# Patient Record
Sex: MALE | Race: OTHER | HISPANIC OR LATINO
[De-identification: names, ages, dates, MRNs, and addresses within clinical notes are randomized per-mention and may not be internally consistent; named-entity substitution may affect disease eponyms.]

---

## 2017-02-13 ENCOUNTER — APPOINTMENT (OUTPATIENT)
Dept: VASCULAR SURGERY | Facility: CLINIC | Age: 52
End: 2017-02-13

## 2017-02-24 ENCOUNTER — APPOINTMENT (OUTPATIENT)
Dept: FAMILY MEDICINE | Facility: CLINIC | Age: 52
End: 2017-02-24

## 2017-02-24 VITALS
SYSTOLIC BLOOD PRESSURE: 129 MMHG | HEART RATE: 92 BPM | BODY MASS INDEX: 23.3 KG/M2 | WEIGHT: 145 LBS | DIASTOLIC BLOOD PRESSURE: 78 MMHG | OXYGEN SATURATION: 95 % | TEMPERATURE: 98.3 F | HEIGHT: 66 IN

## 2017-02-24 DIAGNOSIS — M10.9 GOUT, UNSPECIFIED: ICD-10-CM

## 2017-02-24 DIAGNOSIS — M79.671 PAIN IN RIGHT FOOT: ICD-10-CM

## 2017-02-24 DIAGNOSIS — M25.551 PAIN IN RIGHT HIP: ICD-10-CM

## 2017-02-27 ENCOUNTER — APPOINTMENT (OUTPATIENT)
Dept: VASCULAR SURGERY | Facility: CLINIC | Age: 52
End: 2017-02-27

## 2017-02-27 VITALS
OXYGEN SATURATION: 100 % | SYSTOLIC BLOOD PRESSURE: 128 MMHG | WEIGHT: 143.03 LBS | TEMPERATURE: 98.9 F | DIASTOLIC BLOOD PRESSURE: 86 MMHG | RESPIRATION RATE: 16 BRPM | HEART RATE: 109 BPM | BODY MASS INDEX: 22.99 KG/M2 | HEIGHT: 66 IN

## 2017-03-08 ENCOUNTER — FORM ENCOUNTER (OUTPATIENT)
Age: 52
End: 2017-03-08

## 2017-03-09 ENCOUNTER — APPOINTMENT (OUTPATIENT)
Dept: RADIOLOGY | Facility: CLINIC | Age: 52
End: 2017-03-09

## 2017-03-09 ENCOUNTER — OUTPATIENT (OUTPATIENT)
Dept: OUTPATIENT SERVICES | Facility: HOSPITAL | Age: 52
LOS: 1 days | End: 2017-03-09
Payer: MEDICAID

## 2017-03-09 DIAGNOSIS — Z98.89 OTHER SPECIFIED POSTPROCEDURAL STATES: Chronic | ICD-10-CM

## 2017-03-09 DIAGNOSIS — M25.511 PAIN IN RIGHT SHOULDER: ICD-10-CM

## 2017-03-09 DIAGNOSIS — K40.90 UNILATERAL INGUINAL HERNIA, WITHOUT OBSTRUCTION OR GANGRENE, NOT SPECIFIED AS RECURRENT: Chronic | ICD-10-CM

## 2017-03-09 DIAGNOSIS — Z96.7 PRESENCE OF OTHER BONE AND TENDON IMPLANTS: Chronic | ICD-10-CM

## 2017-03-09 PROCEDURE — 73030 X-RAY EXAM OF SHOULDER: CPT | Mod: 26,RT

## 2017-03-09 PROCEDURE — 73030 X-RAY EXAM OF SHOULDER: CPT

## 2017-03-14 LAB
25(OH)D3 SERPL-MCNC: 25.5 NG/ML
ALBUMIN SERPL ELPH-MCNC: 4.3 G/DL
ALP BLD-CCNC: 145 U/L
ALT SERPL-CCNC: 53 U/L
ANION GAP SERPL CALC-SCNC: 15 MMOL/L
AST SERPL-CCNC: 37 U/L
BASOPHILS # BLD AUTO: 0.02 K/UL
BASOPHILS NFR BLD AUTO: 0.3 %
BILIRUB SERPL-MCNC: 0.2 MG/DL
BUN SERPL-MCNC: 16 MG/DL
CALCIUM SERPL-MCNC: 9.1 MG/DL
CHLORIDE SERPL-SCNC: 103 MMOL/L
CHOLEST SERPL-MCNC: 225 MG/DL
CHOLEST/HDLC SERPL: 4.8 RATIO
CK SERPL-CCNC: 79 U/L
CO2 SERPL-SCNC: 24 MMOL/L
CREAT SERPL-MCNC: 0.8 MG/DL
EOSINOPHIL # BLD AUTO: 0.24 K/UL
EOSINOPHIL NFR BLD AUTO: 3.4 %
ERYTHROCYTE [SEDIMENTATION RATE] IN BLOOD BY WESTERGREN METHOD: 15 MM/HR
GGT SERPL-CCNC: 438 U/L
GLUCOSE SERPL-MCNC: 98 MG/DL
HBA1C MFR BLD HPLC: 5.8 %
HCT VFR BLD CALC: 42.4 %
HDLC SERPL-MCNC: 47 MG/DL
HGB BLD-MCNC: 14.1 G/DL
IMM GRANULOCYTES NFR BLD AUTO: 0.3 %
LDLC SERPL CALC-MCNC: 126 MG/DL
LYMPHOCYTES # BLD AUTO: 2.9 K/UL
LYMPHOCYTES NFR BLD AUTO: 40.7 %
MAN DIFF?: NORMAL
MCHC RBC-ENTMCNC: 30.6 PG
MCHC RBC-ENTMCNC: 33.3 GM/DL
MCV RBC AUTO: 92 FL
MONOCYTES # BLD AUTO: 0.51 K/UL
MONOCYTES NFR BLD AUTO: 7.2 %
NEUTROPHILS # BLD AUTO: 3.44 K/UL
NEUTROPHILS NFR BLD AUTO: 48.1 %
PHENYTOIN SERPL QL: 9.3 UG/ML
PLATELET # BLD AUTO: 195 K/UL
POTASSIUM SERPL-SCNC: 4.1 MMOL/L
PROT SERPL-MCNC: 7.3 G/DL
RBC # BLD: 4.61 M/UL
RBC # FLD: 14.9 %
SODIUM SERPL-SCNC: 142 MMOL/L
T4 FREE SERPL-MCNC: 0.8 NG/DL
TRIGL SERPL-MCNC: 260 MG/DL
TSH SERPL-ACNC: 2.15 UIU/ML
URATE SERPL-MCNC: 5 MG/DL
WBC # FLD AUTO: 7.13 K/UL

## 2017-03-27 ENCOUNTER — FORM ENCOUNTER (OUTPATIENT)
Age: 52
End: 2017-03-27

## 2017-03-28 ENCOUNTER — OUTPATIENT (OUTPATIENT)
Dept: OUTPATIENT SERVICES | Facility: HOSPITAL | Age: 52
LOS: 1 days | End: 2017-03-28
Payer: MEDICAID

## 2017-03-28 ENCOUNTER — APPOINTMENT (OUTPATIENT)
Dept: ULTRASOUND IMAGING | Facility: CLINIC | Age: 52
End: 2017-03-28

## 2017-03-28 DIAGNOSIS — K40.90 UNILATERAL INGUINAL HERNIA, WITHOUT OBSTRUCTION OR GANGRENE, NOT SPECIFIED AS RECURRENT: Chronic | ICD-10-CM

## 2017-03-28 DIAGNOSIS — R74.8 ABNORMAL LEVELS OF OTHER SERUM ENZYMES: ICD-10-CM

## 2017-03-28 DIAGNOSIS — Z98.89 OTHER SPECIFIED POSTPROCEDURAL STATES: Chronic | ICD-10-CM

## 2017-03-28 DIAGNOSIS — Z96.7 PRESENCE OF OTHER BONE AND TENDON IMPLANTS: Chronic | ICD-10-CM

## 2017-03-28 PROCEDURE — 76700 US EXAM ABDOM COMPLETE: CPT

## 2017-04-10 ENCOUNTER — APPOINTMENT (OUTPATIENT)
Dept: ORTHOPEDIC SURGERY | Facility: CLINIC | Age: 52
End: 2017-04-10

## 2017-04-10 VITALS
HEIGHT: 66 IN | WEIGHT: 155 LBS | BODY MASS INDEX: 24.91 KG/M2 | HEART RATE: 86 BPM | DIASTOLIC BLOOD PRESSURE: 73 MMHG | SYSTOLIC BLOOD PRESSURE: 123 MMHG

## 2017-04-24 ENCOUNTER — FORM ENCOUNTER (OUTPATIENT)
Age: 52
End: 2017-04-24

## 2017-04-25 ENCOUNTER — APPOINTMENT (OUTPATIENT)
Dept: MRI IMAGING | Facility: CLINIC | Age: 52
End: 2017-04-25

## 2017-04-25 ENCOUNTER — OUTPATIENT (OUTPATIENT)
Dept: OUTPATIENT SERVICES | Facility: HOSPITAL | Age: 52
LOS: 1 days | End: 2017-04-25
Payer: MEDICAID

## 2017-04-25 DIAGNOSIS — Z98.89 OTHER SPECIFIED POSTPROCEDURAL STATES: Chronic | ICD-10-CM

## 2017-04-25 DIAGNOSIS — Z96.7 PRESENCE OF OTHER BONE AND TENDON IMPLANTS: Chronic | ICD-10-CM

## 2017-04-25 DIAGNOSIS — M19.011 PRIMARY OSTEOARTHRITIS, RIGHT SHOULDER: ICD-10-CM

## 2017-04-25 DIAGNOSIS — K40.90 UNILATERAL INGUINAL HERNIA, WITHOUT OBSTRUCTION OR GANGRENE, NOT SPECIFIED AS RECURRENT: Chronic | ICD-10-CM

## 2017-04-25 PROCEDURE — 73221 MRI JOINT UPR EXTREM W/O DYE: CPT

## 2017-04-26 ENCOUNTER — MESSAGE (OUTPATIENT)
Age: 52
End: 2017-04-26

## 2017-05-15 ENCOUNTER — APPOINTMENT (OUTPATIENT)
Dept: ORTHOPEDIC SURGERY | Facility: CLINIC | Age: 52
End: 2017-05-15

## 2017-05-15 VITALS
WEIGHT: 155 LBS | SYSTOLIC BLOOD PRESSURE: 118 MMHG | BODY MASS INDEX: 24.91 KG/M2 | DIASTOLIC BLOOD PRESSURE: 78 MMHG | HEIGHT: 66 IN | HEART RATE: 96 BPM

## 2017-05-15 DIAGNOSIS — M19.019 PRIMARY OSTEOARTHRITIS, UNSPECIFIED SHOULDER: ICD-10-CM

## 2017-05-15 DIAGNOSIS — M19.011 PRIMARY OSTEOARTHRITIS, RIGHT SHOULDER: ICD-10-CM

## 2017-05-15 DIAGNOSIS — M75.51 BURSITIS OF RIGHT SHOULDER: ICD-10-CM

## 2017-05-22 ENCOUNTER — APPOINTMENT (OUTPATIENT)
Dept: VASCULAR SURGERY | Facility: CLINIC | Age: 52
End: 2017-05-22

## 2017-05-24 ENCOUNTER — FORM ENCOUNTER (OUTPATIENT)
Age: 52
End: 2017-05-24

## 2017-05-25 ENCOUNTER — OUTPATIENT (OUTPATIENT)
Dept: OUTPATIENT SERVICES | Facility: HOSPITAL | Age: 52
LOS: 1 days | End: 2017-05-25
Payer: MEDICAID

## 2017-05-25 ENCOUNTER — APPOINTMENT (OUTPATIENT)
Dept: CT IMAGING | Facility: CLINIC | Age: 52
End: 2017-05-25

## 2017-05-25 DIAGNOSIS — Z98.89 OTHER SPECIFIED POSTPROCEDURAL STATES: Chronic | ICD-10-CM

## 2017-05-25 DIAGNOSIS — K40.90 UNILATERAL INGUINAL HERNIA, WITHOUT OBSTRUCTION OR GANGRENE, NOT SPECIFIED AS RECURRENT: Chronic | ICD-10-CM

## 2017-05-25 DIAGNOSIS — Z96.7 PRESENCE OF OTHER BONE AND TENDON IMPLANTS: Chronic | ICD-10-CM

## 2017-05-25 DIAGNOSIS — M19.011 PRIMARY OSTEOARTHRITIS, RIGHT SHOULDER: ICD-10-CM

## 2017-05-25 PROCEDURE — 73200 CT UPPER EXTREMITY W/O DYE: CPT

## 2017-05-27 ENCOUNTER — EMERGENCY (EMERGENCY)
Facility: HOSPITAL | Age: 52
LOS: 1 days | Discharge: HOME | End: 2017-05-27
Attending: EMERGENCY MEDICINE
Payer: COMMERCIAL

## 2017-05-27 VITALS
SYSTOLIC BLOOD PRESSURE: 134 MMHG | HEIGHT: 65 IN | OXYGEN SATURATION: 97 % | DIASTOLIC BLOOD PRESSURE: 94 MMHG | RESPIRATION RATE: 20 BRPM | WEIGHT: 138.01 LBS | TEMPERATURE: 98 F | HEART RATE: 100 BPM

## 2017-05-27 DIAGNOSIS — Z96.7 PRESENCE OF OTHER BONE AND TENDON IMPLANTS: Chronic | ICD-10-CM

## 2017-05-27 DIAGNOSIS — Z98.89 OTHER SPECIFIED POSTPROCEDURAL STATES: Chronic | ICD-10-CM

## 2017-05-27 DIAGNOSIS — K40.90 UNILATERAL INGUINAL HERNIA, WITHOUT OBSTRUCTION OR GANGRENE, NOT SPECIFIED AS RECURRENT: Chronic | ICD-10-CM

## 2017-05-27 PROCEDURE — 99283 EMERGENCY DEPT VISIT LOW MDM: CPT

## 2017-05-27 RX ORDER — CIPROFLOXACIN AND DEXAMETHASONE 3; 1 MG/ML; MG/ML
4 SUSPENSION/ DROPS AURICULAR (OTIC)
Qty: 1 | Refills: 0 | OUTPATIENT
Start: 2017-05-27 | End: 2017-06-03

## 2017-05-27 RX ORDER — IBUPROFEN 200 MG
1 TABLET ORAL
Qty: 15 | Refills: 0 | OUTPATIENT
Start: 2017-05-27 | End: 2017-06-01

## 2017-05-27 RX ORDER — IBUPROFEN 200 MG
800 TABLET ORAL ONCE
Qty: 0 | Refills: 0 | Status: COMPLETED | OUTPATIENT
Start: 2017-05-27 | End: 2017-05-27

## 2017-05-27 RX ADMIN — Medication 800 MILLIGRAM(S): at 11:25

## 2017-05-27 NOTE — ED STATDOCS - OBJECTIVE STATEMENT
53 y/o M pt w/ no significant PMHx presents to the ED c/o L ear pain and L facial pain onset today morning. Pt also notes bleeding from L ear and decreased hearing. Pt states that he cleaned out the blood in his ear w/ a Q-tip. Pt denies fever, chills, throat pain, vomiting, or any other complaints. NKDA. Pt took Tylenol to no relief.

## 2017-05-27 NOTE — ED STATDOCS - CARE PLAN
Principal Discharge DX:	Left otitis media, unspecified chronicity, unspecified otitis media type  Secondary Diagnosis:	Acute otitis externa of left ear, unspecified type

## 2017-05-27 NOTE — ED STATDOCS - DETAILS:
I, Jennifer Smith, personally performed the services described in the documentation, reviewed the documentation recorded by the scribe in my presence and it accurately and completely records my words and action.

## 2017-05-27 NOTE — ED STATDOCS - NS ED MD SCRIBE ATTENDING SCRIBE SECTIONS
VITAL SIGNS( Pullset)/PAST MEDICAL/SURGICAL/SOCIAL HISTORY/REVIEW OF SYSTEMS/INTAKE ASSESSMENT/SCREENINGS/HISTORY OF PRESENT ILLNESS/DISPOSITION/PHYSICAL EXAM/HIV

## 2017-05-27 NOTE — ED ADULT TRIAGE NOTE - CHIEF COMPLAINT QUOTE
pt states he started having pain to his left ear at 5am this morning. pt denies injury  and states there was blood on his pillow. while showering water got into his ear and now he has decreased hearing. pt states he is also unable to bite down.

## 2017-05-27 NOTE — ED STATDOCS - ENMT, MLM
Nasal mucosa clear.  Mouth with normal mucosa  L TM red, L ear canal red, no perforation, R TM within normal limits. Throat has no vesicles, no oropharyngeal exudates and uvula is midline.

## 2017-05-30 ENCOUNTER — OTHER (OUTPATIENT)
Age: 52
End: 2017-05-30

## 2017-06-08 ENCOUNTER — FORM ENCOUNTER (OUTPATIENT)
Age: 52
End: 2017-06-08

## 2017-06-09 ENCOUNTER — OUTPATIENT (OUTPATIENT)
Dept: OUTPATIENT SERVICES | Facility: HOSPITAL | Age: 52
LOS: 1 days | End: 2017-06-09
Payer: COMMERCIAL

## 2017-06-09 VITALS
WEIGHT: 138.89 LBS | DIASTOLIC BLOOD PRESSURE: 60 MMHG | RESPIRATION RATE: 16 BRPM | HEART RATE: 88 BPM | HEIGHT: 65 IN | TEMPERATURE: 98 F | SYSTOLIC BLOOD PRESSURE: 100 MMHG

## 2017-06-09 DIAGNOSIS — M19.019 PRIMARY OSTEOARTHRITIS, UNSPECIFIED SHOULDER: ICD-10-CM

## 2017-06-09 DIAGNOSIS — Z01.818 ENCOUNTER FOR OTHER PREPROCEDURAL EXAMINATION: ICD-10-CM

## 2017-06-09 DIAGNOSIS — Z96.7 PRESENCE OF OTHER BONE AND TENDON IMPLANTS: Chronic | ICD-10-CM

## 2017-06-09 DIAGNOSIS — R56.9 UNSPECIFIED CONVULSIONS: ICD-10-CM

## 2017-06-09 DIAGNOSIS — I10 ESSENTIAL (PRIMARY) HYPERTENSION: ICD-10-CM

## 2017-06-09 DIAGNOSIS — K40.90 UNILATERAL INGUINAL HERNIA, WITHOUT OBSTRUCTION OR GANGRENE, NOT SPECIFIED AS RECURRENT: Chronic | ICD-10-CM

## 2017-06-09 DIAGNOSIS — Z98.89 OTHER SPECIFIED POSTPROCEDURAL STATES: Chronic | ICD-10-CM

## 2017-06-09 LAB
ANION GAP SERPL CALC-SCNC: 16 MMOL/L — SIGNIFICANT CHANGE UP (ref 5–17)
APTT BLD: 27.8 SEC — SIGNIFICANT CHANGE UP (ref 27.5–37.4)
BASOPHILS # BLD AUTO: 0 K/UL — SIGNIFICANT CHANGE UP (ref 0–0.2)
BASOPHILS NFR BLD AUTO: 0.2 % — SIGNIFICANT CHANGE UP (ref 0–2)
BLD GP AB SCN SERPL QL: SIGNIFICANT CHANGE UP
BUN SERPL-MCNC: 18 MG/DL — SIGNIFICANT CHANGE UP (ref 8–20)
CALCIUM SERPL-MCNC: 9.3 MG/DL — SIGNIFICANT CHANGE UP (ref 8.6–10.2)
CHLORIDE SERPL-SCNC: 100 MMOL/L — SIGNIFICANT CHANGE UP (ref 98–107)
CO2 SERPL-SCNC: 25 MMOL/L — SIGNIFICANT CHANGE UP (ref 22–29)
CREAT SERPL-MCNC: 0.8 MG/DL — SIGNIFICANT CHANGE UP (ref 0.5–1.3)
EOSINOPHIL # BLD AUTO: 0.1 K/UL — SIGNIFICANT CHANGE UP (ref 0–0.5)
EOSINOPHIL NFR BLD AUTO: 1.7 % — SIGNIFICANT CHANGE UP (ref 0–5)
GLUCOSE SERPL-MCNC: 106 MG/DL — SIGNIFICANT CHANGE UP (ref 70–115)
HCT VFR BLD CALC: 42.6 % — SIGNIFICANT CHANGE UP (ref 42–52)
HGB BLD-MCNC: 14.8 G/DL — SIGNIFICANT CHANGE UP (ref 14–18)
INR BLD: 0.89 RATIO — SIGNIFICANT CHANGE UP (ref 0.88–1.16)
LYMPHOCYTES # BLD AUTO: 2.6 K/UL — SIGNIFICANT CHANGE UP (ref 1–4.8)
LYMPHOCYTES # BLD AUTO: 33 % — SIGNIFICANT CHANGE UP (ref 20–55)
MCHC RBC-ENTMCNC: 32 PG — HIGH (ref 27–31)
MCHC RBC-ENTMCNC: 34.7 G/DL — SIGNIFICANT CHANGE UP (ref 32–36)
MCV RBC AUTO: 92 FL — SIGNIFICANT CHANGE UP (ref 80–94)
MONOCYTES # BLD AUTO: 0.7 K/UL — SIGNIFICANT CHANGE UP (ref 0–0.8)
MONOCYTES NFR BLD AUTO: 8.6 % — SIGNIFICANT CHANGE UP (ref 3–10)
MRSA PCR RESULT.: SIGNIFICANT CHANGE UP
NEUTROPHILS # BLD AUTO: 4.5 K/UL — SIGNIFICANT CHANGE UP (ref 1.8–8)
NEUTROPHILS NFR BLD AUTO: 56.1 % — SIGNIFICANT CHANGE UP (ref 37–73)
PLATELET # BLD AUTO: 173 K/UL — SIGNIFICANT CHANGE UP (ref 150–400)
POTASSIUM SERPL-MCNC: 4.2 MMOL/L — SIGNIFICANT CHANGE UP (ref 3.5–5.3)
POTASSIUM SERPL-SCNC: 4.2 MMOL/L — SIGNIFICANT CHANGE UP (ref 3.5–5.3)
PROTHROM AB SERPL-ACNC: 9.8 SEC — SIGNIFICANT CHANGE UP (ref 9.8–12.7)
RBC # BLD: 4.63 M/UL — SIGNIFICANT CHANGE UP (ref 4.6–6.2)
RBC # FLD: 14.5 % — SIGNIFICANT CHANGE UP (ref 11–15.6)
S AUREUS DNA NOSE QL NAA+PROBE: SIGNIFICANT CHANGE UP
SODIUM SERPL-SCNC: 141 MMOL/L — SIGNIFICANT CHANGE UP (ref 135–145)
TYPE + AB SCN PNL BLD: SIGNIFICANT CHANGE UP
WBC # BLD: 8 K/UL — SIGNIFICANT CHANGE UP (ref 4.8–10.8)
WBC # FLD AUTO: 8 K/UL — SIGNIFICANT CHANGE UP (ref 4.8–10.8)

## 2017-06-09 PROCEDURE — 85027 COMPLETE CBC AUTOMATED: CPT

## 2017-06-09 PROCEDURE — 86900 BLOOD TYPING SEROLOGIC ABO: CPT

## 2017-06-09 PROCEDURE — 71046 X-RAY EXAM CHEST 2 VIEWS: CPT

## 2017-06-09 PROCEDURE — 86901 BLOOD TYPING SEROLOGIC RH(D): CPT

## 2017-06-09 PROCEDURE — 87640 STAPH A DNA AMP PROBE: CPT

## 2017-06-09 PROCEDURE — G0463: CPT

## 2017-06-09 PROCEDURE — 87641 MR-STAPH DNA AMP PROBE: CPT

## 2017-06-09 PROCEDURE — 85610 PROTHROMBIN TIME: CPT

## 2017-06-09 PROCEDURE — 80048 BASIC METABOLIC PNL TOTAL CA: CPT

## 2017-06-09 PROCEDURE — 85730 THROMBOPLASTIN TIME PARTIAL: CPT

## 2017-06-09 PROCEDURE — 93010 ELECTROCARDIOGRAM REPORT: CPT

## 2017-06-09 PROCEDURE — 71020: CPT | Mod: 26

## 2017-06-09 PROCEDURE — 86850 RBC ANTIBODY SCREEN: CPT

## 2017-06-09 PROCEDURE — 93005 ELECTROCARDIOGRAM TRACING: CPT

## 2017-06-09 RX ORDER — SODIUM CHLORIDE 9 MG/ML
3 INJECTION INTRAMUSCULAR; INTRAVENOUS; SUBCUTANEOUS EVERY 8 HOURS
Qty: 0 | Refills: 0 | Status: DISCONTINUED | OUTPATIENT
Start: 2017-07-05 | End: 2017-07-05

## 2017-06-09 NOTE — PATIENT PROFILE ADULT. - FAMILY HISTORY
Mother  Still living? No  Family history of breast cancer, Age at diagnosis: Age Unknown  Family history of hepatitis, Age at diagnosis: Age Unknown  Family history of heart disease, Age at diagnosis: Age Unknown     Father  Still living? Yes, Estimated age: Age Unknown  Family history of CABG, Age at diagnosis: 61-70  Family history of peripheral vascular disease, Age at diagnosis: Age Unknown     Sibling  Still living? Yes, Estimated age: Age Unknown  Family history of heart disease, Age at diagnosis: 51-60     Sibling  Still living? Yes, Estimated age: Age Unknown  Family history of brain aneurysm, Age at diagnosis: Age Unknown

## 2017-06-09 NOTE — H&P PST ADULT - PMH
Depression    GERD (gastroesophageal reflux disease)    Hypercholesterolemia    Seizures Depression    GERD (gastroesophageal reflux disease)    Hypercholesterolemia    Hypertension    Osteoarthritis    Peripheral vascular disease  with stents  Seizures

## 2017-06-09 NOTE — H&P PST ADULT - FAMILY HISTORY
Mother  Still living? No  Family history of breast cancer, Age at diagnosis: Age Unknown Mother  Still living? No  Family history of breast cancer, Age at diagnosis: Age Unknown  Family history of hepatitis, Age at diagnosis: Age Unknown  Family history of heart disease, Age at diagnosis: Age Unknown     Father  Still living? Yes, Estimated age: Age Unknown  Family history of CABG, Age at diagnosis: 61-70  Family history of peripheral vascular disease, Age at diagnosis: Age Unknown     Sibling  Still living? Yes, Estimated age: Age Unknown  Family history of heart disease, Age at diagnosis: 51-60     Sibling  Still living? Yes, Estimated age: Age Unknown  Family history of brain aneurysm, Age at diagnosis: Age Unknown

## 2017-06-09 NOTE — H&P PST ADULT - PSH
Inguinal hernia, left    S/P appendectomy    S/P ORIF (open reduction internal fixation) fracture  Left  S/P shoulder surgery  right

## 2017-06-09 NOTE — H&P PST ADULT - HISTORY OF PRESENT ILLNESS
52M with osteoarthritis, for Right Shoulder Replacement. 52M with osteoarthritis, for Right Shoulder Replacement. Pain started 2 months ago and recently got much worse.

## 2017-06-09 NOTE — PATIENT PROFILE ADULT. - LEARNING ASSESSMENT (PATIENT) ADDITIONAL COMMENTS
pre-op instructions, surgical wash, MRSA/MSSA & pain management reviewed pt verbalized understanding

## 2017-06-09 NOTE — H&P PST ADULT - ATTENDING COMMENTS
Agree - reviewed on 6/10 by Dr. Yarbrough Agree - reviewed on 6/10 by Dr. Yarbrough  - Saw pt today on 7/5/17 - Plan for right total shoulder arthroplasty

## 2017-06-09 NOTE — H&P PST ADULT - ASSESSMENT
52M PMH HTN, Hypercholesterolemia, PVD with stents, Depression, GERD, Seizures and Osteoarthritis for Right Total Shoulder Replacement.

## 2017-06-09 NOTE — H&P PST ADULT - PROBLEM SELECTOR PLAN 2
Medical and Cardiac clearances pending. Medical and Cardiac clearances pending. Hold Aspirin and Plavix as directed by PMD.

## 2017-06-09 NOTE — PATIENT PROFILE ADULT. - PMH
Depression    GERD (gastroesophageal reflux disease)    Hypercholesterolemia    Hypertension    Osteoarthritis    Peripheral vascular disease  with stents  Seizures

## 2017-06-21 ENCOUNTER — APPOINTMENT (OUTPATIENT)
Dept: VASCULAR SURGERY | Facility: CLINIC | Age: 52
End: 2017-06-21

## 2017-06-21 VITALS
HEIGHT: 66 IN | SYSTOLIC BLOOD PRESSURE: 99 MMHG | DIASTOLIC BLOOD PRESSURE: 69 MMHG | BODY MASS INDEX: 24.91 KG/M2 | HEART RATE: 69 BPM | OXYGEN SATURATION: 98 % | WEIGHT: 155 LBS

## 2017-06-26 ENCOUNTER — APPOINTMENT (OUTPATIENT)
Dept: FAMILY MEDICINE | Facility: CLINIC | Age: 52
End: 2017-06-26

## 2017-06-26 VITALS
HEART RATE: 94 BPM | BODY MASS INDEX: 23.3 KG/M2 | HEIGHT: 66 IN | WEIGHT: 145 LBS | TEMPERATURE: 98.5 F | SYSTOLIC BLOOD PRESSURE: 159 MMHG | DIASTOLIC BLOOD PRESSURE: 83 MMHG | OXYGEN SATURATION: 97 %

## 2017-06-26 DIAGNOSIS — Z86.69 PERSONAL HISTORY OF OTHER DISEASES OF THE NERVOUS SYSTEM AND SENSE ORGANS: ICD-10-CM

## 2017-06-26 DIAGNOSIS — H91.92 UNSPECIFIED HEARING LOSS, LEFT EAR: ICD-10-CM

## 2017-06-26 DIAGNOSIS — Z92.29 PERSONAL HISTORY OF OTHER DRUG THERAPY: ICD-10-CM

## 2017-06-26 DIAGNOSIS — G89.29 PAIN IN RIGHT SHOULDER: ICD-10-CM

## 2017-06-26 DIAGNOSIS — Z87.898 PERSONAL HISTORY OF OTHER SPECIFIED CONDITIONS: ICD-10-CM

## 2017-06-26 DIAGNOSIS — M25.511 PAIN IN RIGHT SHOULDER: ICD-10-CM

## 2017-07-03 RX ORDER — CEFAZOLIN SODIUM 1 G
2000 VIAL (EA) INJECTION ONCE
Qty: 0 | Refills: 0 | Status: DISCONTINUED | OUTPATIENT
Start: 2017-07-05 | End: 2017-07-05

## 2017-07-05 ENCOUNTER — INPATIENT (INPATIENT)
Facility: HOSPITAL | Age: 52
LOS: 0 days | Discharge: HOME | DRG: 483 | End: 2017-07-06
Attending: ORTHOPAEDIC SURGERY | Admitting: ORTHOPAEDIC SURGERY
Payer: COMMERCIAL

## 2017-07-05 ENCOUNTER — RESULT REVIEW (OUTPATIENT)
Age: 52
End: 2017-07-05

## 2017-07-05 ENCOUNTER — APPOINTMENT (OUTPATIENT)
Dept: ORTHOPEDIC SURGERY | Facility: HOSPITAL | Age: 52
End: 2017-07-05

## 2017-07-05 VITALS
DIASTOLIC BLOOD PRESSURE: 78 MMHG | WEIGHT: 138.89 LBS | OXYGEN SATURATION: 98 % | SYSTOLIC BLOOD PRESSURE: 123 MMHG | TEMPERATURE: 98 F | RESPIRATION RATE: 16 BRPM | HEART RATE: 64 BPM | HEIGHT: 65 IN

## 2017-07-05 DIAGNOSIS — Z98.89 OTHER SPECIFIED POSTPROCEDURAL STATES: Chronic | ICD-10-CM

## 2017-07-05 DIAGNOSIS — K40.90 UNILATERAL INGUINAL HERNIA, WITHOUT OBSTRUCTION OR GANGRENE, NOT SPECIFIED AS RECURRENT: Chronic | ICD-10-CM

## 2017-07-05 DIAGNOSIS — Z96.7 PRESENCE OF OTHER BONE AND TENDON IMPLANTS: Chronic | ICD-10-CM

## 2017-07-05 DIAGNOSIS — M19.019 PRIMARY OSTEOARTHRITIS, UNSPECIFIED SHOULDER: ICD-10-CM

## 2017-07-05 LAB — BLD GP AB SCN SERPL QL: SIGNIFICANT CHANGE UP

## 2017-07-05 PROCEDURE — 23472 RECONSTRUCT SHOULDER JOINT: CPT | Mod: RT

## 2017-07-05 PROCEDURE — 88311 DECALCIFY TISSUE: CPT | Mod: 26

## 2017-07-05 PROCEDURE — 23472 RECONSTRUCT SHOULDER JOINT: CPT | Mod: AS,RT

## 2017-07-05 PROCEDURE — 73030 X-RAY EXAM OF SHOULDER: CPT | Mod: 26,RT

## 2017-07-05 PROCEDURE — 88305 TISSUE EXAM BY PATHOLOGIST: CPT | Mod: 26

## 2017-07-05 RX ORDER — CLOPIDOGREL BISULFATE 75 MG/1
75 TABLET, FILM COATED ORAL DAILY
Qty: 0 | Refills: 0 | Status: DISCONTINUED | OUTPATIENT
Start: 2017-07-06 | End: 2017-07-06

## 2017-07-05 RX ORDER — SODIUM CHLORIDE 9 MG/ML
1000 INJECTION, SOLUTION INTRAVENOUS
Qty: 0 | Refills: 0 | Status: DISCONTINUED | OUTPATIENT
Start: 2017-07-05 | End: 2017-07-06

## 2017-07-05 RX ORDER — HYDROMORPHONE HYDROCHLORIDE 2 MG/ML
0.5 INJECTION INTRAMUSCULAR; INTRAVENOUS; SUBCUTANEOUS EVERY 4 HOURS
Qty: 0 | Refills: 0 | Status: DISCONTINUED | OUTPATIENT
Start: 2017-07-05 | End: 2017-07-06

## 2017-07-05 RX ORDER — HYDROMORPHONE HYDROCHLORIDE 2 MG/ML
1 INJECTION INTRAMUSCULAR; INTRAVENOUS; SUBCUTANEOUS
Qty: 0 | Refills: 0 | Status: DISCONTINUED | OUTPATIENT
Start: 2017-07-05 | End: 2017-07-06

## 2017-07-05 RX ORDER — POLYETHYLENE GLYCOL 3350 17 G/17G
17 POWDER, FOR SOLUTION ORAL DAILY
Qty: 0 | Refills: 0 | Status: DISCONTINUED | OUTPATIENT
Start: 2017-07-05 | End: 2017-07-06

## 2017-07-05 RX ORDER — OXYCODONE HYDROCHLORIDE 5 MG/1
10 TABLET ORAL
Qty: 0 | Refills: 0 | Status: DISCONTINUED | OUTPATIENT
Start: 2017-07-05 | End: 2017-07-06

## 2017-07-05 RX ORDER — HYDROMORPHONE HYDROCHLORIDE 2 MG/ML
2 INJECTION INTRAMUSCULAR; INTRAVENOUS; SUBCUTANEOUS
Qty: 0 | Refills: 0 | Status: DISCONTINUED | OUTPATIENT
Start: 2017-07-05 | End: 2017-07-06

## 2017-07-05 RX ORDER — OXYCODONE HYDROCHLORIDE 5 MG/1
5 TABLET ORAL
Qty: 0 | Refills: 0 | Status: DISCONTINUED | OUTPATIENT
Start: 2017-07-05 | End: 2017-07-06

## 2017-07-05 RX ORDER — ASPIRIN/CALCIUM CARB/MAGNESIUM 324 MG
81 TABLET ORAL DAILY
Qty: 0 | Refills: 0 | Status: DISCONTINUED | OUTPATIENT
Start: 2017-07-06 | End: 2017-07-06

## 2017-07-05 RX ORDER — ONDANSETRON 8 MG/1
4 TABLET, FILM COATED ORAL ONCE
Qty: 0 | Refills: 0 | Status: DISCONTINUED | OUTPATIENT
Start: 2017-07-05 | End: 2017-07-05

## 2017-07-05 RX ORDER — ACETAMINOPHEN 500 MG
975 TABLET ORAL EVERY 8 HOURS
Qty: 0 | Refills: 0 | Status: DISCONTINUED | OUTPATIENT
Start: 2017-07-05 | End: 2017-07-06

## 2017-07-05 RX ORDER — ONDANSETRON 8 MG/1
4 TABLET, FILM COATED ORAL EVERY 6 HOURS
Qty: 0 | Refills: 0 | Status: DISCONTINUED | OUTPATIENT
Start: 2017-07-05 | End: 2017-07-06

## 2017-07-05 RX ORDER — VENLAFAXINE HCL 75 MG
75 CAPSULE, EXT RELEASE 24 HR ORAL DAILY
Qty: 0 | Refills: 0 | Status: DISCONTINUED | OUTPATIENT
Start: 2017-07-05 | End: 2017-07-06

## 2017-07-05 RX ORDER — ATORVASTATIN CALCIUM 80 MG/1
80 TABLET, FILM COATED ORAL AT BEDTIME
Qty: 0 | Refills: 0 | Status: DISCONTINUED | OUTPATIENT
Start: 2017-07-05 | End: 2017-07-06

## 2017-07-05 RX ORDER — MAGNESIUM HYDROXIDE 400 MG/1
30 TABLET, CHEWABLE ORAL DAILY
Qty: 0 | Refills: 0 | Status: DISCONTINUED | OUTPATIENT
Start: 2017-07-05 | End: 2017-07-06

## 2017-07-05 RX ORDER — PANTOPRAZOLE SODIUM 20 MG/1
40 TABLET, DELAYED RELEASE ORAL
Qty: 0 | Refills: 0 | Status: DISCONTINUED | OUTPATIENT
Start: 2017-07-05 | End: 2017-07-06

## 2017-07-05 RX ORDER — QUETIAPINE FUMARATE 200 MG/1
200 TABLET, FILM COATED ORAL AT BEDTIME
Qty: 0 | Refills: 0 | Status: DISCONTINUED | OUTPATIENT
Start: 2017-07-05 | End: 2017-07-06

## 2017-07-05 RX ORDER — SODIUM CHLORIDE 9 MG/ML
1000 INJECTION, SOLUTION INTRAVENOUS
Qty: 0 | Refills: 0 | Status: DISCONTINUED | OUTPATIENT
Start: 2017-07-05 | End: 2017-07-05

## 2017-07-05 RX ORDER — CEFAZOLIN SODIUM 1 G
2000 VIAL (EA) INJECTION
Qty: 0 | Refills: 0 | Status: COMPLETED | OUTPATIENT
Start: 2017-07-05 | End: 2017-07-06

## 2017-07-05 RX ORDER — SENNA PLUS 8.6 MG/1
2 TABLET ORAL AT BEDTIME
Qty: 0 | Refills: 0 | Status: DISCONTINUED | OUTPATIENT
Start: 2017-07-05 | End: 2017-07-06

## 2017-07-05 RX ORDER — VANCOMYCIN HCL 1 G
1000 VIAL (EA) INTRAVENOUS
Qty: 0 | Refills: 0 | Status: COMPLETED | OUTPATIENT
Start: 2017-07-05 | End: 2017-07-05

## 2017-07-05 RX ORDER — VANCOMYCIN HCL 1 G
1000 VIAL (EA) INTRAVENOUS ONCE
Qty: 0 | Refills: 0 | Status: COMPLETED | OUTPATIENT
Start: 2017-07-05 | End: 2017-07-05

## 2017-07-05 RX ORDER — DOCUSATE SODIUM 100 MG
100 CAPSULE ORAL THREE TIMES A DAY
Qty: 0 | Refills: 0 | Status: DISCONTINUED | OUTPATIENT
Start: 2017-07-05 | End: 2017-07-06

## 2017-07-05 RX ORDER — ACETAMINOPHEN 500 MG
650 TABLET ORAL EVERY 6 HOURS
Qty: 0 | Refills: 0 | Status: DISCONTINUED | OUTPATIENT
Start: 2017-07-05 | End: 2017-07-06

## 2017-07-05 RX ORDER — HYDROCHLOROTHIAZIDE 25 MG
25 TABLET ORAL DAILY
Qty: 0 | Refills: 0 | Status: DISCONTINUED | OUTPATIENT
Start: 2017-07-06 | End: 2017-07-06

## 2017-07-05 RX ADMIN — Medication 250 MILLIGRAM(S): at 12:15

## 2017-07-05 RX ADMIN — SODIUM CHLORIDE 100 MILLILITER(S): 9 INJECTION, SOLUTION INTRAVENOUS at 17:43

## 2017-07-05 RX ADMIN — Medication 200 MILLIGRAM(S): at 18:12

## 2017-07-05 RX ADMIN — QUETIAPINE FUMARATE 200 MILLIGRAM(S): 200 TABLET, FILM COATED ORAL at 21:51

## 2017-07-05 RX ADMIN — ATORVASTATIN CALCIUM 80 MILLIGRAM(S): 80 TABLET, FILM COATED ORAL at 21:51

## 2017-07-05 RX ADMIN — Medication 100 MILLIGRAM(S): at 21:51

## 2017-07-05 RX ADMIN — Medication 100 MILLIGRAM(S): at 19:48

## 2017-07-05 RX ADMIN — Medication 75 MILLIGRAM(S): at 18:12

## 2017-07-05 RX ADMIN — Medication 250 MILLIGRAM(S): at 23:34

## 2017-07-05 RX ADMIN — Medication 975 MILLIGRAM(S): at 21:51

## 2017-07-05 RX ADMIN — Medication 975 MILLIGRAM(S): at 22:21

## 2017-07-05 NOTE — DISCHARGE NOTE ADULT - CARE PLAN
Principal Discharge DX:	Primary osteoarthritis of right shoulder  Goal:	improve pain and ability to perform ADL  Instructions for follow-up, activity and diet:	The patient will be seen in the office in 2 weeks for wound check. Tape will be removed at that time. Patient may shower after post-op day #3. The dressing is to be removed on 7/15/17. IF THE DRESSING BECOMES SOILED BEFORE THE REMOVAL DATE, CHANGE WITH A SIMILAR DRESSING. IF THE DRESSING BECOMES STAINED WITH DISCHARGE, CONTACT THE OFFICE FOR FURTHER DIRECTIONS. The patient will contact the office if the wound becomes red, has increasing pain, develops bleeding or discharge, an injury occurs, or has other concerns. The patient will continue PT consistent with total shoulder replacement. The patient will continue aspirin and plavix for blood clot prevention. The patient will take OXYCODONE AND TYLENOL for pain control and titrate according to prescription and patient needs. The patient will take Colace while taking oxycodone to prevent narcotic associated constipation.  Additionally, increase water intake (drink at least 8 glasses of water daily) and try adding fiber to the diet by eating fruits, vegetables and foods that are rich in grains. If constipation is experienced, contact the medical/primary care provider to discuss further treatment options. The patient is non-weight bearing. Elevation of the upper extremity is recommended to reduce swelling. Principal Discharge DX:	Primary osteoarthritis of right shoulder  Goal:	improve pain and ability to perform ADL  Instructions for follow-up, activity and diet:	The patient will be seen in the office in 2 weeks for wound check. Tape will be removed at that time. Patient may shower after post-op day #5. The dressing is to be removed on 7/15/17. IF THE DRESSING BECOMES SOILED BEFORE THE REMOVAL DATE, CHANGE WITH A SIMILAR DRESSING. IF THE DRESSING BECOMES STAINED WITH DISCHARGE, CONTACT THE OFFICE FOR FURTHER DIRECTIONS. The patient will contact the office if the wound becomes red, has increasing pain, develops bleeding or discharge, an injury occurs, or has other concerns. The patient will continue PT consistent with total shoulder replacement. The patient will continue aspirin and plavix for blood clot prevention. The patient will take OXYCODONE AND TYLENOL for pain control and titrate according to prescription and patient needs. The patient will take Colace while taking oxycodone to prevent narcotic associated constipation.  Additionally, increase water intake (drink at least 8 glasses of water daily) and try adding fiber to the diet by eating fruits, vegetables and foods that are rich in grains. If constipation is experienced, contact the medical/primary care provider to discuss further treatment options. The patient is non-weight bearing.

## 2017-07-05 NOTE — DISCHARGE NOTE ADULT - MEDICATION SUMMARY - MEDICATIONS TO TAKE
I will START or STAY ON the medications listed below when I get home from the hospital:    oxyCODONE 10 mg oral tablet  -- 1 tab(s) by mouth every 3 hours, As needed for Pain MDD:8 pt my halve tablets  -- Indication: For Prn pain    aspirin 81 mg oral delayed release capsule  -- 1 tab(s) by mouth once a day  -- Indication: For Home med    phenytoin 100 mg oral capsule  -- 2 cap(s) by mouth 2 times a day  -- Indication: For Home med    venlafaxine 75 mg oral capsule, extended release  -- 1 cap(s) by mouth once a day  -- Indication: For Home med    atorvastatin 80 mg oral tablet  -- 1 tab(s) by mouth once a day  -- Indication: For Home med    Plavix 75 mg oral tablet  -- 1 tab(s) by mouth once a day  -- Indication: For Home med    QUEtiapine 200 mg oral tablet  --  by mouth once a day (at bedtime)  -- Indication: For Home med    hydroCHLOROthiazide 25 mg oral tablet  -- 1 tab(s) by mouth once a day  -- Indication: For Home med    senna oral tablet  -- 2 tab(s) by mouth once a day (at bedtime), As needed, Constipation  -- Indication: For Home med    docusate sodium 100 mg oral capsule  -- 1 cap(s) by mouth 3 times a day  -- Indication: For stool softener    pantoprazole 40 mg oral delayed release tablet  -- 1 tab(s) by mouth once a day  -- Indication: For Home med

## 2017-07-05 NOTE — DISCHARGE NOTE ADULT - PLAN OF CARE
improve pain and ability to perform ADL The patient will be seen in the office in 2 weeks for wound check. Tape will be removed at that time. Patient may shower after post-op day #3. The dressing is to be removed on 7/15/17. IF THE DRESSING BECOMES SOILED BEFORE THE REMOVAL DATE, CHANGE WITH A SIMILAR DRESSING. IF THE DRESSING BECOMES STAINED WITH DISCHARGE, CONTACT THE OFFICE FOR FURTHER DIRECTIONS. The patient will contact the office if the wound becomes red, has increasing pain, develops bleeding or discharge, an injury occurs, or has other concerns. The patient will continue PT consistent with total shoulder replacement. The patient will continue aspirin and plavix for blood clot prevention. The patient will take OXYCODONE AND TYLENOL for pain control and titrate according to prescription and patient needs. The patient will take Colace while taking oxycodone to prevent narcotic associated constipation.  Additionally, increase water intake (drink at least 8 glasses of water daily) and try adding fiber to the diet by eating fruits, vegetables and foods that are rich in grains. If constipation is experienced, contact the medical/primary care provider to discuss further treatment options. The patient is non-weight bearing. Elevation of the upper extremity is recommended to reduce swelling. The patient will be seen in the office in 2 weeks for wound check. Tape will be removed at that time. Patient may shower after post-op day #5. The dressing is to be removed on 7/15/17. IF THE DRESSING BECOMES SOILED BEFORE THE REMOVAL DATE, CHANGE WITH A SIMILAR DRESSING. IF THE DRESSING BECOMES STAINED WITH DISCHARGE, CONTACT THE OFFICE FOR FURTHER DIRECTIONS. The patient will contact the office if the wound becomes red, has increasing pain, develops bleeding or discharge, an injury occurs, or has other concerns. The patient will continue PT consistent with total shoulder replacement. The patient will continue aspirin and plavix for blood clot prevention. The patient will take OXYCODONE AND TYLENOL for pain control and titrate according to prescription and patient needs. The patient will take Colace while taking oxycodone to prevent narcotic associated constipation.  Additionally, increase water intake (drink at least 8 glasses of water daily) and try adding fiber to the diet by eating fruits, vegetables and foods that are rich in grains. If constipation is experienced, contact the medical/primary care provider to discuss further treatment options. The patient is non-weight bearing.

## 2017-07-05 NOTE — DISCHARGE NOTE ADULT - MEDICATION SUMMARY - MEDICATIONS TO STOP TAKING
I will STOP taking the medications listed below when I get home from the hospital:    ibuprofen 800 mg oral tablet  -- 1 tab(s) by mouth every 8 hours, As Needed  -- Do not take this drug if you are pregnant.  It is very important that you take or use this exactly as directed.  Do not skip doses or discontinue unless directed by your doctor.  May cause drowsiness or dizziness.  Obtain medical advice before taking any non-prescription drugs as some may affect the action of this medication.  Take with food or milk.    naproxen 500 mg oral tablet  -- 1 tab(s) by mouth 2 times a day, As Needed

## 2017-07-05 NOTE — DISCHARGE NOTE ADULT - CARE PROVIDER_API CALL
Eyad Yarbrough), Orthopaedic Surgery  [address] [place] [id number]  Phone: [phone]  Fax: [phone]

## 2017-07-05 NOTE — DISCHARGE NOTE ADULT - PATIENT PORTAL LINK FT
“You can access the Follow[employer] Patient Portal, offered by [facility], by registering with the following website: [email or web address]”

## 2017-07-05 NOTE — PROGRESS NOTE ADULT - SUBJECTIVE AND OBJECTIVE BOX
Right shoulder xray reviewed, no evidence of dislocation, patient may participate with physical therapy - NWB, pendulum exercises only

## 2017-07-05 NOTE — DISCHARGE NOTE ADULT - HOSPITAL COURSE
The patient underwent a RIGHT TOTAL SHOULDER REPLACEMENT on 7/5/17. The patient received antibiotics consistent with SCIP guidelines. The patient underwent the procedure and had no intra-operative complications. Post-operatively, the patient was seen by PT. The patient received ASPIRIN/PLAVIX for DVTP. The patient received pain medications per orthopedic pain managment protocol and the pain was appropriately controlled. The patient did not have any post-operative medical complications. The patient was discharged in stable condition.

## 2017-07-05 NOTE — PROGRESS NOTE ADULT - SUBJECTIVE AND OBJECTIVE BOX
Ortho Post Op Check    Name: RADHA CHAKRABORTY    MR #: [id number]    Procedure: right total shoulder arthroplasty  Surgeon: Dr. Yarbrough    Pt comfortable without complaints, pain controlled  Denies CP, SOB, N/V, numbness/tingling     General Exam:  Vital Signs Last 24 Hrs  T(C): 37 (07-05-17 @ 20:48), Max: 37.1 (07-05-17 @ 17:02)  T(F): 98.6 (07-05-17 @ 20:48), Max: 98.8 (07-05-17 @ 17:02)  HR: 76 (07-05-17 @ 20:48) (63 - 79)  BP: 176/96 (07-05-17 @ 20:48) (129/66 - 176/96)  BP(mean): 89 (07-05-17 @ 19:00) (81 - 96)  RR: 18 (07-05-17 @ 20:48) (10 - 18)  SpO2: 96% (07-05-17 @ 20:48) (96% - 100%)    General: Pt Alert and oriented, NAD, controlled pain.  Dressing C/D/I. No bleeding.  Abduction sling in place  Pulses: 2+ radial pulse. Cap refill < 2 sec.  Sensation/motor: unable to assess secondary to nerve block    Post-op X-Ray: shoulder component intact, no signs of loosening    A/P: 52yMale POD#0 s/p right TSA   - Stable  - Pain Control  - DVT ppx as prescribed with compression devices  - Post op abx  - PT eval pending  - Weight bearing status: NWB RUE, pendulum exercises only

## 2017-07-06 ENCOUNTER — TRANSCRIPTION ENCOUNTER (OUTPATIENT)
Age: 52
End: 2017-07-06

## 2017-07-06 ENCOUNTER — MESSAGE (OUTPATIENT)
Age: 52
End: 2017-07-06

## 2017-07-06 VITALS
HEART RATE: 87 BPM | OXYGEN SATURATION: 94 % | RESPIRATION RATE: 18 BRPM | TEMPERATURE: 98 F | DIASTOLIC BLOOD PRESSURE: 85 MMHG | SYSTOLIC BLOOD PRESSURE: 148 MMHG

## 2017-07-06 DIAGNOSIS — K21.9 GASTRO-ESOPHAGEAL REFLUX DISEASE WITHOUT ESOPHAGITIS: ICD-10-CM

## 2017-07-06 DIAGNOSIS — M19.011 PRIMARY OSTEOARTHRITIS, RIGHT SHOULDER: ICD-10-CM

## 2017-07-06 LAB
ANION GAP SERPL CALC-SCNC: 15 MMOL/L — SIGNIFICANT CHANGE UP (ref 5–17)
BUN SERPL-MCNC: 7 MG/DL — LOW (ref 8–20)
CALCIUM SERPL-MCNC: 8.8 MG/DL — SIGNIFICANT CHANGE UP (ref 8.6–10.2)
CHLORIDE SERPL-SCNC: 102 MMOL/L — SIGNIFICANT CHANGE UP (ref 98–107)
CO2 SERPL-SCNC: 23 MMOL/L — SIGNIFICANT CHANGE UP (ref 22–29)
CREAT SERPL-MCNC: 0.66 MG/DL — SIGNIFICANT CHANGE UP (ref 0.5–1.3)
GLUCOSE SERPL-MCNC: 79 MG/DL — SIGNIFICANT CHANGE UP (ref 70–115)
HCT VFR BLD CALC: 36.7 % — LOW (ref 42–52)
HGB BLD-MCNC: 12.7 G/DL — LOW (ref 14–18)
MCHC RBC-ENTMCNC: 31.3 PG — HIGH (ref 27–31)
MCHC RBC-ENTMCNC: 34.6 G/DL — SIGNIFICANT CHANGE UP (ref 32–36)
MCV RBC AUTO: 90.4 FL — SIGNIFICANT CHANGE UP (ref 80–94)
PLATELET # BLD AUTO: 117 K/UL — LOW (ref 150–400)
POTASSIUM SERPL-MCNC: 3.8 MMOL/L — SIGNIFICANT CHANGE UP (ref 3.5–5.3)
POTASSIUM SERPL-SCNC: 3.8 MMOL/L — SIGNIFICANT CHANGE UP (ref 3.5–5.3)
RBC # BLD: 4.06 M/UL — LOW (ref 4.6–6.2)
RBC # FLD: 14.3 % — SIGNIFICANT CHANGE UP (ref 11–15.6)
SODIUM SERPL-SCNC: 140 MMOL/L — SIGNIFICANT CHANGE UP (ref 135–145)
WBC # BLD: 10 K/UL — SIGNIFICANT CHANGE UP (ref 4.8–10.8)
WBC # FLD AUTO: 10 K/UL — SIGNIFICANT CHANGE UP (ref 4.8–10.8)

## 2017-07-06 PROCEDURE — 99223 1ST HOSP IP/OBS HIGH 75: CPT

## 2017-07-06 RX ORDER — OXYCODONE HYDROCHLORIDE 5 MG/1
1 TABLET ORAL
Qty: 55 | Refills: 0
Start: 2017-07-06

## 2017-07-06 RX ORDER — DOCUSATE SODIUM 100 MG
1 CAPSULE ORAL
Qty: 60 | Refills: 0
Start: 2017-07-06

## 2017-07-06 RX ORDER — SENNA PLUS 8.6 MG/1
2 TABLET ORAL
Qty: 0 | Refills: 0 | DISCHARGE
Start: 2017-07-06

## 2017-07-06 RX ADMIN — OXYCODONE HYDROCHLORIDE 10 MILLIGRAM(S): 5 TABLET ORAL at 13:06

## 2017-07-06 RX ADMIN — Medication 975 MILLIGRAM(S): at 05:22

## 2017-07-06 RX ADMIN — Medication 975 MILLIGRAM(S): at 13:09

## 2017-07-06 RX ADMIN — Medication 975 MILLIGRAM(S): at 06:39

## 2017-07-06 RX ADMIN — PANTOPRAZOLE SODIUM 40 MILLIGRAM(S): 20 TABLET, DELAYED RELEASE ORAL at 05:22

## 2017-07-06 RX ADMIN — Medication 100 MILLIGRAM(S): at 13:11

## 2017-07-06 RX ADMIN — Medication 25 MILLIGRAM(S): at 05:22

## 2017-07-06 RX ADMIN — OXYCODONE HYDROCHLORIDE 10 MILLIGRAM(S): 5 TABLET ORAL at 09:35

## 2017-07-06 RX ADMIN — Medication 200 MILLIGRAM(S): at 05:22

## 2017-07-06 RX ADMIN — OXYCODONE HYDROCHLORIDE 10 MILLIGRAM(S): 5 TABLET ORAL at 05:22

## 2017-07-06 RX ADMIN — Medication 100 MILLIGRAM(S): at 04:25

## 2017-07-06 RX ADMIN — Medication 100 MILLIGRAM(S): at 05:22

## 2017-07-06 RX ADMIN — OXYCODONE HYDROCHLORIDE 10 MILLIGRAM(S): 5 TABLET ORAL at 06:39

## 2017-07-06 RX ADMIN — OXYCODONE HYDROCHLORIDE 10 MILLIGRAM(S): 5 TABLET ORAL at 10:10

## 2017-07-06 RX ADMIN — CLOPIDOGREL BISULFATE 75 MILLIGRAM(S): 75 TABLET, FILM COATED ORAL at 13:08

## 2017-07-06 RX ADMIN — Medication 81 MILLIGRAM(S): at 13:08

## 2017-07-06 NOTE — PHYSICAL THERAPY INITIAL EVALUATION ADULT - ADDITIONAL COMMENTS
Pt lives in a house  with2  steps to enter with 1 rails and 0  stairs inside  Pt owns medical equipment: None   Pt lives with: Girlfriend

## 2017-07-06 NOTE — CONSULT NOTE ADULT - SUBJECTIVE AND OBJECTIVE BOX
PMD :Dr Frank   CC : cronic shoulder pain     Patient is a 52y old  Male with h/o seizure,HTn and osteoarthritis of shoulder admitted yesterday for elective shoulder replacement , He had fall at work in 1999 and has been having problem pain on the sholuder move with movements and recently got worse with limited ROM  for 2 months              PAST MEDICAL & SURGICAL HISTORY:  Osteoarthritis  Peripheral vascular disease: with stents  Hypertension  GERD (gastroesophageal reflux disease)  Depression  Seizures  Hypercholesterolemia  S/P appendectomy  S/P shoulder surgery: right  S/P ORIF (open reduction internal fixation) fracture: Left  Inguinal hernia, left      Social History:  Tabacco -   ETOH -   Illicit drug abuse - denies    FAMILY HISTORY:  Family history of brain aneurysm (Sibling)  Family history of peripheral vascular disease (Father)  Family history of CABG (Father)  Family history of heart disease (Mother, Sibling)  Family history of hepatitis (Mother)  Family history of breast cancer (Mother)      Allergies    codeine (Vomiting)    Intolerances        HOME MEDICATIONS : in file reviewed     REVIEW OF SYSTEMS:    CONSTITUTIONAL: No fever, weight loss, or fatigue  EYES: No eye pain, visual disturbances, or discharge  NECK: No pain or stiffness  RESPIRATORY: No cough, wheezing, chills or hemoptysis; No shortness of breath  CARDIOVASCULAR: No chest pain, palpitations, dizziness, or leg swelling  GASTROINTESTINAL: No abdominal or epigastric pain. No nausea, vomiting, or hematemesis; No diarrhea or constipation. No melena or hematochezia.  GENITOURINARY: No dysuria, frequency, hematuria, or incontinence  NEUROLOGICAL: No headaches, memory loss, loss of strength, numbness, or tremors  SKIN: No itching, burning, rashes, or lesions   LYMPH NODES: No enlarged glands  ENDOCRINE: No heat or cold intolerance; No hair loss  MUSCULOSKELETAL: shoulder pain   PSYCHIATRIC: No depression, anxiety, mood swings, or difficulty sleeping  HEME/LYMPH: No easy bruising, or bleeding gums  ALLERGY AND IMMUNOLOGIC: No hives or eczema    MEDICATIONS  (STANDING):  aspirin enteric coated 81 milliGRAM(s) Oral daily  phenytoin   Capsule 200 milliGRAM(s) Oral two times a day  venlafaxine XR. 75 milliGRAM(s) Oral daily  atorvastatin 80 milliGRAM(s) Oral at bedtime  clopidogrel Tablet 75 milliGRAM(s) Oral daily  QUEtiapine 200 milliGRAM(s) Oral at bedtime  hydrochlorothiazide 25 milliGRAM(s) Oral daily  pantoprazole    Tablet 40 milliGRAM(s) Oral before breakfast  acetaminophen   Tablet. 975 milliGRAM(s) Oral every 8 hours  polyethylene glycol 3350 17 Gram(s) Oral daily  docusate sodium 100 milliGRAM(s) Oral three times a day    MEDICATIONS  (PRN):  HYDROmorphone  Injectable 1 milliGRAM(s) IV Push every 10 minutes PRN Moderate Pain (4 - 6)  acetaminophen   Tablet 650 milliGRAM(s) Oral every 6 hours PRN For Temp over 38.3 C (100.94 F)  oxyCODONE    IR 5 milliGRAM(s) Oral every 3 hours PRN Mild Pain  oxyCODONE    IR 10 milliGRAM(s) Oral every 3 hours PRN Moderate Pain  HYDROmorphone  Injectable 0.5 milliGRAM(s) IV Push every 4 hours PRN breakthrough  aluminum hydroxide/magnesium hydroxide/simethicone Suspension 30 milliLiter(s) Oral four times a day PRN Indigestion  ondansetron Injectable 4 milliGRAM(s) IV Push every 6 hours PRN Nausea and/or Vomiting  magnesium hydroxide Suspension 30 milliLiter(s) Oral daily PRN Constipation  senna 2 Tablet(s) Oral at bedtime PRN Constipation  HYDROmorphone   Tablet 2 milliGRAM(s) Oral every 3 hours PRN Severe Pain (7 - 10)      Vital Signs Last 24 Hrs  T(C): 36.6 (06 Jul 2017 04:35), Max: 37.1 (05 Jul 2017 17:02)  T(F): 97.8 (06 Jul 2017 04:35), Max: 98.8 (05 Jul 2017 17:02)  HR: 66 (06 Jul 2017 04:35) (63 - 79)  BP: 152/76 (06 Jul 2017 04:35) (123/78 - 176/96)  BP(mean): 89 (05 Jul 2017 19:00) (81 - 96)  RR: 18 (06 Jul 2017 04:35) (10 - 18)  SpO2: 98% (06 Jul 2017 04:35) (96% - 100%)    PHYSICAL EXAM:    GENERAL: NAD, well-groomed, well-developed  HEAD:  Atraumatic, Normocephalic  EYES: EOMI, PERRLA, conjunctiva and sclera clear  NECK: Supple, No JVD, Normal thyroid  NERVOUS SYSTEM:  Alert & Oriented X3, Good concentration; Motor Strength 5/5 B/L upper and lower extremities; DTRs 2+ intact and symmetric  CHEST/LUNG: CTA  b/l,  no rales, rhonchi, wheezing, or rubs  HEART: Regular rate and rhythm; No murmurs, rubs, or gallops  ABDOMEN: Soft, Nontender, Nondistended; Bowel sounds present  EXTREMITIES:  2+ Peripheral Pulses, No clubbing, cyanosis, or edema , limited ROM on Right arm , sling in palce   SKIN: No rashes or lesions    LABS:                        12.7   10.0  )-----------( 117      ( 06 Jul 2017 06:03 )             36.7     07-06    140  |  102  |  7.0<L>  ----------------------------<  79  3.8   |  23.0  |  0.66    Ca    8.8      06 Jul 2017 06:03              RADIOLOGY & ADDITIONAL STUDIES:

## 2017-07-06 NOTE — PHYSICAL THERAPY INITIAL EVALUATION ADULT - ACTIVE RANGE OF MOTION EXAMINATION, REHAB EVAL
Left UE Active ROM was WNL (within normal limits)/Right UE in immobilizer sling/deficits as listed below/bilateral lower extremity Active ROM was WNL (within normal limits)

## 2017-07-06 NOTE — PROGRESS NOTE ADULT - SUBJECTIVE AND OBJECTIVE BOX
Ortho Post Op Check    Name: RADHA CHAKRABORTY    MR #: [id number]    Procedure: Right Total Shoulder  Surgeon: Dr Yarbrough    Pt comfortable without complaints, pain controlled  Denies CP, SOB, N/V, numbness/tingling     General Exam:  Vital Signs Last 24 Hrs  T(C): 36.9 (07-06-17 @ 07:46), Max: 36.9 (07-06-17 @ 07:46)  T(F): 98.5 (07-06-17 @ 07:46), Max: 98.5 (07-06-17 @ 07:46)  HR: 87 (07-06-17 @ 07:46) (66 - 87)  BP: 148/85 (07-06-17 @ 07:46) (148/85 - 152/76)  BP(mean): --  RR: 18 (07-06-17 @ 07:46) (18 - 18)  SpO2: 94% (07-06-17 @ 07:46) (94% - 98%)    General: Pt Alert and oriented, NAD, controlled pain.  Dressings C/D/I. No bleeding.  Pulses: 2+ radial pulse. Cap refill < 2 sec.  Sensation: Grossly intact to light touch without deficit.  Motor: Full use of hand and wrist                          12.7   10.0  )-----------( 117      ( 06 Jul 2017 06:03 )             36.7   06 Jul 2017 06:03    140    |  102    |  7.0    ----------------------------<  79     3.8     |  23.0   |  0.66     Ca    8.8        06 Jul 2017 06:03      MEDICATIONS  (STANDING):  aspirin enteric coated 81 milliGRAM(s) Oral daily  phenytoin   Capsule 200 milliGRAM(s) Oral two times a day  venlafaxine XR. 75 milliGRAM(s) Oral daily  atorvastatin 80 milliGRAM(s) Oral at bedtime  clopidogrel Tablet 75 milliGRAM(s) Oral daily  QUEtiapine 200 milliGRAM(s) Oral at bedtime  hydrochlorothiazide 25 milliGRAM(s) Oral daily  pantoprazole    Tablet 40 milliGRAM(s) Oral before breakfast  acetaminophen   Tablet. 975 milliGRAM(s) Oral every 8 hours  polyethylene glycol 3350 17 Gram(s) Oral daily  docusate sodium 100 milliGRAM(s) Oral three times a day    MEDICATIONS  (PRN):  HYDROmorphone  Injectable 1 milliGRAM(s) IV Push every 10 minutes PRN Moderate Pain (4 - 6)  acetaminophen   Tablet 650 milliGRAM(s) Oral every 6 hours PRN For Temp over 38.3 C (100.94 F)  oxyCODONE    IR 5 milliGRAM(s) Oral every 3 hours PRN Mild Pain  oxyCODONE    IR 10 milliGRAM(s) Oral every 3 hours PRN Moderate Pain  HYDROmorphone  Injectable 0.5 milliGRAM(s) IV Push every 4 hours PRN breakthrough  aluminum hydroxide/magnesium hydroxide/simethicone Suspension 30 milliLiter(s) Oral four times a day PRN Indigestion  ondansetron Injectable 4 milliGRAM(s) IV Push every 6 hours PRN Nausea and/or Vomiting  magnesium hydroxide Suspension 30 milliLiter(s) Oral daily PRN Constipation  senna 2 Tablet(s) Oral at bedtime PRN Constipation  HYDROmorphone   Tablet 2 milliGRAM(s) Oral every 3 hours PRN Severe Pain (7 - 10)    Post-op X-Ray:  EXAM:  SHOULDER COMP  MIN 2 VIEWS-RT                          PROCEDURE DATE:  07/05/2017        INTERPRETATION:  HISTORY: Intraoperative AP right shoulder radiograph    TECHNIQUE: AP and right shoulder radiograph    FINDINGS: Right humeral head prosthesis in place. No fracture.    IMPRESSION: Right humeral head prosthesis in place.        CASPER LAI M.D., ATTENDING RADIOLOGIST  This document has been electronically signed. Jul 6 2017  6:54AM      A/P: 52yMale POD#1 s/p Right TSA  - Stable  - Pain Control  - DVT ppx: home meds of ASA and Plavix  - Weight bearing status: NWB Right upper extremity. Pendulum exercises only  - Ortho stable  - Follow up with Dr Yarbrough 2 weeks in office

## 2017-07-06 NOTE — CONSULT NOTE ADULT - PROBLEM SELECTOR RECOMMENDATION 9
s/p shoulder replacement , sling weight bearing status per primary team   PT evaluation , may go home if does well

## 2017-07-11 ENCOUNTER — APPOINTMENT (OUTPATIENT)
Dept: ORTHOPEDIC SURGERY | Facility: CLINIC | Age: 52
End: 2017-07-11

## 2017-07-11 ENCOUNTER — MESSAGE (OUTPATIENT)
Age: 52
End: 2017-07-11

## 2017-07-11 VITALS
WEIGHT: 145 LBS | HEART RATE: 81 BPM | BODY MASS INDEX: 23.3 KG/M2 | DIASTOLIC BLOOD PRESSURE: 81 MMHG | SYSTOLIC BLOOD PRESSURE: 136 MMHG | HEIGHT: 66 IN

## 2017-07-11 RX ORDER — OXYCODONE AND ACETAMINOPHEN 5; 325 MG/1; MG/1
5-325 TABLET ORAL
Qty: 42 | Refills: 0 | Status: DISCONTINUED | COMMUNITY
Start: 2017-07-11 | End: 2017-07-11

## 2017-07-23 PROCEDURE — 88305 TISSUE EXAM BY PATHOLOGIST: CPT

## 2017-07-23 PROCEDURE — 80048 BASIC METABOLIC PNL TOTAL CA: CPT

## 2017-07-23 PROCEDURE — 86901 BLOOD TYPING SEROLOGIC RH(D): CPT

## 2017-07-23 PROCEDURE — C1713: CPT

## 2017-07-23 PROCEDURE — 73030 X-RAY EXAM OF SHOULDER: CPT

## 2017-07-23 PROCEDURE — 88311 DECALCIFY TISSUE: CPT

## 2017-07-23 PROCEDURE — 85027 COMPLETE CBC AUTOMATED: CPT

## 2017-07-23 PROCEDURE — 86850 RBC ANTIBODY SCREEN: CPT

## 2017-07-23 PROCEDURE — 97163 PT EVAL HIGH COMPLEX 45 MIN: CPT

## 2017-07-23 PROCEDURE — 86900 BLOOD TYPING SEROLOGIC ABO: CPT

## 2017-07-23 PROCEDURE — 36415 COLL VENOUS BLD VENIPUNCTURE: CPT

## 2017-07-23 PROCEDURE — C1776: CPT

## 2017-07-28 ENCOUNTER — RX RENEWAL (OUTPATIENT)
Age: 52
End: 2017-07-28

## 2017-08-14 ENCOUNTER — OUTPATIENT (OUTPATIENT)
Dept: OUTPATIENT SERVICES | Facility: HOSPITAL | Age: 52
LOS: 1 days | End: 2017-08-14
Payer: COMMERCIAL

## 2017-08-14 DIAGNOSIS — Z51.89 ENCOUNTER FOR OTHER SPECIFIED AFTERCARE: ICD-10-CM

## 2017-08-14 DIAGNOSIS — Z96.7 PRESENCE OF OTHER BONE AND TENDON IMPLANTS: Chronic | ICD-10-CM

## 2017-08-14 DIAGNOSIS — Z98.89 OTHER SPECIFIED POSTPROCEDURAL STATES: Chronic | ICD-10-CM

## 2017-08-14 DIAGNOSIS — K40.90 UNILATERAL INGUINAL HERNIA, WITHOUT OBSTRUCTION OR GANGRENE, NOT SPECIFIED AS RECURRENT: Chronic | ICD-10-CM

## 2017-08-18 DIAGNOSIS — Z96.611 PRESENCE OF RIGHT ARTIFICIAL SHOULDER JOINT: ICD-10-CM

## 2017-08-18 DIAGNOSIS — M19.011 PRIMARY OSTEOARTHRITIS, RIGHT SHOULDER: ICD-10-CM

## 2017-08-24 ENCOUNTER — APPOINTMENT (OUTPATIENT)
Dept: ORTHOPEDIC SURGERY | Facility: CLINIC | Age: 52
End: 2017-08-24
Payer: MEDICAID

## 2017-08-24 ENCOUNTER — RX RENEWAL (OUTPATIENT)
Age: 52
End: 2017-08-24

## 2017-08-24 PROCEDURE — 73030 X-RAY EXAM OF SHOULDER: CPT | Mod: RT

## 2017-08-24 PROCEDURE — 99024 POSTOP FOLLOW-UP VISIT: CPT

## 2017-09-26 ENCOUNTER — APPOINTMENT (OUTPATIENT)
Dept: FAMILY MEDICINE | Facility: CLINIC | Age: 52
End: 2017-09-26
Payer: MEDICAID

## 2017-09-26 VITALS
SYSTOLIC BLOOD PRESSURE: 125 MMHG | HEIGHT: 66 IN | BODY MASS INDEX: 21.86 KG/M2 | TEMPERATURE: 97.7 F | DIASTOLIC BLOOD PRESSURE: 74 MMHG | HEART RATE: 70 BPM | OXYGEN SATURATION: 97 % | WEIGHT: 136 LBS

## 2017-09-26 DIAGNOSIS — M25.511 PAIN IN RIGHT SHOULDER: ICD-10-CM

## 2017-09-26 DIAGNOSIS — Z12.11 ENCOUNTER FOR SCREENING FOR MALIGNANT NEOPLASM OF COLON: ICD-10-CM

## 2017-09-26 PROCEDURE — G0008: CPT

## 2017-09-26 PROCEDURE — 90686 IIV4 VACC NO PRSV 0.5 ML IM: CPT

## 2017-09-26 PROCEDURE — 36415 COLL VENOUS BLD VENIPUNCTURE: CPT

## 2017-09-26 PROCEDURE — 99214 OFFICE O/P EST MOD 30 MIN: CPT | Mod: 25

## 2017-09-26 RX ORDER — OXYCODONE AND ACETAMINOPHEN 5; 325 MG/1; MG/1
5-325 TABLET ORAL
Qty: 42 | Refills: 0 | Status: DISCONTINUED | COMMUNITY
Start: 2017-07-11 | End: 2017-09-26

## 2017-10-01 LAB
25(OH)D3 SERPL-MCNC: 35 NG/ML
ALBUMIN SERPL ELPH-MCNC: 4.3 G/DL
ALP BLD-CCNC: 138 U/L
ALT SERPL-CCNC: 30 U/L
ANION GAP SERPL CALC-SCNC: 16 MMOL/L
APPEARANCE: CLEAR
AST SERPL-CCNC: 22 U/L
BACTERIA: NEGATIVE
BASOPHILS # BLD AUTO: 0.02 K/UL
BASOPHILS NFR BLD AUTO: 0.3 %
BILIRUB SERPL-MCNC: <0.2 MG/DL
BILIRUBIN URINE: NEGATIVE
BLOOD URINE: NEGATIVE
BUN SERPL-MCNC: 12 MG/DL
CALCIUM SERPL-MCNC: 9.6 MG/DL
CHLORIDE SERPL-SCNC: 102 MMOL/L
CHOLEST SERPL-MCNC: 208 MG/DL
CHOLEST/HDLC SERPL: 5.6 RATIO
CO2 SERPL-SCNC: 19 MMOL/L
COLOR: YELLOW
COMPREHENSIVE SCREEN URINE: NORMAL
CREAT SERPL-MCNC: 0.94 MG/DL
EOSINOPHIL # BLD AUTO: 0.16 K/UL
EOSINOPHIL NFR BLD AUTO: 2.3 %
GLUCOSE QUALITATIVE U: NORMAL MG/DL
GLUCOSE SERPL-MCNC: 86 MG/DL
HBA1C MFR BLD HPLC: 5.5 %
HCT VFR BLD CALC: 40.3 %
HDLC SERPL-MCNC: 37 MG/DL
HGB BLD-MCNC: 13.7 G/DL
HYALINE CASTS: 1 /LPF
IMM GRANULOCYTES NFR BLD AUTO: 0.1 %
KETONES URINE: NEGATIVE
LDLC SERPL CALC-MCNC: 113 MG/DL
LEUKOCYTE ESTERASE URINE: NEGATIVE
LYMPHOCYTES # BLD AUTO: 2.5 K/UL
LYMPHOCYTES NFR BLD AUTO: 35.6 %
MAN DIFF?: NORMAL
MCHC RBC-ENTMCNC: 31.4 PG
MCHC RBC-ENTMCNC: 34 GM/DL
MCV RBC AUTO: 92.4 FL
MICROSCOPIC-UA: NORMAL
MONOCYTES # BLD AUTO: 0.61 K/UL
MONOCYTES NFR BLD AUTO: 8.7 %
NEUTROPHILS # BLD AUTO: 3.72 K/UL
NEUTROPHILS NFR BLD AUTO: 53 %
NITRITE URINE: NEGATIVE
PH URINE: 7
PHENYTOIN SERPL QL: 11.7 UG/ML
PLATELET # BLD AUTO: 158 K/UL
POTASSIUM SERPL-SCNC: 4.5 MMOL/L
PROT SERPL-MCNC: 7.4 G/DL
PROTEIN URINE: NEGATIVE MG/DL
RBC # BLD: 4.36 M/UL
RBC # FLD: 14.3 %
RED BLOOD CELLS URINE: 2 /HPF
SODIUM SERPL-SCNC: 137 MMOL/L
SPECIFIC GRAVITY URINE: 1.02
SQUAMOUS EPITHELIAL CELLS: 1 /HPF
TRIGL SERPL-MCNC: 292 MG/DL
UROBILINOGEN URINE: NORMAL MG/DL
WBC # FLD AUTO: 7.02 K/UL
WHITE BLOOD CELLS URINE: 0 /HPF

## 2017-10-05 LAB — HEMOCCULT STL QL IA: NEGATIVE

## 2017-11-07 ENCOUNTER — APPOINTMENT (OUTPATIENT)
Dept: FAMILY MEDICINE | Facility: CLINIC | Age: 52
End: 2017-11-07

## 2017-11-13 ENCOUNTER — APPOINTMENT (OUTPATIENT)
Dept: FAMILY MEDICINE | Facility: CLINIC | Age: 52
End: 2017-11-13

## 2017-12-21 PROCEDURE — 97110 THERAPEUTIC EXERCISES: CPT

## 2017-12-21 PROCEDURE — 97163 PT EVAL HIGH COMPLEX 45 MIN: CPT

## 2017-12-21 PROCEDURE — 97140 MANUAL THERAPY 1/> REGIONS: CPT

## 2017-12-21 PROCEDURE — 97010 HOT OR COLD PACKS THERAPY: CPT

## 2017-12-28 ENCOUNTER — OTHER (OUTPATIENT)
Age: 52
End: 2017-12-28

## 2017-12-28 ENCOUNTER — APPOINTMENT (OUTPATIENT)
Dept: FAMILY MEDICINE | Facility: CLINIC | Age: 52
End: 2017-12-28
Payer: MEDICAID

## 2017-12-28 VITALS
TEMPERATURE: 97.8 F | BODY MASS INDEX: 23.78 KG/M2 | OXYGEN SATURATION: 97 % | DIASTOLIC BLOOD PRESSURE: 75 MMHG | HEART RATE: 68 BPM | HEIGHT: 66 IN | SYSTOLIC BLOOD PRESSURE: 124 MMHG | WEIGHT: 148 LBS

## 2017-12-28 DIAGNOSIS — N52.9 MALE ERECTILE DYSFUNCTION, UNSPECIFIED: ICD-10-CM

## 2017-12-28 DIAGNOSIS — Z87.438 PERSONAL HISTORY OF OTHER DISEASES OF MALE GENITAL ORGANS: ICD-10-CM

## 2017-12-28 DIAGNOSIS — Z92.29 PERSONAL HISTORY OF OTHER DRUG THERAPY: ICD-10-CM

## 2017-12-28 DIAGNOSIS — Z23 ENCOUNTER FOR IMMUNIZATION: ICD-10-CM

## 2017-12-28 PROCEDURE — 99213 OFFICE O/P EST LOW 20 MIN: CPT | Mod: 25

## 2017-12-28 PROCEDURE — 93000 ELECTROCARDIOGRAM COMPLETE: CPT

## 2017-12-28 PROCEDURE — 36415 COLL VENOUS BLD VENIPUNCTURE: CPT

## 2017-12-28 PROCEDURE — 90471 IMMUNIZATION ADMIN: CPT

## 2017-12-28 PROCEDURE — 99396 PREV VISIT EST AGE 40-64: CPT | Mod: 25

## 2017-12-28 PROCEDURE — 90715 TDAP VACCINE 7 YRS/> IM: CPT

## 2017-12-28 RX ORDER — HYDROCODONE BITARTRATE AND ACETAMINOPHEN 5; 325 MG/1; MG/1
5-325 TABLET ORAL
Qty: 20 | Refills: 0 | Status: DISCONTINUED | COMMUNITY
Start: 2017-08-24 | End: 2017-12-28

## 2018-01-01 LAB
ALBUMIN SERPL ELPH-MCNC: 4.8 G/DL
ALP BLD-CCNC: 114 U/L
ALT SERPL-CCNC: 32 U/L
ANION GAP SERPL CALC-SCNC: 16 MMOL/L
APPEARANCE: CLEAR
AST SERPL-CCNC: 26 U/L
BACTERIA: NEGATIVE
BASOPHILS # BLD AUTO: 0.03 K/UL
BASOPHILS NFR BLD AUTO: 0.4 %
BILIRUB SERPL-MCNC: <0.2 MG/DL
BILIRUBIN URINE: NEGATIVE
BLOOD URINE: NEGATIVE
BUN SERPL-MCNC: 22 MG/DL
CALCIUM SERPL-MCNC: 9.9 MG/DL
CHLORIDE SERPL-SCNC: 102 MMOL/L
CHOLEST SERPL-MCNC: 293 MG/DL
CHOLEST/HDLC SERPL: 5.4 RATIO
CK SERPL-CCNC: 98 U/L
CO2 SERPL-SCNC: 23 MMOL/L
COLOR: YELLOW
CREAT SERPL-MCNC: 0.87 MG/DL
EOSINOPHIL # BLD AUTO: 0.2 K/UL
EOSINOPHIL NFR BLD AUTO: 2.5 %
ERYTHROCYTE [SEDIMENTATION RATE] IN BLOOD BY WESTERGREN METHOD: 24 MM/HR
GGT SERPL-CCNC: 228 U/L
GLUCOSE QUALITATIVE U: NEGATIVE MG/DL
GLUCOSE SERPL-MCNC: 103 MG/DL
HCT VFR BLD CALC: 46.1 %
HDLC SERPL-MCNC: 54 MG/DL
HGB BLD-MCNC: 15.2 G/DL
HYALINE CASTS: 2 /LPF
IMM GRANULOCYTES NFR BLD AUTO: 0.1 %
KETONES URINE: NEGATIVE
LDLC SERPL CALC-MCNC: 203 MG/DL
LEUKOCYTE ESTERASE URINE: NEGATIVE
LYMPHOCYTES # BLD AUTO: 2.83 K/UL
LYMPHOCYTES NFR BLD AUTO: 36 %
MAN DIFF?: NORMAL
MCHC RBC-ENTMCNC: 30.9 PG
MCHC RBC-ENTMCNC: 33 GM/DL
MCV RBC AUTO: 93.7 FL
MICROSCOPIC-UA: NORMAL
MONOCYTES # BLD AUTO: 0.59 K/UL
MONOCYTES NFR BLD AUTO: 7.5 %
NEUTROPHILS # BLD AUTO: 4.2 K/UL
NEUTROPHILS NFR BLD AUTO: 53.5 %
NITRITE URINE: NEGATIVE
PH URINE: 5
PHENYTOIN SERPL QL: 9.1 UG/ML
PLATELET # BLD AUTO: 163 K/UL
POTASSIUM SERPL-SCNC: 4.4 MMOL/L
PROT SERPL-MCNC: 8.3 G/DL
PROTEIN URINE: NEGATIVE MG/DL
PSA SERPL-MCNC: 0.45 NG/ML
RBC # BLD: 4.92 M/UL
RBC # FLD: 15.9 %
RED BLOOD CELLS URINE: 2 /HPF
SODIUM SERPL-SCNC: 141 MMOL/L
SPECIFIC GRAVITY URINE: 1.03
SQUAMOUS EPITHELIAL CELLS: 1 /HPF
T4 FREE SERPL-MCNC: 0.8 NG/DL
TRIGL SERPL-MCNC: 180 MG/DL
TSH SERPL-ACNC: 2.25 UIU/ML
UROBILINOGEN URINE: NEGATIVE MG/DL
WBC # FLD AUTO: 7.86 K/UL
WHITE BLOOD CELLS URINE: 1 /HPF

## 2018-01-02 LAB
TESTOST BND SERPL-MCNC: 7.9 PG/ML
TESTOST SERPL-MCNC: 646.3 NG/DL

## 2018-01-19 ENCOUNTER — APPOINTMENT (OUTPATIENT)
Dept: ULTRASOUND IMAGING | Facility: CLINIC | Age: 53
End: 2018-01-19

## 2018-01-22 ENCOUNTER — APPOINTMENT (OUTPATIENT)
Dept: ORTHOPEDIC SURGERY | Facility: CLINIC | Age: 53
End: 2018-01-22

## 2018-01-23 ENCOUNTER — FORM ENCOUNTER (OUTPATIENT)
Age: 53
End: 2018-01-23

## 2018-01-24 ENCOUNTER — APPOINTMENT (OUTPATIENT)
Dept: ULTRASOUND IMAGING | Facility: CLINIC | Age: 53
End: 2018-01-24
Payer: MEDICAID

## 2018-01-24 ENCOUNTER — OUTPATIENT (OUTPATIENT)
Dept: OUTPATIENT SERVICES | Facility: HOSPITAL | Age: 53
LOS: 1 days | End: 2018-01-24
Payer: MEDICAID

## 2018-01-24 DIAGNOSIS — Z98.89 OTHER SPECIFIED POSTPROCEDURAL STATES: Chronic | ICD-10-CM

## 2018-01-24 DIAGNOSIS — I65.29 OCCLUSION AND STENOSIS OF UNSPECIFIED CAROTID ARTERY: ICD-10-CM

## 2018-01-24 DIAGNOSIS — K40.90 UNILATERAL INGUINAL HERNIA, WITHOUT OBSTRUCTION OR GANGRENE, NOT SPECIFIED AS RECURRENT: Chronic | ICD-10-CM

## 2018-01-24 DIAGNOSIS — Z96.7 PRESENCE OF OTHER BONE AND TENDON IMPLANTS: Chronic | ICD-10-CM

## 2018-01-24 PROCEDURE — 93880 EXTRACRANIAL BILAT STUDY: CPT | Mod: 26

## 2018-01-24 PROCEDURE — 93880 EXTRACRANIAL BILAT STUDY: CPT

## 2018-02-22 ENCOUNTER — APPOINTMENT (OUTPATIENT)
Dept: ORTHOPEDIC SURGERY | Facility: CLINIC | Age: 53
End: 2018-02-22
Payer: MEDICAID

## 2018-02-22 VITALS
BODY MASS INDEX: 24.11 KG/M2 | SYSTOLIC BLOOD PRESSURE: 119 MMHG | HEART RATE: 69 BPM | WEIGHT: 150 LBS | HEIGHT: 66 IN | TEMPERATURE: 98.3 F | DIASTOLIC BLOOD PRESSURE: 67 MMHG

## 2018-02-22 DIAGNOSIS — Z96.611 PRESENCE OF RIGHT ARTIFICIAL SHOULDER JOINT: ICD-10-CM

## 2018-02-22 PROCEDURE — 99214 OFFICE O/P EST MOD 30 MIN: CPT

## 2018-03-08 DIAGNOSIS — Z79.2 LONG TERM (CURRENT) USE OF ANTIBIOTICS: ICD-10-CM

## 2018-03-23 ENCOUNTER — APPOINTMENT (OUTPATIENT)
Dept: GASTROENTEROLOGY | Facility: CLINIC | Age: 53
End: 2018-03-23
Payer: MEDICAID

## 2018-03-23 VITALS
DIASTOLIC BLOOD PRESSURE: 79 MMHG | SYSTOLIC BLOOD PRESSURE: 131 MMHG | HEART RATE: 67 BPM | WEIGHT: 149 LBS | BODY MASS INDEX: 23.95 KG/M2 | HEIGHT: 66 IN

## 2018-03-23 PROCEDURE — 99202 OFFICE O/P NEW SF 15 MIN: CPT

## 2018-03-23 RX ORDER — AMOXICILLIN 500 MG/1
500 CAPSULE ORAL
Qty: 4 | Refills: 0 | Status: DISCONTINUED | COMMUNITY
Start: 2018-03-08 | End: 2018-03-23

## 2018-04-03 ENCOUNTER — APPOINTMENT (OUTPATIENT)
Dept: INTERNAL MEDICINE | Facility: CLINIC | Age: 53
End: 2018-04-03
Payer: MEDICAID

## 2018-04-03 VITALS
OXYGEN SATURATION: 98 % | HEART RATE: 72 BPM | WEIGHT: 148 LBS | BODY MASS INDEX: 23.78 KG/M2 | TEMPERATURE: 97.9 F | HEIGHT: 66 IN | SYSTOLIC BLOOD PRESSURE: 140 MMHG | DIASTOLIC BLOOD PRESSURE: 86 MMHG

## 2018-04-03 PROCEDURE — 99214 OFFICE O/P EST MOD 30 MIN: CPT

## 2018-04-07 LAB
25(OH)D3 SERPL-MCNC: 38.1 NG/ML
ALBUMIN SERPL ELPH-MCNC: 4.7 G/DL
ALP BLD-CCNC: 100 U/L
ALT SERPL-CCNC: 54 U/L
ANION GAP SERPL CALC-SCNC: 14 MMOL/L
AST SERPL-CCNC: 37 U/L
BILIRUB SERPL-MCNC: <0.2 MG/DL
BUN SERPL-MCNC: 19 MG/DL
CALCIUM SERPL-MCNC: 9.3 MG/DL
CHLORIDE SERPL-SCNC: 107 MMOL/L
CHOLEST SERPL-MCNC: 226 MG/DL
CHOLEST/HDLC SERPL: 3.7 RATIO
CK SERPL-CCNC: 132 U/L
CO2 SERPL-SCNC: 22 MMOL/L
CREAT SERPL-MCNC: 1.14 MG/DL
GGT SERPL-CCNC: 306 U/L
GLUCOSE SERPL-MCNC: 113 MG/DL
HDLC SERPL-MCNC: 61 MG/DL
LDLC SERPL CALC-MCNC: 140 MG/DL
PHENYTOIN SERPL QL: 11.8 UG/ML
POTASSIUM SERPL-SCNC: 4.9 MMOL/L
PROT SERPL-MCNC: 7.1 G/DL
SODIUM SERPL-SCNC: 143 MMOL/L
TRIGL SERPL-MCNC: 123 MG/DL

## 2018-04-12 ENCOUNTER — FORM ENCOUNTER (OUTPATIENT)
Age: 53
End: 2018-04-12

## 2018-04-13 ENCOUNTER — APPOINTMENT (OUTPATIENT)
Dept: ULTRASOUND IMAGING | Facility: CLINIC | Age: 53
End: 2018-04-13
Payer: MEDICAID

## 2018-04-13 ENCOUNTER — OUTPATIENT (OUTPATIENT)
Dept: OUTPATIENT SERVICES | Facility: HOSPITAL | Age: 53
LOS: 1 days | End: 2018-04-13
Payer: MEDICAID

## 2018-04-13 DIAGNOSIS — Z98.89 OTHER SPECIFIED POSTPROCEDURAL STATES: Chronic | ICD-10-CM

## 2018-04-13 DIAGNOSIS — R79.89 OTHER SPECIFIED ABNORMAL FINDINGS OF BLOOD CHEMISTRY: ICD-10-CM

## 2018-04-13 DIAGNOSIS — Z96.7 PRESENCE OF OTHER BONE AND TENDON IMPLANTS: Chronic | ICD-10-CM

## 2018-04-13 DIAGNOSIS — K40.90 UNILATERAL INGUINAL HERNIA, WITHOUT OBSTRUCTION OR GANGRENE, NOT SPECIFIED AS RECURRENT: Chronic | ICD-10-CM

## 2018-04-13 PROCEDURE — 76700 US EXAM ABDOM COMPLETE: CPT

## 2018-04-13 PROCEDURE — 76700 US EXAM ABDOM COMPLETE: CPT | Mod: 26

## 2018-06-18 ENCOUNTER — APPOINTMENT (OUTPATIENT)
Dept: GASTROENTEROLOGY | Facility: GI CENTER | Age: 53
End: 2018-06-18

## 2018-07-17 ENCOUNTER — APPOINTMENT (OUTPATIENT)
Dept: INTERNAL MEDICINE | Facility: CLINIC | Age: 53
End: 2018-07-17

## 2018-07-26 NOTE — PATIENT PROFILE ADULT. - NS PRO ABUSE SCREEN SUSPICION NEGLECT YN
AOX3 +ambulatory patient reports chest pain started at work today. Patient denies any shortness of breath
no

## 2018-08-22 PROBLEM — I10 ESSENTIAL (PRIMARY) HYPERTENSION: Chronic | Status: ACTIVE | Noted: 2017-06-09

## 2018-08-22 PROBLEM — M19.90 UNSPECIFIED OSTEOARTHRITIS, UNSPECIFIED SITE: Chronic | Status: ACTIVE | Noted: 2017-06-09

## 2018-08-28 ENCOUNTER — APPOINTMENT (OUTPATIENT)
Dept: GASTROENTEROLOGY | Facility: GI CENTER | Age: 53
End: 2018-08-28

## 2018-09-27 ENCOUNTER — APPOINTMENT (OUTPATIENT)
Dept: INTERNAL MEDICINE | Facility: CLINIC | Age: 53
End: 2018-09-27
Payer: MEDICAID

## 2018-09-27 VITALS
OXYGEN SATURATION: 98 % | TEMPERATURE: 98.2 F | HEART RATE: 71 BPM | HEIGHT: 66 IN | SYSTOLIC BLOOD PRESSURE: 110 MMHG | BODY MASS INDEX: 24.27 KG/M2 | DIASTOLIC BLOOD PRESSURE: 70 MMHG | WEIGHT: 151 LBS

## 2018-09-27 DIAGNOSIS — E78.1 PURE HYPERGLYCERIDEMIA: ICD-10-CM

## 2018-09-27 DIAGNOSIS — Z23 ENCOUNTER FOR IMMUNIZATION: ICD-10-CM

## 2018-09-27 PROCEDURE — G0008: CPT

## 2018-09-27 PROCEDURE — 36415 COLL VENOUS BLD VENIPUNCTURE: CPT

## 2018-09-27 PROCEDURE — 99214 OFFICE O/P EST MOD 30 MIN: CPT | Mod: 25

## 2018-09-27 PROCEDURE — 90686 IIV4 VACC NO PRSV 0.5 ML IM: CPT

## 2018-09-27 NOTE — PHYSICAL EXAM
[No Acute Distress] : no acute distress [Well Nourished] : well nourished [Well Developed] : well developed [Well-Appearing] : well-appearing [Normal Sclera/Conjunctiva] : normal sclera/conjunctiva [PERRL] : pupils equal round and reactive to light [Normal Oropharynx] : the oropharynx was normal [No JVD] : no jugular venous distention [Supple] : supple [No Lymphadenopathy] : no lymphadenopathy [No Respiratory Distress] : no respiratory distress  [Clear to Auscultation] : lungs were clear to auscultation bilaterally [Normal Rate] : normal rate  [Regular Rhythm] : with a regular rhythm [Normal S1, S2] : normal S1 and S2 [No Murmur] : no murmur heard [No Edema] : there was no peripheral edema [Soft] : abdomen soft [Non Tender] : non-tender [Non-distended] : non-distended [No Masses] : no abdominal mass palpated [No HSM] : no HSM [Normal Bowel Sounds] : normal bowel sounds [No Rash] : no rash [No Focal Deficits] : no focal deficits [Normal Affect] : the affect was normal [Alert and Oriented x3] : oriented to person, place, and time [Normal Mood] : the mood was normal [No Accessory Muscle Use] : no accessory muscle use [No Spinal Tenderness] : no spinal tenderness [No Joint Swelling] : no joint swelling [No Skin Lesions] : no skin lesions [de-identified] : left foot examined with no ulcerations, he did have diminished pedal pulses DP and PT in left foot, left foot warm and pink

## 2018-09-27 NOTE — REVIEW OF SYSTEMS
[Claudication] : leg claudication [Depression] : depression [Negative] : Genitourinary [Fever] : no fever [Chills] : no chills [Fatigue] : no fatigue [Recent Change In Weight] : ~T no recent weight change [Chest Pain] : no chest pain [Palpitations] : no palpitations [Lower Ext Edema] : no lower extremity edema [Shortness Of Breath] : no shortness of breath [Wheezing] : no wheezing [Cough] : no cough [Dyspnea on Exertion] : not dyspnea on exertion [Abdominal Pain] : no abdominal pain [Nausea] : no nausea [Diarrhea] : no diarrhea [Vomiting] : no vomiting [Suicidal] : not suicidal [Insomnia] : no insomnia [Anxiety] : no anxiety

## 2018-09-27 NOTE — HISTORY OF PRESENT ILLNESS
[de-identified] : Here for follow up\par \par He would like to get flu shot.  He denies egg allergy\par \par He missed appt to have colonoscopy done and needs to reschedule\par \par He states he has been having claudication pain in left leg

## 2018-09-27 NOTE — ASSESSMENT
[FreeTextEntry1] : \par PAD: with left leg pain/claudication\par -s/p right SFA angioplasty\par -he is followed by Vascular surgery-Dr Silveira\par -he is on atorvastatin, plavix, and ASA\par -he is no longer smoking\par -I again adv he schedule appt to f/u with Vascular Surgery-he states he will make appt soon\par \par Seizure disorder:\par -followed by neurologist (Dr Mendez)-advised to f/u with neurology\par -continue Dilantin\par -no recent seizures\par -will check dilantin level\par \par Vitamin D deficiency:\par -on vitamin D3 1000 units daily\par -vitamin D 25-OH was normal 2018\par \par GERD:\par -continue pantoprazole-will refill\par \par Dyslipidemia\par -on atorvastatin 80mg PO daily and lovaza\par -I advised low fat/low chol diet\par -fasting lipids ordered\par \par Elevated LFTs:\par -will check labs\par -2018 abd US was normal\par -hepatitis B and C serology was negative\par \par Anxiety/Insomnia/Depression:\par -on Venlafaxine ER 75mg PO daily and seroquel to 200mg PO QHS\par -he states he has been a little stressed and depressed secondary to recent move and his wife feel and broke her arm\par --PHQ 9 score 10\par -I offered to refer him to [facility] but he declined\par -he states sx are mild and feels they will get better on their own.\par \par HTN:\par -BP at goal on Amlodipine 10mg Po daily\par \par Pre-DM:\par -HgA1c 5.5 10/2017 (normal)\par -I adv low fat/low chol diet \par -will check fasting labs\par \par Carotid stenosis\par -50-69% stenosis B/L\par -he is on ASA, plavix, and atorvastatin\par -will check fasting labs\par \par \par HCM:\par \par CPE 2017\par \par [id number] \par \par Flu shot:  advised.  R/B discussed.  Flu shot given 2018\par \par tdap: 2017\par \par Pneumovax: 11/3/2015\par \par HIV testing offered: pt declined testing 2017\par \par Hep C screening: negative 2015\par \par Colonoscopy: Seen by GI and colonoscopy-he missed appt for colonoscopy but states he will reschedule\par \par FIT testing: negative 10/2017-I gave him another FIT kit today\par \par Depression screenin2018negative (PHQ 9 score 10)\par \par \par F/U 3 months.  Fasting labs have been ordered and drawn today

## 2018-10-07 LAB
ALBUMIN SERPL ELPH-MCNC: 4.4 G/DL
ALP BLD-CCNC: 100 U/L
ALT SERPL-CCNC: 54 U/L
ANION GAP SERPL CALC-SCNC: 13 MMOL/L
AST SERPL-CCNC: 36 U/L
BASOPHILS # BLD AUTO: 0.02 K/UL
BASOPHILS NFR BLD AUTO: 0.2 %
BILIRUB SERPL-MCNC: <0.2 MG/DL
BUN SERPL-MCNC: 16 MG/DL
CALCIUM SERPL-MCNC: 9.5 MG/DL
CHLORIDE SERPL-SCNC: 105 MMOL/L
CHOLEST SERPL-MCNC: 224 MG/DL
CHOLEST/HDLC SERPL: 4.1 RATIO
CK SERPL-CCNC: 145 U/L
CO2 SERPL-SCNC: 23 MMOL/L
CREAT SERPL-MCNC: 0.73 MG/DL
EOSINOPHIL # BLD AUTO: 0.15 K/UL
EOSINOPHIL NFR BLD AUTO: 1.8 %
GGT SERPL-CCNC: 263 U/L
GLUCOSE SERPL-MCNC: 91 MG/DL
HBA1C MFR BLD HPLC: 5.6 %
HCT VFR BLD CALC: 41.8 %
HDLC SERPL-MCNC: 54 MG/DL
HGB BLD-MCNC: 13.6 G/DL
IMM GRANULOCYTES NFR BLD AUTO: 0.2 %
LDLC SERPL CALC-MCNC: 138 MG/DL
LYMPHOCYTES # BLD AUTO: 3 K/UL
LYMPHOCYTES NFR BLD AUTO: 36.9 %
MAN DIFF?: NORMAL
MCHC RBC-ENTMCNC: 30.4 PG
MCHC RBC-ENTMCNC: 32.5 GM/DL
MCV RBC AUTO: 93.3 FL
MONOCYTES # BLD AUTO: 0.69 K/UL
MONOCYTES NFR BLD AUTO: 8.5 %
NEUTROPHILS # BLD AUTO: 4.26 K/UL
NEUTROPHILS NFR BLD AUTO: 52.4 %
PHENYTOIN SERPL QL: 9.3 UG/ML
PLATELET # BLD AUTO: 165 K/UL
POTASSIUM SERPL-SCNC: 4.2 MMOL/L
PROT SERPL-MCNC: 7.5 G/DL
RBC # BLD: 4.48 M/UL
RBC # FLD: 15.5 %
SODIUM SERPL-SCNC: 141 MMOL/L
T4 FREE SERPL-MCNC: 0.8 NG/DL
TRIGL SERPL-MCNC: 158 MG/DL
TSH SERPL-ACNC: 2.73 UIU/ML
WBC # FLD AUTO: 8.14 K/UL

## 2018-12-18 ENCOUNTER — APPOINTMENT (OUTPATIENT)
Dept: INTERNAL MEDICINE | Facility: CLINIC | Age: 53
End: 2018-12-18

## 2019-04-09 ENCOUNTER — APPOINTMENT (OUTPATIENT)
Dept: NEUROLOGY | Facility: CLINIC | Age: 54
End: 2019-04-09
Payer: MEDICAID

## 2019-04-09 VITALS
WEIGHT: 150 LBS | DIASTOLIC BLOOD PRESSURE: 70 MMHG | HEIGHT: 66 IN | BODY MASS INDEX: 24.11 KG/M2 | SYSTOLIC BLOOD PRESSURE: 110 MMHG

## 2019-04-09 PROCEDURE — 99214 OFFICE O/P EST MOD 30 MIN: CPT

## 2019-05-02 ENCOUNTER — APPOINTMENT (OUTPATIENT)
Dept: INTERNAL MEDICINE | Facility: CLINIC | Age: 54
End: 2019-05-02

## 2019-05-09 ENCOUNTER — APPOINTMENT (OUTPATIENT)
Dept: INTERNAL MEDICINE | Facility: CLINIC | Age: 54
End: 2019-05-09
Payer: MEDICAID

## 2019-05-09 VITALS
SYSTOLIC BLOOD PRESSURE: 126 MMHG | WEIGHT: 148 LBS | DIASTOLIC BLOOD PRESSURE: 64 MMHG | BODY MASS INDEX: 23.78 KG/M2 | HEIGHT: 66 IN | HEART RATE: 79 BPM | RESPIRATION RATE: 15 BRPM | TEMPERATURE: 98.8 F | OXYGEN SATURATION: 94 %

## 2019-05-09 DIAGNOSIS — G47.00 INSOMNIA, UNSPECIFIED: ICD-10-CM

## 2019-05-09 PROCEDURE — 99214 OFFICE O/P EST MOD 30 MIN: CPT | Mod: 25

## 2019-05-09 PROCEDURE — 96127 BRIEF EMOTIONAL/BEHAV ASSMT: CPT

## 2019-05-09 NOTE — PHYSICAL EXAM
[No Acute Distress] : no acute distress [Well-Appearing] : well-appearing [Normal Voice/Communication] : normal voice/communication [Normal Sclera/Conjunctiva] : normal sclera/conjunctiva [PERRL] : pupils equal round and reactive to light [Normal Oropharynx] : the oropharynx was normal [No JVD] : no jugular venous distention [Supple] : supple [No Respiratory Distress] : no respiratory distress  [No Lymphadenopathy] : no lymphadenopathy [Clear to Auscultation] : lungs were clear to auscultation bilaterally [No Accessory Muscle Use] : no accessory muscle use [Normal Rate] : normal rate  [Normal S1, S2] : normal S1 and S2 [Regular Rhythm] : with a regular rhythm [No Edema] : there was no peripheral edema [No Murmur] : no murmur heard [Non-distended] : non-distended [Soft] : abdomen soft [Non Tender] : non-tender [No HSM] : no HSM [No Masses] : no abdominal mass palpated [No Spinal Tenderness] : no spinal tenderness [Normal Bowel Sounds] : normal bowel sounds [No Rash] : no rash [No Focal Deficits] : no focal deficits [No Skin Lesions] : no skin lesions [Normal Affect] : the affect was normal [Normal Mood] : the mood was normal [Alert and Oriented x3] : oriented to person, place, and time [Thyroid Normal, No Nodules] : the thyroid was normal and there were no nodules present [No Extremity Clubbing/Cyanosis] : no extremity clubbing/cyanosis [Normal Insight/Judgement] : insight and judgment were intact

## 2019-05-09 NOTE — REVIEW OF SYSTEMS
[Negative] : Musculoskeletal [Fever] : no fever [Chills] : no chills [Fatigue] : no fatigue [Recent Change In Weight] : ~T no recent weight change [Chest Pain] : no chest pain [Palpitations] : no palpitations [Claudication] : no  leg claudication [Lower Ext Edema] : no lower extremity edema [Shortness Of Breath] : no shortness of breath [Wheezing] : no wheezing [Cough] : no cough [Dyspnea on Exertion] : not dyspnea on exertion [Abdominal Pain] : no abdominal pain [Nausea] : no nausea [Diarrhea] : no diarrhea [Vomiting] : no vomiting [Anxiety] : no anxiety [Depression] : no depression

## 2019-05-09 NOTE — END OF VISIT
[FreeTextEntry3] : All medical entries made by the Scribe were at my, Dr. Mohit Pat's, direction and personally dictated by me on [5/9/19]. I have reviewed the chart and agree that the record accurately reflects my personal performance of the history, physical exam, assessment and plan. I have also personally directed, reviewed, and agreed with the chart.\par

## 2019-05-09 NOTE — ASSESSMENT
[FreeTextEntry1] : \par PAD: \par -no sx today \par -s/p right SFA angioplasty\par -F/U with Vascular surgery-Dr Silveira advised\par -he is on atorvastatin, plavix, and ASA\par \par Carotid stenosis:\par -will order carotid US\par -50-69% stenosis B/L\par -he is on ASA, plavix, and atorvastatin\par \par Seizure disorder:\par -followed by neurologist (Dr Mednez)\par -continue Dilantin\par -no recent seizures\par -will check dilantin level\par \par Vitamin D deficiency:\par -on vitamin D3 1000 units daily\par -will check vitamin D 25-OH\par \par GERD:\par -on pantoprazole\par -no sx reported\par \par Dyslipidemia\par -on atorvastatin 80mg PO daily and lovaza\par -I advised low fat/low chol diet\par -fasting lipids ordered\par \par Elevated LFTs:\par -last ALT 54, \par -will check labs\par -2018 abd US was normal\par -hepatitis B and C serology was negative\par -I advised f/u with GI\par \par Anxiety/Insomnia/Depression:\par -on Venlafaxine ER 75mg PO daily and seroquel to 200mg PO QHS and states he is doing well\par -PHQ 9 score 6\par -he states he has had some stress at home because his wife sister just passed away\par -he states he is dealing with it on how own\par \par HTN:\par -BP at goal on Amlodipine 10mg Po daily\par \par Pre-DM:\par -will check fasting labs\par \par \par HCM:\par \par CPE 2017\par \par [id number] \par \par Flu shot:  2018\par \par tdap: 2017\par \par Pneumovax: 11/3/2015\par \par HIV testing offered: pt declined testing 2019\par \par Hep C screening: negative 2015\par \par Colonoscopy: Seen by GI and colonoscopy-he missed appt for colonoscopy but states he will reschedule\par \par FIT testing: negative 10/2017-I gave him another FIT kit today 2019\par \par Depression screenin2019 PHQ 9 score 6\par \par \par F/U 3 months.  Fasting labs have been ordered.  he will have done at lab.  Meds refilled today

## 2019-05-09 NOTE — HISTORY OF PRESENT ILLNESS
[FreeTextEntry1] : Here for follow up  [de-identified] : Here for follow up\par \par He states he overall feels well with no acute complaints today.\par \par He was recently seen by neurologist Dr. Mendez.  No recent seizures reported\par \par He needs meds refilled

## 2019-05-28 ENCOUNTER — MEDICATION RENEWAL (OUTPATIENT)
Age: 54
End: 2019-05-28

## 2019-05-29 LAB — HEMOCCULT STL QL IA: NEGATIVE

## 2019-06-15 LAB
25(OH)D3 SERPL-MCNC: 37.9 NG/ML
ALBUMIN SERPL ELPH-MCNC: 5.1 G/DL
ALP BLD-CCNC: 109 U/L
ALT SERPL-CCNC: 38 U/L
ANION GAP SERPL CALC-SCNC: 17 MMOL/L
APPEARANCE: CLEAR
AST SERPL-CCNC: 29 U/L
BACTERIA: NEGATIVE
BASOPHILS # BLD AUTO: 0.03 K/UL
BASOPHILS NFR BLD AUTO: 0.4 %
BILIRUB SERPL-MCNC: <0.2 MG/DL
BILIRUBIN URINE: NEGATIVE
BLOOD URINE: NEGATIVE
BUN SERPL-MCNC: 15 MG/DL
CALCIUM SERPL-MCNC: 9.9 MG/DL
CHLORIDE SERPL-SCNC: 105 MMOL/L
CHOLEST SERPL-MCNC: 227 MG/DL
CHOLEST/HDLC SERPL: 4.6 RATIO
CK SERPL-CCNC: 138 U/L
CO2 SERPL-SCNC: 20 MMOL/L
COLOR: YELLOW
CREAT SERPL-MCNC: 0.89 MG/DL
EOSINOPHIL # BLD AUTO: 0.23 K/UL
EOSINOPHIL NFR BLD AUTO: 2.9 %
ESTIMATED AVERAGE GLUCOSE: 111 MG/DL
GGT SERPL-CCNC: 294 U/L
GLUCOSE QUALITATIVE U: NORMAL
GLUCOSE SERPL-MCNC: 101 MG/DL
HBA1C MFR BLD HPLC: 5.5 %
HCT VFR BLD CALC: 45.1 %
HDLC SERPL-MCNC: 49 MG/DL
HGB BLD-MCNC: 15.1 G/DL
HYALINE CASTS: 1 /LPF
IMM GRANULOCYTES NFR BLD AUTO: 0.5 %
KETONES URINE: NEGATIVE
LDLC SERPL CALC-MCNC: 126 MG/DL
LEUKOCYTE ESTERASE URINE: NEGATIVE
LYMPHOCYTES # BLD AUTO: 2.71 K/UL
LYMPHOCYTES NFR BLD AUTO: 34 %
MAN DIFF?: NORMAL
MCHC RBC-ENTMCNC: 31.1 PG
MCHC RBC-ENTMCNC: 33.5 GM/DL
MCV RBC AUTO: 92.8 FL
MICROSCOPIC-UA: NORMAL
MONOCYTES # BLD AUTO: 0.62 K/UL
MONOCYTES NFR BLD AUTO: 7.8 %
NEUTROPHILS # BLD AUTO: 4.33 K/UL
NEUTROPHILS NFR BLD AUTO: 54.4 %
NITRITE URINE: NEGATIVE
PH URINE: 5.5
PHENYTOIN SERPL QL: 8.6 UG/ML
PLATELET # BLD AUTO: 176 K/UL
POTASSIUM SERPL-SCNC: 4.6 MMOL/L
PROT SERPL-MCNC: 7.5 G/DL
PROTEIN URINE: NORMAL
PSA SERPL-MCNC: 0.45 NG/ML
RBC # BLD: 4.86 M/UL
RBC # FLD: 15 %
RED BLOOD CELLS URINE: 4 /HPF
SODIUM SERPL-SCNC: 142 MMOL/L
SPECIFIC GRAVITY URINE: 1.03
SQUAMOUS EPITHELIAL CELLS: 0 /HPF
T4 FREE SERPL-MCNC: 0.8 NG/DL
TRIGL SERPL-MCNC: 262 MG/DL
TSH SERPL-ACNC: 2.83 UIU/ML
URATE SERPL-MCNC: 4.8 MG/DL
UROBILINOGEN URINE: NORMAL
WBC # FLD AUTO: 7.96 K/UL
WHITE BLOOD CELLS URINE: 1 /HPF

## 2019-06-17 ENCOUNTER — RESULT CHARGE (OUTPATIENT)
Age: 54
End: 2019-06-17

## 2019-07-01 ENCOUNTER — APPOINTMENT (OUTPATIENT)
Dept: GASTROENTEROLOGY | Facility: CLINIC | Age: 54
End: 2019-07-01

## 2019-07-07 ENCOUNTER — FORM ENCOUNTER (OUTPATIENT)
Age: 54
End: 2019-07-07

## 2019-07-08 ENCOUNTER — OUTPATIENT (OUTPATIENT)
Dept: OUTPATIENT SERVICES | Facility: HOSPITAL | Age: 54
LOS: 1 days | End: 2019-07-08

## 2019-07-08 ENCOUNTER — APPOINTMENT (OUTPATIENT)
Dept: ULTRASOUND IMAGING | Facility: CLINIC | Age: 54
End: 2019-07-08
Payer: MEDICAID

## 2019-07-08 DIAGNOSIS — R94.5 ABNORMAL RESULTS OF LIVER FUNCTION STUDIES: ICD-10-CM

## 2019-07-08 DIAGNOSIS — Z96.7 PRESENCE OF OTHER BONE AND TENDON IMPLANTS: Chronic | ICD-10-CM

## 2019-07-08 DIAGNOSIS — Z98.89 OTHER SPECIFIED POSTPROCEDURAL STATES: Chronic | ICD-10-CM

## 2019-07-08 DIAGNOSIS — K40.90 UNILATERAL INGUINAL HERNIA, WITHOUT OBSTRUCTION OR GANGRENE, NOT SPECIFIED AS RECURRENT: Chronic | ICD-10-CM

## 2019-07-08 PROCEDURE — 93880 EXTRACRANIAL BILAT STUDY: CPT | Mod: 26

## 2019-07-08 PROCEDURE — 76700 US EXAM ABDOM COMPLETE: CPT | Mod: 26

## 2019-08-06 ENCOUNTER — APPOINTMENT (OUTPATIENT)
Dept: INTERNAL MEDICINE | Facility: CLINIC | Age: 54
End: 2019-08-06
Payer: MEDICAID

## 2019-08-06 ENCOUNTER — NON-APPOINTMENT (OUTPATIENT)
Age: 54
End: 2019-08-06

## 2019-08-06 VITALS
TEMPERATURE: 97.6 F | SYSTOLIC BLOOD PRESSURE: 122 MMHG | HEART RATE: 62 BPM | WEIGHT: 150 LBS | BODY MASS INDEX: 24.11 KG/M2 | DIASTOLIC BLOOD PRESSURE: 80 MMHG | OXYGEN SATURATION: 97 % | HEIGHT: 66 IN

## 2019-08-06 DIAGNOSIS — R06.2 WHEEZING: ICD-10-CM

## 2019-08-06 PROCEDURE — 99215 OFFICE O/P EST HI 40 MIN: CPT | Mod: 25

## 2019-08-06 PROCEDURE — 93000 ELECTROCARDIOGRAM COMPLETE: CPT

## 2019-08-06 PROCEDURE — 96127 BRIEF EMOTIONAL/BEHAV ASSMT: CPT

## 2019-08-06 NOTE — ASSESSMENT
[FreeTextEntry1] : \par Acute Bronchitis/Wheezing:\par -will order CXR\par -will tx with Z-pack \par -will tx with medrol dose pack\par -will give proAir inhaler 2 puffs q 6 hours prn\par -he is to notify office if sx persist/worsen\par \par PAD: with occasional claudication\par -s/p right SFA angioplasty\par -F/U with Vascular surgery-Dr Silveira advised-he states he will make appt\par -he is on atorvastatin, Zetia plavix, and ASA\par -fasting labs ordered\par -uses naproxen prn-rare use when he has leg pain-will refill R/B/side effects discussed\par \par Carotid stenosis:\par -carotid US showed 50-69% stenosis B/L 2019\par -he is on ASA, zetia,  plavix, and atorvastatin\par -i have advised he f/u with Vascular surgery\par \par Seizure disorder:\par -no recent seizues\par -followed by neurologist (Dr Mendez)\par -continue Dilantin\par -will check dilantin level\par \par Vitamin D deficiency:\par -on vitamin D3 1000 units daily\par -vitamin D 25-OH normal 2019\par \par GERD:\par -on pantoprazole\par -no sx reported\par \par Dyslipidemia\par -on atorvastatin 80mg PO daily, Zetia, and lovaza\par -I advised low fat/low chol diet\par -fasting labs ordered\par \par Elevated LFTs:\par -\par -he denies any alcohol use\par -recent abd US was normal\par -will check labs\par -hepatitis B and C serology was negative\par -I advised f/u with GI-referred again to GI\par \par Anxiety/Insomnia/Depression:\par -on Venlafaxine ER 75mg PO daily and seroquel to 200mg PO QHS and states he is doing well\par -PHQ 2 score 0\par \par HTN:\par -BP at goal on Amlodipine 10mg Po daily\par \par Pre-DM:\par -last HgA1c 5.6 2019-normal\par -BMI 24\par \par \par HCM:\par \par CPE 2017\par \par EK2019 NSR at 79 with Flip T waves in  V1 and V2-no significant change from previous-he denies chest pain, sob, palpitations\par \par Flu shot:  2018-advised in the fall\par \par Tdap: 2017\par \par Pneumovax: 11/3/2015\par \par HIV testing offered: pt declined testing 2019\par \par Hep C screening: negative 2015\par \par Colonoscopy: Will refer again to GI\par \par FIT testing: negative 2019\par \par Depression screenin2019 PHQ 2 score 0\par \par \par F/U 3 months.  Fasting labs have been ordered.  He will have done at lab.

## 2019-08-06 NOTE — HISTORY OF PRESENT ILLNESS
[FreeTextEntry1] : Here for follow up \par  [de-identified] : Here for follow up\par \par He presents today with c/o a chest cold x 2 days. He states that he feels like he has a lot of phlegm in his chest and has been having a productive cough (white phlegm). Cough has been keeping him up at night. He has no other associated sx. He denies fever/chills, sore throat, wheezing, chest pain, SOB, or recent foreign travel. He denies any sick contacts. He is not taking anything to relieve his sx. \par \par He states claudication pain in left leg has been acting up. He will make appt with vascular surgery. \par \par He needs Naproxen refilled today which he takes from time to time for leg pain.  he reports rare use.

## 2019-08-06 NOTE — REVIEW OF SYSTEMS
[Cough] : cough [Claudication] : leg claudication [Negative] : Integumentary [Fever] : no fever [Chills] : no chills [Fatigue] : no fatigue [Recent Change In Weight] : ~T no recent weight change [Earache] : no earache [Nasal Discharge] : no nasal discharge [Chest Pain] : no chest pain [Palpitations] : no palpitations [Lower Ext Edema] : no lower extremity edema [Shortness Of Breath] : no shortness of breath [Wheezing] : no wheezing [Dyspnea on Exertion] : not dyspnea on exertion [Abdominal Pain] : no abdominal pain [Nausea] : no nausea [Diarrhea] : no diarrhea [Vomiting] : no vomiting [Skin Rash] : no skin rash [Anxiety] : no anxiety [Depression] : no depression [FreeTextEntry6] : see HPI

## 2019-08-06 NOTE — PHYSICAL EXAM
[No Acute Distress] : no acute distress [Normal Sclera/Conjunctiva] : normal sclera/conjunctiva [Well-Appearing] : well-appearing [Normal Voice/Communication] : normal voice/communication [PERRL] : pupils equal round and reactive to light [Normal Oropharynx] : the oropharynx was normal [No JVD] : no jugular venous distention [Supple] : supple [No Lymphadenopathy] : no lymphadenopathy [Thyroid Normal, No Nodules] : the thyroid was normal and there were no nodules present [No Respiratory Distress] : no respiratory distress  [No Accessory Muscle Use] : no accessory muscle use [Normal Rate] : normal rate  [Regular Rhythm] : with a regular rhythm [No Murmur] : no murmur heard [Normal S1, S2] : normal S1 and S2 [No Edema] : there was no peripheral edema [No Extremity Clubbing/Cyanosis] : no extremity clubbing/cyanosis [Soft] : abdomen soft [Non Tender] : non-tender [Non-distended] : non-distended [No Masses] : no abdominal mass palpated [No HSM] : no HSM [Normal Bowel Sounds] : normal bowel sounds [No Spinal Tenderness] : no spinal tenderness [No Skin Lesions] : no skin lesions [No Rash] : no rash [Alert and Oriented x3] : oriented to person, place, and time [Normal Affect] : the affect was normal [No Focal Deficits] : no focal deficits [Normal Mood] : the mood was normal [Normal Insight/Judgement] : insight and judgment were intact [de-identified] : mild wheezing heard

## 2019-08-06 NOTE — END OF VISIT
[>50% of Time Spent on Counseling for ____] : Greater than 50% of the encounter time was spent on counseling for [unfilled] [Time Spent: ___ minutes] : I have spent [unfilled] minutes of face to face time with the patient [FreeTextEntry3] : All medical entries made by the Scribe were at my, Dr. Mohit Pat's, direction and personally dictated by me on [8/6/2019]. I have reviewed the chart and agree that the record accurately reflects my personal performance of the history, physical exam, assessment and plan. I have also personally directed, reviewed, and agreed with the chart.\par

## 2019-08-11 ENCOUNTER — FORM ENCOUNTER (OUTPATIENT)
Age: 54
End: 2019-08-11

## 2019-08-12 ENCOUNTER — OUTPATIENT (OUTPATIENT)
Dept: OUTPATIENT SERVICES | Facility: HOSPITAL | Age: 54
LOS: 1 days | End: 2019-08-12
Payer: MEDICAID

## 2019-08-12 ENCOUNTER — APPOINTMENT (OUTPATIENT)
Dept: RADIOLOGY | Facility: CLINIC | Age: 54
End: 2019-08-12

## 2019-08-12 DIAGNOSIS — Z96.7 PRESENCE OF OTHER BONE AND TENDON IMPLANTS: Chronic | ICD-10-CM

## 2019-08-12 DIAGNOSIS — Z98.89 OTHER SPECIFIED POSTPROCEDURAL STATES: Chronic | ICD-10-CM

## 2019-08-12 DIAGNOSIS — K40.90 UNILATERAL INGUINAL HERNIA, WITHOUT OBSTRUCTION OR GANGRENE, NOT SPECIFIED AS RECURRENT: Chronic | ICD-10-CM

## 2019-08-12 DIAGNOSIS — R06.2 WHEEZING: ICD-10-CM

## 2019-08-12 DIAGNOSIS — J20.9 ACUTE BRONCHITIS, UNSPECIFIED: ICD-10-CM

## 2019-08-12 PROCEDURE — 71046 X-RAY EXAM CHEST 2 VIEWS: CPT | Mod: 26

## 2019-08-12 PROCEDURE — 71046 X-RAY EXAM CHEST 2 VIEWS: CPT

## 2019-09-24 ENCOUNTER — RX RENEWAL (OUTPATIENT)
Age: 54
End: 2019-09-24

## 2019-09-27 ENCOUNTER — APPOINTMENT (OUTPATIENT)
Dept: GASTROENTEROLOGY | Facility: CLINIC | Age: 54
End: 2019-09-27
Payer: MEDICAID

## 2019-09-27 VITALS
DIASTOLIC BLOOD PRESSURE: 88 MMHG | HEART RATE: 86 BPM | OXYGEN SATURATION: 98 % | BODY MASS INDEX: 25.02 KG/M2 | SYSTOLIC BLOOD PRESSURE: 146 MMHG | RESPIRATION RATE: 14 BRPM | WEIGHT: 155 LBS

## 2019-09-27 PROCEDURE — 99213 OFFICE O/P EST LOW 20 MIN: CPT

## 2019-09-27 NOTE — HISTORY OF PRESENT ILLNESS
[None] : had no significant interval events [Heartburn] : denies heartburn [Nausea] : denies nausea [Vomiting] : denies vomiting [Diarrhea] : denies diarrhea [Constipation] : denies constipation [Yellow Skin Or Eyes (Jaundice)] : denies jaundice [Abdominal Pain] : denies abdominal pain [Abdominal Swelling] : denies abdominal swelling [Rectal Pain] : denies rectal pain [Abdominal Surgery] : abdominal surgery [Wt Gain ___ Lbs] : no recent weight gain [Wt Loss ___ Lbs] : no recent weight loss [GERD] : no gastroesophageal reflux disease [Hiatus Hernia] : no hiatus hernia [Peptic Ulcer Disease] : no peptic ulcer disease [Pancreatitis] : no pancreatitis [Cholelithiasis] : no cholelithiasis [Kidney Stone] : no kidney stone [Inflammatory Bowel Disease] : no inflammatory bowel disease [Irritable Bowel Syndrome] : no irritable bowel syndrome [Diverticulitis] : no diverticulitis [Alcohol Abuse] : no alcohol abuse [Malignancy] : no malignancy [Appendectomy] : no appendectomy [Cholecystectomy] : no cholecystectomy [de-identified] : roughly one year ago [de-identified] : recommending colonoscopy [de-identified] : Patient presents today for followup evaluation for screening colonoscopy having been seen here roughly one year ago for the same. He never had the above colonoscopy last year for personal reasons and is not yet ready to have one now although he is not refusing one. He does state that he has yearly stool DNA tests which have been negative but has never had a colonoscopy. He denies gross hematochezia or melena or abdominal pain or unexplained weight loss or anorexia and has no first-degree relatives with either colon cancer or colon polyps.

## 2019-09-27 NOTE — ASSESSMENT
[FreeTextEntry1] : Impression: Colon cancer screening rule out colonic neoplasm. Patient has had no previous colonoscopies. However he wishes to wait until January of next year before scheduling a colonoscopy. He is scheduled for surgery on his lower extremities for circulation later this year.\par \par Recommendations: A high-fiber diet was recommended for routine colon health. Screening colonoscopy was once again advised and the patient is agreeable to proceeding with colonoscopy but for personal reasons wishes to wait until January of next year. He was told to return here in 3 months time for followup evaluation at which point he will hopefully be willing to schedule a screening colonoscopy. He appeared to understand all of the above instructions, information, and management plan.

## 2019-09-27 NOTE — PHYSICAL EXAM
[General Appearance - Alert] : alert [General Appearance - In No Acute Distress] : in no acute distress [General Appearance - Well Nourished] : well nourished [General Appearance - Well Developed] : well developed [General Appearance - Well-Appearing] : healthy appearing [Sclera] : the sclera and conjunctiva were normal [PERRL With Normal Accommodation] : pupils were equal in size, round, and reactive to light [Extraocular Movements] : extraocular movements were intact [Outer Ear] : the ears and nose were normal in appearance [Examination Of The Oral Cavity] : the lips and gums were normal [Oropharynx] : the oropharynx was normal [Neck Appearance] : the appearance of the neck was normal [Neck Cervical Mass (___cm)] : no neck mass was observed [Jugular Venous Distention Increased] : there was no jugular-venous distention [Thyroid Diffuse Enlargement] : the thyroid was not enlarged [Thyroid Nodule] : there were no palpable thyroid nodules [Heart Rate And Rhythm] : heart rate was normal and rhythm regular [Auscultation Breath Sounds / Voice Sounds] : lungs were clear to auscultation bilaterally [Heart Sounds] : normal S1 and S2 [Heart Sounds Gallop] : no gallops [Murmurs] : no murmurs [Heart Sounds Pericardial Friction Rub] : no pericardial rub [Bowel Sounds] : normal bowel sounds [Abdomen Soft] : soft [Abdomen Tenderness] : non-tender [Abdomen Mass (___ Cm)] : no abdominal mass palpated [] : no hepato-splenomegaly [Abnormal Walk] : normal gait [No CVA Tenderness] : no ~M costovertebral angle tenderness [Musculoskeletal - Swelling] : no joint swelling seen [Skin Color & Pigmentation] : normal skin color and pigmentation [Skin Turgor] : normal skin turgor [Oriented To Time, Place, And Person] : oriented to person, place, and time [FreeTextEntry1] : deferred until colonoscopy

## 2019-11-05 ENCOUNTER — APPOINTMENT (OUTPATIENT)
Dept: INTERNAL MEDICINE | Facility: CLINIC | Age: 54
End: 2019-11-05

## 2019-11-21 ENCOUNTER — MEDICATION RENEWAL (OUTPATIENT)
Age: 54
End: 2019-11-21

## 2019-11-25 ENCOUNTER — MEDICATION RENEWAL (OUTPATIENT)
Age: 54
End: 2019-11-25

## 2019-12-03 ENCOUNTER — MEDICATION RENEWAL (OUTPATIENT)
Age: 54
End: 2019-12-03

## 2019-12-05 ENCOUNTER — APPOINTMENT (OUTPATIENT)
Dept: INTERNAL MEDICINE | Facility: CLINIC | Age: 54
End: 2019-12-05
Payer: MEDICAID

## 2019-12-05 VITALS
DIASTOLIC BLOOD PRESSURE: 83 MMHG | TEMPERATURE: 98.3 F | SYSTOLIC BLOOD PRESSURE: 142 MMHG | HEIGHT: 66 IN | WEIGHT: 151.31 LBS | HEART RATE: 73 BPM | BODY MASS INDEX: 24.32 KG/M2 | OXYGEN SATURATION: 98 %

## 2019-12-05 DIAGNOSIS — R73.03 PREDIABETES.: ICD-10-CM

## 2019-12-05 DIAGNOSIS — Z23 ENCOUNTER FOR IMMUNIZATION: ICD-10-CM

## 2019-12-05 DIAGNOSIS — M10.9 GOUT, UNSPECIFIED: ICD-10-CM

## 2019-12-05 DIAGNOSIS — Z87.09 PERSONAL HISTORY OF OTHER DISEASES OF THE RESPIRATORY SYSTEM: ICD-10-CM

## 2019-12-05 PROCEDURE — 99214 OFFICE O/P EST MOD 30 MIN: CPT | Mod: 25

## 2019-12-05 PROCEDURE — G0008: CPT

## 2019-12-05 PROCEDURE — 90682 RIV4 VACC RECOMBINANT DNA IM: CPT

## 2019-12-05 RX ORDER — METHYLPREDNISOLONE 4 MG/1
4 TABLET ORAL
Qty: 1 | Refills: 0 | Status: DISCONTINUED | COMMUNITY
Start: 2019-08-06 | End: 2019-12-05

## 2019-12-05 RX ORDER — AZITHROMYCIN 250 MG/1
250 TABLET, FILM COATED ORAL
Qty: 1 | Refills: 0 | Status: DISCONTINUED | COMMUNITY
Start: 2019-08-06 | End: 2019-12-05

## 2019-12-08 PROBLEM — Z87.09 HISTORY OF ACUTE BRONCHITIS: Status: RESOLVED | Noted: 2019-08-06 | Resolved: 2019-12-08

## 2019-12-08 NOTE — PHYSICAL EXAM
[Supple] : supple [No JVD] : no jugular venous distention [No Lymphadenopathy] : no lymphadenopathy [Thyroid Normal, No Nodules] : the thyroid was normal and there were no nodules present [No Respiratory Distress] : no respiratory distress  [No Accessory Muscle Use] : no accessory muscle use [Regular Rhythm] : with a regular rhythm [Normal Rate] : normal rate  [Normal S1, S2] : normal S1 and S2 [No Murmur] : no murmur heard [Normal Mood] : the mood was normal [de-identified] : mild wheezing heard  [No Acute Distress] : no acute distress [Normal Voice/Communication] : normal voice/communication [Normal Sclera/Conjunctiva] : normal sclera/conjunctiva [Well-Appearing] : well-appearing [Normal Oropharynx] : the oropharynx was normal [PERRL] : pupils equal round and reactive to light [Normal] : normal rate, regular rhythm, normal S1 and S2 and no murmur heard [No Extremity Clubbing/Cyanosis] : no extremity clubbing/cyanosis [No Edema] : there was no peripheral edema [No Rash] : no rash [No Focal Deficits] : no focal deficits [Normal Affect] : the affect was normal [No Skin Lesions] : no skin lesions [Alert and Oriented x3] : oriented to person, place, and time [Normal Insight/Judgement] : insight and judgment were intact [Soft] : abdomen soft [Non Tender] : non-tender [No Masses] : no abdominal mass palpated [No HSM] : no HSM [Non-distended] : non-distended [Normal Bowel Sounds] : normal bowel sounds [No Spinal Tenderness] : no spinal tenderness [de-identified] : depressed mood, tearful at times during exam [de-identified] : no calf tenderness

## 2019-12-08 NOTE — ASSESSMENT
[FreeTextEntry1] : \par \par PAD: with occasional claudication\par -s/p right SFA angioplasty\par -F/U with Vascular surgery-Dr Silveira advised-he states he will make appt\par -he is on atorvastatin, Zetia plavix, and ASA\par -fasting labs ordered\par \par Carotid stenosis:\par -carotid US showed 50-69% stenosis B/L 2019\par -he is on ASA, zetia,  plavix, and atorvastatin\par -I have advised he f/u with Vascular surgery\par \par Seizure disorder:\par -no recent seizures\par -followed by neurologist (Dr Mendez)\par -continue Dilantin- refilled today \par -will check Dilantin level\par \par Vitamin D deficiency:\par -on vitamin D3 1000 units daily\par -vitamin D 25-OH normal 2019\par \par GERD:\par -on pantoprazole\par -no sx reported\par \par Dyslipidemia\par -on atorvastatin 80mg PO daily, Zetia, and lovaza\par -I advised low fat/low chol diet\par -fasting labs ordered\par \par Elevated LFTs:\par -\par -he denies any alcohol use\par -recent abd US was normal\par -will check labs\par -hepatitis B and C serology was negative\par -seen by GI-has f/u appt 12/15/2019\par \par Anxiety/Insomnia/Depression:\par -on Venlafaxine ER 75mg PO daily (renewed today) and seroquel to 200mg PO QHS \par -PHQ 9 score 4\par -he is going through a hard time as he has had some sick family members and anniversaries of family members deaths are coming up\par -I advised he speak with [facility] but he declined-he states he will consider if sx do not improve or worsen\par -no SI/HI\par \par HTN:\par -BP mildly  elevated today possibly secondary to not having his seizure medication and venlafaxine which has stressed him out\par -I advised low fat/low cholesterol diet, low salt diet\par -on Amlodipine 10mg Po daily\par \par Pre-DM:\par -last HgA1c 5.6 2019-normal\par -BMI 24\par \par \par HCM:\par \par CPE 2017\par \par EK2019\par \par Flu shot: Advised. No egg allergy.  R/B discussed-VIS given.  Flu shot given 2019\par \par Tdap: 2017\par \par Pneumovax: 11/3/2015\par \par HIV testing offered: pt declined testing 2019\par \par Hep C screening: negative 2015\par \par Colonoscopy: seen by GI has appt to discuss colonoscopy\par \par FIT testing: negative 2019\par \par Depression screenin2019 PHQ 9 score 4\par \par \par F/U 3 months.  Fasting labs have been ordered.  He will have done at lab.  Medications refilled today.

## 2019-12-08 NOTE — ADDENDUM
[FreeTextEntry1] : I, Lani Nova, acted solely as a scribe for Dr. Pat on this date [12/5/2019].\par

## 2019-12-08 NOTE — HISTORY OF PRESENT ILLNESS
[de-identified] : Here for medication refill. He states he has been stressed without his medication (has been out of venlafaxine and dilantin x few days) and has not been able to sleep well. \par \par He has also been feeling depressed because of issues going on in his personal life. At this time he would just like his depression medication refilled.He does not want to see therapist.  No SI/HI. \par \par He would like flu shot today and denies egg allergy. \par \par \par \par  [FreeTextEntry1] : Medication refill

## 2019-12-08 NOTE — END OF VISIT
[FreeTextEntry3] : All medical entries made by the Scribe were at my, Dr. Mohit Pat's, direction and personally dictated by me on [12/5/2019]. I have reviewed the chart and agree that the record accurately reflects my personal performance of the history, physical exam, assessment and plan. I have also personally directed, reviewed, and agreed with the chart.\par

## 2019-12-08 NOTE — REVIEW OF SYSTEMS
[Depression] : depression [Insomnia] : insomnia [Negative] : Respiratory [Fever] : no fever [Chills] : no chills [Fatigue] : no fatigue [Recent Change In Weight] : ~T no recent weight change [Chest Pain] : no chest pain [Palpitations] : no palpitations [Wheezing] : no wheezing [Lower Ext Edema] : no lower extremity edema [Shortness Of Breath] : no shortness of breath [Cough] : no cough [Dyspnea on Exertion] : not dyspnea on exertion [Abdominal Pain] : no abdominal pain [Diarrhea] : no diarrhea [Nausea] : no nausea [Vomiting] : no vomiting [Suicidal] : not suicidal [Anxiety] : no anxiety [de-identified] : See HPI

## 2019-12-16 ENCOUNTER — APPOINTMENT (OUTPATIENT)
Dept: GASTROENTEROLOGY | Facility: CLINIC | Age: 54
End: 2019-12-16
Payer: MEDICAID

## 2019-12-16 VITALS
HEIGHT: 66 IN | HEART RATE: 70 BPM | BODY MASS INDEX: 24.43 KG/M2 | WEIGHT: 152 LBS | SYSTOLIC BLOOD PRESSURE: 130 MMHG | DIASTOLIC BLOOD PRESSURE: 80 MMHG

## 2019-12-16 DIAGNOSIS — Z12.11 ENCOUNTER FOR SCREENING FOR MALIGNANT NEOPLASM OF COLON: ICD-10-CM

## 2019-12-16 PROCEDURE — 99213 OFFICE O/P EST LOW 20 MIN: CPT

## 2019-12-16 NOTE — PHYSICAL EXAM
[General Appearance - Alert] : alert [General Appearance - Well Developed] : well developed [General Appearance - Well Nourished] : well nourished [General Appearance - In No Acute Distress] : in no acute distress [General Appearance - Well-Appearing] : healthy appearing [Sclera] : the sclera and conjunctiva were normal [Extraocular Movements] : extraocular movements were intact [PERRL With Normal Accommodation] : pupils were equal in size, round, and reactive to light [Examination Of The Oral Cavity] : the lips and gums were normal [Outer Ear] : the ears and nose were normal in appearance [Oropharynx] : the oropharynx was normal [Neck Cervical Mass (___cm)] : no neck mass was observed [Neck Appearance] : the appearance of the neck was normal [Thyroid Diffuse Enlargement] : the thyroid was not enlarged [Jugular Venous Distention Increased] : there was no jugular-venous distention [Thyroid Nodule] : there were no palpable thyroid nodules [Auscultation Breath Sounds / Voice Sounds] : lungs were clear to auscultation bilaterally [Heart Rate And Rhythm] : heart rate was normal and rhythm regular [Heart Sounds] : normal S1 and S2 [Heart Sounds Gallop] : no gallops [Murmurs] : no murmurs [Heart Sounds Pericardial Friction Rub] : no pericardial rub [Bowel Sounds] : normal bowel sounds [Abdomen Soft] : soft [Abdomen Tenderness] : non-tender [] : no hepato-splenomegaly [No CVA Tenderness] : no ~M costovertebral angle tenderness [Abdomen Mass (___ Cm)] : no abdominal mass palpated [Abnormal Walk] : normal gait [Musculoskeletal - Swelling] : no joint swelling seen [Skin Color & Pigmentation] : normal skin color and pigmentation [Oriented To Time, Place, And Person] : oriented to person, place, and time [Skin Turgor] : normal skin turgor [FreeTextEntry1] : deferred until colonoscopy

## 2019-12-16 NOTE — HISTORY OF PRESENT ILLNESS
[None] : had no significant interval events [de-identified] : September 2019 [de-identified] : recommending screening colonoscopy [de-identified] : Patient presents today for followup evaluation for screening colonoscopy. He has been here in the past and colonoscopy for colon cancer screening was recommended but was never done because of personal reasons. He is here today but states that he still cannot schedule a screening colonoscopy because of personal matters regarding his parents which he has to take care. He has no lower GI symptoms or complaints and has had no previous colonoscopies.

## 2019-12-16 NOTE — ASSESSMENT
[FreeTextEntry1] : Impression: Colon cancer screening for colonic neoplasm. Patient is still not ready to schedule screening colonoscopy for personal reasons.\par \par Recommendations: Patient was told to call back when he is ready to schedule colonoscopy and is taking care of his personal matters. He states that he will do this.

## 2019-12-22 ENCOUNTER — RX RENEWAL (OUTPATIENT)
Age: 54
End: 2019-12-22

## 2020-01-03 ENCOUNTER — RX RENEWAL (OUTPATIENT)
Age: 55
End: 2020-01-03

## 2020-01-12 LAB
ALBUMIN SERPL ELPH-MCNC: 4.4 G/DL
ALP BLD-CCNC: 96 U/L
ALT SERPL-CCNC: 38 U/L
ANION GAP SERPL CALC-SCNC: 14 MMOL/L
AST SERPL-CCNC: 28 U/L
BASOPHILS # BLD AUTO: 0.05 K/UL
BASOPHILS NFR BLD AUTO: 0.8 %
BILIRUB SERPL-MCNC: 0.2 MG/DL
BUN SERPL-MCNC: 16 MG/DL
CALCIUM SERPL-MCNC: 9.3 MG/DL
CHLORIDE SERPL-SCNC: 105 MMOL/L
CHOLEST SERPL-MCNC: 188 MG/DL
CHOLEST/HDLC SERPL: 3.6 RATIO
CO2 SERPL-SCNC: 22 MMOL/L
CREAT SERPL-MCNC: 1.04 MG/DL
EOSINOPHIL # BLD AUTO: 0.19 K/UL
EOSINOPHIL NFR BLD AUTO: 3.1 %
GGT SERPL-CCNC: 276 U/L
GLUCOSE SERPL-MCNC: 101 MG/DL
HCT VFR BLD CALC: 40.9 %
HDLC SERPL-MCNC: 52 MG/DL
HGB BLD-MCNC: 14 G/DL
IMM GRANULOCYTES NFR BLD AUTO: 0.3 %
LDLC SERPL CALC-MCNC: 102 MG/DL
LYMPHOCYTES # BLD AUTO: 2.36 K/UL
LYMPHOCYTES NFR BLD AUTO: 38.8 %
MAN DIFF?: NORMAL
MCHC RBC-ENTMCNC: 31.2 PG
MCHC RBC-ENTMCNC: 34.2 GM/DL
MCV RBC AUTO: 91.1 FL
MONOCYTES # BLD AUTO: 0.54 K/UL
MONOCYTES NFR BLD AUTO: 8.9 %
NEUTROPHILS # BLD AUTO: 2.93 K/UL
NEUTROPHILS NFR BLD AUTO: 48.1 %
PHENYTOIN SERPL QL: 6.3 UG/ML
PLATELET # BLD AUTO: 177 K/UL
POTASSIUM SERPL-SCNC: 4.2 MMOL/L
PROT SERPL-MCNC: 6.7 G/DL
RBC # BLD: 4.49 M/UL
RBC # FLD: 14 %
SODIUM SERPL-SCNC: 141 MMOL/L
TRIGL SERPL-MCNC: 168 MG/DL
URATE SERPL-MCNC: 5.6 MG/DL
WBC # FLD AUTO: 6.09 K/UL

## 2020-03-10 ENCOUNTER — APPOINTMENT (OUTPATIENT)
Dept: INTERNAL MEDICINE | Facility: CLINIC | Age: 55
End: 2020-03-10

## 2020-04-09 ENCOUNTER — APPOINTMENT (OUTPATIENT)
Dept: NEUROLOGY | Facility: CLINIC | Age: 55
End: 2020-04-09

## 2020-05-03 ENCOUNTER — RX RENEWAL (OUTPATIENT)
Age: 55
End: 2020-05-03

## 2020-05-06 ENCOUNTER — RX RENEWAL (OUTPATIENT)
Age: 55
End: 2020-05-06

## 2020-06-02 ENCOUNTER — RX RENEWAL (OUTPATIENT)
Age: 55
End: 2020-06-02

## 2020-06-09 ENCOUNTER — RX RENEWAL (OUTPATIENT)
Age: 55
End: 2020-06-09

## 2020-06-10 ENCOUNTER — APPOINTMENT (OUTPATIENT)
Dept: INTERNAL MEDICINE | Facility: CLINIC | Age: 55
End: 2020-06-10
Payer: MEDICAID

## 2020-06-10 DIAGNOSIS — R79.89 OTHER SPECIFIED ABNORMAL FINDINGS OF BLOOD CHEMISTRY: ICD-10-CM

## 2020-06-10 DIAGNOSIS — Z11.59 ENCOUNTER FOR SCREENING FOR OTHER VIRAL DISEASES: ICD-10-CM

## 2020-06-10 PROCEDURE — 99441: CPT

## 2020-07-09 ENCOUNTER — RX RENEWAL (OUTPATIENT)
Age: 55
End: 2020-07-09

## 2020-07-30 ENCOUNTER — APPOINTMENT (OUTPATIENT)
Dept: NEUROLOGY | Facility: CLINIC | Age: 55
End: 2020-07-30

## 2020-08-21 ENCOUNTER — RX RENEWAL (OUTPATIENT)
Age: 55
End: 2020-08-21

## 2020-09-11 ENCOUNTER — RX RENEWAL (OUTPATIENT)
Age: 55
End: 2020-09-11

## 2020-09-12 ENCOUNTER — RX RENEWAL (OUTPATIENT)
Age: 55
End: 2020-09-12

## 2020-09-15 ENCOUNTER — APPOINTMENT (OUTPATIENT)
Dept: INTERNAL MEDICINE | Facility: CLINIC | Age: 55
End: 2020-09-15

## 2020-10-08 ENCOUNTER — RX RENEWAL (OUTPATIENT)
Age: 55
End: 2020-10-08

## 2020-10-15 ENCOUNTER — RX RENEWAL (OUTPATIENT)
Age: 55
End: 2020-10-15

## 2020-11-05 ENCOUNTER — RX RENEWAL (OUTPATIENT)
Age: 55
End: 2020-11-05

## 2020-11-17 ENCOUNTER — RX RENEWAL (OUTPATIENT)
Age: 55
End: 2020-11-17

## 2020-12-17 ENCOUNTER — RX RENEWAL (OUTPATIENT)
Age: 55
End: 2020-12-17

## 2020-12-18 ENCOUNTER — RX RENEWAL (OUTPATIENT)
Age: 55
End: 2020-12-18

## 2020-12-19 ENCOUNTER — RX RENEWAL (OUTPATIENT)
Age: 55
End: 2020-12-19

## 2021-01-24 ENCOUNTER — RX RENEWAL (OUTPATIENT)
Age: 56
End: 2021-01-24

## 2021-02-03 LAB
25(OH)D3 SERPL-MCNC: 16.8 NG/ML
ALBUMIN SERPL ELPH-MCNC: 4.6 G/DL
ALP BLD-CCNC: 131 U/L
ALT SERPL-CCNC: 41 U/L
ANION GAP SERPL CALC-SCNC: 14 MMOL/L
APPEARANCE: CLEAR
AST SERPL-CCNC: 28 U/L
BACTERIA: NEGATIVE
BASOPHILS # BLD AUTO: 0.04 K/UL
BASOPHILS NFR BLD AUTO: 0.6 %
BILIRUB SERPL-MCNC: 0.2 MG/DL
BILIRUBIN URINE: NEGATIVE
BLOOD URINE: NEGATIVE
BUN SERPL-MCNC: 10 MG/DL
CALCIUM SERPL-MCNC: 9.4 MG/DL
CHLORIDE SERPL-SCNC: 103 MMOL/L
CHOLEST SERPL-MCNC: 293 MG/DL
CO2 SERPL-SCNC: 22 MMOL/L
COLOR: NORMAL
CREAT SERPL-MCNC: 0.87 MG/DL
EOSINOPHIL # BLD AUTO: 0.22 K/UL
EOSINOPHIL NFR BLD AUTO: 3.2 %
ESTIMATED AVERAGE GLUCOSE: 114 MG/DL
GGT SERPL-CCNC: 138 U/L
GLUCOSE QUALITATIVE U: NEGATIVE
GLUCOSE SERPL-MCNC: 115 MG/DL
HBA1C MFR BLD HPLC: 5.6 %
HCT VFR BLD CALC: 48 %
HDLC SERPL-MCNC: 52 MG/DL
HGB BLD-MCNC: 15.2 G/DL
HYALINE CASTS: 0 /LPF
IMM GRANULOCYTES NFR BLD AUTO: 0.4 %
KETONES URINE: NEGATIVE
LDLC SERPL CALC-MCNC: 171 MG/DL
LEUKOCYTE ESTERASE URINE: NEGATIVE
LYMPHOCYTES # BLD AUTO: 2.49 K/UL
LYMPHOCYTES NFR BLD AUTO: 35.9 %
MAN DIFF?: NORMAL
MCHC RBC-ENTMCNC: 30.6 PG
MCHC RBC-ENTMCNC: 31.7 GM/DL
MCV RBC AUTO: 96.8 FL
MICROSCOPIC-UA: NORMAL
MONOCYTES # BLD AUTO: 0.52 K/UL
MONOCYTES NFR BLD AUTO: 7.5 %
NEUTROPHILS # BLD AUTO: 3.64 K/UL
NEUTROPHILS NFR BLD AUTO: 52.4 %
NITRITE URINE: NEGATIVE
NONHDLC SERPL-MCNC: 241 MG/DL
PH URINE: 6
PHENYTOIN SERPL QL: 5.7 UG/ML
PLATELET # BLD AUTO: 180 K/UL
POTASSIUM SERPL-SCNC: 4.9 MMOL/L
PROT SERPL-MCNC: 7.4 G/DL
PROTEIN URINE: NEGATIVE
PSA SERPL-MCNC: 0.42 NG/ML
RBC # BLD: 4.96 M/UL
RBC # FLD: 15.5 %
RED BLOOD CELLS URINE: 2 /HPF
SARS-COV-2 IGG SERPL IA-ACNC: 0.06 INDEX
SARS-COV-2 IGG SERPL QL IA: NEGATIVE
SODIUM SERPL-SCNC: 139 MMOL/L
SPECIFIC GRAVITY URINE: 1.02
SQUAMOUS EPITHELIAL CELLS: 0 /HPF
T4 FREE SERPL-MCNC: 0.7 NG/DL
TRIGL SERPL-MCNC: 351 MG/DL
TSH SERPL-ACNC: 1.65 UIU/ML
UROBILINOGEN URINE: NORMAL
WBC # FLD AUTO: 6.94 K/UL
WHITE BLOOD CELLS URINE: 1 /HPF

## 2021-02-10 ENCOUNTER — APPOINTMENT (OUTPATIENT)
Dept: ULTRASOUND IMAGING | Facility: CLINIC | Age: 56
End: 2021-02-10
Payer: MEDICAID

## 2021-02-10 ENCOUNTER — OUTPATIENT (OUTPATIENT)
Dept: OUTPATIENT SERVICES | Facility: HOSPITAL | Age: 56
LOS: 1 days | End: 2021-02-10
Payer: MEDICAID

## 2021-02-10 DIAGNOSIS — Z98.89 OTHER SPECIFIED POSTPROCEDURAL STATES: Chronic | ICD-10-CM

## 2021-02-10 DIAGNOSIS — Z96.7 PRESENCE OF OTHER BONE AND TENDON IMPLANTS: Chronic | ICD-10-CM

## 2021-02-10 DIAGNOSIS — R74.8 ABNORMAL LEVELS OF OTHER SERUM ENZYMES: ICD-10-CM

## 2021-02-10 DIAGNOSIS — K40.90 UNILATERAL INGUINAL HERNIA, WITHOUT OBSTRUCTION OR GANGRENE, NOT SPECIFIED AS RECURRENT: Chronic | ICD-10-CM

## 2021-02-10 PROCEDURE — 76700 US EXAM ABDOM COMPLETE: CPT | Mod: 26

## 2021-02-10 PROCEDURE — 76700 US EXAM ABDOM COMPLETE: CPT

## 2021-02-11 ENCOUNTER — NON-APPOINTMENT (OUTPATIENT)
Age: 56
End: 2021-02-11

## 2021-04-21 ENCOUNTER — NON-APPOINTMENT (OUTPATIENT)
Age: 56
End: 2021-04-21

## 2021-04-21 ENCOUNTER — APPOINTMENT (OUTPATIENT)
Dept: INTERNAL MEDICINE | Facility: CLINIC | Age: 56
End: 2021-04-21
Payer: MEDICAID

## 2021-04-21 VITALS
OXYGEN SATURATION: 95 % | TEMPERATURE: 97.9 F | SYSTOLIC BLOOD PRESSURE: 120 MMHG | HEART RATE: 92 BPM | WEIGHT: 145 LBS | RESPIRATION RATE: 16 BRPM | BODY MASS INDEX: 23.4 KG/M2 | DIASTOLIC BLOOD PRESSURE: 60 MMHG

## 2021-04-21 DIAGNOSIS — R74.8 ABNORMAL LEVELS OF OTHER SERUM ENZYMES: ICD-10-CM

## 2021-04-21 DIAGNOSIS — K21.9 GASTRO-ESOPHAGEAL REFLUX DISEASE W/OUT ESOPHAGITIS: ICD-10-CM

## 2021-04-21 PROCEDURE — 96127 BRIEF EMOTIONAL/BEHAV ASSMT: CPT

## 2021-04-21 PROCEDURE — 99072 ADDL SUPL MATRL&STAF TM PHE: CPT

## 2021-04-21 PROCEDURE — 99214 OFFICE O/P EST MOD 30 MIN: CPT | Mod: 25

## 2021-04-21 PROCEDURE — 93000 ELECTROCARDIOGRAM COMPLETE: CPT

## 2021-04-21 RX ORDER — MULTIVIT-MIN/FOLIC/VIT K/LYCOP 400-300MCG
25 MCG TABLET ORAL
Qty: 90 | Refills: 0 | Status: DISCONTINUED | COMMUNITY
Start: 2017-03-14 | End: 2021-04-21

## 2021-04-21 RX ORDER — ALBUTEROL SULFATE 90 UG/1
108 (90 BASE) AEROSOL, METERED RESPIRATORY (INHALATION)
Qty: 1 | Refills: 0 | Status: DISCONTINUED | COMMUNITY
Start: 2019-08-06 | End: 2021-04-21

## 2021-04-25 PROBLEM — R74.8 ELEVATED SERUM GGT LEVEL: Status: ACTIVE | Noted: 2019-06-15

## 2021-04-25 LAB
ALBUMIN SERPL ELPH-MCNC: 4.8 G/DL
ALP BLD-CCNC: 136 U/L
ALT SERPL-CCNC: 23 U/L
ANION GAP SERPL CALC-SCNC: 12 MMOL/L
AST SERPL-CCNC: 19 U/L
BILIRUB SERPL-MCNC: <0.2 MG/DL
BUN SERPL-MCNC: 17 MG/DL
CALCIUM SERPL-MCNC: 10.2 MG/DL
CHLORIDE SERPL-SCNC: 106 MMOL/L
CHOLEST SERPL-MCNC: 219 MG/DL
CO2 SERPL-SCNC: 26 MMOL/L
CREAT SERPL-MCNC: 0.99 MG/DL
GLUCOSE SERPL-MCNC: 90 MG/DL
HDLC SERPL-MCNC: 47 MG/DL
LDLC SERPL CALC-MCNC: 134 MG/DL
NONHDLC SERPL-MCNC: 172 MG/DL
POTASSIUM SERPL-SCNC: 4.6 MMOL/L
PROT SERPL-MCNC: 7.6 G/DL
SODIUM SERPL-SCNC: 144 MMOL/L
TRIGL SERPL-MCNC: 189 MG/DL

## 2021-04-25 NOTE — ASSESSMENT
[FreeTextEntry1] : \par \par PAD: \par -s/p right SFA angioplasty\par -he is on atorvastatin, Zetia plavix, and ASA\par -Check lipids\par -I have advised that he schedule follow-up appointment with vascular surgery\par \par Carotid stenosis:\par -carotid US showed 50-69% stenosis B/L 2019\par -he is on ASA, zetia,  plavix, and atorvastatin\par -I have advised he f/u with Vascular surgery\par -Check carotid ultrasound\par \par Seizure disorder:\par -no recent seizures\par -followed by neurologist (Dr Mendez)-I have advised that he schedule follow-up appointment\par -continue Dilantin- refilled today \par -He reports he is compliant with his seizure medication\par \par Vitamin D Deficiency:\par -I advised ergocalciferol 50,000 units once a week for 12 weeks\par -After finishing 12 week course of ergocalciferol, I adv starting over the counter vitamin D 3 1000 units daily\par -I advised repeat vitamin D 25-OH level in 3 months\par \par GERD:\par -on pantoprazole\par -no sx reported\par \par Dyslipidemia\par -on atorvastatin 80mg PO daily, Zetia, and lovaza\par -I advised low fat/low chol diet\par -His recent lipids were not at goal\par -He reports he is taking his medications now-he states on last blood test he had been out of medication\par -Check fasting lipids/CMP\par \par Elevated LFTs:\par -Alk phos 131 and -mildly elevated\par -Recent abdominal ultrasound was normal\par -he denies any alcohol use\par -hepatitis B and C serology was negative\par -seen by GI previously\par -Check CMP\par \par Anxiety/Insomnia/Depression:\par -on Venlafaxine ER 75mg PO daily and seroquel to 200mg PO QHS \par -PHQ 2 score 0\par -Meds refilled today\par \par HTN:\par -BP at goal today\par -on Amlodipine 10mg Po daily\par \par Pre-DM:\par -last HgA1c 5.6 2021-normal\par \par \par HCM:\par \par CPE 2017\par \par [id number] normal sinus rhythm at 90 with incomplete right bundle branch block, no ST abnormalities\par \par Flu shot: Advised-he declined\par \par Tdap: 2017\par \par Pneumovax: 11/3/2015\par \par Covid vaccine advised\par \par HIV testing offered: pt declined testing 2021\par \par Hep C screening: negative 2015\par \par Colonoscopy: Advised colonoscopy again today but he declines-he states he is not interested in going right now during pandemic\par \par FIT testing: negative 2019-FIT testing kit given to patient today 2021\par \par Depression screenin2021 PHQ 2 score 0\par \par PSA: 2021 0.42\par \par \par F/U 3 months.  Fasting labs have been ordered and drawn in office today

## 2021-04-25 NOTE — PHYSICAL EXAM
[No Acute Distress] : no acute distress [Well Nourished] : well nourished [Well Developed] : well developed [Well-Appearing] : well-appearing [Normal Voice/Communication] : normal voice/communication [Normal Sclera/Conjunctiva] : normal sclera/conjunctiva [PERRL] : pupils equal round and reactive to light [Normal Outer Ear/Nose] : the outer ears and nose were normal in appearance [Normal Oropharynx] : the oropharynx was normal [Normal TMs] : both tympanic membranes were normal [Normal Nasal Mucosa] : the nasal mucosa was normal [No JVD] : no jugular venous distention [No Lymphadenopathy] : no lymphadenopathy [Supple] : supple [No Respiratory Distress] : no respiratory distress  [Thyroid Normal, No Nodules] : the thyroid was normal and there were no nodules present [No Accessory Muscle Use] : no accessory muscle use [Clear to Auscultation] : lungs were clear to auscultation bilaterally [Normal Rate] : normal rate  [Normal S1, S2] : normal S1 and S2 [Regular Rhythm] : with a regular rhythm [No Murmur] : no murmur heard [No Carotid Bruits] : no carotid bruits [No Edema] : there was no peripheral edema [No Extremity Clubbing/Cyanosis] : no extremity clubbing/cyanosis [Soft] : abdomen soft [Non Tender] : non-tender [No Masses] : no abdominal mass palpated [Non-distended] : non-distended [No HSM] : no HSM [Normal Bowel Sounds] : normal bowel sounds [No CVA Tenderness] : no CVA  tenderness [No Spinal Tenderness] : no spinal tenderness [No Rash] : no rash [No Focal Deficits] : no focal deficits [Alert and Oriented x3] : oriented to person, place, and time [Normal Affect] : the affect was normal [Normal Mood] : the mood was normal [Normal Insight/Judgement] : insight and judgment were intact [de-identified] : +1 DP/PT pulses bilaterally

## 2021-04-25 NOTE — REVIEW OF SYSTEMS
[Fever] : no fever [Chills] : no chills [Fatigue] : no fatigue [Chest Pain] : no chest pain [Palpitations] : no palpitations [Lower Ext Edema] : no lower extremity edema [Shortness Of Breath] : no shortness of breath [Wheezing] : no wheezing [Cough] : no cough [Dyspnea on Exertion] : not dyspnea on exertion [Abdominal Pain] : no abdominal pain [Nausea] : no nausea [Diarrhea] : no diarrhea [Vomiting] : no vomiting [Negative] : Psychiatric

## 2021-04-25 NOTE — HISTORY OF PRESENT ILLNESS
[de-identified] : Here for follow-up appointment\par \par He was last seen in the office 12/2019\par \par He states he needs all of his medications refilled\par \par He states he overall feels well and denies any new complaints

## 2021-04-25 NOTE — HEALTH RISK ASSESSMENT
[0] : 2) Feeling down, depressed, or hopeless: Not at all (0) [[id number]] : 0 [Patient declined colonoscopy] : Patient declined colonoscopy

## 2021-04-27 LAB — HEMOCCULT STL QL IA: POSITIVE

## 2021-06-02 ENCOUNTER — RX RENEWAL (OUTPATIENT)
Age: 56
End: 2021-06-02

## 2021-06-14 ENCOUNTER — EMERGENCY (EMERGENCY)
Facility: HOSPITAL | Age: 56
LOS: 1 days | Discharge: LEFT BEFORE DISCHARGE | End: 2021-06-14
Attending: EMERGENCY MEDICINE
Payer: COMMERCIAL

## 2021-06-14 VITALS
RESPIRATION RATE: 20 BRPM | DIASTOLIC BLOOD PRESSURE: 69 MMHG | HEART RATE: 103 BPM | SYSTOLIC BLOOD PRESSURE: 100 MMHG | TEMPERATURE: 99 F | WEIGHT: 149.91 LBS | HEIGHT: 65 IN | OXYGEN SATURATION: 98 %

## 2021-06-14 DIAGNOSIS — K40.90 UNILATERAL INGUINAL HERNIA, WITHOUT OBSTRUCTION OR GANGRENE, NOT SPECIFIED AS RECURRENT: Chronic | ICD-10-CM

## 2021-06-14 DIAGNOSIS — Z96.7 PRESENCE OF OTHER BONE AND TENDON IMPLANTS: Chronic | ICD-10-CM

## 2021-06-14 DIAGNOSIS — Z98.89 OTHER SPECIFIED POSTPROCEDURAL STATES: Chronic | ICD-10-CM

## 2021-06-14 LAB
APPEARANCE UR: CLEAR — SIGNIFICANT CHANGE UP
BACTERIA # UR AUTO: NEGATIVE — SIGNIFICANT CHANGE UP
BILIRUB UR-MCNC: NEGATIVE — SIGNIFICANT CHANGE UP
COLOR SPEC: YELLOW — SIGNIFICANT CHANGE UP
DIFF PNL FLD: ABNORMAL
EPI CELLS # UR: NEGATIVE — SIGNIFICANT CHANGE UP
GLUCOSE UR QL: NEGATIVE MG/DL — SIGNIFICANT CHANGE UP
KETONES UR-MCNC: NEGATIVE — SIGNIFICANT CHANGE UP
LEUKOCYTE ESTERASE UR-ACNC: NEGATIVE — SIGNIFICANT CHANGE UP
NITRITE UR-MCNC: NEGATIVE — SIGNIFICANT CHANGE UP
PH UR: 6.5 — SIGNIFICANT CHANGE UP (ref 5–8)
PROT UR-MCNC: 15 MG/DL
RBC CASTS # UR COMP ASSIST: SIGNIFICANT CHANGE UP /HPF (ref 0–4)
SP GR SPEC: 1.01 — SIGNIFICANT CHANGE UP (ref 1.01–1.02)
UROBILINOGEN FLD QL: 1 MG/DL
WBC UR QL: NEGATIVE — SIGNIFICANT CHANGE UP

## 2021-06-14 PROCEDURE — 93971 EXTREMITY STUDY: CPT | Mod: 26,LT

## 2021-06-14 PROCEDURE — 87086 URINE CULTURE/COLONY COUNT: CPT

## 2021-06-14 PROCEDURE — 81001 URINALYSIS AUTO W/SCOPE: CPT

## 2021-06-14 PROCEDURE — 93971 EXTREMITY STUDY: CPT

## 2021-06-14 PROCEDURE — 99285 EMERGENCY DEPT VISIT HI MDM: CPT

## 2021-06-14 PROCEDURE — 93926 LOWER EXTREMITY STUDY: CPT | Mod: 26,LT

## 2021-06-14 PROCEDURE — 99284 EMERGENCY DEPT VISIT MOD MDM: CPT | Mod: 25

## 2021-06-14 PROCEDURE — 93926 LOWER EXTREMITY STUDY: CPT

## 2021-06-14 RX ORDER — IBUPROFEN 200 MG
1 TABLET ORAL
Qty: 15 | Refills: 0
Start: 2021-06-14 | End: 2021-06-18

## 2021-06-14 RX ORDER — DEXAMETHASONE 0.5 MG/5ML
6 ELIXIR ORAL ONCE
Refills: 0 | Status: COMPLETED | OUTPATIENT
Start: 2021-06-14 | End: 2021-06-14

## 2021-06-14 RX ORDER — DIAZEPAM 5 MG
5 TABLET ORAL ONCE
Refills: 0 | Status: DISCONTINUED | OUTPATIENT
Start: 2021-06-14 | End: 2021-06-14

## 2021-06-14 RX ORDER — IBUPROFEN 200 MG
600 TABLET ORAL ONCE
Refills: 0 | Status: COMPLETED | OUTPATIENT
Start: 2021-06-14 | End: 2021-06-14

## 2021-06-14 RX ORDER — METHOCARBAMOL 500 MG/1
2 TABLET, FILM COATED ORAL
Qty: 10 | Refills: 0
Start: 2021-06-14 | End: 2021-06-18

## 2021-06-14 RX ADMIN — Medication 6 MILLIGRAM(S): at 13:32

## 2021-06-14 RX ADMIN — Medication 600 MILLIGRAM(S): at 13:32

## 2021-06-14 RX ADMIN — Medication 5 MILLIGRAM(S): at 13:32

## 2021-06-14 NOTE — ED ADULT NURSE NOTE - OBJECTIVE STATEMENT
+ left  hip pain radiating to left upper leg; onset of pain last night; pt denies any injury; + burning pain

## 2021-06-14 NOTE — ED PROVIDER NOTE - PHYSICAL EXAMINATION
General:     NAD, well-nourished, well-appearing  Head:     NC/AT, EOMI, oral mucosa moist  Neck:     trachea midline  Lungs:     CTA b/l, no w/r/r  CVS:     S1S2, RRR, no m/g/r  Abd:     +BS, s/nt/nd, no organomegaly  Ext:    RLE intact femoral popliteal  2+DP 2+ PT.   LLE: 2+ femoral/popliteal 1+DP 1+PT  Neuro: AAOx3, no sensory/motor deficits General:     NAD, well-nourished, well-appearing  Head:     NC/AT, EOMI, oral mucosa moist  Neck:     trachea midline  Lungs:     CTA b/l, no w/r/r  CVS:     S1S2, RRR, no m/g/r  Abd:     +BS, s/nt/nd, no organomegaly  BACK: nt midline. +SLR on left  Ext:    RLE intact femoral popliteal  2+DP 2+ PT.   LLE: 2+ femoral/popliteal 1+DP 1+PT  Neuro: AAOx3, no sensory/motor deficits

## 2021-06-14 NOTE — ED ADULT TRIAGE NOTE - CHIEF COMPLAINT QUOTE
Pt arrives to ED  c/o L hip severe 10/10  non traumatic leg pain . Hx of blood clots . Currently on Plavix

## 2021-06-14 NOTE — ED PROVIDER NOTE - PATIENT PORTAL LINK FT
You can access the FollowMyHealth Patient Portal offered by [facility] by registering at the following website: [email or web address]. By joining [facility]’s FollowMyHealth portal, you will also be able to view your health information using other applications (apps) compatible with our system.

## 2021-06-14 NOTE — ED PROVIDER NOTE - ATTENDING CONTRIBUTION TO CARE
I performed the initial face to face bedside interview with this patient regarding history of present illness, review of symptoms and past medical, social and family history.  I completed an independent physical examination.  I was the initial provider who evaluated this patient.  The history, review of symptoms and examination was documented by the scribe in my presence and I attest to the accuracy of the documentation.  I have signed out the follow up of any pending tests (i.e. labs, radiological studies) to the ACP.  I have discussed the patient’s plan of care and disposition with the ACP. I performed the initial face to face bedside interview with this patient regarding history of present illness, review of symptoms and past medical, social and family history.  I completed an independent physical examination.  I was the initial provider who evaluated this patient.  The history, review of symptoms and examination was documented by the scribe in my presence and I attest to the accuracy of the documentation.    The resident will be participating in the care of this patient under my direct supervision.

## 2021-06-14 NOTE — ED ADULT NURSE NOTE - NSIMPLEMENTINTERV_GEN_ALL_ED
Implemented All Fall Risk Interventions:  [place] to call system. Call bell, personal items and telephone within reach. Instruct patient to call for assistance. Room bathroom lighting operational. Non-slip footwear when patient is off stretcher. Physically safe environment: no spills, clutter or unnecessary equipment. Stretcher in lowest position, wheels locked, appropriate side rails in place. Provide visual cue, wrist band, yellow gown, etc. Monitor gait and stability. Monitor for mental status changes and reorient to person, place, and time. Review medications for side effects contributing to fall risk. Reinforce activity limits and safety measures with patient and family.

## 2021-06-14 NOTE — ED PROVIDER NOTE - OBJECTIVE STATEMENT
57 y/o male with a PMHx of HTN, PVD with stents, on Plavix, presents to the ED c/o LLE pain since last night, described as a burning sensation from his L hip down to his knee. Denies dysuria, difficulty urinating, n/v. Pt reports having stent in R leg for PVD but denies having similar symptoms. Denies heavy lifting or bending. NKDA.   PMHx: Depression, GERD, Hypercholesterolemia, HTN, OA, PVD with stents, seizures, inguinal hernia  Surg Hx: Appendectomy, ORIF, R shoulder surgery 57 y/o male with a PMHx of HTN, PVD with stents, on Plavix, presents to the ED c/o L LE pain since last night, described as a burning sensation from his L buttock/hip down to his knee, worse with movement. Denies dysuria, difficulty urinating, n/v. Pt reports having stent in R leg for PVD but denies having similar symptoms now in Left leg. Denies heavy lifting or bending. denies trauma  PMHx: Depression, GERD, Hypercholesterolemia, HTN, OA, PVD with stents, seizures, inguinal hernia  Surg Hx: Appendectomy, ORIF, R shoulder surgery

## 2021-06-14 NOTE — ED ADULT NURSE REASSESSMENT NOTE - NS ED NURSE REASSESS COMMENT FT1
pt states spasm are returning, and that his pain is "coming back"; pt also requesting to be discharged

## 2021-06-14 NOTE — ED PROVIDER NOTE - PROGRESS NOTE DETAILS
Dane: Patient is alert and oriented times three. No acute distress. Discussed patient's current condition and need for further diagnostic evaluation. Patient wants to leave and understands leaving against medical advise. Risks, benefits and alternatives explained. Advised to follow up with physician tomorrow or return immediately for any change in symptoms. Patient understand that they can return to the ER at any time to continue evaluation. Patient verbalizes understanding to the above instructions. Dane: Patient is alert and oriented times three. No acute distress. Discussed patient's current condition and need for further diagnostic evaluation. Patient wants to leave and understands leaving against medical advise. Risks, benefits and alternatives explained. Advised to follow up with physician tomorrow or return immediately for any change in symptoms. Patient understand that they can return to the ER at any time to continue evaluation. Patient verbalizes understanding to the above instructions.    Patient understands that without waiting for results or tx and having a positive finding, it could result in loss of his limb.

## 2021-06-14 NOTE — ED PROVIDER NOTE - NSFOLLOWUPINSTRUCTIONS_ED_ALL_ED_FT
If you feel worsening pain, swelling, have tingling in your foot, lose sensation, your leg becomes cold, lose a pulse in your foot please come back to the emergency room.     Against Medical Advice    WHAT YOU NEED TO KNOW:    Discharge against medical advice means that you choose to leave the hospital before your healthcare provider recommends that you do. Your healthcare provider may still need to diagnose or treat your condition or your treatment may not be complete.     DISCHARGE INSTRUCTIONS:    Risks: Risks of leaving the hospital before your healthcare providers recommend include the following:  •Your condition may cause other health problems if not treated properly.       •You may need to be admitted to the hospital again for the same condition.       •Your condition could become life-threatening.      Follow up with your healthcare provider as directed: Write down your questions so you remember to ask them during your visits

## 2021-06-14 NOTE — ED PROVIDER NOTE - NS ED ROS FT
Constitutional: (-) fever  (-)chills  (-)sweats  Eyes/ENT: (-) blurry vision, (-) epistaxis  (-)rhinorrhea   (-) sore throat    Cardiovascular: (-) chest pain, (-) palpitations (-) edema   Respiratory: (-) cough, (-) shortness of breath   Gastrointestinal: (-)nausea  (-)vomiting, (-) diarrhea  (-) abdominal pain   :  (-)dysuria, (-)frequency, (-)urgency, (-)hematuria  Musculoskeletal: (-) neck pain, (-) back pain, (-) joint pain, (+) L leg pain  Integumentary: (-) rash, (-) edema  Neurological: (-) headache, (-) altered mental status  (-)LOC

## 2021-06-15 LAB
CULTURE RESULTS: SIGNIFICANT CHANGE UP
SPECIMEN SOURCE: SIGNIFICANT CHANGE UP

## 2021-06-17 ENCOUNTER — APPOINTMENT (OUTPATIENT)
Dept: VASCULAR SURGERY | Facility: CLINIC | Age: 56
End: 2021-06-17
Payer: MEDICAID

## 2021-06-17 VITALS
HEIGHT: 66 IN | DIASTOLIC BLOOD PRESSURE: 84 MMHG | TEMPERATURE: 97.4 F | SYSTOLIC BLOOD PRESSURE: 130 MMHG | HEART RATE: 118 BPM | RESPIRATION RATE: 15 BRPM | OXYGEN SATURATION: 93 %

## 2021-06-17 PROCEDURE — 99203 OFFICE O/P NEW LOW 30 MIN: CPT

## 2021-06-17 PROCEDURE — 93923 UPR/LXTR ART STDY 3+ LVLS: CPT

## 2021-06-17 PROCEDURE — 93880 EXTRACRANIAL BILAT STUDY: CPT

## 2021-06-17 NOTE — PHYSICAL EXAM
[JVD] : no jugular venous distention  [Normal Breath Sounds] : Normal breath sounds [Normal Rate and Rhythm] : normal rate and rhythm [2+] : right 2+ [1+] : right 1+ [0] : left 0 [Ankle Swelling (On Exam)] : not present [Varicose Veins Of Lower Extremities] : not present [] : not present [Abdomen Masses] : No abdominal masses [Abdomen Tenderness] : ~T ~M No abdominal tenderness [No Rash or Lesion] : No rash or lesion [Alert] : alert [Oriented to Person] : oriented to person [Oriented to Place] : oriented to place [Oriented to Time] : oriented to time [Calm] : calm [de-identified] : Well appearing, NAD [de-identified] : NCAT [FreeTextEntry1] : Both feet warm, normal capillary refill.\par Right arm BP gradient  of 20mmHg

## 2021-06-17 NOTE — ASSESSMENT
[FreeTextEntry1] : 56 year old man, previous heavy smoker with PAD (s/p right SFA stenting in 2017), carotid stenosis presenting with worsening left leg claudication and left hip/thigh pain.\par \par 1. PAD\par -Patient now was lifestyle limiting left leg claudication due to severe SFA stenosis. JOSE is 0.58 (L) and 1.0 (R)\par -Will plan for left leg angiogram\par -Continue antiplatelet and statin therapy\par \par 2. Carotid stenosis\par -Duplex US performed today shows stable 50-69% carotid stenosis\par -Carotid revascularization not warranted at this time. Will continue annual surveillance carotid US\par \par 3. Left hip and thigh pain\par Pain present at rest, appears musculoskeletal or radiculopathic\par LS spine and hip xrays requested\par \par 4. RUE with 30mmHg BP gradient. Likely has asymptomatic right subclavian artery stenosis\par No symptoms of subclavian steal or right arm  claudication\par No further work-up warranted at this time as he is asymptomatic

## 2021-06-17 NOTE — HISTORY OF PRESENT ILLNESS
[FreeTextEntry1] : 56 year old man, previous heavy smoker with PAD (s/p right SFA stenting in 2017), carotid stenosis presenting with worsening left leg claudication and left hip/thigh pain.\par Patient states that he is now only able to walk 10-15 steps before he has to stop due to severe calf cramping. he denies left foot pain at rest or left foot ulcer.\par Of note, over the past 1-2  weeks, he has also developed sharp pain and numbness around his left hip that radiates down the lateral aspect of his thigh.

## 2021-06-18 ENCOUNTER — APPOINTMENT (OUTPATIENT)
Dept: RADIOLOGY | Facility: CLINIC | Age: 56
End: 2021-06-18
Payer: MEDICAID

## 2021-06-18 ENCOUNTER — OUTPATIENT (OUTPATIENT)
Dept: OUTPATIENT SERVICES | Facility: HOSPITAL | Age: 56
LOS: 1 days | End: 2021-06-18
Payer: MEDICAID

## 2021-06-18 DIAGNOSIS — Z98.89 OTHER SPECIFIED POSTPROCEDURAL STATES: Chronic | ICD-10-CM

## 2021-06-18 DIAGNOSIS — Z96.7 PRESENCE OF OTHER BONE AND TENDON IMPLANTS: Chronic | ICD-10-CM

## 2021-06-18 DIAGNOSIS — M79.605 PAIN IN LEFT LEG: ICD-10-CM

## 2021-06-18 DIAGNOSIS — K40.90 UNILATERAL INGUINAL HERNIA, WITHOUT OBSTRUCTION OR GANGRENE, NOT SPECIFIED AS RECURRENT: Chronic | ICD-10-CM

## 2021-06-18 PROCEDURE — 72100 X-RAY EXAM L-S SPINE 2/3 VWS: CPT

## 2021-06-18 PROCEDURE — 72100 X-RAY EXAM L-S SPINE 2/3 VWS: CPT | Mod: 26

## 2021-06-18 PROCEDURE — 73502 X-RAY EXAM HIP UNI 2-3 VIEWS: CPT | Mod: 26,LT

## 2021-06-18 PROCEDURE — 73502 X-RAY EXAM HIP UNI 2-3 VIEWS: CPT

## 2021-06-24 ENCOUNTER — APPOINTMENT (OUTPATIENT)
Dept: INTERNAL MEDICINE | Facility: CLINIC | Age: 56
End: 2021-06-24
Payer: MEDICAID

## 2021-06-24 DIAGNOSIS — M79.605 PAIN IN LEFT LEG: ICD-10-CM

## 2021-06-24 PROCEDURE — 99441: CPT

## 2021-06-24 NOTE — HISTORY OF PRESENT ILLNESS
[Home] : at home, [unfilled] , at the time of the visit. [Medical Office: ([facility])___] : at the medical office located in  [Verbal consent obtained from patient] : the patient, [unfilled] [de-identified] : As this is telephonic visit no physical exam could be performed.  Diagnosis and treatment based upon symptoms provided\par \par David requested telemedicine visit today to follow-up on recent ER visit for left leg pain.  He was seen at [facility] ER 6/14/2021 worsening left leg pain.  He had Left leg arterial ultrasound which showed greater than 75% stenosis of the left superficial femoral artery.  He had a left leg venous ultrasound which showed no DVT.  He left the hospital AMA.\par \par He was subsequently seen by vascular surgery who ordered x-ray of the hip which was normal and x-ray of the lumbar spine which showed degenerative changes.  He states he was told he may need an angiogram\par \par He states he continues to have left leg pain which is severe.  He states he is having rest pain.  He states he has burning pain in his calf and his upper thigh.  He states his pain is severe and states he cannot walk or leave the house due to pain.  He denies any skin changes or ulcerations of the extremity.\par \par He denies fever/chills, chest pain, shortness of breath, redness of lower extremity\par

## 2021-06-24 NOTE — PHYSICAL EXAM
[Normal Voice/Communication] : normal voice/communication [de-identified] : Answering questions appropriately

## 2021-06-24 NOTE — ASSESSMENT
[FreeTextEntry1] : \par \par PAD: Now with severe left leg pain likely due to claudication\par -s/p right SFA angioplasty\par -he is on atorvastatin, Zetia plavix, and ASA\par -He was recently seen by vascular surgery and told he may need angiogram\par -Right hip x-ray was normal \par -lumbar spine x-ray showed degenerative changes\par -As his symptoms appear to be severe now and he is having pain at rest and unable to ambulate due to pain I have advised that he go to the emergency room for reevaluation\par -He was agreeable with plan and will go to ER\par \par Carotid stenosis:\par -carotid US showed 50-69% stenosis B/L 2021\par -he is on ASA, zetia,  plavix, and atorvastatin\par -No intervention advised by vascular surgery at this time\par \par Seizure disorder:\par -no recent seizures\par -followed by neurologist (Dr Mendez)-I have advised that he schedule follow-up appointment\par -continue Dilantin-\par \par Vitamin D Deficiency:\par -I advised ergocalciferol 50,000 units once a week for 12 weeks\par -After finishing 12 week course of ergocalciferol, I adv starting over the counter vitamin D 3 1000 units daily\par -I advised repeat vitamin D 25-OH level in 3 months\par \par GERD:\par -on pantoprazole\par \par Dyslipidemia\par -on atorvastatin 80mg PO daily, Zetia, and lovaza\par -I advised low fat/low chol diet\par -His recent lipids were not at goal\par -I have advised that he schedule in person appointment for fasting labs\par \par Elevated LFTs:\par - abdominal ultrasound was normal\par -he denies any alcohol use\par -hepatitis B and C serology was negative\par -seen by GI previously\par -Last alkaline phosphatase was 136\par \par Anxiety/Insomnia/Depression:\par -on Venlafaxine ER 75mg PO daily and seroquel to 200mg PO QHS \par \par HTN:\par -on Amlodipine 10mg Po daily\par \par Pre-DM:\par -last HgA1c 5.6 2021-normal\par \par \par HCM:\par \par CPE 2017\par \par [id number] \par \par Flu shot: Advised-he declined\par \par Tdap: 2017\par \par Pneumovax: 11/3/2015\par \par Covid vaccine advised\par \par HIV testing offered: pt declined testing 2021\par \par Hep C screening: negative 2015\par \par Colonoscopy: Advised colonoscopy again today given positive FIT test-he has been referred\par \par FIT testin2021 positive\par \par Depression screenin2021 PHQ 2 score 0\par \par PSA: 2021 0.42\par \par \par F/U after ER/hospital discharge for fasting labs

## 2021-06-28 ENCOUNTER — APPOINTMENT (OUTPATIENT)
Dept: INTERNAL MEDICINE | Facility: CLINIC | Age: 56
End: 2021-06-28
Payer: MEDICAID

## 2021-06-28 ENCOUNTER — NON-APPOINTMENT (OUTPATIENT)
Age: 56
End: 2021-06-28

## 2021-06-28 ENCOUNTER — LABORATORY RESULT (OUTPATIENT)
Age: 56
End: 2021-06-28

## 2021-06-28 VITALS
OXYGEN SATURATION: 94 % | TEMPERATURE: 98.2 F | HEART RATE: 60 BPM | BODY MASS INDEX: 23.78 KG/M2 | RESPIRATION RATE: 15 BRPM | WEIGHT: 148 LBS | HEIGHT: 66 IN

## 2021-06-28 VITALS
BODY MASS INDEX: 23.46 KG/M2 | TEMPERATURE: 98.2 F | WEIGHT: 146 LBS | RESPIRATION RATE: 16 BRPM | SYSTOLIC BLOOD PRESSURE: 130 MMHG | OXYGEN SATURATION: 94 % | HEIGHT: 66 IN | DIASTOLIC BLOOD PRESSURE: 80 MMHG | HEART RATE: 60 BPM

## 2021-06-28 PROCEDURE — 99214 OFFICE O/P EST MOD 30 MIN: CPT

## 2021-06-29 ENCOUNTER — OUTPATIENT (OUTPATIENT)
Dept: OUTPATIENT SERVICES | Facility: HOSPITAL | Age: 56
LOS: 1 days | End: 2021-06-29
Payer: COMMERCIAL

## 2021-06-29 ENCOUNTER — APPOINTMENT (OUTPATIENT)
Dept: DISASTER EMERGENCY | Facility: CLINIC | Age: 56
End: 2021-06-29

## 2021-06-29 VITALS
TEMPERATURE: 98 F | HEART RATE: 87 BPM | HEIGHT: 65 IN | DIASTOLIC BLOOD PRESSURE: 60 MMHG | WEIGHT: 142.42 LBS | SYSTOLIC BLOOD PRESSURE: 132 MMHG | RESPIRATION RATE: 18 BRPM

## 2021-06-29 DIAGNOSIS — I80.10 PHLEBITIS AND THROMBOPHLEBITIS OF UNSPECIFIED FEMORAL VEIN: ICD-10-CM

## 2021-06-29 DIAGNOSIS — Z98.89 OTHER SPECIFIED POSTPROCEDURAL STATES: Chronic | ICD-10-CM

## 2021-06-29 DIAGNOSIS — Z96.7 PRESENCE OF OTHER BONE AND TENDON IMPLANTS: Chronic | ICD-10-CM

## 2021-06-29 DIAGNOSIS — Z29.9 ENCOUNTER FOR PROPHYLACTIC MEASURES, UNSPECIFIED: ICD-10-CM

## 2021-06-29 DIAGNOSIS — K40.90 UNILATERAL INGUINAL HERNIA, WITHOUT OBSTRUCTION OR GANGRENE, NOT SPECIFIED AS RECURRENT: Chronic | ICD-10-CM

## 2021-06-29 DIAGNOSIS — I73.9 PERIPHERAL VASCULAR DISEASE, UNSPECIFIED: ICD-10-CM

## 2021-06-29 DIAGNOSIS — Z71.89 OTHER SPECIFIED COUNSELING: ICD-10-CM

## 2021-06-29 DIAGNOSIS — I10 ESSENTIAL (PRIMARY) HYPERTENSION: ICD-10-CM

## 2021-06-29 DIAGNOSIS — Z01.818 ENCOUNTER FOR OTHER PREPROCEDURAL EXAMINATION: ICD-10-CM

## 2021-06-29 LAB
ALBUMIN SERPL ELPH-MCNC: 4.5 G/DL — SIGNIFICANT CHANGE UP (ref 3.3–5.2)
ALBUMIN SERPL ELPH-MCNC: 4.7 G/DL
ALP BLD-CCNC: 140 U/L
ALP SERPL-CCNC: 134 U/L — HIGH (ref 40–120)
ALT FLD-CCNC: 27 U/L — SIGNIFICANT CHANGE UP
ALT SERPL-CCNC: 30 U/L
ANION GAP SERPL CALC-SCNC: 12 MMOL/L — SIGNIFICANT CHANGE UP (ref 5–17)
ANION GAP SERPL CALC-SCNC: 15 MMOL/L
APPEARANCE: CLEAR
AST SERPL-CCNC: 20 U/L
AST SERPL-CCNC: 20 U/L — SIGNIFICANT CHANGE UP
BACTERIA: NEGATIVE
BASOPHILS # BLD AUTO: 0.03 K/UL — SIGNIFICANT CHANGE UP (ref 0–0.2)
BASOPHILS # BLD AUTO: 0.04 K/UL
BASOPHILS NFR BLD AUTO: 0.4 % — SIGNIFICANT CHANGE UP (ref 0–2)
BASOPHILS NFR BLD AUTO: 0.5 %
BILIRUB SERPL-MCNC: <0.2 MG/DL
BILIRUB SERPL-MCNC: <0.2 MG/DL — LOW (ref 0.4–2)
BILIRUBIN URINE: NEGATIVE
BLD GP AB SCN SERPL QL: SIGNIFICANT CHANGE UP
BLOOD URINE: NEGATIVE
BUN SERPL-MCNC: 14.8 MG/DL — SIGNIFICANT CHANGE UP (ref 8–20)
BUN SERPL-MCNC: 15 MG/DL
CALCIUM SERPL-MCNC: 9 MG/DL — SIGNIFICANT CHANGE UP (ref 8.6–10.2)
CALCIUM SERPL-MCNC: 9.9 MG/DL
CHLORIDE SERPL-SCNC: 104 MMOL/L — SIGNIFICANT CHANGE UP (ref 98–107)
CHLORIDE SERPL-SCNC: 105 MMOL/L
CO2 SERPL-SCNC: 22 MMOL/L
CO2 SERPL-SCNC: 22 MMOL/L — SIGNIFICANT CHANGE UP (ref 22–29)
COLOR: YELLOW
CREAT SERPL-MCNC: 0.71 MG/DL — SIGNIFICANT CHANGE UP (ref 0.5–1.3)
CREAT SERPL-MCNC: 0.92 MG/DL
EOSINOPHIL # BLD AUTO: 0.17 K/UL — SIGNIFICANT CHANGE UP (ref 0–0.5)
EOSINOPHIL # BLD AUTO: 0.23 K/UL
EOSINOPHIL NFR BLD AUTO: 2 % — SIGNIFICANT CHANGE UP (ref 0–6)
EOSINOPHIL NFR BLD AUTO: 2.9 %
GLUCOSE QUALITATIVE U: NEGATIVE
GLUCOSE SERPL-MCNC: 101 MG/DL — HIGH (ref 70–99)
GLUCOSE SERPL-MCNC: 99 MG/DL
HCT VFR BLD CALC: 44.3 % — SIGNIFICANT CHANGE UP (ref 39–50)
HCT VFR BLD CALC: 47 %
HGB BLD-MCNC: 14.7 G/DL — SIGNIFICANT CHANGE UP (ref 13–17)
HGB BLD-MCNC: 15.1 G/DL
HYALINE CASTS: 0 /LPF
IMM GRANULOCYTES NFR BLD AUTO: 0.2 % — SIGNIFICANT CHANGE UP (ref 0–1.5)
IMM GRANULOCYTES NFR BLD AUTO: 0.3 %
INR BLD: 0.94 RATIO — SIGNIFICANT CHANGE UP (ref 0.88–1.16)
KETONES URINE: NEGATIVE
LEUKOCYTE ESTERASE URINE: NEGATIVE
LYMPHOCYTES # BLD AUTO: 2.67 K/UL — SIGNIFICANT CHANGE UP (ref 1–3.3)
LYMPHOCYTES # BLD AUTO: 2.68 K/UL
LYMPHOCYTES # BLD AUTO: 32.2 % — SIGNIFICANT CHANGE UP (ref 13–44)
LYMPHOCYTES NFR BLD AUTO: 34.1 %
MAGNESIUM SERPL-MCNC: 2 MG/DL — SIGNIFICANT CHANGE UP (ref 1.6–2.6)
MAN DIFF?: NORMAL
MCHC RBC-ENTMCNC: 30.9 PG
MCHC RBC-ENTMCNC: 30.9 PG — SIGNIFICANT CHANGE UP (ref 27–34)
MCHC RBC-ENTMCNC: 32.1 GM/DL
MCHC RBC-ENTMCNC: 33.2 GM/DL — SIGNIFICANT CHANGE UP (ref 32–36)
MCV RBC AUTO: 93.1 FL — SIGNIFICANT CHANGE UP (ref 80–100)
MCV RBC AUTO: 96.3 FL
MICROSCOPIC-UA: NORMAL
MONOCYTES # BLD AUTO: 0.57 K/UL
MONOCYTES # BLD AUTO: 0.63 K/UL — SIGNIFICANT CHANGE UP (ref 0–0.9)
MONOCYTES NFR BLD AUTO: 7.3 %
MONOCYTES NFR BLD AUTO: 7.6 % — SIGNIFICANT CHANGE UP (ref 2–14)
NEUTROPHILS # BLD AUTO: 4.31 K/UL
NEUTROPHILS # BLD AUTO: 4.78 K/UL — SIGNIFICANT CHANGE UP (ref 1.8–7.4)
NEUTROPHILS NFR BLD AUTO: 54.9 %
NEUTROPHILS NFR BLD AUTO: 57.6 % — SIGNIFICANT CHANGE UP (ref 43–77)
NITRITE URINE: NEGATIVE
PH URINE: 5.5
PLATELET # BLD AUTO: 205 K/UL — SIGNIFICANT CHANGE UP (ref 150–400)
PLATELET # BLD AUTO: 224 K/UL
POTASSIUM SERPL-MCNC: 4.2 MMOL/L — SIGNIFICANT CHANGE UP (ref 3.5–5.3)
POTASSIUM SERPL-SCNC: 4.2 MMOL/L — SIGNIFICANT CHANGE UP (ref 3.5–5.3)
POTASSIUM SERPL-SCNC: 4.6 MMOL/L
PROT SERPL-MCNC: 7.5 G/DL
PROT SERPL-MCNC: 7.5 G/DL — SIGNIFICANT CHANGE UP (ref 6.6–8.7)
PROTEIN URINE: NORMAL
PROTHROM AB SERPL-ACNC: 10.9 SEC — SIGNIFICANT CHANGE UP (ref 10.6–13.6)
RBC # BLD: 4.76 M/UL — SIGNIFICANT CHANGE UP (ref 4.2–5.8)
RBC # BLD: 4.88 M/UL
RBC # FLD: 14.1 % — SIGNIFICANT CHANGE UP (ref 10.3–14.5)
RBC # FLD: 14.6 %
RED BLOOD CELLS URINE: 2 /HPF
SARS-COV-2 RNA SPEC QL NAA+PROBE: SIGNIFICANT CHANGE UP
SODIUM SERPL-SCNC: 138 MMOL/L — SIGNIFICANT CHANGE UP (ref 135–145)
SODIUM SERPL-SCNC: 141 MMOL/L
SPECIFIC GRAVITY URINE: 1.03
SQUAMOUS EPITHELIAL CELLS: 1 /HPF
UROBILINOGEN URINE: NORMAL
WBC # BLD: 8.3 K/UL — SIGNIFICANT CHANGE UP (ref 3.8–10.5)
WBC # FLD AUTO: 7.85 K/UL
WBC # FLD AUTO: 8.3 K/UL — SIGNIFICANT CHANGE UP (ref 3.8–10.5)
WHITE BLOOD CELLS URINE: 1 /HPF

## 2021-06-29 PROCEDURE — 93010 ELECTROCARDIOGRAM REPORT: CPT

## 2021-06-29 PROCEDURE — G0463: CPT

## 2021-06-29 PROCEDURE — 93005 ELECTROCARDIOGRAM TRACING: CPT

## 2021-06-29 RX ORDER — PANTOPRAZOLE SODIUM 20 MG/1
1 TABLET, DELAYED RELEASE ORAL
Qty: 0 | Refills: 0 | DISCHARGE

## 2021-06-29 NOTE — H&P PST ADULT - NSICDXFAMILYHX_GEN_ALL_CORE_FT
FAMILY HISTORY:  Father  Still living? Yes, Estimated age: Age Unknown  Family history of CABG, Age at diagnosis: 61-70  Family history of peripheral vascular disease, Age at diagnosis: Age Unknown    Mother  Still living? No  Family history of breast cancer, Age at diagnosis: Age Unknown  Family history of heart disease, Age at diagnosis: Age Unknown  Family history of hepatitis, Age at diagnosis: Age Unknown    Sibling  Still living? Yes, Estimated age: Age Unknown  Family history of heart disease, Age at diagnosis: 51-60  Family history of brain aneurysm, Age at diagnosis: Age Unknown

## 2021-06-29 NOTE — H&P PST ADULT - NSICDXPASTSURGICALHX_GEN_ALL_CORE_FT
PAST SURGICAL HISTORY:  Inguinal hernia, left     S/P appendectomy     S/P ORIF (open reduction internal fixation) fracture Left    S/P shoulder surgery right

## 2021-06-29 NOTE — H&P PST ADULT - ASSESSMENT
OPIOID RISK TOOL    [place] EACH BOX THAT APPLIES AND ADD TOTALS AT THE END    FAMILY HISTORY OF SUBSTANCE ABUSE                 FEMALE         MALE                                                Alcohol                             [  ]1 pt          [  ]3pts                                               Illegal Drugs                     [  ]2 pts        [  ]3pts                                               Rx Drugs                           [  ]4 pts        [  ]4 pts    PERSONAL HISTORY OF SUBSTANCE ABUSE                                                                                          Alcohol                             [  ]3 pts       [  ]3 pts                                               Illegal Drugs                     [  ]4 pts        [  ]4 pts                                               Rx Drugs                           [  ]5 pts        [  ]5 pts    AGE BETWEEN 16-45 YEARS                                      [  ]1 pt         [  ]1 pt    HISTORY OF PREADOLESCENT   SEXUAL ABUSE                                                             [  ]3 pts        [  ]0pts    PSYCHOLOGICAL DISEASE                     ADD, OCD, Bipolar, Schizophrenia        [  ]2 pts         [  ]2 pts                      Depression                                               [  ]1 pt           [  ]1 pt           SCORING TOTAL   (add numbers and type here)              (*0**)                                     CAPRINI SCORE [CLOT]    AGE RELATED RISK FACTORS                                                       MOBILITY RELATED FACTORS  [ x] Age 41-60 years                                            (1 Point)                  [ ] Bed rest                                                        (1 Point)  [ ] Age: 61-74 years                                           (2 Points)                 [ ] Plaster cast                                                   (2 Points)  [ ] Age= 75 years                                              (3 Points)                 [ ] Bed bound for more than 72 hours                 (2 Points)    DISEASE RELATED RISK FACTORS                                               GENDER SPECIFIC FACTORS  [ ] Edema in the lower extremities                       (1 Point)                  [ ] Pregnancy                                                     (1 Point)  [ ] Varicose veins                                               (1 Point)                  [ ] Post-partum < 6 weeks                                   (1 Point)             [ ] BMI > 25 Kg/m2                                            (1 Point)                  [ ] Hormonal therapy  or oral contraception          (1 Point)                 [ ] Sepsis (in the previous month)                        (1 Point)                  [ ] History of pregnancy complications                 (1 point)  [ ] Pneumonia or serious lung disease                                               [ ] Unexplained or recurrent                     (1 Point)           (in the previous month)                               (1 Point)  [ ] Abnormal pulmonary function test                     (1 Point)                 SURGERY RELATED RISK FACTORS  [ ] Acute myocardial infarction                              (1 Point)                 [ ]  Section                                             (1 Point)  [ ] Congestive heart failure (in the previous month)  (1 Point)               [ ] Minor surgery                                                  (1 Point)   [ ] Inflammatory bowel disease                             (1 Point)                 [ ] Arthroscopic surgery                                        (2 Points)  [ ] Central venous access                                      (2 Points)                [ x] General surgery lasting more than 45 minutes   (2 Points)       [ ] Stroke (in the previous month)                          (5 Points)               [ ] Elective arthroplasty                                         (5 Points)                                                                                                                                               HEMATOLOGY RELATED FACTORS                                                 TRAUMA RELATED RISK FACTORS  [ x] Prior episodes of VTE                                     (3 Points)                [ ] Fracture of the hip, pelvis, or leg                       (5 Points)  [ ] Positive family history for VTE                         (3 Points)                 [ ] Acute spinal cord injury (in the previous month)  (5 Points)  [ ] Prothrombin 23958 A                                     (3 Points)                 [ ] Paralysis  (less than 1 month)                             (5 Points)  [ ] Factor V Leiden                                             (3 Points)                  [ ] Multiple Trauma within 1 month                        (5 Points)  [ ] Lupus anticoagulants                                     (3 Points)                                                           [ ] Anticardiolipin antibodies                               (3 Points)                                                       [ ] High homocysteine in the blood                      (3 Points)                                             [ ] Other congenital or acquired thrombophilia      (3 Points)                                                [ ] Heparin induced thrombocytopenia                  (3 Points)                                          Total Score [      6    ]     OPIOID RISK TOOL    [place] EACH BOX THAT APPLIES AND ADD TOTALS AT THE END    FAMILY HISTORY OF SUBSTANCE ABUSE                 FEMALE         MALE                                                Alcohol                             [  ]1 pt          [  ]3pts                                               Illegal Drugs                     [  ]2 pts        [  ]3pts                                               Rx Drugs                           [  ]4 pts        [  ]4 pts    PERSONAL HISTORY OF SUBSTANCE ABUSE                                                                                          Alcohol                             [  ]3 pts       [  ]3 pts                                               Illegal Drugs                     [  ]4 pts        [  ]4 pts                                               Rx Drugs                           [  ]5 pts        [  ]5 pts    AGE BETWEEN 16-45 YEARS                                      [  ]1 pt         [  ]1 pt    HISTORY OF PREADOLESCENT   SEXUAL ABUSE                                                             [  ]3 pts        [  ]0pts    PSYCHOLOGICAL DISEASE                     ADD, OCD, Bipolar, Schizophrenia        [  ]2 pts         [  ]2 pts                      Depression                                               [  ]1 pt           [  ]1 pt           SCORING TOTAL   (add numbers and type here)              (*0**)                                     CAPRINI SCORE [CLOT]    AGE RELATED RISK FACTORS                                                       MOBILITY RELATED FACTORS  [ x] Age 41-60 years                                            (1 Point)                  [ ] Bed rest                                                        (1 Point)  [ ] Age: 61-74 years                                           (2 Points)                 [ ] Plaster cast                                                   (2 Points)  [ ] Age= 75 years                                              (3 Points)                 [ ] Bed bound for more than 72 hours                 (2 Points)    DISEASE RELATED RISK FACTORS                                               GENDER SPECIFIC FACTORS  [ ] Edema in the lower extremities                       (1 Point)                  [ ] Pregnancy                                                     (1 Point)  [ ] Varicose veins                                               (1 Point)                  [ ] Post-partum < 6 weeks                                   (1 Point)             [ ] BMI > 25 Kg/m2                                            (1 Point)                  [ ] Hormonal therapy  or oral contraception          (1 Point)                 [ ] Sepsis (in the previous month)                        (1 Point)                  [ ] History of pregnancy complications                 (1 point)  [ ] Pneumonia or serious lung disease                                               [ ] Unexplained or recurrent                     (1 Point)           (in the previous month)                               (1 Point)  [ ] Abnormal pulmonary function test                     (1 Point)                 SURGERY RELATED RISK FACTORS  [ ] Acute myocardial infarction                              (1 Point)                 [ ]  Section                                             (1 Point)  [ ] Congestive heart failure (in the previous month)  (1 Point)               [ ] Minor surgery                                                  (1 Point)   [ ] Inflammatory bowel disease                             (1 Point)                 [ ] Arthroscopic surgery                                        (2 Points)  [ ] Central venous access                                      (2 Points)                [ x] General surgery lasting more than 45 minutes   (2 Points)       [ ] Stroke (in the previous month)                          (5 Points)               [ ] Elective arthroplasty                                         (5 Points)                                                                                                                                               HEMATOLOGY RELATED FACTORS                                                 TRAUMA RELATED RISK FACTORS  [ x] Prior episodes of VTE                                     (3 Points)                [ ] Fracture of the hip, pelvis, or leg                       (5 Points)  [ ] Positive family history for VTE                         (3 Points)                 [ ] Acute spinal cord injury (in the previous month)  (5 Points)  [ ] Prothrombin 66868 A                                     (3 Points)                 [ ] Paralysis  (less than 1 month)                             (5 Points)  [ ] Factor V Leiden                                             (3 Points)                  [ ] Multiple Trauma within 1 month                        (5 Points)  [ ] Lupus anticoagulants                                     (3 Points)                                                           [ ] Anticardiolipin antibodies                               (3 Points)                                                       [ ] High homocysteine in the blood                      (3 Points)                                             [ ] Other congenital or acquired thrombophilia      (3 Points)                                                [ ] Heparin induced thrombocytopenia                  (3 Points)                                          Total Score [      6    ]          RADHA CHAKRABORTY is a 57 y/o male aox3 . PMH of heavy smoker with PAD , s/p right SFA stenting in 2017, carotid stenosis Patient states at two weeks ago he started to have extreme left leg pain .Patient describes pain as sharp and constant with numbness that radiates down the lateral aspect of his thigh. Patient pain level rages from 7/10 to10/10 with no relief from rest or OTC medication. Patient went to his PCP for evaluation and was referred to DR Montiel . Patient states that he was evaluated and given testing as per patient confirmed that he has restricted blood flow to his left leg .Patient states that he is now only able to walk 10-15 steps before he has to stop due to severe calf cramping. Patient presents to [facility] for evaluation for a left leg angiogram with Dr Montiel on 21. Patient had covid testing at [facility]     Patient was educated on preoperative preparation with written and verbal instruction . Patient is going for medical clearance with DR Ceballos [phone]   . Patient will review medications with PCP. Patient was educated on aspirin and aspirin products NSAIDs ,vitamins and herbals that increase the risk of bleeding and need to be stopped five days before procedure  . Patient was also educated on covid testing and covid prevention ,social distancing and wearing a mask.

## 2021-06-29 NOTE — PHYSICAL EXAM
[1+] : left 1+ [2+] : right 2+ [0] : left 0 [Normal] : soft, non-tender, non-distended, no masses palpated, no HSM and normal bowel sounds [No Joint Swelling] : no joint swelling [Grossly Normal Strength/Tone] : grossly normal strength/tone [de-identified] : pt. is on pain.uncomfortable on the exam table. [de-identified] : cane in use,

## 2021-06-29 NOTE — ASU PATIENT PROFILE, ADULT - LEARNING ASSESSMENT (PATIENT) ADDITIONAL COMMENTS
Np, Yimi Drake, instructed pt on pre-op instructions/teaching, tips for safer surgery, pain management scale, pre-surgical infection prevention instructions, medical clearance pending, covid swab done at [facility] 6/29 and sent to lab for processing. Pt verbalized understanding of all instructions given.

## 2021-06-29 NOTE — REVIEW OF SYSTEMS
[Negative] : Heme/Lymph [Joint Pain] : joint pain [FreeTextEntry9] : left hip, pain in the left calf

## 2021-06-29 NOTE — PLAN
[FreeTextEntry1] : Mr. RADHA CHAKRABORTY  is    56 year male with a previous history of heavy smoking, PAD(s/p right SFA stent 2017), carotid stenosis.\par is going for left SFA stent placement.\par EKG: nsr , NO ACUTE CHANGES.\par

## 2021-06-29 NOTE — H&P PST ADULT - NSICDXPROBLEM_GEN_ALL_CORE_FT
PROBLEM DIAGNOSES  Problem: Need for prophylactic measure  Assessment and Plan: caprini score is 6 at VTE risk SCD's ordered surgical team to assess the need for pharm proph        PROBLEM DIAGNOSES  Problem: Peripheral vascular disease, unspecified  Assessment and Plan: pt is having a angiogram left leg with Dr Montiel on 7/2/21    Problem: Hypertension  Assessment and Plan: pt will continue with medication pt is going for medical clearance     Problem: Need for prophylactic measure  Assessment and Plan: caprini score is 6 at VTE risk SCD's ordered surgical team to assess the need for pharm proph     Problem: Thrombophlebitis of the femoral vein  Assessment and Plan: pt has had a right vein stent placed in 2017     Problem: Educated about COVID-19 virus infection  Assessment and Plan: pt educated on testing as well as prevented on covid pt tesed at PST

## 2021-06-29 NOTE — H&P PST ADULT - HISTORY OF PRESENT ILLNESS
patient pian 7/10 to 10 /10 with with no real relif from rest two weeks ago he noticed having left leg pain after bending . Patient went to PCP and was refferd to DR Montiel pt was sent fro testing and confirmed resticted blood flow to his left leg and lower linb   56 year old man, previous heavy smoker with PAD (s/p right SFA stenting in 2017), carotid stenosis presenting with worsening left leg claudication and left hip/thigh pain.  Patient states that he is now only able to walk 10-15 steps before he has to stop due to severe calf cramping. he denies left foot pain at rest or left foot ulcer.  Of note, over the past 1-2 weeks, he has also developed sharp pain and numbness around his left hip that radiates down the lateral aspect of his thigh.     Active Problems     RADHA CHAKRABORTY is a 55 y/o male aox3 . PMH of heavy smoker with PAD , s/p right SFA stenting in 2017, carotid stenosis Patient states at two weeks ago he started to have extreme left leg pain .Patient describes pain as sharp and constant with numbness that radiates down the lateral aspect of his thigh. Patient pain level rages from 7/10 to10/10 with no relief from rest or OTC medication. Patient went to his PCP for evaluation and was referred to DR Montiel . Patient states that he was evaluated and given testing as per patient confirmed that he has restricted blood flow to his left leg .Patient states that he is now only able to walk 10-15 steps before he has to stop due to severe calf cramping. Patient presents to [facility] for evaluation for a left leg angiogram with Dr Montiel on 7/2/21. Patient had covid testing at PST      RADHA CHAKRABORTY is a 57 y/o male aox3 . PMH of heavy smoker with PAD , s/p right SFA stenting in 2017, carotid stenosis Patient states at two weeks ago he started to have extreme left leg pain .Patient describes pain as sharp and constant with numbness that radiates down the lateral aspect of his thigh. Patient pain level rages from 7/10 to10/10 with no relief from rest or OTC medication. Patient went to his PCP for evaluation and was referred to DR Montiel . Patient states that he was evaluated and given testing as per patient confirmed that he has restricted blood flow to his left leg .Patient states that he is now only able to walk 10-15 steps before he has to stop due to severe calf cramping. Patient presents to [facility] for evaluation for a left leg angiogram with Dr Montiel on 7/2/21. Patient had covid testing at [facility]   Narrative:     Risk Factors for PAD       Age: 56       DM: N/A       Smoking History: former smoled 1 pack a day for 42 years        Hyperlipidemia: yes       Hypertension: yes       Family History of PAD: N/A       Known Atherosclerotic Disease ( carotid,:     Subjective Complaints: pain in the left leg        Claudication Nic Class: pt can only walk 15 feet        Impaired Walking Function: N/A       Ischemic Rest Pain:yes       Other Non-Joint Related Exertional Lower Extremity Symptoms (not typical of claudication): N/A    Objective Findings:        Lower Extremity Pulses: Unable to palpate DP pulses, doppler used see exam        Nonhealing Lower Extremity Wound: none       Lower Extremity Gangrene: N/A       Vascular Bruit: N/A       Other Suggestive Lower Extremity Findings (elevation pallor, dependent rubor): N/A    Vascular Testing:        JOSE (Normal/Borderline/Abnormal/Noncompressable):        Arterial Dopplers:        CTA/MRA:     Medical Management:       Antiplatelets: Plavix        Cilostazol:        Statin: atorvastatin         Nic Claudication Classification:        I: Asymptomatic       IIa: Walking > 200 meters (2.5 blocks)       IIb: Walking < 200 meters (2.5 blocks)       III: Rest/nocturnal pain       IV: Necrosis/gangrene    Ankle-Brachial Index: left 1.0 right .58

## 2021-06-29 NOTE — H&P PST ADULT - NSANTHOSAYNRD_GEN_A_CORE
No. OSEAS screening performed.  STOP BANG Legend: 0-2 = LOW Risk; 3-4 = INTERMEDIATE Risk; 5-8 = HIGH Risk

## 2021-06-29 NOTE — HISTORY OF PRESENT ILLNESS
[No Pertinent Cardiac History] : no history of aortic stenosis, atrial fibrillation, coronary artery disease, recent myocardial infarction, or implantable device/pacemaker [No Adverse Anesthesia Reaction] : no adverse anesthesia reaction in self or family member [Poor (<4 METs)] : Poor (<4 METs) [Anti-Platelet Agents: _____] : Anti-Platelet Agents: [unfilled] [Smoker] : not a smoker [FreeTextEntry1] : left SFA stent placement [FreeTextEntry2] : 0702/21 [FreeTextEntry3] : DR KELLI Montiel [FreeTextEntry4] : Mr. RADHA CHAKRABORTY  is    56 year male with a previous history of heavy smoking, PAD(s/p right SFA stent 2017), carotid stenosis.\par Patient is going for left SFA stent placement.\par He is in constant severe pain in his left leg his pain is going up to the left thigh.\par He also has degenerative changes in the lumbar spine.\par He is having difficulty in walking due to pain , can barely walk 10-12 inside the house.\par History of hypertension, blood pressure well controlled with amlodipine 10 mg\par PAD.on aspirin, atorvastatin 80 mg, Plavix 75 mg and ezetimibe 10 mg.\par History of seizure disorder on phenytoin 100 mg 2 capsules twice daily.\par History of depression Quetiapine 200 mg at bedtime and venlafaxine ER 75 mg 1 capsule/day.\par \par \par  [FreeTextEntry6] : +claudication [FreeTextEntry8] : due to pain

## 2021-06-29 NOTE — ADDENDUM
[FreeTextEntry1] : all labs reviewed  are within normal range.\par pt. is at low risk for the planned procedure.

## 2021-06-29 NOTE — H&P PST ADULT - NSICDXPASTMEDICALHX_GEN_ALL_CORE_FT
PAST MEDICAL HISTORY:  Depression     GERD (gastroesophageal reflux disease)     Hypercholesterolemia     Hypertension     Osteoarthritis     Peripheral vascular disease with stents    Seizures

## 2021-07-02 ENCOUNTER — TRANSCRIPTION ENCOUNTER (OUTPATIENT)
Age: 56
End: 2021-07-02

## 2021-07-02 ENCOUNTER — OUTPATIENT (OUTPATIENT)
Dept: OUTPATIENT SERVICES | Facility: HOSPITAL | Age: 56
LOS: 1 days | Discharge: HOME | End: 2021-07-02
Payer: COMMERCIAL

## 2021-07-02 VITALS
OXYGEN SATURATION: 96 % | RESPIRATION RATE: 16 BRPM | DIASTOLIC BLOOD PRESSURE: 72 MMHG | HEART RATE: 80 BPM | SYSTOLIC BLOOD PRESSURE: 158 MMHG

## 2021-07-02 VITALS
DIASTOLIC BLOOD PRESSURE: 65 MMHG | HEIGHT: 65 IN | RESPIRATION RATE: 16 BRPM | HEART RATE: 66 BPM | OXYGEN SATURATION: 95 % | WEIGHT: 149.03 LBS | TEMPERATURE: 98 F | SYSTOLIC BLOOD PRESSURE: 141 MMHG

## 2021-07-02 DIAGNOSIS — Z98.89 OTHER SPECIFIED POSTPROCEDURAL STATES: Chronic | ICD-10-CM

## 2021-07-02 DIAGNOSIS — Z96.7 PRESENCE OF OTHER BONE AND TENDON IMPLANTS: Chronic | ICD-10-CM

## 2021-07-02 DIAGNOSIS — I73.9 PERIPHERAL VASCULAR DISEASE, UNSPECIFIED: ICD-10-CM

## 2021-07-02 DIAGNOSIS — K40.90 UNILATERAL INGUINAL HERNIA, WITHOUT OBSTRUCTION OR GANGRENE, NOT SPECIFIED AS RECURRENT: Chronic | ICD-10-CM

## 2021-07-02 PROCEDURE — C1769: CPT

## 2021-07-02 PROCEDURE — 75710 ARTERY X-RAYS ARM/LEG: CPT

## 2021-07-02 PROCEDURE — 99153 MOD SED SAME PHYS/QHP EA: CPT

## 2021-07-02 PROCEDURE — 76937 US GUIDE VASCULAR ACCESS: CPT | Mod: 26

## 2021-07-02 PROCEDURE — 99152 MOD SED SAME PHYS/QHP 5/>YRS: CPT

## 2021-07-02 PROCEDURE — C1887: CPT

## 2021-07-02 PROCEDURE — 36246 INS CATH ABD/L-EXT ART 2ND: CPT

## 2021-07-02 PROCEDURE — C1760: CPT

## 2021-07-02 PROCEDURE — 75630 X-RAY AORTA LEG ARTERIES: CPT | Mod: 26

## 2021-07-02 PROCEDURE — C1894: CPT

## 2021-07-02 RX ORDER — FENTANYL CITRATE 50 UG/ML
25 INJECTION INTRAVENOUS ONCE
Refills: 0 | Status: DISCONTINUED | OUTPATIENT
Start: 2021-07-02 | End: 2021-07-02

## 2021-07-02 RX ORDER — GABAPENTIN 400 MG/1
100 CAPSULE ORAL THREE TIMES A DAY
Refills: 0 | Status: DISCONTINUED | OUTPATIENT
Start: 2021-07-02 | End: 2021-07-16

## 2021-07-02 RX ORDER — GABAPENTIN 400 MG/1
1 CAPSULE ORAL
Qty: 90 | Refills: 1
Start: 2021-07-02

## 2021-07-02 RX ORDER — SODIUM CHLORIDE 9 MG/ML
3 INJECTION INTRAMUSCULAR; INTRAVENOUS; SUBCUTANEOUS ONCE
Refills: 0 | Status: COMPLETED | OUTPATIENT
Start: 2021-07-02 | End: 2021-07-02

## 2021-07-02 RX ADMIN — FENTANYL CITRATE 25 MICROGRAM(S): 50 INJECTION INTRAVENOUS at 17:46

## 2021-07-02 RX ADMIN — SODIUM CHLORIDE 3 MILLILITER(S): 9 INJECTION INTRAMUSCULAR; INTRAVENOUS; SUBCUTANEOUS at 14:13

## 2021-07-02 RX ADMIN — FENTANYL CITRATE 25 MICROGRAM(S): 50 INJECTION INTRAVENOUS at 14:04

## 2021-07-02 RX ADMIN — GABAPENTIN 100 MILLIGRAM(S): 400 CAPSULE ORAL at 14:13

## 2021-07-02 NOTE — DISCHARGE NOTE PROVIDER - NSDCCPTREATMENT_GEN_ALL_CORE_FT
PRINCIPAL PROCEDURE  Procedure: Angiogram, peripheral, with PTA if indicated  Findings and Treatment: No PTA done

## 2021-07-02 NOTE — PROGRESS NOTE ADULT - SUBJECTIVE AND OBJECTIVE BOX
Post-op Check    Subjective:  Pt offers no acute complaints at this time. Pain well controlled on current regiment. Denies chest pain, SOB, palpitations.     STATUS POST:      3 hrs post lower extremity angiogram, patient tolerate pain, dressing over site of catheter access dry     MEDICATIONS  (STANDING):  gabapentin 100 milliGRAM(s) Oral three times a day    MEDICATIONS  (PRN):      Vital Signs Last 24 Hrs  T(C): 36.8 (02 Jul 2021 11:23), Max: 36.8 (02 Jul 2021 11:23)  T(F): 98.2 (02 Jul 2021 11:23), Max: 98.2 (02 Jul 2021 11:23)  HR: 80 (02 Jul 2021 16:15) (59 - 82)  BP: 158/72 (02 Jul 2021 16:15) (141/65 - 170/78)  BP(mean): --  RR: 16 (02 Jul 2021 16:15) (16 - 16)  SpO2: 96% (02 Jul 2021 16:15) (94% - 98%)    Physical Exam:    Constitutional: NAD  Neck: No JVD, FROM without pain  Respiratory: no accessory muscle use, respirations non-labored  GI:   Neurological: A&O x 3; without gross deficit

## 2021-07-02 NOTE — DISCHARGE NOTE PROVIDER - CARE PROVIDER_API CALL
Husam Montiel)  Surgery  [address], [address] [place] [address]  Phone: [phone]  Fax: [phone]  Follow Up Time:

## 2021-07-02 NOTE — DISCHARGE NOTE PROVIDER - NSDCMRMEDTOKEN_GEN_ALL_CORE_FT
aspirin 81 mg oral delayed release capsule: 1 tab(s) orally once a day  atorvastatin 80 mg oral tablet: 1 tab(s) orally once a day  gabapentin 100 mg oral capsule: 1 cap(s) orally 3 times a day  pantoprazole 40 mg oral delayed release tablet: 1 tab(s) orally once a day  phenytoin 100 mg oral capsule: 2 cap(s) orally 2 times a day  Plavix 75 mg oral tablet: 1 tab(s) orally once a day  QUEtiapine 200 mg oral tablet:  orally once a day (at bedtime)

## 2021-07-02 NOTE — PROGRESS NOTE ADULT - SUBJECTIVE AND OBJECTIVE BOX
Department of Cardiology                                                                  [facility]/[place] [address] [address] [facility]                                                            Telephone: [phone]. Fax:[phone]                                                    Post- Procedure Note: Left Heart Cardiac Catheterization       Narrative:  56y  Male now s/p peripheral angiography via RFA with significant LSFA disease that cannot be treated with PTA, procedure performed by  Dr. Montiel, plan to f/u out-pt to further discuss revascularization plan. Received versed and fentanyl intraprocedurally, for sedation, RFA with failed mynx closure device and treated with manual pressure, arrived to recovery in NAD and HDS, RFA access site stable, no bleed/hematoma, distal pulse + by doppler, fentanyl 25mg IV given for LLE baseline claudication pain and started on neurontin. Plan for D/C after recovery period completed.           PAST MEDICAL & SURGICAL HISTORY:  Hypercholesterolemia  Seizures  Depression  GERD (gastroesophageal reflux disease)  Hypertension  Peripheral vascular disease  with stents  Osteoarthritis  Inguinal hernia, left  S/P ORIF (open reduction internal fixation) fracture  Left  S/P shoulder surgery  right  S/P appendectomy      Home Medications:  aspirin 81 mg oral delayed release capsule: 1 tab(s) orally once a day (02 Jul 2021 11:23)  atorvastatin 80 mg oral tablet: 1 tab(s) orally once a day (02 Jul 2021 11:23)  pantoprazole 40 mg oral delayed release tablet: 1 tab(s) orally once a day (02 Jul 2021 11:23)  phenytoin 100 mg oral capsule: 2 cap(s) orally 2 times a day (02 Jul 2021 11:23)  Plavix 75 mg oral tablet: 1 tab(s) orally once a day (02 Jul 2021 11:23)  QUEtiapine 200 mg oral tablet:  orally once a day (at bedtime) (02 Jul 2021 11:23)    General: No fatigue, no fevers/chills  Respiratory: No dyspnea, no cough, no wheeze  CV: No chest pain, no palpitations  Abd: No nausea  Neuro: No headache, no dizziness  codeine (Vomiting)      Objective:  Vital Signs Last 24 Hrs  T(C): 36.8 (02 Jul 2021 11:23), Max: 36.8 (02 Jul 2021 11:23)  T(F): 98.2 (02 Jul 2021 11:23), Max: 98.2 (02 Jul 2021 11:23)  HR: 60 (02 Jul 2021 13:45) (59 - 66)  BP: 162/75 (02 Jul 2021 13:45) (141/65 - 170/78)  BP(mean): --  RR: 16 (02 Jul 2021 13:45) (16 - 16)  SpO2: 98% (02 Jul 2021 13:45) (95% - 98%)    PE:  Neuro: A&OX3, CN 2-12 intact  HEENT: NC, AT  Lungs: CTA B/L  CV: S1, S2, no murmur, RRR  Abd: Soft  Right Groin: Soft, no bleeding, no hematoma  Extremity: + distal pulses doppler                  Assessment:    57 y/o male with PMH of heavy smoker with PAD , s/p right SFA stenting in 2017, carotid stenosis Patient states at two weeks ago he started to have extreme left leg pain .Patient describes pain as sharp and constant with numbness that radiates down the lateral aspect of his thigh. Patient pain level rages from 7/10 to10/10 with no relief from rest or OTC medication. Patient went to his PCP for evaluation and was referred to DR Montiel . Patient states that he was evaluated and given testing as per patient confirmed that he has restricted blood flow to his left leg .Patient states that he is now only able to walk 10-15 steps before he has to stop due to severe calf cramping. Patient presents for peripheral angiogram and possible PTA by Dr Montiel.        Now s/p peripheral angiography via RFA with significant LSFA disease that cannot be treated with PTA, procedure performed by  Dr. Montiel, plan to f/u out-pt to further discuss revascularization plan. Received versed and fentanyl intraprocedurally, for sedation, RFA with failed mynx closure device and treated with manual pressure, arrived to recovery in NAD and HDS, RFA access site stable, no bleed/hematoma, distal pulse + by doppler, fentanyl 25mg IV given for LLE baseline claudication pain and started on neurontin. Plan for D/C after recovery period completed.           Plan:  -Formal cath report pending  -Post procedure management/monitoring per protocol  -Access site precautions  -Bedrest x 3hours post procedure  -Continue current medical therapy  -Dual anti platelet therapy with aspirin/plavix   -Cont statin therapy with Lipitor 80mg po qHS  -Start neurontin 100mg po TID   -Educated regarding post procedure management and care  -Discussed the importance of RF modification  -F/U outpt in 1-2 weeks with Dr. Montiel to further discuss revascularization options  -DISPO: D/C home after recovery period completed

## 2021-07-02 NOTE — DISCHARGE NOTE NURSING/CASE MANAGEMENT/SOCIAL WORK - PATIENT PORTAL LINK FT
You can access the FollowMyHealth Patient Portal offered by [facility] by registering at the following website: [email or web address]. By joining [employer]’s FollowMyHealth portal, you will also be able to view your health information using other applications (apps) compatible with our system.

## 2021-07-02 NOTE — DISCHARGE NOTE PROVIDER - NSDCCPCAREPLAN_GEN_ALL_CORE_FT
PRINCIPAL DISCHARGE DIAGNOSIS  Diagnosis: PAD (peripheral artery disease)  Assessment and Plan of Treatment: unable to be treated with PTA, f/u for further plan out-pt

## 2021-07-02 NOTE — DISCHARGE NOTE PROVIDER - HOSPITAL COURSE
A/P: 55yo male with history significant for ex-smoking (quit 2017), PAD with prior PTA/sten RSFA, c/o LLE cramping pain and numbness at rest and increased with ambulation, arterial duplex C/W >75% stenosis LSFA, plan for LE angiogram and poss PTA to be performed by Dr Montile.     ROS: as stated in HPI otherwise normal    PE:  A + O X 3, NAD  Lungs: CTA B/L  CV: S1 S2, no murmur  Abd: soft, BS +  LE: no edema, dp/pt palpable and cofirmed by doppler      -plan for peripheral angio via RFA  -patient seen and examined  -confirmed appropriate NPO duration  -ECG and Labs reviewed  -procedure discussed with patient; risks and benefits explained, questions answered  -consent obtained by attending IC    Now s/p peripheral angiography via RFA with significant LSFA disease that cannot be treated with PTA, procedure performed by  Dr. Montiel, plan to f/u out-pt to further discuss revascularization plan. Received versed and fentanyl intraprocedurally, for sedation, RFA with failed mynx closure device and treated with manual pressure, arrived to recovery in NAD and HDS, RFA access site stable, no bleed/hematoma, distal pulse + by doppler, fentanyl 25mg IV given for LLE baseline claudication pain and started on neurontin. Plan for D/C after recovery period completed.           Plan:  -Formal cath report pending  -Post procedure management/monitoring per protocol  -Access site precautions  -Bedrest x 3hours post procedure  -Continue current medical therapy  -Dual anti platelet therapy with aspirin/plavix   -Cont statin therapy with Lipitor 80mg po qHS  -Start neurontin 100mg po TID   -Educated regarding post procedure management and care  -Discussed the importance of RF modification  -F/U outpt in 1-2 weeks with Dr. Montiel to further discuss revascularization options  -DISPO: D/C home after recovery period completed

## 2021-07-02 NOTE — PROGRESS NOTE ADULT - ASSESSMENT
A: 56 M had left lower extremity angiogram through right femoral catheter access, 3 hours post procedure patient tolerate pain and dressing over incision looks dry.    P:  Continue current care  Pain control  OOB as tolerated  Encourage IS  DVT ppx

## 2021-07-22 ENCOUNTER — RX RENEWAL (OUTPATIENT)
Age: 56
End: 2021-07-22

## 2021-08-05 ENCOUNTER — APPOINTMENT (OUTPATIENT)
Dept: VASCULAR SURGERY | Facility: CLINIC | Age: 56
End: 2021-08-05
Payer: MEDICAID

## 2021-08-05 VITALS
HEART RATE: 64 BPM | DIASTOLIC BLOOD PRESSURE: 82 MMHG | BODY MASS INDEX: 23.46 KG/M2 | SYSTOLIC BLOOD PRESSURE: 144 MMHG | WEIGHT: 146 LBS | TEMPERATURE: 98.7 F | HEIGHT: 66 IN | OXYGEN SATURATION: 94 %

## 2021-08-05 PROCEDURE — 99213 OFFICE O/P EST LOW 20 MIN: CPT

## 2021-08-05 NOTE — HISTORY OF PRESENT ILLNESS
[FreeTextEntry1] : 56 year old man, previous heavy smoker with PAD (s/p right SFA stenting in 2017), carotid stenosis presenting with worsening left leg claudication and left hip/thigh pain.\par Patient states that he is now only able to walk 10-15 steps before he has to stop due to severe calf cramping. he denies left foot pain at rest or left foot ulcer.\par Of note, over the past 1-2  weeks, he has also developed sharp pain and numbness around his left hip that radiates down the lateral aspect of his thigh. [de-identified] : Patient underwent LLE angiogram 4 weeks ago.\par Noted to have severe common femoral and distal SFA disease not amenable to endovascular therapy\par He states that his short distance left leg claudication has worsened and he now has left foot numbness at rest.\par His sharp shooting pain has since resolved

## 2021-08-05 NOTE — PHYSICAL EXAM
[JVD] : no jugular venous distention  [Normal Breath Sounds] : Normal breath sounds [Normal Rate and Rhythm] : normal rate and rhythm [2+] : right 2+ [1+] : right 1+ [0] : left 0 [Ankle Swelling (On Exam)] : not present [Varicose Veins Of Lower Extremities] : not present [] : not present [Abdomen Masses] : No abdominal masses [Abdomen Tenderness] : ~T ~M No abdominal tenderness [No Rash or Lesion] : No rash or lesion [Alert] : alert [Oriented to Person] : oriented to person [Oriented to Place] : oriented to place [Oriented to Time] : oriented to time [Calm] : calm [de-identified] : Well appearing, NAD [de-identified] : NCAT [FreeTextEntry1] : Both feet warm, normal capillary refill.\par Right arm BP gradient  of 20mmHg

## 2021-08-05 NOTE — ASSESSMENT
[FreeTextEntry1] : \par 56 year old man, previous heavy smoker with PAD (s/p right SFA stenting in 2017), carotid stenosis presenting with worsening left leg claudication and left hip/thigh pain.\par \par 1. PAD\par -Patient now was lifestyle limiting left leg claudication and ischemic rest pain\par Will plan for left femoral endarterectomy\par Risks and benefits of the planned procedure discussed with patient. All questions answered\par Will obtain medical and cardiac risk assessment\par \par 2. Carotid stenosis\par -Duplex US performed today shows stable 50-69% carotid stenosis\par -Carotid revascularization not warranted at this time. Will continue annual surveillance carotid US\par \par 3. Left hip and thigh pain\par Pain present at rest, appears musculoskeletal or radiculopathic\par LS spine and hip xrays requested\par \par 4. RUE with 30mmHg BP gradient. Likely has asymptomatic right subclavian artery stenosis\par No symptoms of subclavian steal or right arm claudication\par No further work-up warranted at this time as he is asymptomatic. \par

## 2021-08-09 ENCOUNTER — RX RENEWAL (OUTPATIENT)
Age: 56
End: 2021-08-09

## 2021-08-13 ENCOUNTER — APPOINTMENT (OUTPATIENT)
Dept: CARDIOLOGY | Facility: CLINIC | Age: 56
End: 2021-08-13
Payer: MEDICAID

## 2021-08-13 ENCOUNTER — NON-APPOINTMENT (OUTPATIENT)
Age: 56
End: 2021-08-13

## 2021-08-13 VITALS
RESPIRATION RATE: 16 BRPM | SYSTOLIC BLOOD PRESSURE: 143 MMHG | HEART RATE: 72 BPM | WEIGHT: 140 LBS | DIASTOLIC BLOOD PRESSURE: 72 MMHG | HEIGHT: 66 IN | BODY MASS INDEX: 22.5 KG/M2 | OXYGEN SATURATION: 99 %

## 2021-08-13 DIAGNOSIS — Z72.3 LACK OF PHYSICAL EXERCISE: ICD-10-CM

## 2021-08-13 DIAGNOSIS — Z82.49 FAMILY HISTORY OF ISCHEMIC HEART DISEASE AND OTHER DISEASES OF THE CIRCULATORY SYSTEM: ICD-10-CM

## 2021-08-13 PROCEDURE — 93000 ELECTROCARDIOGRAM COMPLETE: CPT | Mod: NC

## 2021-08-13 PROCEDURE — 99204 OFFICE O/P NEW MOD 45 MIN: CPT

## 2021-08-13 RX ORDER — EZETIMIBE 10 MG/1
10 TABLET ORAL
Qty: 90 | Refills: 1 | Status: DISCONTINUED | COMMUNITY
Start: 2019-06-17 | End: 2021-08-13

## 2021-08-13 RX ORDER — ERGOCALCIFEROL 1.25 MG/1
1.25 MG CAPSULE, LIQUID FILLED ORAL
Qty: 4 | Refills: 1 | Status: DISCONTINUED | COMMUNITY
Start: 2021-02-04 | End: 2021-08-13

## 2021-08-13 NOTE — ASSESSMENT
[FreeTextEntry1] : EKG: Sinus rhythm with no significant ST or T wave changes.\par \par 56-year-old man with a past medical history of PAD status post right lower extremity stenting, hypertension, dyslipidemia, carotid stenosis who presents to me for evaluation prior to undergoing left lower extremity bypass.  Patient's exercise capacity is extremely limited because of the issues with his leg.  I am unable to assess it at this time.  Given his extensive PAD he certainly represents a higher risk of cardiovascular disease.  I have recommended testing prior to surgery.  His blood pressure appears to be adequately controlled but have asked him to start monitoring it at home.  Review of blood work shows that his LDL still elevated despite being on high-dose statin.  I will consider PCSK9 inhibitor after his surgery.

## 2021-08-13 NOTE — DISCUSSION/SUMMARY
[FreeTextEntry1] : 1.  Check echocardiogram and nuclear stress test prior to surgery given his very limited ambulation and extensive PAD.\par 2.  Continue current cardiac meds in doses as noted above for hypertension, dyslipidemia and PAD.\par 3.  Consider addition of PCSK9 inhibitor given his elevated LDL despite being on high-dose statin and his extensive PAD.\par 4.  Monitor BP at home, keep a log and bring to f/u.\par 5.  Follow-up with vascular.\par 6.  Follow up here after testing, and will make further recommendations at that time.

## 2021-08-13 NOTE — HISTORY OF PRESENT ILLNESS
[FreeTextEntry1] : Patient presents today because he is planned to undergo a left lower extremity bypass.  He has not been seen in our office in a number of years.  He is very limited his activity right now because of the pains in his left leg.  He has issues with pain and numbness.  His ability to ambulate is very limited but at night he will have a lot of issues with pain and some numbness in the leg.  There is also some weakness in the leg.  He does not have any other specific symptoms.  Within the limited activity he has he reports no other limitations.  Patient denies chest pain, shortness of breath, palpitations, orthopnea, presyncope, syncope.

## 2021-08-17 ENCOUNTER — APPOINTMENT (OUTPATIENT)
Dept: INTERNAL MEDICINE | Facility: CLINIC | Age: 56
End: 2021-08-17
Payer: MEDICAID

## 2021-08-17 ENCOUNTER — LABORATORY RESULT (OUTPATIENT)
Age: 56
End: 2021-08-17

## 2021-08-17 VITALS
HEART RATE: 80 BPM | DIASTOLIC BLOOD PRESSURE: 70 MMHG | RESPIRATION RATE: 15 BRPM | BODY MASS INDEX: 22.5 KG/M2 | TEMPERATURE: 98 F | WEIGHT: 140 LBS | HEIGHT: 66 IN | OXYGEN SATURATION: 97 % | SYSTOLIC BLOOD PRESSURE: 120 MMHG

## 2021-08-17 PROCEDURE — 99214 OFFICE O/P EST MOD 30 MIN: CPT

## 2021-08-18 LAB
ALBUMIN SERPL ELPH-MCNC: 4.4 G/DL
ALP BLD-CCNC: 143 U/L
ALT SERPL-CCNC: 24 U/L
ANION GAP SERPL CALC-SCNC: 13 MMOL/L
APPEARANCE: CLEAR
AST SERPL-CCNC: 17 U/L
BACTERIA: NEGATIVE
BASOPHILS # BLD AUTO: 0.04 K/UL
BASOPHILS NFR BLD AUTO: 0.5 %
BILIRUB SERPL-MCNC: <0.2 MG/DL
BILIRUBIN URINE: NEGATIVE
BLOOD URINE: NEGATIVE
BUN SERPL-MCNC: 13 MG/DL
CALCIUM SERPL-MCNC: 9.1 MG/DL
CHLORIDE SERPL-SCNC: 106 MMOL/L
CO2 SERPL-SCNC: 22 MMOL/L
COLOR: NORMAL
CREAT SERPL-MCNC: 0.87 MG/DL
EOSINOPHIL # BLD AUTO: 0.13 K/UL
EOSINOPHIL NFR BLD AUTO: 1.7 %
GLUCOSE QUALITATIVE U: NEGATIVE
GLUCOSE SERPL-MCNC: 99 MG/DL
HCT VFR BLD CALC: 45.5 %
HGB BLD-MCNC: 14.6 G/DL
HYALINE CASTS: 1 /LPF
IMM GRANULOCYTES NFR BLD AUTO: 0.3 %
INR PPP: 0.92 RATIO
KETONES URINE: NEGATIVE
LEUKOCYTE ESTERASE URINE: NEGATIVE
LYMPHOCYTES # BLD AUTO: 2.33 K/UL
LYMPHOCYTES NFR BLD AUTO: 30.1 %
MAN DIFF?: NORMAL
MCHC RBC-ENTMCNC: 31.3 PG
MCHC RBC-ENTMCNC: 32.1 GM/DL
MCV RBC AUTO: 97.6 FL
MICROSCOPIC-UA: NORMAL
MONOCYTES # BLD AUTO: 0.66 K/UL
MONOCYTES NFR BLD AUTO: 8.5 %
NEUTROPHILS # BLD AUTO: 4.55 K/UL
NEUTROPHILS NFR BLD AUTO: 58.9 %
NITRITE URINE: NEGATIVE
PH URINE: 6
PLATELET # BLD AUTO: 179 K/UL
POTASSIUM SERPL-SCNC: 4.1 MMOL/L
PROT SERPL-MCNC: 7.1 G/DL
PROTEIN URINE: NORMAL
PT BLD: 10.9 SEC
RBC # BLD: 4.66 M/UL
RBC # FLD: 15 %
RED BLOOD CELLS URINE: 1 /HPF
SODIUM SERPL-SCNC: 142 MMOL/L
SPECIFIC GRAVITY URINE: 1.03
SQUAMOUS EPITHELIAL CELLS: 0 /HPF
UROBILINOGEN URINE: NORMAL
WBC # FLD AUTO: 7.73 K/UL
WHITE BLOOD CELLS URINE: 0 /HPF

## 2021-08-18 NOTE — REVIEW OF SYSTEMS
[Joint Pain] : joint pain [Negative] : Heme/Lymph [FreeTextEntry9] : left hip, pain in the left calf

## 2021-08-18 NOTE — ADDENDUM
[FreeTextEntry1] : all labs reviewed  are within normal range.\par pt. is pending to have a stress test and cardiac clearance.

## 2021-08-18 NOTE — PHYSICAL EXAM
[1+] : left 1+ [2+] : right 2+ [0] : left 0 [No Joint Swelling] : no joint swelling [Grossly Normal Strength/Tone] : grossly normal strength/tone [Normal] : affect was normal and insight and judgment were intact [de-identified] : pt. is on pain.uncomfortable on the exam table. [de-identified] : cane in use,

## 2021-08-18 NOTE — PLAN
[FreeTextEntry1] : Mr. RADHA CHAKRABORTY  is    56 year male with a previous history of heavy smoking, PAD(s/p right SFA stent 2017), carotid stenosis.\par is going for left SFA stent placement.\par Pre-op labs ordered today.\par will need to review the labs result.\par Have seen cardiology for cardiac clearance.\par He is scheduled to have a stress test. I will need to review the results of the stress test.

## 2021-08-18 NOTE — HISTORY OF PRESENT ILLNESS
[No Pertinent Cardiac History] : no history of aortic stenosis, atrial fibrillation, coronary artery disease, recent myocardial infarction, or implantable device/pacemaker [No Adverse Anesthesia Reaction] : no adverse anesthesia reaction in self or family member [Poor (<4 METs)] : Poor (<4 METs) [Anti-Platelet Agents: _____] : Anti-Platelet Agents: [unfilled] [Smoker] : not a smoker [FreeTextEntry2] : 09/23/21 [FreeTextEntry1] : left SFA stent placement [FreeTextEntry3] : DR KELLI Mnotiel [FreeTextEntry4] : Mr. RADHA CHAKRABORTY  is    56 year male with a previous history of heavy smoking, PAD(s/p right SFA stent 2017), carotid stenosis.\par Patient is going for left SFA stent placement.\par He is in constant severe pain in his left leg his pain is going up to the left thigh.\par He also has degenerative changes in the lumbar spine.\par He is having difficulty in walking due to pain , can barely walk 10-12 inside the house.\par History of hypertension, blood pressure well controlled with amlodipine 10 mg\par PAD.on aspirin, atorvastatin 80 mg, Plavix 75 mg and ezetimibe 10 mg.\par History of seizure disorder on phenytoin 100 mg 2 capsules twice daily.\par History of depression Quetiapine 200 mg at bedtime and venlafaxine ER 75 mg 1 capsule/day.\par \par \par  [FreeTextEntry6] : +claudication [FreeTextEntry8] : due to pain

## 2021-08-19 ENCOUNTER — APPOINTMENT (OUTPATIENT)
Dept: NEUROLOGY | Facility: CLINIC | Age: 56
End: 2021-08-19
Payer: MEDICAID

## 2021-08-19 VITALS
BODY MASS INDEX: 22.5 KG/M2 | SYSTOLIC BLOOD PRESSURE: 118 MMHG | HEIGHT: 66 IN | WEIGHT: 140 LBS | DIASTOLIC BLOOD PRESSURE: 70 MMHG

## 2021-08-19 PROCEDURE — 99215 OFFICE O/P EST HI 40 MIN: CPT

## 2021-08-24 ENCOUNTER — NON-APPOINTMENT (OUTPATIENT)
Age: 56
End: 2021-08-24

## 2021-08-24 LAB — PHENYTOIN SERPL QL: 9.8 UG/ML

## 2021-08-30 ENCOUNTER — APPOINTMENT (OUTPATIENT)
Dept: CARDIOLOGY | Facility: CLINIC | Age: 56
End: 2021-08-30

## 2021-08-30 VITALS
HEIGHT: 66 IN | BODY MASS INDEX: 22.82 KG/M2 | WEIGHT: 142 LBS | SYSTOLIC BLOOD PRESSURE: 138 MMHG | OXYGEN SATURATION: 97 % | HEART RATE: 81 BPM | TEMPERATURE: 99 F | DIASTOLIC BLOOD PRESSURE: 72 MMHG

## 2021-08-31 ENCOUNTER — APPOINTMENT (OUTPATIENT)
Dept: CARDIOLOGY | Facility: CLINIC | Age: 56
End: 2021-08-31
Payer: MEDICAID

## 2021-08-31 DIAGNOSIS — R94.31 ABNORMAL ELECTROCARDIOGRAM [ECG] [EKG]: ICD-10-CM

## 2021-08-31 PROCEDURE — A9500: CPT

## 2021-08-31 PROCEDURE — 78452 HT MUSCLE IMAGE SPECT MULT: CPT

## 2021-08-31 PROCEDURE — 93015 CV STRESS TEST SUPVJ I&R: CPT

## 2021-08-31 RX ADMIN — REGADENOSON 0 MG/5ML: 0.08 INJECTION, SOLUTION INTRAVENOUS at 00:00

## 2021-08-31 RX ADMIN — AMINOPHYLLINE 0 MG/ML: 25 INJECTION, SOLUTION INTRAVENOUS at 00:00

## 2021-09-08 RX ORDER — REGADENOSON 0.08 MG/ML
0.4 INJECTION, SOLUTION INTRAVENOUS
Qty: 4 | Refills: 0 | Status: COMPLETED | OUTPATIENT
Start: 2021-08-31

## 2021-09-08 RX ORDER — AMINOPHYLLINE 25 MG/ML
25 INJECTION, SOLUTION INTRAVENOUS
Qty: 0 | Refills: 0 | Status: COMPLETED | OUTPATIENT
Start: 2021-08-31

## 2021-09-10 ENCOUNTER — APPOINTMENT (OUTPATIENT)
Dept: CARDIOLOGY | Facility: CLINIC | Age: 56
End: 2021-09-10
Payer: MEDICAID

## 2021-09-10 PROCEDURE — 93306 TTE W/DOPPLER COMPLETE: CPT

## 2021-09-13 ENCOUNTER — APPOINTMENT (OUTPATIENT)
Dept: CARDIOLOGY | Facility: CLINIC | Age: 56
End: 2021-09-13
Payer: MEDICAID

## 2021-09-13 VITALS
OXYGEN SATURATION: 97 % | HEART RATE: 88 BPM | BODY MASS INDEX: 22.5 KG/M2 | HEIGHT: 66 IN | RESPIRATION RATE: 16 BRPM | WEIGHT: 140 LBS | DIASTOLIC BLOOD PRESSURE: 72 MMHG | SYSTOLIC BLOOD PRESSURE: 151 MMHG

## 2021-09-13 PROCEDURE — 99214 OFFICE O/P EST MOD 30 MIN: CPT

## 2021-09-13 NOTE — DISCUSSION/SUMMARY
[FreeTextEntry1] : 1.  Proceed with surgery as planned.\par 2.  Monitor through the procedure until stable post procedurally.\par 3.  No additional cardiac testing at this time.\par 4.  Continue current cardiac meds in doses as noted above for hypertension, dyslipidemia and PAD.\par 5.  Consider addition of PCSK9 inhibitor given his elevated LDL despite being on high-dose statin and his extensive PAD.\par 6.  Monitor BP at home, keep a log and bring to f/u.\par 7.  Follow-up with vascular.\par 8.  Follow up here in two months.

## 2021-09-13 NOTE — HISTORY OF PRESENT ILLNESS
[FreeTextEntry1] : Patient returns to the office today stating that the difficulties with his left leg have continued to worsen and he is looking forward to having his surgery.  He reports no other new complaints at this time.  Patient denies chest pain, shortness of breath, palpitations, orthopnea, presyncope, syncope.

## 2021-09-13 NOTE — ASSESSMENT
[FreeTextEntry1] : Nuclear stress test August 31, 2021 was a pharmacologic study which was tolerated well.  Blood pressure response was normal.  EKG showed no evidence of ischemia.  Evaluation of nuclear imaging showed no evidence of ischemia or infarct with some inferior artifact caused by diaphragmatic and bowel attenuation.  Ejection fraction of 61%.\par \par Echocardiogram September 10, 2021 demonstrated left ventricle normal size and function with ejection fraction of 55 to 60%.  Study was very limited but showed no significant valvular or other structural abnormality.\par \par 56-year-old man with a past medical history of PAD status post right lower extremity stenting, hypertension, dyslipidemia, carotid stenosis who presented to me for evaluation prior to undergoing left lower extremity bypass.  Cardiac testing shows no significant abnormalities.  There is no evidence of significant ischemia on stress testing and his EF is normal.  Echocardiogram is limited but shows a normal EF and no significant valve disease.  There is no evidence of heart failure at this time.  I see no cardiac contraindication to proceeding with surgery at this time.  I will reevaluate him postoperatively with regards to his lipids and his blood pressure.

## 2021-09-23 ENCOUNTER — OUTPATIENT (OUTPATIENT)
Dept: OUTPATIENT SERVICES | Facility: HOSPITAL | Age: 56
LOS: 1 days | End: 2021-09-23
Payer: COMMERCIAL

## 2021-09-23 VITALS
RESPIRATION RATE: 20 BRPM | SYSTOLIC BLOOD PRESSURE: 121 MMHG | HEIGHT: 65 IN | HEART RATE: 80 BPM | TEMPERATURE: 98 F | DIASTOLIC BLOOD PRESSURE: 52 MMHG | OXYGEN SATURATION: 99 % | WEIGHT: 140.43 LBS

## 2021-09-23 DIAGNOSIS — I80.10 PHLEBITIS AND THROMBOPHLEBITIS OF UNSPECIFIED FEMORAL VEIN: ICD-10-CM

## 2021-09-23 DIAGNOSIS — I73.9 PERIPHERAL VASCULAR DISEASE, UNSPECIFIED: ICD-10-CM

## 2021-09-23 DIAGNOSIS — Z13.89 ENCOUNTER FOR SCREENING FOR OTHER DISORDER: ICD-10-CM

## 2021-09-23 DIAGNOSIS — Z96.7 PRESENCE OF OTHER BONE AND TENDON IMPLANTS: Chronic | ICD-10-CM

## 2021-09-23 DIAGNOSIS — I10 ESSENTIAL (PRIMARY) HYPERTENSION: ICD-10-CM

## 2021-09-23 DIAGNOSIS — Z98.89 OTHER SPECIFIED POSTPROCEDURAL STATES: Chronic | ICD-10-CM

## 2021-09-23 DIAGNOSIS — R73.03 PREDIABETES: ICD-10-CM

## 2021-09-23 DIAGNOSIS — K40.90 UNILATERAL INGUINAL HERNIA, WITHOUT OBSTRUCTION OR GANGRENE, NOT SPECIFIED AS RECURRENT: Chronic | ICD-10-CM

## 2021-09-23 DIAGNOSIS — Z29.9 ENCOUNTER FOR PROPHYLACTIC MEASURES, UNSPECIFIED: ICD-10-CM

## 2021-09-23 DIAGNOSIS — Z01.818 ENCOUNTER FOR OTHER PREPROCEDURAL EXAMINATION: ICD-10-CM

## 2021-09-23 DIAGNOSIS — R56.9 UNSPECIFIED CONVULSIONS: ICD-10-CM

## 2021-09-23 LAB
A1C WITH ESTIMATED AVERAGE GLUCOSE RESULT: 5.2 % — SIGNIFICANT CHANGE UP (ref 4–5.6)
ANION GAP SERPL CALC-SCNC: 13 MMOL/L — SIGNIFICANT CHANGE UP (ref 5–17)
APTT BLD: 30.1 SEC — SIGNIFICANT CHANGE UP (ref 27.5–35.5)
BASOPHILS # BLD AUTO: 0.04 K/UL — SIGNIFICANT CHANGE UP (ref 0–0.2)
BASOPHILS NFR BLD AUTO: 0.5 % — SIGNIFICANT CHANGE UP (ref 0–2)
BLD GP AB SCN SERPL QL: SIGNIFICANT CHANGE UP
BUN SERPL-MCNC: 15.8 MG/DL — SIGNIFICANT CHANGE UP (ref 8–20)
CALCIUM SERPL-MCNC: 9.4 MG/DL — SIGNIFICANT CHANGE UP (ref 8.6–10.2)
CHLORIDE SERPL-SCNC: 105 MMOL/L — SIGNIFICANT CHANGE UP (ref 98–107)
CO2 SERPL-SCNC: 21 MMOL/L — LOW (ref 22–29)
CREAT SERPL-MCNC: 0.96 MG/DL — SIGNIFICANT CHANGE UP (ref 0.5–1.3)
EOSINOPHIL # BLD AUTO: 0.14 K/UL — SIGNIFICANT CHANGE UP (ref 0–0.5)
EOSINOPHIL NFR BLD AUTO: 1.8 % — SIGNIFICANT CHANGE UP (ref 0–6)
ESTIMATED AVERAGE GLUCOSE: 103 MG/DL — SIGNIFICANT CHANGE UP (ref 68–114)
GLUCOSE SERPL-MCNC: 104 MG/DL — HIGH (ref 70–99)
HCT VFR BLD CALC: 45.1 % — SIGNIFICANT CHANGE UP (ref 39–50)
HGB BLD-MCNC: 15.4 G/DL — SIGNIFICANT CHANGE UP (ref 13–17)
IMM GRANULOCYTES NFR BLD AUTO: 0.4 % — SIGNIFICANT CHANGE UP (ref 0–1.5)
INR BLD: 0.94 RATIO — SIGNIFICANT CHANGE UP (ref 0.88–1.16)
LYMPHOCYTES # BLD AUTO: 2.34 K/UL — SIGNIFICANT CHANGE UP (ref 1–3.3)
LYMPHOCYTES # BLD AUTO: 29.7 % — SIGNIFICANT CHANGE UP (ref 13–44)
MCHC RBC-ENTMCNC: 31 PG — SIGNIFICANT CHANGE UP (ref 27–34)
MCHC RBC-ENTMCNC: 34.1 GM/DL — SIGNIFICANT CHANGE UP (ref 32–36)
MCV RBC AUTO: 90.7 FL — SIGNIFICANT CHANGE UP (ref 80–100)
MONOCYTES # BLD AUTO: 0.64 K/UL — SIGNIFICANT CHANGE UP (ref 0–0.9)
MONOCYTES NFR BLD AUTO: 8.1 % — SIGNIFICANT CHANGE UP (ref 2–14)
NEUTROPHILS # BLD AUTO: 4.7 K/UL — SIGNIFICANT CHANGE UP (ref 1.8–7.4)
NEUTROPHILS NFR BLD AUTO: 59.5 % — SIGNIFICANT CHANGE UP (ref 43–77)
PLATELET # BLD AUTO: 207 K/UL — SIGNIFICANT CHANGE UP (ref 150–400)
POTASSIUM SERPL-MCNC: 4.3 MMOL/L — SIGNIFICANT CHANGE UP (ref 3.5–5.3)
POTASSIUM SERPL-SCNC: 4.3 MMOL/L — SIGNIFICANT CHANGE UP (ref 3.5–5.3)
PROTHROM AB SERPL-ACNC: 10.9 SEC — SIGNIFICANT CHANGE UP (ref 10.6–13.6)
RBC # BLD: 4.97 M/UL — SIGNIFICANT CHANGE UP (ref 4.2–5.8)
RBC # FLD: 14.1 % — SIGNIFICANT CHANGE UP (ref 10.3–14.5)
SODIUM SERPL-SCNC: 139 MMOL/L — SIGNIFICANT CHANGE UP (ref 135–145)
WBC # BLD: 7.89 K/UL — SIGNIFICANT CHANGE UP (ref 3.8–10.5)
WBC # FLD AUTO: 7.89 K/UL — SIGNIFICANT CHANGE UP (ref 3.8–10.5)

## 2021-09-23 PROCEDURE — 93005 ELECTROCARDIOGRAM TRACING: CPT

## 2021-09-23 PROCEDURE — G0463: CPT

## 2021-09-23 PROCEDURE — 93010 ELECTROCARDIOGRAM REPORT: CPT

## 2021-09-23 RX ORDER — INFLUENZA VIRUS VACCINE 15; 15; 15; 15 UG/.5ML; UG/.5ML; UG/.5ML; UG/.5ML
0.5 SUSPENSION INTRAMUSCULAR ONCE
Refills: 0 | Status: DISCONTINUED | OUTPATIENT
Start: 2021-09-23 | End: 2021-10-07

## 2021-09-23 RX ORDER — CEFAZOLIN SODIUM 1 G
2000 VIAL (EA) INJECTION ONCE
Refills: 0 | Status: DISCONTINUED | OUTPATIENT
Start: 2021-10-01 | End: 2021-10-04

## 2021-09-23 NOTE — H&P PST ADULT - ATTENDING COMMENTS
Patient with severe LLE PAD and ischemic rest pain  Plan is for left femoral endarterectomy, possible left SFA stent  Risks and benefits of the planned procedure discussed with patient. All questions answered

## 2021-09-23 NOTE — H&P PST ADULT - PROBLEM SELECTOR PLAN 2
Labs and EKG performed. Written and verbal instructions provided, verbalizes understanding, teach back method utilized.  Patient educated on surgical scrub, COVID testing 9/28, preadmission instructions, medical, cardiac & neurology clearance and day of procedure medications, verbalizes understanding, teach back method utilized.  Patient instructed to stop Herbals or anti-inflammatory meds one week prior to surgery and discuss with PCP.  Patient instructed to continue Plavix and ASA without interruption as per Whitney from Dr. Montiel's office (9/23 11:12am).  Patient to have medical clearance with Dr. Biggs  Patient to have cardiac clearance with Dr. Irby  Patient to have neurology clearance with Dr. Mendez.

## 2021-09-23 NOTE — H&P PST ADULT - ASSESSMENT
This is a 56 year old man in NAD with history of previous heavy smoker with PAD (s/p right SFA stenting in 2017), carotid stenosis, hyperlipidemia, HTN, prediabetes,  GERD depression and anxiety presents today for PST c/o worsening left leg claudication and left hip/thigh pain. Patient reports ongoing extreme left leg pain for several months. Patient describes pain as sharp and constant with numbness that radiates down the lateral aspect of his thigh. Patient's pain level ranges from 7/10 to10/10 with no relief from rest or OTC medication. Patient states that he is now only able to walk 5-10 steps before he has to stop due to severe calf cramping and numbness.  He denies left foot pain at rest or left foot ulcer. He is s/p LLE angiogram 2021, which he was noted to have severe common femoral and distal SFA disease not amenable to endovascular therapy. Denies chest pain, palpitations or shortness of breath. Now scheduled for left femoral endarterectomy on 10/1/21 with Dr. Montiel pending medical, cardiac and neuro clearance.     CAPRINI SCORE    AGE RELATED RISK FACTORS                                                             [X ] Age 41-60 years                                            (1 Point)  [ ] Age: 61-74 years                                           (2 Points)                 [ ] Age= 75 years                                                (3 Points)             DISEASE RELATED RISK FACTORS                                                       [ ] Edema in the lower extremities                 (1 Point)                     [ ] Varicose veins                                               (1 Point)                                 [ ] BMI > 25 Kg/m2                                            (1 Point)                                  [ ] Serious infection (ie PNA)                            (1 Point)                     [ ] Lung disease ( COPD, Emphysema)            (1 Point)                                                                          [ ] Acute myocardial infarction                         (1 Point)                  [ ] Congestive heart failure (in the previous month)  (1 Point)         [ ] Inflammatory bowel disease                            (1 Point)                  [ ] Central venous access, PICC or Port               (2 points)       (within the last month)                                                                [ ] Stroke (in the previous month)                        (5 Points)    [ ] Previous or present malignancy                       (2 points)                                                                                                                                                         HEMATOLOGY RELATED FACTORS                                                         [X ] Prior episodes of VTE                                     (3 Points)                     [ ] Positive family history for VTE                      (3 Points)                  [ ] Prothrombin 25867 A                                     (3 Points)                     [ ] Factor V Leiden                                                (3 Points)                        [ ] Lupus anticoagulants                                      (3 Points)                                                           [ ] Anticardiolipin antibodies                              (3 Points)                                                       [ ] High homocysteine in the blood                   (3 Points)                                             [ ] Other congenital or acquired thrombophilia      (3 Points)                                                [ ] Heparin induced thrombocytopenia                  (3 Points)                                        MOBILITY RELATED FACTORS  [ ] Bed rest                                                         (1 Point)  [ ] Plaster cast                                                    (2 points)  [ ] Bed bound for more than 72 hours           (2 Points)    GENDER SPECIFIC FACTORS  [ ] Pregnancy or had a baby within the last month   (1 Point)  [ ] Post-partum < 6 weeks                                   (1 Point)  [ ] Hormonal therapy  or oral contraception   (1 Point)  [ ] History of pregnancy complications              (1 point)  [ ] Unexplained or recurrent              (1 Point)    OTHER RISK FACTORS                                           (1 Point)  [X] BMI >40, smoking, diabetes requiring insulin, chemotherapy  blood transfusions and length of surgery over 2 hours    SURGERY RELATED RISK FACTORS  [ ]  Section within the last month     (1 Point)  [ ] Minor surgery                                                  (1 Point)  [ ] Arthroscopic surgery                                       (2 Points)  [X ] Planned major surgery lasting more            (2 Points)      than 45 minutes     [ ] Elective hip or knee joint replacement       (5 points)       surgery                                                TRAUMA RELATED RISK FACTORS  [ ] Fracture of the hip, pelvis, or leg                       (5 Points)  [ ] Spinal cord injury resulting in paralysis             (5 points)       (in the previous month)    [ ] Paralysis  (less than 1 month)                             (5 Points)  [ ] Multiple Trauma within 1 month                        (5 Points)    Total Score [    7    ]    Caprini Score 0-2: Low Risk, NO VTE prophylaxis required for most patients, encourage ambulation  Caprini Score 3-6: Moderate Risk , pharmacologic VTE prophylaxis is indicated for most patients (in the absence of contraindications)  Caprini Score Greater than or =7: High risk, pharmocologic VTE prophylaxis indicated for most patients (in the absence of contraindications)    OPIOID RISK TOOL    [place] EACH BOX THAT APPLIES AND ADD TOTALS AT THE END    FAMILY HISTORY OF SUBSTANCE ABUSE                 FEMALE         MALE                                                Alcohol                             [  ]1 pt          [  ]3pts                                               Illegal Durgs                     [  ]2 pts        [  ]3pts                                               Rx Drugs                           [  ]4 pts        [  ]4 pts    PERSONAL HISTORY OF SUBSTANCE ABUSE                                                                                          Alcohol                             [  ]3 pts       [  ]3 pts                                               Illegal Drugs                     [  ]4 pts        [  ]4 pts                                               Rx Drugs                           [  ]5 pts        [  ]5 pts    AGE BETWEEN 16-45 YEARS                                      [  ]1 pt         [  ]1 pt    HISTORY OF PREADOLESCENT   SEXUAL ABUSE                                                             [  ]3 pts        [  ]0pts    PSYCHOLOGICAL DISEASE                     ADD, OCD, Bipolar, Schizophrenia        [  ]2 pts         [  ]2 pts                      Depression                                               [  ]1 pt           [  ]1 pt           SCORING TOTAL   (add numbers and type here)              (*0*)                                     A score of 3 or lower indicated LOW risk for future opioid abuse  A score of 4 to 7 indicated moderate risk for future opioid abuse  A score of 8 or higher indicates a high risk for opioid abuse

## 2021-09-23 NOTE — H&P PST ADULT - PROBLEM/PLAN-1
[FreeTextEntry1] : The patient remains unchanged . She does have a PFO but she had seen neurology show did not feel her lacunar infarct was embolic . No need for closure. She is seeing neurology for her cognitive issues . Work up in in  progress . The patient has bradycardia which is chronic . She has an ILM and was told by EP that she does not need a PPM as of yet. she does have some lightheadedness.  DISPLAY PLAN FREE TEXT

## 2021-09-23 NOTE — H&P PST ADULT - HISTORY OF PRESENT ILLNESS
56 year old man with history of previous heavy smoker with PAD (s/p right SFA stenting in 2017), carotid stenosis, hyperlipidemia, HTN, GERD depression and anxiety presents today for PST c/o worsening left leg claudication and left hip/thigh pain. Patient reports ongoing extreme left leg pain for several months. Patient describes pain as sharp and constant with numbness that radiates down the lateral aspect of his thigh. Patient's pain level ranges from 7/10 to10/10 with no relief from rest or OTC medication. Patient states that he is now only able to walk 10-15 steps before he has to stop due to severe calf cramping.  He denies left foot pain at rest or left foot ulcer. He is s/p LLE angiogram July 2021, which he was noted to have severe common femoral and distal SFA disease not amenable to endovascular therapy.     Risk Factors for PAD       Age: 56       DM: N/A       Smoking History: former smoker 1 pack a day for 42 years        Hyperlipidemia: yes       Hypertension: yes       Family History of PAD: N/A       Known Atherosclerotic Disease: Carotid Stenosis    Subjective Complaints: pain in the left leg        Claudication Nic Class: pt can only walk 15 feet        Impaired Walking Function: N/A       Ischemic Rest Pain: yes       Other Non-Joint Related Exertional Lower Extremity Symptoms (not typical of claudication): N/A    Objective Findings:        Lower Extremity Pulses: Unable to palpate DP pulses, doppler used see exam        Nonhealing Lower Extremity Wound: none       Lower Extremity Gangrene: N/A       Vascular Bruit: N/A       Other Suggestive Lower Extremity Findings (elevation pallor, dependent rubor): N/A    Vascular Testing:        JOSE (Normal/Borderline/Abnormal/Noncompressable):        Arterial Dopplers:        CTA/MRA:     Medical Management:       Antiplatelets: Plavix        Cilostazol:        Statin: atorvastatin         Nic Claudication Classification:        I: Asymptomatic       IIa: Walking > 200 meters (2.5 blocks)       IIb: Walking < 200 meters (2.5 blocks)       III: Rest/nocturnal pain       IV: Necrosis/gangrene    Ankle-Brachial Index: left 1.0 right .58    Narrative:     Risk Factors for PAD       Age:        DM: N/A       Smoking History: N/A       Hyperlipidemia: N/A       Hypertension: N/A       Family History of PAD: N/A       Known Atherosclerotic Disease (coronary, carotid, subclavian, renal, mesenteric, AAA):     Subjective Complaints:        Claudication Nic Class:        Impaired Walking Function: N/A       Ischemic Rest Pain: N/A       Non-healing Ulcers       Other Non-Joint Related Exertional Lower Extremity Symptoms (not typical of claudication): N/A    Objective Findings:        Lower Extremity Pulses: Unable to palpate DP pulses, doppler pulses noted.       Nonhealing Lower Extremity Wound/ Ulcers: N/A       Lower Extremity Gangrene: N/A       Vascular Bruit: N/A       Other Suggestive Lower Extremity Findings (elevation pallor, dependent rubor): N/A    Vascular Testing:        JOSE (Normal/Borderline/Abnormal/Noncompressable):        Arterial Dopplers:        CTA/MRA:        Prior Peripheral Angiograms/ Interventions : (Date/ Findings)    Medical Management:       Antiplatelets:        Cilostazol:        Statin:         Nic Claudication Classification:        I: Asymptomatic       IIa: Walking > 200 meters (2.5 blocks)       IIb: Walking < 200 meters (2.5 blocks)       III: Rest/nocturnal pain       IV: Necrosis/gangrene    Ankle-Brachial Index:       Noncompressable: > 1.4       Normal: 1.0 to 1.4       Borderline: 0.91 to 0.99       Abnormal: < 0.91          56 year old man with history of previous heavy smoker with PAD (s/p right SFA stenting in 2017), carotid stenosis, hyperlipidemia, HTN, GERD depression and anxiety presents today for PST c/o worsening left leg claudication and left hip/thigh pain. Patient reports ongoing extreme left leg pain for several months. Patient describes pain as sharp and constant with numbness that radiates down the lateral aspect of his thigh. Patient's pain level ranges from 7/10 to10/10 with no relief from rest or OTC medication. Patient states that he is now only able to walk 10-15 steps before he has to stop due to severe calf cramping.  He denies left foot pain at rest or left foot ulcer. He is s/p LLE angiogram July 2021, which he was noted to have severe common femoral and distal SFA disease not amenable to endovascular therapy.      56 year old man with history of previous heavy smoker with PAD (s/p right SFA stenting in 2017), carotid stenosis, hyperlipidemia, HTN, prediabetes,  GERD depression and anxiety presents today for PST c/o worsening left leg claudication and left hip/thigh pain. Patient reports ongoing extreme left leg pain for several months. Patient describes pain as sharp and constant with numbness that radiates down the lateral aspect of his thigh. Patient's pain level ranges from 7/10 to10/10 with no relief from rest or OTC medication. Patient states that he is now only able to walk 5-10 steps before he has to stop due to severe calf cramping and numbness.  He denies left foot pain at rest or left foot ulcer. He is s/p LLE angiogram July 2021, which he was noted to have severe common femoral and distal SFA disease not amenable to endovascular therapy. Denies chest pain, palpitations or shortness of breath. Now scheduled for left femoral endarterectomy on 10/1/21 with Dr. Montiel pending medical, cardiac and neuro clearance.

## 2021-09-23 NOTE — H&P PST ADULT - PROBLEM SELECTOR PLAN 1
EKG performed. BP today 121/52. Continue medications as instructed. Patient instructed to take amlodipine with a sip of water morning of surgery, verbalizes understanding, teach back method utilized.   Medical and cardiac clearance pending

## 2021-09-23 NOTE — H&P PST ADULT - MUSCULOSKELETAL
details… decreased ROM due to pain detailed exam no joint erythema/no joint warmth/no calf tenderness/decreased ROM due to pain/diminished strength

## 2021-09-23 NOTE — H&P PST ADULT - PROBLEM SELECTOR PLAN 4
CAP score 7 patient High risk, SCDs ordered for day of procedure.  Surgical team to assess need for  pharmacological and mechanical measures for VTE prophylaxis

## 2021-09-23 NOTE — H&P PST ADULT - NSICDXPASTMEDICALHX_GEN_ALL_CORE_FT
PAST MEDICAL HISTORY:  Depression     GERD (gastroesophageal reflux disease)     Hypercholesterolemia     Hypertension     Osteoarthritis     Peripheral vascular disease with stents    Seizures      PAST MEDICAL HISTORY:  Depression     Former smoker     GERD (gastroesophageal reflux disease)     Hypercholesterolemia     Hypertension     Osteoarthritis     Peripheral vascular disease with stents    Prediabetes     Seizures

## 2021-09-23 NOTE — H&P PST ADULT - PROBLEM SELECTOR PLAN 3
No seizure in 6 months, followed by neurology. Currently on dilantin. Patient instructed to take dilantin with a sip of water morning of surgery, verbalizes understanding, teach back method utilized.   Neurology clearance pending.

## 2021-09-24 ENCOUNTER — APPOINTMENT (OUTPATIENT)
Dept: INTERNAL MEDICINE | Facility: CLINIC | Age: 56
End: 2021-09-24
Payer: MEDICAID

## 2021-09-24 VITALS
RESPIRATION RATE: 16 BRPM | BODY MASS INDEX: 22.5 KG/M2 | WEIGHT: 140 LBS | SYSTOLIC BLOOD PRESSURE: 120 MMHG | HEART RATE: 83 BPM | HEIGHT: 66 IN | TEMPERATURE: 98.7 F | DIASTOLIC BLOOD PRESSURE: 70 MMHG | OXYGEN SATURATION: 96 %

## 2021-09-24 PROCEDURE — 99213 OFFICE O/P EST LOW 20 MIN: CPT

## 2021-09-24 RX ORDER — METHOCARBAMOL 750 MG/1
750 TABLET, FILM COATED ORAL
Qty: 10 | Refills: 0 | Status: DISCONTINUED | COMMUNITY
Start: 2021-06-14

## 2021-09-24 RX ORDER — IBUPROFEN 600 MG/1
600 TABLET, FILM COATED ORAL
Qty: 15 | Refills: 0 | Status: DISCONTINUED | COMMUNITY
Start: 2021-06-14

## 2021-09-24 RX ORDER — GABAPENTIN 100 MG/1
100 CAPSULE ORAL
Qty: 90 | Refills: 0 | Status: DISCONTINUED | COMMUNITY
Start: 2021-07-29

## 2021-09-24 NOTE — PHYSICAL EXAM
[1+] : left 1+ [2+] : right 2+ [0] : left 0 [No Joint Swelling] : no joint swelling [Grossly Normal Strength/Tone] : grossly normal strength/tone [Normal] : affect was normal and insight and judgment were intact [de-identified] : pt. is on pain.uncomfortable on the exam table. [de-identified] : cane in use,

## 2021-09-24 NOTE — ASSESSMENT
[High Risk Surgery - Intraperitoneal, Intrathoracic or Supringuinal Vascular Procedures] : High Risk Surgery - Intraperitoneal, Intrathoracic or Supringuinal Vascular Procedures - No (0) [Congestive Heart Failure] : Congestive Heart Failure - No (0) [Ischemic Heart Disease] : Ischemic Heart Disease - No (0) [Prior Cerebrovascular Accident or TIA] : Prior Cerebrovascular Accident or TIA - No (0) [Creatinine >= 2mg/dL (1 Point)] : Creatinine >= 2mg/dL - No (0) [Insulin-dependent Diabetic (1 Point)] : Insulin-dependent Diabetic - No (0) [0] : 0 , RCRI Class: I, Risk of Post-Op Cardiac Complications: 3.9%, 95% CI for Risk Estimate: 2.8% - 5.4% [Patient Optimized for Surgery] : Patient optimized for surgery [No Further Testing Recommended] : no further testing recommended [As per surgery] : as per surgery [FreeTextEntry4] : Mr. RADHA CHAKRABORTY  is    56 year male with a previous history of heavy smoking, PAD(s/p right SFA stent 2017), carotid stenosis.\par is going for left SFA stent placement.\par Pre-op labs reviewed and EKG reviewed.\par Have seen cardiology for cardiac clearance.\par Nuclear stress test form 08/31 showed no ischemia.\par echo from 09/10/21 LV function normal , EF 55 to 60 %.\par pt. is at low risk for the planned surgery.

## 2021-09-24 NOTE — RESULTS/DATA
[] : results reviewed [de-identified] : with in normal range [de-identified] : with in normal range [de-identified] : no acute st-t wave changes

## 2021-09-24 NOTE — HISTORY OF PRESENT ILLNESS
[No Pertinent Cardiac History] : no history of aortic stenosis, atrial fibrillation, coronary artery disease, recent myocardial infarction, or implantable device/pacemaker [No Adverse Anesthesia Reaction] : no adverse anesthesia reaction in self or family member [Poor (<4 METs)] : Poor (<4 METs) [Anti-Platelet Agents: _____] : Anti-Platelet Agents: [unfilled] [Smoker] : not a smoker [FreeTextEntry1] : left SFA stent placement [FreeTextEntry2] : 10/01/21 [FreeTextEntry3] : DR KELLI Montiel [FreeTextEntry4] : Mr. RADHA CHAKRABORTY  is    56 year male with a previous history of heavy smoking, PAD(s/p right SFA stent 2017), carotid stenosis.\par Patient is going for left SFA stent placement.\par He is in constant severe pain in his left leg his pain is going up to the left thigh.\par He also has degenerative changes in the lumbar spine.\par He is having difficulty in walking due to pain , can barely walk 10-12 inside the house.\par History of hypertension, blood pressure well controlled with amlodipine 10 mg\par PAD.on aspirin, atorvastatin 80 mg, Plavix 75 mg and ezetimibe 10 mg.\par History of seizure disorder on phenytoin 100 mg 2 capsules twice daily.\par History of depression Quetiapine 200 mg at bedtime and venlafaxine ER 75 mg 1 capsule/day.\par \par \par  [FreeTextEntry8] : due to pain [FreeTextEntry6] : +claudication

## 2021-09-25 PROBLEM — Z87.891 PERSONAL HISTORY OF NICOTINE DEPENDENCE: Chronic | Status: ACTIVE | Noted: 2021-09-23

## 2021-09-25 PROBLEM — R73.03 PREDIABETES: Chronic | Status: ACTIVE | Noted: 2021-09-23

## 2021-09-28 ENCOUNTER — APPOINTMENT (OUTPATIENT)
Dept: DISASTER EMERGENCY | Facility: CLINIC | Age: 56
End: 2021-09-28

## 2021-09-29 LAB — SARS-COV-2 N GENE NPH QL NAA+PROBE: NOT DETECTED

## 2021-10-01 ENCOUNTER — RESULT REVIEW (OUTPATIENT)
Age: 56
End: 2021-10-01

## 2021-10-01 ENCOUNTER — INPATIENT (INPATIENT)
Facility: HOSPITAL | Age: 56
LOS: 2 days | Discharge: HOME | DRG: 253 | End: 2021-10-04
Attending: SURGERY | Admitting: SURGERY
Payer: COMMERCIAL

## 2021-10-01 VITALS — HEIGHT: 65 IN | WEIGHT: 147.05 LBS | RESPIRATION RATE: 16 BRPM | OXYGEN SATURATION: 100 %

## 2021-10-01 DIAGNOSIS — Z96.7 PRESENCE OF OTHER BONE AND TENDON IMPLANTS: Chronic | ICD-10-CM

## 2021-10-01 DIAGNOSIS — K40.90 UNILATERAL INGUINAL HERNIA, WITHOUT OBSTRUCTION OR GANGRENE, NOT SPECIFIED AS RECURRENT: Chronic | ICD-10-CM

## 2021-10-01 DIAGNOSIS — Z98.89 OTHER SPECIFIED POSTPROCEDURAL STATES: Chronic | ICD-10-CM

## 2021-10-01 DIAGNOSIS — I73.9 PERIPHERAL VASCULAR DISEASE, UNSPECIFIED: ICD-10-CM

## 2021-10-01 LAB
ANION GAP SERPL CALC-SCNC: 12 MMOL/L — SIGNIFICANT CHANGE UP (ref 5–17)
BLD GP AB SCN SERPL QL: SIGNIFICANT CHANGE UP
BUN SERPL-MCNC: 15.5 MG/DL — SIGNIFICANT CHANGE UP (ref 8–20)
CALCIUM SERPL-MCNC: 8.8 MG/DL — SIGNIFICANT CHANGE UP (ref 8.6–10.2)
CHLORIDE SERPL-SCNC: 104 MMOL/L — SIGNIFICANT CHANGE UP (ref 98–107)
CO2 SERPL-SCNC: 23 MMOL/L — SIGNIFICANT CHANGE UP (ref 22–29)
CREAT SERPL-MCNC: 0.97 MG/DL — SIGNIFICANT CHANGE UP (ref 0.5–1.3)
GLUCOSE BLDC GLUCOMTR-MCNC: 98 MG/DL — SIGNIFICANT CHANGE UP (ref 70–99)
GLUCOSE SERPL-MCNC: 116 MG/DL — HIGH (ref 70–99)
HCT VFR BLD CALC: 38.9 % — LOW (ref 39–50)
HGB BLD-MCNC: 13.1 G/DL — SIGNIFICANT CHANGE UP (ref 13–17)
INR BLD: 1.02 RATIO — SIGNIFICANT CHANGE UP (ref 0.88–1.16)
MCHC RBC-ENTMCNC: 30.8 PG — SIGNIFICANT CHANGE UP (ref 27–34)
MCHC RBC-ENTMCNC: 33.7 GM/DL — SIGNIFICANT CHANGE UP (ref 32–36)
MCV RBC AUTO: 91.5 FL — SIGNIFICANT CHANGE UP (ref 80–100)
PLATELET # BLD AUTO: 167 K/UL — SIGNIFICANT CHANGE UP (ref 150–400)
POTASSIUM SERPL-MCNC: 4.4 MMOL/L — SIGNIFICANT CHANGE UP (ref 3.5–5.3)
POTASSIUM SERPL-SCNC: 4.4 MMOL/L — SIGNIFICANT CHANGE UP (ref 3.5–5.3)
PROTHROM AB SERPL-ACNC: 11.8 SEC — SIGNIFICANT CHANGE UP (ref 10.6–13.6)
RBC # BLD: 4.25 M/UL — SIGNIFICANT CHANGE UP (ref 4.2–5.8)
RBC # FLD: 14.1 % — SIGNIFICANT CHANGE UP (ref 10.3–14.5)
SODIUM SERPL-SCNC: 139 MMOL/L — SIGNIFICANT CHANGE UP (ref 135–145)
WBC # BLD: 11.03 K/UL — HIGH (ref 3.8–10.5)
WBC # FLD AUTO: 11.03 K/UL — HIGH (ref 3.8–10.5)

## 2021-10-01 PROCEDURE — 35371 RECHANNELING OF ARTERY: CPT

## 2021-10-01 PROCEDURE — 88304 TISSUE EXAM BY PATHOLOGIST: CPT | Mod: 26

## 2021-10-01 PROCEDURE — 88311 DECALCIFY TISSUE: CPT | Mod: 26

## 2021-10-01 RX ORDER — AMLODIPINE BESYLATE 2.5 MG/1
10 TABLET ORAL DAILY
Refills: 0 | Status: DISCONTINUED | OUTPATIENT
Start: 2021-10-01 | End: 2021-10-04

## 2021-10-01 RX ORDER — ACETAMINOPHEN 500 MG
975 TABLET ORAL ONCE
Refills: 0 | Status: COMPLETED | OUTPATIENT
Start: 2021-10-01 | End: 2021-10-01

## 2021-10-01 RX ORDER — ACETAMINOPHEN 500 MG
650 TABLET ORAL EVERY 6 HOURS
Refills: 0 | Status: DISCONTINUED | OUTPATIENT
Start: 2021-10-01 | End: 2021-10-04

## 2021-10-01 RX ORDER — CLOPIDOGREL BISULFATE 75 MG/1
75 TABLET, FILM COATED ORAL DAILY
Refills: 0 | Status: DISCONTINUED | OUTPATIENT
Start: 2021-10-01 | End: 2021-10-02

## 2021-10-01 RX ORDER — SODIUM CHLORIDE 9 MG/ML
3 INJECTION INTRAMUSCULAR; INTRAVENOUS; SUBCUTANEOUS EVERY 8 HOURS
Refills: 0 | Status: DISCONTINUED | OUTPATIENT
Start: 2021-10-01 | End: 2021-10-01

## 2021-10-01 RX ORDER — TRAMADOL HYDROCHLORIDE 50 MG/1
50 TABLET ORAL EVERY 6 HOURS
Refills: 0 | Status: DISCONTINUED | OUTPATIENT
Start: 2021-10-01 | End: 2021-10-04

## 2021-10-01 RX ORDER — TRAMADOL HYDROCHLORIDE 50 MG/1
100 TABLET ORAL EVERY 6 HOURS
Refills: 0 | Status: DISCONTINUED | OUTPATIENT
Start: 2021-10-01 | End: 2021-10-04

## 2021-10-01 RX ORDER — HYDROMORPHONE HYDROCHLORIDE 2 MG/ML
0.5 INJECTION INTRAMUSCULAR; INTRAVENOUS; SUBCUTANEOUS
Refills: 0 | Status: DISCONTINUED | OUTPATIENT
Start: 2021-10-01 | End: 2021-10-01

## 2021-10-01 RX ORDER — QUETIAPINE FUMARATE 200 MG/1
200 TABLET, FILM COATED ORAL AT BEDTIME
Refills: 0 | Status: DISCONTINUED | OUTPATIENT
Start: 2021-10-01 | End: 2021-10-04

## 2021-10-01 RX ORDER — ASPIRIN/CALCIUM CARB/MAGNESIUM 324 MG
81 TABLET ORAL DAILY
Refills: 0 | Status: DISCONTINUED | OUTPATIENT
Start: 2021-10-01 | End: 2021-10-04

## 2021-10-01 RX ORDER — SODIUM CHLORIDE 9 MG/ML
1000 INJECTION, SOLUTION INTRAVENOUS
Refills: 0 | Status: DISCONTINUED | OUTPATIENT
Start: 2021-10-01 | End: 2021-10-03

## 2021-10-01 RX ORDER — ACETAMINOPHEN 500 MG
1000 TABLET ORAL ONCE
Refills: 0 | Status: COMPLETED | OUTPATIENT
Start: 2021-10-01 | End: 2021-10-01

## 2021-10-01 RX ORDER — VENLAFAXINE HCL 75 MG
75 CAPSULE, EXT RELEASE 24 HR ORAL DAILY
Refills: 0 | Status: DISCONTINUED | OUTPATIENT
Start: 2021-10-01 | End: 2021-10-04

## 2021-10-01 RX ORDER — ONDANSETRON 8 MG/1
4 TABLET, FILM COATED ORAL ONCE
Refills: 0 | Status: DISCONTINUED | OUTPATIENT
Start: 2021-10-01 | End: 2021-10-01

## 2021-10-01 RX ORDER — PANTOPRAZOLE SODIUM 20 MG/1
40 TABLET, DELAYED RELEASE ORAL
Refills: 0 | Status: DISCONTINUED | OUTPATIENT
Start: 2021-10-01 | End: 2021-10-04

## 2021-10-01 RX ORDER — HYDROMORPHONE HYDROCHLORIDE 2 MG/ML
0.5 INJECTION INTRAMUSCULAR; INTRAVENOUS; SUBCUTANEOUS ONCE
Refills: 0 | Status: DISCONTINUED | OUTPATIENT
Start: 2021-10-01 | End: 2021-10-01

## 2021-10-01 RX ADMIN — QUETIAPINE FUMARATE 200 MILLIGRAM(S): 200 TABLET, FILM COATED ORAL at 21:00

## 2021-10-01 RX ADMIN — Medication 200 MILLIGRAM(S): at 18:32

## 2021-10-01 RX ADMIN — Medication 1000 MILLIGRAM(S): at 18:52

## 2021-10-01 RX ADMIN — HYDROMORPHONE HYDROCHLORIDE 0.5 MILLIGRAM(S): 2 INJECTION INTRAMUSCULAR; INTRAVENOUS; SUBCUTANEOUS at 16:46

## 2021-10-01 RX ADMIN — AMLODIPINE BESYLATE 10 MILLIGRAM(S): 2.5 TABLET ORAL at 18:32

## 2021-10-01 RX ADMIN — HYDROMORPHONE HYDROCHLORIDE 0.5 MILLIGRAM(S): 2 INJECTION INTRAMUSCULAR; INTRAVENOUS; SUBCUTANEOUS at 16:15

## 2021-10-01 RX ADMIN — Medication 400 MILLIGRAM(S): at 18:37

## 2021-10-01 RX ADMIN — Medication 650 MILLIGRAM(S): at 21:05

## 2021-10-01 RX ADMIN — PANTOPRAZOLE SODIUM 40 MILLIGRAM(S): 20 TABLET, DELAYED RELEASE ORAL at 18:32

## 2021-10-01 RX ADMIN — HYDROMORPHONE HYDROCHLORIDE 0.5 MILLIGRAM(S): 2 INJECTION INTRAMUSCULAR; INTRAVENOUS; SUBCUTANEOUS at 17:35

## 2021-10-01 RX ADMIN — Medication 650 MILLIGRAM(S): at 21:30

## 2021-10-01 RX ADMIN — Medication 975 MILLIGRAM(S): at 09:58

## 2021-10-01 RX ADMIN — HYDROMORPHONE HYDROCHLORIDE 0.5 MILLIGRAM(S): 2 INJECTION INTRAMUSCULAR; INTRAVENOUS; SUBCUTANEOUS at 17:50

## 2021-10-01 NOTE — BRIEF OPERATIVE NOTE - OPERATION/FINDINGS
Severe atherosclerotic disease affecting CFA and SFA. Performed endarterectomy removing intima and part of the media of the CFA, SFA and profunda. Closed arteriotomy with a graft patch. Layered closure of subcutaneous tissues and staples on skin.

## 2021-10-01 NOTE — CHART NOTE - NSCHARTNOTEFT_GEN_A_CORE
pt is admitted for Left femoral endarterectomy today s/p procedure rapid response called due to concern for bleeding at surgical site   arrivied to bedside , [facility] Dr Swain and DR Rojas at the bedside pressure being applied by surgery resident   BP  93/65 initially   iv fluid in progress for Bp support   stat labs ordered by me CBC , type screen , BMP , PTT / Pt INR   repeat blood pressure 132/72   pt is c/o pain in groin , pain medication adminsitered   pt is awake alert   BP improved pulse ox 98 %     cont iv fluid   further care per surgical team , primary team to follow up closely   to follow the labs

## 2021-10-01 NOTE — RAPID RESPONSE TEAM SUMMARY - NSSITUATIONBACKGROUNDRRT_GEN_ALL_CORE
Patient is immediate status post of of left femoral endarterectomy. Presente oozing fron the surgical site in PACU. Patient was brought to the floor. Nurse in care identified increased dressing soak with blood and dripping blood from the outside-margings of the OR-dressing. Vascular team discovered the wound dressing, noticed active oozing from the upper margin of the surgical site and some oozing from the upper section of the wound. Started applying compression for 30 min. Meanwhile, BP recoded 95/60. Fluids started at 500 cc /one hour of Ringer Lactate. Rapid response team solicited CBC. Patient status is confirmed stable. Compression dressing applied to the wound. Verification of new vitals signs is done, and  noticeable improve /90mmhg. Patient rapid response is finished.

## 2021-10-01 NOTE — PROGRESS NOTE ADULT - SUBJECTIVE AND OBJECTIVE BOX
POST OP Check         Postoperatively patient became hypotensive with SBP is low 90s, no tachycardia. Significant amount of dark colored blood from left groin wound. Pressure was held for 30 minutes, IV fluids administered and STAT CBC and electrolytes. Patient now currently HD stable, HB 13, Hct 39%, and metabolic panel wnl. Likely collected a hematoma in right groin that drain spontaneously. Currently not bleeding. Extremity is warm. Doppler signals in DP and PT.

## 2021-10-02 LAB
HCT VFR BLD CALC: 34.7 % — LOW (ref 39–50)
HCT VFR BLD CALC: 36.7 % — LOW (ref 39–50)
HGB BLD-MCNC: 11.6 G/DL — LOW (ref 13–17)
HGB BLD-MCNC: 12.1 G/DL — LOW (ref 13–17)

## 2021-10-02 RX ADMIN — Medication 81 MILLIGRAM(S): at 11:20

## 2021-10-02 RX ADMIN — AMLODIPINE BESYLATE 10 MILLIGRAM(S): 2.5 TABLET ORAL at 05:30

## 2021-10-02 RX ADMIN — QUETIAPINE FUMARATE 200 MILLIGRAM(S): 200 TABLET, FILM COATED ORAL at 21:43

## 2021-10-02 RX ADMIN — Medication 200 MILLIGRAM(S): at 17:32

## 2021-10-02 RX ADMIN — Medication 75 MILLIGRAM(S): at 11:30

## 2021-10-02 RX ADMIN — Medication 200 MILLIGRAM(S): at 05:30

## 2021-10-02 RX ADMIN — Medication 650 MILLIGRAM(S): at 05:31

## 2021-10-02 RX ADMIN — CLOPIDOGREL BISULFATE 75 MILLIGRAM(S): 75 TABLET, FILM COATED ORAL at 11:21

## 2021-10-02 RX ADMIN — Medication 650 MILLIGRAM(S): at 11:21

## 2021-10-02 RX ADMIN — Medication 650 MILLIGRAM(S): at 17:32

## 2021-10-02 RX ADMIN — PANTOPRAZOLE SODIUM 40 MILLIGRAM(S): 20 TABLET, DELAYED RELEASE ORAL at 05:30

## 2021-10-02 RX ADMIN — Medication 650 MILLIGRAM(S): at 06:00

## 2021-10-02 NOTE — PROGRESS NOTE ADULT - SUBJECTIVE AND OBJECTIVE BOX
VASCULAR SURGERY.    No acute overnight events. Patient afebrile, oozing from the left pelvic surgical wound. Dressing is dry and clean. Pain well controlled.  Denies n/v/f/c/cp/sob.   tolerating  Regular diet.       Scheduled Medications:   acetaminophen   Tablet .. 650 milliGRAM(s) Oral every 6 hours  amLODIPine   Tablet 10 milliGRAM(s) Oral daily  aspirin  chewable 81 milliGRAM(s) Oral daily  ceFAZolin   IVPB 2000 milliGRAM(s) IV Intermittent once  clopidogrel Tablet 75 milliGRAM(s) Oral daily  influenza   Vaccine 0.5 milliLiter(s) IntraMuscular once  lactated ringers. 1000 milliLiter(s) (125 mL/Hr) IV Continuous <Continuous>  pantoprazole    Tablet 40 milliGRAM(s) Oral before breakfast  phenytoin   Capsule 200 milliGRAM(s) Oral two times a day  QUEtiapine 200 milliGRAM(s) Oral at bedtime  venlafaxine XR. 75 milliGRAM(s) Oral daily    PRN Medications:  traMADol 50 milliGRAM(s) Oral every 6 hours PRN Moderate Pain (4 - 6)  traMADol 100 milliGRAM(s) Oral every 6 hours PRN Severe Pain (7 - 10)      Objective:   T(F): 98.2 (10-01 @ 21:54), Max: 98.6 (10-01 @ 14:56)  HR: 86 (10-01 @ 21:54) (65 - 91)  BP: 134/68 (10-01 @ 21:54) (93/65 - 178/72)  BP(mean): --  ABP: --  ABP(mean): --  RR: 18 (10-01 @ 21:54) (9 - 20)  SpO2: 91% (10-01 @ 21:54) (91% - 100%)      Physical Exam:   GEN: patient resting comfortably in bed, in no acute distress  RESP: respirations are unlabored, no accessory muscle use, no conversational dyspnea  CVS: RRR  GI: Abdomen soft, non-tender, non-distended, no rebound tenderness / guarding    I&O's    10-01 @ 07:01  -  10-02 @ 04:18  --------------------------------------------------------  IN:  Total IN: 0 mL    OUT:    Ureteral Catheter (mL): 1000 mL  Total OUT: 1000 mL    Total NET: -1000 mL          LABS:                        13.1   11.03 )-----------( 167      ( 01 Oct 2021 17:43 )             38.9     10-01    139  |  104  |  15.5  ----------------------------<  116<H>  4.4   |  23.0  |  0.97    Ca    8.8      01 Oct 2021 17:43      PT/INR - ( 01 Oct 2021 17:43 )   PT: 11.8 sec;   INR: 1.02 ratio      MICROBIOLOGY: n/a    PATHOLOGY: n/a       VASCULAR SURGERY.    No acute overnight events. Patient afebrile, oozing from the left pelvic surgical wound. Dressing is dry and clean. Pain well controlled.  Denies n/v/f/c/cp/sob.   tolerating  Regular diet.       Scheduled Medications:   acetaminophen   Tablet .. 650 milliGRAM(s) Oral every 6 hours  amLODIPine   Tablet 10 milliGRAM(s) Oral daily  aspirin  chewable 81 milliGRAM(s) Oral daily  ceFAZolin   IVPB 2000 milliGRAM(s) IV Intermittent once  clopidogrel Tablet 75 milliGRAM(s) Oral daily  influenza   Vaccine 0.5 milliLiter(s) IntraMuscular once  lactated ringers. 1000 milliLiter(s) (125 mL/Hr) IV Continuous <Continuous>  pantoprazole    Tablet 40 milliGRAM(s) Oral before breakfast  phenytoin   Capsule 200 milliGRAM(s) Oral two times a day  QUEtiapine 200 milliGRAM(s) Oral at bedtime  venlafaxine XR. 75 milliGRAM(s) Oral daily    PRN Medications:  traMADol 50 milliGRAM(s) Oral every 6 hours PRN Moderate Pain (4 - 6)  traMADol 100 milliGRAM(s) Oral every 6 hours PRN Severe Pain (7 - 10)      Objective:   T(F): 98.2 (10-01 @ 21:54), Max: 98.6 (10-01 @ 14:56)  HR: 86 (10-01 @ 21:54) (65 - 91)  BP: 134/68 (10-01 @ 21:54) (93/65 - 178/72)  BP(mean): --  ABP: --  ABP(mean): --  RR: 18 (10-01 @ 21:54) (9 - 20)  SpO2: 91% (10-01 @ 21:54) (91% - 100%)      Physical Exam:   GEN: patient resting comfortably in bed, in no acute distress. Tolerated diet. Present some discomfort irradiating to his l-thigh.   RESP: respirations are unlabored, no accessory muscle use, no conversational dyspnea  CVS: RRR.  GI: Abdomen soft, non-tender, non-distended, no rebound tenderness / guarding.  Extremities: Surgical site on the left inguinal area with a dry and clean dressing.   Yamileth: oriented in person, time, and person.    I&O's    10-01 @ 07:01  -  10-02 @ 04:18  --------------------------------------------------------  IN:  Total IN: 0 mL    OUT:    Ureteral Catheter (mL): 1000 mL  Total OUT: 1000 mL    Total NET: -1000 mL          LABS:                        13.1   11.03 )-----------( 167      ( 01 Oct 2021 17:43 )             38.9     10-01    139  |  104  |  15.5  ----------------------------<  116<H>  4.4   |  23.0  |  0.97    Ca    8.8      01 Oct 2021 17:43      PT/INR - ( 01 Oct 2021 17:43 )   PT: 11.8 sec;   INR: 1.02 ratio      MICROBIOLOGY: n/a  PATHOLOGY: n/a    ASSESSMENT    Patient couses hemodinamically competetn after a bleeding in the immediate post op period at Pacu and then in the floor. Patient is immediate status post of of left femoral endarterectomy. Presente oozing fron the surgical site in PACU. Patient was brought to the floor. Nurse in care identified increased dressing soak with blood and dripping blood from the outside-margings of the OR-dressing. Vascular team discovered the wound dressing, noticed active oozing from the upper margin of the surgical site and some oozing from the upper section of the wound. Started applying compression for 30 min. Meanwhile, BP recoded 95/60. Fluids started at 500 cc /one hour of Ringer Lactate. Rapid response team solicited CBC. Patient status is confirmed stable. Compression dressing applied to the wound. Verification of new vitals signs is done, and  noticeable improve /90mmhg. Patient rapid response is finished.  After the previous event, patient present with no new events.    PLAN:  FU labs.  PT evaluation.  D/C Rod if pertinent.  FU paresthesias of the left leg.  Start ASA and plavix.  Discharge bed rest if better.  Discharge planning.

## 2021-10-03 LAB
HCT VFR BLD CALC: 33.7 % — LOW (ref 39–50)
HGB BLD-MCNC: 11.3 G/DL — LOW (ref 13–17)
MCHC RBC-ENTMCNC: 30.7 PG — SIGNIFICANT CHANGE UP (ref 27–34)
MCHC RBC-ENTMCNC: 33.5 GM/DL — SIGNIFICANT CHANGE UP (ref 32–36)
MCV RBC AUTO: 91.6 FL — SIGNIFICANT CHANGE UP (ref 80–100)
PLATELET # BLD AUTO: 139 K/UL — LOW (ref 150–400)
RBC # BLD: 3.68 M/UL — LOW (ref 4.2–5.8)
RBC # FLD: 13.9 % — SIGNIFICANT CHANGE UP (ref 10.3–14.5)
WBC # BLD: 9.5 K/UL — SIGNIFICANT CHANGE UP (ref 3.8–10.5)
WBC # FLD AUTO: 9.5 K/UL — SIGNIFICANT CHANGE UP (ref 3.8–10.5)

## 2021-10-03 RX ORDER — POLYETHYLENE GLYCOL 3350 17 G/17G
17 POWDER, FOR SOLUTION ORAL DAILY
Refills: 0 | Status: DISCONTINUED | OUTPATIENT
Start: 2021-10-03 | End: 2021-10-04

## 2021-10-03 RX ADMIN — Medication 650 MILLIGRAM(S): at 00:03

## 2021-10-03 RX ADMIN — QUETIAPINE FUMARATE 200 MILLIGRAM(S): 200 TABLET, FILM COATED ORAL at 21:17

## 2021-10-03 RX ADMIN — PANTOPRAZOLE SODIUM 40 MILLIGRAM(S): 20 TABLET, DELAYED RELEASE ORAL at 05:18

## 2021-10-03 RX ADMIN — Medication 650 MILLIGRAM(S): at 11:25

## 2021-10-03 RX ADMIN — Medication 81 MILLIGRAM(S): at 11:25

## 2021-10-03 RX ADMIN — Medication 75 MILLIGRAM(S): at 11:25

## 2021-10-03 RX ADMIN — Medication 650 MILLIGRAM(S): at 17:30

## 2021-10-03 RX ADMIN — Medication 650 MILLIGRAM(S): at 06:15

## 2021-10-03 RX ADMIN — TRAMADOL HYDROCHLORIDE 100 MILLIGRAM(S): 50 TABLET ORAL at 14:23

## 2021-10-03 RX ADMIN — Medication 200 MILLIGRAM(S): at 05:17

## 2021-10-03 RX ADMIN — Medication 650 MILLIGRAM(S): at 12:25

## 2021-10-03 RX ADMIN — Medication 650 MILLIGRAM(S): at 16:30

## 2021-10-03 RX ADMIN — TRAMADOL HYDROCHLORIDE 100 MILLIGRAM(S): 50 TABLET ORAL at 15:23

## 2021-10-03 RX ADMIN — SODIUM CHLORIDE 125 MILLILITER(S): 9 INJECTION, SOLUTION INTRAVENOUS at 00:03

## 2021-10-03 RX ADMIN — AMLODIPINE BESYLATE 10 MILLIGRAM(S): 2.5 TABLET ORAL at 05:18

## 2021-10-03 RX ADMIN — Medication 650 MILLIGRAM(S): at 01:03

## 2021-10-03 RX ADMIN — Medication 200 MILLIGRAM(S): at 16:30

## 2021-10-03 RX ADMIN — Medication 650 MILLIGRAM(S): at 05:17

## 2021-10-03 NOTE — PHYSICAL THERAPY INITIAL EVALUATION ADULT - PLANNED THERAPY INTERVENTIONS, PT EVAL
stair negotiation training/balance training/bed mobility training/gait training/strengthening/transfer training

## 2021-10-03 NOTE — PROGRESS NOTE ADULT - SUBJECTIVE AND OBJECTIVE BOX
VASCULAR SURGERY.    No acute overnight events. Patient afebrile, oozing from the left femoral endarterectomy surgical wound. Dressing is minimally soiled. Pain well controlled.  Denies n/v/f/c/cp/sob.   tolerating  Regular diet.       Scheduled Medications:   acetaminophen   Tablet .. 650 milliGRAM(s) Oral every 6 hours  amLODIPine   Tablet 10 milliGRAM(s) Oral daily  aspirin  chewable 81 milliGRAM(s) Oral daily  ceFAZolin   IVPB 2000 milliGRAM(s) IV Intermittent once  clopidogrel Tablet 75 milliGRAM(s) Oral daily  influenza   Vaccine 0.5 milliLiter(s) IntraMuscular once  lactated ringers. 1000 milliLiter(s) (125 mL/Hr) IV Continuous <Continuous>  pantoprazole    Tablet 40 milliGRAM(s) Oral before breakfast  phenytoin   Capsule 200 milliGRAM(s) Oral two times a day  QUEtiapine 200 milliGRAM(s) Oral at bedtime  venlafaxine XR. 75 milliGRAM(s) Oral daily    PRN Medications:  traMADol 50 milliGRAM(s) Oral every 6 hours PRN Moderate Pain (4 - 6)  traMADol 100 milliGRAM(s) Oral every 6 hours PRN Severe Pain (7 - 10)    ICU Vital Signs Last 24 Hrs  T(C): 36.9 (02 Oct 2021 22:53), Max: 37.4 (02 Oct 2021 10:53)  T(F): 98.5 (02 Oct 2021 22:53), Max: 99.3 (02 Oct 2021 10:53)  HR: 76 (02 Oct 2021 22:53) (71 - 83)  BP: 132/73 (02 Oct 2021 22:53) (108/67 - 134/70)  BP(mean): --  ABP: --  ABP(mean): --  RR: 20 (02 Oct 2021 22:53) (18 - 20)  SpO2: 93% (02 Oct 2021 22:53) (92% - 98%)    Physical Exam:   GEN: patient resting comfortably in bed, in no acute distress. Tolerated diet. Present some discomfort irradiating to his l-thigh.   RESP: respirations are unlabored, no accessory muscle use, no conversational dyspnea  CVS: RRR.  GI: Abdomen soft, non-tender, non-distended, no rebound tenderness / guarding.  Extremities: Surgical site on the left inguinal area with a dry and clean dressing.   Yamileth: oriented in person, time, and person.    I&O's Detail    01 Oct 2021 07:01  -  02 Oct 2021 07:00  --------------------------------------------------------  IN:  Total IN: 0 mL    OUT:    Ureteral Catheter (mL): 1900 mL  Total OUT: 1900 mL    Total NET: -1900 mL      02 Oct 2021 07:01  -  03 Oct 2021 03:30  --------------------------------------------------------  IN:    Lactated Ringers: 1375 mL    Oral Fluid: 550 mL  Total IN: 1925 mL    OUT:    Voided (mL): 2200 mL  Total OUT: 2200 mL    Total NET: -275 mL      LABS:                         11.6   x     )-----------( x        ( 02 Oct 2021 18:11 )             34.7     10-01    139  |  104  |  15.5  ----------------------------<  116<H>  4.4   |  23.0  |  0.97    Ca    8.8      01 Oct 2021 17:43      PT/INR - ( 01 Oct 2021 17:43 )   PT: 11.8 sec;   INR: 1.02 ratio                   RADIOLOGY, EKG & ADDITIONAL TESTS: Reviewed. D/C Marcel if pertinent.

## 2021-10-03 NOTE — PHYSICAL THERAPY INITIAL EVALUATION ADULT - ADDITIONAL COMMENTS
Pt verbalizing living in 1 story home with 3 MAXIMO with BHRs, can reach both, with tub and walk in shower + shower seat and PLOF Mod I with SAC. Lives with wife who can assist as needed.

## 2021-10-04 ENCOUNTER — TRANSCRIPTION ENCOUNTER (OUTPATIENT)
Age: 56
End: 2021-10-04

## 2021-10-04 VITALS
TEMPERATURE: 98 F | DIASTOLIC BLOOD PRESSURE: 70 MMHG | HEART RATE: 111 BPM | OXYGEN SATURATION: 99 % | SYSTOLIC BLOOD PRESSURE: 150 MMHG | RESPIRATION RATE: 18 BRPM

## 2021-10-04 LAB
ALBUMIN SERPL ELPH-MCNC: 3.8 G/DL — SIGNIFICANT CHANGE UP (ref 3.3–5.2)
ALP SERPL-CCNC: 123 U/L — HIGH (ref 40–120)
ALT FLD-CCNC: 24 U/L — SIGNIFICANT CHANGE UP
ANION GAP SERPL CALC-SCNC: 11 MMOL/L — SIGNIFICANT CHANGE UP (ref 5–17)
AST SERPL-CCNC: 23 U/L — SIGNIFICANT CHANGE UP
BILIRUB SERPL-MCNC: <0.2 MG/DL — LOW (ref 0.4–2)
BUN SERPL-MCNC: 11.9 MG/DL — SIGNIFICANT CHANGE UP (ref 8–20)
CALCIUM SERPL-MCNC: 8.8 MG/DL — SIGNIFICANT CHANGE UP (ref 8.6–10.2)
CHLORIDE SERPL-SCNC: 105 MMOL/L — SIGNIFICANT CHANGE UP (ref 98–107)
CO2 SERPL-SCNC: 23 MMOL/L — SIGNIFICANT CHANGE UP (ref 22–29)
CREAT SERPL-MCNC: 0.66 MG/DL — SIGNIFICANT CHANGE UP (ref 0.5–1.3)
GLUCOSE SERPL-MCNC: 102 MG/DL — HIGH (ref 70–99)
HCT VFR BLD CALC: 34.2 % — LOW (ref 39–50)
HGB BLD-MCNC: 11.6 G/DL — LOW (ref 13–17)
MAGNESIUM SERPL-MCNC: 2 MG/DL — SIGNIFICANT CHANGE UP (ref 1.6–2.6)
MCHC RBC-ENTMCNC: 30.9 PG — SIGNIFICANT CHANGE UP (ref 27–34)
MCHC RBC-ENTMCNC: 33.9 GM/DL — SIGNIFICANT CHANGE UP (ref 32–36)
MCV RBC AUTO: 91.2 FL — SIGNIFICANT CHANGE UP (ref 80–100)
PHOSPHATE SERPL-MCNC: 3.7 MG/DL — SIGNIFICANT CHANGE UP (ref 2.4–4.7)
PLATELET # BLD AUTO: 156 K/UL — SIGNIFICANT CHANGE UP (ref 150–400)
POTASSIUM SERPL-MCNC: 4.5 MMOL/L — SIGNIFICANT CHANGE UP (ref 3.5–5.3)
POTASSIUM SERPL-SCNC: 4.5 MMOL/L — SIGNIFICANT CHANGE UP (ref 3.5–5.3)
PROT SERPL-MCNC: 6 G/DL — LOW (ref 6.6–8.7)
RBC # BLD: 3.75 M/UL — LOW (ref 4.2–5.8)
RBC # FLD: 14 % — SIGNIFICANT CHANGE UP (ref 10.3–14.5)
SODIUM SERPL-SCNC: 139 MMOL/L — SIGNIFICANT CHANGE UP (ref 135–145)
WBC # BLD: 7.59 K/UL — SIGNIFICANT CHANGE UP (ref 3.8–10.5)
WBC # FLD AUTO: 7.59 K/UL — SIGNIFICANT CHANGE UP (ref 3.8–10.5)

## 2021-10-04 PROCEDURE — 88304 TISSUE EXAM BY PATHOLOGIST: CPT

## 2021-10-04 PROCEDURE — 82962 GLUCOSE BLOOD TEST: CPT

## 2021-10-04 PROCEDURE — 80053 COMPREHEN METABOLIC PANEL: CPT

## 2021-10-04 PROCEDURE — C1769: CPT

## 2021-10-04 PROCEDURE — 86901 BLOOD TYPING SEROLOGIC RH(D): CPT

## 2021-10-04 PROCEDURE — 86850 RBC ANTIBODY SCREEN: CPT

## 2021-10-04 PROCEDURE — 86900 BLOOD TYPING SEROLOGIC ABO: CPT

## 2021-10-04 PROCEDURE — 85018 HEMOGLOBIN: CPT

## 2021-10-04 PROCEDURE — C1894: CPT

## 2021-10-04 PROCEDURE — 85014 HEMATOCRIT: CPT

## 2021-10-04 PROCEDURE — C1889: CPT

## 2021-10-04 PROCEDURE — 85610 PROTHROMBIN TIME: CPT

## 2021-10-04 PROCEDURE — 88311 DECALCIFY TISSUE: CPT

## 2021-10-04 PROCEDURE — 36415 COLL VENOUS BLD VENIPUNCTURE: CPT

## 2021-10-04 PROCEDURE — 80048 BASIC METABOLIC PNL TOTAL CA: CPT

## 2021-10-04 PROCEDURE — 76000 FLUOROSCOPY <1 HR PHYS/QHP: CPT

## 2021-10-04 PROCEDURE — 83735 ASSAY OF MAGNESIUM: CPT

## 2021-10-04 PROCEDURE — 85027 COMPLETE CBC AUTOMATED: CPT

## 2021-10-04 PROCEDURE — 84100 ASSAY OF PHOSPHORUS: CPT

## 2021-10-04 RX ORDER — ACETAMINOPHEN 500 MG
2 TABLET ORAL
Qty: 0 | Refills: 0 | DISCHARGE
Start: 2021-10-04

## 2021-10-04 RX ORDER — CLOPIDOGREL BISULFATE 75 MG/1
75 TABLET, FILM COATED ORAL DAILY
Refills: 0 | Status: DISCONTINUED | OUTPATIENT
Start: 2021-10-04 | End: 2021-10-04

## 2021-10-04 RX ORDER — ASPIRIN/CALCIUM CARB/MAGNESIUM 324 MG
1 TABLET ORAL
Qty: 0 | Refills: 0 | DISCHARGE
Start: 2021-10-04

## 2021-10-04 RX ORDER — ASPIRIN/CALCIUM CARB/MAGNESIUM 324 MG
1 TABLET ORAL
Qty: 0 | Refills: 0 | DISCHARGE

## 2021-10-04 RX ADMIN — Medication 650 MILLIGRAM(S): at 00:07

## 2021-10-04 RX ADMIN — AMLODIPINE BESYLATE 10 MILLIGRAM(S): 2.5 TABLET ORAL at 05:28

## 2021-10-04 RX ADMIN — Medication 650 MILLIGRAM(S): at 05:29

## 2021-10-04 RX ADMIN — Medication 650 MILLIGRAM(S): at 00:37

## 2021-10-04 RX ADMIN — Medication 75 MILLIGRAM(S): at 12:01

## 2021-10-04 RX ADMIN — Medication 81 MILLIGRAM(S): at 12:01

## 2021-10-04 RX ADMIN — PANTOPRAZOLE SODIUM 40 MILLIGRAM(S): 20 TABLET, DELAYED RELEASE ORAL at 05:31

## 2021-10-04 RX ADMIN — Medication 650 MILLIGRAM(S): at 12:02

## 2021-10-04 RX ADMIN — Medication 200 MILLIGRAM(S): at 05:30

## 2021-10-04 NOTE — DISCHARGE NOTE PROVIDER - NSDCCPTREATMENT_GEN_ALL_CORE_FT
PRINCIPAL PROCEDURE  Procedure: Endarterectomy, femoral, endovascular  Findings and Treatment:

## 2021-10-04 NOTE — DISCHARGE NOTE PROVIDER - CARE PROVIDER_API CALL
Husam Montiel)  Surgery  [address], [address] [place] [address]  Phone: [phone]  Fax: [phone]  Follow Up Time:

## 2021-10-04 NOTE — DISCHARGE NOTE PROVIDER - NSDCFUADDINST_GEN_ALL_CORE_FT
Patient may resume previous diet.  Patient may resume home medication.  Please call your PCP after discharge to schedule a follow up appointment to discuss your hospitalization.  After discharge from the hospital there should be no heavy lifting (greater than 10 pounds) and no strenuous activities until after your follow up appointment.  You may drive whenever you are off pain medications and are able to perform the activities needed to drive i.e. turning, bending, twisting etc.  You may shower, but do not submerge in a bath for at least two weeks.   Medications were sent to your pharmacy; please take them as directed.

## 2021-10-04 NOTE — CHART NOTE - NSCHARTNOTEFT_GEN_A_CORE
POST ROUND NOTE  Patient was seen with Dr. Rowell and Vascular residents.  His left groin has the pressure dressing clean  and intact without signs of bleeding. After the dressing was removed and at direct inspection of the wound , there is no oozing, no bleeding, no expanding bulge, the stapled incision look clean, without erythema, no edema, no signs of any bleeding.  Other wise the patient states he feels good, he has been ambulating without pain, tolerating his diet, no fever, no nausea, no pain and hemodynamically competent.   Patient with good evolution after his surgery.  Plan   -Reinstate Plavix   -Continue the pressure packing for 2 days more  -Discharge home with indications and recommendations

## 2021-10-04 NOTE — DISCHARGE NOTE NURSING/CASE MANAGEMENT/SOCIAL WORK - PATIENT PORTAL LINK FT
You can access the FollowMyHealth Patient Portal offered by [facility] by registering at the following website: [email or web address]. By joining [employer]’s FollowMyHealth portal, you will also be able to view your health information using other applications (apps) compatible with our system.

## 2021-10-04 NOTE — PROGRESS NOTE ADULT - REASON FOR ADMISSION
Severe common fem art and distal SFA disease.
left femoral endartrectomy
Severe common fem art and distal SFA disease.

## 2021-10-04 NOTE — DISCHARGE NOTE PROVIDER - HOSPITAL COURSE
56 year old man with history of previous heavy smoker with PAD (s/p right SFA stenting in 2017), carotid stenosis, hyperlipidemia, HTN, prediabetes,  GERD depression and anxiety presents today for PST c/o worsening left leg claudication and left hip/thigh pain. Patient reports ongoing extreme left leg pain for several months. Patient describes pain as sharp and constant with numbness that radiates down the lateral aspect of his thigh. Patient's pain level ranges from 7/10 to10/10 with no relief from rest or OTC medication. Patient states that he is now only able to walk 5-10 steps before he has to stop due to severe calf cramping and numbness.  He denies left foot pain at rest or left foot ulcer. He is s/p LLE angiogram July 2021, which he was noted to have severe common femoral and distal SFA disease not amenable to endovascular therapy. Denies chest pain, palpitations or shortness of breath.   Patient came scheduled for left femoral endarterectomy on 10/1/21 with Dr. Montiel, he had his medical, cardiac and neuro clearance.  Patient  underwent on 01-Oct-2021 Endarterectomy, superficial femoral. Operative findings, Severe atherosclerotic disease affecting CFA and SFA. Performed endarterectomy removing intima and part of the media of the CFA, SFA and profunda. Closed arteriotomy with a graft patch. Layered closure of subcutaneous tissues and staples on skin.  After surgery he had a bleeding from his stapled incision placed, that was controlled with pressure and  Patient had a good post-operatory course,

## 2021-10-04 NOTE — PROGRESS NOTE ADULT - ASSESSMENT
56 year old M status post open L femoral endarterectomy, yesterday night the patient notably had a episode of bleeding from the L groin which was resolved with a compressive dressing, today he had some oozing along the incision site, no obvious hematoma, H and H stable, distal signals appreciated in the L LE.      PLAN:    FU labs  PT evaluation  Start ASA  Hold Plavix for now   Bedrest for now   Dressing changes PRN   
ASSESSMENT    56 year old M status post open L femoral endarterectomy, yesterday night the patient notably had a episode of bleeding from the L groin which was resolved with a compressive dressing, today he had some oozing along the incision site, no obvious hematoma, H and H stable, distal signals appreciated in the L LE.      PLAN:    FU labs  PT evaluation  Start ASA  Hold Plavix for now   Bedrest for now   Dressing changes PRN 
N: Multimodal pain therapy as needed  P: monitor, ISm OOB tomorrow   CV: monitor, neurovasc checks q4-6 hours   : gallegos to gravity - plan to d/c tomorrow, strict i/os, replete lytes prn   GI: diet as tolerated   Heme: Aspirin and plavix   Activity: Bed rest until tomorrow morning

## 2021-10-04 NOTE — DISCHARGE NOTE PROVIDER - NSDCCPCAREPLAN_GEN_ALL_CORE_FT
PRINCIPAL DISCHARGE DIAGNOSIS  Diagnosis: Peripheral artery disease  Assessment and Plan of Treatment:

## 2021-10-04 NOTE — PROGRESS NOTE ADULT - SUBJECTIVE AND OBJECTIVE BOX
Subjective:  No acute overnight events. Patient afebrile, decreased oozing from the left femoral endarterectomy surgical wound. Dressing is minimally soiled. Pain well controlled.  Denies n/v/f/c/cp/sob.   tolerating  Regular diet.     MEDICATIONS  (STANDING):  acetaminophen   Tablet .. 650 milliGRAM(s) Oral every 6 hours  amLODIPine   Tablet 10 milliGRAM(s) Oral daily  aspirin  chewable 81 milliGRAM(s) Oral daily  ceFAZolin   IVPB 2000 milliGRAM(s) IV Intermittent once  influenza   Vaccine 0.5 milliLiter(s) IntraMuscular once  pantoprazole    Tablet 40 milliGRAM(s) Oral before breakfast  phenytoin   Capsule 200 milliGRAM(s) Oral two times a day  polyethylene glycol 3350 17 Gram(s) Oral daily  QUEtiapine 200 milliGRAM(s) Oral at bedtime  venlafaxine XR. 75 milliGRAM(s) Oral daily    MEDICATIONS  (PRN):  traMADol 50 milliGRAM(s) Oral every 6 hours PRN Moderate Pain (4 - 6)  traMADol 100 milliGRAM(s) Oral every 6 hours PRN Severe Pain (7 - 10)      Vital Signs Last 24 Hrs  T(C): 36.4 (03 Oct 2021 17:07), Max: 37.1 (03 Oct 2021 07:18)  T(F): 97.6 (03 Oct 2021 17:07), Max: 98.7 (03 Oct 2021 07:18)  HR: 89 (03 Oct 2021 17:07) (80 - 93)  BP: 134/80 (03 Oct 2021 17:07) (116/64 - 134/80)  BP(mean): --  RR: 18 (03 Oct 2021 17:07) (18 - 18)  SpO2: 95% (03 Oct 2021 17:07) (95% - 97%)      10-02  -  10-03  --------------------------------------------------------  IN:    Lactated Ringers: 2750 mL    Oral Fluid: 550 mL  Total IN: 3300 mL    OUT:    Voided (mL): 3700 mL  Total OUT: 3700 mL    Total NET: -400 mL      10-03  -  10-04  --------------------------------------------------------  IN:  Total IN: 0 mL    OUT:    Voided (mL): 300 mL  Total OUT: 300 mL    Total NET: -300 mL          Physical Exam:    GEN: patient resting comfortably in bed, in no acute distress. Tolerated diet. Present some discomfort irradiating to his l-thigh.   RESP: respirations are unlabored, no accessory muscle use, no conversational dyspnea  CVS: RRR.  GI: Abdomen soft, non-tender, non-distended, no rebound tenderness / guarding.  Extremities: Surgical site on the left inguinal area with a dry and clean dressing.   Yamileth: oriented in person, time, and person.    LABS:                        11.3   9.50  )-----------( 139      ( 03 Oct 2021 06:30 )             33.7

## 2021-10-04 NOTE — DISCHARGE NOTE PROVIDER - NSDCMRMEDTOKEN_GEN_ALL_CORE_FT
acetaminophen 325 mg oral tablet: 2 tab(s) orally every 6 hours  amLODIPine 10 mg oral tablet: 1 tab(s) orally once a day  aspirin 81 mg oral tablet, chewable: 1 tab(s) orally once a day  atorvastatin 80 mg oral tablet: 1 tab(s) orally once a day  Fish Oil 1000 mg oral capsule: 1 cap(s) orally 2 times a day  pantoprazole 40 mg oral delayed release tablet: 1 tab(s) orally once a day  phenytoin 100 mg oral capsule: 2 cap(s) orally 2 times a day  Plavix 75 mg oral tablet: 1 tab(s) orally once a day  QUEtiapine 200 mg oral tablet:  orally once a day (at bedtime)  venlafaxine 75 mg oral capsule, extended release: 1 cap(s) orally once a day

## 2021-10-06 ENCOUNTER — NON-APPOINTMENT (OUTPATIENT)
Age: 56
End: 2021-10-06

## 2021-10-14 ENCOUNTER — APPOINTMENT (OUTPATIENT)
Dept: VASCULAR SURGERY | Facility: CLINIC | Age: 56
End: 2021-10-14
Payer: MEDICAID

## 2021-10-14 VITALS
BODY MASS INDEX: 22.5 KG/M2 | HEIGHT: 66 IN | WEIGHT: 140 LBS | SYSTOLIC BLOOD PRESSURE: 135 MMHG | TEMPERATURE: 97.3 F | HEART RATE: 87 BPM | OXYGEN SATURATION: 96 % | DIASTOLIC BLOOD PRESSURE: 73 MMHG

## 2021-10-14 LAB — SURGICAL PATHOLOGY STUDY: SIGNIFICANT CHANGE UP

## 2021-10-14 PROCEDURE — 99024 POSTOP FOLLOW-UP VISIT: CPT

## 2021-10-14 NOTE — ASSESSMENT
[FreeTextEntry1] : 56 year old man, previous heavy smoker with PAD (s/p right SFA stenting in 2017), carotid stenosis presenting with worsening left leg claudication and left hip/thigh pain.\par \par 1. PAD\par -S/P Left femoral endarterectomy on 10/1/21 for lifestyle limiting left leg claudication and ischemic rest pain\par Symptoms have resolved\par RTO 2 weeks for wound check\par \par 2. Carotid stenosis\par -Duplex US performed on recent visit shows stable 50-69% carotid stenosis\par -Carotid revascularization not warranted at this time. Will continue annual surveillance carotid US\par \par 4. RUE with 30mmHg BP gradient. Likely has asymptomatic right subclavian artery stenosis\par No symptoms of subclavian steal or right arm claudication\par No further work-up warranted at this time as he is asymptomatic. \par  \par \par

## 2021-10-14 NOTE — HISTORY OF PRESENT ILLNESS
[FreeTextEntry1] : 56 year old man, previous heavy smoker with PAD (s/p right SFA stenting in 2017), carotid stenosis presenting with worsening left leg claudication and left hip/thigh pain.\par Patient states that he is now only able to walk 10-15 steps before he has to stop due to severe calf cramping. he denies left foot pain at rest or left foot ulcer.\par Of note, over the past 1-2  weeks, he has also developed sharp pain and numbness around his left hip that radiates down the lateral aspect of his thigh. [de-identified] : Patient is s/p left femoral endarterectomy on 10/1/121\par His left leg claudication has resolved

## 2021-10-14 NOTE — PHYSICAL EXAM
[JVD] : no jugular venous distention  [Normal Breath Sounds] : Normal breath sounds [Normal Rate and Rhythm] : normal rate and rhythm [2+] : left 2+ [1+] : right 1+ [0] : left 0 [Ankle Swelling (On Exam)] : not present [Varicose Veins Of Lower Extremities] : not present [] : not present [Abdomen Masses] : No abdominal masses [Abdomen Tenderness] : ~T ~M No abdominal tenderness [No Rash or Lesion] : No rash or lesion [Alert] : alert [Oriented to Person] : oriented to person [Oriented to Place] : oriented to place [Oriented to Time] : oriented to time [Calm] : calm [de-identified] : Well appearing, NAD [de-identified] : NCAT [FreeTextEntry1] : Both feet warm, normal capillary refill.\par Left groin staples removed. 2 mm skin separation at mid-incision

## 2021-10-21 PROBLEM — R94.31 ABNORMAL EKG: Status: ACTIVE | Noted: 2021-10-21

## 2021-10-21 RX ORDER — KIT FOR THE PREPARATION OF TECHNETIUM TC99M SESTAMIBI 1 MG/5ML
INJECTION, POWDER, LYOPHILIZED, FOR SOLUTION PARENTERAL
Refills: 0 | Status: COMPLETED | OUTPATIENT
Start: 2021-10-21

## 2021-10-21 RX ADMIN — KIT FOR THE PREPARATION OF TECHNETIUM TC99M SESTAMIBI 0: 1 INJECTION, POWDER, LYOPHILIZED, FOR SOLUTION PARENTERAL at 00:00

## 2021-10-28 ENCOUNTER — APPOINTMENT (OUTPATIENT)
Dept: VASCULAR SURGERY | Facility: CLINIC | Age: 56
End: 2021-10-28

## 2021-10-29 ENCOUNTER — RX RENEWAL (OUTPATIENT)
Age: 56
End: 2021-10-29

## 2021-11-15 ENCOUNTER — APPOINTMENT (OUTPATIENT)
Dept: CARDIOLOGY | Facility: CLINIC | Age: 56
End: 2021-11-15

## 2021-11-18 ENCOUNTER — APPOINTMENT (OUTPATIENT)
Dept: VASCULAR SURGERY | Facility: CLINIC | Age: 56
End: 2021-11-18
Payer: MEDICAID

## 2021-11-18 VITALS
WEIGHT: 140 LBS | HEART RATE: 88 BPM | SYSTOLIC BLOOD PRESSURE: 144 MMHG | RESPIRATION RATE: 16 BRPM | TEMPERATURE: 97.9 F | HEIGHT: 66 IN | BODY MASS INDEX: 22.5 KG/M2 | OXYGEN SATURATION: 96 % | DIASTOLIC BLOOD PRESSURE: 80 MMHG

## 2021-11-18 PROCEDURE — 93923 UPR/LXTR ART STDY 3+ LVLS: CPT

## 2021-11-18 PROCEDURE — 99024 POSTOP FOLLOW-UP VISIT: CPT

## 2021-11-18 NOTE — HISTORY OF PRESENT ILLNESS
[FreeTextEntry1] : 56 year old man, previous heavy smoker with PAD (s/p right SFA stenting in 2017), carotid stenosis presenting with worsening left leg claudication and left hip/thigh pain.\par Patient states that he is now only able to walk 10-15 steps before he has to stop due to severe calf cramping. he denies left foot pain at rest or left foot ulcer.\par Of note, over the past 1-2  weeks, he has also developed sharp pain and numbness around his left hip that radiates down the lateral aspect of his thigh. [de-identified] : Patient is s/p left femoral endarterectomy on 10/1/121\par His left leg claudication has resolved\par He is without complaints

## 2021-11-18 NOTE — PHYSICAL EXAM
[JVD] : no jugular venous distention  [Normal Breath Sounds] : Normal breath sounds [Normal Rate and Rhythm] : normal rate and rhythm [2+] : left 2+ [1+] : right 1+ [0] : left 0 [Ankle Swelling (On Exam)] : not present [Varicose Veins Of Lower Extremities] : not present [] : not present [Abdomen Masses] : No abdominal masses [Abdomen Tenderness] : ~T ~M No abdominal tenderness [No Rash or Lesion] : No rash or lesion [Alert] : alert [Oriented to Person] : oriented to person [Oriented to Place] : oriented to place [Oriented to Time] : oriented to time [Calm] : calm [de-identified] : Well appearing, NAD [de-identified] : NCAT [FreeTextEntry1] : Both feet warm, normal capillary refill.\par Left groin staples removed. Left groin wound well healed

## 2021-11-18 NOTE — ASSESSMENT
[FreeTextEntry1] : 56 year old man, previous heavy smoker with PAD (s/p right SFA stenting in 2017), carotid stenosis presenting with worsening left leg claudication and left hip/thigh pain.\par \par 1. PAD\par -S/P Left femoral endarterectomy on 10/1/21 for lifestyle limiting left leg claudication and ischemic rest pain\par Symptoms have resolved. Wound has healed\par RTO 3 months\par \par 2. Carotid stenosis\par -Duplex US performed on recent visit shows stable 50-69% carotid stenosis\par -Carotid revascularization not warranted at this time. Will continue annual surveillance carotid US\par \par 4. RUE with 30mmHg BP gradient. Likely has asymptomatic right subclavian artery stenosis\par No symptoms of subclavian steal or right arm claudication\par No further work-up warranted at this time as he is asymptomatic. \par

## 2021-11-28 ENCOUNTER — RX RENEWAL (OUTPATIENT)
Age: 56
End: 2021-11-28

## 2022-01-31 ENCOUNTER — RX RENEWAL (OUTPATIENT)
Age: 57
End: 2022-01-31

## 2022-02-17 ENCOUNTER — APPOINTMENT (OUTPATIENT)
Dept: VASCULAR SURGERY | Facility: CLINIC | Age: 57
End: 2022-02-17
Payer: MEDICAID

## 2022-02-17 VITALS
BODY MASS INDEX: 22.98 KG/M2 | DIASTOLIC BLOOD PRESSURE: 85 MMHG | TEMPERATURE: 97.6 F | OXYGEN SATURATION: 96 % | HEIGHT: 66 IN | HEART RATE: 97 BPM | WEIGHT: 143 LBS | RESPIRATION RATE: 16 BRPM | SYSTOLIC BLOOD PRESSURE: 145 MMHG

## 2022-02-17 PROCEDURE — 93926 LOWER EXTREMITY STUDY: CPT

## 2022-02-17 PROCEDURE — 99213 OFFICE O/P EST LOW 20 MIN: CPT

## 2022-02-17 NOTE — ASSESSMENT
[FreeTextEntry1] : 56 year old man, previous heavy smoker with PAD (s/p right SFA stenting in 2017), carotid stenosis presenting with worsening with right foot ischemic rest pain for the past 1 week\par \par 1. PAD\par -Right leg rest pain secondary to SFA stent occlusion. He has rest pain but no imminently threatening ischemia.\par  Will plan for RLE angiogram next week\par \par 2. Carotid stenosis\par -Duplex US performed 1 year ago shows stable 50-69% carotid stenosis\par -Will repeat his carotid US after his acute right leg ischemia is addresses\par \par 4. RUE with 30mmHg BP gradient. Likely has asymptomatic right subclavian artery stenosis\par No symptoms of subclavian steal or right arm claudication\par No further work-up warranted at this time as he is asymptomatic. \par

## 2022-02-17 NOTE — HISTORY OF PRESENT ILLNESS
[FreeTextEntry1] : 56 year old man, previous heavy smoker with PAD (s/p right SFA stenting in 2017), carotid stenosis.\par Patient is s/p left femoral endarterectomy on 10/1/21\par His left leg claudication has resolved [de-identified] : Patient present with 1 week history of right foot pain and numbness\par He states that he ran out of his Plavix 2 weeks ago\par No right foot ulcer

## 2022-02-17 NOTE — PHYSICAL EXAM
[Normal Breath Sounds] : Normal breath sounds [Normal Rate and Rhythm] : normal rate and rhythm [2+] : left 2+ [1+] : left 1+ [0] : left 0 [No Rash or Lesion] : No rash or lesion [Alert] : alert [Oriented to Person] : oriented to person [Oriented to Place] : oriented to place [Oriented to Time] : oriented to time [Calm] : calm [JVD] : no jugular venous distention  [Ankle Swelling (On Exam)] : not present [Varicose Veins Of Lower Extremities] : not present [] : not present [Abdomen Masses] : No abdominal masses [Abdomen Tenderness] : ~T ~M No abdominal tenderness [de-identified] : Well appearing, NAD [de-identified] : NCAT [FreeTextEntry1] : Right foot with dependant rubor\par Cap refill > 3 seconds\par Left foot is warm and well perfused

## 2022-02-18 ENCOUNTER — APPOINTMENT (OUTPATIENT)
Dept: INTERNAL MEDICINE | Facility: CLINIC | Age: 57
End: 2022-02-18
Payer: MEDICAID

## 2022-02-18 VITALS
OXYGEN SATURATION: 97 % | TEMPERATURE: 97.6 F | HEART RATE: 95 BPM | SYSTOLIC BLOOD PRESSURE: 165 MMHG | WEIGHT: 144 LBS | BODY MASS INDEX: 23.24 KG/M2 | DIASTOLIC BLOOD PRESSURE: 83 MMHG | RESPIRATION RATE: 16 BRPM

## 2022-02-18 DIAGNOSIS — F41.9 ANXIETY DISORDER, UNSPECIFIED: ICD-10-CM

## 2022-02-18 DIAGNOSIS — F32.A DEPRESSION, UNSPECIFIED: ICD-10-CM

## 2022-02-18 PROCEDURE — 90686 IIV4 VACC NO PRSV 0.5 ML IM: CPT

## 2022-02-18 PROCEDURE — G0008: CPT

## 2022-02-18 PROCEDURE — 99214 OFFICE O/P EST MOD 30 MIN: CPT | Mod: 25

## 2022-02-21 PROBLEM — F32.A DEPRESSION: Status: ACTIVE | Noted: 2018-09-27

## 2022-02-21 NOTE — HISTORY OF PRESENT ILLNESS
[FreeTextEntry1] : pt. is here for medication refill.\par c/o severe pain in the right leg. [de-identified] : Mr. CHAKRABORTY is here today for a f/u visit.\par h/o heavy smoking with PAD (s/p Rt. SFA stenting in 2017), carotid stenosis\par He recently had lf. femoral endarterectomy 10/1/21. \par c/o severe pain in the right leg from SFA stent occlusion.\par \par \par

## 2022-02-21 NOTE — PHYSICAL EXAM
[Normal] : normal rate, regular rhythm, normal S1 and S2 and no murmur heard [de-identified] : pt is on severe pain  [de-identified] : right leg look red, diminished pulse in the dorsalis pedis A. left leg is well perfused , normal pulses.

## 2022-02-22 ENCOUNTER — INPATIENT (INPATIENT)
Facility: HOSPITAL | Age: 57
LOS: 1 days | Discharge: HOME | DRG: 272 | End: 2022-02-24
Attending: SURGERY | Admitting: SURGERY
Payer: COMMERCIAL

## 2022-02-22 VITALS
HEIGHT: 65 IN | SYSTOLIC BLOOD PRESSURE: 136 MMHG | DIASTOLIC BLOOD PRESSURE: 67 MMHG | HEART RATE: 76 BPM | OXYGEN SATURATION: 98 % | WEIGHT: 142.2 LBS | TEMPERATURE: 98 F | RESPIRATION RATE: 20 BRPM

## 2022-02-22 DIAGNOSIS — Z96.7 PRESENCE OF OTHER BONE AND TENDON IMPLANTS: Chronic | ICD-10-CM

## 2022-02-22 DIAGNOSIS — Z98.890 OTHER SPECIFIED POSTPROCEDURAL STATES: Chronic | ICD-10-CM

## 2022-02-22 DIAGNOSIS — K40.90 UNILATERAL INGUINAL HERNIA, WITHOUT OBSTRUCTION OR GANGRENE, NOT SPECIFIED AS RECURRENT: Chronic | ICD-10-CM

## 2022-02-22 DIAGNOSIS — I73.9 PERIPHERAL VASCULAR DISEASE, UNSPECIFIED: ICD-10-CM

## 2022-02-22 DIAGNOSIS — Z98.89 OTHER SPECIFIED POSTPROCEDURAL STATES: Chronic | ICD-10-CM

## 2022-02-22 LAB
ALBUMIN SERPL ELPH-MCNC: 4.1 G/DL — SIGNIFICANT CHANGE UP (ref 3.3–5.2)
ALP SERPL-CCNC: 159 U/L — HIGH (ref 40–120)
ALT FLD-CCNC: 29 U/L — SIGNIFICANT CHANGE UP
ANION GAP SERPL CALC-SCNC: 13 MMOL/L — SIGNIFICANT CHANGE UP (ref 5–17)
APTT BLD: 30.7 SEC — SIGNIFICANT CHANGE UP (ref 27.5–35.5)
APTT BLD: 66.3 SEC — HIGH (ref 27.5–35.5)
AST SERPL-CCNC: 20 U/L — SIGNIFICANT CHANGE UP
BASOPHILS # BLD AUTO: 0.03 K/UL — SIGNIFICANT CHANGE UP (ref 0–0.2)
BASOPHILS NFR BLD AUTO: 0.4 % — SIGNIFICANT CHANGE UP (ref 0–2)
BILIRUB SERPL-MCNC: <0.2 MG/DL — LOW (ref 0.4–2)
BLD GP AB SCN SERPL QL: SIGNIFICANT CHANGE UP
BUN SERPL-MCNC: 14 MG/DL — SIGNIFICANT CHANGE UP (ref 8–20)
CALCIUM SERPL-MCNC: 8.9 MG/DL — SIGNIFICANT CHANGE UP (ref 8.6–10.2)
CHLORIDE SERPL-SCNC: 103 MMOL/L — SIGNIFICANT CHANGE UP (ref 98–107)
CO2 SERPL-SCNC: 22 MMOL/L — SIGNIFICANT CHANGE UP (ref 22–29)
CREAT SERPL-MCNC: 0.71 MG/DL — SIGNIFICANT CHANGE UP (ref 0.5–1.3)
EOSINOPHIL # BLD AUTO: 0.11 K/UL — SIGNIFICANT CHANGE UP (ref 0–0.5)
EOSINOPHIL NFR BLD AUTO: 1.6 % — SIGNIFICANT CHANGE UP (ref 0–6)
GLUCOSE SERPL-MCNC: 100 MG/DL — HIGH (ref 70–99)
HCT VFR BLD CALC: 40.2 % — SIGNIFICANT CHANGE UP (ref 39–50)
HCT VFR BLD CALC: 42.6 % — SIGNIFICANT CHANGE UP (ref 39–50)
HGB BLD-MCNC: 13.6 G/DL — SIGNIFICANT CHANGE UP (ref 13–17)
HGB BLD-MCNC: 14.3 G/DL — SIGNIFICANT CHANGE UP (ref 13–17)
IMM GRANULOCYTES NFR BLD AUTO: 0.3 % — SIGNIFICANT CHANGE UP (ref 0–1.5)
INR BLD: 1 RATIO — SIGNIFICANT CHANGE UP (ref 0.88–1.16)
LYMPHOCYTES # BLD AUTO: 2.23 K/UL — SIGNIFICANT CHANGE UP (ref 1–3.3)
LYMPHOCYTES # BLD AUTO: 32.7 % — SIGNIFICANT CHANGE UP (ref 13–44)
MCHC RBC-ENTMCNC: 30 PG — SIGNIFICANT CHANGE UP (ref 27–34)
MCHC RBC-ENTMCNC: 30.4 PG — SIGNIFICANT CHANGE UP (ref 27–34)
MCHC RBC-ENTMCNC: 33.6 GM/DL — SIGNIFICANT CHANGE UP (ref 32–36)
MCHC RBC-ENTMCNC: 33.8 GM/DL — SIGNIFICANT CHANGE UP (ref 32–36)
MCV RBC AUTO: 89.5 FL — SIGNIFICANT CHANGE UP (ref 80–100)
MCV RBC AUTO: 89.7 FL — SIGNIFICANT CHANGE UP (ref 80–100)
MONOCYTES # BLD AUTO: 0.52 K/UL — SIGNIFICANT CHANGE UP (ref 0–0.9)
MONOCYTES NFR BLD AUTO: 7.6 % — SIGNIFICANT CHANGE UP (ref 2–14)
NEUTROPHILS # BLD AUTO: 3.9 K/UL — SIGNIFICANT CHANGE UP (ref 1.8–7.4)
NEUTROPHILS NFR BLD AUTO: 57.4 % — SIGNIFICANT CHANGE UP (ref 43–77)
PLATELET # BLD AUTO: 212 K/UL — SIGNIFICANT CHANGE UP (ref 150–400)
PLATELET # BLD AUTO: 215 K/UL — SIGNIFICANT CHANGE UP (ref 150–400)
POTASSIUM SERPL-MCNC: 4.4 MMOL/L — SIGNIFICANT CHANGE UP (ref 3.5–5.3)
POTASSIUM SERPL-SCNC: 4.4 MMOL/L — SIGNIFICANT CHANGE UP (ref 3.5–5.3)
PROT SERPL-MCNC: 7.4 G/DL — SIGNIFICANT CHANGE UP (ref 6.6–8.7)
PROTHROM AB SERPL-ACNC: 11.6 SEC — SIGNIFICANT CHANGE UP (ref 10.6–13.6)
RBC # BLD: 4.48 M/UL — SIGNIFICANT CHANGE UP (ref 4.2–5.8)
RBC # BLD: 4.76 M/UL — SIGNIFICANT CHANGE UP (ref 4.2–5.8)
RBC # FLD: 15.9 % — HIGH (ref 10.3–14.5)
RBC # FLD: 15.9 % — HIGH (ref 10.3–14.5)
SARS-COV-2 RNA SPEC QL NAA+PROBE: SIGNIFICANT CHANGE UP
SODIUM SERPL-SCNC: 138 MMOL/L — SIGNIFICANT CHANGE UP (ref 135–145)
WBC # BLD: 6.81 K/UL — SIGNIFICANT CHANGE UP (ref 3.8–10.5)
WBC # BLD: 7.3 K/UL — SIGNIFICANT CHANGE UP (ref 3.8–10.5)
WBC # FLD AUTO: 6.81 K/UL — SIGNIFICANT CHANGE UP (ref 3.8–10.5)
WBC # FLD AUTO: 7.3 K/UL — SIGNIFICANT CHANGE UP (ref 3.8–10.5)

## 2022-02-22 PROCEDURE — 99285 EMERGENCY DEPT VISIT HI MDM: CPT

## 2022-02-22 PROCEDURE — 93010 ELECTROCARDIOGRAM REPORT: CPT

## 2022-02-22 RX ORDER — HYDROMORPHONE HYDROCHLORIDE 2 MG/ML
0.5 INJECTION INTRAMUSCULAR; INTRAVENOUS; SUBCUTANEOUS
Refills: 0 | Status: DISCONTINUED | OUTPATIENT
Start: 2022-02-22 | End: 2022-02-24

## 2022-02-22 RX ORDER — INFLUENZA VIRUS VACCINE 15; 15; 15; 15 UG/.5ML; UG/.5ML; UG/.5ML; UG/.5ML
0.5 SUSPENSION INTRAMUSCULAR ONCE
Refills: 0 | Status: DISCONTINUED | OUTPATIENT
Start: 2022-02-22 | End: 2022-02-24

## 2022-02-22 RX ORDER — CLOPIDOGREL BISULFATE 75 MG/1
75 TABLET, FILM COATED ORAL DAILY
Refills: 0 | Status: DISCONTINUED | OUTPATIENT
Start: 2022-02-22 | End: 2022-02-24

## 2022-02-22 RX ORDER — TRAMADOL HYDROCHLORIDE 50 MG/1
25 TABLET ORAL EVERY 6 HOURS
Refills: 0 | Status: DISCONTINUED | OUTPATIENT
Start: 2022-02-22 | End: 2022-02-24

## 2022-02-22 RX ORDER — VENLAFAXINE HCL 75 MG
75 CAPSULE, EXT RELEASE 24 HR ORAL DAILY
Refills: 0 | Status: DISCONTINUED | OUTPATIENT
Start: 2022-02-22 | End: 2022-02-24

## 2022-02-22 RX ORDER — HEPARIN SODIUM 5000 [USP'U]/ML
2500 INJECTION INTRAVENOUS; SUBCUTANEOUS EVERY 6 HOURS
Refills: 0 | Status: DISCONTINUED | OUTPATIENT
Start: 2022-02-22 | End: 2022-02-23

## 2022-02-22 RX ORDER — ACETAMINOPHEN 500 MG
650 TABLET ORAL EVERY 6 HOURS
Refills: 0 | Status: DISCONTINUED | OUTPATIENT
Start: 2022-02-22 | End: 2022-02-24

## 2022-02-22 RX ORDER — IBUPROFEN 200 MG
400 TABLET ORAL EVERY 6 HOURS
Refills: 0 | Status: DISCONTINUED | OUTPATIENT
Start: 2022-02-22 | End: 2022-02-24

## 2022-02-22 RX ORDER — HEPARIN SODIUM 5000 [USP'U]/ML
INJECTION INTRAVENOUS; SUBCUTANEOUS
Qty: 25000 | Refills: 0 | Status: DISCONTINUED | OUTPATIENT
Start: 2022-02-22 | End: 2022-02-23

## 2022-02-22 RX ORDER — AMLODIPINE BESYLATE 2.5 MG/1
10 TABLET ORAL DAILY
Refills: 0 | Status: DISCONTINUED | OUTPATIENT
Start: 2022-02-22 | End: 2022-02-24

## 2022-02-22 RX ORDER — ASPIRIN/CALCIUM CARB/MAGNESIUM 324 MG
81 TABLET ORAL DAILY
Refills: 0 | Status: DISCONTINUED | OUTPATIENT
Start: 2022-02-22 | End: 2022-02-24

## 2022-02-22 RX ORDER — QUETIAPINE FUMARATE 200 MG/1
200 TABLET, FILM COATED ORAL AT BEDTIME
Refills: 0 | Status: DISCONTINUED | OUTPATIENT
Start: 2022-02-22 | End: 2022-02-24

## 2022-02-22 RX ORDER — HEPARIN SODIUM 5000 [USP'U]/ML
5000 INJECTION INTRAVENOUS; SUBCUTANEOUS EVERY 6 HOURS
Refills: 0 | Status: DISCONTINUED | OUTPATIENT
Start: 2022-02-22 | End: 2022-02-23

## 2022-02-22 RX ORDER — SODIUM CHLORIDE 9 MG/ML
1000 INJECTION, SOLUTION INTRAVENOUS
Refills: 0 | Status: DISCONTINUED | OUTPATIENT
Start: 2022-02-22 | End: 2022-02-23

## 2022-02-22 RX ORDER — PANTOPRAZOLE SODIUM 20 MG/1
40 TABLET, DELAYED RELEASE ORAL
Refills: 0 | Status: DISCONTINUED | OUTPATIENT
Start: 2022-02-22 | End: 2022-02-24

## 2022-02-22 RX ORDER — ATORVASTATIN CALCIUM 80 MG/1
80 TABLET, FILM COATED ORAL AT BEDTIME
Refills: 0 | Status: DISCONTINUED | OUTPATIENT
Start: 2022-02-22 | End: 2022-02-24

## 2022-02-22 RX ORDER — SODIUM CHLORIDE 9 MG/ML
250 INJECTION INTRAMUSCULAR; INTRAVENOUS; SUBCUTANEOUS ONCE
Refills: 0 | Status: COMPLETED | OUTPATIENT
Start: 2022-02-22 | End: 2022-02-22

## 2022-02-22 RX ORDER — ONDANSETRON 8 MG/1
4 TABLET, FILM COATED ORAL EVERY 6 HOURS
Refills: 0 | Status: DISCONTINUED | OUTPATIENT
Start: 2022-02-22 | End: 2022-02-24

## 2022-02-22 RX ORDER — MORPHINE SULFATE 50 MG/1
2 CAPSULE, EXTENDED RELEASE ORAL ONCE
Refills: 0 | Status: DISCONTINUED | OUTPATIENT
Start: 2022-02-22 | End: 2022-02-22

## 2022-02-22 RX ADMIN — HEPARIN SODIUM 1100 UNIT(S)/HR: 5000 INJECTION INTRAVENOUS; SUBCUTANEOUS at 13:13

## 2022-02-22 RX ADMIN — TRAMADOL HYDROCHLORIDE 25 MILLIGRAM(S): 50 TABLET ORAL at 15:54

## 2022-02-22 RX ADMIN — Medication 200 MILLIGRAM(S): at 18:04

## 2022-02-22 RX ADMIN — ATORVASTATIN CALCIUM 80 MILLIGRAM(S): 80 TABLET, FILM COATED ORAL at 20:54

## 2022-02-22 RX ADMIN — Medication 650 MILLIGRAM(S): at 12:27

## 2022-02-22 RX ADMIN — Medication 650 MILLIGRAM(S): at 22:59

## 2022-02-22 RX ADMIN — QUETIAPINE FUMARATE 200 MILLIGRAM(S): 200 TABLET, FILM COATED ORAL at 20:55

## 2022-02-22 RX ADMIN — Medication 650 MILLIGRAM(S): at 18:04

## 2022-02-22 RX ADMIN — TRAMADOL HYDROCHLORIDE 25 MILLIGRAM(S): 50 TABLET ORAL at 16:30

## 2022-02-22 RX ADMIN — HEPARIN SODIUM 1100 UNIT(S)/HR: 5000 INJECTION INTRAVENOUS; SUBCUTANEOUS at 20:04

## 2022-02-22 RX ADMIN — Medication 650 MILLIGRAM(S): at 18:34

## 2022-02-22 RX ADMIN — SODIUM CHLORIDE 250 MILLILITER(S): 9 INJECTION INTRAMUSCULAR; INTRAVENOUS; SUBCUTANEOUS at 13:13

## 2022-02-22 RX ADMIN — Medication 75 MILLIGRAM(S): at 14:55

## 2022-02-22 RX ADMIN — Medication 650 MILLIGRAM(S): at 23:00

## 2022-02-22 RX ADMIN — MORPHINE SULFATE 2 MILLIGRAM(S): 50 CAPSULE, EXTENDED RELEASE ORAL at 12:24

## 2022-02-22 NOTE — H&P ADULT - HISTORY OF PRESENT ILLNESS
56yoM w/ PAD (s/p R SFA stenting in 2017 and L femoral angioplasty 10/2021), carotid stenosis, HLD, HTN, pre-DM, GERD, anxiety, and history of severe smoking presented to the office with Dr. Montiel last thursday for worsening RLE pain. Patient states that it feels like sharp pins and needle like feeling. Since then, it has not gotten any better. Patient states he quit smoking. Afebrile. VSS.

## 2022-02-22 NOTE — H&P ADULT - ASSESSMENT
56yoM with PAD, HLD, carotid stenosis, HTN, pre-dm, gerd, anxiety who presents with worsening pain in the RLE. Patient has a history of a SFA stent in 2017.  - admit to Dr. Montiel  - [facility]  - cath lab on 2/23 for angiogram  - npo @[place] / ivf

## 2022-02-22 NOTE — ED PROVIDER NOTE - PHYSICAL EXAMINATION
Vital Signs Last 24 Hrs  T(C): 36.7 (22 Feb 2022 10:27), Max: 36.7 (22 Feb 2022 10:27)  T(F): 98 (22 Feb 2022 10:27), Max: 98 (22 Feb 2022 10:27)  HR: 76 (22 Feb 2022 10:27) (76 - 76)  BP: 136/67 (22 Feb 2022 10:27) (136/67 - 136/67)  BP(mean): --  RR: 20 (22 Feb 2022 10:27) (20 - 20)  SpO2: 98% (22 Feb 2022 10:27) (98% - 98%)      PHYSICAL EXAM:      Constitutional: NAD  Eyes: EOMI, no scleral icterus or conjunctivitis  Head: NC/AT  Respiratory: on RA  Vascular: +ttp R foot, +erythema R foot; R: Doppler -DP +PT, L: palpable DP/PT  Neurological: A&Ox3  Skin: no jaundice  Musculoskeletal: TAN spontaneously  Psychiatric: calm, cooperative

## 2022-02-22 NOTE — ED PROVIDER NOTE - CLINICAL SUMMARY MEDICAL DECISION MAKING FREE TEXT BOX
57 yo M w/ PAD s/p R SFA stenting on Plavix and L FA endarterectomy p/w R foot pain and erythema. Doppler unable to detect R dorsalis pedis pulse.    -Pain control  -Vascular surgery following  -Pre-op labs

## 2022-02-22 NOTE — H&P ADULT - ATTENDING COMMENTS
56 year old man, active smoker with 2-3 week history of right leg ischemic rest pain due to SFA stent occlusion  No motor or sensory changes  Plan is for right leg angiogram on 2/23

## 2022-02-22 NOTE — ED PROVIDER NOTE - NSICDXPASTMEDICALHX_GEN_ALL_CORE_FT
PAST MEDICAL HISTORY:  Depression     Former smoker     GERD (gastroesophageal reflux disease)     Hypercholesterolemia     Hypertension     Osteoarthritis     Peripheral vascular disease with R SFA stent    Prediabetes     Seizures

## 2022-02-22 NOTE — ED PROVIDER NOTE - OBJECTIVE STATEMENT
55 yo M w/ PMH of PAD s/p L femoral endarterectomy (10/2021) and R SFA stenting (2017) on Plavix, HTN, HLD, prediabetes, previous smoker who p/w R foot pain and redness. Pt states pain and swelling began 2 weeks ago when walking. Pt saw vascular surgeon today and was told his stent was clogged. States plan is for surgery tomorrow.      Vascular surgeon: Dr. Montiel

## 2022-02-22 NOTE — ED ADULT NURSE NOTE - OBJECTIVE STATEMENT
Pt presents with worsening right foot pain, foot is red and tender to touch. Pt has 10/10 pain and unable to bare weight. Pt scheduled for sx tomorrow. Pulse not palpable in right lower extremity, vaguely heard on doppler. Pt took asa and plavix this morning, pt medicated with morphine for pain relief.

## 2022-02-22 NOTE — PATIENT PROFILE ADULT - FALL HARM RISK - UNIVERSAL INTERVENTIONS
Bed in lowest position, wheels locked, appropriate side rails in place/Call bell, personal items and telephone in reach/Instruct patient to call for assistance before getting out of bed or chair/Non-slip footwear when patient is out of bed/[place] to call system/Physically safe environment - no spills, clutter or unnecessary equipment/Purposeful Proactive Rounding/Room/bathroom lighting operational, light cord in reach

## 2022-02-22 NOTE — ED PROVIDER NOTE - ATTENDING CONTRIBUTION TO CARE
Pt with h/o PAD presenting with R leg pain x 2 weeks, found to have stent occlusion at vascular surgeon's office (Dr. Montiel) and sent to ED for surgery tomorrow. Patient with dopplerable tibialis anterior pulses, no palpable DP pulses. R foot warm, capillary refill <3seconds, +TTP over L foot, no crepitus. R foot warm. Seen by vascular surgery, plan for admission for further management. Bharati White DO       I performed a history and physical exam of the patient and discussed their management with the student. I reviewed the student's note and agree with the documented findings and plan of care. My medical decision making and observations are found above.

## 2022-02-22 NOTE — ED ADULT TRIAGE NOTE - CHIEF COMPLAINT QUOTE
Pt was sent from vascular surgeon for R foot " clogged vein " Hx of stents in the leg . Foot warm to touch - unable to feel pulses.

## 2022-02-22 NOTE — ED ADULT NURSE NOTE - NSIMPLEMENTINTERV_GEN_ALL_ED
Implemented All Fall Risk Interventions:  [place] to call system. Call bell, personal items and telephone within reach. Instruct patient to call for assistance. Room bathroom lighting operational. Non-slip footwear when patient is off stretcher. Physically safe environment: no spills, clutter or unnecessary equipment. Stretcher in lowest position, wheels locked, appropriate side rails in place. Provide visual cue, wrist band, yellow gown, etc. Monitor gait and stability. Monitor for mental status changes and reorient to person, place, and time. Review medications for side effects contributing to fall risk. Reinforce activity limits and safety measures with patient and family.

## 2022-02-22 NOTE — ED PROVIDER NOTE - NSICDXPASTSURGICALHX_GEN_ALL_CORE_FT
PAST SURGICAL HISTORY:  H/O endarterectomy L femoral a.    Inguinal hernia, left     S/P appendectomy     S/P ORIF (open reduction internal fixation) fracture Left    S/P shoulder surgery right

## 2022-02-22 NOTE — H&P ADULT - NSHPPHYSICALEXAM_GEN_ALL_CORE
gen: nad, a&ox3  heent: eomi, perrla  cv: Rrr  resp: nonlabored breathing  gi: soft, nd, nttp  msk: LLE pain to touch, rubor. +AROM.  vasc: dopp AT, no PT. palpable fem b/l gen: nad, a&ox3  heent: eomi, perrla  cv: Rrr  resp: nonlabored breathing  gi: soft, nd, nttp  msk: RLE pain to touch, rubor. +AROM.  vasc: dopp AT, no PT. palpable fem b/l

## 2022-02-23 ENCOUNTER — APPOINTMENT (OUTPATIENT)
Dept: VASCULAR SURGERY | Facility: HOSPITAL | Age: 57
End: 2022-02-23

## 2022-02-23 LAB
ANION GAP SERPL CALC-SCNC: 12 MMOL/L — SIGNIFICANT CHANGE UP (ref 5–17)
APTT BLD: 79.8 SEC — HIGH (ref 27.5–35.5)
BASOPHILS # BLD AUTO: 0.04 K/UL — SIGNIFICANT CHANGE UP (ref 0–0.2)
BASOPHILS NFR BLD AUTO: 0.6 % — SIGNIFICANT CHANGE UP (ref 0–2)
BLD GP AB SCN SERPL QL: SIGNIFICANT CHANGE UP
BUN SERPL-MCNC: 16.7 MG/DL — SIGNIFICANT CHANGE UP (ref 8–20)
CALCIUM SERPL-MCNC: 8.6 MG/DL — SIGNIFICANT CHANGE UP (ref 8.6–10.2)
CHLORIDE SERPL-SCNC: 104 MMOL/L — SIGNIFICANT CHANGE UP (ref 98–107)
CO2 SERPL-SCNC: 21 MMOL/L — LOW (ref 22–29)
CREAT SERPL-MCNC: 0.63 MG/DL — SIGNIFICANT CHANGE UP (ref 0.5–1.3)
EOSINOPHIL # BLD AUTO: 0.17 K/UL — SIGNIFICANT CHANGE UP (ref 0–0.5)
EOSINOPHIL NFR BLD AUTO: 2.5 % — SIGNIFICANT CHANGE UP (ref 0–6)
GLUCOSE SERPL-MCNC: 92 MG/DL — SIGNIFICANT CHANGE UP (ref 70–99)
HCT VFR BLD CALC: 39.4 % — SIGNIFICANT CHANGE UP (ref 39–50)
HCV AB S/CO SERPL IA: 0.08 S/CO — SIGNIFICANT CHANGE UP (ref 0–0.99)
HCV AB SERPL-IMP: SIGNIFICANT CHANGE UP
HGB BLD-MCNC: 12.9 G/DL — LOW (ref 13–17)
IMM GRANULOCYTES NFR BLD AUTO: 0.4 % — SIGNIFICANT CHANGE UP (ref 0–1.5)
LYMPHOCYTES # BLD AUTO: 2.78 K/UL — SIGNIFICANT CHANGE UP (ref 1–3.3)
LYMPHOCYTES # BLD AUTO: 41.2 % — SIGNIFICANT CHANGE UP (ref 13–44)
MAGNESIUM SERPL-MCNC: 2 MG/DL — SIGNIFICANT CHANGE UP (ref 1.6–2.6)
MCHC RBC-ENTMCNC: 29.3 PG — SIGNIFICANT CHANGE UP (ref 27–34)
MCHC RBC-ENTMCNC: 32.7 GM/DL — SIGNIFICANT CHANGE UP (ref 32–36)
MCV RBC AUTO: 89.3 FL — SIGNIFICANT CHANGE UP (ref 80–100)
MONOCYTES # BLD AUTO: 0.59 K/UL — SIGNIFICANT CHANGE UP (ref 0–0.9)
MONOCYTES NFR BLD AUTO: 8.7 % — SIGNIFICANT CHANGE UP (ref 2–14)
NEUTROPHILS # BLD AUTO: 3.14 K/UL — SIGNIFICANT CHANGE UP (ref 1.8–7.4)
NEUTROPHILS NFR BLD AUTO: 46.6 % — SIGNIFICANT CHANGE UP (ref 43–77)
PHOSPHATE SERPL-MCNC: 3.3 MG/DL — SIGNIFICANT CHANGE UP (ref 2.4–4.7)
PLATELET # BLD AUTO: 206 K/UL — SIGNIFICANT CHANGE UP (ref 150–400)
POTASSIUM SERPL-MCNC: 4.5 MMOL/L — SIGNIFICANT CHANGE UP (ref 3.5–5.3)
POTASSIUM SERPL-SCNC: 4.5 MMOL/L — SIGNIFICANT CHANGE UP (ref 3.5–5.3)
RBC # BLD: 4.41 M/UL — SIGNIFICANT CHANGE UP (ref 4.2–5.8)
RBC # FLD: 15.8 % — HIGH (ref 10.3–14.5)
SODIUM SERPL-SCNC: 137 MMOL/L — SIGNIFICANT CHANGE UP (ref 135–145)
WBC # BLD: 6.83 K/UL — SIGNIFICANT CHANGE UP (ref 3.8–10.5)
WBC # FLD AUTO: 6.83 K/UL — SIGNIFICANT CHANGE UP (ref 3.8–10.5)

## 2022-02-23 PROCEDURE — 76937 US GUIDE VASCULAR ACCESS: CPT | Mod: 26

## 2022-02-23 PROCEDURE — 37184 PRIM ART M-THRMBC 1ST VSL: CPT

## 2022-02-23 PROCEDURE — 37225: CPT

## 2022-02-23 PROCEDURE — 99231 SBSQ HOSP IP/OBS SF/LOW 25: CPT

## 2022-02-23 PROCEDURE — 75630 X-RAY AORTA LEG ARTERIES: CPT | Mod: 26,59

## 2022-02-23 PROCEDURE — 93880 EXTRACRANIAL BILAT STUDY: CPT | Mod: 26

## 2022-02-23 RX ORDER — AMLODIPINE BESYLATE 2.5 MG/1
10 TABLET ORAL ONCE
Refills: 0 | Status: COMPLETED | OUTPATIENT
Start: 2022-02-23 | End: 2022-02-23

## 2022-02-23 RX ADMIN — Medication 650 MILLIGRAM(S): at 23:12

## 2022-02-23 RX ADMIN — Medication 650 MILLIGRAM(S): at 23:11

## 2022-02-23 RX ADMIN — Medication 650 MILLIGRAM(S): at 17:39

## 2022-02-23 RX ADMIN — Medication 650 MILLIGRAM(S): at 05:36

## 2022-02-23 RX ADMIN — Medication 75 MILLIGRAM(S): at 17:39

## 2022-02-23 RX ADMIN — PANTOPRAZOLE SODIUM 40 MILLIGRAM(S): 20 TABLET, DELAYED RELEASE ORAL at 05:35

## 2022-02-23 RX ADMIN — Medication 650 MILLIGRAM(S): at 05:35

## 2022-02-23 RX ADMIN — Medication 81 MILLIGRAM(S): at 10:03

## 2022-02-23 RX ADMIN — CLOPIDOGREL BISULFATE 75 MILLIGRAM(S): 75 TABLET, FILM COATED ORAL at 10:02

## 2022-02-23 RX ADMIN — HEPARIN SODIUM 1100 UNIT(S)/HR: 5000 INJECTION INTRAVENOUS; SUBCUTANEOUS at 02:43

## 2022-02-23 RX ADMIN — HYDROMORPHONE HYDROCHLORIDE 0.5 MILLIGRAM(S): 2 INJECTION INTRAMUSCULAR; INTRAVENOUS; SUBCUTANEOUS at 14:07

## 2022-02-23 RX ADMIN — AMLODIPINE BESYLATE 10 MILLIGRAM(S): 2.5 TABLET ORAL at 16:24

## 2022-02-23 RX ADMIN — HYDROMORPHONE HYDROCHLORIDE 0.5 MILLIGRAM(S): 2 INJECTION INTRAMUSCULAR; INTRAVENOUS; SUBCUTANEOUS at 14:49

## 2022-02-23 RX ADMIN — HYDROMORPHONE HYDROCHLORIDE 0.5 MILLIGRAM(S): 2 INJECTION INTRAMUSCULAR; INTRAVENOUS; SUBCUTANEOUS at 10:03

## 2022-02-23 RX ADMIN — Medication 200 MILLIGRAM(S): at 05:35

## 2022-02-23 RX ADMIN — QUETIAPINE FUMARATE 200 MILLIGRAM(S): 200 TABLET, FILM COATED ORAL at 21:19

## 2022-02-23 RX ADMIN — Medication 200 MILLIGRAM(S): at 17:38

## 2022-02-23 RX ADMIN — ATORVASTATIN CALCIUM 80 MILLIGRAM(S): 80 TABLET, FILM COATED ORAL at 21:19

## 2022-02-23 NOTE — BRIEF OPERATIVE NOTE - COMMENTS
plan:   continue ASA , Plavix   stay flat for 3 hours   serial pulse exam   monitor neurovascular   monitor compartments

## 2022-02-23 NOTE — PROGRESS NOTE ADULT - SUBJECTIVE AND OBJECTIVE BOX
Department of Cardiology                                                                  [facility]/[place] [address]                                                            Telephone: [phone]. Fax:[phone]                                                    Post- Procedure Note: RLE angiogram/ Right SFA-popliteal atherectomy ballooning and JIAN      Narrative:  56y  Male is now s/p   RLE angiogram/ Right SFA-popliteal atherectomy ballooning and JIAN via #6Fr LFA ( now with Mynx closure agent) by Dr Montiel  Pt received 120cc contrast, 7,000 units Heparin, Last ACT was 294, fentanyl 150 mcg, Versed 3 mg, and 100cc Normal saline    General: No fatigue, no fevers/chills  Respiratory: No dyspnea, no cough, no wheeze  CV: No chest pain, no palpitations  Abd: No nausea  Neuro: No headache, no dizziness      PAST MEDICAL & SURGICAL HISTORY:  Hypercholesterolemia    Seizures    Depression    GERD (gastroesophageal reflux disease)    Hypertension    Peripheral vascular disease  with R SFA stent    Osteoarthritis    Former smoker    Prediabetes    Inguinal hernia, left    S/P ORIF (open reduction internal fixation) fracture  Left    S/P shoulder surgery  right    S/P appendectomy    H/O endarterectomy  L femoral a.      Home Medications:  acetaminophen 325 mg oral tablet: 2 tab(s) orally every 6 hours (04 Oct 2021 11:25)  amLODIPine 10 mg oral tablet: 1 tab(s) orally once a day (01 Oct 2021 09:46)  aspirin 81 mg oral tablet, chewable: 1 tab(s) orally once a day (04 Oct 2021 11:25)  atorvastatin 80 mg oral tablet: 1 tab(s) orally once a day (01 Oct 2021 09:46)  Fish Oil 1000 mg oral capsule: 1 cap(s) orally 2 times a day (01 Oct 2021 09:46)  pantoprazole 40 mg oral delayed release tablet: 1 tab(s) orally once a day (01 Oct 2021 09:46)  phenytoin 100 mg oral capsule: 2 cap(s) orally 2 times a day (01 Oct 2021 09:46)  Plavix 75 mg oral tablet: 1 tab(s) orally once a day (01 Oct 2021 09:46)  QUEtiapine 200 mg oral tablet:  orally once a day (at bedtime) (01 Oct 2021 09:46)  venlafaxine 75 mg oral capsule, extended release: 1 cap(s) orally once a day (01 Oct 2021 09:46)        No Known Allergies      Objective:  Vital Signs Last 24 Hrs  T(C): 36.3 (23 Feb 2022 09:45), Max: 36.9 (22 Feb 2022 20:24)  T(F): 97.4 (23 Feb 2022 09:45), Max: 98.4 (22 Feb 2022 20:24)  HR: 73 (23 Feb 2022 12:30) (56 - 80)  BP: 177/79 (23 Feb 2022 12:30) (99/63 - 177/79)  BP(mean): --  RR: 16 (23 Feb 2022 12:30) (12 - 18)  SpO2: 97% (23 Feb 2022 12:30) (92% - 97%)    GENERAL: Pt lying comfortably, NAD. BR x 3 hrs post procedure  ENMT: PERRL, +EOMI.  NECK: soft, Supple, No JVD,   CHEST/LUNG: Clear to auscultatation bilaterally; No wheezing.  HEART: S1S2+, Regular rate and rhythm; No murmurs.  ABDOMEN: Soft, Nontender, Nondistended; Bowel sounds present.  MUSCULOSKELETAL: Normal range of motion.  SKIN: No rashes or lesions.  NEURO: AAOX3, no focal deficits, no motor r sensory loss.  PSYCH: normal mood.  Procedure site: ...#6Fr .LFA......, no signs of bleeding or hematoma, neurovascular intact   VASCULAR:   Femoral +2 R/+2 L  PT +2 Doppler  R/+2 L  DP +2 R Palpable /+2 L                          12.9   6.83  )-----------( 206      ( 23 Feb 2022 02:21 )             39.4     02-23    137  |  104  |  16.7  ----------------------------<  92  4.5   |  21.0<L>  |  0.63    Ca    8.6      23 Feb 2022 03:13  Phos  3.3     02-23  Mg     2.0     02-23    TPro  7.4  /  Alb  4.1  /  TBili  <0.2<L>  /  DBili  x   /  AST  20  /  ALT  29  /  AlkPhos  159<H>  02-22    PT/INR - ( 22 Feb 2022 11:20 )   PT: 11.6 sec;   INR: 1.00 ratio         PTT - ( 23 Feb 2022 02:21 )  PTT:79.8 sec    Patient is a 56y old  Male who presents with a chief complaint of RLE pain (23 Feb 2022 09:49)    56y  Male is now s/p   RLE angiogram/ Right SFA-popliteal atherectomy ballooning and JIAN via #6Fr LFA ( now with Mynx closure agent) by Dr Montiel     Pt is already in-patient on 2 BKT  -post  peripheral vascular orders  -Left groin precautions/ education  -bedrest x 3 hours post procedure  -IV Fluids-Normal saline 150cc/ hr x 3 hours post procedure  -labs and EKG in am  -continue current medical therapy  -heparin infusion discontinued post procedure  -Continue Dual anti platelet therapy with aspirin/ plavix , reinforced importance of strict adherence to DAPT   -Continue statin therapy  -follow up outpt as per Vascular Surgery with Dr Montiel  -Lifestyle modifications discussed to reduce vascular risk factors including weight reduction, smoking cessation, medication compliance, and routine follow up with Cardiologist to track your BMI, cholesterol, and glucose levels.   -Vascular surgery will continue to follow                                                                              Department of Cardiology                                                                  [facility]/[place] [address] [address] [facility] [address]                                                            Telephone: [phone]. Fax:[phone]                                                    Post- Procedure Note: RLE angiogram/ Right SFA-popliteal atherectomy ballooning and JIAN      Narrative:  56y  Male is now s/p   RLE angiogram/ Right SFA-popliteal atherectomy ballooning and JIAN via #6Fr LFA ( now with Mynx closure agent) by Dr Montiel  Pt received 120cc contrast, 7,000 units Heparin, Last ACT was 294, fentanyl 150 mcg, Versed 3 mg, and 100cc Normal saline    General: No fatigue, no fevers/chills  Respiratory: No dyspnea, no cough, no wheeze  CV: No chest pain, no palpitations  Abd: No nausea  Neuro: No headache, no dizziness      PAST MEDICAL & SURGICAL HISTORY:  Hypercholesterolemia    Seizures    Depression    GERD (gastroesophageal reflux disease)    Hypertension    Peripheral vascular disease  with R SFA stent    Osteoarthritis    Former smoker    Prediabetes    Inguinal hernia, left    S/P ORIF (open reduction internal fixation) fracture  Left    S/P shoulder surgery  right    S/P appendectomy    H/O endarterectomy  L femoral a.      Home Medications:  acetaminophen 325 mg oral tablet: 2 tab(s) orally every 6 hours (04 Oct 2021 11:25)  amLODIPine 10 mg oral tablet: 1 tab(s) orally once a day (01 Oct 2021 09:46)  aspirin 81 mg oral tablet, chewable: 1 tab(s) orally once a day (04 Oct 2021 11:25)  atorvastatin 80 mg oral tablet: 1 tab(s) orally once a day (01 Oct 2021 09:46)  Fish Oil 1000 mg oral capsule: 1 cap(s) orally 2 times a day (01 Oct 2021 09:46)  pantoprazole 40 mg oral delayed release tablet: 1 tab(s) orally once a day (01 Oct 2021 09:46)  phenytoin 100 mg oral capsule: 2 cap(s) orally 2 times a day (01 Oct 2021 09:46)  Plavix 75 mg oral tablet: 1 tab(s) orally once a day (01 Oct 2021 09:46)  QUEtiapine 200 mg oral tablet:  orally once a day (at bedtime) (01 Oct 2021 09:46)  venlafaxine 75 mg oral capsule, extended release: 1 cap(s) orally once a day (01 Oct 2021 09:46)        No Known Allergies      Objective:  Vital Signs Last 24 Hrs  T(C): 36.3 (23 Feb 2022 09:45), Max: 36.9 (22 Feb 2022 20:24)  T(F): 97.4 (23 Feb 2022 09:45), Max: 98.4 (22 Feb 2022 20:24)  HR: 73 (23 Feb 2022 12:30) (56 - 80)  BP: 177/79 (23 Feb 2022 12:30) (99/63 - 177/79)  BP(mean): --  RR: 16 (23 Feb 2022 12:30) (12 - 18)  SpO2: 97% (23 Feb 2022 12:30) (92% - 97%)    GENERAL: Pt lying comfortably, NAD. BR x 3 hrs post procedure  ENMT: PERRL, +EOMI.  NECK: soft, Supple, No JVD,   CHEST/LUNG: Clear to auscultatation bilaterally; No wheezing.  HEART: S1S2+, Regular rate and rhythm; No murmurs.  ABDOMEN: Soft, Nontender, Nondistended; Bowel sounds present.  MUSCULOSKELETAL: Normal range of motion.  SKIN: No rashes or lesions.  NEURO: AAOX3, no focal deficits, no motor r sensory loss.  PSYCH: normal mood.  Procedure site: ...#6Fr .LFA......, no signs of bleeding or hematoma, neurovascular intact   VASCULAR:   Femoral +2 R/+2 L  PT +2 Doppler  R/+2 L  DP +2 R Palpable /+2 L                          12.9   6.83  )-----------( 206      ( 23 Feb 2022 02:21 )             39.4     02-23    137  |  104  |  16.7  ----------------------------<  92  4.5   |  21.0<L>  |  0.63    Ca    8.6      23 Feb 2022 03:13  Phos  3.3     02-23  Mg     2.0     02-23    TPro  7.4  /  Alb  4.1  /  TBili  <0.2<L>  /  DBili  x   /  AST  20  /  ALT  29  /  AlkPhos  159<H>  02-22    PT/INR - ( 22 Feb 2022 11:20 )   PT: 11.6 sec;   INR: 1.00 ratio         PTT - ( 23 Feb 2022 02:21 )  PTT:79.8 sec    Patient is a 56y old  Male who presents with a chief complaint of RLE pain (23 Feb 2022 09:49)    56y  Male is now s/p   RLE angiogram/ Right SFA-popliteal atherectomy ballooning and JIAN via #6Fr LFA ( now with Mynx closure agent) by Dr Montiel     Pt is already in-patient on 2 BKT  -post  peripheral vascular orders  -Left groin precautions/ education  -bedrest x 3 hours post procedure  -IV Fluids-Normal saline 150cc/ hr x 3 hours post procedure  -bilateral carotid duplex ordered to be performed  -labs and EKG in am  -continue current medical therapy  -heparin infusion discontinued post procedure  -Continue Dual anti platelet therapy with aspirin/ plavix , reinforced importance of strict adherence to DAPT   -Continue statin therapy  -follow up outpt as per Vascular Surgery with Dr Montiel  -Lifestyle modifications discussed to reduce vascular risk factors including weight reduction, smoking cessation, medication compliance, and routine follow up with Cardiologist to track your BMI, cholesterol, and glucose levels.   -Vascular surgery will continue to follow                                                                              Department of Cardiology                                                                  [facility]/[place] [address] [address]                                                            Telephone: [phone]. Fax:[phone]                                                    Post- Procedure Note: RLE angiogram/ Right SFA-popliteal atherectomy and balloon angioplasty      Narrative:  56y  Male is now s/p   RLE angiogram/ Right SFA-popliteal atherectomy and balloon angioplasty    via #6Fr LFA ( now with Mynx closure agent) by Dr Montiel  Pt received 120cc contrast, 7,000 units Heparin, Last ACT was 294, fentanyl 150 mcg, Versed 3 mg, and 100cc Normal saline    General: No fatigue, no fevers/chills  Respiratory: No dyspnea, no cough, no wheeze  CV: No chest pain, no palpitations  Abd: No nausea  Neuro: No headache, no dizziness      PAST MEDICAL & SURGICAL HISTORY:  Hypercholesterolemia    Seizures    Depression    GERD (gastroesophageal reflux disease)    Hypertension    Peripheral vascular disease  with R SFA stent    Osteoarthritis    Former smoker    Prediabetes    Inguinal hernia, left    S/P ORIF (open reduction internal fixation) fracture  Left    S/P shoulder surgery  right    S/P appendectomy    H/O endarterectomy  L femoral a.      Home Medications:  acetaminophen 325 mg oral tablet: 2 tab(s) orally every 6 hours (04 Oct 2021 11:25)  amLODIPine 10 mg oral tablet: 1 tab(s) orally once a day (01 Oct 2021 09:46)  aspirin 81 mg oral tablet, chewable: 1 tab(s) orally once a day (04 Oct 2021 11:25)  atorvastatin 80 mg oral tablet: 1 tab(s) orally once a day (01 Oct 2021 09:46)  Fish Oil 1000 mg oral capsule: 1 cap(s) orally 2 times a day (01 Oct 2021 09:46)  pantoprazole 40 mg oral delayed release tablet: 1 tab(s) orally once a day (01 Oct 2021 09:46)  phenytoin 100 mg oral capsule: 2 cap(s) orally 2 times a day (01 Oct 2021 09:46)  Plavix 75 mg oral tablet: 1 tab(s) orally once a day (01 Oct 2021 09:46)  QUEtiapine 200 mg oral tablet:  orally once a day (at bedtime) (01 Oct 2021 09:46)  venlafaxine 75 mg oral capsule, extended release: 1 cap(s) orally once a day (01 Oct 2021 09:46)        No Known Allergies      Objective:  Vital Signs Last 24 Hrs  T(C): 36.3 (23 Feb 2022 09:45), Max: 36.9 (22 Feb 2022 20:24)  T(F): 97.4 (23 Feb 2022 09:45), Max: 98.4 (22 Feb 2022 20:24)  HR: 73 (23 Feb 2022 12:30) (56 - 80)  BP: 177/79 (23 Feb 2022 12:30) (99/63 - 177/79)  BP(mean): --  RR: 16 (23 Feb 2022 12:30) (12 - 18)  SpO2: 97% (23 Feb 2022 12:30) (92% - 97%)    GENERAL: Pt lying comfortably, NAD. BR x 3 hrs post procedure  ENMT: PERRL, +EOMI.  NECK: soft, Supple, No JVD,   CHEST/LUNG: Clear to auscultatation bilaterally; No wheezing.  HEART: S1S2+, Regular rate and rhythm; No murmurs.  ABDOMEN: Soft, Nontender, Nondistended; Bowel sounds present.  MUSCULOSKELETAL: Normal range of motion.  SKIN: No rashes or lesions.  NEURO: AAOX3, no focal deficits, no motor r sensory loss.  PSYCH: normal mood.  Procedure site: ...#6Fr .LFA......, no signs of bleeding or hematoma, neurovascular intact   VASCULAR:   Femoral +2 R/+2 L  PT +2 Doppler  R/+2 L  DP +2 R Palpable /+2 L                          12.9   6.83  )-----------( 206      ( 23 Feb 2022 02:21 )             39.4     02-23    137  |  104  |  16.7  ----------------------------<  92  4.5   |  21.0<L>  |  0.63    Ca    8.6      23 Feb 2022 03:13  Phos  3.3     02-23  Mg     2.0     02-23    TPro  7.4  /  Alb  4.1  /  TBili  <0.2<L>  /  DBili  x   /  AST  20  /  ALT  29  /  AlkPhos  159<H>  02-22    PT/INR - ( 22 Feb 2022 11:20 )   PT: 11.6 sec;   INR: 1.00 ratio         PTT - ( 23 Feb 2022 02:21 )  PTT:79.8 sec    Patient is a 56y old  Male who presents with a chief complaint of RLE pain (23 Feb 2022 09:49)    56y  Male is now s/p   RLE angiogram/ Right SFA-popliteal atherectomy atherectomy and balloon angioplasty via #6Fr LFA ( now with Mynx closure agent) by Dr Montiel     Pt is already in-patient on 2 BKT  -post procedure peripheral vascular orders  -Left groin precautions/ education  -bedrest x 3 hours post procedure  -IV Fluids-Normal saline 150cc/ hr x 3 hours post procedure  -pain management prn right toe/ LE pain  -bilateral carotid duplex ordered to be performed  -labs and EKG in am  -continue current medical therapy  -heparin infusion discontinued post procedure  -Continue Dual anti platelet therapy with Baby aspirin/ plavix , reinforced importance of strict adherence to DAPT   -Continue statin therapy  -follow up outpt as per Vascular Surgery with Dr Montiel  -Lifestyle modifications discussed to reduce vascular risk factors including weight reduction, smoking cessation, medication compliance, and routine follow up with Cardiologist to track your BMI, cholesterol, and glucose levels.   -Vascular surgery will continue to follow

## 2022-02-23 NOTE — BRIEF OPERATIVE NOTE - OPERATION/FINDINGS
L femoral artery access, right lower extremity angiogram, demonstrated occluded SFA and SFA stent, atherectomy and balloon angioplasty, intraop angiogram good inflow and outflow of SFA stent , occluded AT, doppler PT signal postop

## 2022-02-23 NOTE — CHART NOTE - NSCHARTNOTEFT_GEN_A_CORE
Post-op Check    Subjective:  Pt . Denies chest pain, SOB, palpitations.     STATUS POST:      POST OPERATIVE DAY #: 0    MEDICATIONS  (STANDING):  acetaminophen     Tablet .. 650 milliGRAM(s) Oral every 6 hours  amLODIPine   Tablet 10 milliGRAM(s) Oral daily  aspirin  chewable 81 milliGRAM(s) Oral daily  atorvastatin 80 milliGRAM(s) Oral at bedtime  clopidogrel Tablet 75 milliGRAM(s) Oral daily  influenza   Vaccine 0.5 milliLiter(s) IntraMuscular once  pantoprazole    Tablet 40 milliGRAM(s) Oral before breakfast  phenytoin   Capsule 200 milliGRAM(s) Oral two times a day  QUEtiapine 200 milliGRAM(s) Oral at bedtime  venlafaxine XR. 75 milliGRAM(s) Oral daily    MEDICATIONS  (PRN):  HYDROmorphone  Injectable 0.5 milliGRAM(s) IV Push every 3 hours PRN Severe Pain (7 - 10)  ibuprofen  Tablet. 400 milliGRAM(s) Oral every 6 hours PRN Moderate Pain (4 - 6)  ondansetron Injectable 4 milliGRAM(s) IV Push every 6 hours PRN Nausea  traMADol 25 milliGRAM(s) Oral every 6 hours PRN Severe Pain (7 - 10)      Vital Signs Last 24 Hrs  T(C): 36.3 (23 Feb 2022 09:45), Max: 36.9 (22 Feb 2022 20:24)  T(F): 97.4 (23 Feb 2022 09:45), Max: 98.4 (22 Feb 2022 20:24)  HR: 85 (23 Feb 2022 15:43) (56 - 86)  BP: 178/85 (23 Feb 2022 15:43) (99/63 - 179/78)  BP(mean): --  RR: 20 (23 Feb 2022 15:43) (12 - 20)  SpO2: 100% (23 Feb 2022 15:43) (92% - 100%)    Physical Exam:    Constitutional: NAD  HEENT: PERRL, EOMI  Neck: No JVD, FROM without pain  Respiratory: no accessory muscle use, respirations non-labored  GI: L groin with no hematoma , dressing C/D/I   Vascular: R foot motor and sensory intact, doppler PT signal, some blue discoloration on toes which is better and improved than preop      A: s/p RLE angiogram and atherectomy/angioplasty       plan:   continue ASA , Plavix   stay flat for 3 hours   serial pulse exam   monitor neurovascular   monitor compartments

## 2022-02-23 NOTE — PROGRESS NOTE ADULT - SUBJECTIVE AND OBJECTIVE BOX
HPI/OVERNIGHT EVENTS: Patient seen and examined at bedside this AM. No overnight events. No complaints. however complain of some pins and needle like feeling in R foot.     Vital Signs Last 24 Hrs  T(C): 36.7 (23 Feb 2022 04:17), Max: 36.9 (22 Feb 2022 20:24)  T(F): 98 (23 Feb 2022 04:17), Max: 98.4 (22 Feb 2022 20:24)  HR: 58 (23 Feb 2022 04:17) (58 - 80)  BP: 123/62 (23 Feb 2022 04:17) (99/63 - 177/70)  BP(mean): --  RR: 18 (23 Feb 2022 04:17) (18 - 21)  SpO2: 92% (23 Feb 2022 04:17) (89% - 98%)    I&O's Detail    22 Feb 2022 07:01  -  23 Feb 2022 06:55  --------------------------------------------------------  IN:    Heparin Infusion: 132 mL    Lactated Ringers: 1200 mL    Oral Fluid: 240 mL  Total IN: 1572 mL    OUT:    Voided (mL): 500 mL  Total OUT: 500 mL    Total NET: 1072 mL          Constitutional: patient resting comfortably in bed, in no acute distress  HEENT: EOMI, PERRLA, MMM.  Respiratory: Non labored breathing on RA  Cardiovascular: RRR  Gastrointestinal: Abdomen soft, non-tender, non-distended, no rebound tenderness / guarding  Musculoskeletal: No joint pain, swelling or deformity; no limitation of movement, pain in touch.   Vascular: Extremities warm and well perfused, RLE doppler signal in AT, palpable femoral b/l     LABS:                        12.9   6.83  )-----------( 206      ( 23 Feb 2022 02:21 )             39.4     02-23    137  |  104  |  16.7  ----------------------------<  92  4.5   |  21.0<L>  |  0.63    Ca    8.6      23 Feb 2022 03:13  Phos  3.3     02-23  Mg     2.0     02-23    TPro  7.4  /  Alb  4.1  /  TBili  <0.2<L>  /  DBili  x   /  AST  20  /  ALT  29  /  AlkPhos  159<H>  02-22    PT/INR - ( 22 Feb 2022 11:20 )   PT: 11.6 sec;   INR: 1.00 ratio         PTT - ( 23 Feb 2022 02:21 )  PTT:79.8 sec      MEDICATIONS  (STANDING):  acetaminophen     Tablet .. 650 milliGRAM(s) Oral every 6 hours  amLODIPine   Tablet 10 milliGRAM(s) Oral daily  aspirin  chewable 81 milliGRAM(s) Oral daily  atorvastatin 80 milliGRAM(s) Oral at bedtime  clopidogrel Tablet 75 milliGRAM(s) Oral daily  heparin  Infusion.  Unit(s)/Hr (11 mL/Hr) IV Continuous <Continuous>  influenza   Vaccine 0.5 milliLiter(s) IntraMuscular once  lactated ringers. 1000 milliLiter(s) (100 mL/Hr) IV Continuous <Continuous>  pantoprazole    Tablet 40 milliGRAM(s) Oral before breakfast  phenytoin   Capsule 200 milliGRAM(s) Oral two times a day  QUEtiapine 200 milliGRAM(s) Oral at bedtime  venlafaxine XR. 75 milliGRAM(s) Oral daily    MEDICATIONS  (PRN):  heparin   Injectable 5000 Unit(s) IV Push every 6 hours PRN For aPTT less than 40  heparin   Injectable 2500 Unit(s) IV Push every 6 hours PRN For aPTT between 40 - 57  HYDROmorphone  Injectable 0.5 milliGRAM(s) IV Push every 3 hours PRN Severe Pain (7 - 10)  ibuprofen  Tablet. 400 milliGRAM(s) Oral every 6 hours PRN Moderate Pain (4 - 6)  ondansetron Injectable 4 milliGRAM(s) IV Push every 6 hours PRN Nausea  traMADol 25 milliGRAM(s) Oral every 6 hours PRN Severe Pain (7 - 10)      MICRO:   Cultures     STUDIES:   EKG, CXR, U/S, CT, MRI

## 2022-02-23 NOTE — BRIEF OPERATIVE NOTE - NSICDXBRIEFPROCEDURE_GEN_ALL_CORE_FT
PROCEDURES:  Fluoroscopic angiography guidance for angioplasty of tibioperoneal artery of right lower extremity with intra-arterial contrast 23-Feb-2022 14:33:12 RLE angiogram demonstrated SFA stenosis and occlusion of SFA stent, atherectomy and balloon angioplasty BrianKye

## 2022-02-23 NOTE — PROGRESS NOTE ADULT - SUBJECTIVE AND OBJECTIVE BOX
56yoM w/ PAD (s/p R SFA stenting in 2017 and L femoral angioplasty 10/2021), carotid stenosis, HLD, HTN, pre-DM, GERD, anxiety, and history of severe smoking presented to the office with Dr. Montiel last thursday for worsening RLE pain. Patient states that it feels like sharp pins and needle like feeling. Since then, it has not gotten any better. Patient states he quit smoking. Afebrile. VSS.    General: No fatigue, no fevers/chills  Respiratory: No dyspnea, no cough, no wheeze  CV: No chest pain, no palpitations  Abd: No nausea  Neuro: No headache, no dizziness    PRE-PROCEDURE ASSESSMENT  -NPO after midnight confirmed  -IV insert  -Patient seen and examined  -Labs reviewed  -Pre-procedure teaching completed with patient   -Consent obtained by Interventional Cardiologist  -Questions answered    ASA-  MALL-  BRA-  GFR-109  Creat-0.63  COVID Negative-2/22   56yoM w/ PAD (s/p R SFA stenting in 2017 and L femoral angioplasty 10/2021), carotid stenosis, HLD, HTN, pre-DM, GERD, anxiety, and history of severe smoking presented to the office with Dr. Montiel last thursday for worsening RLE pain. Patient states that it feels like sharp pins and needle like feeling. Since then, it has not gotten any better. Patient states he quit smoking. Afebrile. VSS.    Pt presenting to [facility] area for a RLE angiogram with Dr Montiel this am    General: No fatigue, no fevers/chills  Respiratory: No dyspnea, no cough, no wheeze  CV: No chest pain, no palpitations  Abd: No nausea  Neuro: No headache, no dizziness    PRE-PROCEDURE ASSESSMENT  -NPO after midnight confirmed  -IV insert  -Patient seen and examined-No palpable pulses RLE + Doppler PT no audible DP, Palpable Left DP and PT  -Pt with severe 12/10 RLE pain after doppler assesment-dilaudid given IV   -Labs reviewed  -Pre-procedure teaching completed with patient   -Consent obtained by Vascular Surgeon  -Questions answered    ASA-3  MALL-2  BRA-0.9%  GFR-109  Creat-0.63  COVID Negative-2/22

## 2022-02-24 ENCOUNTER — TRANSCRIPTION ENCOUNTER (OUTPATIENT)
Age: 57
End: 2022-02-24

## 2022-02-24 VITALS
RESPIRATION RATE: 18 BRPM | SYSTOLIC BLOOD PRESSURE: 151 MMHG | OXYGEN SATURATION: 99 % | DIASTOLIC BLOOD PRESSURE: 69 MMHG | HEART RATE: 67 BPM | TEMPERATURE: 98 F

## 2022-02-24 LAB
ANION GAP SERPL CALC-SCNC: 11 MMOL/L — SIGNIFICANT CHANGE UP (ref 5–17)
APTT BLD: 31.3 SEC — SIGNIFICANT CHANGE UP (ref 27.5–35.5)
BUN SERPL-MCNC: 14.1 MG/DL — SIGNIFICANT CHANGE UP (ref 8–20)
CALCIUM SERPL-MCNC: 8.6 MG/DL — SIGNIFICANT CHANGE UP (ref 8.6–10.2)
CHLORIDE SERPL-SCNC: 105 MMOL/L — SIGNIFICANT CHANGE UP (ref 98–107)
CO2 SERPL-SCNC: 21 MMOL/L — LOW (ref 22–29)
CREAT SERPL-MCNC: 0.79 MG/DL — SIGNIFICANT CHANGE UP (ref 0.5–1.3)
GLUCOSE SERPL-MCNC: 97 MG/DL — SIGNIFICANT CHANGE UP (ref 70–99)
HCT VFR BLD CALC: 39.9 % — SIGNIFICANT CHANGE UP (ref 39–50)
HGB BLD-MCNC: 13.4 G/DL — SIGNIFICANT CHANGE UP (ref 13–17)
MAGNESIUM SERPL-MCNC: 1.8 MG/DL — SIGNIFICANT CHANGE UP (ref 1.6–2.6)
MCHC RBC-ENTMCNC: 29.8 PG — SIGNIFICANT CHANGE UP (ref 27–34)
MCHC RBC-ENTMCNC: 33.6 GM/DL — SIGNIFICANT CHANGE UP (ref 32–36)
MCV RBC AUTO: 88.7 FL — SIGNIFICANT CHANGE UP (ref 80–100)
PHOSPHATE SERPL-MCNC: 3.2 MG/DL — SIGNIFICANT CHANGE UP (ref 2.4–4.7)
PLATELET # BLD AUTO: 210 K/UL — SIGNIFICANT CHANGE UP (ref 150–400)
POTASSIUM SERPL-MCNC: 4.6 MMOL/L — SIGNIFICANT CHANGE UP (ref 3.5–5.3)
POTASSIUM SERPL-SCNC: 4.6 MMOL/L — SIGNIFICANT CHANGE UP (ref 3.5–5.3)
RBC # BLD: 4.5 M/UL — SIGNIFICANT CHANGE UP (ref 4.2–5.8)
RBC # FLD: 15.9 % — HIGH (ref 10.3–14.5)
SODIUM SERPL-SCNC: 137 MMOL/L — SIGNIFICANT CHANGE UP (ref 135–145)
WBC # BLD: 9.21 K/UL — SIGNIFICANT CHANGE UP (ref 3.8–10.5)
WBC # FLD AUTO: 9.21 K/UL — SIGNIFICANT CHANGE UP (ref 3.8–10.5)

## 2022-02-24 PROCEDURE — 86901 BLOOD TYPING SEROLOGIC RH(D): CPT

## 2022-02-24 PROCEDURE — 85610 PROTHROMBIN TIME: CPT

## 2022-02-24 PROCEDURE — 93880 EXTRACRANIAL BILAT STUDY: CPT

## 2022-02-24 PROCEDURE — 86850 RBC ANTIBODY SCREEN: CPT

## 2022-02-24 PROCEDURE — C1760: CPT

## 2022-02-24 PROCEDURE — C2623: CPT

## 2022-02-24 PROCEDURE — 99152 MOD SED SAME PHYS/QHP 5/>YRS: CPT

## 2022-02-24 PROCEDURE — U0005: CPT

## 2022-02-24 PROCEDURE — C1769: CPT

## 2022-02-24 PROCEDURE — C1887: CPT

## 2022-02-24 PROCEDURE — 36415 COLL VENOUS BLD VENIPUNCTURE: CPT

## 2022-02-24 PROCEDURE — 86803 HEPATITIS C AB TEST: CPT

## 2022-02-24 PROCEDURE — 80053 COMPREHEN METABOLIC PANEL: CPT

## 2022-02-24 PROCEDURE — 80048 BASIC METABOLIC PNL TOTAL CA: CPT

## 2022-02-24 PROCEDURE — 99285 EMERGENCY DEPT VISIT HI MDM: CPT | Mod: 25

## 2022-02-24 PROCEDURE — 84100 ASSAY OF PHOSPHORUS: CPT

## 2022-02-24 PROCEDURE — 93005 ELECTROCARDIOGRAM TRACING: CPT

## 2022-02-24 PROCEDURE — U0003: CPT

## 2022-02-24 PROCEDURE — C1894: CPT

## 2022-02-24 PROCEDURE — 75716 ARTERY X-RAYS ARMS/LEGS: CPT | Mod: 59

## 2022-02-24 PROCEDURE — 85027 COMPLETE CBC AUTOMATED: CPT

## 2022-02-24 PROCEDURE — C1725: CPT

## 2022-02-24 PROCEDURE — 37225: CPT

## 2022-02-24 PROCEDURE — 86900 BLOOD TYPING SEROLOGIC ABO: CPT

## 2022-02-24 PROCEDURE — 93010 ELECTROCARDIOGRAM REPORT: CPT

## 2022-02-24 PROCEDURE — 85025 COMPLETE CBC W/AUTO DIFF WBC: CPT

## 2022-02-24 PROCEDURE — 85730 THROMBOPLASTIN TIME PARTIAL: CPT

## 2022-02-24 PROCEDURE — 99153 MOD SED SAME PHYS/QHP EA: CPT

## 2022-02-24 PROCEDURE — 83735 ASSAY OF MAGNESIUM: CPT

## 2022-02-24 RX ADMIN — PANTOPRAZOLE SODIUM 40 MILLIGRAM(S): 20 TABLET, DELAYED RELEASE ORAL at 05:11

## 2022-02-24 RX ADMIN — Medication 200 MILLIGRAM(S): at 05:10

## 2022-02-24 RX ADMIN — Medication 650 MILLIGRAM(S): at 05:11

## 2022-02-24 RX ADMIN — Medication 650 MILLIGRAM(S): at 05:10

## 2022-02-24 RX ADMIN — AMLODIPINE BESYLATE 10 MILLIGRAM(S): 2.5 TABLET ORAL at 05:11

## 2022-02-24 NOTE — DISCHARGE NOTE PROVIDER - CARE PROVIDER_API CALL
Husam Montiel)  Surgery  [address], [address] [place] [address]  Phone: [phone]  Fax: [phone]  Follow Up Time: 2 weeks

## 2022-02-24 NOTE — DISCHARGE NOTE NURSING/CASE MANAGEMENT/SOCIAL WORK - NSDCPEFALRISK_GEN_ALL_CORE
For information on Fall & Injury Prevention, visit: [email or web address] OR  [email or web address] OR  https://www.cdc.gov/steadi/patient.html

## 2022-02-24 NOTE — DISCHARGE NOTE NURSING/CASE MANAGEMENT/SOCIAL WORK - PATIENT PORTAL LINK FT
You can access the FollowMyHealth Patient Portal offered by [facility] by registering at the following website: [email or web address]. By joining [facility]’s FollowMyHealth portal, you will also be able to view your health information using other applications (apps) compatible with our system.

## 2022-02-24 NOTE — DISCHARGE NOTE PROVIDER - HOSPITAL COURSE
56yoM w/ PAD (s/p R SFA stenting in 2017 and L femoral angioplasty 10/2021), carotid stenosis, HLD, HTN, pre-DM, GERD, anxiety, and history of severe smoking presented to the office with Dr. Montiel last thursday for worsening RLE pain. Patient states that it feels like sharp pins and needle like feeling. Since then, it has not gotten any better. Patient states he quit smoking. Afebrile. VSS.  On 2/23/22 patient was taken to the OR for a right lower extremity angiogram for an occluded SFA that was treated with balloon angioplasty. Post intervention angiogram demonstrated adequate inflow and outflow of the SFA stent. Procedure was well tolerated with no complications. On POD 1 patient stated that his pain has completely resolved. Patient had PT doppler signal post operatively. Patient meeting all discharge criteria.

## 2022-02-24 NOTE — PROGRESS NOTE ADULT - SUBJECTIVE AND OBJECTIVE BOX
HPI/OVERNIGHT EVENTS: Patient seen and examined at bedside this AM. POD 1 for angiogram and atherectomy, SFA angioplasty of RLE, tolerated procedure well, pain improved, doing well this AM    Vital Signs Last 24 Hrs  T(C): 36.7 (24 Feb 2022 04:27), Max: 36.9 (23 Feb 2022 20:14)  T(F): 98.1 (24 Feb 2022 04:27), Max: 98.5 (23 Feb 2022 20:14)  HR: 87 (24 Feb 2022 04:27) (56 - 96)  BP: 117/71 (24 Feb 2022 04:27) (93/56 - 180/96)  BP(mean): --  RR: 18 (24 Feb 2022 04:27) (12 - 20)  SpO2: 93% (24 Feb 2022 04:27) (92% - 100%)    I&O's Detail    23 Feb 2022 07:01  -  24 Feb 2022 07:00  --------------------------------------------------------  IN:  Total IN: 0 mL    OUT:    Voided (mL): 350 mL  Total OUT: 350 mL    Total NET: -350 mL          Constitutional: patient resting comfortably in bed, in no acute distress  HEENT: EOMI, PERRLA, MMM.  Respiratory: Non labored breathing on RA  Cardiovascular: RRR  Gastrointestinal: Abdomen soft, non-tender, non-distended, no rebound tenderness / guarding, groin with dressing C/D/I  Musculoskeletal: No joint pain, swelling or deformity; no limitation of movement, pain in touch.   Vascular: Extremities warm and well perfused, RLE doppler signal in AT, palpable femoral b/l   LABS:                        13.4   9.21  )-----------( 210      ( 24 Feb 2022 05:39 )             39.9     02-24    137  |  105  |  14.1  ----------------------------<  97  4.6   |  21.0<L>  |  0.79    Ca    8.6      24 Feb 2022 05:39  Phos  3.2     02-24  Mg     1.8     02-24    TPro  7.4  /  Alb  4.1  /  TBili  <0.2<L>  /  DBili  x   /  AST  20  /  ALT  29  /  AlkPhos  159<H>  02-22    PT/INR - ( 22 Feb 2022 11:20 )   PT: 11.6 sec;   INR: 1.00 ratio         PTT - ( 24 Feb 2022 05:39 )  PTT:31.3 sec      MEDICATIONS  (STANDING):  acetaminophen     Tablet .. 650 milliGRAM(s) Oral every 6 hours  amLODIPine   Tablet 10 milliGRAM(s) Oral daily  aspirin  chewable 81 milliGRAM(s) Oral daily  atorvastatin 80 milliGRAM(s) Oral at bedtime  clopidogrel Tablet 75 milliGRAM(s) Oral daily  influenza   Vaccine 0.5 milliLiter(s) IntraMuscular once  pantoprazole    Tablet 40 milliGRAM(s) Oral before breakfast  phenytoin   Capsule 200 milliGRAM(s) Oral two times a day  QUEtiapine 200 milliGRAM(s) Oral at bedtime  venlafaxine XR. 75 milliGRAM(s) Oral daily    MEDICATIONS  (PRN):  HYDROmorphone  Injectable 0.5 milliGRAM(s) IV Push every 3 hours PRN Severe Pain (7 - 10)  ibuprofen  Tablet. 400 milliGRAM(s) Oral every 6 hours PRN Moderate Pain (4 - 6)  ondansetron Injectable 4 milliGRAM(s) IV Push every 6 hours PRN Nausea  traMADol 25 milliGRAM(s) Oral every 6 hours PRN Severe Pain (7 - 10)      MICRO:   Cultures     STUDIES:   EKG, CXR, U/S, CT, MRI

## 2022-02-24 NOTE — PROGRESS NOTE ADULT - ASSESSMENT
56yoM with PAD, HLD, carotid stenosis, HTN, pre-dm, gerd, anxiety who presents with worsening pain in the RLE. Patient has a history of a SFA stent in 2017.  - npo @[facility] / ivf  - hep gtt  - cath lab today on 2/23 for angiogram  
56yoM with PAD, HLD, carotid stenosis, HTN, pre-dm, gerd, anxiety who presents with worsening pain in the RLE. Patient has a history of a SFA stent in 2017. now s/p angiogram with atherectomy and angioplasty doing well  - DASH  - ASA/Plavix  - ambulate  - f.u in office in 2 weeks  - Dispo: Dc

## 2022-02-24 NOTE — DISCHARGE NOTE PROVIDER - NSDCCPCAREPLAN_GEN_ALL_CORE_FT
PRINCIPAL DISCHARGE DIAGNOSIS  Diagnosis: Peripheral arterial disease  Assessment and Plan of Treatment:

## 2022-02-25 ENCOUNTER — NON-APPOINTMENT (OUTPATIENT)
Age: 57
End: 2022-02-25

## 2022-02-28 PROBLEM — I73.9 PERIPHERAL VASCULAR DISEASE, UNSPECIFIED: Chronic | Status: ACTIVE | Noted: 2017-06-09

## 2022-03-01 ENCOUNTER — APPOINTMENT (OUTPATIENT)
Dept: VASCULAR SURGERY | Facility: CLINIC | Age: 57
End: 2022-03-01

## 2022-03-21 ENCOUNTER — RX RENEWAL (OUTPATIENT)
Age: 57
End: 2022-03-21

## 2022-03-29 ENCOUNTER — APPOINTMENT (OUTPATIENT)
Dept: VASCULAR SURGERY | Facility: CLINIC | Age: 57
End: 2022-03-29

## 2022-04-09 ENCOUNTER — EMERGENCY (EMERGENCY)
Facility: HOSPITAL | Age: 57
LOS: 1 days | Discharge: HOME | End: 2022-04-09
Attending: EMERGENCY MEDICINE
Payer: COMMERCIAL

## 2022-04-09 VITALS
RESPIRATION RATE: 18 BRPM | SYSTOLIC BLOOD PRESSURE: 188 MMHG | HEART RATE: 75 BPM | OXYGEN SATURATION: 92 % | WEIGHT: 134.92 LBS | DIASTOLIC BLOOD PRESSURE: 71 MMHG | HEIGHT: 65 IN | TEMPERATURE: 98 F

## 2022-04-09 VITALS
RESPIRATION RATE: 18 BRPM | OXYGEN SATURATION: 97 % | SYSTOLIC BLOOD PRESSURE: 179 MMHG | DIASTOLIC BLOOD PRESSURE: 70 MMHG | TEMPERATURE: 98 F | HEART RATE: 57 BPM

## 2022-04-09 DIAGNOSIS — K40.90 UNILATERAL INGUINAL HERNIA, WITHOUT OBSTRUCTION OR GANGRENE, NOT SPECIFIED AS RECURRENT: Chronic | ICD-10-CM

## 2022-04-09 DIAGNOSIS — Z96.7 PRESENCE OF OTHER BONE AND TENDON IMPLANTS: Chronic | ICD-10-CM

## 2022-04-09 DIAGNOSIS — Z98.890 OTHER SPECIFIED POSTPROCEDURAL STATES: Chronic | ICD-10-CM

## 2022-04-09 DIAGNOSIS — Z98.89 OTHER SPECIFIED POSTPROCEDURAL STATES: Chronic | ICD-10-CM

## 2022-04-09 LAB
ALBUMIN SERPL ELPH-MCNC: 4.3 G/DL — SIGNIFICANT CHANGE UP (ref 3.3–5.2)
ALP SERPL-CCNC: 147 U/L — HIGH (ref 40–120)
ALT FLD-CCNC: 24 U/L — SIGNIFICANT CHANGE UP
ANION GAP SERPL CALC-SCNC: 16 MMOL/L — SIGNIFICANT CHANGE UP (ref 5–17)
APTT BLD: 20.9 SEC — LOW (ref 27.5–35.5)
AST SERPL-CCNC: 22 U/L — SIGNIFICANT CHANGE UP
BASOPHILS # BLD AUTO: 0.05 K/UL — SIGNIFICANT CHANGE UP (ref 0–0.2)
BASOPHILS NFR BLD AUTO: 0.5 % — SIGNIFICANT CHANGE UP (ref 0–2)
BILIRUB SERPL-MCNC: <0.2 MG/DL — LOW (ref 0.4–2)
BUN SERPL-MCNC: 9.9 MG/DL — SIGNIFICANT CHANGE UP (ref 8–20)
CALCIUM SERPL-MCNC: 8.9 MG/DL — SIGNIFICANT CHANGE UP (ref 8.6–10.2)
CHLORIDE SERPL-SCNC: 105 MMOL/L — SIGNIFICANT CHANGE UP (ref 98–107)
CO2 SERPL-SCNC: 20 MMOL/L — LOW (ref 22–29)
CREAT SERPL-MCNC: 0.64 MG/DL — SIGNIFICANT CHANGE UP (ref 0.5–1.3)
EGFR: 109 ML/MIN/1.73M2 — SIGNIFICANT CHANGE UP
EOSINOPHIL # BLD AUTO: 0.23 K/UL — SIGNIFICANT CHANGE UP (ref 0–0.5)
EOSINOPHIL NFR BLD AUTO: 2.3 % — SIGNIFICANT CHANGE UP (ref 0–6)
GLUCOSE SERPL-MCNC: 92 MG/DL — SIGNIFICANT CHANGE UP (ref 70–99)
HCT VFR BLD CALC: 41.1 % — SIGNIFICANT CHANGE UP (ref 39–50)
HGB BLD-MCNC: 13.7 G/DL — SIGNIFICANT CHANGE UP (ref 13–17)
IMM GRANULOCYTES NFR BLD AUTO: 0.4 % — SIGNIFICANT CHANGE UP (ref 0–1.5)
INR BLD: 0.91 RATIO — SIGNIFICANT CHANGE UP (ref 0.88–1.16)
LYMPHOCYTES # BLD AUTO: 2.02 K/UL — SIGNIFICANT CHANGE UP (ref 1–3.3)
LYMPHOCYTES # BLD AUTO: 20 % — SIGNIFICANT CHANGE UP (ref 13–44)
MAGNESIUM SERPL-MCNC: 2 MG/DL — SIGNIFICANT CHANGE UP (ref 1.6–2.6)
MCHC RBC-ENTMCNC: 30.7 PG — SIGNIFICANT CHANGE UP (ref 27–34)
MCHC RBC-ENTMCNC: 33.3 GM/DL — SIGNIFICANT CHANGE UP (ref 32–36)
MCV RBC AUTO: 92.2 FL — SIGNIFICANT CHANGE UP (ref 80–100)
MONOCYTES # BLD AUTO: 0.84 K/UL — SIGNIFICANT CHANGE UP (ref 0–0.9)
MONOCYTES NFR BLD AUTO: 8.3 % — SIGNIFICANT CHANGE UP (ref 2–14)
NEUTROPHILS # BLD AUTO: 6.94 K/UL — SIGNIFICANT CHANGE UP (ref 1.8–7.4)
NEUTROPHILS NFR BLD AUTO: 68.5 % — SIGNIFICANT CHANGE UP (ref 43–77)
PLATELET # BLD AUTO: 215 K/UL — SIGNIFICANT CHANGE UP (ref 150–400)
POTASSIUM SERPL-MCNC: 4.3 MMOL/L — SIGNIFICANT CHANGE UP (ref 3.5–5.3)
POTASSIUM SERPL-SCNC: 4.3 MMOL/L — SIGNIFICANT CHANGE UP (ref 3.5–5.3)
PROT SERPL-MCNC: 7.3 G/DL — SIGNIFICANT CHANGE UP (ref 6.6–8.7)
PROTHROM AB SERPL-ACNC: 10.5 SEC — SIGNIFICANT CHANGE UP (ref 10.5–13.4)
RAPID RVP RESULT: SIGNIFICANT CHANGE UP
RBC # BLD: 4.46 M/UL — SIGNIFICANT CHANGE UP (ref 4.2–5.8)
RBC # FLD: 15 % — HIGH (ref 10.3–14.5)
SARS-COV-2 RNA SPEC QL NAA+PROBE: SIGNIFICANT CHANGE UP
SODIUM SERPL-SCNC: 140 MMOL/L — SIGNIFICANT CHANGE UP (ref 135–145)
WBC # BLD: 10.12 K/UL — SIGNIFICANT CHANGE UP (ref 3.8–10.5)
WBC # FLD AUTO: 10.12 K/UL — SIGNIFICANT CHANGE UP (ref 3.8–10.5)

## 2022-04-09 PROCEDURE — 36415 COLL VENOUS BLD VENIPUNCTURE: CPT

## 2022-04-09 PROCEDURE — 73630 X-RAY EXAM OF FOOT: CPT

## 2022-04-09 PROCEDURE — 93926 LOWER EXTREMITY STUDY: CPT | Mod: 26,RT

## 2022-04-09 PROCEDURE — 80053 COMPREHEN METABOLIC PANEL: CPT

## 2022-04-09 PROCEDURE — 99285 EMERGENCY DEPT VISIT HI MDM: CPT | Mod: 25

## 2022-04-09 PROCEDURE — 96375 TX/PRO/DX INJ NEW DRUG ADDON: CPT

## 2022-04-09 PROCEDURE — 85610 PROTHROMBIN TIME: CPT

## 2022-04-09 PROCEDURE — 73630 X-RAY EXAM OF FOOT: CPT | Mod: 26,RT

## 2022-04-09 PROCEDURE — 83735 ASSAY OF MAGNESIUM: CPT

## 2022-04-09 PROCEDURE — 93923 UPR/LXTR ART STDY 3+ LVLS: CPT | Mod: 26

## 2022-04-09 PROCEDURE — 96374 THER/PROPH/DIAG INJ IV PUSH: CPT

## 2022-04-09 PROCEDURE — 85025 COMPLETE CBC W/AUTO DIFF WBC: CPT

## 2022-04-09 PROCEDURE — 99285 EMERGENCY DEPT VISIT HI MDM: CPT

## 2022-04-09 PROCEDURE — 85730 THROMBOPLASTIN TIME PARTIAL: CPT

## 2022-04-09 PROCEDURE — 93926 LOWER EXTREMITY STUDY: CPT

## 2022-04-09 PROCEDURE — 0225U NFCT DS DNA&RNA 21 SARSCOV2: CPT

## 2022-04-09 PROCEDURE — 93971 EXTREMITY STUDY: CPT | Mod: 26,RT

## 2022-04-09 PROCEDURE — 93005 ELECTROCARDIOGRAM TRACING: CPT

## 2022-04-09 PROCEDURE — 93923 UPR/LXTR ART STDY 3+ LVLS: CPT

## 2022-04-09 PROCEDURE — 93971 EXTREMITY STUDY: CPT

## 2022-04-09 PROCEDURE — 93010 ELECTROCARDIOGRAM REPORT: CPT

## 2022-04-09 RX ORDER — MORPHINE SULFATE 50 MG/1
4 CAPSULE, EXTENDED RELEASE ORAL ONCE
Refills: 0 | Status: DISCONTINUED | OUTPATIENT
Start: 2022-04-09 | End: 2022-04-09

## 2022-04-09 RX ORDER — ACETAMINOPHEN 500 MG
1000 TABLET ORAL ONCE
Refills: 0 | Status: COMPLETED | OUTPATIENT
Start: 2022-04-09 | End: 2022-04-09

## 2022-04-09 RX ADMIN — Medication 400 MILLIGRAM(S): at 08:06

## 2022-04-09 RX ADMIN — MORPHINE SULFATE 4 MILLIGRAM(S): 50 CAPSULE, EXTENDED RELEASE ORAL at 10:45

## 2022-04-09 NOTE — ED ADULT TRIAGE NOTE - CHIEF COMPLAINT QUOTE
patient states that he had stents placed to right side of leg, complaining of numbness to right foot for 2 days difficulty ambulating

## 2022-04-09 NOTE — ED PROVIDER NOTE - PATIENT PORTAL LINK FT
You can access the FollowMyHealth Patient Portal offered by [facility] by registering at the following website: [email or web address]. By joining [facility]’s FollowMyHealth portal, you will also be able to view your health information using other applications (apps) compatible with our system.

## 2022-04-09 NOTE — CONSULT NOTE ADULT - ASSESSMENT
ASSESSMENT: Patient is a 57y old male with PAD, now with Left foot pain swelling, present signals, history of gout, however given history of vascular interventions recommend US venous dupplex to rule out DVT and Arterial dupplex to assess stent    PLAN:    - Consider uric acid  - recommend Dupplex venous, and arterial to rule out DVT and assess stent  - f/u X rays  - Discussed with Dr. Jama

## 2022-04-09 NOTE — ED PROVIDER NOTE - NSFOLLOWUPINSTRUCTIONS_ED_ALL_ED_FT
FOLLOW UP WITH DR. HART IN THE OFFICE AS SOON AS POSSIBLE. RETURN TO THE EMERGENCY DEPARTMENT IF YOU HAVE WORSENING PAIN, DISCOLORATION OF THE FOOT, NUMBNESS OF THE LEG/FOOT, OR ANY WORSENING SYMPTOMS.    Peripheral Artery Disease    WHAT YOU NEED TO KNOW:    Peripheral artery disease (PAD) is narrow, weak, or blocked arteries. It may affect any arteries outside of your heart and brain. PAD is usually the result of a buildup of fat and cholesterol, also called plaque, along your artery walls. Inflammation, a blood clot, or abnormal cell growth could also block your arteries. PAD prevents normal blood flow to your legs and arms. You are at risk of an amputation if poor blood flow keeps wounds from healing or causes gangrene (tissue death). Without treatment, PAD can also cause a heart attack or stroke.    DISCHARGE INSTRUCTIONS:    Call your local emergency number (911 in the ) if:   •You have any of the following signs of a heart attack: ?Squeezing, pressure, or pain in your chest      ?You may also have any of the following: ?Discomfort or pain in your back, neck, jaw, stomach, or arm      ?Shortness of breath      ?Nausea or vomiting      ?Lightheadedness or a sudden cold sweat        •You have any of the following signs of a stroke: ?Numbness or drooping on one side of your face       ?Weakness in an arm or leg      ?Confusion or difficulty speaking      ?Dizziness, a severe headache, or vision loss        Return to the emergency department if:   •You have sores or wounds that will not heal.      •You notice black or discolored skin on your arm or leg.      •Your skin is cool to the touch.      Call your doctor if:   •You have leg pain when you walk 1/8 mile (200 meters) or less, even with treatment.      •Your legs are red, dry, or pale, even with treatment.      •You have questions or concerns about your condition or care.      Manage PAD:   •Walk for 30 to 60 minutes at least 4 times a week. Your healthcare provider may also refer you to a supervised exercise program. The program helps increase how far you can walk without pain. It also helps you stay active in normal daily activities.   Family Walking for Exercise           •Do not smoke. Nicotine and other chemicals in cigarettes and cigars can worsen PAD. They can also increase your risk for a heart attack or stroke. Ask your healthcare provider for information if you currently smoke and need help to quit. E-cigarettes or smokeless tobacco still contain nicotine. Talk to your healthcare provider before you use these products.      •Manage other health conditions. Take your medicines as directed and follow your healthcare provider's instructions if you have high blood pressure or high cholesterol. Perform foot care and check your blood sugar levels as directed if you have diabetes.      •Eat heart-healthy foods. Eat whole grains, fruits, and vegetables every day. Limit salt and high-fat foods. Ask your healthcare provider for more information on a heart healthy diet. Ask what a healthy weight is for you. Your healthcare provider can help you create a healthy weight-loss plan, if needed.             Medicines: You may need any of the following:  •Antiplatelets, such as aspirin, help prevent blood clots. Take your antiplatelet medicine exactly as directed. These medicines make it more likely for you to bleed or bruise. If you are told to take aspirin, do not take acetaminophen or ibuprofen instead.       •Statin medicine helps lower your cholesterol and prevents your PAD from getting worse.      •Take your medicine as directed. Contact your healthcare provider if you think your medicine is not helping or if you have side effects. Tell him or her if you are allergic to any medicine. Keep a list of the medicines, vitamins, and herbs you take. Include the amounts, and when and why you take them. Bring the list or the pill bottles to follow-up visits. Carry your medicine list with you in case of an emergency.      Follow up with your doctor as directed: Write down your questions so you remember to ask them during your visits.

## 2022-04-09 NOTE — CONSULT NOTE ADULT - SUBJECTIVE AND OBJECTIVE BOX
VASCULAR SURGERY CONSULT     HPI: 57y Male present to ED with Right foot pain    57 male well known to vascular service previous R SFA stent in 2015 with Dr. Mitchell and subsequent angio in february of this year with Dr. Montiel who presents with Right foot pain for two days, shooting as well as swelling of toes, difficulty to bear weight due to pain, denies weakness or coolness, has not followed up in office since intervention. Has been taking ASA/Plavix. Denies fever, SOB, LLE swelling, endorses symptoms of claudication significantly improved after first intervention, this current pain is not as previous one.    ROS: 10-system review is otherwise negative except HPI above.      PAST MEDICAL & SURGICAL HISTORY:  Hypercholesterolemia    Seizures    Depression    GERD (gastroesophageal reflux disease)    Hypertension    Peripheral vascular disease  with R SFA stent    Osteoarthritis    Former smoker    Prediabetes    Inguinal hernia, left    S/P ORIF (open reduction internal fixation) fracture  Left    S/P shoulder surgery  right    S/P appendectomy    H/O endarterectomy  L femoral a.      FAMILY HISTORY:  Family history of breast cancer (Mother)    Family history of hepatitis (Mother)    Family history of heart disease (Mother, Sibling)    Family history of CABG (Father)    Family history of peripheral vascular disease (Father)    Family history of brain aneurysm (Sibling)      Family history not pertinent as reviewed with the patient.    SOCIAL HISTORY:  Denies any toxic habits    ALLERGIES: NKA No Known Allergies      HOME MEDICATIONS: ***  Home Medications:  acetaminophen 325 mg oral tablet: 2 tab(s) orally every 6 hours (04 Oct 2021 11:25)  amLODIPine 10 mg oral tablet: 1 tab(s) orally once a day (01 Oct 2021 09:46)  aspirin 81 mg oral tablet, chewable: 1 tab(s) orally once a day (04 Oct 2021 11:25)  atorvastatin 80 mg oral tablet: 1 tab(s) orally once a day (01 Oct 2021 09:46)  Fish Oil 1000 mg oral capsule: 1 cap(s) orally 2 times a day (01 Oct 2021 09:46)  pantoprazole 40 mg oral delayed release tablet: 1 tab(s) orally once a day (01 Oct 2021 09:46)  phenytoin 100 mg oral capsule: 2 cap(s) orally 2 times a day (01 Oct 2021 09:46)  Plavix 75 mg oral tablet: 1 tab(s) orally once a day (01 Oct 2021 09:46)  QUEtiapine 200 mg oral tablet:  orally once a day (at bedtime) (01 Oct 2021 09:46)  venlafaxine 75 mg oral capsule, extended release: 1 cap(s) orally once a day (01 Oct 2021 09:46)      --------------------------------------------------------------------------------------------    PHYSICAL EXAM:   General: NAD, Lying in bed comfortably  Neuro: A+Ox3  HEENT: EOMI, SPENCER, GARY  Cardio: RRR  Resp: Non labored breathing on RA  GI/Abd: Soft, NT/ND, no rebound/guarding, no masses palpated, groins, left incision completely healed  Vascular: bilateral palpable femoral pulses, doppler bilaterally AT/PT  Pelvis: stable  Musculoskeletal: All 4 extremities moving spontaneously, no limitations, no spinal tenderness. swelling of right foot  --------------------------------------------------------------------------------------------    LABS                 13.7   10.12  )----------(  215       ( 09 Apr 2022 07:54 )               41.1                  CAPILLARY BLOOD GLUCOSE              --------------------------------------------------------------------------------------------  IMAGING  pending X ray

## 2022-04-09 NOTE — ED PROVIDER NOTE - PHYSICAL EXAMINATION
Constitutional: Well appearing, awake, alert, oriented to person, place, and time/situation and in no apparent distress  ENMT: Airway patent nasal mucosa clear. Mouth with normal mucosa. Throat has no vesicles no oropharyngeal exudates and uvula is midline. No blood in the oropharynx  EYES: clear bilaterally, pupils equal, round and reactive to light  Cardiac: Regular rate, regular rhythm. Heart sounds S1, S2. No murmurs, rubs or gallops. Good capillary refill, 2+ pulses, no peripheral edema  Respiratory: Lungs CTAB, no use of accessory muscles, no crackles, satting 92% on RA in no distress  Gastrointestinal: Abdomen nondistended, non-tender, no rebound guarding or peritoneal signs  Genitourinary: No CVA tenderness, pelvis nontender, bladder nondistended  Musculoskeletal: Spine appears normal, range of motion is not limited, no muscle or joint tenderness. Full ROM at the hips knees ankles, toes; R foot toes erythematous and edematous no open wounds or lacerations no drainage or fluctuance, 1+ dopplerable PT and DP pulses, toes TTP, full ROM, sensation intact,   Neurological: Alert and oriented, no focal deficits, no motor or sensory deficits. CN 2-12 intact, PERRLA, EOMI, No FND, moving all extremities equally, sensation intact, No dysmetria, no ataxia, negative heel-shin, 5/5 strength   Skin: Skin normal color for race, warm, dry and intact, No evidence of rash

## 2022-04-09 NOTE — ED PROVIDER NOTE - OBJECTIVE STATEMENT
Patient is a 56 yo male with PMHx HTN, occluded R SFA s/p balloon angioplasty by Dr. Montiel on 2/23/22 presenting with 2 days of burning paresthesias to his R foot, along with edema and erythema. Patient states for 2 days he has had burning shooting pains from his leg down to his R toes, edema of his toes, pain with ambulation, and erythema; patient denies skin changes, falls, pallor, pain with ROM (pain is at rest), skin wounds, pallor, poikilothermia, fevers. Denies fevers, chills, dizziness, lightheadedness, dysphagia, dysarthria, diplopia, photophobia, syncope, cough, congestion, SOB, CP, abdominal pain, neck pain, back pain, diarrhea, dysuria, hematuria, hematochezia, hematemesis, n/v, recent travel, sick contacts.

## 2022-04-09 NOTE — ED PROVIDER NOTE - PROGRESS NOTE DETAILS
[place]: Patient does not want to stay as he has a family event to attend. JK - US arterial showing Occlusion from the mid SFA to the popliteal with tardus parvus flow in the posterior tibial artery. Vascular consulted, recommended outpatient f/u with Dr. Montiel as patient is safe to DC at this time. Patient reports improvement in his symptoms with morphine, Tylenol. Resting comfortably, VSS, in no apparent distress, ambulating on his own without assistance. Ready and agreeable to DC with return precautions and close vascular sx follow up.

## 2022-04-09 NOTE — ED ADULT NURSE NOTE - OBJECTIVE STATEMENT
Patient A&Ox3 complaining of right foot numbness and 10/10 pain starting 2 days ago. Patient denies SOB, chest pain, chills, or dizziness. Patient states he had stent placed 2/2022.

## 2022-04-09 NOTE — ED PROVIDER NOTE - ATTENDING CONTRIBUTION TO CARE
I personally saw the patient with the resident, and completed the key components of the history and physical exam. I then discussed the management plan with the resident.    58 y/o M vasculopath presents with worsening paresthesia and pain to his distal right foot and toes, pain with rest, not relieved with elevation, unable to walk or sleep due to pain, had right occluded right SFA with balloon angioplasty by Dr. Montiel on 2/23/22.    PE - NAD, right foot with dopplerable DP pulse, dusky distal toes 2-5 with diminished sensation, decreased ROM, pain to palpation, toes are warm with cap refill 3-5 seconds, calf soft and non-tender, no rashes, no edema, RRR,lungs CTA B/L, abd soft NT/ND, 2+ symmetrical radial pulses.    pain control, labs, dopplers, vascular consult.

## 2022-04-09 NOTE — ED PROVIDER NOTE - CARE PROVIDER_API CALL
Husam Montiel)  Surgery  [address] [facility], [address] [place] [address]  Phone: [phone]  Fax: [phone]  Established Patient  Follow Up Time: Urgent

## 2022-04-09 NOTE — ED PROVIDER NOTE - CLINICAL SUMMARY MEDICAL DECISION MAKING FREE TEXT BOX
Patient is a 56 yo male with PMHx HTN, occluded R SFA s/p balloon angioplasty by Dr. Montiel on 2/23/22 on asa/plavix presenting with 2 days of burning paresthesias to his R foot, along with edema and erythema. Patient states for 2 days he has had burning shooting pains from his leg down to his R toes, edema of his toes, pain with ambulation, and erythema; patient denies skin changes, falls, pallor, pain with ROM (pain is at rest), skin wounds, pallor, poikilothermia, fevers. VSS, Full ROM at the hips knees ankles, toes; R foot toes erythematous and edematous no open wounds or lacerations no drainage or fluctuance, 1+ dopplerable PT and DP pulses, toes TTP, full ROM, sensation intact. Vascular surgery consulted. Xrays to r/o OM vs. fractures. Ofirmev for pain. CBC, CMP, Coags to r/o infection and electrolyte abnormalities. Will continue to monitor.

## 2022-04-11 PROBLEM — Z11.59 SCREENING FOR VIRAL DISEASE: Status: ACTIVE | Noted: 2020-06-10

## 2022-04-13 ENCOUNTER — INPATIENT (INPATIENT)
Facility: HOSPITAL | Age: 57
LOS: 5 days | Discharge: HOME | DRG: 272 | End: 2022-04-19
Attending: SURGERY | Admitting: SURGERY
Payer: COMMERCIAL

## 2022-04-13 VITALS
OXYGEN SATURATION: 91 % | HEIGHT: 65 IN | DIASTOLIC BLOOD PRESSURE: 57 MMHG | TEMPERATURE: 98 F | SYSTOLIC BLOOD PRESSURE: 125 MMHG | WEIGHT: 139.77 LBS | RESPIRATION RATE: 20 BRPM | HEART RATE: 113 BPM

## 2022-04-13 DIAGNOSIS — Z01.818 ENCOUNTER FOR OTHER PREPROCEDURAL EXAMINATION: ICD-10-CM

## 2022-04-13 DIAGNOSIS — Z98.89 OTHER SPECIFIED POSTPROCEDURAL STATES: Chronic | ICD-10-CM

## 2022-04-13 DIAGNOSIS — K40.90 UNILATERAL INGUINAL HERNIA, WITHOUT OBSTRUCTION OR GANGRENE, NOT SPECIFIED AS RECURRENT: Chronic | ICD-10-CM

## 2022-04-13 DIAGNOSIS — I73.9 PERIPHERAL VASCULAR DISEASE, UNSPECIFIED: ICD-10-CM

## 2022-04-13 DIAGNOSIS — Z98.890 OTHER SPECIFIED POSTPROCEDURAL STATES: Chronic | ICD-10-CM

## 2022-04-13 DIAGNOSIS — Z96.7 PRESENCE OF OTHER BONE AND TENDON IMPLANTS: Chronic | ICD-10-CM

## 2022-04-13 LAB
ALBUMIN SERPL ELPH-MCNC: 3.8 G/DL — SIGNIFICANT CHANGE UP (ref 3.3–5.2)
ALP SERPL-CCNC: 129 U/L — HIGH (ref 40–120)
ALT FLD-CCNC: 26 U/L — SIGNIFICANT CHANGE UP
ANION GAP SERPL CALC-SCNC: 13 MMOL/L — SIGNIFICANT CHANGE UP (ref 5–17)
APTT BLD: 28.5 SEC — SIGNIFICANT CHANGE UP (ref 27.5–35.5)
APTT BLD: 66.1 SEC — HIGH (ref 27.5–35.5)
AST SERPL-CCNC: 18 U/L — SIGNIFICANT CHANGE UP
BASOPHILS # BLD AUTO: 0.04 K/UL — SIGNIFICANT CHANGE UP (ref 0–0.2)
BASOPHILS NFR BLD AUTO: 0.6 % — SIGNIFICANT CHANGE UP (ref 0–2)
BILIRUB SERPL-MCNC: <0.2 MG/DL — LOW (ref 0.4–2)
BLD GP AB SCN SERPL QL: SIGNIFICANT CHANGE UP
BUN SERPL-MCNC: 20.6 MG/DL — HIGH (ref 8–20)
CALCIUM SERPL-MCNC: 8.4 MG/DL — LOW (ref 8.6–10.2)
CHLORIDE SERPL-SCNC: 105 MMOL/L — SIGNIFICANT CHANGE UP (ref 98–107)
CO2 SERPL-SCNC: 21 MMOL/L — LOW (ref 22–29)
CREAT SERPL-MCNC: 0.72 MG/DL — SIGNIFICANT CHANGE UP (ref 0.5–1.3)
EGFR: 105 ML/MIN/1.73M2 — SIGNIFICANT CHANGE UP
EOSINOPHIL # BLD AUTO: 0.19 K/UL — SIGNIFICANT CHANGE UP (ref 0–0.5)
EOSINOPHIL NFR BLD AUTO: 2.6 % — SIGNIFICANT CHANGE UP (ref 0–6)
GLUCOSE SERPL-MCNC: 95 MG/DL — SIGNIFICANT CHANGE UP (ref 70–99)
HCT VFR BLD CALC: 38.4 % — LOW (ref 39–50)
HGB BLD-MCNC: 12.6 G/DL — LOW (ref 13–17)
IMM GRANULOCYTES NFR BLD AUTO: 0.3 % — SIGNIFICANT CHANGE UP (ref 0–1.5)
INR BLD: 0.91 RATIO — SIGNIFICANT CHANGE UP (ref 0.88–1.16)
LYMPHOCYTES # BLD AUTO: 2.52 K/UL — SIGNIFICANT CHANGE UP (ref 1–3.3)
LYMPHOCYTES # BLD AUTO: 34.8 % — SIGNIFICANT CHANGE UP (ref 13–44)
MCHC RBC-ENTMCNC: 30.2 PG — SIGNIFICANT CHANGE UP (ref 27–34)
MCHC RBC-ENTMCNC: 32.8 GM/DL — SIGNIFICANT CHANGE UP (ref 32–36)
MCV RBC AUTO: 92.1 FL — SIGNIFICANT CHANGE UP (ref 80–100)
MONOCYTES # BLD AUTO: 0.61 K/UL — SIGNIFICANT CHANGE UP (ref 0–0.9)
MONOCYTES NFR BLD AUTO: 8.4 % — SIGNIFICANT CHANGE UP (ref 2–14)
NEUTROPHILS # BLD AUTO: 3.87 K/UL — SIGNIFICANT CHANGE UP (ref 1.8–7.4)
NEUTROPHILS NFR BLD AUTO: 53.3 % — SIGNIFICANT CHANGE UP (ref 43–77)
PLATELET # BLD AUTO: 165 K/UL — SIGNIFICANT CHANGE UP (ref 150–400)
POTASSIUM SERPL-MCNC: 4.2 MMOL/L — SIGNIFICANT CHANGE UP (ref 3.5–5.3)
POTASSIUM SERPL-SCNC: 4.2 MMOL/L — SIGNIFICANT CHANGE UP (ref 3.5–5.3)
PROT SERPL-MCNC: 6.6 G/DL — SIGNIFICANT CHANGE UP (ref 6.6–8.7)
PROTHROM AB SERPL-ACNC: 10.5 SEC — SIGNIFICANT CHANGE UP (ref 10.5–13.4)
RBC # BLD: 4.17 M/UL — LOW (ref 4.2–5.8)
RBC # FLD: 15 % — HIGH (ref 10.3–14.5)
SARS-COV-2 RNA SPEC QL NAA+PROBE: SIGNIFICANT CHANGE UP
SODIUM SERPL-SCNC: 139 MMOL/L — SIGNIFICANT CHANGE UP (ref 135–145)
WBC # BLD: 7.25 K/UL — SIGNIFICANT CHANGE UP (ref 3.8–10.5)
WBC # FLD AUTO: 7.25 K/UL — SIGNIFICANT CHANGE UP (ref 3.8–10.5)

## 2022-04-13 PROCEDURE — 93970 EXTREMITY STUDY: CPT | Mod: 26

## 2022-04-13 PROCEDURE — 99285 EMERGENCY DEPT VISIT HI MDM: CPT

## 2022-04-13 PROCEDURE — 71045 X-RAY EXAM CHEST 1 VIEW: CPT | Mod: 26

## 2022-04-13 PROCEDURE — 93010 ELECTROCARDIOGRAM REPORT: CPT

## 2022-04-13 RX ORDER — ATORVASTATIN CALCIUM 80 MG/1
80 TABLET, FILM COATED ORAL AT BEDTIME
Refills: 0 | Status: DISCONTINUED | OUTPATIENT
Start: 2022-04-13 | End: 2022-04-15

## 2022-04-13 RX ORDER — HEPARIN SODIUM 5000 [USP'U]/ML
1000 INJECTION INTRAVENOUS; SUBCUTANEOUS
Qty: 25000 | Refills: 0 | Status: DISCONTINUED | OUTPATIENT
Start: 2022-04-13 | End: 2022-04-14

## 2022-04-13 RX ORDER — AMLODIPINE BESYLATE 2.5 MG/1
10 TABLET ORAL DAILY
Refills: 0 | Status: DISCONTINUED | OUTPATIENT
Start: 2022-04-13 | End: 2022-04-15

## 2022-04-13 RX ORDER — VENLAFAXINE HCL 75 MG
75 CAPSULE, EXT RELEASE 24 HR ORAL DAILY
Refills: 0 | Status: DISCONTINUED | OUTPATIENT
Start: 2022-04-13 | End: 2022-04-15

## 2022-04-13 RX ORDER — CLOPIDOGREL BISULFATE 75 MG/1
75 TABLET, FILM COATED ORAL DAILY
Refills: 0 | Status: DISCONTINUED | OUTPATIENT
Start: 2022-04-14 | End: 2022-04-15

## 2022-04-13 RX ORDER — ASPIRIN/CALCIUM CARB/MAGNESIUM 324 MG
81 TABLET ORAL DAILY
Refills: 0 | Status: DISCONTINUED | OUTPATIENT
Start: 2022-04-13 | End: 2022-04-15

## 2022-04-13 RX ORDER — QUETIAPINE FUMARATE 200 MG/1
200 TABLET, FILM COATED ORAL AT BEDTIME
Refills: 0 | Status: DISCONTINUED | OUTPATIENT
Start: 2022-04-13 | End: 2022-04-13

## 2022-04-13 RX ORDER — QUETIAPINE FUMARATE 200 MG/1
200 TABLET, FILM COATED ORAL AT BEDTIME
Refills: 0 | Status: DISCONTINUED | OUTPATIENT
Start: 2022-04-13 | End: 2022-04-15

## 2022-04-13 RX ORDER — PANTOPRAZOLE SODIUM 20 MG/1
40 TABLET, DELAYED RELEASE ORAL
Refills: 0 | Status: DISCONTINUED | OUTPATIENT
Start: 2022-04-13 | End: 2022-04-15

## 2022-04-13 RX ADMIN — QUETIAPINE FUMARATE 200 MILLIGRAM(S): 200 TABLET, FILM COATED ORAL at 22:21

## 2022-04-13 RX ADMIN — Medication 75 MILLIGRAM(S): at 17:32

## 2022-04-13 RX ADMIN — ATORVASTATIN CALCIUM 80 MILLIGRAM(S): 80 TABLET, FILM COATED ORAL at 22:21

## 2022-04-13 RX ADMIN — HEPARIN SODIUM 10 UNIT(S)/HR: 5000 INJECTION INTRAVENOUS; SUBCUTANEOUS at 12:39

## 2022-04-13 RX ADMIN — Medication 200 MILLIGRAM(S): at 23:13

## 2022-04-13 NOTE — PATIENT PROFILE ADULT - FALL HARM RISK - HARM RISK INTERVENTIONS

## 2022-04-13 NOTE — ED ADULT TRIAGE NOTE - CHIEF COMPLAINT QUOTE
Pt sent from Dr. Montiel for  R leg clot. Pt had stent placed in R leg few months ago - currently on Plavix. Extremity warm to touch

## 2022-04-13 NOTE — H&P ADULT - ASSESSMENT
Right le chronic ischemia, unrelenting rest pain  threatened extremity    - Needs revascularization  - Please admit to vascular surgery, Dr. Montiel  - Start heparin ggt after labs are obtained  - initiate preoperative planning (vein mapping, cardiology risk stratification)  - Pain control as needed

## 2022-04-13 NOTE — H&P ADULT - NSHPPHYSICALEXAM_GEN_ALL_CORE
PHYSICAL EXAM:      Constitutional: no acute distress, alert and oriented     Eyes: no scleral icterus    ENMT: atraumatic     Neck: supple, no noted JVD    Respiratory: non labored    Cardiovascular: s1/s2     Gastrointestinal: soft, non tender, no pulsatile mass     Genitourinary: no gallegos     Rectal: not examined     Extremities: warm, both lower ext.  Right foot with erythema to the ankles, no palpable pulses on the right    Vascular: right pedal non palpable     Neurological: reduced    Skin: rubor to the right foot       Psychiatric: good affect

## 2022-04-13 NOTE — CONSULT NOTE ADULT - ASSESSMENT
57M with hx of PVD, HTN, HLD, depression, who comes to the ED c/o right foot pain, patient was seen by vascular surgery and its admitted for right chronic leg ischemia with intractable pain, patient is schedule for peripheral angiogram +/- bypass. Cardiology consulted for risk stratification. Patient denies chest pain, no palpitations, no syncope, no orthopnea, no MIDDLETON.

## 2022-04-13 NOTE — H&P ADULT - HISTORY OF PRESENT ILLNESS
57 year old male known to our service from prior lower extremity revascularization, PMHX PAD. Patient reports several weeks of right leg pain, initially with walking and progressing to be at rest. 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  Patient was discharged with office follow up to see Dr. Laws. When evaluated by Dr Laws, patient was deemed in need for revascularization and told to return to the Hospital this past Monday for surgical planning.  Patient reports he was unable to present Monday for personal reasons.  Reports right foot discloration, pain and periodic numbness. No chest pain, no sob, fever or chills.  No trauma, no fever or chills.

## 2022-04-13 NOTE — PROVIDER CONTACT NOTE (OTHER) - SITUATION
Attempted to notify Dr. Husam Montiel about pt's aptt result of 66.1 per orders as patient on heparin gtt. Writer notified that Dr. Montiel is in surgery. Dr. Vickers made aware of the result above.

## 2022-04-13 NOTE — PATIENT PROFILE ADULT - FUNCTIONAL ASSESSMENT - DAILY ACTIVITY 1.
What Type Of Note Output Would You Prefer (Optional)?: Standard Output Hpi Title: Evaluation of a Skin Lesion How Severe Are Your Spot(S)?: mild Have Your Spot(S) Been Treated In The Past?: has not been treated 3 = A little assistance

## 2022-04-13 NOTE — CONSULT NOTE ADULT - SUBJECTIVE AND OBJECTIVE BOX
CHIEF COMPLAINT: right foot pain    HPI: 57M with hx of PVD, HTN, HLD, depression, who comes to the ED c/o right foot pain, patient was seen by vascular surgery and its admitted for right chronic leg ischemia with intractable pain, patient is schedule for peripheral angiogram +/- bypass. Cardiology consulted for risk stratification. Patient denies chest pain, no palpitations, no syncope, no orthopnea, no MIDDLETON.     PAST MEDICAL & SURGICAL HISTORY:  Hypercholesterolemia    Seizures    Depression    GERD (gastroesophageal reflux disease)    Hypertension    Peripheral vascular disease  with R SFA stent    Osteoarthritis    Former smoker    Prediabetes    Inguinal hernia, left    S/P ORIF (open reduction internal fixation) fracture  Left    S/P shoulder surgery  right    S/P appendectomy    H/O endarterectomy  L femoral a.        Allergies    No Known Allergies    Intolerances        MEDICATIONS  (STANDING):  amLODIPine   Tablet 10 milliGRAM(s) Oral daily  aspirin  chewable 81 milliGRAM(s) Oral daily  atorvastatin 80 milliGRAM(s) Oral at bedtime  heparin  Infusion 1000 Unit(s)/Hr (10 mL/Hr) IV Continuous <Continuous>  pantoprazole    Tablet 40 milliGRAM(s) Oral before breakfast  QUEtiapine 200 milliGRAM(s) Oral at bedtime  venlafaxine XR. 75 milliGRAM(s) Oral daily    MEDICATIONS  (PRN):      FAMILY HISTORY:  Family history of breast cancer (Mother)    Family history of hepatitis (Mother)    Family history of heart disease (Mother, Sibling)    Family history of CABG (Father)    Family history of peripheral vascular disease (Father)    Family history of brain aneurysm (Sibling)        ***No family history of premature coronary artery disease or sudden cardiac death    SOCIAL HISTORY:  Smoking-  Alcohol-  Illicit Drug use-    REVIEW OF SYSTEMS:  Constitutional: [ ] fever, [ ]weight loss,  [ ]fatigue  Eyes: [ ] visual changes  Respiratory: [ ]shortness of breath;  [ ] cough, [ ]wheezing, [ ]chills, [ ]hemoptysis,   Cardiovascular: [ ] chest pain, [ ]palpitations, [ ]dizziness,  [ ]leg swelling [ ]syncope, [x] claudication  Gastrointestinal: [ ] abdominal pain, [ ]nausea, [ ]vomiting,  [ ]diarrhea   Genitourinary: [ ] dysuria, [ ] hematuria  Neurologic: [ ] headaches [ ] tremors  [ ] weakness [ ] lightheadedness  Skin: [ ] itching, [ ]burning, [ ] rashes  Endocrine: [ ] heat or cold intolerance  Musculoskeletal: [ ] joint pain or swelling; [ ] muscle, back, or extremity pain  Psychiatric: [ ] depression, [ ]anxiety, [ ]mood swings, or [ ]difficulty sleeping  Hematologic: [ ] easy bruising, [ ] bleeding gums     [ x] positive	  [ ] negative    Vital Signs Last 24 Hrs  T(C): 36.7 (13 Apr 2022 10:19), Max: 36.7 (13 Apr 2022 10:19)  T(F): 98 (13 Apr 2022 10:19), Max: 98 (13 Apr 2022 10:19)  HR: 113 (13 Apr 2022 10:19) (113 - 113)  BP: 125/57 (13 Apr 2022 10:19) (125/57 - 125/57)  BP(mean): --  RR: 20 (13 Apr 2022 10:19) (20 - 20)  SpO2: 91% (13 Apr 2022 10:19) (91% - 91%)  I&O's Summary      PHYSICAL EXAM:  General: No acute distress  HEENT: EOMI  Neck:  No JVD  Lungs: Clear to auscultation bilaterally; No rales or wheezing  Heart: Regular rate and rhythm; No murmurs, rubs, or gallops  Abdomen: soft, non tender, non distended   Extremities: warm, no edema,  Right foot with erythema to the ankles, no palpable pulses on the right  Nervous system:  Alert & Oriented X3  Psychiatric: Normal affect  Skin: No rashes or lesions    LABS:  04-13    139  |  105  |  20.6<H>  ----------------------------<  95  4.2   |  21.0<L>  |  0.72    Ca    8.4<L>      13 Apr 2022 11:34    TPro  6.6  /  Alb  3.8  /  TBili  <0.2<L>  /  DBili  x   /  AST  18  /  ALT  26  /  AlkPhos  129<H>  04-13    Creatinine Trend: 0.72<--, 0.64<--                        12.6   7.25  )-----------( 165      ( 13 Apr 2022 11:34 )             38.4     PT/INR - ( 13 Apr 2022 11:34 )   PT: 10.5 sec;   INR: 0.91 ratio         PTT - ( 13 Apr 2022 11:34 )  PTT:28.5 sec    Lipid Panel:   Cardiac Enzymes:           RADIOLOGY:    ECG: sinus bradycardia and APCs    TELEMETRY: unremarkable    ECHO:  9/2021  normal LVEF   Minimal VHD  Mild diastolic dysfunction    STRESS TEST:    CATHETERIZATION:

## 2022-04-13 NOTE — ED ADULT NURSE NOTE - OBJECTIVE STATEMENT
a&ox3 - c/o right foot pain; pt returns to ED s/p previous visit for same complaint 4/9/22 pt had multiple US testing and contacted by his provider to return to ED for " pre op admssion for blood clot above stent and for "fem pop". pt hx PVD/PAD, DVT, stent in right leg (2/22) on plavix; reports worsening pain in right foot + redness + difficulty bearing weight

## 2022-04-13 NOTE — ED PROVIDER NOTE - OBJECTIVE STATEMENT
57 year old male with PMH PAD s/p RLE stent presents with L leg pain. pt reports several weeks of leg pain, initially with walking and progressing to be at rest. Pt was seen here 4/9, arterial doppler showed clot that was believed to be chronic, discharged, followed dup with Dinesh, and due top persistent pain sent back in for bypass. Reports decreased sensation to the LLE. NO fevers, chills.

## 2022-04-13 NOTE — CONSULT NOTE ADULT - PROBLEM SELECTOR RECOMMENDATION 9
patient is scheduled for a high risk surgical intervention (if suprainguinal vascular intervention)  patient is at class II Risk, 0.9% Risk of Major Cardiac Event   No active cardiac issues at the time   No additional cardiac workup is indicated at this time   Vascular surgery can proceed with procedure planned.  Please call us back with questions or concerns.

## 2022-04-14 ENCOUNTER — TRANSCRIPTION ENCOUNTER (OUTPATIENT)
Age: 57
End: 2022-04-14

## 2022-04-14 LAB
A1C WITH ESTIMATED AVERAGE GLUCOSE RESULT: 5.4 % — SIGNIFICANT CHANGE UP (ref 4–5.6)
ALBUMIN SERPL ELPH-MCNC: 3.9 G/DL — SIGNIFICANT CHANGE UP (ref 3.3–5.2)
ALP SERPL-CCNC: 132 U/L — HIGH (ref 40–120)
ALT FLD-CCNC: 25 U/L — SIGNIFICANT CHANGE UP
ANION GAP SERPL CALC-SCNC: 13 MMOL/L — SIGNIFICANT CHANGE UP (ref 5–17)
APTT BLD: 101.7 SEC — HIGH (ref 27.5–35.5)
APTT BLD: 68.6 SEC — HIGH (ref 27.5–35.5)
APTT BLD: 80.4 SEC — HIGH (ref 27.5–35.5)
AST SERPL-CCNC: 19 U/L — SIGNIFICANT CHANGE UP
BASOPHILS # BLD AUTO: 0.04 K/UL — SIGNIFICANT CHANGE UP (ref 0–0.2)
BASOPHILS NFR BLD AUTO: 0.5 % — SIGNIFICANT CHANGE UP (ref 0–2)
BILIRUB SERPL-MCNC: <0.2 MG/DL — LOW (ref 0.4–2)
BUN SERPL-MCNC: 18.9 MG/DL — SIGNIFICANT CHANGE UP (ref 8–20)
CALCIUM SERPL-MCNC: 8.7 MG/DL — SIGNIFICANT CHANGE UP (ref 8.6–10.2)
CHLORIDE SERPL-SCNC: 102 MMOL/L — SIGNIFICANT CHANGE UP (ref 98–107)
CO2 SERPL-SCNC: 22 MMOL/L — SIGNIFICANT CHANGE UP (ref 22–29)
CREAT SERPL-MCNC: 0.81 MG/DL — SIGNIFICANT CHANGE UP (ref 0.5–1.3)
EGFR: 102 ML/MIN/1.73M2 — SIGNIFICANT CHANGE UP
EOSINOPHIL # BLD AUTO: 0.29 K/UL — SIGNIFICANT CHANGE UP (ref 0–0.5)
EOSINOPHIL NFR BLD AUTO: 3.9 % — SIGNIFICANT CHANGE UP (ref 0–6)
ESTIMATED AVERAGE GLUCOSE: 108 MG/DL — SIGNIFICANT CHANGE UP (ref 68–114)
GLUCOSE SERPL-MCNC: 88 MG/DL — SIGNIFICANT CHANGE UP (ref 70–99)
HCT VFR BLD CALC: 38.1 % — LOW (ref 39–50)
HGB BLD-MCNC: 12.7 G/DL — LOW (ref 13–17)
IMM GRANULOCYTES NFR BLD AUTO: 0.1 % — SIGNIFICANT CHANGE UP (ref 0–1.5)
LYMPHOCYTES # BLD AUTO: 2.84 K/UL — SIGNIFICANT CHANGE UP (ref 1–3.3)
LYMPHOCYTES # BLD AUTO: 37.9 % — SIGNIFICANT CHANGE UP (ref 13–44)
MAGNESIUM SERPL-MCNC: 2 MG/DL — SIGNIFICANT CHANGE UP (ref 1.6–2.6)
MCHC RBC-ENTMCNC: 30.6 PG — SIGNIFICANT CHANGE UP (ref 27–34)
MCHC RBC-ENTMCNC: 33.3 GM/DL — SIGNIFICANT CHANGE UP (ref 32–36)
MCV RBC AUTO: 91.8 FL — SIGNIFICANT CHANGE UP (ref 80–100)
MONOCYTES # BLD AUTO: 0.65 K/UL — SIGNIFICANT CHANGE UP (ref 0–0.9)
MONOCYTES NFR BLD AUTO: 8.7 % — SIGNIFICANT CHANGE UP (ref 2–14)
NEUTROPHILS # BLD AUTO: 3.67 K/UL — SIGNIFICANT CHANGE UP (ref 1.8–7.4)
NEUTROPHILS NFR BLD AUTO: 48.9 % — SIGNIFICANT CHANGE UP (ref 43–77)
PHOSPHATE SERPL-MCNC: 4 MG/DL — SIGNIFICANT CHANGE UP (ref 2.4–4.7)
PLATELET # BLD AUTO: 178 K/UL — SIGNIFICANT CHANGE UP (ref 150–400)
POTASSIUM SERPL-MCNC: 4.5 MMOL/L — SIGNIFICANT CHANGE UP (ref 3.5–5.3)
POTASSIUM SERPL-SCNC: 4.5 MMOL/L — SIGNIFICANT CHANGE UP (ref 3.5–5.3)
PROT SERPL-MCNC: 6.7 G/DL — SIGNIFICANT CHANGE UP (ref 6.6–8.7)
RBC # BLD: 4.15 M/UL — LOW (ref 4.2–5.8)
RBC # FLD: 14.9 % — HIGH (ref 10.3–14.5)
SODIUM SERPL-SCNC: 137 MMOL/L — SIGNIFICANT CHANGE UP (ref 135–145)
WBC # BLD: 7.5 K/UL — SIGNIFICANT CHANGE UP (ref 3.8–10.5)
WBC # FLD AUTO: 7.5 K/UL — SIGNIFICANT CHANGE UP (ref 3.8–10.5)

## 2022-04-14 PROCEDURE — 75635 CT ANGIO ABDOMINAL ARTERIES: CPT | Mod: 26

## 2022-04-14 RX ORDER — SODIUM CHLORIDE 9 MG/ML
1000 INJECTION, SOLUTION INTRAVENOUS
Refills: 0 | Status: DISCONTINUED | OUTPATIENT
Start: 2022-04-14 | End: 2022-04-15

## 2022-04-14 RX ORDER — HEPARIN SODIUM 5000 [USP'U]/ML
900 INJECTION INTRAVENOUS; SUBCUTANEOUS
Qty: 25000 | Refills: 0 | Status: DISCONTINUED | OUTPATIENT
Start: 2022-04-14 | End: 2022-04-15

## 2022-04-14 RX ADMIN — CLOPIDOGREL BISULFATE 75 MILLIGRAM(S): 75 TABLET, FILM COATED ORAL at 12:01

## 2022-04-14 RX ADMIN — ATORVASTATIN CALCIUM 80 MILLIGRAM(S): 80 TABLET, FILM COATED ORAL at 21:34

## 2022-04-14 RX ADMIN — Medication 200 MILLIGRAM(S): at 10:17

## 2022-04-14 RX ADMIN — QUETIAPINE FUMARATE 200 MILLIGRAM(S): 200 TABLET, FILM COATED ORAL at 21:34

## 2022-04-14 RX ADMIN — Medication 81 MILLIGRAM(S): at 12:01

## 2022-04-14 RX ADMIN — HEPARIN SODIUM 9 UNIT(S)/HR: 5000 INJECTION INTRAVENOUS; SUBCUTANEOUS at 10:42

## 2022-04-14 RX ADMIN — HEPARIN SODIUM 9 UNIT(S)/HR: 5000 INJECTION INTRAVENOUS; SUBCUTANEOUS at 17:17

## 2022-04-14 RX ADMIN — Medication 75 MILLIGRAM(S): at 12:02

## 2022-04-14 RX ADMIN — HEPARIN SODIUM 10 UNIT(S)/HR: 5000 INJECTION INTRAVENOUS; SUBCUTANEOUS at 01:15

## 2022-04-14 RX ADMIN — PANTOPRAZOLE SODIUM 40 MILLIGRAM(S): 20 TABLET, DELAYED RELEASE ORAL at 06:11

## 2022-04-14 RX ADMIN — Medication 200 MILLIGRAM(S): at 21:29

## 2022-04-14 NOTE — PROGRESS NOTE ADULT - SUBJECTIVE AND OBJECTIVE BOX
HPI/OVERNIGHT EVENTS: Patient seen and examined at bedside this AM. pain improved after starting on hep gtt, vein mapping cmpleted, cardiac risk stratification completed    Vital Signs Last 24 Hrs  T(C): 36.9 (14 Apr 2022 04:31), Max: 36.9 (14 Apr 2022 04:31)  T(F): 98.4 (14 Apr 2022 04:31), Max: 98.4 (14 Apr 2022 04:31)  HR: 53 (14 Apr 2022 04:31) (53 - 113)  BP: 106/60 (14 Apr 2022 04:31) (106/56 - 142/68)  BP(mean): --  RR: 20 (13 Apr 2022 20:04) (20 - 20)  SpO2: 92% (14 Apr 2022 04:31) (91% - 97%)    I&O's Detail      General: No acute distress  HEENT: EOMI  Neck:  No JVD  Lungs: Clear to auscultation bilaterally; No rales or wheezing  Heart: Regular rate and rhythm; No murmurs, rubs, or gallops  Abdomen: soft, non tender, non distended   Extremities: warm, no edema,  Right foot with erythema to the ankles, no palpable pulses on the right  Nervous system:  Alert & Oriented X3  Psychiatric: Normal affect  Skin: No rashes or lesions    LABS:                        12.7   7.50  )-----------( 178      ( 14 Apr 2022 03:24 )             38.1     04-14    137  |  102  |  18.9  ----------------------------<  88  4.5   |  22.0  |  0.81    Ca    8.7      14 Apr 2022 03:24  Phos  4.0     04-14  Mg     2.0     04-14    TPro  6.7  /  Alb  3.9  /  TBili  <0.2<L>  /  DBili  x   /  AST  19  /  ALT  25  /  AlkPhos  132<H>  04-14    PT/INR - ( 13 Apr 2022 11:34 )   PT: 10.5 sec;   INR: 0.91 ratio         PTT - ( 14 Apr 2022 00:51 )  PTT:80.4 sec      MEDICATIONS  (STANDING):  amLODIPine   Tablet 10 milliGRAM(s) Oral daily  aspirin  chewable 81 milliGRAM(s) Oral daily  atorvastatin 80 milliGRAM(s) Oral at bedtime  clopidogrel Tablet 75 milliGRAM(s) Oral daily  heparin  Infusion 1000 Unit(s)/Hr (10 mL/Hr) IV Continuous <Continuous>  pantoprazole    Tablet 40 milliGRAM(s) Oral before breakfast  phenytoin   Capsule 200 milliGRAM(s) Oral every 12 hours  QUEtiapine 200 milliGRAM(s) Oral at bedtime  venlafaxine XR. 75 milliGRAM(s) Oral daily    MEDICATIONS  (PRN):      MICRO:   Cultures     STUDIES:   EKG, CXR, U/S, CT, MRI

## 2022-04-15 ENCOUNTER — RESULT REVIEW (OUTPATIENT)
Age: 57
End: 2022-04-15

## 2022-04-15 ENCOUNTER — TRANSCRIPTION ENCOUNTER (OUTPATIENT)
Age: 57
End: 2022-04-15

## 2022-04-15 LAB
ANION GAP SERPL CALC-SCNC: 15 MMOL/L — SIGNIFICANT CHANGE UP (ref 5–17)
APTT BLD: 64.7 SEC — HIGH (ref 27.5–35.5)
BUN SERPL-MCNC: 27 MG/DL — HIGH (ref 8–20)
CALCIUM SERPL-MCNC: 8.8 MG/DL — SIGNIFICANT CHANGE UP (ref 8.6–10.2)
CHLORIDE SERPL-SCNC: 99 MMOL/L — SIGNIFICANT CHANGE UP (ref 98–107)
CO2 SERPL-SCNC: 20 MMOL/L — LOW (ref 22–29)
CREAT SERPL-MCNC: 0.92 MG/DL — SIGNIFICANT CHANGE UP (ref 0.5–1.3)
EGFR: 96 ML/MIN/1.73M2 — SIGNIFICANT CHANGE UP
GLUCOSE SERPL-MCNC: 120 MG/DL — HIGH (ref 70–99)
HCT VFR BLD CALC: 34.7 % — LOW (ref 39–50)
HCT VFR BLD CALC: 35.6 % — LOW (ref 39–50)
HCT VFR BLD CALC: 38.7 % — LOW (ref 39–50)
HGB BLD-MCNC: 11.7 G/DL — LOW (ref 13–17)
HGB BLD-MCNC: 11.9 G/DL — LOW (ref 13–17)
HGB BLD-MCNC: 12.9 G/DL — LOW (ref 13–17)
INR BLD: 0.91 RATIO — SIGNIFICANT CHANGE UP (ref 0.88–1.16)
MAGNESIUM SERPL-MCNC: 1.9 MG/DL — SIGNIFICANT CHANGE UP (ref 1.6–2.6)
MCHC RBC-ENTMCNC: 30.3 PG — SIGNIFICANT CHANGE UP (ref 27–34)
MCHC RBC-ENTMCNC: 30.4 PG — SIGNIFICANT CHANGE UP (ref 27–34)
MCHC RBC-ENTMCNC: 30.7 PG — SIGNIFICANT CHANGE UP (ref 27–34)
MCHC RBC-ENTMCNC: 32.9 GM/DL — SIGNIFICANT CHANGE UP (ref 32–36)
MCHC RBC-ENTMCNC: 33.3 GM/DL — SIGNIFICANT CHANGE UP (ref 32–36)
MCHC RBC-ENTMCNC: 34.3 GM/DL — SIGNIFICANT CHANGE UP (ref 32–36)
MCV RBC AUTO: 89.4 FL — SIGNIFICANT CHANGE UP (ref 80–100)
MCV RBC AUTO: 90.8 FL — SIGNIFICANT CHANGE UP (ref 80–100)
MCV RBC AUTO: 92.5 FL — SIGNIFICANT CHANGE UP (ref 80–100)
PHOSPHATE SERPL-MCNC: 3.3 MG/DL — SIGNIFICANT CHANGE UP (ref 2.4–4.7)
PLATELET # BLD AUTO: 175 K/UL — SIGNIFICANT CHANGE UP (ref 150–400)
PLATELET # BLD AUTO: 182 K/UL — SIGNIFICANT CHANGE UP (ref 150–400)
PLATELET # BLD AUTO: 189 K/UL — SIGNIFICANT CHANGE UP (ref 150–400)
POTASSIUM SERPL-MCNC: 4.1 MMOL/L — SIGNIFICANT CHANGE UP (ref 3.5–5.3)
POTASSIUM SERPL-SCNC: 4.1 MMOL/L — SIGNIFICANT CHANGE UP (ref 3.5–5.3)
PROTHROM AB SERPL-ACNC: 10.6 SEC — SIGNIFICANT CHANGE UP (ref 10.5–13.4)
RBC # BLD: 3.85 M/UL — LOW (ref 4.2–5.8)
RBC # BLD: 3.88 M/UL — LOW (ref 4.2–5.8)
RBC # BLD: 4.26 M/UL — SIGNIFICANT CHANGE UP (ref 4.2–5.8)
RBC # FLD: 14.6 % — HIGH (ref 10.3–14.5)
RBC # FLD: 14.7 % — HIGH (ref 10.3–14.5)
RBC # FLD: 14.8 % — HIGH (ref 10.3–14.5)
SODIUM SERPL-SCNC: 134 MMOL/L — LOW (ref 135–145)
WBC # BLD: 13.8 K/UL — HIGH (ref 3.8–10.5)
WBC # BLD: 8.71 K/UL — SIGNIFICANT CHANGE UP (ref 3.8–10.5)
WBC # BLD: 9.86 K/UL — SIGNIFICANT CHANGE UP (ref 3.8–10.5)
WBC # FLD AUTO: 13.8 K/UL — HIGH (ref 3.8–10.5)
WBC # FLD AUTO: 8.71 K/UL — SIGNIFICANT CHANGE UP (ref 3.8–10.5)
WBC # FLD AUTO: 9.86 K/UL — SIGNIFICANT CHANGE UP (ref 3.8–10.5)

## 2022-04-15 PROCEDURE — 88304 TISSUE EXAM BY PATHOLOGIST: CPT | Mod: 26

## 2022-04-15 PROCEDURE — 88311 DECALCIFY TISSUE: CPT | Mod: 26

## 2022-04-15 PROCEDURE — 35355 RECHANNELING OF ARTERY: CPT

## 2022-04-15 DEVICE — IMPLANTABLE DEVICE: Type: IMPLANTABLE DEVICE | Status: FUNCTIONAL

## 2022-04-15 DEVICE — GRAFT VASC PROPATEN 6MMX40X50CM TW REMOV RING: Type: IMPLANTABLE DEVICE | Status: FUNCTIONAL

## 2022-04-15 DEVICE — SPONGE GELFOAM SZ 100 UNCOMPRESSED: Type: IMPLANTABLE DEVICE | Status: FUNCTIONAL

## 2022-04-15 DEVICE — HEMOSTAT ARISTA 3GR: Type: IMPLANTABLE DEVICE | Status: FUNCTIONAL

## 2022-04-15 DEVICE — CLIP APPLIER COVIDIEN SURGICLIP III 9" SM: Type: IMPLANTABLE DEVICE | Status: FUNCTIONAL

## 2022-04-15 DEVICE — CLIP APPLIER COVIDIEN SURGICLIP 11.5" MEDIUM: Type: IMPLANTABLE DEVICE | Status: FUNCTIONAL

## 2022-04-15 DEVICE — PATCH PERICARDIAL PHOTOFIX .8X8CM: Type: IMPLANTABLE DEVICE | Status: FUNCTIONAL

## 2022-04-15 DEVICE — GRAFT VASC PROPATEN 8MMX40X50CM TW REMOV RING: Type: IMPLANTABLE DEVICE | Status: FUNCTIONAL

## 2022-04-15 DEVICE — CLIP APPLIER COVIDIEN SURGICLIP 13" LARGE: Type: IMPLANTABLE DEVICE | Status: FUNCTIONAL

## 2022-04-15 RX ORDER — CEFAZOLIN SODIUM 1 G
2000 VIAL (EA) INJECTION ONCE
Refills: 0 | Status: DISCONTINUED | OUTPATIENT
Start: 2022-04-15 | End: 2022-04-15

## 2022-04-15 RX ORDER — HYDROMORPHONE HYDROCHLORIDE 2 MG/ML
0.5 INJECTION INTRAMUSCULAR; INTRAVENOUS; SUBCUTANEOUS ONCE
Refills: 0 | Status: DISCONTINUED | OUTPATIENT
Start: 2022-04-15 | End: 2022-04-15

## 2022-04-15 RX ORDER — SODIUM CHLORIDE 9 MG/ML
1000 INJECTION, SOLUTION INTRAVENOUS
Refills: 0 | Status: DISCONTINUED | OUTPATIENT
Start: 2022-04-15 | End: 2022-04-15

## 2022-04-15 RX ORDER — HYDROMORPHONE HYDROCHLORIDE 2 MG/ML
0.5 INJECTION INTRAMUSCULAR; INTRAVENOUS; SUBCUTANEOUS EVERY 4 HOURS
Refills: 0 | Status: DISCONTINUED | OUTPATIENT
Start: 2022-04-15 | End: 2022-04-19

## 2022-04-15 RX ORDER — VENLAFAXINE HCL 75 MG
75 CAPSULE, EXT RELEASE 24 HR ORAL DAILY
Refills: 0 | Status: DISCONTINUED | OUTPATIENT
Start: 2022-04-15 | End: 2022-04-19

## 2022-04-15 RX ORDER — METHOCARBAMOL 500 MG/1
750 TABLET, FILM COATED ORAL
Refills: 0 | Status: DISCONTINUED | OUTPATIENT
Start: 2022-04-15 | End: 2022-04-19

## 2022-04-15 RX ORDER — OXYCODONE HYDROCHLORIDE 5 MG/1
10 TABLET ORAL EVERY 4 HOURS
Refills: 0 | Status: DISCONTINUED | OUTPATIENT
Start: 2022-04-15 | End: 2022-04-19

## 2022-04-15 RX ORDER — ACETAMINOPHEN 500 MG
1000 TABLET ORAL ONCE
Refills: 0 | Status: COMPLETED | OUTPATIENT
Start: 2022-04-15 | End: 2022-04-15

## 2022-04-15 RX ORDER — FENTANYL CITRATE 50 UG/ML
25 INJECTION INTRAVENOUS
Refills: 0 | Status: DISCONTINUED | OUTPATIENT
Start: 2022-04-15 | End: 2022-04-15

## 2022-04-15 RX ORDER — PANTOPRAZOLE SODIUM 20 MG/1
40 TABLET, DELAYED RELEASE ORAL
Refills: 0 | Status: DISCONTINUED | OUTPATIENT
Start: 2022-04-15 | End: 2022-04-19

## 2022-04-15 RX ORDER — GABAPENTIN 400 MG/1
600 CAPSULE ORAL EVERY 8 HOURS
Refills: 0 | Status: DISCONTINUED | OUTPATIENT
Start: 2022-04-15 | End: 2022-04-19

## 2022-04-15 RX ORDER — HYDROMORPHONE HYDROCHLORIDE 2 MG/ML
0.5 INJECTION INTRAMUSCULAR; INTRAVENOUS; SUBCUTANEOUS
Refills: 0 | Status: DISCONTINUED | OUTPATIENT
Start: 2022-04-15 | End: 2022-04-15

## 2022-04-15 RX ORDER — FENTANYL CITRATE 50 UG/ML
50 INJECTION INTRAVENOUS ONCE
Refills: 0 | Status: DISCONTINUED | OUTPATIENT
Start: 2022-04-15 | End: 2022-04-15

## 2022-04-15 RX ORDER — ONDANSETRON 8 MG/1
4 TABLET, FILM COATED ORAL ONCE
Refills: 0 | Status: DISCONTINUED | OUTPATIENT
Start: 2022-04-15 | End: 2022-04-15

## 2022-04-15 RX ORDER — QUETIAPINE FUMARATE 200 MG/1
200 TABLET, FILM COATED ORAL AT BEDTIME
Refills: 0 | Status: DISCONTINUED | OUTPATIENT
Start: 2022-04-15 | End: 2022-04-19

## 2022-04-15 RX ORDER — AMLODIPINE BESYLATE 2.5 MG/1
10 TABLET ORAL DAILY
Refills: 0 | Status: DISCONTINUED | OUTPATIENT
Start: 2022-04-15 | End: 2022-04-19

## 2022-04-15 RX ORDER — ASPIRIN/CALCIUM CARB/MAGNESIUM 324 MG
81 TABLET ORAL DAILY
Refills: 0 | Status: DISCONTINUED | OUTPATIENT
Start: 2022-04-15 | End: 2022-04-19

## 2022-04-15 RX ORDER — CEFAZOLIN SODIUM 1 G
2000 VIAL (EA) INJECTION ONCE
Refills: 0 | Status: COMPLETED | OUTPATIENT
Start: 2022-04-15 | End: 2022-04-15

## 2022-04-15 RX ORDER — LANOLIN ALCOHOL/MO/W.PET/CERES
5 CREAM (GRAM) TOPICAL AT BEDTIME
Refills: 0 | Status: DISCONTINUED | OUTPATIENT
Start: 2022-04-15 | End: 2022-04-19

## 2022-04-15 RX ORDER — OXYCODONE HYDROCHLORIDE 5 MG/1
5 TABLET ORAL EVERY 4 HOURS
Refills: 0 | Status: DISCONTINUED | OUTPATIENT
Start: 2022-04-15 | End: 2022-04-19

## 2022-04-15 RX ORDER — ATORVASTATIN CALCIUM 80 MG/1
80 TABLET, FILM COATED ORAL AT BEDTIME
Refills: 0 | Status: DISCONTINUED | OUTPATIENT
Start: 2022-04-15 | End: 2022-04-19

## 2022-04-15 RX ORDER — CLOPIDOGREL BISULFATE 75 MG/1
75 TABLET, FILM COATED ORAL EVERY 24 HOURS
Refills: 0 | Status: DISCONTINUED | OUTPATIENT
Start: 2022-04-15 | End: 2022-04-19

## 2022-04-15 RX ADMIN — Medication 200 MILLIGRAM(S): at 05:13

## 2022-04-15 RX ADMIN — OXYCODONE HYDROCHLORIDE 10 MILLIGRAM(S): 5 TABLET ORAL at 18:20

## 2022-04-15 RX ADMIN — FENTANYL CITRATE 25 MICROGRAM(S): 50 INJECTION INTRAVENOUS at 13:45

## 2022-04-15 RX ADMIN — FENTANYL CITRATE 50 MICROGRAM(S): 50 INJECTION INTRAVENOUS at 13:15

## 2022-04-15 RX ADMIN — FENTANYL CITRATE 25 MICROGRAM(S): 50 INJECTION INTRAVENOUS at 11:53

## 2022-04-15 RX ADMIN — Medication 1000 MILLIGRAM(S): at 22:43

## 2022-04-15 RX ADMIN — HYDROMORPHONE HYDROCHLORIDE 0.5 MILLIGRAM(S): 2 INJECTION INTRAMUSCULAR; INTRAVENOUS; SUBCUTANEOUS at 23:20

## 2022-04-15 RX ADMIN — HYDROMORPHONE HYDROCHLORIDE 0.5 MILLIGRAM(S): 2 INJECTION INTRAMUSCULAR; INTRAVENOUS; SUBCUTANEOUS at 12:30

## 2022-04-15 RX ADMIN — METHOCARBAMOL 750 MILLIGRAM(S): 500 TABLET, FILM COATED ORAL at 23:19

## 2022-04-15 RX ADMIN — PANTOPRAZOLE SODIUM 40 MILLIGRAM(S): 20 TABLET, DELAYED RELEASE ORAL at 05:14

## 2022-04-15 RX ADMIN — HEPARIN SODIUM 9 UNIT(S)/HR: 5000 INJECTION INTRAVENOUS; SUBCUTANEOUS at 01:49

## 2022-04-15 RX ADMIN — CLOPIDOGREL BISULFATE 75 MILLIGRAM(S): 75 TABLET, FILM COATED ORAL at 17:47

## 2022-04-15 RX ADMIN — GABAPENTIN 600 MILLIGRAM(S): 400 CAPSULE ORAL at 13:54

## 2022-04-15 RX ADMIN — HYDROMORPHONE HYDROCHLORIDE 0.5 MILLIGRAM(S): 2 INJECTION INTRAMUSCULAR; INTRAVENOUS; SUBCUTANEOUS at 12:15

## 2022-04-15 RX ADMIN — FENTANYL CITRATE 50 MICROGRAM(S): 50 INJECTION INTRAVENOUS at 13:05

## 2022-04-15 RX ADMIN — OXYCODONE HYDROCHLORIDE 10 MILLIGRAM(S): 5 TABLET ORAL at 13:15

## 2022-04-15 RX ADMIN — HYDROMORPHONE HYDROCHLORIDE 0.5 MILLIGRAM(S): 2 INJECTION INTRAMUSCULAR; INTRAVENOUS; SUBCUTANEOUS at 13:41

## 2022-04-15 RX ADMIN — FENTANYL CITRATE 25 MICROGRAM(S): 50 INJECTION INTRAVENOUS at 13:36

## 2022-04-15 RX ADMIN — Medication 400 MILLIGRAM(S): at 21:41

## 2022-04-15 RX ADMIN — ATORVASTATIN CALCIUM 80 MILLIGRAM(S): 80 TABLET, FILM COATED ORAL at 21:45

## 2022-04-15 RX ADMIN — FENTANYL CITRATE 25 MICROGRAM(S): 50 INJECTION INTRAVENOUS at 12:00

## 2022-04-15 RX ADMIN — FENTANYL CITRATE 25 MICROGRAM(S): 50 INJECTION INTRAVENOUS at 11:58

## 2022-04-15 RX ADMIN — Medication 81 MILLIGRAM(S): at 17:47

## 2022-04-15 RX ADMIN — GABAPENTIN 600 MILLIGRAM(S): 400 CAPSULE ORAL at 21:45

## 2022-04-15 RX ADMIN — HYDROMORPHONE HYDROCHLORIDE 0.5 MILLIGRAM(S): 2 INJECTION INTRAMUSCULAR; INTRAVENOUS; SUBCUTANEOUS at 12:45

## 2022-04-15 RX ADMIN — FENTANYL CITRATE 50 MICROGRAM(S): 50 INJECTION INTRAVENOUS at 13:16

## 2022-04-15 RX ADMIN — OXYCODONE HYDROCHLORIDE 10 MILLIGRAM(S): 5 TABLET ORAL at 13:09

## 2022-04-15 RX ADMIN — Medication 1000 MILLIGRAM(S): at 13:30

## 2022-04-15 RX ADMIN — HYDROMORPHONE HYDROCHLORIDE 0.5 MILLIGRAM(S): 2 INJECTION INTRAMUSCULAR; INTRAVENOUS; SUBCUTANEOUS at 19:28

## 2022-04-15 RX ADMIN — FENTANYL CITRATE 25 MICROGRAM(S): 50 INJECTION INTRAVENOUS at 11:50

## 2022-04-15 RX ADMIN — METHOCARBAMOL 750 MILLIGRAM(S): 500 TABLET, FILM COATED ORAL at 13:53

## 2022-04-15 RX ADMIN — METHOCARBAMOL 750 MILLIGRAM(S): 500 TABLET, FILM COATED ORAL at 17:48

## 2022-04-15 RX ADMIN — OXYCODONE HYDROCHLORIDE 10 MILLIGRAM(S): 5 TABLET ORAL at 17:50

## 2022-04-15 RX ADMIN — FENTANYL CITRATE 25 MICROGRAM(S): 50 INJECTION INTRAVENOUS at 11:48

## 2022-04-15 RX ADMIN — Medication 200 MILLIGRAM(S): at 17:48

## 2022-04-15 RX ADMIN — HYDROMORPHONE HYDROCHLORIDE 0.5 MILLIGRAM(S): 2 INJECTION INTRAMUSCULAR; INTRAVENOUS; SUBCUTANEOUS at 14:00

## 2022-04-15 RX ADMIN — HYDROMORPHONE HYDROCHLORIDE 0.5 MILLIGRAM(S): 2 INJECTION INTRAMUSCULAR; INTRAVENOUS; SUBCUTANEOUS at 12:00

## 2022-04-15 RX ADMIN — FENTANYL CITRATE 50 MICROGRAM(S): 50 INJECTION INTRAVENOUS at 13:30

## 2022-04-15 RX ADMIN — QUETIAPINE FUMARATE 200 MILLIGRAM(S): 200 TABLET, FILM COATED ORAL at 21:45

## 2022-04-15 RX ADMIN — SODIUM CHLORIDE 100 MILLILITER(S): 9 INJECTION, SOLUTION INTRAVENOUS at 01:43

## 2022-04-15 RX ADMIN — Medication 100 MILLIGRAM(S): at 18:52

## 2022-04-15 RX ADMIN — HYDROMORPHONE HYDROCHLORIDE 0.5 MILLIGRAM(S): 2 INJECTION INTRAMUSCULAR; INTRAVENOUS; SUBCUTANEOUS at 20:11

## 2022-04-15 RX ADMIN — AMLODIPINE BESYLATE 10 MILLIGRAM(S): 2.5 TABLET ORAL at 05:14

## 2022-04-15 RX ADMIN — Medication 75 MILLIGRAM(S): at 17:48

## 2022-04-15 RX ADMIN — Medication 400 MILLIGRAM(S): at 13:16

## 2022-04-15 RX ADMIN — FENTANYL CITRATE 25 MICROGRAM(S): 50 INJECTION INTRAVENOUS at 11:55

## 2022-04-15 NOTE — PROGRESS NOTE ADULT - SUBJECTIVE AND OBJECTIVE BOX
Seen in same day surgery awaiting OR    INTERVAL HPI/OVERNIGHT EVENTS: no new complaints today or overnight events      MEDICATIONS  (STANDING):  amLODIPine   Tablet 10 milliGRAM(s) Oral daily  aspirin  chewable 81 milliGRAM(s) Oral daily  atorvastatin 80 milliGRAM(s) Oral at bedtime  clopidogrel Tablet 75 milliGRAM(s) Oral daily  heparin  Infusion 900 Unit(s)/Hr (9 mL/Hr) IV Continuous <Continuous>  lactated ringers. 1000 milliLiter(s) (100 mL/Hr) IV Continuous <Continuous>  pantoprazole    Tablet 40 milliGRAM(s) Oral before breakfast  phenytoin   Capsule 200 milliGRAM(s) Oral every 12 hours  QUEtiapine 200 milliGRAM(s) Oral at bedtime  venlafaxine XR. 75 milliGRAM(s) Oral daily    MEDICATIONS  (PRN):      Vital Signs Last 24 Hrs  T(C): 36.7 (15 Apr 2022 06:42), Max: 36.9 (14 Apr 2022 16:26)  T(F): 98 (15 Apr 2022 06:42), Max: 98.5 (14 Apr 2022 16:26)  HR: 60 (15 Apr 2022 06:42) (60 - 79)  BP: 131/68 (15 Apr 2022 06:42) (119/62 - 156/74)  BP(mean): --  RR: 16 (15 Apr 2022 06:42) (16 - 20)  SpO2: 98% (15 Apr 2022 06:42) (92% - 98%)    PHYSICAL EXAM:      Constitutional: NAD    Respiratory: no accessory muscle use or conversational dyspnea    Cardiovascular: RRR    Gastrointestinal: soft, NT/ND    Extremities: warm, no edema,  Right foot with erythema to the ankles, no palpable pulses on the right            I&O's Detail      LABS:                        12.9   8.71  )-----------( 189      ( 15 Apr 2022 02:15 )             38.7     04-15    134<L>  |  99  |  27.0<H>  ----------------------------<  120<H>  4.1   |  20.0<L>  |  0.92    Ca    8.8      15 Apr 2022 02:15  Phos  3.3     04-15  Mg     1.9     04-15    TPro  6.7  /  Alb  3.9  /  TBili  <0.2<L>  /  DBili  x   /  AST  19  /  ALT  25  /  AlkPhos  132<H>  04-14    PT/INR - ( 15 Apr 2022 01:08 )   PT: 10.6 sec;   INR: 0.91 ratio         PTT - ( 15 Apr 2022 01:08 )  PTT:64.7 sec      RADIOLOGY & ADDITIONAL STUDIES:

## 2022-04-15 NOTE — BRIEF OPERATIVE NOTE - OPERATION/FINDINGS
severely calcified right external iliac, common femoral into SFA, extensive endarterectomy performed, repaired with patch angioplasty

## 2022-04-15 NOTE — PROGRESS NOTE ADULT - ATTENDING COMMENTS
57 year old man with PAD and RLE rest pain secondary to severe common femoral occlusive disease and occluded SFA-popliteal stents  Plan is for right femoral endarterectomy, possible fem-PT bypass.  I have discussed the risks and benefits of the planned procedure with the patient.  All questions answered. He wishes to proceed

## 2022-04-15 NOTE — CHART NOTE - NSCHARTNOTEFT_GEN_A_CORE
POST-OPERATIVE NOTE    Subjective: Patient in PACU complaining of pain, multimodal pain medication restarted, complained of right groin pain and swelling, pressure held, improvised, during this time no change in signals. once in room pain well controlled  Patient is s/p right common femoral artery endarterectomy extending to external iliac and profunda .     Vital Signs Last 24 Hrs  T(C): 36.5 (15 Apr 2022 15:30), Max: 36.9 (14 Apr 2022 19:26)  T(F): 97.7 (15 Apr 2022 15:30), Max: 98.5 (15 Apr 2022 11:15)  HR: 75 (15 Apr 2022 15:30) (60 - 106)  BP: 153/56 (15 Apr 2022 15:30) (81/63 - 166/58)  BP(mean): 80 (15 Apr 2022 15:30) (80 - 86)  RR: 14 (15 Apr 2022 15:30) (12 - 19)  SpO2: 100% (15 Apr 2022 15:30) (94% - 100%)  I&O's Detail    ceFAZolin   IVPB 2000  amLODIPine   Tablet 10  aspirin  chewable 81  ceFAZolin   IVPB 2000  clopidogrel Tablet 75    PAST MEDICAL & SURGICAL HISTORY:  Hypercholesterolemia    Seizures    Depression    GERD (gastroesophageal reflux disease)    Hypertension    Peripheral vascular disease  with R SFA stent    Osteoarthritis    Former smoker    Prediabetes    Inguinal hernia, left    S/P ORIF (open reduction internal fixation) fracture  Left    S/P shoulder surgery  right    S/P appendectomy    H/O endarterectomy  L femoral a.          Physical Exam:  General: NAD, resting comfortably in bed  Pulmonary: Nonlabored breathing, no respiratory distress  Cardiovascular: NSR  Abdominal: soft, mild groin swelling, soft, non tender, prevena in place with good seal, signals PT biphasic, present AT soft  Extremities: WWP    LABS:                        11.9   9.86  )-----------( 175      ( 15 Apr 2022 12:33 )             34.7     04-15    134<L>  |  99  |  27.0<H>  ----------------------------<  120<H>  4.1   |  20.0<L>  |  0.92    Ca    8.8      15 Apr 2022 02:15  Phos  3.3     04-15  Mg     1.9     04-15    TPro  6.7  /  Alb  3.9  /  TBili  <0.2<L>  /  DBili  x   /  AST  19  /  ALT  25  /  AlkPhos  132<H>  04-14    PT/INR - ( 15 Apr 2022 01:08 )   PT: 10.6 sec;   INR: 0.91 ratio         PTT - ( 15 Apr 2022 01:08 )  PTT:64.7 sec  CAPILLARY BLOOD GLUCOSE          Radiology and Additional Studies:    Assessment:  The patient is a 57y Male who is now several hours post-op from a right common femoral endarterectomy    Plan:  - Pain   - DVT ppx  - bedrest today  - F/u PM labs  - SA Plavix

## 2022-04-16 LAB
ANION GAP SERPL CALC-SCNC: 13 MMOL/L — SIGNIFICANT CHANGE UP (ref 5–17)
BASOPHILS # BLD AUTO: 0.03 K/UL — SIGNIFICANT CHANGE UP (ref 0–0.2)
BASOPHILS NFR BLD AUTO: 0.4 % — SIGNIFICANT CHANGE UP (ref 0–2)
BUN SERPL-MCNC: 14.6 MG/DL — SIGNIFICANT CHANGE UP (ref 8–20)
CALCIUM SERPL-MCNC: 8.1 MG/DL — LOW (ref 8.6–10.2)
CHLORIDE SERPL-SCNC: 104 MMOL/L — SIGNIFICANT CHANGE UP (ref 98–107)
CO2 SERPL-SCNC: 21 MMOL/L — LOW (ref 22–29)
CREAT SERPL-MCNC: 0.87 MG/DL — SIGNIFICANT CHANGE UP (ref 0.5–1.3)
EGFR: 99 ML/MIN/1.73M2 — SIGNIFICANT CHANGE UP
EOSINOPHIL # BLD AUTO: 0.07 K/UL — SIGNIFICANT CHANGE UP (ref 0–0.5)
EOSINOPHIL NFR BLD AUTO: 0.9 % — SIGNIFICANT CHANGE UP (ref 0–6)
GLUCOSE SERPL-MCNC: 118 MG/DL — HIGH (ref 70–99)
HCT VFR BLD CALC: 31.3 % — LOW (ref 39–50)
HGB BLD-MCNC: 10.4 G/DL — LOW (ref 13–17)
IMM GRANULOCYTES NFR BLD AUTO: 0.4 % — SIGNIFICANT CHANGE UP (ref 0–1.5)
LYMPHOCYTES # BLD AUTO: 2.52 K/UL — SIGNIFICANT CHANGE UP (ref 1–3.3)
LYMPHOCYTES # BLD AUTO: 30.8 % — SIGNIFICANT CHANGE UP (ref 13–44)
MAGNESIUM SERPL-MCNC: 1.8 MG/DL — SIGNIFICANT CHANGE UP (ref 1.6–2.6)
MCHC RBC-ENTMCNC: 30.2 PG — SIGNIFICANT CHANGE UP (ref 27–34)
MCHC RBC-ENTMCNC: 33.2 GM/DL — SIGNIFICANT CHANGE UP (ref 32–36)
MCV RBC AUTO: 91 FL — SIGNIFICANT CHANGE UP (ref 80–100)
MONOCYTES # BLD AUTO: 1.1 K/UL — HIGH (ref 0–0.9)
MONOCYTES NFR BLD AUTO: 13.4 % — SIGNIFICANT CHANGE UP (ref 2–14)
NEUTROPHILS # BLD AUTO: 4.44 K/UL — SIGNIFICANT CHANGE UP (ref 1.8–7.4)
NEUTROPHILS NFR BLD AUTO: 54.1 % — SIGNIFICANT CHANGE UP (ref 43–77)
PHOSPHATE SERPL-MCNC: 4.2 MG/DL — SIGNIFICANT CHANGE UP (ref 2.4–4.7)
PLATELET # BLD AUTO: 155 K/UL — SIGNIFICANT CHANGE UP (ref 150–400)
POTASSIUM SERPL-MCNC: 4.1 MMOL/L — SIGNIFICANT CHANGE UP (ref 3.5–5.3)
POTASSIUM SERPL-SCNC: 4.1 MMOL/L — SIGNIFICANT CHANGE UP (ref 3.5–5.3)
RBC # BLD: 3.44 M/UL — LOW (ref 4.2–5.8)
RBC # FLD: 14.7 % — HIGH (ref 10.3–14.5)
SODIUM SERPL-SCNC: 138 MMOL/L — SIGNIFICANT CHANGE UP (ref 135–145)
WBC # BLD: 8.19 K/UL — SIGNIFICANT CHANGE UP (ref 3.8–10.5)
WBC # FLD AUTO: 8.19 K/UL — SIGNIFICANT CHANGE UP (ref 3.8–10.5)

## 2022-04-16 RX ORDER — ONDANSETRON 8 MG/1
4 TABLET, FILM COATED ORAL EVERY 8 HOURS
Refills: 0 | Status: DISCONTINUED | OUTPATIENT
Start: 2022-04-16 | End: 2022-04-19

## 2022-04-16 RX ADMIN — OXYCODONE HYDROCHLORIDE 10 MILLIGRAM(S): 5 TABLET ORAL at 10:21

## 2022-04-16 RX ADMIN — OXYCODONE HYDROCHLORIDE 10 MILLIGRAM(S): 5 TABLET ORAL at 18:24

## 2022-04-16 RX ADMIN — Medication 75 MILLIGRAM(S): at 13:52

## 2022-04-16 RX ADMIN — GABAPENTIN 600 MILLIGRAM(S): 400 CAPSULE ORAL at 22:39

## 2022-04-16 RX ADMIN — PANTOPRAZOLE SODIUM 40 MILLIGRAM(S): 20 TABLET, DELAYED RELEASE ORAL at 05:20

## 2022-04-16 RX ADMIN — Medication 200 MILLIGRAM(S): at 05:21

## 2022-04-16 RX ADMIN — OXYCODONE HYDROCHLORIDE 10 MILLIGRAM(S): 5 TABLET ORAL at 05:18

## 2022-04-16 RX ADMIN — OXYCODONE HYDROCHLORIDE 10 MILLIGRAM(S): 5 TABLET ORAL at 10:51

## 2022-04-16 RX ADMIN — METHOCARBAMOL 750 MILLIGRAM(S): 500 TABLET, FILM COATED ORAL at 23:36

## 2022-04-16 RX ADMIN — OXYCODONE HYDROCHLORIDE 10 MILLIGRAM(S): 5 TABLET ORAL at 23:10

## 2022-04-16 RX ADMIN — Medication 200 MILLIGRAM(S): at 17:54

## 2022-04-16 RX ADMIN — OXYCODONE HYDROCHLORIDE 10 MILLIGRAM(S): 5 TABLET ORAL at 06:00

## 2022-04-16 RX ADMIN — GABAPENTIN 600 MILLIGRAM(S): 400 CAPSULE ORAL at 13:53

## 2022-04-16 RX ADMIN — CLOPIDOGREL BISULFATE 75 MILLIGRAM(S): 75 TABLET, FILM COATED ORAL at 17:54

## 2022-04-16 RX ADMIN — OXYCODONE HYDROCHLORIDE 10 MILLIGRAM(S): 5 TABLET ORAL at 22:40

## 2022-04-16 RX ADMIN — QUETIAPINE FUMARATE 200 MILLIGRAM(S): 200 TABLET, FILM COATED ORAL at 22:41

## 2022-04-16 RX ADMIN — GABAPENTIN 600 MILLIGRAM(S): 400 CAPSULE ORAL at 05:19

## 2022-04-16 RX ADMIN — Medication 5 MILLIGRAM(S): at 22:41

## 2022-04-16 RX ADMIN — Medication 81 MILLIGRAM(S): at 13:52

## 2022-04-16 RX ADMIN — ATORVASTATIN CALCIUM 80 MILLIGRAM(S): 80 TABLET, FILM COATED ORAL at 22:40

## 2022-04-16 RX ADMIN — HYDROMORPHONE HYDROCHLORIDE 0.5 MILLIGRAM(S): 2 INJECTION INTRAMUSCULAR; INTRAVENOUS; SUBCUTANEOUS at 00:00

## 2022-04-16 RX ADMIN — METHOCARBAMOL 750 MILLIGRAM(S): 500 TABLET, FILM COATED ORAL at 17:54

## 2022-04-16 RX ADMIN — OXYCODONE HYDROCHLORIDE 10 MILLIGRAM(S): 5 TABLET ORAL at 17:54

## 2022-04-16 RX ADMIN — METHOCARBAMOL 750 MILLIGRAM(S): 500 TABLET, FILM COATED ORAL at 13:52

## 2022-04-16 RX ADMIN — METHOCARBAMOL 750 MILLIGRAM(S): 500 TABLET, FILM COATED ORAL at 05:20

## 2022-04-16 NOTE — PHYSICAL THERAPY INITIAL EVALUATION ADULT - ADDITIONAL COMMENTS
Pt lives with girlfriend and step son in a private home with 3 MAXIMO 0 handrails and bed&bath on ground level. Pt's PLOF was independent in all ADLs/ambulation without an Assistive Device. Pt has no DME at home. At this time, RW is recommended upon d/c home.

## 2022-04-16 NOTE — PROGRESS NOTE ADULT - SUBJECTIVE AND OBJECTIVE BOX
Acute Care Surgery/Vascular Surgery Progress Note:     Patient afebrile, and vital sings within normal limits Pain persistent on multimodal regimen with breakthrough medication to alleviate current right, abdominal pain. . Tolerating diet. Denies n/v/f/c/cp/sob. Prevena working well. Paresthesias between the right toe and second toe, as well as 5th toe.    Diet, Regular:   Consistent Carbohydrate Evening Snack (CSTCHOSN) (04-15-22 @ 11:23)      Scheduled Medications:   amLODIPine   Tablet 10 milliGRAM(s) Oral daily  aspirin  chewable 81 milliGRAM(s) Oral daily  atorvastatin 80 milliGRAM(s) Oral at bedtime  clopidogrel Tablet 75 milliGRAM(s) Oral every 24 hours  gabapentin 600 milliGRAM(s) Oral every 8 hours  LORazepam     Tablet 0.5 milliGRAM(s) Oral once  melatonin 5 milliGRAM(s) Oral at bedtime  methocarbamol 750 milliGRAM(s) Oral four times a day  pantoprazole    Tablet 40 milliGRAM(s) Oral before breakfast  phenytoin   Capsule 200 milliGRAM(s) Oral every 12 hours  QUEtiapine 200 milliGRAM(s) Oral at bedtime  venlafaxine XR. 75 milliGRAM(s) Oral daily    PRN Medications:  HYDROmorphone  Injectable 0.5 milliGRAM(s) IV Push every 4 hours PRN Severe Pain (7 - 10)  oxyCODONE    IR 5 milliGRAM(s) Oral every 4 hours PRN Moderate Pain (4 - 6)  oxyCODONE    IR 10 milliGRAM(s) Oral every 4 hours PRN Severe Pain (7 - 10)      Objective:   T(F): 98.2 (04-15 @ 22:09), Max: 98.5 (04-15 @ 11:15)  HR: 65 (04-15 @ 22:09) (60 - 106)  BP: 139/71 (04-15 @ 22:09) (81/63 - 166/58)  BP(mean): 80 (04-15 @ 15:30) (80 - 86)  ABP: 180/65 (04-15 @ 12:15) (139/58 - 180/65)  ABP(mean): --  RR: 16 (04-15 @ 16:12) (12 - 19)  SpO2: 95% (04-15 @ 22:09) (94% - 100%)      Physical Exam:   GEN: patient  in no acute distress  RESP: respirations are unlabored, no accessory muscle use, no conversational dyspnea  CVS: RRR  GI: Abdomen soft, non-tender, non-distended, no rebound tenderness / guarding. There is a 9x10 cm induration of the right groin.  Neuro: oriented in time, person and place.      I&O's    04-15 @ 07:01  -  04-16 @ 03:10  --------------------------------------------------------  IN:  Total IN: 0 mL    OUT:    Indwelling Catheter - Urethral (mL): 900 mL    Voided (mL): 400 mL  Total OUT: 1300 mL    Total NET: -1300 mL          LABS:                        11.7   13.80 )-----------( 182      ( 15 Apr 2022 17:37 )             35.6     04-15    134<L>  |  99  |  27.0<H>  ----------------------------<  120<H>  4.1   |  20.0<L>  |  0.92    Ca    8.8      15 Apr 2022 02:15  Phos  3.3     04-15  Mg     1.9     04-15    TPro  6.7  /  Alb  3.9  /  TBili  <0.2<L>  /  DBili  x   /  AST  19  /  ALT  25  /  AlkPhos  132<H>  04-14    PT/INR - ( 15 Apr 2022 01:08 )   PT: 10.6 sec;   INR: 0.91 ratio         PTT - ( 15 Apr 2022 01:08 )  PTT:64.7 sec      MICROBIOLOGY:       PATHOLOGY:

## 2022-04-16 NOTE — PHYSICAL THERAPY INITIAL EVALUATION ADULT - RANGE OF MOTION EXAMINATION, REHAB EVAL
bilateral upper extremity ROM was WNL (within normal limits)/Left LE ROM was WNL (within normal limits)/Right LE ROM was WFL (within functional limits)

## 2022-04-17 LAB
ANION GAP SERPL CALC-SCNC: 13 MMOL/L — SIGNIFICANT CHANGE UP (ref 5–17)
BUN SERPL-MCNC: 14 MG/DL — SIGNIFICANT CHANGE UP (ref 8–20)
CALCIUM SERPL-MCNC: 8.5 MG/DL — LOW (ref 8.6–10.2)
CHLORIDE SERPL-SCNC: 105 MMOL/L — SIGNIFICANT CHANGE UP (ref 98–107)
CO2 SERPL-SCNC: 20 MMOL/L — LOW (ref 22–29)
CREAT SERPL-MCNC: 0.73 MG/DL — SIGNIFICANT CHANGE UP (ref 0.5–1.3)
EGFR: 105 ML/MIN/1.73M2 — SIGNIFICANT CHANGE UP
GLUCOSE SERPL-MCNC: 117 MG/DL — HIGH (ref 70–99)
HCT VFR BLD CALC: 30.2 % — LOW (ref 39–50)
HGB BLD-MCNC: 9.8 G/DL — LOW (ref 13–17)
MAGNESIUM SERPL-MCNC: 1.8 MG/DL — SIGNIFICANT CHANGE UP (ref 1.6–2.6)
MCHC RBC-ENTMCNC: 30.5 PG — SIGNIFICANT CHANGE UP (ref 27–34)
MCHC RBC-ENTMCNC: 32.5 GM/DL — SIGNIFICANT CHANGE UP (ref 32–36)
MCV RBC AUTO: 94.1 FL — SIGNIFICANT CHANGE UP (ref 80–100)
PHOSPHATE SERPL-MCNC: 3 MG/DL — SIGNIFICANT CHANGE UP (ref 2.4–4.7)
PLATELET # BLD AUTO: 155 K/UL — SIGNIFICANT CHANGE UP (ref 150–400)
POTASSIUM SERPL-MCNC: 4.6 MMOL/L — SIGNIFICANT CHANGE UP (ref 3.5–5.3)
POTASSIUM SERPL-SCNC: 4.6 MMOL/L — SIGNIFICANT CHANGE UP (ref 3.5–5.3)
RBC # BLD: 3.21 M/UL — LOW (ref 4.2–5.8)
RBC # FLD: 14.9 % — HIGH (ref 10.3–14.5)
SODIUM SERPL-SCNC: 138 MMOL/L — SIGNIFICANT CHANGE UP (ref 135–145)
WBC # BLD: 9.28 K/UL — SIGNIFICANT CHANGE UP (ref 3.8–10.5)
WBC # FLD AUTO: 9.28 K/UL — SIGNIFICANT CHANGE UP (ref 3.8–10.5)

## 2022-04-17 RX ORDER — ENOXAPARIN SODIUM 100 MG/ML
40 INJECTION SUBCUTANEOUS EVERY 24 HOURS
Refills: 0 | Status: DISCONTINUED | OUTPATIENT
Start: 2022-04-17 | End: 2022-04-19

## 2022-04-17 RX ADMIN — Medication 200 MILLIGRAM(S): at 05:03

## 2022-04-17 RX ADMIN — METHOCARBAMOL 750 MILLIGRAM(S): 500 TABLET, FILM COATED ORAL at 05:03

## 2022-04-17 RX ADMIN — GABAPENTIN 600 MILLIGRAM(S): 400 CAPSULE ORAL at 13:05

## 2022-04-17 RX ADMIN — Medication 200 MILLIGRAM(S): at 18:02

## 2022-04-17 RX ADMIN — OXYCODONE HYDROCHLORIDE 10 MILLIGRAM(S): 5 TABLET ORAL at 04:42

## 2022-04-17 RX ADMIN — AMLODIPINE BESYLATE 10 MILLIGRAM(S): 2.5 TABLET ORAL at 05:03

## 2022-04-17 RX ADMIN — OXYCODONE HYDROCHLORIDE 10 MILLIGRAM(S): 5 TABLET ORAL at 18:02

## 2022-04-17 RX ADMIN — Medication 81 MILLIGRAM(S): at 13:05

## 2022-04-17 RX ADMIN — OXYCODONE HYDROCHLORIDE 10 MILLIGRAM(S): 5 TABLET ORAL at 18:32

## 2022-04-17 RX ADMIN — CLOPIDOGREL BISULFATE 75 MILLIGRAM(S): 75 TABLET, FILM COATED ORAL at 18:04

## 2022-04-17 RX ADMIN — QUETIAPINE FUMARATE 200 MILLIGRAM(S): 200 TABLET, FILM COATED ORAL at 22:31

## 2022-04-17 RX ADMIN — ATORVASTATIN CALCIUM 80 MILLIGRAM(S): 80 TABLET, FILM COATED ORAL at 22:30

## 2022-04-17 RX ADMIN — ENOXAPARIN SODIUM 40 MILLIGRAM(S): 100 INJECTION SUBCUTANEOUS at 22:32

## 2022-04-17 RX ADMIN — Medication 5 MILLIGRAM(S): at 22:30

## 2022-04-17 RX ADMIN — GABAPENTIN 600 MILLIGRAM(S): 400 CAPSULE ORAL at 22:31

## 2022-04-17 RX ADMIN — METHOCARBAMOL 750 MILLIGRAM(S): 500 TABLET, FILM COATED ORAL at 13:06

## 2022-04-17 RX ADMIN — GABAPENTIN 600 MILLIGRAM(S): 400 CAPSULE ORAL at 05:02

## 2022-04-17 RX ADMIN — OXYCODONE HYDROCHLORIDE 10 MILLIGRAM(S): 5 TABLET ORAL at 05:06

## 2022-04-17 RX ADMIN — PANTOPRAZOLE SODIUM 40 MILLIGRAM(S): 20 TABLET, DELAYED RELEASE ORAL at 05:03

## 2022-04-17 RX ADMIN — Medication 75 MILLIGRAM(S): at 13:05

## 2022-04-17 RX ADMIN — METHOCARBAMOL 750 MILLIGRAM(S): 500 TABLET, FILM COATED ORAL at 18:02

## 2022-04-17 NOTE — PROGRESS NOTE ADULT - SUBJECTIVE AND OBJECTIVE BOX
Subjective:  No acute events overnight. pt still complaining of some parasthesia on his rt foot. remains AVSS    MEDICATIONS  (STANDING):  amLODIPine   Tablet 10 milliGRAM(s) Oral daily  aspirin  chewable 81 milliGRAM(s) Oral daily  atorvastatin 80 milliGRAM(s) Oral at bedtime  clopidogrel Tablet 75 milliGRAM(s) Oral every 24 hours  gabapentin 600 milliGRAM(s) Oral every 8 hours  LORazepam     Tablet 0.5 milliGRAM(s) Oral once  melatonin 5 milliGRAM(s) Oral at bedtime  methocarbamol 750 milliGRAM(s) Oral four times a day  pantoprazole    Tablet 40 milliGRAM(s) Oral before breakfast  phenytoin   Capsule 200 milliGRAM(s) Oral every 12 hours  QUEtiapine 200 milliGRAM(s) Oral at bedtime  venlafaxine XR. 75 milliGRAM(s) Oral daily    MEDICATIONS  (PRN):  HYDROmorphone  Injectable 0.5 milliGRAM(s) IV Push every 4 hours PRN Severe Pain (7 - 10)  ondansetron Injectable 4 milliGRAM(s) IV Push every 8 hours PRN Nausea and/or Vomiting  oxyCODONE    IR 5 milliGRAM(s) Oral every 4 hours PRN Moderate Pain (4 - 6)  oxyCODONE    IR 10 milliGRAM(s) Oral every 4 hours PRN Severe Pain (7 - 10)      Vital Signs Last 24 Hrs  T(C): 36.4 (17 Apr 2022 00:49), Max: 36.8 (16 Apr 2022 11:10)  T(F): 97.6 (17 Apr 2022 00:49), Max: 98.3 (16 Apr 2022 11:10)  HR: 86 (17 Apr 2022 00:49) (86 - 94)  BP: 114/65 (17 Apr 2022 00:49) (100/66 - 114/65)  BP(mean): --  RR: 18 (17 Apr 2022 00:49) (18 - 18)  SpO2: 92% (17 Apr 2022 00:49) (92% - 94%)      04-15  -  04-16  --------------------------------------------------------  IN:  Total IN: 0 mL    OUT:    Indwelling Catheter - Urethral (mL): 1800 mL    Voided (mL): 400 mL  Total OUT: 2200 mL    Total NET: -2200 mL      04-16  -  04-17  --------------------------------------------------------  IN:  Total IN: 0 mL    OUT:    Voided (mL): 620 mL  Total OUT: 620 mL    Total NET: -620 mL          PHYSICAL EXAM:    Constitutional: NAD  Respiratory: no accessory muscle use or conversational dyspnea  Cardiovascular: RRR  Gastrointestinal: soft, NT/ND    Extremities: rt foot is colder compared to the left. intact gross motor/ sensory functions.  signals appreciated at the rt PT. no signals at the rt DP      LABS:                        10.4   8.19  )-----------( 155      ( 16 Apr 2022 04:48 )             31.3     04-16    138  |  104  |  14.6  ----------------------------<  118<H>  4.1   |  21.0<L>  |  0.87    Ca    8.1<L>      16 Apr 2022 04:48  Phos  4.2     04-16  Mg     1.8     04-16

## 2022-04-18 ENCOUNTER — TRANSCRIPTION ENCOUNTER (OUTPATIENT)
Age: 57
End: 2022-04-18

## 2022-04-18 LAB
ANION GAP SERPL CALC-SCNC: 12 MMOL/L — SIGNIFICANT CHANGE UP (ref 5–17)
BUN SERPL-MCNC: 15.6 MG/DL — SIGNIFICANT CHANGE UP (ref 8–20)
CALCIUM SERPL-MCNC: 8.5 MG/DL — LOW (ref 8.6–10.2)
CHLORIDE SERPL-SCNC: 101 MMOL/L — SIGNIFICANT CHANGE UP (ref 98–107)
CO2 SERPL-SCNC: 22 MMOL/L — SIGNIFICANT CHANGE UP (ref 22–29)
CREAT SERPL-MCNC: 0.71 MG/DL — SIGNIFICANT CHANGE UP (ref 0.5–1.3)
EGFR: 106 ML/MIN/1.73M2 — SIGNIFICANT CHANGE UP
GLUCOSE SERPL-MCNC: 106 MG/DL — HIGH (ref 70–99)
HCT VFR BLD CALC: 28.1 % — LOW (ref 39–50)
HGB BLD-MCNC: 9.2 G/DL — LOW (ref 13–17)
MAGNESIUM SERPL-MCNC: 1.8 MG/DL — SIGNIFICANT CHANGE UP (ref 1.6–2.6)
MCHC RBC-ENTMCNC: 30.7 PG — SIGNIFICANT CHANGE UP (ref 27–34)
MCHC RBC-ENTMCNC: 32.7 GM/DL — SIGNIFICANT CHANGE UP (ref 32–36)
MCV RBC AUTO: 93.7 FL — SIGNIFICANT CHANGE UP (ref 80–100)
PHOSPHATE SERPL-MCNC: 3.5 MG/DL — SIGNIFICANT CHANGE UP (ref 2.4–4.7)
PLATELET # BLD AUTO: 164 K/UL — SIGNIFICANT CHANGE UP (ref 150–400)
POTASSIUM SERPL-MCNC: 4.6 MMOL/L — SIGNIFICANT CHANGE UP (ref 3.5–5.3)
POTASSIUM SERPL-SCNC: 4.6 MMOL/L — SIGNIFICANT CHANGE UP (ref 3.5–5.3)
RBC # BLD: 3 M/UL — LOW (ref 4.2–5.8)
RBC # FLD: 14.9 % — HIGH (ref 10.3–14.5)
SODIUM SERPL-SCNC: 135 MMOL/L — SIGNIFICANT CHANGE UP (ref 135–145)
WBC # BLD: 7.73 K/UL — SIGNIFICANT CHANGE UP (ref 3.8–10.5)
WBC # FLD AUTO: 7.73 K/UL — SIGNIFICANT CHANGE UP (ref 3.8–10.5)

## 2022-04-18 RX ORDER — METHOCARBAMOL 500 MG/1
1 TABLET, FILM COATED ORAL
Qty: 30 | Refills: 0
Start: 2022-04-18 | End: 2022-04-27

## 2022-04-18 RX ORDER — OXYCODONE HYDROCHLORIDE 5 MG/1
1 TABLET ORAL
Qty: 15 | Refills: 0
Start: 2022-04-18 | End: 2022-04-22

## 2022-04-18 RX ORDER — MAGNESIUM SULFATE 500 MG/ML
1 VIAL (ML) INJECTION ONCE
Refills: 0 | Status: COMPLETED | OUTPATIENT
Start: 2022-04-18 | End: 2022-04-18

## 2022-04-18 RX ORDER — GABAPENTIN 400 MG/1
2 CAPSULE ORAL
Qty: 180 | Refills: 0
Start: 2022-04-18 | End: 2022-05-17

## 2022-04-18 RX ADMIN — QUETIAPINE FUMARATE 200 MILLIGRAM(S): 200 TABLET, FILM COATED ORAL at 22:01

## 2022-04-18 RX ADMIN — OXYCODONE HYDROCHLORIDE 10 MILLIGRAM(S): 5 TABLET ORAL at 10:41

## 2022-04-18 RX ADMIN — GABAPENTIN 600 MILLIGRAM(S): 400 CAPSULE ORAL at 22:01

## 2022-04-18 RX ADMIN — GABAPENTIN 600 MILLIGRAM(S): 400 CAPSULE ORAL at 05:12

## 2022-04-18 RX ADMIN — Medication 200 MILLIGRAM(S): at 05:11

## 2022-04-18 RX ADMIN — CLOPIDOGREL BISULFATE 75 MILLIGRAM(S): 75 TABLET, FILM COATED ORAL at 18:31

## 2022-04-18 RX ADMIN — METHOCARBAMOL 750 MILLIGRAM(S): 500 TABLET, FILM COATED ORAL at 15:02

## 2022-04-18 RX ADMIN — Medication 100 GRAM(S): at 15:02

## 2022-04-18 RX ADMIN — METHOCARBAMOL 750 MILLIGRAM(S): 500 TABLET, FILM COATED ORAL at 00:51

## 2022-04-18 RX ADMIN — METHOCARBAMOL 750 MILLIGRAM(S): 500 TABLET, FILM COATED ORAL at 18:32

## 2022-04-18 RX ADMIN — Medication 5 MILLIGRAM(S): at 22:02

## 2022-04-18 RX ADMIN — Medication 200 MILLIGRAM(S): at 18:31

## 2022-04-18 RX ADMIN — PANTOPRAZOLE SODIUM 40 MILLIGRAM(S): 20 TABLET, DELAYED RELEASE ORAL at 05:12

## 2022-04-18 RX ADMIN — OXYCODONE HYDROCHLORIDE 10 MILLIGRAM(S): 5 TABLET ORAL at 11:30

## 2022-04-18 RX ADMIN — OXYCODONE HYDROCHLORIDE 10 MILLIGRAM(S): 5 TABLET ORAL at 16:14

## 2022-04-18 RX ADMIN — GABAPENTIN 600 MILLIGRAM(S): 400 CAPSULE ORAL at 15:02

## 2022-04-18 RX ADMIN — METHOCARBAMOL 750 MILLIGRAM(S): 500 TABLET, FILM COATED ORAL at 05:11

## 2022-04-18 RX ADMIN — ENOXAPARIN SODIUM 40 MILLIGRAM(S): 100 INJECTION SUBCUTANEOUS at 22:01

## 2022-04-18 RX ADMIN — Medication 81 MILLIGRAM(S): at 10:41

## 2022-04-18 RX ADMIN — Medication 75 MILLIGRAM(S): at 10:41

## 2022-04-18 RX ADMIN — ATORVASTATIN CALCIUM 80 MILLIGRAM(S): 80 TABLET, FILM COATED ORAL at 22:02

## 2022-04-18 RX ADMIN — OXYCODONE HYDROCHLORIDE 10 MILLIGRAM(S): 5 TABLET ORAL at 15:14

## 2022-04-18 NOTE — DIETITIAN INITIAL EVALUATION ADULT - PERTINENT LABORATORY DATA
04-18    135  |  101  |  15.6  ----------------------------<  106<H>  4.6   |  22.0  |  0.71    Ca    8.5<L>      18 Apr 2022 06:33  Phos  3.5     04-18  Mg     1.8     04-18    A1C with Estimated Average Glucose Result: 5.4 % (04-14-22 @ 03:24)  A1C with Estimated Average Glucose Result: 5.2 % (09-23-21 @ 14:03)  04-18 Na135 mmol/L Glu 106 mg/dL<H> K+ 4.6 mmol/L Cr  0.71 mg/dL BUN 15.6 mg/dL Phos 3.5 mg/dL Alb n/a   PAB n/a

## 2022-04-18 NOTE — DISCHARGE NOTE PROVIDER - NSDCCPCAREPLAN_GEN_ALL_CORE_FT
PRINCIPAL DISCHARGE DIAGNOSIS  Diagnosis: Right leg claudication  Assessment and Plan of Treatment: Follow up: Please call and make an appointment with the Vascular Surgery Clinic 10-14 days after discharge. Also, please call and make an appointment with your primary care physician as per your usual schedule.   Activity: May return to normal activities as tolerated..  Diet: May continue regular diet.  Medications: Please take all medications listed on your discharge paperwork as prescribed. Pain medication has been prescribed for you. Please, take it as it has been prescribed, do not drive or operate heavy machinery while taking narcotics.  You are encouraged to take over-the-counter tylenol and/or ibuprofen for pain relief when you feel your pain no longer warrants the use of narcotic pain medications. Please continue with your home medications, including aspirin and plavix.  Wound Care: Please, keep wound site clean and dry. You may shower, but do not bathe.  Patient is advised to RETURN TO THE EMERGENCY DEPARTMENT for any of the following - worsening pain, fever/chills, nausea/vomiting, altered mental status, chest pain, shortness of breath, or any other new / worsening symptom.

## 2022-04-18 NOTE — DISCHARGE NOTE PROVIDER - HOSPITAL COURSE
HPI:  57 year old male known to our service from prior lower extremity revascularization, PMHX PAD. Patient reports several weeks of right leg pain, initially with walking and progressing to be at rest. 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  Patient was discharged with office follow up to see Dr. Laws. When evaluated by Dr Laws, patient was deemed in need for revascularization and told to return to the Hospital this past Monday for surgical planning.  Patient reports he was unable to present Monday for personal reasons.  Reports right foot discloration, pain and periodic numbness. No chest pain, no sob, fever or chills.  No trauma, no fever or chills.  (13 Apr 2022 11:29)    Hospital course:  Patient was admitted to the Vascular surgery service and was started on a Heparin gtt. After being cleared by cardiology, the patient underwent an uncomplicated R common femoral artery endarterectomy and repair with patch angioplasty on 4/15. After surgery the patient progressed well with stable H/H and no evidence of expanding hematoma on the R groin. Patient was evaluated by PT recommending home with RW and assist. Patient now remains HDN stable, working with PT, pain is well controlled and is stable for discharge home.

## 2022-04-18 NOTE — DIETITIAN INITIAL EVALUATION ADULT - PERTINENT MEDS FT
MEDICATIONS  (STANDING):  amLODIPine   Tablet 10 milliGRAM(s) Oral daily  aspirin  chewable 81 milliGRAM(s) Oral daily  atorvastatin 80 milliGRAM(s) Oral at bedtime  clopidogrel Tablet 75 milliGRAM(s) Oral every 24 hours  enoxaparin Injectable 40 milliGRAM(s) SubCutaneous every 24 hours  gabapentin 600 milliGRAM(s) Oral every 8 hours  LORazepam     Tablet 0.5 milliGRAM(s) Oral once  magnesium sulfate  IVPB 1 Gram(s) IV Intermittent once  melatonin 5 milliGRAM(s) Oral at bedtime  methocarbamol 750 milliGRAM(s) Oral four times a day  pantoprazole    Tablet 40 milliGRAM(s) Oral before breakfast  phenytoin   Capsule 200 milliGRAM(s) Oral every 12 hours  QUEtiapine 200 milliGRAM(s) Oral at bedtime  venlafaxine XR. 75 milliGRAM(s) Oral daily    MEDICATIONS  (PRN):  HYDROmorphone  Injectable 0.5 milliGRAM(s) IV Push every 4 hours PRN Severe Pain (7 - 10)  ondansetron Injectable 4 milliGRAM(s) IV Push every 8 hours PRN Nausea and/or Vomiting  oxyCODONE    IR 5 milliGRAM(s) Oral every 4 hours PRN Moderate Pain (4 - 6)  oxyCODONE    IR 10 milliGRAM(s) Oral every 4 hours PRN Severe Pain (7 - 10)

## 2022-04-18 NOTE — PROGRESS NOTE ADULT - SUBJECTIVE AND OBJECTIVE BOX
ACUTE CARE SURGERY PROGRESS NOTE    Subjective:   Patient examined at bedside this AM. Reports     Objective:  Vital Signs  T(C): 36.8 (04-18 @ 04:00), Max: 37 (04-17 @ 10:43)  HR: 71 (04-18 @ 04:00) (71 - 90)  BP: 106/63 (04-18 @ 04:00) (106/63 - 127/72)  RR: 18 (04-18 @ 04:00) (18 - 18)  SpO2: 90% (04-18 @ 04:00) (90% - 93%)  04-16-22 @ 07:01  -  04-17-22 @ 07:00  --------------------------------------------------------  IN:  Total IN: 0 mL    OUT:    Voided (mL): 1770 mL  Total OUT: 1770 mL    Total NET: -1770 mL      04-17-22 @ 07:01  -  04-18-22 @ 06:52  --------------------------------------------------------  IN:  Total IN: 0 mL    OUT:    Voided (mL): 1900 mL  Total OUT: 1900 mL    Total NET: -1900 mL          Physical Exam:  General: alert and oriented, NAD  Resp: airway patent, respirations unlabored  Extremities: RLE groin is soft, no hematoma, biphasic PT    Labs:                        9.2    7.73  )-----------( 164      ( 18 Apr 2022 06:33 )             28.1   04-17    138  |  105  |  14.0  ----------------------------<  117<H>  4.6   |  20.0<L>  |  0.73    Ca    8.5<L>      17 Apr 2022 06:31  Phos  3.0     04-17  Mg     1.8     04-17      CAPILLARY BLOOD GLUCOSE          Medications:   MEDICATIONS  (STANDING):  amLODIPine   Tablet 10 milliGRAM(s) Oral daily  aspirin  chewable 81 milliGRAM(s) Oral daily  atorvastatin 80 milliGRAM(s) Oral at bedtime  clopidogrel Tablet 75 milliGRAM(s) Oral every 24 hours  enoxaparin Injectable 40 milliGRAM(s) SubCutaneous every 24 hours  gabapentin 600 milliGRAM(s) Oral every 8 hours  LORazepam     Tablet 0.5 milliGRAM(s) Oral once  melatonin 5 milliGRAM(s) Oral at bedtime  methocarbamol 750 milliGRAM(s) Oral four times a day  pantoprazole    Tablet 40 milliGRAM(s) Oral before breakfast  phenytoin   Capsule 200 milliGRAM(s) Oral every 12 hours  QUEtiapine 200 milliGRAM(s) Oral at bedtime  venlafaxine XR. 75 milliGRAM(s) Oral daily    MEDICATIONS  (PRN):  HYDROmorphone  Injectable 0.5 milliGRAM(s) IV Push every 4 hours PRN Severe Pain (7 - 10)  ondansetron Injectable 4 milliGRAM(s) IV Push every 8 hours PRN Nausea and/or Vomiting  oxyCODONE    IR 5 milliGRAM(s) Oral every 4 hours PRN Moderate Pain (4 - 6)  oxyCODONE    IR 10 milliGRAM(s) Oral every 4 hours PRN Severe Pain (7 - 10)   VASCULAR SURGERY PROGRESS NOTE    Subjective: Patient examined at bedside this AM. Reports his pain is improved from yesterday. His only complaint is subjective tightness of skin overlaying his toes and forefoot. No acute events overnight    Objective:  Vital Signs  T(C): 36.8 (04-18 @ 04:00), Max: 37 (04-17 @ 10:43)  HR: 71 (04-18 @ 04:00) (71 - 90)  BP: 106/63 (04-18 @ 04:00) (106/63 - 127/72)  RR: 18 (04-18 @ 04:00) (18 - 18)  SpO2: 90% (04-18 @ 04:00) (90% - 93%)  04-16-22 @ 07:01  -  04-17-22 @ 07:00  --------------------------------------------------------  IN:  Total IN: 0 mL    OUT:    Voided (mL): 1770 mL  Total OUT: 1770 mL    Total NET: -1770 mL      04-17-22 @ 07:01  -  04-18-22 @ 06:52  --------------------------------------------------------  IN:  Total IN: 0 mL    OUT:    Voided (mL): 1900 mL  Total OUT: 1900 mL    Total NET: -1900 mL          Physical Exam:  General: alert and oriented, NAD  Resp: airway patent, respirations unlabored  Extremities: RLE groin is soft, no hematoma, biphasic PT    Labs:                        9.2    7.73  )-----------( 164      ( 18 Apr 2022 06:33 )             28.1   04-17    138  |  105  |  14.0  ----------------------------<  117<H>  4.6   |  20.0<L>  |  0.73    Ca    8.5<L>      17 Apr 2022 06:31  Phos  3.0     04-17  Mg     1.8     04-17        Medications:   MEDICATIONS  (STANDING):  amLODIPine   Tablet 10 milliGRAM(s) Oral daily  aspirin  chewable 81 milliGRAM(s) Oral daily  atorvastatin 80 milliGRAM(s) Oral at bedtime  clopidogrel Tablet 75 milliGRAM(s) Oral every 24 hours  enoxaparin Injectable 40 milliGRAM(s) SubCutaneous every 24 hours  gabapentin 600 milliGRAM(s) Oral every 8 hours  LORazepam     Tablet 0.5 milliGRAM(s) Oral once  melatonin 5 milliGRAM(s) Oral at bedtime  methocarbamol 750 milliGRAM(s) Oral four times a day  pantoprazole    Tablet 40 milliGRAM(s) Oral before breakfast  phenytoin   Capsule 200 milliGRAM(s) Oral every 12 hours  QUEtiapine 200 milliGRAM(s) Oral at bedtime  venlafaxine XR. 75 milliGRAM(s) Oral daily    MEDICATIONS  (PRN):  HYDROmorphone  Injectable 0.5 milliGRAM(s) IV Push every 4 hours PRN Severe Pain (7 - 10)  ondansetron Injectable 4 milliGRAM(s) IV Push every 8 hours PRN Nausea and/or Vomiting  oxyCODONE    IR 5 milliGRAM(s) Oral every 4 hours PRN Moderate Pain (4 - 6)  oxyCODONE    IR 10 milliGRAM(s) Oral every 4 hours PRN Severe Pain (7 - 10)

## 2022-04-18 NOTE — DIETITIAN INITIAL EVALUATION ADULT - ORAL INTAKE PTA/DIET HISTORY
Patient reported 100% PO intake and general improvements overall. Eating well in house and PTA. UBW reported ~133lbs. No difficulties chewing or swallowing.

## 2022-04-18 NOTE — DIETITIAN INITIAL EVALUATION ADULT - OTHER INFO
57M PMHx PVD, HTN, HLD, depression, with R chronic leg ischemia s/p rt CFA endarterectomy now POD#2. Marcel gomez' ed 4/16. PT saw and recommended home with RW pending stair assessment.

## 2022-04-18 NOTE — DIETITIAN INITIAL EVALUATION ADULT - ADD RECOMMEND
1. Recommend to continue nutrition plan of care   2. Recommend MVI daily to support nutrition status  3. Encourage HBV protein sources at meals to support healing post-op

## 2022-04-18 NOTE — DISCHARGE NOTE PROVIDER - PROVIDER TOKENS
FREE:[LAST:[akuma],FIRST:[Husam],PHONE:[(   )    -],FAX:[(   )    -],ADDRESS:[[address] [place] [address], [id number] [phone]],FOLLOWUP:[2 weeks],ESTABLISHEDPATIENT:[T]]

## 2022-04-18 NOTE — DISCHARGE NOTE PROVIDER - NSDCMRMEDTOKEN_GEN_ALL_CORE_FT
amLODIPine 10 mg oral tablet: 1 tab(s) orally once a day  aspirin 81 mg oral tablet, chewable: 1 tab(s) orally once a day  atorvastatin 80 mg oral tablet: 1 tab(s) orally once a day  Fish Oil 1000 mg oral capsule: 1 cap(s) orally 2 times a day  gabapentin 300 mg oral capsule: 2 cap(s) orally every 8 hours  methocarbamol 750 mg oral tablet: 1 tab(s) orally 3 times a day   Oxaydo 5 mg oral tablet: 1 tab(s) orally every 6 hours, As Needed -Moderate Pain (4 - 6) MDD:4  pantoprazole 40 mg oral delayed release tablet: 1 tab(s) orally once a day  phenytoin 200 mg oral capsule, extended release: 1 cap(s) orally every 12 hours  Plavix 75 mg oral tablet: 1 tab(s) orally once a day  QUEtiapine 200 mg oral tablet:  orally once a day (at bedtime)  venlafaxine 75 mg oral capsule, extended release: 1 cap(s) orally once a day

## 2022-04-18 NOTE — DISCHARGE NOTE PROVIDER - CARE PROVIDER_API CALL
Husam juarez  [address] [place] [address], [address] [phone]  Phone: (   )    -  Fax: (   )    -  Established Patient  Follow Up Time: 2 weeks

## 2022-04-19 ENCOUNTER — TRANSCRIPTION ENCOUNTER (OUTPATIENT)
Age: 57
End: 2022-04-19

## 2022-04-19 VITALS
DIASTOLIC BLOOD PRESSURE: 72 MMHG | HEART RATE: 78 BPM | RESPIRATION RATE: 18 BRPM | SYSTOLIC BLOOD PRESSURE: 125 MMHG | OXYGEN SATURATION: 96 % | TEMPERATURE: 98 F

## 2022-04-19 RX ADMIN — Medication 81 MILLIGRAM(S): at 11:58

## 2022-04-19 RX ADMIN — GABAPENTIN 600 MILLIGRAM(S): 400 CAPSULE ORAL at 11:59

## 2022-04-19 RX ADMIN — OXYCODONE HYDROCHLORIDE 5 MILLIGRAM(S): 5 TABLET ORAL at 06:00

## 2022-04-19 RX ADMIN — GABAPENTIN 600 MILLIGRAM(S): 400 CAPSULE ORAL at 05:32

## 2022-04-19 RX ADMIN — METHOCARBAMOL 750 MILLIGRAM(S): 500 TABLET, FILM COATED ORAL at 11:59

## 2022-04-19 RX ADMIN — CLOPIDOGREL BISULFATE 75 MILLIGRAM(S): 75 TABLET, FILM COATED ORAL at 11:59

## 2022-04-19 RX ADMIN — Medication 75 MILLIGRAM(S): at 11:59

## 2022-04-19 RX ADMIN — OXYCODONE HYDROCHLORIDE 5 MILLIGRAM(S): 5 TABLET ORAL at 05:32

## 2022-04-19 RX ADMIN — Medication 200 MILLIGRAM(S): at 05:31

## 2022-04-19 RX ADMIN — PANTOPRAZOLE SODIUM 40 MILLIGRAM(S): 20 TABLET, DELAYED RELEASE ORAL at 05:32

## 2022-04-19 RX ADMIN — METHOCARBAMOL 750 MILLIGRAM(S): 500 TABLET, FILM COATED ORAL at 05:32

## 2022-04-19 NOTE — PROGRESS NOTE ADULT - SUBJECTIVE AND OBJECTIVE BOX
VASCULAR SURGERY PROGRESS NOTE    Subjective: Patient examined at bedside this AM. Had a headache overnight, but pain improved with tylenol. No new concerns this morning. States he is ready to return home today. No acute events overnight    Objective:  Vital Signs  T(C): 36.8 (04-18 @ 22:00), Max: 36.8 (04-18 @ 22:00)  HR: 60 (04-19 @ 05:18) (60 - 90)  BP: 108/63 (04-19 @ 05:18) (103/64 - 144/70)  RR: 18 (04-19 @ 05:18) (18 - 18)  SpO2: 93% (04-19 @ 05:18) (91% - 94%)  04-17-22 @ 07:01  -  04-18-22 @ 07:00  --------------------------------------------------------  IN:  Total IN: 0 mL    OUT:    Voided (mL): 1900 mL  Total OUT: 1900 mL    Total NET: -1900 mL      04-18-22 @ 07:01  -  04-19-22 @ 06:43  --------------------------------------------------------  IN:  Total IN: 0 mL    OUT:    Voided (mL): 1000 mL  Total OUT: 1000 mL    Total NET: -1000 mL          Physical Exam:  General: alert and oriented, NAD  Resp: airway patent, respirations unlabored  Extremities: no edema, warm well perfused, groin soft, no hematoma, PT signals present  Skin: warm, dry, appropriate color    Labs:                        9.2    7.73  )-----------( 164      ( 18 Apr 2022 06:33 )             28.1   04-18    135  |  101  |  15.6  ----------------------------<  106<H>  4.6   |  22.0  |  0.71    Ca    8.5<L>      18 Apr 2022 06:33  Phos  3.5     04-18  Mg     1.8     04-18      CAPILLARY BLOOD GLUCOSE          Medications:   MEDICATIONS  (STANDING):  amLODIPine   Tablet 10 milliGRAM(s) Oral daily  aspirin  chewable 81 milliGRAM(s) Oral daily  atorvastatin 80 milliGRAM(s) Oral at bedtime  clopidogrel Tablet 75 milliGRAM(s) Oral every 24 hours  enoxaparin Injectable 40 milliGRAM(s) SubCutaneous every 24 hours  gabapentin 600 milliGRAM(s) Oral every 8 hours  LORazepam     Tablet 0.5 milliGRAM(s) Oral once  melatonin 5 milliGRAM(s) Oral at bedtime  methocarbamol 750 milliGRAM(s) Oral four times a day  pantoprazole    Tablet 40 milliGRAM(s) Oral before breakfast  phenytoin   Capsule 200 milliGRAM(s) Oral every 12 hours  QUEtiapine 200 milliGRAM(s) Oral at bedtime  venlafaxine XR. 75 milliGRAM(s) Oral daily    MEDICATIONS  (PRN):  HYDROmorphone  Injectable 0.5 milliGRAM(s) IV Push every 4 hours PRN Severe Pain (7 - 10)  ondansetron Injectable 4 milliGRAM(s) IV Push every 8 hours PRN Nausea and/or Vomiting  oxyCODONE    IR 5 milliGRAM(s) Oral every 4 hours PRN Moderate Pain (4 - 6)  oxyCODONE    IR 10 milliGRAM(s) Oral every 4 hours PRN Severe Pain (7 - 10)

## 2022-04-19 NOTE — PROGRESS NOTE ADULT - ASSESSMENT
57M PMHx PVD, HTN, HLD, depression, with R chronic leg ischemia s/p rt CFA endarterectomy now POD#2. Marcel gomez' ed 4/16. PT saw and recommended home with RW pending stair assessment.    Plan:  DC planning.  pain control.  regular diet  groin check  no hep gtt  ASA/ Plavix
57M PMHx PVD, HTN, HLD, depression, with R chronic leg ischemia with intractable pain, for peripheral angiogram +/- bypass today    - OR today  - hep gtt  - care pending OR course today
57M with hx of PVD, HTN, HLD, depression, who comes to the ED c/o right foot pain, patient was seen by vascular surgery and its admitted for right chronic leg ischemia with intractable pain, patient is schedule for peripheral angiogram +/- bypass. Cardiology consulted for risk stratification. no further workup needed    -- NPO at midnight  -- CTA with runoff  -- hep gtt  -- OOB  -- plan for bypass 4/15
57 year old man with PAD and RLE rest pain secondary to severe common femoral occlusive disease and occluded SFA-popliteal stents.  Experiencing  R chronic leg ischemia with intractable pain, for peripheral angiogram on 4/15/22    Plan:    57 year old man with PAD and RLE rest pain secondary to severe common femoral occlusive disease and occluded SFA-popliteal stents  Plan is for right femoral endarterectomy, possible fem-PT bypass.  I have discussed the risks and benefits of the planned procedure with the patient.  Discharge planning.
57M PMHx PVD, HTN, HLD, depression, with R chronic leg ischemia s/p rt CFA endarterectomy now POD#3. Marcel gomez' ed 4/16. PT saw and recommended home with RW pending stair assessment.    Plan:  - Reg diet  - Pain control PRN  - No hep gtt  - cont ASA/plavix  - Dispo: DC home today with RW and home PT   - DC with ASA/Plavix/high dose statin
No further testing required from Anesthesia perspective for OR on 4/15. 
57M PMHx PVD, HTN, HLD, depression, with R chronic leg ischemia s/p rt CFA endarterectomy now POD#2. Marcel gomez' ed 4/16. PT saw and recommended home with RW pending stair assessment.    Plan:  - Reg diet  - Pain control PRN  - No hep gtt  - cont ASA/plavix  - Dispo: PT rec home with home PT and RW pending further work on stairs

## 2022-04-19 NOTE — DISCHARGE NOTE NURSING/CASE MANAGEMENT/SOCIAL WORK - PATIENT PORTAL LINK FT
You can access the FollowMyHealth Patient Portal offered by [facility] by registering at the following website: [email or web address]. By joining [employer]’s FollowMyHealth portal, you will also be able to view your health information using other applications (apps) compatible with our system.

## 2022-04-19 NOTE — DISCHARGE NOTE NURSING/CASE MANAGEMENT/SOCIAL WORK - NSDCPEFALRISK_GEN_ALL_CORE
For information on Fall & Injury Prevention, visit: [email or web address] OR  [email or web address] OR  https://www.cdc.gov/steadi/patient.html

## 2022-04-19 NOTE — PROGRESS NOTE ADULT - PROVIDER SPECIALTY LIST ADULT
Anesthesia
Vascular Surgery

## 2022-04-20 ENCOUNTER — NON-APPOINTMENT (OUTPATIENT)
Age: 57
End: 2022-04-20

## 2022-04-23 ENCOUNTER — EMERGENCY (EMERGENCY)
Facility: HOSPITAL | Age: 57
LOS: 1 days | Discharge: HOME | End: 2022-04-23
Attending: EMERGENCY MEDICINE
Payer: COMMERCIAL

## 2022-04-23 VITALS
WEIGHT: 132.94 LBS | SYSTOLIC BLOOD PRESSURE: 168 MMHG | HEART RATE: 97 BPM | TEMPERATURE: 98 F | DIASTOLIC BLOOD PRESSURE: 88 MMHG | OXYGEN SATURATION: 99 % | HEIGHT: 65 IN | RESPIRATION RATE: 18 BRPM

## 2022-04-23 DIAGNOSIS — Z98.890 OTHER SPECIFIED POSTPROCEDURAL STATES: Chronic | ICD-10-CM

## 2022-04-23 DIAGNOSIS — Z98.89 OTHER SPECIFIED POSTPROCEDURAL STATES: Chronic | ICD-10-CM

## 2022-04-23 DIAGNOSIS — Z96.7 PRESENCE OF OTHER BONE AND TENDON IMPLANTS: Chronic | ICD-10-CM

## 2022-04-23 DIAGNOSIS — K40.90 UNILATERAL INGUINAL HERNIA, WITHOUT OBSTRUCTION OR GANGRENE, NOT SPECIFIED AS RECURRENT: Chronic | ICD-10-CM

## 2022-04-23 LAB
ALBUMIN SERPL ELPH-MCNC: 4.1 G/DL — SIGNIFICANT CHANGE UP (ref 3.3–5.2)
ALP SERPL-CCNC: 173 U/L — HIGH (ref 40–120)
ALT FLD-CCNC: 30 U/L — SIGNIFICANT CHANGE UP
ANION GAP SERPL CALC-SCNC: 13 MMOL/L — SIGNIFICANT CHANGE UP (ref 5–17)
APTT BLD: 29.2 SEC — SIGNIFICANT CHANGE UP (ref 27.5–35.5)
AST SERPL-CCNC: 18 U/L — SIGNIFICANT CHANGE UP
BASOPHILS # BLD AUTO: 0.04 K/UL — SIGNIFICANT CHANGE UP (ref 0–0.2)
BASOPHILS NFR BLD AUTO: 0.3 % — SIGNIFICANT CHANGE UP (ref 0–2)
BILIRUB SERPL-MCNC: 0.2 MG/DL — LOW (ref 0.4–2)
BUN SERPL-MCNC: 12.9 MG/DL — SIGNIFICANT CHANGE UP (ref 8–20)
CALCIUM SERPL-MCNC: 9.1 MG/DL — SIGNIFICANT CHANGE UP (ref 8.6–10.2)
CHLORIDE SERPL-SCNC: 101 MMOL/L — SIGNIFICANT CHANGE UP (ref 98–107)
CO2 SERPL-SCNC: 22 MMOL/L — SIGNIFICANT CHANGE UP (ref 22–29)
CREAT SERPL-MCNC: 0.68 MG/DL — SIGNIFICANT CHANGE UP (ref 0.5–1.3)
EGFR: 107 ML/MIN/1.73M2 — SIGNIFICANT CHANGE UP
EOSINOPHIL # BLD AUTO: 0.06 K/UL — SIGNIFICANT CHANGE UP (ref 0–0.5)
EOSINOPHIL NFR BLD AUTO: 0.5 % — SIGNIFICANT CHANGE UP (ref 0–6)
GLUCOSE SERPL-MCNC: 104 MG/DL — HIGH (ref 70–99)
HCT VFR BLD CALC: 34.1 % — LOW (ref 39–50)
HGB BLD-MCNC: 11.1 G/DL — LOW (ref 13–17)
IMM GRANULOCYTES NFR BLD AUTO: 0.8 % — SIGNIFICANT CHANGE UP (ref 0–1.5)
INR BLD: 0.88 RATIO — SIGNIFICANT CHANGE UP (ref 0.88–1.16)
LYMPHOCYTES # BLD AUTO: 1.99 K/UL — SIGNIFICANT CHANGE UP (ref 1–3.3)
LYMPHOCYTES # BLD AUTO: 16.5 % — SIGNIFICANT CHANGE UP (ref 13–44)
MCHC RBC-ENTMCNC: 30.5 PG — SIGNIFICANT CHANGE UP (ref 27–34)
MCHC RBC-ENTMCNC: 32.6 GM/DL — SIGNIFICANT CHANGE UP (ref 32–36)
MCV RBC AUTO: 93.7 FL — SIGNIFICANT CHANGE UP (ref 80–100)
MONOCYTES # BLD AUTO: 1.24 K/UL — HIGH (ref 0–0.9)
MONOCYTES NFR BLD AUTO: 10.3 % — SIGNIFICANT CHANGE UP (ref 2–14)
NEUTROPHILS # BLD AUTO: 8.66 K/UL — HIGH (ref 1.8–7.4)
NEUTROPHILS NFR BLD AUTO: 71.6 % — SIGNIFICANT CHANGE UP (ref 43–77)
PLATELET # BLD AUTO: 323 K/UL — SIGNIFICANT CHANGE UP (ref 150–400)
POTASSIUM SERPL-MCNC: 4.2 MMOL/L — SIGNIFICANT CHANGE UP (ref 3.5–5.3)
POTASSIUM SERPL-SCNC: 4.2 MMOL/L — SIGNIFICANT CHANGE UP (ref 3.5–5.3)
PROT SERPL-MCNC: 7.6 G/DL — SIGNIFICANT CHANGE UP (ref 6.6–8.7)
PROTHROM AB SERPL-ACNC: 10.2 SEC — LOW (ref 10.5–13.4)
RBC # BLD: 3.64 M/UL — LOW (ref 4.2–5.8)
RBC # FLD: 15.5 % — HIGH (ref 10.3–14.5)
SODIUM SERPL-SCNC: 136 MMOL/L — SIGNIFICANT CHANGE UP (ref 135–145)
WBC # BLD: 12.09 K/UL — HIGH (ref 3.8–10.5)
WBC # FLD AUTO: 12.09 K/UL — HIGH (ref 3.8–10.5)

## 2022-04-23 PROCEDURE — 85610 PROTHROMBIN TIME: CPT

## 2022-04-23 PROCEDURE — G1004: CPT

## 2022-04-23 PROCEDURE — 85025 COMPLETE CBC W/AUTO DIFF WBC: CPT

## 2022-04-23 PROCEDURE — 99284 EMERGENCY DEPT VISIT MOD MDM: CPT | Mod: 25

## 2022-04-23 PROCEDURE — 36415 COLL VENOUS BLD VENIPUNCTURE: CPT

## 2022-04-23 PROCEDURE — 99285 EMERGENCY DEPT VISIT HI MDM: CPT

## 2022-04-23 PROCEDURE — 80053 COMPREHEN METABOLIC PANEL: CPT

## 2022-04-23 PROCEDURE — 85730 THROMBOPLASTIN TIME PARTIAL: CPT

## 2022-04-23 PROCEDURE — 96376 TX/PRO/DX INJ SAME DRUG ADON: CPT | Mod: XU

## 2022-04-23 PROCEDURE — 75635 CT ANGIO ABDOMINAL ARTERIES: CPT | Mod: MG

## 2022-04-23 PROCEDURE — 75635 CT ANGIO ABDOMINAL ARTERIES: CPT | Mod: 26,MG

## 2022-04-23 PROCEDURE — 96365 THER/PROPH/DIAG IV INF INIT: CPT | Mod: XU

## 2022-04-23 PROCEDURE — 96375 TX/PRO/DX INJ NEW DRUG ADDON: CPT | Mod: XU

## 2022-04-23 RX ORDER — MORPHINE SULFATE 50 MG/1
4 CAPSULE, EXTENDED RELEASE ORAL ONCE
Refills: 0 | Status: DISCONTINUED | OUTPATIENT
Start: 2022-04-23 | End: 2022-04-23

## 2022-04-23 RX ORDER — MORPHINE SULFATE 50 MG/1
6 CAPSULE, EXTENDED RELEASE ORAL ONCE
Refills: 0 | Status: DISCONTINUED | OUTPATIENT
Start: 2022-04-23 | End: 2022-04-23

## 2022-04-23 RX ORDER — HYDROMORPHONE HYDROCHLORIDE 2 MG/ML
0.5 INJECTION INTRAMUSCULAR; INTRAVENOUS; SUBCUTANEOUS ONCE
Refills: 0 | Status: DISCONTINUED | OUTPATIENT
Start: 2022-04-23 | End: 2022-04-23

## 2022-04-23 RX ORDER — ACETAMINOPHEN 500 MG
1000 TABLET ORAL ONCE
Refills: 0 | Status: COMPLETED | OUTPATIENT
Start: 2022-04-23 | End: 2022-04-23

## 2022-04-23 RX ORDER — OXYCODONE HYDROCHLORIDE 5 MG/1
1 TABLET ORAL
Qty: 15 | Refills: 0
Start: 2022-04-23

## 2022-04-23 RX ADMIN — MORPHINE SULFATE 4 MILLIGRAM(S): 50 CAPSULE, EXTENDED RELEASE ORAL at 20:18

## 2022-04-23 RX ADMIN — HYDROMORPHONE HYDROCHLORIDE 0.5 MILLIGRAM(S): 2 INJECTION INTRAMUSCULAR; INTRAVENOUS; SUBCUTANEOUS at 22:33

## 2022-04-23 RX ADMIN — MORPHINE SULFATE 4 MILLIGRAM(S): 50 CAPSULE, EXTENDED RELEASE ORAL at 18:50

## 2022-04-23 RX ADMIN — Medication 400 MILLIGRAM(S): at 18:50

## 2022-04-23 RX ADMIN — Medication 1000 MILLIGRAM(S): at 20:18

## 2022-04-23 RX ADMIN — HYDROMORPHONE HYDROCHLORIDE 0.5 MILLIGRAM(S): 2 INJECTION INTRAMUSCULAR; INTRAVENOUS; SUBCUTANEOUS at 22:10

## 2022-04-23 RX ADMIN — MORPHINE SULFATE 6 MILLIGRAM(S): 50 CAPSULE, EXTENDED RELEASE ORAL at 22:33

## 2022-04-23 RX ADMIN — MORPHINE SULFATE 4 MILLIGRAM(S): 50 CAPSULE, EXTENDED RELEASE ORAL at 22:33

## 2022-04-23 NOTE — ED ADULT NURSE NOTE - NSIMPLEMENTINTERV_GEN_ALL_ED
Implemented All Fall with Harm Risk Interventions:  [place] to call system. Call bell, personal items and telephone within reach. Instruct patient to call for assistance. Room bathroom lighting operational. Non-slip footwear when patient is off stretcher. Physically safe environment: no spills, clutter or unnecessary equipment. Stretcher in lowest position, wheels locked, appropriate side rails in place. Provide visual cue, wrist band, yellow gown, etc. Monitor gait and stability. Monitor for mental status changes and reorient to person, place, and time. Review medications for side effects contributing to fall risk. Reinforce activity limits and safety measures with patient and family. Provide visual clues: red socks.

## 2022-04-23 NOTE — ED PROVIDER NOTE - PATIENT PORTAL LINK FT
You can access the FollowMyHealth Patient Portal offered by [facility] by registering at the following website: [email or web address]. By joining [employer]’s FollowMyHealth portal, you will also be able to view your health information using other applications (apps) compatible with our system.

## 2022-04-23 NOTE — ED PROVIDER NOTE - ATTENDING CONTRIBUTION TO CARE
Demetrice: I performed a face to face evaluation of this patient and performed a full history and physical examination on the patient.  I agree with the resident's history, physical examination, and plan of the patient unless otherwise noted. My brief assessment is as follows: pmh as documented with recent endarterectomy to right cfa with Dr. Montiel, here for bleeding from site. states bent and staple became loose. developed swelling/bruising to area with hematoma, slow oozing when pressure not being held to surgical site. stamyrna has has tingling/skin tightness to LE since procedure, no acute change, no other complaints. on plavix, no a/c. non toxic, rrr, ctab, abd benign, right groin with surgical site staples in place with slow oozing when no pressure, palpable mass to area, non tender, with pulses distally on doppler. labs, vascular aware and to see pt.

## 2022-04-23 NOTE — ED PROVIDER NOTE - PHYSICAL EXAMINATION
Constitutional: Well appearing, awake, alert, oriented to person, place, and time/situation and in no apparent distress  ENMT: Airway patent nasal mucosa clear. Mouth with normal mucosa. Throat has no vesicles no oropharyngeal exudates and uvula is midline. No blood in the oropharynx  EYES: clear bilaterally, pupils equal, round and reactive to light  Cardiac: Regular rate, regular rhythm. Heart sounds S1, S2. No murmurs, rubs or gallops. Good capillary refill  Respiratory: Lungs CTAB, no use of accessory muscles, no crackles, satting 99% on RA in no distress  Gastrointestinal: Abdomen nondistended, non-tender, no rebound guarding or peritoneal signs  Genitourinary: No CVA tenderness, pelvis nontender, bladder nondistended  Musculoskeletal: Spine appears normal, range of motion is not limited, no muscle or joint tenderness  Neurological: Alert and oriented, no focal deficits, no motor or sensory deficits. CN 2-12 intact, PERRLA, EOMI, No FND, moving all extremities equally, sensation intact, No dysmetria, no ataxia, negative heel-shin, 5/5 strength   Skin: R groin wound with 1 staple loose oozing blood with bruising, erythema, and swelling around the incision, no drainage or fluctuance, non TTP, 2+ femoral pulses, dopplerable DP pulses, capillary refill < 2 seconds

## 2022-04-23 NOTE — ED PROVIDER NOTE - CLINICAL SUMMARY MEDICAL DECISION MAKING FREE TEXT BOX
Patient is a 56 yo male with PMHx PVD, HTN, HLD, depression, R chronic leg ischemia s/p R CFA endarterectomy and repair with patch angioplasty on 4/15 (Dr. Montiel) DC'd on plavix now presenting with bleeding from his surgical wound in his R groin. Patient states today while bending down patient noticed one of the staples on his wound came loose and patient started bleeding from the wound; he developed swelling, edema, and bruising across his R groin as well as intermittent sharp pain radiating to his leg. VSS, resting comfortably, lungs CTAB, belly soft nontender, R groin wound with 1 staple loose oozing blood with bruising, erythema, and swelling around the incision, no drainage or fluctuance, non TTP, 2+ femoral pulses, dopplerable DP pulses, capillary refill < 2 seconds. Tylenol, morphine for pain. cbc, cmp, coags to r/o electrolyte abnormalities. CTA lower extremity to r/o arterial insufficiency and pseudoaneurysm. Vascular sx consulted. Will continue to monitor.

## 2022-04-23 NOTE — ED PROVIDER NOTE - OBJECTIVE STATEMENT
Patient is a 56 yo male with PMHx PVD, HTN, HLD, depression, R chronic leg ischemia s/p R CFA endarterectomy and repair with patch angioplasty on 4/15 (Dr. Montiel) DC'd on plavix now presenting with bleeding from his surgical wound in his R groin. Patient is a 56 yo male with PMHx PVD, HTN, HLD, depression, R chronic leg ischemia s/p R CFA endarterectomy and repair with patch angioplasty on 4/15 (Dr. Montiel) DC'd on plavix now presenting with bleeding from his surgical wound in his R groin. Patient states today while bending down patient noticed one of the staples on his wound came loose and patient started bleeding from the wound; he developed swelling, edema, and bruising across his R groin as well as intermittent sharp pain radiating to his leg. Patient endorses chronic R leg discoloration with paresthesias. Denies fevers, chills, dizziness, lightheadedness, dysphagia, dysarthria, diplopia, photophobia, syncope, cough, congestion, SOB, CP, abdominal pain, neck pain, back pain, diarrhea, dysuria, hematuria, hematochezia, hematemesis, n/v, recent travel, sick contacts.

## 2022-04-23 NOTE — ED PROVIDER NOTE - NSFOLLOWUPINSTRUCTIONS_ED_ALL_ED_FT
Followup with vascular surgery team in next 1-7 days. Return to emergency department if symptoms worsen, fever/chills, chest pain/shortness of breath, bleeding, changes in sensation to lower extremity.

## 2022-04-23 NOTE — ED ADULT TRIAGE NOTE - CHIEF COMPLAINT QUOTE
staples to R groin from stent in the leg. pt states a staple moved and now slightly bleeding. denies pain

## 2022-04-23 NOTE — ED PROVIDER NOTE - PROGRESS NOTE DETAILS
Demetrice: vascular surgery resident saw pt, recommending cta RLE. patient endorsing pain over RLE, pain meds to be given, will reassess. signout received from day team. imaging and vascular surgery recommendations pending. Reinoso: pain improved s/p medications. f/u as outpatient recommended by surgery team. will discharge. Reinoso: patient endorsing pain over RLE, pain meds to be given, will reassess. signout received from day team. imaging and vascular surgery recommendations pending.

## 2022-04-23 NOTE — ED PROVIDER NOTE - CARE PROVIDER_API CALL
Husam Montiel)  Surgery  [address] [facility], [address]nd [place] [address]  Phone: [phone]  Fax: [phone]  Follow Up Time: 4-6 Days

## 2022-04-23 NOTE — CONSULT NOTE ADULT - SUBJECTIVE AND OBJECTIVE BOX
VASCULAR SURGERY CONSULT    HPI: 58yo M with PMH of HTN, HLD, MDD and PVD s/p R CFA endarterectomy presenting to the ED with one day history of pain in RLE, and drainage from R groin site. Patient reports he was having a bowel movement when he noticed drainage or dark old blood from right groin site. Afterwards patient started having severe pain on his right foot and toes, describing burning sensation. Patient has had previous episode of severe, burning sensation and previous admissions.    PAST MEDICAL HISTORY:  Hypercholesterolemia  Seizures  Depression  GERD (gastroesophageal reflux disease)  Hypertension  Peripheral vascular disease  Osteoarthritis  Former smoker  Prediabetes    PAST SURGICAL HISTORY:  Inguinal hernia, left  S/P ORIF (open reduction internal fixation) fracture  S/P shoulder surgery  S/P appendectomy  H/O endarterectomy    ALLERGIES:  No Known Allergies    VITALS & I/Os:  Vital Signs Last 24 Hrs  T(C): 36.6 (23 Apr 2022 19:49), Max: 36.7 (23 Apr 2022 16:40)  T(F): 97.8 (23 Apr 2022 19:49), Max: 98.1 (23 Apr 2022 16:40)  HR: 87 (23 Apr 2022 19:49) (87 - 97)  BP: 158/95 (23 Apr 2022 19:49) (158/95 - 168/88)  BP(mean): --  RR: 18 (23 Apr 2022 19:49) (18 - 18)  SpO2: 98% (23 Apr 2022 19:49) (98% - 99%)  CAPILLARY BLOOD GLUCOSE      I&O's Summary      GENERAL: Alert, well developed, in no acute distress.  MENTAL STATUS: AAOx3. Appropriate affect.  HEENT: PERRLA. EOMI. MMM.  Trachea midline.   CHEST: non-labored breathing or conversational dyspnea  GASTROINTESTINAL: Abdomen soft, NT, ND, -R/-G.   MUSCULOSKELETAL: R groin with staples, swelling and minimal dark old blood draining. RLE warm to the touch, dopplerable AT and DP (marked)      LABS:                        11.1   12.09 )-----------( 323      ( 23 Apr 2022 17:40 )             34.1     04-23    136  |  101  |  12.9  ----------------------------<  104<H>  4.2   |  22.0  |  0.68    Ca    9.1      23 Apr 2022 17:40    TPro  7.6  /  Alb  4.1  /  TBili  0.2<L>  /  DBili  x   /  AST  18  /  ALT  30  /  AlkPhos  173<H>  04-23    Lactate:    PT/INR - ( 23 Apr 2022 17:40 )   PT: 10.2 sec;   INR: 0.88 ratio         PTT - ( 23 Apr 2022 17:40 )  PTT:29.2 sec      IMAGING:    Pending CTA    VASCULAR SURGERY CONSULT    HPI: 56yo M with PMH of HTN, HLD, MDD and PVD s/p R CFA endarterectomy presenting to the ED with one day history of pain in RLE, and drainage from R groin site. Patient reports he was having a bowel movement when he noticed drainage or dark old blood from right groin site. Afterwards patient started having severe pain on his right foot and toes, describing burning sensation. Patient has had previous episode of severe, burning sensation and previous admissions.    PAST MEDICAL HISTORY:  Hypercholesterolemia  Seizures  Depression  GERD (gastroesophageal reflux disease)  Hypertension  Peripheral vascular disease  Osteoarthritis  Former smoker  Prediabetes    PAST SURGICAL HISTORY:  Inguinal hernia, left  S/P ORIF (open reduction internal fixation) fracture  S/P shoulder surgery  S/P appendectomy  H/O endarterectomy    ALLERGIES:  No Known Allergies    VITALS & I/Os:  Vital Signs Last 24 Hrs  T(C): 36.6 (23 Apr 2022 19:49), Max: 36.7 (23 Apr 2022 16:40)  T(F): 97.8 (23 Apr 2022 19:49), Max: 98.1 (23 Apr 2022 16:40)  HR: 87 (23 Apr 2022 19:49) (87 - 97)  BP: 158/95 (23 Apr 2022 19:49) (158/95 - 168/88)  BP(mean): --  RR: 18 (23 Apr 2022 19:49) (18 - 18)  SpO2: 98% (23 Apr 2022 19:49) (98% - 99%)  CAPILLARY BLOOD GLUCOSE      I&O's Summary      GENERAL: Alert, well developed, in no acute distress.  MENTAL STATUS: AAOx3. Appropriate affect.  HEENT: PERRLA. EOMI. MMM.  Trachea midline.   CHEST: non-labored breathing or conversational dyspnea  GASTROINTESTINAL: Abdomen soft, NT, ND, -R/-G.   MUSCULOSKELETAL: R groin with staples, swelling and minimal dark old blood draining. RLE warm to the touch, dopplerable AT and DP (marked)      LABS:                        11.1   12.09 )-----------( 323      ( 23 Apr 2022 17:40 )             34.1     04-23    136  |  101  |  12.9  ----------------------------<  104<H>  4.2   |  22.0  |  0.68    Ca    9.1      23 Apr 2022 17:40    TPro  7.6  /  Alb  4.1  /  TBili  0.2<L>  /  DBili  x   /  AST  18  /  ALT  30  /  AlkPhos  173<H>  04-23    Lactate:    PT/INR - ( 23 Apr 2022 17:40 )   PT: 10.2 sec;   INR: 0.88 ratio         PTT - ( 23 Apr 2022 17:40 )  PTT:29.2 sec      IMAGING:    CT Angio Abd Aorta w/run-off w/ IV Cont (04.23.22 @ 20:46)  1. Interval occlusion of the right superficial femoral artery superior to   the stent. Redemonstration of distal right superficial femoral artery   stent occlusion. Distal reconstitution with 3 vessel runoff to the right   foot which is diffusely attenuated.  2. Interval development of a right inguinal subcutaneous hematoma at the   level of the right common femoral artery.

## 2022-04-23 NOTE — ED ADULT NURSE NOTE - OBJECTIVE STATEMENT
AAOx3 c/o bleeding to suture site. States when he was having a bowel movement, patient noticed a suture falling out. Takes Plavix. Denies trauma, CP, SOB, NVD. Well appearing. Patient applying pressure to site. Will CTM.

## 2022-04-23 NOTE — CONSULT NOTE ADULT - ASSESSMENT
Patient is a 56yo M with PVD s/p right CFA endarterectomy presenting with burning pain on right foot/toes and drainage from right groin incision. Dopplerable AT and DP    -Recommend CTA   -Pain control  -No admission at this time  Patient is a 58yo M with PVD s/p right CFA endarterectomy presenting with burning pain on right foot/toes and drainage from right groin incision. Dopplerable AT and DP. CTA stable from previous images.       -Pain control: Scheduled Acetaminophen q6hrs ATC, Ibuprofen q6hrs ATC, Oxycodone 5mg q12hrs PRN severe pain  -Bowel Regimen: Senna and Miralax  -No admission at this time   -Follow up as outpatient

## 2022-04-24 VITALS
RESPIRATION RATE: 17 BRPM | DIASTOLIC BLOOD PRESSURE: 74 MMHG | OXYGEN SATURATION: 97 % | HEART RATE: 64 BPM | SYSTOLIC BLOOD PRESSURE: 175 MMHG

## 2022-04-24 RX ORDER — SENNA PLUS 8.6 MG/1
2 TABLET ORAL
Qty: 28 | Refills: 0
Start: 2022-04-24 | End: 2022-05-07

## 2022-04-24 RX ORDER — POLYETHYLENE GLYCOL 3350 17 G/17G
17 POWDER, FOR SOLUTION ORAL
Qty: 238 | Refills: 0
Start: 2022-04-24 | End: 2022-05-07

## 2022-04-25 LAB — SURGICAL PATHOLOGY STUDY: SIGNIFICANT CHANGE UP

## 2022-05-05 ENCOUNTER — APPOINTMENT (OUTPATIENT)
Dept: VASCULAR SURGERY | Facility: CLINIC | Age: 57
End: 2022-05-05
Payer: MEDICAID

## 2022-05-05 VITALS
BODY MASS INDEX: 24.11 KG/M2 | SYSTOLIC BLOOD PRESSURE: 137 MMHG | DIASTOLIC BLOOD PRESSURE: 73 MMHG | HEART RATE: 85 BPM | TEMPERATURE: 98.5 F | OXYGEN SATURATION: 95 % | HEIGHT: 66 IN | WEIGHT: 150 LBS | RESPIRATION RATE: 16 BRPM

## 2022-05-05 PROCEDURE — 93923 UPR/LXTR ART STDY 3+ LVLS: CPT

## 2022-05-05 PROCEDURE — 99024 POSTOP FOLLOW-UP VISIT: CPT

## 2022-05-05 RX ORDER — OXYCODONE HYDROCHLORIDE 5 MG/1
1 TABLET ORAL
Qty: 10 | Refills: 0
Start: 2022-05-05

## 2022-05-05 NOTE — ASSESSMENT
[FreeTextEntry1] : PAD (peripheral artery disease) (443.9) (I73.9)\par Carotid stenosis (433.10) (I65.29)\par \par 56 year old man, previous heavy smoker with PAD (s/p right SFA stenting in 2017), carotid stenosis presenting  with ongoing right foot pain following right femoral endarterectomy on 4/15/22. He has extensive PAD of the Right lower extremity with persistent pain.\par Repeat JOSE is 0.24\par He has untreated long segment SFA-Popliteal occlusion with single vessel PT run-off\par Will plan for right femoral-PT bypass. Risks and benefits of the planned intervention discussed with patient. All questions answered

## 2022-05-05 NOTE — HISTORY OF PRESENT ILLNESS
[FreeTextEntry1] : 56yoM w/ PAD (s/p R SFA stenting in 2017 and L femoral angioplasty 10/2021), carotid stenosis, HLD, HTN, pre-DM, GERD, anxiety, and extensive history of active smoking\par  [de-identified] : Admitted for right femoral endarterectomy on 4/15/22. Doing well with continuous right foot/toe pain and throbbing. has pain t rest with mild reliefif dangling foot off side of bed. able to ambulate with ongoing pain. Ambulation not limited by the pain, but is unable to sleep because of severity.

## 2022-05-05 NOTE — PHYSICAL EXAM
[2+] : left 2+ [Ankle Swelling (On Exam)] : not present [Varicose Veins Of Lower Extremities] : not present [de-identified] : NAD [FreeTextEntry1] : Right femoral region with thickened scar tissue, no pulse palpated\par biphasic posterior tibial on doppler exam. [de-identified] : Right lower extremity with hyperemia, no blanchable erythema. Non- tender to palpation. No edema

## 2022-05-11 ENCOUNTER — INPATIENT (INPATIENT)
Facility: HOSPITAL | Age: 57
LOS: 17 days | Discharge: HOME | DRG: 253 | End: 2022-05-29
Attending: SURGERY | Admitting: SURGERY
Payer: COMMERCIAL

## 2022-05-11 VITALS
HEART RATE: 80 BPM | SYSTOLIC BLOOD PRESSURE: 114 MMHG | WEIGHT: 132.94 LBS | OXYGEN SATURATION: 95 % | RESPIRATION RATE: 16 BRPM | HEIGHT: 65 IN | TEMPERATURE: 99 F | DIASTOLIC BLOOD PRESSURE: 62 MMHG

## 2022-05-11 DIAGNOSIS — I73.9 PERIPHERAL VASCULAR DISEASE, UNSPECIFIED: ICD-10-CM

## 2022-05-11 DIAGNOSIS — Z98.89 OTHER SPECIFIED POSTPROCEDURAL STATES: Chronic | ICD-10-CM

## 2022-05-11 DIAGNOSIS — K40.90 UNILATERAL INGUINAL HERNIA, WITHOUT OBSTRUCTION OR GANGRENE, NOT SPECIFIED AS RECURRENT: Chronic | ICD-10-CM

## 2022-05-11 DIAGNOSIS — Z98.890 OTHER SPECIFIED POSTPROCEDURAL STATES: Chronic | ICD-10-CM

## 2022-05-11 DIAGNOSIS — Z96.7 PRESENCE OF OTHER BONE AND TENDON IMPLANTS: Chronic | ICD-10-CM

## 2022-05-11 LAB
ALBUMIN SERPL ELPH-MCNC: 3.8 G/DL — SIGNIFICANT CHANGE UP (ref 3.3–5.2)
ALP SERPL-CCNC: 160 U/L — HIGH (ref 40–120)
ALT FLD-CCNC: 27 U/L — SIGNIFICANT CHANGE UP
ANION GAP SERPL CALC-SCNC: 13 MMOL/L — SIGNIFICANT CHANGE UP (ref 5–17)
APTT BLD: 28.2 SEC — SIGNIFICANT CHANGE UP (ref 27.5–35.5)
AST SERPL-CCNC: 15 U/L — SIGNIFICANT CHANGE UP
BASOPHILS # BLD AUTO: 0.02 K/UL — SIGNIFICANT CHANGE UP (ref 0–0.2)
BASOPHILS NFR BLD AUTO: 0.2 % — SIGNIFICANT CHANGE UP (ref 0–2)
BILIRUB SERPL-MCNC: <0.2 MG/DL — LOW (ref 0.4–2)
BLD GP AB SCN SERPL QL: SIGNIFICANT CHANGE UP
BUN SERPL-MCNC: 15.6 MG/DL — SIGNIFICANT CHANGE UP (ref 8–20)
CALCIUM SERPL-MCNC: 8.5 MG/DL — LOW (ref 8.6–10.2)
CHLORIDE SERPL-SCNC: 105 MMOL/L — SIGNIFICANT CHANGE UP (ref 98–107)
CO2 SERPL-SCNC: 22 MMOL/L — SIGNIFICANT CHANGE UP (ref 22–29)
CREAT SERPL-MCNC: 0.74 MG/DL — SIGNIFICANT CHANGE UP (ref 0.5–1.3)
EGFR: 104 ML/MIN/1.73M2 — SIGNIFICANT CHANGE UP
EOSINOPHIL # BLD AUTO: 0.19 K/UL — SIGNIFICANT CHANGE UP (ref 0–0.5)
EOSINOPHIL NFR BLD AUTO: 2.2 % — SIGNIFICANT CHANGE UP (ref 0–6)
FLUAV AG NPH QL: SIGNIFICANT CHANGE UP
FLUBV AG NPH QL: SIGNIFICANT CHANGE UP
GLUCOSE SERPL-MCNC: 79 MG/DL — SIGNIFICANT CHANGE UP (ref 70–99)
HCT VFR BLD CALC: 29.6 % — LOW (ref 39–50)
HGB BLD-MCNC: 9.6 G/DL — LOW (ref 13–17)
IMM GRANULOCYTES NFR BLD AUTO: 0.5 % — SIGNIFICANT CHANGE UP (ref 0–1.5)
INR BLD: 0.94 RATIO — SIGNIFICANT CHANGE UP (ref 0.88–1.16)
LYMPHOCYTES # BLD AUTO: 2.26 K/UL — SIGNIFICANT CHANGE UP (ref 1–3.3)
LYMPHOCYTES # BLD AUTO: 26.2 % — SIGNIFICANT CHANGE UP (ref 13–44)
MCHC RBC-ENTMCNC: 30.3 PG — SIGNIFICANT CHANGE UP (ref 27–34)
MCHC RBC-ENTMCNC: 32.4 GM/DL — SIGNIFICANT CHANGE UP (ref 32–36)
MCV RBC AUTO: 93.4 FL — SIGNIFICANT CHANGE UP (ref 80–100)
MONOCYTES # BLD AUTO: 0.85 K/UL — SIGNIFICANT CHANGE UP (ref 0–0.9)
MONOCYTES NFR BLD AUTO: 9.8 % — SIGNIFICANT CHANGE UP (ref 2–14)
NEUTROPHILS # BLD AUTO: 5.28 K/UL — SIGNIFICANT CHANGE UP (ref 1.8–7.4)
NEUTROPHILS NFR BLD AUTO: 61.1 % — SIGNIFICANT CHANGE UP (ref 43–77)
PLATELET # BLD AUTO: 217 K/UL — SIGNIFICANT CHANGE UP (ref 150–400)
POTASSIUM SERPL-MCNC: 4.1 MMOL/L — SIGNIFICANT CHANGE UP (ref 3.5–5.3)
POTASSIUM SERPL-SCNC: 4.1 MMOL/L — SIGNIFICANT CHANGE UP (ref 3.5–5.3)
PROT SERPL-MCNC: 6.7 G/DL — SIGNIFICANT CHANGE UP (ref 6.6–8.7)
PROTHROM AB SERPL-ACNC: 10.9 SEC — SIGNIFICANT CHANGE UP (ref 10.5–13.4)
RBC # BLD: 3.17 M/UL — LOW (ref 4.2–5.8)
RBC # FLD: 15.3 % — HIGH (ref 10.3–14.5)
RSV RNA NPH QL NAA+NON-PROBE: SIGNIFICANT CHANGE UP
SARS-COV-2 RNA SPEC QL NAA+PROBE: SIGNIFICANT CHANGE UP
SODIUM SERPL-SCNC: 140 MMOL/L — SIGNIFICANT CHANGE UP (ref 135–145)
WBC # BLD: 8.64 K/UL — SIGNIFICANT CHANGE UP (ref 3.8–10.5)
WBC # FLD AUTO: 8.64 K/UL — SIGNIFICANT CHANGE UP (ref 3.8–10.5)

## 2022-05-11 PROCEDURE — 93010 ELECTROCARDIOGRAM REPORT: CPT

## 2022-05-11 PROCEDURE — 99285 EMERGENCY DEPT VISIT HI MDM: CPT

## 2022-05-11 RX ORDER — GABAPENTIN 400 MG/1
600 CAPSULE ORAL EVERY 8 HOURS
Refills: 0 | Status: DISCONTINUED | OUTPATIENT
Start: 2022-05-11 | End: 2022-05-13

## 2022-05-11 RX ORDER — AMLODIPINE BESYLATE 2.5 MG/1
10 TABLET ORAL DAILY
Refills: 0 | Status: DISCONTINUED | OUTPATIENT
Start: 2022-05-11 | End: 2022-05-13

## 2022-05-11 RX ORDER — HEPARIN SODIUM 5000 [USP'U]/ML
5500 INJECTION INTRAVENOUS; SUBCUTANEOUS ONCE
Refills: 0 | Status: COMPLETED | OUTPATIENT
Start: 2022-05-11 | End: 2022-05-11

## 2022-05-11 RX ORDER — HEPARIN SODIUM 5000 [USP'U]/ML
2500 INJECTION INTRAVENOUS; SUBCUTANEOUS EVERY 6 HOURS
Refills: 0 | Status: DISCONTINUED | OUTPATIENT
Start: 2022-05-11 | End: 2022-05-12

## 2022-05-11 RX ORDER — VENLAFAXINE HCL 75 MG
75 CAPSULE, EXT RELEASE 24 HR ORAL DAILY
Refills: 0 | Status: DISCONTINUED | OUTPATIENT
Start: 2022-05-11 | End: 2022-05-13

## 2022-05-11 RX ORDER — ACETAMINOPHEN 500 MG
975 TABLET ORAL EVERY 8 HOURS
Refills: 0 | Status: DISCONTINUED | OUTPATIENT
Start: 2022-05-11 | End: 2022-05-13

## 2022-05-11 RX ORDER — ASPIRIN/CALCIUM CARB/MAGNESIUM 324 MG
81 TABLET ORAL DAILY
Refills: 0 | Status: DISCONTINUED | OUTPATIENT
Start: 2022-05-11 | End: 2022-05-13

## 2022-05-11 RX ORDER — HYDROMORPHONE HYDROCHLORIDE 2 MG/ML
0.5 INJECTION INTRAMUSCULAR; INTRAVENOUS; SUBCUTANEOUS
Refills: 0 | Status: DISCONTINUED | OUTPATIENT
Start: 2022-05-11 | End: 2022-05-12

## 2022-05-11 RX ORDER — HYDROMORPHONE HYDROCHLORIDE 2 MG/ML
1 INJECTION INTRAMUSCULAR; INTRAVENOUS; SUBCUTANEOUS EVERY 4 HOURS
Refills: 0 | Status: DISCONTINUED | OUTPATIENT
Start: 2022-05-11 | End: 2022-05-12

## 2022-05-11 RX ORDER — QUETIAPINE FUMARATE 200 MG/1
200 TABLET, FILM COATED ORAL AT BEDTIME
Refills: 0 | Status: DISCONTINUED | OUTPATIENT
Start: 2022-05-11 | End: 2022-05-13

## 2022-05-11 RX ORDER — SENNA PLUS 8.6 MG/1
2 TABLET ORAL AT BEDTIME
Refills: 0 | Status: DISCONTINUED | OUTPATIENT
Start: 2022-05-11 | End: 2022-05-13

## 2022-05-11 RX ORDER — MORPHINE SULFATE 50 MG/1
4 CAPSULE, EXTENDED RELEASE ORAL ONCE
Refills: 0 | Status: DISCONTINUED | OUTPATIENT
Start: 2022-05-11 | End: 2022-05-11

## 2022-05-11 RX ORDER — HEPARIN SODIUM 5000 [USP'U]/ML
INJECTION INTRAVENOUS; SUBCUTANEOUS
Qty: 25000 | Refills: 0 | Status: DISCONTINUED | OUTPATIENT
Start: 2022-05-11 | End: 2022-05-13

## 2022-05-11 RX ORDER — METHOCARBAMOL 500 MG/1
750 TABLET, FILM COATED ORAL THREE TIMES A DAY
Refills: 0 | Status: DISCONTINUED | OUTPATIENT
Start: 2022-05-11 | End: 2022-05-13

## 2022-05-11 RX ORDER — HEPARIN SODIUM 5000 [USP'U]/ML
5500 INJECTION INTRAVENOUS; SUBCUTANEOUS EVERY 6 HOURS
Refills: 0 | Status: DISCONTINUED | OUTPATIENT
Start: 2022-05-11 | End: 2022-05-12

## 2022-05-11 RX ORDER — PANTOPRAZOLE SODIUM 20 MG/1
40 TABLET, DELAYED RELEASE ORAL
Refills: 0 | Status: DISCONTINUED | OUTPATIENT
Start: 2022-05-11 | End: 2022-05-13

## 2022-05-11 RX ORDER — ATORVASTATIN CALCIUM 80 MG/1
80 TABLET, FILM COATED ORAL AT BEDTIME
Refills: 0 | Status: DISCONTINUED | OUTPATIENT
Start: 2022-05-11 | End: 2022-05-13

## 2022-05-11 RX ORDER — ONDANSETRON 8 MG/1
4 TABLET, FILM COATED ORAL EVERY 6 HOURS
Refills: 0 | Status: DISCONTINUED | OUTPATIENT
Start: 2022-05-11 | End: 2022-05-13

## 2022-05-11 RX ADMIN — Medication 975 MILLIGRAM(S): at 22:19

## 2022-05-11 RX ADMIN — METHOCARBAMOL 750 MILLIGRAM(S): 500 TABLET, FILM COATED ORAL at 22:20

## 2022-05-11 RX ADMIN — QUETIAPINE FUMARATE 200 MILLIGRAM(S): 200 TABLET, FILM COATED ORAL at 22:20

## 2022-05-11 RX ADMIN — HEPARIN SODIUM 5500 UNIT(S): 5000 INJECTION INTRAVENOUS; SUBCUTANEOUS at 22:18

## 2022-05-11 RX ADMIN — HEPARIN SODIUM 1200 UNIT(S)/HR: 5000 INJECTION INTRAVENOUS; SUBCUTANEOUS at 22:24

## 2022-05-11 RX ADMIN — MORPHINE SULFATE 4 MILLIGRAM(S): 50 CAPSULE, EXTENDED RELEASE ORAL at 20:02

## 2022-05-11 RX ADMIN — SENNA PLUS 2 TABLET(S): 8.6 TABLET ORAL at 22:25

## 2022-05-11 RX ADMIN — GABAPENTIN 600 MILLIGRAM(S): 400 CAPSULE ORAL at 22:20

## 2022-05-11 RX ADMIN — MORPHINE SULFATE 4 MILLIGRAM(S): 50 CAPSULE, EXTENDED RELEASE ORAL at 22:30

## 2022-05-11 RX ADMIN — ATORVASTATIN CALCIUM 80 MILLIGRAM(S): 80 TABLET, FILM COATED ORAL at 22:19

## 2022-05-11 NOTE — ED ADULT TRIAGE NOTE - CHIEF COMPLAINT QUOTE
Pt is scheduled for bypass on RLE on the 18th but today he woke up with a lot of pain and swelling to RLE. Has 2 stents in that leg already. No relief with pain medicine at home.

## 2022-05-11 NOTE — H&P ADULT - HISTORY OF PRESENT ILLNESS
57 year old male known to our service from prior lower extremity revascularization, PMHX PAD with recent femoral endarterectomy 4/15 with rest pain planned for bypass on RLE on 5/18 who presents with worsening pain and changes in toe coloration. Patient reports several weeks of right leg pain, initially with walking and progressing to be at rest. Now has worsening rest pain, improved with dangling legs and now has discoloration starting 2 days prior to presentation. Progressive splotches of discoloration progressing along his leg with bluish discoloration of his great toe. No chest pain, no sob, fever or chills.  No trauma, no fever or chills.

## 2022-05-11 NOTE — H&P ADULT - NSHPPHYSICALEXAM_GEN_ALL_CORE
GEN: NAD, laying in bed, appears stated age  HEENT: NCAT, clear oral mucosa, normal conjunctiva  Chest: non-tender  CV:  non-tachycardic, 2+ radial pulse  Pulm: no increased work of breathing, no conversational dyspnea  GI: soft, nontender  MSK: moving all extremities. pain and tenderness to right leg with blanching erythema and scattered petichiae. Bluish discoloration of 1st right lower extremity digit with blistering of 2nd digit   Vasc:   b/l DP signals to PT and popliteal arteries

## 2022-05-11 NOTE — H&P ADULT - NSHPLABSRESULTS_GEN_ALL_CORE
LABS:                          9.6    8.64  )-----------( 217      ( 11 May 2022 20:30 )             29.6     05-11    140  |  105  |  15.6  ----------------------------<  79  4.1   |  22.0  |  0.74    Ca    8.5<L>      11 May 2022 20:30    TPro  6.7  /  Alb  3.8  /  TBili  <0.2<L>  /  DBili  x   /  AST  15  /  ALT  27  /  AlkPhos  160<H>  05-11    PT/INR - ( 11 May 2022 20:30 )   PT: 10.9 sec;   INR: 0.94 ratio         PTT - ( 11 May 2022 20:30 )  PTT:28.2 sec

## 2022-05-11 NOTE — ED PROVIDER NOTE - MUSCULOSKELETAL, MLM
Right LE tender to palpation. Skin in right foot mottled. Undetectable peripheral pulses in foot on palpation and via doppler.

## 2022-05-11 NOTE — H&P ADULT - ASSESSMENT
57M with PAD who presents with worsening rest pain and worsening ischemia of right lower extremity.   - Cardiology evaluation for risk stratification  - Medical evaluation for risk stratification  - Admission to vascular surgery  - Heparin drip  - continue home medications  - Pain control PRN  - Plan for surgical intervention for lower extremity revascularization with multifocal lower extremity peripheral artery diease

## 2022-05-11 NOTE — ED PROVIDER NOTE - OBJECTIVE STATEMENT
58 yo male with hx of PVD presents to ED for right leg pain. Pain with confirmed arterial blockage on leg. Following with vascular sgx and planning intervention next week. However now pain worse and has discoloration of leg.

## 2022-05-11 NOTE — ED ADULT NURSE NOTE - NSIMPLEMENTINTERV_GEN_ALL_ED
Implemented All Fall with Harm Risk Interventions:  [place] to call system. Call bell, personal items and telephone within reach. Instruct patient to call for assistance. Room bathroom lighting operational. Non-slip footwear when patient is off stretcher. Physically safe environment: no spills, clutter or unnecessary equipment. Stretcher in lowest position, wheels locked, appropriate side rails in place. Provide visual cue, wrist band, yellow gown, etc. Monitor gait and stability. Monitor for mental status changes and reorient to person, place, and time. Review medications for side effects contributing to fall risk. Reinforce activity limits and safety measures with patient and family. Provide visual clues: red socks.

## 2022-05-11 NOTE — ED PROVIDER NOTE - CLINICAL SUMMARY MEDICAL DECISION MAKING FREE TEXT BOX
Confirmed arterial blockage in RLE outpatient as per patient. Worsening sxs, concern for ischemia. Vascular sgx consulted. Heparin began and admitted to vascular sgx service. Patient given pain control.

## 2022-05-11 NOTE — H&P ADULT - NSICDXPASTSURGICALHX_GEN_ALL_CORE_FT
PAST SURGICAL HISTORY:  H/O endarterectomy L femoral a.  R femoral a.    Inguinal hernia, left     S/P appendectomy     S/P ORIF (open reduction internal fixation) fracture Left    S/P shoulder surgery right

## 2022-05-11 NOTE — ED PROVIDER NOTE - ATTENDING CONTRIBUTION TO CARE
Ray: I performed a face to face bedside interview with patient regarding history of present illness, review of symptoms and past medical history. I completed an independent physical exam.  I have discussed patient's plan of care with resident.   I agree with note as stated above including HISTORY OF PRESENT ILLNESS, HIV, PAST MEDICAL/SURGICAL/FAMILY/SOCIAL HISTORY, ALLERGIES AND HOME MEDICATIONS, REVIEW OF SYSTEMS, PHYSICAL EXAM, MEDICAL DECISION MAKING and any PROGRESS NOTES during the time I functioned as the attending physician for this patient unless otherwise noted. My brief assessment is as follows: 57M h/o HTN, HLD, PVD pending RLE bypass with Dr Montiel (5/18) p/w worsening RLE swelling/pain/redness. +erythema extending to mid shin. +edema. R big toe mottled. Difficulty obtaining dopplerable pulse. Vascular called. Labs and pain control ordered. Ray: I performed a face to face bedside interview with patient regarding history of present illness, review of symptoms and past medical history. I completed an independent physical exam.  I have discussed patient's plan of care with resident.   I agree with note as stated above including HISTORY OF PRESENT ILLNESS, HIV, PAST MEDICAL/SURGICAL/FAMILY/SOCIAL HISTORY, ALLERGIES AND HOME MEDICATIONS, REVIEW OF SYSTEMS, PHYSICAL EXAM, MEDICAL DECISION MAKING and any PROGRESS NOTES during the time I functioned as the attending physician for this patient unless otherwise noted. My brief assessment is as follows: 57M h/o HTN, HLD, PVD pending RLE bypass with Dr Montiel (5/18) p/w worsening RLE swelling/pain/redness. +erythema extending to mid shin. +edema. R big toe mottled. Difficulty obtaining dopplerable pulse. Vascular called. Labs and pain control ordered .

## 2022-05-11 NOTE — ED ADULT NURSE REASSESSMENT NOTE - NS ED NURSE REASSESS COMMENT FT1
Pt educated on possible complications of Heparin infusion and to inform RN immediately of any signs of bleeding during transfusion.

## 2022-05-11 NOTE — ED ADULT NURSE NOTE - OBJECTIVE STATEMENT
Assumed care of pt at 1930. Pt A&Ox4 c/o right foot pain that started 2 days ago, the right foot appears swollen with mild pitting edema, the h big toe on the right foot appears mottled and no pulse is present on the right foot, the left foot has a pulse present with no signs of swelling and edema, pt denies N/V/D or SOB

## 2022-05-12 ENCOUNTER — TRANSCRIPTION ENCOUNTER (OUTPATIENT)
Age: 57
End: 2022-05-12

## 2022-05-12 LAB
ALBUMIN SERPL ELPH-MCNC: 3.5 G/DL — SIGNIFICANT CHANGE UP (ref 3.3–5.2)
ALP SERPL-CCNC: 161 U/L — HIGH (ref 40–120)
ALT FLD-CCNC: 27 U/L — SIGNIFICANT CHANGE UP
ANION GAP SERPL CALC-SCNC: 13 MMOL/L — SIGNIFICANT CHANGE UP (ref 5–17)
APTT BLD: 123.9 SEC — CRITICAL HIGH (ref 27.5–35.5)
APTT BLD: 68.8 SEC — HIGH (ref 27.5–35.5)
APTT BLD: 83.8 SEC — HIGH (ref 27.5–35.5)
AST SERPL-CCNC: 19 U/L — SIGNIFICANT CHANGE UP
BASOPHILS # BLD AUTO: 0.02 K/UL — SIGNIFICANT CHANGE UP (ref 0–0.2)
BASOPHILS NFR BLD AUTO: 0.2 % — SIGNIFICANT CHANGE UP (ref 0–2)
BILIRUB SERPL-MCNC: <0.2 MG/DL — LOW (ref 0.4–2)
BUN SERPL-MCNC: 12.4 MG/DL — SIGNIFICANT CHANGE UP (ref 8–20)
CALCIUM SERPL-MCNC: 8.6 MG/DL — SIGNIFICANT CHANGE UP (ref 8.6–10.2)
CHLORIDE SERPL-SCNC: 105 MMOL/L — SIGNIFICANT CHANGE UP (ref 98–107)
CK SERPL-CCNC: 115 U/L — SIGNIFICANT CHANGE UP (ref 30–200)
CO2 SERPL-SCNC: 21 MMOL/L — LOW (ref 22–29)
CREAT SERPL-MCNC: 0.64 MG/DL — SIGNIFICANT CHANGE UP (ref 0.5–1.3)
EGFR: 109 ML/MIN/1.73M2 — SIGNIFICANT CHANGE UP
EOSINOPHIL # BLD AUTO: 0.23 K/UL — SIGNIFICANT CHANGE UP (ref 0–0.5)
EOSINOPHIL NFR BLD AUTO: 2.9 % — SIGNIFICANT CHANGE UP (ref 0–6)
GLUCOSE SERPL-MCNC: 83 MG/DL — SIGNIFICANT CHANGE UP (ref 70–99)
HCT VFR BLD CALC: 31.2 % — LOW (ref 39–50)
HGB BLD-MCNC: 10 G/DL — LOW (ref 13–17)
IMM GRANULOCYTES NFR BLD AUTO: 0.7 % — SIGNIFICANT CHANGE UP (ref 0–1.5)
LACTATE SERPL-SCNC: 0.6 MMOL/L — SIGNIFICANT CHANGE UP (ref 0.5–2)
LYMPHOCYTES # BLD AUTO: 3 K/UL — SIGNIFICANT CHANGE UP (ref 1–3.3)
LYMPHOCYTES # BLD AUTO: 37.4 % — SIGNIFICANT CHANGE UP (ref 13–44)
MAGNESIUM SERPL-MCNC: 2 MG/DL — SIGNIFICANT CHANGE UP (ref 1.6–2.6)
MCHC RBC-ENTMCNC: 29.6 PG — SIGNIFICANT CHANGE UP (ref 27–34)
MCHC RBC-ENTMCNC: 32.1 GM/DL — SIGNIFICANT CHANGE UP (ref 32–36)
MCV RBC AUTO: 92.3 FL — SIGNIFICANT CHANGE UP (ref 80–100)
MONOCYTES # BLD AUTO: 0.78 K/UL — SIGNIFICANT CHANGE UP (ref 0–0.9)
MONOCYTES NFR BLD AUTO: 9.7 % — SIGNIFICANT CHANGE UP (ref 2–14)
NEUTROPHILS # BLD AUTO: 3.93 K/UL — SIGNIFICANT CHANGE UP (ref 1.8–7.4)
NEUTROPHILS NFR BLD AUTO: 49.1 % — SIGNIFICANT CHANGE UP (ref 43–77)
PHOSPHATE SERPL-MCNC: 2.8 MG/DL — SIGNIFICANT CHANGE UP (ref 2.4–4.7)
PLATELET # BLD AUTO: 219 K/UL — SIGNIFICANT CHANGE UP (ref 150–400)
POTASSIUM SERPL-MCNC: 4 MMOL/L — SIGNIFICANT CHANGE UP (ref 3.5–5.3)
POTASSIUM SERPL-SCNC: 4 MMOL/L — SIGNIFICANT CHANGE UP (ref 3.5–5.3)
PROT SERPL-MCNC: 6.5 G/DL — LOW (ref 6.6–8.7)
RBC # BLD: 3.38 M/UL — LOW (ref 4.2–5.8)
RBC # FLD: 15.4 % — HIGH (ref 10.3–14.5)
SODIUM SERPL-SCNC: 139 MMOL/L — SIGNIFICANT CHANGE UP (ref 135–145)
WBC # BLD: 8.02 K/UL — SIGNIFICANT CHANGE UP (ref 3.8–10.5)
WBC # FLD AUTO: 8.02 K/UL — SIGNIFICANT CHANGE UP (ref 3.8–10.5)

## 2022-05-12 PROCEDURE — 12345: CPT | Mod: NC

## 2022-05-12 PROCEDURE — 99221 1ST HOSP IP/OBS SF/LOW 40: CPT

## 2022-05-12 RX ORDER — OXYCODONE HYDROCHLORIDE 5 MG/1
15 TABLET ORAL ONCE
Refills: 0 | Status: DISCONTINUED | OUTPATIENT
Start: 2022-05-12 | End: 2022-05-12

## 2022-05-12 RX ORDER — HYDROMORPHONE HYDROCHLORIDE 2 MG/ML
1 INJECTION INTRAMUSCULAR; INTRAVENOUS; SUBCUTANEOUS ONCE
Refills: 0 | Status: DISCONTINUED | OUTPATIENT
Start: 2022-05-12 | End: 2022-05-12

## 2022-05-12 RX ORDER — HYDROMORPHONE HYDROCHLORIDE 2 MG/ML
0.5 INJECTION INTRAMUSCULAR; INTRAVENOUS; SUBCUTANEOUS EVERY 4 HOURS
Refills: 0 | Status: DISCONTINUED | OUTPATIENT
Start: 2022-05-12 | End: 2022-05-13

## 2022-05-12 RX ORDER — HYDROMORPHONE HYDROCHLORIDE 2 MG/ML
0.3 INJECTION INTRAMUSCULAR; INTRAVENOUS; SUBCUTANEOUS EVERY 4 HOURS
Refills: 0 | Status: DISCONTINUED | OUTPATIENT
Start: 2022-05-12 | End: 2022-05-12

## 2022-05-12 RX ORDER — SODIUM CHLORIDE 9 MG/ML
1000 INJECTION, SOLUTION INTRAVENOUS
Refills: 0 | Status: DISCONTINUED | OUTPATIENT
Start: 2022-05-12 | End: 2022-05-13

## 2022-05-12 RX ADMIN — OXYCODONE HYDROCHLORIDE 15 MILLIGRAM(S): 5 TABLET ORAL at 19:44

## 2022-05-12 RX ADMIN — Medication 975 MILLIGRAM(S): at 21:35

## 2022-05-12 RX ADMIN — SODIUM CHLORIDE 110 MILLILITER(S): 9 INJECTION, SOLUTION INTRAVENOUS at 23:31

## 2022-05-12 RX ADMIN — Medication 975 MILLIGRAM(S): at 13:31

## 2022-05-12 RX ADMIN — HYDROMORPHONE HYDROCHLORIDE 0.3 MILLIGRAM(S): 2 INJECTION INTRAMUSCULAR; INTRAVENOUS; SUBCUTANEOUS at 16:50

## 2022-05-12 RX ADMIN — METHOCARBAMOL 750 MILLIGRAM(S): 500 TABLET, FILM COATED ORAL at 21:35

## 2022-05-12 RX ADMIN — OXYCODONE HYDROCHLORIDE 15 MILLIGRAM(S): 5 TABLET ORAL at 18:44

## 2022-05-12 RX ADMIN — HEPARIN SODIUM 1100 UNIT(S)/HR: 5000 INJECTION INTRAVENOUS; SUBCUTANEOUS at 18:16

## 2022-05-12 RX ADMIN — HYDROMORPHONE HYDROCHLORIDE 1 MILLIGRAM(S): 2 INJECTION INTRAMUSCULAR; INTRAVENOUS; SUBCUTANEOUS at 08:03

## 2022-05-12 RX ADMIN — HEPARIN SODIUM 1100 UNIT(S)/HR: 5000 INJECTION INTRAVENOUS; SUBCUTANEOUS at 19:27

## 2022-05-12 RX ADMIN — HYDROMORPHONE HYDROCHLORIDE 0.5 MILLIGRAM(S): 2 INJECTION INTRAMUSCULAR; INTRAVENOUS; SUBCUTANEOUS at 22:35

## 2022-05-12 RX ADMIN — HEPARIN SODIUM 1100 UNIT(S)/HR: 5000 INJECTION INTRAVENOUS; SUBCUTANEOUS at 04:44

## 2022-05-12 RX ADMIN — HYDROMORPHONE HYDROCHLORIDE 1 MILLIGRAM(S): 2 INJECTION INTRAMUSCULAR; INTRAVENOUS; SUBCUTANEOUS at 13:29

## 2022-05-12 RX ADMIN — HYDROMORPHONE HYDROCHLORIDE 0.3 MILLIGRAM(S): 2 INJECTION INTRAMUSCULAR; INTRAVENOUS; SUBCUTANEOUS at 17:20

## 2022-05-12 RX ADMIN — QUETIAPINE FUMARATE 200 MILLIGRAM(S): 200 TABLET, FILM COATED ORAL at 21:35

## 2022-05-12 RX ADMIN — Medication 200 MILLIGRAM(S): at 18:04

## 2022-05-12 RX ADMIN — Medication 975 MILLIGRAM(S): at 02:40

## 2022-05-12 RX ADMIN — HYDROMORPHONE HYDROCHLORIDE 0.3 MILLIGRAM(S): 2 INJECTION INTRAMUSCULAR; INTRAVENOUS; SUBCUTANEOUS at 10:02

## 2022-05-12 RX ADMIN — ATORVASTATIN CALCIUM 80 MILLIGRAM(S): 80 TABLET, FILM COATED ORAL at 21:35

## 2022-05-12 RX ADMIN — AMLODIPINE BESYLATE 10 MILLIGRAM(S): 2.5 TABLET ORAL at 05:25

## 2022-05-12 RX ADMIN — Medication 81 MILLIGRAM(S): at 13:31

## 2022-05-12 RX ADMIN — HEPARIN SODIUM 1100 UNIT(S)/HR: 5000 INJECTION INTRAVENOUS; SUBCUTANEOUS at 10:55

## 2022-05-12 RX ADMIN — HYDROMORPHONE HYDROCHLORIDE 1 MILLIGRAM(S): 2 INJECTION INTRAMUSCULAR; INTRAVENOUS; SUBCUTANEOUS at 08:20

## 2022-05-12 RX ADMIN — GABAPENTIN 600 MILLIGRAM(S): 400 CAPSULE ORAL at 13:31

## 2022-05-12 RX ADMIN — GABAPENTIN 600 MILLIGRAM(S): 400 CAPSULE ORAL at 21:36

## 2022-05-12 RX ADMIN — Medication 975 MILLIGRAM(S): at 22:35

## 2022-05-12 RX ADMIN — Medication 975 MILLIGRAM(S): at 05:24

## 2022-05-12 RX ADMIN — PANTOPRAZOLE SODIUM 40 MILLIGRAM(S): 20 TABLET, DELAYED RELEASE ORAL at 05:27

## 2022-05-12 RX ADMIN — HYDROMORPHONE HYDROCHLORIDE 1 MILLIGRAM(S): 2 INJECTION INTRAMUSCULAR; INTRAVENOUS; SUBCUTANEOUS at 18:43

## 2022-05-12 RX ADMIN — HYDROMORPHONE HYDROCHLORIDE 1 MILLIGRAM(S): 2 INJECTION INTRAMUSCULAR; INTRAVENOUS; SUBCUTANEOUS at 18:28

## 2022-05-12 RX ADMIN — Medication 75 MILLIGRAM(S): at 13:31

## 2022-05-12 RX ADMIN — HYDROMORPHONE HYDROCHLORIDE 0.5 MILLIGRAM(S): 2 INJECTION INTRAMUSCULAR; INTRAVENOUS; SUBCUTANEOUS at 22:50

## 2022-05-12 RX ADMIN — METHOCARBAMOL 750 MILLIGRAM(S): 500 TABLET, FILM COATED ORAL at 13:31

## 2022-05-12 RX ADMIN — HYDROMORPHONE HYDROCHLORIDE 1 MILLIGRAM(S): 2 INJECTION INTRAMUSCULAR; INTRAVENOUS; SUBCUTANEOUS at 13:45

## 2022-05-12 RX ADMIN — Medication 200 MILLIGRAM(S): at 05:26

## 2022-05-12 RX ADMIN — HYDROMORPHONE HYDROCHLORIDE 0.5 MILLIGRAM(S): 2 INJECTION INTRAMUSCULAR; INTRAVENOUS; SUBCUTANEOUS at 18:03

## 2022-05-12 RX ADMIN — Medication 975 MILLIGRAM(S): at 14:20

## 2022-05-12 RX ADMIN — HYDROMORPHONE HYDROCHLORIDE 0.5 MILLIGRAM(S): 2 INJECTION INTRAMUSCULAR; INTRAVENOUS; SUBCUTANEOUS at 18:18

## 2022-05-12 RX ADMIN — METHOCARBAMOL 750 MILLIGRAM(S): 500 TABLET, FILM COATED ORAL at 05:26

## 2022-05-12 RX ADMIN — Medication 975 MILLIGRAM(S): at 05:54

## 2022-05-12 RX ADMIN — HYDROMORPHONE HYDROCHLORIDE 0.3 MILLIGRAM(S): 2 INJECTION INTRAMUSCULAR; INTRAVENOUS; SUBCUTANEOUS at 10:20

## 2022-05-12 RX ADMIN — GABAPENTIN 600 MILLIGRAM(S): 400 CAPSULE ORAL at 05:25

## 2022-05-12 NOTE — PATIENT PROFILE ADULT - FALL HARM RISK - RISK INTERVENTIONS
Assistance OOB with selected safe patient handling equipment/Assistance with ambulation/Communicate Fall Risk and Risk Factors to all staff, patient, and family/Discuss with provider need for PT consult/Monitor gait and stability/Reinforce activity limits and safety measures with patient and family/Visual Cue: Yellow wristband/Bed in lowest position, wheels locked, appropriate side rails in place/Call bell, personal items and telephone in reach/Instruct patient to call for assistance before getting out of bed or chair/Non-slip footwear when patient is out of bed/[place] to call system/Physically safe environment - no spills, clutter or unnecessary equipment/Purposeful Proactive Rounding/Room/bathroom lighting operational, light cord in reach

## 2022-05-12 NOTE — PROGRESS NOTE ADULT - SUBJECTIVE AND OBJECTIVE BOX
57 year old male with , PMHX PAD with recent femoral endarterectomy 4/15 with rest pain planned for bypass on RLE on 5/18 who presents with worsening pain and changes in toe coloration.     pt is scheduled for a fem-pop bypass on 5/13.      NKDA    H/H 10/32  Plt- 219  Pt- 10.9  INR- 0.94  K-4.0    EKG- SB    -Pt recently had anesthesia during endarterectomy of R-common femoral artery without issue on 4/15  Remains hemodynamically stable and denies fevers, chills. No CP or SOB overnight.  Cardiology evaluation for risk stratification previously completed on 4/13      ASA 3     type and cross    general anesthesia  arterial line      Dr. Felix Kang

## 2022-05-12 NOTE — CONSULT NOTE ADULT - ASSESSMENT
57yoM hx HTN, seizure disorder, PAD s/p recent femoral endarterectomy 4/15 with rest pain planned for bypass on RLE revascularization on 5/18 who presents with worsening RLE pain and changes in R-foot toe coloration    PAD with RLE ischemia  -Started on heparin gtt as per vascular service  -Tentative plan for RLE re-vascularization  -Pt recently had anesthesia during endarterectomy of R-common femoral artery on 4/15 without issue  -EKG reviewed, shows NSR with expected TWI in V1, no other ischemic changes  -Vitals and labs reviewed, show chronic anemia with Hgb at baseline  -Pt is currently medically optimized for procedure  -Further care as per primary team    Hx HTN  -On amlodipine 10mg daily    Hx seizure  -On phenytoin 200mg BID    Hx depression  -On venlafaxine 75mg daily    Hx insomnia  -Can resume Seroquel but monitor closely as pt was started on gabapentin and methocarbamol     Prophylactic measure  -On heparin gtt 57yoM hx HTN, seizure disorder, PAD s/p recent femoral endarterectomy 4/15 with rest pain planned for bypass on RLE revascularization on 5/18 who presents with worsening RLE pain and changes in R-foot toe coloration    PAD with RLE ischemia  -Started on heparin gtt as per vascular service  -Tentative plan for RLE re-vascularization  -Pt recently had anesthesia during endarterectomy of R-common femoral artery without issue  -EKG reviewed, shows NSR with expected TWI in V1, no other ischemic changes  -Vitals and labs reviewed, show chronic anemia with Hgb at baseline  -Pt is currently medically optimized for procedure  -Further care as per primary team    Hx HTN  -On amlodipine 10mg daily    Hx seizure  -On phenytoin 200mg BID    Hx depression  -On venlafaxine 75mg daily    Hx insomnia  -Can resume Seroquel but monitor closely as pt was started on gabapentin and methocarbamol     Prophylactic measure  -On heparin gtt

## 2022-05-12 NOTE — PROGRESS NOTE ADULT - SUBJECTIVE AND OBJECTIVE BOX
INTERVAL HPI/OVERNIGHT EVENTS:    Patient seen this AM with no acute events overnight. Remains on heparin drip. Patient without any acute complaints at this time. Patients' pain is well controlled on current pain regiment. No change in symptoms. Tolerating diet without any n/v, has been having normal bowel function. Remains hemodynamically stable and denies fevers, chills. No CP or SOB       MEDICATIONS  (STANDING):  acetaminophen     Tablet .. 975 milliGRAM(s) Oral every 8 hours  amLODIPine   Tablet 10 milliGRAM(s) Oral daily  aspirin  chewable 81 milliGRAM(s) Oral daily  atorvastatin 80 milliGRAM(s) Oral at bedtime  gabapentin 600 milliGRAM(s) Oral every 8 hours  heparin  Infusion.  Unit(s)/Hr (12 mL/Hr) IV Continuous <Continuous>  methocarbamol 750 milliGRAM(s) Oral three times a day  pantoprazole    Tablet 40 milliGRAM(s) Oral before breakfast  phenytoin ER Oral Tab/Cap - Peds 200 milliGRAM(s) Oral every 12 hours  QUEtiapine 200 milliGRAM(s) Oral at bedtime  senna 2 Tablet(s) Oral at bedtime  venlafaxine XR. 75 milliGRAM(s) Oral daily    MEDICATIONS  (PRN):  heparin   Injectable 5500 Unit(s) IV Push every 6 hours PRN For aPTT less than 40  heparin   Injectable 2500 Unit(s) IV Push every 6 hours PRN For aPTT between 40 - 57  HYDROmorphone  Injectable 1 milliGRAM(s) IV Push every 4 hours PRN Severe Pain (7 - 10)  HYDROmorphone  Injectable 0.5 milliGRAM(s) IV Push every 3 hours PRN Moderate Pain (4 - 6)  ondansetron Injectable 4 milliGRAM(s) IV Push every 6 hours PRN Nausea      Vital Signs Last 24 Hrs  T(C): 36.7 (12 May 2022 04:31), Max: 37.1 (11 May 2022 17:21)  T(F): 98 (12 May 2022 04:31), Max: 98.8 (11 May 2022 17:21)  HR: 60 (12 May 2022 04:31) (59 - 89)  BP: 163/79 (12 May 2022 04:31) (114/62 - 163/79)  BP(mean): --  RR: 18 (12 May 2022 04:31) (16 - 18)  SpO2: 96% (12 May 2022 04:31) (95% - 96%)    Physical Exam  GEN: NAD, laying in bed, appears stated age  HEENT: NCAT, clear oral mucosa, normal conjunctiva  Chest: non-tender  CV:  non-tachycardic, 2+ radial pulse  Pulm: no increased work of breathing, no conversational dyspnea  GI: soft, nontender  MSK: moving all extremities. pain and tenderness to right leg with blanching erythema and scattered petichiae. Splotchy bluish discoloration of 1st right lower extremity digit with blistering of 2nd digit   Vasc:   b/l DP signals to PT and popliteal arteries    I&O's Detail      LABS:                        10.0   8.02  )-----------( 219      ( 12 May 2022 03:23 )             31.2     05-12    139  |  105  |  12.4  ----------------------------<  83  4.0   |  21.0<L>  |  0.64    Ca    8.6      12 May 2022 03:23  Phos  2.8     05-12  Mg     2.0     05-12    TPro  6.5<L>  /  Alb  3.5  /  TBili  <0.2<L>  /  DBili  x   /  AST  19  /  ALT  27  /  AlkPhos  161<H>  05-12    PT/INR - ( 11 May 2022 20:30 )   PT: 10.9 sec;   INR: 0.94 ratio         PTT - ( 12 May 2022 04:00 )  PTT:123.9 sec      RADIOLOGY & ADDITIONAL STUDIES:

## 2022-05-12 NOTE — PROGRESS NOTE ADULT - ASSESSMENT
57M with PAD who presents with worsening rest pain and worsening ischemia of right lower extremity.   - Cardiology evaluation for risk stratification previously completed on 4/13  - Medical evaluation for risk stratification completed  - Admission to vascular surgery  - Heparin drip  - continue home medications  - Pain control PRN  - Plan for surgical intervention for lower extremity revascularization with multifocal lower extremity peripheral artery diease

## 2022-05-12 NOTE — CONSULT NOTE ADULT - SUBJECTIVE AND OBJECTIVE BOX
Patient is a 57y old  Male who presents with a chief complaint of PAD, RLE ischemia (12 May 2022 02:41)    HPI:  57 year old male known to our service from prior lower extremity revascularization, PMHX PAD with recent femoral endarterectomy 4/15 with rest pain planned for bypass on RLE on 5/18 who presents with worsening pain and changes in toe coloration. Patient reports several weeks of right leg pain, initially with walking and progressing to be at rest. Now has worsening rest pain, improved with dangling legs and now has discoloration starting 2 days prior to presentation. Progressive splotches of discoloration progressing along his leg with bluish discoloration of his great toe. No chest pain, no sob, fever or chills.  No trauma, no fever or chills.    (11 May 2022 22:05)      MEDICATIONS  (STANDING):  acetaminophen     Tablet .. 975 milliGRAM(s) Oral every 8 hours  amLODIPine   Tablet 10 milliGRAM(s) Oral daily  aspirin  chewable 81 milliGRAM(s) Oral daily  atorvastatin 80 milliGRAM(s) Oral at bedtime  gabapentin 600 milliGRAM(s) Oral every 8 hours  heparin  Infusion.  Unit(s)/Hr (12 mL/Hr) IV Continuous <Continuous>  methocarbamol 750 milliGRAM(s) Oral three times a day  pantoprazole    Tablet 40 milliGRAM(s) Oral before breakfast  phenytoin ER Oral Tab/Cap - Peds 200 milliGRAM(s) Oral every 12 hours  QUEtiapine 200 milliGRAM(s) Oral at bedtime  senna 2 Tablet(s) Oral at bedtime  venlafaxine XR. 75 milliGRAM(s) Oral daily    MEDICATIONS  (PRN):  heparin   Injectable 5500 Unit(s) IV Push every 6 hours PRN For aPTT less than 40  heparin   Injectable 2500 Unit(s) IV Push every 6 hours PRN For aPTT between 40 - 57  HYDROmorphone  Injectable 1 milliGRAM(s) IV Push every 4 hours PRN Severe Pain (7 - 10)  HYDROmorphone  Injectable 0.5 milliGRAM(s) IV Push every 3 hours PRN Moderate Pain (4 - 6)  ondansetron Injectable 4 milliGRAM(s) IV Push every 6 hours PRN Nausea      ALLERGIES:   NKDA or Intolerances      REVIEW OF SYSTEMS:  CONSTITUTIONAL: No fever, weight loss, or fatigue  EYES: No eye pain, visual disturbances, or discharge  ENMT:  No difficulty hearing, tinnitus, vertigo; No sinus or throat pain  NECK: No pain or stiffness  BREASTS: No pain, masses, or nipple discharge  RESPIRATORY: No cough, wheezing, chills or hemoptysis; No shortness of breath  CARDIOVASCULAR: No chest pain, palpitations, dizziness, or leg swelling  GASTROINTESTINAL: No abdominal or epigastric pain. No nausea, vomiting, or hematemesis; No diarrhea or constipation. No melena or hematochezia.  GENITOURINARY: No dysuria, frequency, hematuria, or incontinence  NEUROLOGICAL: No headaches, memory loss, loss of strength, numbness, or tremors  SKIN: No itching, burning, rashes, or lesions   LYMPH NODES: No enlarged glands  ENDOCRINE: No heat or cold intolerance; No hair loss  MUSCULOSKELETAL: No joint pain or swelling; No muscle, back, or extremity pain  PSYCHIATRIC: No depression, anxiety, mood swings, or difficulty sleeping  HEME/LYMPH: No easy bruising, or bleeding gums  ALLERGY AND IMMUNOLOGIC: No hives or eczema    T(C): 36.7 (05-12-22 @ 04:31), Max: 37.1 (05-11-22 @ 17:21)  HR: 60 (05-12-22 @ 04:31) (59 - 89)  BP: 163/79 (05-12-22 @ 04:31) (114/62 - 163/79)  RR: 18 (05-12-22 @ 04:31) (16 - 18)  SpO2: 96% (05-12-22 @ 04:31) (95% - 96%)  Wt(kg): --Vital Signs Last 24 Hrs  T(C): 36.7 (12 May 2022 04:31), Max: 37.1 (11 May 2022 17:21)  T(F): 98 (12 May 2022 04:31), Max: 98.8 (11 May 2022 17:21)  HR: 60 (12 May 2022 04:31) (59 - 89)  BP: 163/79 (12 May 2022 04:31) (114/62 - 163/79)  BP(mean): --  RR: 18 (12 May 2022 04:31) (16 - 18)  SpO2: 96% (12 May 2022 04:31) (95% - 96%)  I&O's Summary      PHYSICAL EXAM:  GENERAL:  Well-appearing, not in acute distress  EYES:  Clear conjuctiva, extraocular movement intact  ENT: Moist mucous membranes  RESP:  Non-labored breathing pattern, lungs clear to ausculation   CV: Regular rate and rhythm, no murmurs appreciated, no lower extremity edema  GI: Soft, non-tender, non-distended  NEURO: Awake, alert, conversant, upper and lower extremity strength 5/5, light touch sensation grossly intact  PSYCH: Calm, cooperative  SKIN: Blue discoloration of great toe of R-foot, skin warm and dry    LABS:                        10.0   8.02  )-----------( 219      ( 12 May 2022 03:23 )             31.2     05-12    139  |  105  |  12.4  ----------------------------<  83  4.0   |  21.0<L>  |  0.64    Ca    8.6      12 May 2022 03:23  Phos  2.8     05-12  Mg     2.0     05-12    TPro  6.5<L>  /  Alb  3.5  /  TBili  <0.2<L>  /  DBili  x   /  AST  19  /  ALT  27  /  AlkPhos  161<H>  05-12    PT/INR - ( 11 May 2022 20:30 )   PT: 10.9 sec;   INR: 0.94 ratio         PTT - ( 12 May 2022 04:00 )  PTT:123.9 sec       Patient is a 57y old  Male who presents with a chief complaint of PAD, RLE ischemia (12 May 2022 02:41)    HPI:  57 year old male known to our service from prior lower extremity revascularization, PMHX PAD with recent femoral endarterectomy 4/15 with rest pain planned for bypass on RLE on 5/18 who presents with worsening pain and changes in toe coloration. Patient reports several weeks of right leg pain, initially with walking and progressing to be at rest. Now has worsening rest pain, improved with dangling legs and now has discoloration starting 2 days prior to presentation. Progressive splotches of discoloration progressing along his leg with bluish discoloration of his great toe. No chest pain, no sob, fever or chills.  No trauma, no fever or chills.    (11 May 2022 22:05)      MEDICATIONS  (STANDING):  acetaminophen     Tablet .. 975 milliGRAM(s) Oral every 8 hours  amLODIPine   Tablet 10 milliGRAM(s) Oral daily  aspirin  chewable 81 milliGRAM(s) Oral daily  atorvastatin 80 milliGRAM(s) Oral at bedtime  gabapentin 600 milliGRAM(s) Oral every 8 hours  heparin  Infusion.  Unit(s)/Hr (12 mL/Hr) IV Continuous <Continuous>  methocarbamol 750 milliGRAM(s) Oral three times a day  pantoprazole    Tablet 40 milliGRAM(s) Oral before breakfast  phenytoin ER Oral Tab/Cap - Peds 200 milliGRAM(s) Oral every 12 hours  QUEtiapine 200 milliGRAM(s) Oral at bedtime  senna 2 Tablet(s) Oral at bedtime  venlafaxine XR. 75 milliGRAM(s) Oral daily    MEDICATIONS  (PRN):  heparin   Injectable 5500 Unit(s) IV Push every 6 hours PRN For aPTT less than 40  heparin   Injectable 2500 Unit(s) IV Push every 6 hours PRN For aPTT between 40 - 57  HYDROmorphone  Injectable 1 milliGRAM(s) IV Push every 4 hours PRN Severe Pain (7 - 10)  HYDROmorphone  Injectable 0.5 milliGRAM(s) IV Push every 3 hours PRN Moderate Pain (4 - 6)  ondansetron Injectable 4 milliGRAM(s) IV Push every 6 hours PRN Nausea      ALLERGIES:   NKDA or Intolerances      REVIEW OF SYSTEMS:  CONSTITUTIONAL: No fever, weight loss, or fatigue  EYES: No eye pain, visual disturbances, or discharge  ENMT:  No difficulty hearing, tinnitus, vertigo; No sinus or throat pain  NECK: No pain or stiffness  BREASTS: No pain, masses, or nipple discharge  RESPIRATORY: No cough, wheezing, chills or hemoptysis; No shortness of breath  CARDIOVASCULAR: No chest pain, palpitations, dizziness, or leg swelling  GASTROINTESTINAL: No abdominal or epigastric pain. No nausea, vomiting, or hematemesis; No diarrhea or constipation. No melena or hematochezia.  GENITOURINARY: No dysuria, frequency, hematuria, or incontinence  NEUROLOGICAL: No headaches, memory loss, loss of strength, numbness, or tremors  SKIN: No itching, burning, rashes, or lesions   LYMPH NODES: No enlarged glands  ENDOCRINE: No heat or cold intolerance; No hair loss  MUSCULOSKELETAL: No joint pain or swelling; No muscle, back, or extremity pain  PSYCHIATRIC: No depression, anxiety, mood swings, or difficulty sleeping  HEME/LYMPH: No easy bruising, or bleeding gums  ALLERGY AND IMMUNOLOGIC: No hives or eczema    T(C): 36.7 (05-12-22 @ 04:31), Max: 37.1 (05-11-22 @ 17:21)  HR: 60 (05-12-22 @ 04:31) (59 - 89)  BP: 163/79 (05-12-22 @ 04:31) (114/62 - 163/79)  RR: 18 (05-12-22 @ 04:31) (16 - 18)  SpO2: 96% (05-12-22 @ 04:31) (95% - 96%)  Wt(kg): --Vital Signs Last 24 Hrs  T(C): 36.7 (12 May 2022 04:31), Max: 37.1 (11 May 2022 17:21)  T(F): 98 (12 May 2022 04:31), Max: 98.8 (11 May 2022 17:21)  HR: 60 (12 May 2022 04:31) (59 - 89)  BP: 163/79 (12 May 2022 04:31) (114/62 - 163/79)  BP(mean): --  RR: 18 (12 May 2022 04:31) (16 - 18)  SpO2: 96% (12 May 2022 04:31) (95% - 96%)  I&O's Summary      PHYSICAL EXAM:  GENERAL:  Well-appearing, not in acute distress  EYES:  Clear conjuctiva, extraocular movement intact  ENT: Moist mucous membranes  RESP:  Non-labored breathing pattern, lungs clear to ausculation   CV: Regular rate and rhythm, no murmurs appreciated, no lower extremity edema  GI: Soft, non-tender, non-distended  NEURO: Awake, alert, conversant, upper and lower extremity strength 5/5, light touch sensation grossly intact  PSYCH: Calm, cooperative  SKIN: Blue discoloration of great toe of R-foot, skin warm and dry    LABS:                        10.0   8.02  )-----------( 219      ( 12 May 2022 03:23 )             31.2     05-12    139  |  105  |  12.4  ----------------------------<  83  4.0   |  21.0<L>  |  0.64    Ca    8.6      12 May 2022 03:23  Phos  2.8     05-12  Mg     2.0     05-12    TPro  6.5<L>  /  Alb  3.5  /  TBili  <0.2<L>  /  DBili  x   /  AST  19  /  ALT  27  /  AlkPhos  161<H>  05-12    PT/INR - ( 11 May 2022 20:30 )   PT: 10.9 sec;   INR: 0.94 ratio         PTT - ( 12 May 2022 04:00 )  PTT:123.9 sec

## 2022-05-13 ENCOUNTER — TRANSCRIPTION ENCOUNTER (OUTPATIENT)
Age: 57
End: 2022-05-13

## 2022-05-13 LAB
ALBUMIN SERPL ELPH-MCNC: 4.1 G/DL — SIGNIFICANT CHANGE UP (ref 3.3–5.2)
ALP SERPL-CCNC: 185 U/L — HIGH (ref 40–120)
ALT FLD-CCNC: 27 U/L — SIGNIFICANT CHANGE UP
ANION GAP SERPL CALC-SCNC: 13 MMOL/L — SIGNIFICANT CHANGE UP (ref 5–17)
ANION GAP SERPL CALC-SCNC: 16 MMOL/L — SIGNIFICANT CHANGE UP (ref 5–17)
APTT BLD: 69 SEC — HIGH (ref 27.5–35.5)
APTT BLD: 71.5 SEC — HIGH (ref 27.5–35.5)
AST SERPL-CCNC: 18 U/L — SIGNIFICANT CHANGE UP
BASOPHILS # BLD AUTO: 0 K/UL — SIGNIFICANT CHANGE UP (ref 0–0.2)
BASOPHILS # BLD AUTO: 0.03 K/UL — SIGNIFICANT CHANGE UP (ref 0–0.2)
BASOPHILS NFR BLD AUTO: 0 % — SIGNIFICANT CHANGE UP (ref 0–2)
BASOPHILS NFR BLD AUTO: 0.3 % — SIGNIFICANT CHANGE UP (ref 0–2)
BILIRUB SERPL-MCNC: <0.2 MG/DL — LOW (ref 0.4–2)
BUN SERPL-MCNC: 14.7 MG/DL — SIGNIFICANT CHANGE UP (ref 8–20)
BUN SERPL-MCNC: 9.6 MG/DL — SIGNIFICANT CHANGE UP (ref 8–20)
BURR CELLS BLD QL SMEAR: PRESENT — SIGNIFICANT CHANGE UP
CALCIUM SERPL-MCNC: 7.6 MG/DL — LOW (ref 8.6–10.2)
CALCIUM SERPL-MCNC: 8.9 MG/DL — SIGNIFICANT CHANGE UP (ref 8.6–10.2)
CHLORIDE SERPL-SCNC: 102 MMOL/L — SIGNIFICANT CHANGE UP (ref 98–107)
CHLORIDE SERPL-SCNC: 108 MMOL/L — HIGH (ref 98–107)
CO2 SERPL-SCNC: 17 MMOL/L — LOW (ref 22–29)
CO2 SERPL-SCNC: 21 MMOL/L — LOW (ref 22–29)
CREAT SERPL-MCNC: 0.7 MG/DL — SIGNIFICANT CHANGE UP (ref 0.5–1.3)
CREAT SERPL-MCNC: 0.71 MG/DL — SIGNIFICANT CHANGE UP (ref 0.5–1.3)
EGFR: 106 ML/MIN/1.73M2 — SIGNIFICANT CHANGE UP
EGFR: 106 ML/MIN/1.73M2 — SIGNIFICANT CHANGE UP
EOSINOPHIL # BLD AUTO: 0.11 K/UL — SIGNIFICANT CHANGE UP (ref 0–0.5)
EOSINOPHIL # BLD AUTO: 0.23 K/UL — SIGNIFICANT CHANGE UP (ref 0–0.5)
EOSINOPHIL NFR BLD AUTO: 0.9 % — SIGNIFICANT CHANGE UP (ref 0–6)
EOSINOPHIL NFR BLD AUTO: 2.6 % — SIGNIFICANT CHANGE UP (ref 0–6)
GAS PNL BLDA: SIGNIFICANT CHANGE UP
GIANT PLATELETS BLD QL SMEAR: PRESENT — SIGNIFICANT CHANGE UP
GLUCOSE SERPL-MCNC: 136 MG/DL — HIGH (ref 70–99)
GLUCOSE SERPL-MCNC: 90 MG/DL — SIGNIFICANT CHANGE UP (ref 70–99)
HCT VFR BLD CALC: 27.9 % — LOW (ref 39–50)
HCT VFR BLD CALC: 34.1 % — LOW (ref 39–50)
HGB BLD-MCNC: 11.1 G/DL — LOW (ref 13–17)
HGB BLD-MCNC: 9.4 G/DL — LOW (ref 13–17)
IMM GRANULOCYTES NFR BLD AUTO: 1 % — SIGNIFICANT CHANGE UP (ref 0–1.5)
LYMPHOCYTES # BLD AUTO: 0.99 K/UL — LOW (ref 1–3.3)
LYMPHOCYTES # BLD AUTO: 2.77 K/UL — SIGNIFICANT CHANGE UP (ref 1–3.3)
LYMPHOCYTES # BLD AUTO: 30.8 % — SIGNIFICANT CHANGE UP (ref 13–44)
LYMPHOCYTES # BLD AUTO: 7.9 % — LOW (ref 13–44)
MAGNESIUM SERPL-MCNC: 2.1 MG/DL — SIGNIFICANT CHANGE UP (ref 1.6–2.6)
MANUAL SMEAR VERIFICATION: SIGNIFICANT CHANGE UP
MCHC RBC-ENTMCNC: 29.3 PG — SIGNIFICANT CHANGE UP (ref 27–34)
MCHC RBC-ENTMCNC: 30.2 PG — SIGNIFICANT CHANGE UP (ref 27–34)
MCHC RBC-ENTMCNC: 32.6 GM/DL — SIGNIFICANT CHANGE UP (ref 32–36)
MCHC RBC-ENTMCNC: 33.7 GM/DL — SIGNIFICANT CHANGE UP (ref 32–36)
MCV RBC AUTO: 86.9 FL — SIGNIFICANT CHANGE UP (ref 80–100)
MCV RBC AUTO: 92.9 FL — SIGNIFICANT CHANGE UP (ref 80–100)
MONOCYTES # BLD AUTO: 1.03 K/UL — HIGH (ref 0–0.9)
MONOCYTES # BLD AUTO: 1.2 K/UL — HIGH (ref 0–0.9)
MONOCYTES NFR BLD AUTO: 11.5 % — SIGNIFICANT CHANGE UP (ref 2–14)
MONOCYTES NFR BLD AUTO: 9.6 % — SIGNIFICANT CHANGE UP (ref 2–14)
NEUTROPHILS # BLD AUTO: 10.19 K/UL — HIGH (ref 1.8–7.4)
NEUTROPHILS # BLD AUTO: 4.83 K/UL — SIGNIFICANT CHANGE UP (ref 1.8–7.4)
NEUTROPHILS NFR BLD AUTO: 53.8 % — SIGNIFICANT CHANGE UP (ref 43–77)
NEUTROPHILS NFR BLD AUTO: 81.6 % — HIGH (ref 43–77)
PHOSPHATE SERPL-MCNC: 3.2 MG/DL — SIGNIFICANT CHANGE UP (ref 2.4–4.7)
PLAT MORPH BLD: NORMAL — SIGNIFICANT CHANGE UP
PLATELET # BLD AUTO: 206 K/UL — SIGNIFICANT CHANGE UP (ref 150–400)
PLATELET # BLD AUTO: 260 K/UL — SIGNIFICANT CHANGE UP (ref 150–400)
POIKILOCYTOSIS BLD QL AUTO: SLIGHT — SIGNIFICANT CHANGE UP
POTASSIUM SERPL-MCNC: 3.9 MMOL/L — SIGNIFICANT CHANGE UP (ref 3.5–5.3)
POTASSIUM SERPL-MCNC: 4.1 MMOL/L — SIGNIFICANT CHANGE UP (ref 3.5–5.3)
POTASSIUM SERPL-SCNC: 3.9 MMOL/L — SIGNIFICANT CHANGE UP (ref 3.5–5.3)
POTASSIUM SERPL-SCNC: 4.1 MMOL/L — SIGNIFICANT CHANGE UP (ref 3.5–5.3)
PROT SERPL-MCNC: 7.6 G/DL — SIGNIFICANT CHANGE UP (ref 6.6–8.7)
RBC # BLD: 3.21 M/UL — LOW (ref 4.2–5.8)
RBC # BLD: 3.67 M/UL — LOW (ref 4.2–5.8)
RBC # FLD: 15.1 % — HIGH (ref 10.3–14.5)
RBC # FLD: 15.1 % — HIGH (ref 10.3–14.5)
RBC BLD AUTO: SIGNIFICANT CHANGE UP
SMUDGE CELLS # BLD: PRESENT — SIGNIFICANT CHANGE UP
SODIUM SERPL-SCNC: 138 MMOL/L — SIGNIFICANT CHANGE UP (ref 135–145)
SODIUM SERPL-SCNC: 139 MMOL/L — SIGNIFICANT CHANGE UP (ref 135–145)
WBC # BLD: 12.49 K/UL — HIGH (ref 3.8–10.5)
WBC # BLD: 8.98 K/UL — SIGNIFICANT CHANGE UP (ref 3.8–10.5)
WBC # FLD AUTO: 12.49 K/UL — HIGH (ref 3.8–10.5)
WBC # FLD AUTO: 8.98 K/UL — SIGNIFICANT CHANGE UP (ref 3.8–10.5)

## 2022-05-13 PROCEDURE — 99221 1ST HOSP IP/OBS SF/LOW 40: CPT

## 2022-05-13 PROCEDURE — 99232 SBSQ HOSP IP/OBS MODERATE 35: CPT

## 2022-05-13 PROCEDURE — 35566 ART BYP FEM-ANT-POST TIB/PRL: CPT | Mod: 58

## 2022-05-13 DEVICE — GRAFT VASC PROPATEN 6MMX40X50CM TW REMOV RING: Type: IMPLANTABLE DEVICE | Status: FUNCTIONAL

## 2022-05-13 DEVICE — CLIP APPLIER COVIDIEN SURGICLIP 13" LARGE: Type: IMPLANTABLE DEVICE | Status: FUNCTIONAL

## 2022-05-13 DEVICE — CLIP APPLIER COVIDIEN SURGICLIP III 9" SM: Type: IMPLANTABLE DEVICE | Status: FUNCTIONAL

## 2022-05-13 DEVICE — GRAFT VASC PROPATEN 8MMX40X50CM TW REMOV RING: Type: IMPLANTABLE DEVICE | Status: FUNCTIONAL

## 2022-05-13 DEVICE — HEMOSTAT ARISTA 3GR: Type: IMPLANTABLE DEVICE | Status: FUNCTIONAL

## 2022-05-13 DEVICE — CLIP APPLIER COVIDIEN SURGICLIP 11.5" MEDIUM: Type: IMPLANTABLE DEVICE | Status: FUNCTIONAL

## 2022-05-13 DEVICE — IMPLANTABLE DEVICE: Type: IMPLANTABLE DEVICE | Status: FUNCTIONAL

## 2022-05-13 DEVICE — SPONGE GELFOAM SZ 100 UNCOMPRESSED: Type: IMPLANTABLE DEVICE | Status: FUNCTIONAL

## 2022-05-13 RX ORDER — HYDROMORPHONE HYDROCHLORIDE 2 MG/ML
30 INJECTION INTRAMUSCULAR; INTRAVENOUS; SUBCUTANEOUS
Refills: 0 | Status: DISCONTINUED | OUTPATIENT
Start: 2022-05-13 | End: 2022-05-13

## 2022-05-13 RX ORDER — ONDANSETRON 8 MG/1
4 TABLET, FILM COATED ORAL EVERY 6 HOURS
Refills: 0 | Status: DISCONTINUED | OUTPATIENT
Start: 2022-05-13 | End: 2022-05-13

## 2022-05-13 RX ORDER — ATORVASTATIN CALCIUM 80 MG/1
80 TABLET, FILM COATED ORAL AT BEDTIME
Refills: 0 | Status: DISCONTINUED | OUTPATIENT
Start: 2022-05-13 | End: 2022-05-25

## 2022-05-13 RX ORDER — ACETAMINOPHEN 500 MG
975 TABLET ORAL EVERY 8 HOURS
Refills: 0 | Status: DISCONTINUED | OUTPATIENT
Start: 2022-05-13 | End: 2022-05-25

## 2022-05-13 RX ORDER — SODIUM CHLORIDE 9 MG/ML
1000 INJECTION, SOLUTION INTRAVENOUS
Refills: 0 | Status: DISCONTINUED | OUTPATIENT
Start: 2022-05-13 | End: 2022-05-14

## 2022-05-13 RX ORDER — HYDROMORPHONE HYDROCHLORIDE 2 MG/ML
1 INJECTION INTRAMUSCULAR; INTRAVENOUS; SUBCUTANEOUS ONCE
Refills: 0 | Status: DISCONTINUED | OUTPATIENT
Start: 2022-05-13 | End: 2022-05-13

## 2022-05-13 RX ORDER — HYDROMORPHONE HYDROCHLORIDE 2 MG/ML
0.5 INJECTION INTRAMUSCULAR; INTRAVENOUS; SUBCUTANEOUS
Refills: 0 | Status: DISCONTINUED | OUTPATIENT
Start: 2022-05-13 | End: 2022-05-13

## 2022-05-13 RX ORDER — QUETIAPINE FUMARATE 200 MG/1
200 TABLET, FILM COATED ORAL AT BEDTIME
Refills: 0 | Status: DISCONTINUED | OUTPATIENT
Start: 2022-05-13 | End: 2022-05-25

## 2022-05-13 RX ORDER — KETOROLAC TROMETHAMINE 30 MG/ML
30 SYRINGE (ML) INJECTION ONCE
Refills: 0 | Status: DISCONTINUED | OUTPATIENT
Start: 2022-05-13 | End: 2022-05-13

## 2022-05-13 RX ORDER — METHOCARBAMOL 500 MG/1
750 TABLET, FILM COATED ORAL THREE TIMES A DAY
Refills: 0 | Status: DISCONTINUED | OUTPATIENT
Start: 2022-05-13 | End: 2022-05-18

## 2022-05-13 RX ORDER — GABAPENTIN 400 MG/1
600 CAPSULE ORAL EVERY 8 HOURS
Refills: 0 | Status: DISCONTINUED | OUTPATIENT
Start: 2022-05-13 | End: 2022-05-16

## 2022-05-13 RX ORDER — ACETAMINOPHEN 500 MG
1000 TABLET ORAL ONCE
Refills: 0 | Status: COMPLETED | OUTPATIENT
Start: 2022-05-13 | End: 2022-05-13

## 2022-05-13 RX ORDER — VENLAFAXINE HCL 75 MG
75 CAPSULE, EXT RELEASE 24 HR ORAL DAILY
Refills: 0 | Status: DISCONTINUED | OUTPATIENT
Start: 2022-05-13 | End: 2022-05-25

## 2022-05-13 RX ORDER — OXYCODONE HYDROCHLORIDE 5 MG/1
15 TABLET ORAL ONCE
Refills: 0 | Status: DISCONTINUED | OUTPATIENT
Start: 2022-05-13 | End: 2022-05-13

## 2022-05-13 RX ORDER — NALOXONE HYDROCHLORIDE 4 MG/.1ML
0.1 SPRAY NASAL
Refills: 0 | Status: DISCONTINUED | OUTPATIENT
Start: 2022-05-13 | End: 2022-05-13

## 2022-05-13 RX ORDER — FENTANYL CITRATE 50 UG/ML
50 INJECTION INTRAVENOUS ONCE
Refills: 0 | Status: DISCONTINUED | OUTPATIENT
Start: 2022-05-13 | End: 2022-05-25

## 2022-05-13 RX ORDER — OXYCODONE HYDROCHLORIDE 5 MG/1
5 TABLET ORAL ONCE
Refills: 0 | Status: DISCONTINUED | OUTPATIENT
Start: 2022-05-13 | End: 2022-05-13

## 2022-05-13 RX ORDER — HYDROMORPHONE HYDROCHLORIDE 2 MG/ML
30 INJECTION INTRAMUSCULAR; INTRAVENOUS; SUBCUTANEOUS
Refills: 0 | Status: DISCONTINUED | OUTPATIENT
Start: 2022-05-13 | End: 2022-05-16

## 2022-05-13 RX ORDER — SENNA PLUS 8.6 MG/1
2 TABLET ORAL AT BEDTIME
Refills: 0 | Status: DISCONTINUED | OUTPATIENT
Start: 2022-05-13 | End: 2022-05-25

## 2022-05-13 RX ORDER — DEXMEDETOMIDINE HYDROCHLORIDE IN 0.9% SODIUM CHLORIDE 4 UG/ML
0.5 INJECTION INTRAVENOUS
Qty: 200 | Refills: 0 | Status: DISCONTINUED | OUTPATIENT
Start: 2022-05-13 | End: 2022-05-13

## 2022-05-13 RX ORDER — ONDANSETRON 8 MG/1
4 TABLET, FILM COATED ORAL EVERY 6 HOURS
Refills: 0 | Status: DISCONTINUED | OUTPATIENT
Start: 2022-05-13 | End: 2022-05-25

## 2022-05-13 RX ORDER — AMLODIPINE BESYLATE 2.5 MG/1
10 TABLET ORAL DAILY
Refills: 0 | Status: DISCONTINUED | OUTPATIENT
Start: 2022-05-13 | End: 2022-05-25

## 2022-05-13 RX ORDER — HYDROMORPHONE HYDROCHLORIDE 2 MG/ML
2 INJECTION INTRAMUSCULAR; INTRAVENOUS; SUBCUTANEOUS ONCE
Refills: 0 | Status: DISCONTINUED | OUTPATIENT
Start: 2022-05-13 | End: 2022-05-13

## 2022-05-13 RX ORDER — ASPIRIN/CALCIUM CARB/MAGNESIUM 324 MG
81 TABLET ORAL DAILY
Refills: 0 | Status: DISCONTINUED | OUTPATIENT
Start: 2022-05-13 | End: 2022-05-25

## 2022-05-13 RX ORDER — PANTOPRAZOLE SODIUM 20 MG/1
40 TABLET, DELAYED RELEASE ORAL
Refills: 0 | Status: DISCONTINUED | OUTPATIENT
Start: 2022-05-13 | End: 2022-05-25

## 2022-05-13 RX ADMIN — Medication 975 MILLIGRAM(S): at 07:00

## 2022-05-13 RX ADMIN — HYDROMORPHONE HYDROCHLORIDE 30 MILLILITER(S): 2 INJECTION INTRAMUSCULAR; INTRAVENOUS; SUBCUTANEOUS at 22:45

## 2022-05-13 RX ADMIN — HYDROMORPHONE HYDROCHLORIDE 0.5 MILLIGRAM(S): 2 INJECTION INTRAMUSCULAR; INTRAVENOUS; SUBCUTANEOUS at 17:26

## 2022-05-13 RX ADMIN — Medication 30 MILLIGRAM(S): at 20:34

## 2022-05-13 RX ADMIN — QUETIAPINE FUMARATE 200 MILLIGRAM(S): 200 TABLET, FILM COATED ORAL at 22:09

## 2022-05-13 RX ADMIN — HYDROMORPHONE HYDROCHLORIDE 1 MILLIGRAM(S): 2 INJECTION INTRAMUSCULAR; INTRAVENOUS; SUBCUTANEOUS at 00:55

## 2022-05-13 RX ADMIN — PANTOPRAZOLE SODIUM 40 MILLIGRAM(S): 20 TABLET, DELAYED RELEASE ORAL at 05:59

## 2022-05-13 RX ADMIN — Medication 1000 MILLIGRAM(S): at 19:41

## 2022-05-13 RX ADMIN — HYDROMORPHONE HYDROCHLORIDE 0.5 MILLIGRAM(S): 2 INJECTION INTRAMUSCULAR; INTRAVENOUS; SUBCUTANEOUS at 18:10

## 2022-05-13 RX ADMIN — SODIUM CHLORIDE 110 MILLILITER(S): 9 INJECTION, SOLUTION INTRAVENOUS at 07:40

## 2022-05-13 RX ADMIN — HEPARIN SODIUM 1100 UNIT(S)/HR: 5000 INJECTION INTRAVENOUS; SUBCUTANEOUS at 07:39

## 2022-05-13 RX ADMIN — OXYCODONE HYDROCHLORIDE 15 MILLIGRAM(S): 5 TABLET ORAL at 06:00

## 2022-05-13 RX ADMIN — AMLODIPINE BESYLATE 10 MILLIGRAM(S): 2.5 TABLET ORAL at 05:59

## 2022-05-13 RX ADMIN — Medication 200 MILLIGRAM(S): at 06:00

## 2022-05-13 RX ADMIN — HYDROMORPHONE HYDROCHLORIDE 0.5 MILLIGRAM(S): 2 INJECTION INTRAMUSCULAR; INTRAVENOUS; SUBCUTANEOUS at 18:00

## 2022-05-13 RX ADMIN — HYDROMORPHONE HYDROCHLORIDE 0.5 MILLIGRAM(S): 2 INJECTION INTRAMUSCULAR; INTRAVENOUS; SUBCUTANEOUS at 17:54

## 2022-05-13 RX ADMIN — HYDROMORPHONE HYDROCHLORIDE 0.5 MILLIGRAM(S): 2 INJECTION INTRAMUSCULAR; INTRAVENOUS; SUBCUTANEOUS at 17:16

## 2022-05-13 RX ADMIN — Medication 30 MILLIGRAM(S): at 20:04

## 2022-05-13 RX ADMIN — GABAPENTIN 600 MILLIGRAM(S): 400 CAPSULE ORAL at 20:05

## 2022-05-13 RX ADMIN — HYDROMORPHONE HYDROCHLORIDE 30 MILLILITER(S): 2 INJECTION INTRAMUSCULAR; INTRAVENOUS; SUBCUTANEOUS at 16:57

## 2022-05-13 RX ADMIN — HYDROMORPHONE HYDROCHLORIDE 1 MILLIGRAM(S): 2 INJECTION INTRAMUSCULAR; INTRAVENOUS; SUBCUTANEOUS at 07:05

## 2022-05-13 RX ADMIN — OXYCODONE HYDROCHLORIDE 15 MILLIGRAM(S): 5 TABLET ORAL at 07:00

## 2022-05-13 RX ADMIN — HYDROMORPHONE HYDROCHLORIDE 0.5 MILLIGRAM(S): 2 INJECTION INTRAMUSCULAR; INTRAVENOUS; SUBCUTANEOUS at 16:48

## 2022-05-13 RX ADMIN — HYDROMORPHONE HYDROCHLORIDE 1 MILLIGRAM(S): 2 INJECTION INTRAMUSCULAR; INTRAVENOUS; SUBCUTANEOUS at 07:20

## 2022-05-13 RX ADMIN — METHOCARBAMOL 750 MILLIGRAM(S): 500 TABLET, FILM COATED ORAL at 05:59

## 2022-05-13 RX ADMIN — OXYCODONE HYDROCHLORIDE 5 MILLIGRAM(S): 5 TABLET ORAL at 00:55

## 2022-05-13 RX ADMIN — HYDROMORPHONE HYDROCHLORIDE 30 MILLILITER(S): 2 INJECTION INTRAMUSCULAR; INTRAVENOUS; SUBCUTANEOUS at 22:21

## 2022-05-13 RX ADMIN — Medication 975 MILLIGRAM(S): at 06:00

## 2022-05-13 RX ADMIN — OXYCODONE HYDROCHLORIDE 5 MILLIGRAM(S): 5 TABLET ORAL at 01:55

## 2022-05-13 RX ADMIN — HYDROMORPHONE HYDROCHLORIDE 0.5 MILLIGRAM(S): 2 INJECTION INTRAMUSCULAR; INTRAVENOUS; SUBCUTANEOUS at 17:44

## 2022-05-13 RX ADMIN — METHOCARBAMOL 750 MILLIGRAM(S): 500 TABLET, FILM COATED ORAL at 20:05

## 2022-05-13 RX ADMIN — HYDROMORPHONE HYDROCHLORIDE 2 MILLIGRAM(S): 2 INJECTION INTRAMUSCULAR; INTRAVENOUS; SUBCUTANEOUS at 10:23

## 2022-05-13 RX ADMIN — GABAPENTIN 600 MILLIGRAM(S): 400 CAPSULE ORAL at 06:00

## 2022-05-13 RX ADMIN — HYDROMORPHONE HYDROCHLORIDE 30 MILLILITER(S): 2 INJECTION INTRAMUSCULAR; INTRAVENOUS; SUBCUTANEOUS at 20:09

## 2022-05-13 RX ADMIN — HYDROMORPHONE HYDROCHLORIDE 0.5 MILLIGRAM(S): 2 INJECTION INTRAMUSCULAR; INTRAVENOUS; SUBCUTANEOUS at 03:59

## 2022-05-13 RX ADMIN — Medication 400 MILLIGRAM(S): at 19:00

## 2022-05-13 RX ADMIN — HYDROMORPHONE HYDROCHLORIDE 1 MILLIGRAM(S): 2 INJECTION INTRAMUSCULAR; INTRAVENOUS; SUBCUTANEOUS at 01:10

## 2022-05-13 RX ADMIN — HYDROMORPHONE HYDROCHLORIDE 0.5 MILLIGRAM(S): 2 INJECTION INTRAMUSCULAR; INTRAVENOUS; SUBCUTANEOUS at 02:44

## 2022-05-13 RX ADMIN — HYDROMORPHONE HYDROCHLORIDE 0.5 MILLIGRAM(S): 2 INJECTION INTRAMUSCULAR; INTRAVENOUS; SUBCUTANEOUS at 16:58

## 2022-05-13 RX ADMIN — Medication 200 MILLIGRAM(S): at 22:09

## 2022-05-13 NOTE — CONSULT NOTE ADULT - SUBJECTIVE AND OBJECTIVE BOX
CHIEF COMPLAINT: pre-op evaluation    HPI: Patient is a  57y Male with PAD, carotid disease, HTN, HLD, seizures here with limb ischemia. In need of LE bypass. Stable from CV stanpoint, no exertional CP/SOB. No PND/orthopnea/palps/edema/syncope. Continued severe pain in right foot/leg. No fevers/chills/sweats.     PAST MEDICAL & SURGICAL HISTORY:  Hypercholesterolemia      Seizures      Depression      GERD (gastroesophageal reflux disease)      Hypertension      Peripheral vascular disease  with R SFA stent      Osteoarthritis      Former smoker      Prediabetes      Inguinal hernia, left      S/P ORIF (open reduction internal fixation) fracture  Left      S/P shoulder surgery  right      S/P appendectomy      H/O endarterectomy  L femoral a.  R femoral a.          MEDICATIONS:  MEDICATIONS  (STANDING):  acetaminophen     Tablet .. 975 milliGRAM(s) Oral every 8 hours  amLODIPine   Tablet 10 milliGRAM(s) Oral daily  aspirin  chewable 81 milliGRAM(s) Oral daily  atorvastatin 80 milliGRAM(s) Oral at bedtime  gabapentin 600 milliGRAM(s) Oral every 8 hours  heparin  Infusion.  Unit(s)/Hr (12 mL/Hr) IV Continuous <Continuous>  methocarbamol 750 milliGRAM(s) Oral three times a day  multiple electrolytes Injection Type 1 1000 milliLiter(s) (110 mL/Hr) IV Continuous <Continuous>  pantoprazole    Tablet 40 milliGRAM(s) Oral before breakfast  phenytoin ER Oral Tab/Cap - Peds 200 milliGRAM(s) Oral every 12 hours  QUEtiapine 200 milliGRAM(s) Oral at bedtime  senna 2 Tablet(s) Oral at bedtime  venlafaxine XR. 75 milliGRAM(s) Oral daily    MEDICATIONS  (PRN):  HYDROmorphone  Injectable 0.5 milliGRAM(s) IV Push every 4 hours PRN Severe Pain (7 - 10)  ondansetron Injectable 4 milliGRAM(s) IV Push every 6 hours PRN Nausea    acetaminophen     Tablet .. 975 milliGRAM(s) Oral every 8 hours  amLODIPine   Tablet 10 milliGRAM(s) Oral daily  aspirin  chewable 81 milliGRAM(s) Oral daily  atorvastatin 80 milliGRAM(s) Oral at bedtime  gabapentin 600 milliGRAM(s) Oral every 8 hours  heparin  Infusion.  Unit(s)/Hr IV Continuous <Continuous>  HYDROmorphone  Injectable 0.5 milliGRAM(s) IV Push every 4 hours PRN  methocarbamol 750 milliGRAM(s) Oral three times a day  multiple electrolytes Injection Type 1 1000 milliLiter(s) IV Continuous <Continuous>  ondansetron Injectable 4 milliGRAM(s) IV Push every 6 hours PRN  pantoprazole    Tablet 40 milliGRAM(s) Oral before breakfast  phenytoin ER Oral Tab/Cap - Peds 200 milliGRAM(s) Oral every 12 hours  QUEtiapine 200 milliGRAM(s) Oral at bedtime  senna 2 Tablet(s) Oral at bedtime  venlafaxine XR. 75 milliGRAM(s) Oral daily      FAMILY HISTORY:  FAMILY HISTORY:  Family history of breast cancer (Mother)    Family history of hepatitis (Mother)    Family history of heart disease (Mother, Sibling)    Family history of CABG (Father)    Family history of peripheral vascular disease (Father)    Family history of brain aneurysm (Sibling)        SOCIAL HISTORY: no EtOH, drugs or tobacco    ROS: GI negative, All others negative    PHYSCIAL EXAM:  Vital Signs Last 24 Hrs  T(C): 36.4 (13 May 2022 04:42), Max: 36.8 (12 May 2022 15:55)  T(F): 97.6 (13 May 2022 04:42), Max: 98.3 (12 May 2022 17:55)  HR: 84 (13 May 2022 04:42) (74 - 102)  BP: 183/80 (13 May 2022 04:42) (157/82 - 183/80)  BP(mean): --  RR: 18 (13 May 2022 04:42) (18 - 18)  SpO2: 99% (13 May 2022 04:42) (95% - 99%)  I&O's Summary    12 May 2022 07:01  -  13 May 2022 07:00  --------------------------------------------------------  IN: 22 mL / OUT: 0 mL / NET: 22 mL      GEN: NAD  HEENT: MMM, sclera anicteric  RESP: CTA bilaterally  CVS: S1S2, RRR, no JVD, no M/R/G  GI: Soft, NT, ND, BS+  EXT: right foot cool, mild edema  NEURO: AAOX3  PSYCH: Normal affect    ECG: SR, no ST-T abnormalities    LABS:                        11.1   8.98  )-----------( 260      ( 13 May 2022 05:36 )             34.1     05-13    139  |  102  |  14.7  ----------------------------<  90  3.9   |  21.0<L>  |  0.71    Ca    8.9      13 May 2022 05:36  Phos  3.2     05-13  Mg     2.1     05-13    TPro  7.6  /  Alb  4.1  /  TBili  <0.2<L>  /  DBili  x   /  AST  18  /  ALT  27  /  AlkPhos  185<H>  05-13    CARDIAC MARKERS ( 12 May 2022 03:23 )  x     / x     / 115 U/L / x     / x          PT/INR - ( 11 May 2022 20:30 )   PT: 10.9 sec;   INR: 0.94 ratio         PTT - ( 13 May 2022 06:58 )  PTT:69.0 sec      RADIOLOGY & ADDITIONAL STUDIES:    Assessment:    Patient is a  57y Male with a PMH of PAD, HTN, HLD, seizure disorder here with critical limb ischemia  -Had echo 2021 with normal BiV function and no significant valve disease  -Nuclear stress test 2021 with normal perfusion, no ischemic/infarct and EF 63%  -No dyspnea or exertional CP  -ECG unremarkable    Plan:  1. Patient is at low CV risk for vascular surgery  2. No further cardiac work up  3. Continue high dose statin and antiplatelets  4. Outpatient follow up with Dr Irby after discharge  5. thank you       Breezy Singh MD

## 2022-05-13 NOTE — PROGRESS NOTE ADULT - ASSESSMENT
57M with PAD who presents with worsening rest pain and worsening ischemia of right lower extremity.   - Cardiology evaluation for risk stratification previously completed on 4/13, will see, Clearance should be good  - Medical evaluation for risk stratification completed  - Anesthiesa evalauted  - Heparin drip  - continue home medications  - Pain control PRN  - OR today for bypass

## 2022-05-13 NOTE — PROGRESS NOTE ADULT - SUBJECTIVE AND OBJECTIVE BOX
INTERVAL HPI/OVERNIGHT EVENTS:    Patient seen this AM event of pain overnight, improved, afebrile, HDS, pending bypass planned for today, evalauted by anesthesia      MEDICATIONS  (STANDING):  acetaminophen     Tablet .. 975 milliGRAM(s) Oral every 8 hours  amLODIPine   Tablet 10 milliGRAM(s) Oral daily  aspirin  chewable 81 milliGRAM(s) Oral daily  atorvastatin 80 milliGRAM(s) Oral at bedtime  gabapentin 600 milliGRAM(s) Oral every 8 hours  heparin  Infusion.  Unit(s)/Hr (12 mL/Hr) IV Continuous <Continuous>  methocarbamol 750 milliGRAM(s) Oral three times a day  multiple electrolytes Injection Type 1 1000 milliLiter(s) (110 mL/Hr) IV Continuous <Continuous>  pantoprazole    Tablet 40 milliGRAM(s) Oral before breakfast  phenytoin ER Oral Tab/Cap - Peds 200 milliGRAM(s) Oral every 12 hours  QUEtiapine 200 milliGRAM(s) Oral at bedtime  senna 2 Tablet(s) Oral at bedtime  venlafaxine XR. 75 milliGRAM(s) Oral daily    MEDICATIONS  (PRN):  HYDROmorphone  Injectable 0.5 milliGRAM(s) IV Push every 4 hours PRN Severe Pain (7 - 10)  ondansetron Injectable 4 milliGRAM(s) IV Push every 6 hours PRN Nausea        Vital Signs Last 24 Hrs  Vital Signs Last 24 Hrs  T(C): 36.4 (13 May 2022 04:42), Max: 36.8 (12 May 2022 15:55)  T(F): 97.6 (13 May 2022 04:42), Max: 98.3 (12 May 2022 17:55)  HR: 84 (13 May 2022 04:42) (68 - 102)  BP: 183/80 (13 May 2022 04:42) (151/73 - 183/80)  BP(mean): --  RR: 18 (13 May 2022 04:42) (18 - 18)  SpO2: 99% (13 May 2022 04:42) (95% - 99%)    Physical Exam  GEN: NAD, laying in bed, appears stated age  HEENT: NCAT, clear oral mucosa, normal conjunctiva  Chest: non-tender  CV:  non-tachycardic, 2+ radial pulse  Pulm: no increased work of breathing, no conversational dyspnea  GI: soft, nontender  MSK: moving all extremities. pain and tenderness to right leg with blanching erythema and scattered petichiae. Splotchy bluish discoloration of 1st right lower extremity digit with blistering of 2nd digit   Vasc:   b/l DP signals to PT and popliteal arteries    I&O's Detail      LABS:                         10.0   8.02  )-----------( 219      ( 12 May 2022 03:23 )             31.2   05-12    139  |  105  |  12.4  ----------------------------<  83  4.0   |  21.0<L>  |  0.64    Ca    8.6      12 May 2022 03:23  Phos  2.8     05-12  Mg     2.0     05-12    TPro  6.5<L>  /  Alb  3.5  /  TBili  <0.2<L>  /  DBili  x   /  AST  19  /  ALT  27  /  AlkPhos  161<H>  05-12      RADIOLOGY & ADDITIONAL STUDIES:

## 2022-05-13 NOTE — PROGRESS NOTE ADULT - ATTENDING COMMENTS
Seen and examined  Patient with ischemic rest pain and evolving right great toe dry gangrene despite recent right femoral endarterectomy  2+ right femoral pulse, absent pedal pulses  Plan is for right fem-PT bypass  Risks and benefits of the planned intervention discussed with patient. All questions answered

## 2022-05-13 NOTE — PROGRESS NOTE ADULT - SUBJECTIVE AND OBJECTIVE BOX
seen for PVD    complaining of right foot discoloration and pain  OR today  ros negative     MEDICATIONS  (STANDING):  acetaminophen     Tablet .. 975 milliGRAM(s) Oral every 8 hours  amLODIPine   Tablet 10 milliGRAM(s) Oral daily  aspirin  chewable 81 milliGRAM(s) Oral daily  atorvastatin 80 milliGRAM(s) Oral at bedtime  gabapentin 600 milliGRAM(s) Oral every 8 hours  heparin  Infusion.  Unit(s)/Hr (12 mL/Hr) IV Continuous <Continuous>  methocarbamol 750 milliGRAM(s) Oral three times a day  multiple electrolytes Injection Type 1 1000 milliLiter(s) (110 mL/Hr) IV Continuous <Continuous>  pantoprazole    Tablet 40 milliGRAM(s) Oral before breakfast  phenytoin ER Oral Tab/Cap - Peds 200 milliGRAM(s) Oral every 12 hours  QUEtiapine 200 milliGRAM(s) Oral at bedtime  senna 2 Tablet(s) Oral at bedtime  venlafaxine XR. 75 milliGRAM(s) Oral daily    MEDICATIONS  (PRN):  HYDROmorphone  Injectable 0.5 milliGRAM(s) IV Push every 4 hours PRN Severe Pain (7 - 10)  ondansetron Injectable 4 milliGRAM(s) IV Push every 6 hours PRN Nausea      Allergies    No Known Allergies    Vital Signs Last 24 Hrs  T(C): 36.9 (13 May 2022 10:08), Max: 36.9 (13 May 2022 10:08)  T(F): 98.5 (13 May 2022 10:08), Max: 98.5 (13 May 2022 10:08)  HR: 103 (13 May 2022 10:08) (74 - 103)  BP: 165/82 (13 May 2022 10:08) (157/82 - 183/80)  BP(mean): --  RR: 20 (13 May 2022 10:08) (18 - 20)  SpO2: 100% (13 May 2022 10:08) (95% - 100%)    PHYSICAL EXAM:    GENERAL: NAD  CHEST/LUNG: Clear to ausculation bilaterally  HEART: Regular rate and rhythm; S1 S2  ABDOMEN: Soft,  Bowel sounds present  EXTREMITIES:  right foot swelling, discoloration of right foot.  NERVOUS SYSTEM:  Alert & Oriented X3, nonfocal    LABS:                        11.1   8.98  )-----------( 260      ( 13 May 2022 05:36 )             34.1     05-13    139  |  102  |  14.7  ----------------------------<  90  3.9   |  21.0<L>  |  0.71    Ca    8.9      13 May 2022 05:36  Phos  3.2     05-13  Mg     2.1     05-13    TPro  7.6  /  Alb  4.1  /  TBili  <0.2<L>  /  DBili  x   /  AST  18  /  ALT  27  /  AlkPhos  185<H>  05-13    PT/INR - ( 11 May 2022 20:30 )   PT: 10.9 sec;   INR: 0.94 ratio         PTT - ( 13 May 2022 06:58 )  PTT:69.0 sec      CAPILLARY BLOOD GLUCOSE            RADIOLOGY & ADDITIONAL TESTS:

## 2022-05-13 NOTE — BRIEF OPERATIVE NOTE - OPERATION/FINDINGS
Intra-operative use of ultrasound  Right Fem to PT bypass with own ipsilateral saphenous vein graft. Signals obtained post-operatively  Placement of Negative pressure wound dressing

## 2022-05-13 NOTE — PROGRESS NOTE ADULT - ASSESSMENT
57yoM hx HTN, seizure disorder, PAD s/p recent femoral endarterectomy 4/15 with rest pain planned for bypass on RLE revascularization on 5/18 who presents with worsening RLE pain and changes in R-foot toe coloration    PAD with RLE ischemia    vascular surgery primary     planned for bypass today    heparin drip     Hx HTN  -On amlodipine 10mg daily    Hx seizure  -On phenytoin 200mg BID    Hx depression  -On venlafaxine 75mg daily  -resume Seroquel but monitor closely as pt was started on gabapentin and methocarbamol

## 2022-05-13 NOTE — CONSULT NOTE ADULT - SUBJECTIVE AND OBJECTIVE BOX
HPI:  57 year old male known to our service from prior lower extremity revascularization, PMHX PAD with recent femoral endarterectomy 4/15 with rest pain planned for bypass on RLE on 5/18 who presents with worsening pain and changes in toe coloration. Patient reports several weeks of right leg pain, initially with walking and progressing to be at rest. Now has worsening rest pain, improved with dangling legs and now has discoloration starting 2 days prior to presentation. Pt was taken to the OR today 95/13) with vascular surgery and underwent R fem to PT bypass with ispsilateral saphenous vein graft. Signals present postoperatively. EBL 1L, pt recieved 2 u PRBC and 5L crystalloid while in the OR. Post operatively, patient with severe reperfusion pain, requiring increasing doses of PCA Dilaudid SICU consult for pain management.     PAST MEDICAL & SURGICAL HISTORY:  Hypercholesterolemia    Seizures    Depression    GERD (gastroesophageal reflux disease)    Hypertension    Peripheral vascular disease  with R SFA stent    Osteoarthritis    Former smoker    Prediabetes    Inguinal hernia, left    S/P ORIF (open reduction internal fixation) fracture  Left    S/P shoulder surgery  right    S/P appendectomy    H/O endarterectomy  L femoral a.  R femoral a.    Home Medications:  amLODIPine 10 mg oral tablet: 1 tab(s) orally once a day (01 Oct 2021 09:46)  aspirin 81 mg oral tablet, chewable: 1 tab(s) orally once a day (04 Oct 2021 11:25)  atorvastatin 80 mg oral tablet: 1 tab(s) orally once a day (01 Oct 2021 09:46)  Fish Oil 1000 mg oral capsule: 1 cap(s) orally 2 times a day (01 Oct 2021 09:46)  pantoprazole 40 mg oral delayed release tablet: 1 tab(s) orally once a day (01 Oct 2021 09:46)  phenytoin 200 mg oral capsule, extended release: 1 cap(s) orally every 12 hours (18 Apr 2022 15:19)  Plavix 75 mg oral tablet: 1 tab(s) orally once a day (01 Oct 2021 09:46)  QUEtiapine 200 mg oral tablet:  orally once a day (at bedtime) (01 Oct 2021 09:46)  venlafaxine 75 mg oral capsule, extended release: 1 cap(s) orally once a day (01 Oct 2021 09:46)    Review of Systems: All negative except where noted in HPI    MEDICATIONS  (STANDING):  acetaminophen     Tablet .. 975 milliGRAM(s) Oral every 8 hours  amLODIPine   Tablet 10 milliGRAM(s) Oral daily  aspirin  chewable 81 milliGRAM(s) Oral daily  atorvastatin 80 milliGRAM(s) Oral at bedtime  dexMEDEtomidine Infusion 0.5 MICROgram(s)/kG/Hr (8.48 mL/Hr) IV Continuous <Continuous>  fentaNYL    Injectable 50 MICROGram(s) IV Push once  gabapentin 600 milliGRAM(s) Oral every 8 hours  HYDROmorphone PCA (1 mG/mL) 30 milliLiter(s) PCA Continuous PCA Continuous  HYDROmorphone PCA (1 mG/mL) 30 milliLiter(s) PCA Continuous PCA Continuous  HYDROmorphone PCA (1 mG/mL) 30 milliLiter(s) PCA Continuous PCA Continuous  methocarbamol 750 milliGRAM(s) Oral three times a day  multiple electrolytes Injection Type 1 1000 milliLiter(s) (110 mL/Hr) IV Continuous <Continuous>  pantoprazole    Tablet 40 milliGRAM(s) Oral before breakfast  phenytoin ER Oral Tab/Cap - Peds 200 milliGRAM(s) Oral every 12 hours  QUEtiapine 200 milliGRAM(s) Oral at bedtime  senna 2 Tablet(s) Oral at bedtime  venlafaxine XR. 75 milliGRAM(s) Oral daily    MEDICATIONS  (PRN):  naloxone Injectable 0.1 milliGRAM(s) IV Push every 3 minutes PRN For ANY of the following changes in patient status:  A. RR LESS THAN 10 breaths per minute, B. Oxygen saturation LESS THAN 90%, C. Sedation score of 6  naloxone Injectable 0.1 milliGRAM(s) IV Push every 3 minutes PRN For ANY of the following changes in patient status:  A. RR LESS THAN 10 breaths per minute, B. Oxygen saturation LESS THAN 90%, C. Sedation score of 6  ondansetron Injectable 4 milliGRAM(s) IV Push every 6 hours PRN Nausea  ondansetron Injectable 4 milliGRAM(s) IV Push every 6 hours PRN Nausea  ondansetron Injectable 4 milliGRAM(s) IV Push every 6 hours PRN Nausea    Vital Signs Last 24 Hrs  T(C): 36.5 (13 May 2022 19:41), Max: 37.1 (13 May 2022 16:42)  T(F): 97.7 (13 May 2022 19:41), Max: 98.8 (13 May 2022 16:42)  HR: 132 (13 May 2022 19:41) (84 - 132)  BP: 141/96 (13 May 2022 19:41) (92/65 - 183/80)  BP(mean): 107 (13 May 2022 19:41) (71 - 107)  RR: 15 (13 May 2022 19:41) (10 - 20)  SpO2: 100% (13 May 2022 19:41) (95% - 100%)    Physical Exam:    Gen: crying in pain, appears uncomfortable  HEENT: PERRL, EOMI  Neurological: Alert and oriented x3 without focal deficit, moving all extremties  Neck: Trachea midline, no evidence of JVD, FROM without pain, neck symmetric  Pulmonary: CTAB with decreased breath sounds at the bases  Cardiovascular: ST  Gastrointestinal: Soft, non-tender, non-distended  : Rod in place draining clear yellow urine  Extremities: RLE with ace wrap, toes mottled   Skin: Intact  Musculoskeletal: severe reperfusion pain RLE    LABS:                        9.4    12.49 )-----------( 206      ( 13 May 2022 17:10 )             27.9     05-13    138  |  108<H>  |  9.6  ----------------------------<  136<H>  4.1   |  17.0<L>  |  0.70    Ca    7.6<L>      13 May 2022 17:10  Phos  3.2     05-13  Mg     2.1     05-13    TPro  7.6  /  Alb  4.1  /  TBili  <0.2<L>  /  DBili  x   /  AST  18  /  ALT  27  /  AlkPhos  185<H>  05-13    PT/INR - ( 11 May 2022 20:30 )   PT: 10.9 sec;   INR: 0.94 ratio         PTT - ( 13 May 2022 06:58 )  PTT:69.0 sec    Impression and Plan:  57 y.o male presented with critical limb ischemia, no s/p R fem to PT bypass. SICU consulted post operative for severe reperfusion pain.     -Recommend adding additional non-opioid analgesics for multimodal pain control: standing tylenol 650 mg q6, Toradol 30 mg IVP q8 for 3 doses today, F/U AM labs and would d/c if BELEN develops, gabapentin 300 mg q8, robaxin 750 mg q6  - bolus short acting opioids to achieve adequate pain control (recommend using fentanyl). Suspect patient is behind in giovanni control, and once pain control is achieved, patient's pain going forward will be much easier to control.   - DECREASE demand PCA dose from 0.4 mg to 0.1 or 0.2 mg q6  - would recommend to continue monitoring in PACU for 1 - 2 hours after adequate pain control achieved to monitor for oversedation, and then transfer to telemetry  - continue to educate patient that some pain is normal after this particular surgical procedure, and can be seen as a sign that the surgery was effective asn it is indicative of reperfusion affected area.     Plan to be discussed with Dr. Strong    [phone] with any questions or concerns.  HPI:  57 year old male known to our service from prior lower extremity revascularization, PMHX PAD with recent femoral endarterectomy 4/15 with rest pain planned for bypass on RLE on 5/18 who presents with worsening pain and changes in toe coloration. Patient reports several weeks of right leg pain, initially with walking and progressing to be at rest. Now has worsening rest pain, improved with dangling legs and now has discoloration starting 2 days prior to presentation. Pt was taken to the OR today 95/13) with vascular surgery and underwent R fem to PT bypass with ispsilateral saphenous vein graft. Signals present postoperatively. EBL 1L, pt recieved 2 u PRBC and 5L crystalloid while in the OR. Post operatively, patient with severe reperfusion pain, requiring increasing doses of PCA Dilaudid SICU consult for pain management.     PAST MEDICAL & SURGICAL HISTORY:  Hypercholesterolemia    Seizures    Depression    GERD (gastroesophageal reflux disease)    Hypertension    Peripheral vascular disease  with R SFA stent    Osteoarthritis    Former smoker    Prediabetes    Inguinal hernia, left    S/P ORIF (open reduction internal fixation) fracture  Left    S/P shoulder surgery  right    S/P appendectomy    H/O endarterectomy  L femoral a.  R femoral a.    Home Medications:  amLODIPine 10 mg oral tablet: 1 tab(s) orally once a day (01 Oct 2021 09:46)  aspirin 81 mg oral tablet, chewable: 1 tab(s) orally once a day (04 Oct 2021 11:25)  atorvastatin 80 mg oral tablet: 1 tab(s) orally once a day (01 Oct 2021 09:46)  Fish Oil 1000 mg oral capsule: 1 cap(s) orally 2 times a day (01 Oct 2021 09:46)  pantoprazole 40 mg oral delayed release tablet: 1 tab(s) orally once a day (01 Oct 2021 09:46)  phenytoin 200 mg oral capsule, extended release: 1 cap(s) orally every 12 hours (18 Apr 2022 15:19)  Plavix 75 mg oral tablet: 1 tab(s) orally once a day (01 Oct 2021 09:46)  QUEtiapine 200 mg oral tablet:  orally once a day (at bedtime) (01 Oct 2021 09:46)  venlafaxine 75 mg oral capsule, extended release: 1 cap(s) orally once a day (01 Oct 2021 09:46)    Review of Systems: All negative except where noted in HPI    MEDICATIONS  (STANDING):  acetaminophen     Tablet .. 975 milliGRAM(s) Oral every 8 hours  amLODIPine   Tablet 10 milliGRAM(s) Oral daily  aspirin  chewable 81 milliGRAM(s) Oral daily  atorvastatin 80 milliGRAM(s) Oral at bedtime  dexMEDEtomidine Infusion 0.5 MICROgram(s)/kG/Hr (8.48 mL/Hr) IV Continuous <Continuous>  fentaNYL    Injectable 50 MICROGram(s) IV Push once  gabapentin 600 milliGRAM(s) Oral every 8 hours  HYDROmorphone PCA (1 mG/mL) 30 milliLiter(s) PCA Continuous PCA Continuous  HYDROmorphone PCA (1 mG/mL) 30 milliLiter(s) PCA Continuous PCA Continuous  HYDROmorphone PCA (1 mG/mL) 30 milliLiter(s) PCA Continuous PCA Continuous  methocarbamol 750 milliGRAM(s) Oral three times a day  multiple electrolytes Injection Type 1 1000 milliLiter(s) (110 mL/Hr) IV Continuous <Continuous>  pantoprazole    Tablet 40 milliGRAM(s) Oral before breakfast  phenytoin ER Oral Tab/Cap - Peds 200 milliGRAM(s) Oral every 12 hours  QUEtiapine 200 milliGRAM(s) Oral at bedtime  senna 2 Tablet(s) Oral at bedtime  venlafaxine XR. 75 milliGRAM(s) Oral daily    MEDICATIONS  (PRN):  naloxone Injectable 0.1 milliGRAM(s) IV Push every 3 minutes PRN For ANY of the following changes in patient status:  A. RR LESS THAN 10 breaths per minute, B. Oxygen saturation LESS THAN 90%, C. Sedation score of 6  naloxone Injectable 0.1 milliGRAM(s) IV Push every 3 minutes PRN For ANY of the following changes in patient status:  A. RR LESS THAN 10 breaths per minute, B. Oxygen saturation LESS THAN 90%, C. Sedation score of 6  ondansetron Injectable 4 milliGRAM(s) IV Push every 6 hours PRN Nausea  ondansetron Injectable 4 milliGRAM(s) IV Push every 6 hours PRN Nausea  ondansetron Injectable 4 milliGRAM(s) IV Push every 6 hours PRN Nausea    Vital Signs Last 24 Hrs  T(C): 36.5 (13 May 2022 19:41), Max: 37.1 (13 May 2022 16:42)  T(F): 97.7 (13 May 2022 19:41), Max: 98.8 (13 May 2022 16:42)  HR: 132 (13 May 2022 19:41) (84 - 132)  BP: 141/96 (13 May 2022 19:41) (92/65 - 183/80)  BP(mean): 107 (13 May 2022 19:41) (71 - 107)  RR: 15 (13 May 2022 19:41) (10 - 20)  SpO2: 100% (13 May 2022 19:41) (95% - 100%)    Physical Exam:    Gen: crying in pain, appears uncomfortable  HEENT: PERRL, EOMI  Neurological: Alert and oriented x3 without focal deficit, moving all extremties  Neck: Trachea midline, no evidence of JVD, FROM without pain, neck symmetric  Pulmonary: CTAB with decreased breath sounds at the bases  Cardiovascular: ST  Gastrointestinal: Soft, non-tender, non-distended  : Rod in place draining clear yellow urine  Extremities: RLE with ace wrap, toes mottled   Skin: Intact  Musculoskeletal: severe reperfusion pain RLE    LABS:                        9.4    12.49 )-----------( 206      ( 13 May 2022 17:10 )             27.9     05-13    138  |  108<H>  |  9.6  ----------------------------<  136<H>  4.1   |  17.0<L>  |  0.70    Ca    7.6<L>      13 May 2022 17:10  Phos  3.2     05-13  Mg     2.1     05-13    TPro  7.6  /  Alb  4.1  /  TBili  <0.2<L>  /  DBili  x   /  AST  18  /  ALT  27  /  AlkPhos  185<H>  05-13    PT/INR - ( 11 May 2022 20:30 )   PT: 10.9 sec;   INR: 0.94 ratio         PTT - ( 13 May 2022 06:58 )  PTT:69.0 sec    Impression and Plan:  57 y.o male presented with critical limb ischemia, no s/p R fem to PT bypass. SICU consulted post operative for severe reperfusion pain.     -Recommend adding additional non-opioid analgesics for multimodal pain control: standing tylenol 650 mg q6, Toradol 30 mg IVP q8 for 3 doses today, F/U AM labs and would d/c if BELEN develops, gabapentin 300 mg q8, robaxin 750 mg q6  - bolus short acting opioids to achieve adequate pain control (recommend using fentanyl). Suspect patient is behind in pain control, and once pain control is achieved, patient's pain going forward will be much easier to control.   - DECREASE demand PCA dose from 0.4 mg to 0.1 or 0.2 mg q6  - would recommend to continue monitoring in PACU for 1 - 2 hours after adequate pain control achieved to monitor for oversedation, and then transfer to telemetry  - continue to educate patient that some pain is normal after this particular surgical procedure, and can be seen as a sign that the surgery was effective asn it is indicative of reperfusion affected area.     Plan to be discussed with Dr. Strong    Please Call [facility] [phone] with any questions or concerns.  HPI:  57 year old male known to our service from prior lower extremity revascularization, PMHX PAD with recent femoral endarterectomy 4/15 with rest pain planned for bypass on RLE on 5/18 who presents with worsening pain and changes in toe coloration. Patient reports several weeks of right leg pain, initially with walking and progressing to be at rest. Now has worsening rest pain, improved with dangling legs and now has discoloration starting 2 days prior to presentation. Pt was taken to the OR today (5/13) with vascular surgery and underwent R fem to PT bypass with ispsilateral saphenous vein graft. Signals present postoperatively. EBL 1L, pt recieved 2 u PRBC and 5L crystalloid while in the OR. Post operatively, patient with severe reperfusion pain, requiring increasing doses of PCA Dilaudid SICU consult for pain management.     PAST MEDICAL & SURGICAL HISTORY:  Hypercholesterolemia    Seizures    Depression    GERD (gastroesophageal reflux disease)    Hypertension    Peripheral vascular disease  with R SFA stent    Osteoarthritis    Former smoker    Prediabetes    Inguinal hernia, left    S/P ORIF (open reduction internal fixation) fracture  Left    S/P shoulder surgery  right    S/P appendectomy    H/O endarterectomy  L femoral a.  R femoral a.    Home Medications:  amLODIPine 10 mg oral tablet: 1 tab(s) orally once a day (01 Oct 2021 09:46)  aspirin 81 mg oral tablet, chewable: 1 tab(s) orally once a day (04 Oct 2021 11:25)  atorvastatin 80 mg oral tablet: 1 tab(s) orally once a day (01 Oct 2021 09:46)  Fish Oil 1000 mg oral capsule: 1 cap(s) orally 2 times a day (01 Oct 2021 09:46)  pantoprazole 40 mg oral delayed release tablet: 1 tab(s) orally once a day (01 Oct 2021 09:46)  phenytoin 200 mg oral capsule, extended release: 1 cap(s) orally every 12 hours (18 Apr 2022 15:19)  Plavix 75 mg oral tablet: 1 tab(s) orally once a day (01 Oct 2021 09:46)  QUEtiapine 200 mg oral tablet:  orally once a day (at bedtime) (01 Oct 2021 09:46)  venlafaxine 75 mg oral capsule, extended release: 1 cap(s) orally once a day (01 Oct 2021 09:46)    Review of Systems: All negative except where noted in HPI    MEDICATIONS  (STANDING):  acetaminophen     Tablet .. 975 milliGRAM(s) Oral every 8 hours  amLODIPine   Tablet 10 milliGRAM(s) Oral daily  aspirin  chewable 81 milliGRAM(s) Oral daily  atorvastatin 80 milliGRAM(s) Oral at bedtime  dexMEDEtomidine Infusion 0.5 MICROgram(s)/kG/Hr (8.48 mL/Hr) IV Continuous <Continuous>  fentaNYL    Injectable 50 MICROGram(s) IV Push once  gabapentin 600 milliGRAM(s) Oral every 8 hours  HYDROmorphone PCA (1 mG/mL) 30 milliLiter(s) PCA Continuous PCA Continuous  HYDROmorphone PCA (1 mG/mL) 30 milliLiter(s) PCA Continuous PCA Continuous  HYDROmorphone PCA (1 mG/mL) 30 milliLiter(s) PCA Continuous PCA Continuous  methocarbamol 750 milliGRAM(s) Oral three times a day  multiple electrolytes Injection Type 1 1000 milliLiter(s) (110 mL/Hr) IV Continuous <Continuous>  pantoprazole    Tablet 40 milliGRAM(s) Oral before breakfast  phenytoin ER Oral Tab/Cap - Peds 200 milliGRAM(s) Oral every 12 hours  QUEtiapine 200 milliGRAM(s) Oral at bedtime  senna 2 Tablet(s) Oral at bedtime  venlafaxine XR. 75 milliGRAM(s) Oral daily    MEDICATIONS  (PRN):  naloxone Injectable 0.1 milliGRAM(s) IV Push every 3 minutes PRN For ANY of the following changes in patient status:  A. RR LESS THAN 10 breaths per minute, B. Oxygen saturation LESS THAN 90%, C. Sedation score of 6  naloxone Injectable 0.1 milliGRAM(s) IV Push every 3 minutes PRN For ANY of the following changes in patient status:  A. RR LESS THAN 10 breaths per minute, B. Oxygen saturation LESS THAN 90%, C. Sedation score of 6  ondansetron Injectable 4 milliGRAM(s) IV Push every 6 hours PRN Nausea  ondansetron Injectable 4 milliGRAM(s) IV Push every 6 hours PRN Nausea  ondansetron Injectable 4 milliGRAM(s) IV Push every 6 hours PRN Nausea    Vital Signs Last 24 Hrs  T(C): 36.5 (13 May 2022 19:41), Max: 37.1 (13 May 2022 16:42)  T(F): 97.7 (13 May 2022 19:41), Max: 98.8 (13 May 2022 16:42)  HR: 132 (13 May 2022 19:41) (84 - 132)  BP: 141/96 (13 May 2022 19:41) (92/65 - 183/80)  BP(mean): 107 (13 May 2022 19:41) (71 - 107)  RR: 15 (13 May 2022 19:41) (10 - 20)  SpO2: 100% (13 May 2022 19:41) (95% - 100%)    Physical Exam:    Gen: crying in pain, appears uncomfortable  HEENT: PERRL, EOMI  Neurological: Alert and oriented x3 without focal deficit, moving all extremties  Neck: Trachea midline, no evidence of JVD, FROM without pain, neck symmetric  Pulmonary: CTAB with decreased breath sounds at the bases  Cardiovascular: ST  Gastrointestinal: Soft, non-tender, non-distended  : Rod in place draining clear yellow urine  Extremities: RLE with ace wrap, toes mottled   Skin: Intact  Musculoskeletal: severe reperfusion pain RLE    LABS:                        9.4    12.49 )-----------( 206      ( 13 May 2022 17:10 )             27.9     05-13    138  |  108<H>  |  9.6  ----------------------------<  136<H>  4.1   |  17.0<L>  |  0.70    Ca    7.6<L>      13 May 2022 17:10  Phos  3.2     05-13  Mg     2.1     05-13    TPro  7.6  /  Alb  4.1  /  TBili  <0.2<L>  /  DBili  x   /  AST  18  /  ALT  27  /  AlkPhos  185<H>  05-13    PT/INR - ( 11 May 2022 20:30 )   PT: 10.9 sec;   INR: 0.94 ratio         PTT - ( 13 May 2022 06:58 )  PTT:69.0 sec    Impression and Plan:  57 y.o male presented with critical limb ischemia, no s/p R fem to PT bypass. SICU consulted post operative for severe reperfusion pain.     -Recommend adding additional non-opioid analgesics for multimodal pain control: standing tylenol 650 mg q6, Toradol 30 mg IVP q8 for 3 doses today, F/U AM labs and would d/c if BELEN develops, gabapentin 300 mg q8, robaxin 750 mg q6  - bolus short acting opioids to achieve adequate pain control (recommend using fentanyl). Suspect patient is behind in pain control, and once pain control is achieved, patient's pain going forward will be much easier to control.   - DECREASE demand PCA dose from 0.4 mg to 0.1 or 0.2 mg q6  - would recommend to continue monitoring in PACU for 1 - 2 hours after adequate pain control achieved to monitor for oversedation, and then transfer to telemetry  - continue to educate patient that some pain is normal after this particular surgical procedure, and can be seen as a sign that the surgery was effective asn it is indicative of reperfusion affected area.     Plan discussed with Dr. Strong    Please Call [facility] [phone] with any questions or concerns.

## 2022-05-13 NOTE — BRIEF OPERATIVE NOTE - NSICDXBRIEFPROCEDURE_GEN_ALL_CORE_FT
PROCEDURES:  Bypass graft, femoral-posterior tibial, in-situ vein 13-May-2022 16:55:46 Right Flo [place]

## 2022-05-14 LAB
ANION GAP SERPL CALC-SCNC: 14 MMOL/L — SIGNIFICANT CHANGE UP (ref 5–17)
APTT BLD: 24.5 SEC — LOW (ref 27.5–35.5)
BASOPHILS # BLD AUTO: 0 K/UL — SIGNIFICANT CHANGE UP (ref 0–0.2)
BASOPHILS NFR BLD AUTO: 0 % — SIGNIFICANT CHANGE UP (ref 0–2)
BUN SERPL-MCNC: 9.3 MG/DL — SIGNIFICANT CHANGE UP (ref 8–20)
CALCIUM SERPL-MCNC: 7.7 MG/DL — LOW (ref 8.6–10.2)
CHLORIDE SERPL-SCNC: 106 MMOL/L — SIGNIFICANT CHANGE UP (ref 98–107)
CO2 SERPL-SCNC: 20 MMOL/L — LOW (ref 22–29)
CREAT SERPL-MCNC: 0.78 MG/DL — SIGNIFICANT CHANGE UP (ref 0.5–1.3)
EGFR: 103 ML/MIN/1.73M2 — SIGNIFICANT CHANGE UP
EOSINOPHIL # BLD AUTO: 0 K/UL — SIGNIFICANT CHANGE UP (ref 0–0.5)
EOSINOPHIL NFR BLD AUTO: 0 % — SIGNIFICANT CHANGE UP (ref 0–6)
GLUCOSE SERPL-MCNC: 106 MG/DL — HIGH (ref 70–99)
HCT VFR BLD CALC: 24.8 % — LOW (ref 39–50)
HGB BLD-MCNC: 8.1 G/DL — LOW (ref 13–17)
INR BLD: 1.03 RATIO — SIGNIFICANT CHANGE UP (ref 0.88–1.16)
LYMPHOCYTES # BLD AUTO: 1.67 K/UL — SIGNIFICANT CHANGE UP (ref 1–3.3)
LYMPHOCYTES # BLD AUTO: 17.4 % — SIGNIFICANT CHANGE UP (ref 13–44)
MAGNESIUM SERPL-MCNC: 1.7 MG/DL — SIGNIFICANT CHANGE UP (ref 1.6–2.6)
MCHC RBC-ENTMCNC: 29.3 PG — SIGNIFICANT CHANGE UP (ref 27–34)
MCHC RBC-ENTMCNC: 32.7 GM/DL — SIGNIFICANT CHANGE UP (ref 32–36)
MCV RBC AUTO: 89.9 FL — SIGNIFICANT CHANGE UP (ref 80–100)
MONOCYTES # BLD AUTO: 0.75 K/UL — SIGNIFICANT CHANGE UP (ref 0–0.9)
MONOCYTES NFR BLD AUTO: 7.8 % — SIGNIFICANT CHANGE UP (ref 2–14)
NEUTROPHILS # BLD AUTO: 7.19 K/UL — SIGNIFICANT CHANGE UP (ref 1.8–7.4)
NEUTROPHILS NFR BLD AUTO: 74.8 % — SIGNIFICANT CHANGE UP (ref 43–77)
PHOSPHATE SERPL-MCNC: 3.3 MG/DL — SIGNIFICANT CHANGE UP (ref 2.4–4.7)
PLAT MORPH BLD: NORMAL — SIGNIFICANT CHANGE UP
PLATELET # BLD AUTO: 186 K/UL — SIGNIFICANT CHANGE UP (ref 150–400)
POTASSIUM SERPL-MCNC: 4.2 MMOL/L — SIGNIFICANT CHANGE UP (ref 3.5–5.3)
POTASSIUM SERPL-SCNC: 4.2 MMOL/L — SIGNIFICANT CHANGE UP (ref 3.5–5.3)
PROTHROM AB SERPL-ACNC: 12 SEC — SIGNIFICANT CHANGE UP (ref 10.5–13.4)
RBC # BLD: 2.76 M/UL — LOW (ref 4.2–5.8)
RBC # FLD: 16.6 % — HIGH (ref 10.3–14.5)
RBC BLD AUTO: ABNORMAL
SODIUM SERPL-SCNC: 140 MMOL/L — SIGNIFICANT CHANGE UP (ref 135–145)
WBC # BLD: 9.61 K/UL — SIGNIFICANT CHANGE UP (ref 3.8–10.5)
WBC # FLD AUTO: 9.61 K/UL — SIGNIFICANT CHANGE UP (ref 3.8–10.5)

## 2022-05-14 RX ORDER — SODIUM CHLORIDE 9 MG/ML
1000 INJECTION, SOLUTION INTRAVENOUS
Refills: 0 | Status: DISCONTINUED | OUTPATIENT
Start: 2022-05-14 | End: 2022-05-24

## 2022-05-14 RX ORDER — MAGNESIUM SULFATE 500 MG/ML
1 VIAL (ML) INJECTION ONCE
Refills: 0 | Status: COMPLETED | OUTPATIENT
Start: 2022-05-14 | End: 2022-05-14

## 2022-05-14 RX ORDER — HEPARIN SODIUM 5000 [USP'U]/ML
1100 INJECTION INTRAVENOUS; SUBCUTANEOUS
Qty: 25000 | Refills: 0 | Status: DISCONTINUED | OUTPATIENT
Start: 2022-05-14 | End: 2022-05-15

## 2022-05-14 RX ADMIN — HYDROMORPHONE HYDROCHLORIDE 30 MILLILITER(S): 2 INJECTION INTRAMUSCULAR; INTRAVENOUS; SUBCUTANEOUS at 07:32

## 2022-05-14 RX ADMIN — METHOCARBAMOL 750 MILLIGRAM(S): 500 TABLET, FILM COATED ORAL at 21:35

## 2022-05-14 RX ADMIN — GABAPENTIN 600 MILLIGRAM(S): 400 CAPSULE ORAL at 12:53

## 2022-05-14 RX ADMIN — METHOCARBAMOL 750 MILLIGRAM(S): 500 TABLET, FILM COATED ORAL at 12:57

## 2022-05-14 RX ADMIN — GABAPENTIN 600 MILLIGRAM(S): 400 CAPSULE ORAL at 05:34

## 2022-05-14 RX ADMIN — HYDROMORPHONE HYDROCHLORIDE 30 MILLILITER(S): 2 INJECTION INTRAMUSCULAR; INTRAVENOUS; SUBCUTANEOUS at 17:25

## 2022-05-14 RX ADMIN — SENNA PLUS 2 TABLET(S): 8.6 TABLET ORAL at 21:35

## 2022-05-14 RX ADMIN — ATORVASTATIN CALCIUM 80 MILLIGRAM(S): 80 TABLET, FILM COATED ORAL at 21:34

## 2022-05-14 RX ADMIN — SODIUM CHLORIDE 110 MILLILITER(S): 9 INJECTION, SOLUTION INTRAVENOUS at 09:55

## 2022-05-14 RX ADMIN — HEPARIN SODIUM 1100 UNIT(S)/HR: 5000 INJECTION INTRAVENOUS; SUBCUTANEOUS at 19:33

## 2022-05-14 RX ADMIN — SODIUM CHLORIDE 10 MILLILITER(S): 9 INJECTION, SOLUTION INTRAVENOUS at 19:04

## 2022-05-14 RX ADMIN — Medication 975 MILLIGRAM(S): at 05:31

## 2022-05-14 RX ADMIN — METHOCARBAMOL 750 MILLIGRAM(S): 500 TABLET, FILM COATED ORAL at 05:31

## 2022-05-14 RX ADMIN — Medication 975 MILLIGRAM(S): at 06:31

## 2022-05-14 RX ADMIN — Medication 75 MILLIGRAM(S): at 12:57

## 2022-05-14 RX ADMIN — QUETIAPINE FUMARATE 200 MILLIGRAM(S): 200 TABLET, FILM COATED ORAL at 21:34

## 2022-05-14 RX ADMIN — PANTOPRAZOLE SODIUM 40 MILLIGRAM(S): 20 TABLET, DELAYED RELEASE ORAL at 05:30

## 2022-05-14 RX ADMIN — Medication 100 GRAM(S): at 12:53

## 2022-05-14 RX ADMIN — SODIUM CHLORIDE 110 MILLILITER(S): 9 INJECTION, SOLUTION INTRAVENOUS at 01:42

## 2022-05-14 RX ADMIN — GABAPENTIN 600 MILLIGRAM(S): 400 CAPSULE ORAL at 21:35

## 2022-05-14 RX ADMIN — Medication 200 MILLIGRAM(S): at 18:46

## 2022-05-14 RX ADMIN — Medication 975 MILLIGRAM(S): at 12:56

## 2022-05-14 RX ADMIN — HYDROMORPHONE HYDROCHLORIDE 30 MILLILITER(S): 2 INJECTION INTRAMUSCULAR; INTRAVENOUS; SUBCUTANEOUS at 19:35

## 2022-05-14 RX ADMIN — HEPARIN SODIUM 1100 UNIT(S)/HR: 5000 INJECTION INTRAVENOUS; SUBCUTANEOUS at 18:59

## 2022-05-14 RX ADMIN — Medication 81 MILLIGRAM(S): at 12:54

## 2022-05-14 RX ADMIN — Medication 200 MILLIGRAM(S): at 05:30

## 2022-05-14 RX ADMIN — Medication 975 MILLIGRAM(S): at 12:54

## 2022-05-14 RX ADMIN — AMLODIPINE BESYLATE 10 MILLIGRAM(S): 2.5 TABLET ORAL at 05:31

## 2022-05-14 NOTE — PROGRESS NOTE ADULT - ASSESSMENT
The patient is a 57y Male post-op from a Right Fem to PT bypass with own ipsilateral saphenous vein graft. Patient doing well on PCA after medicatioins adjustment. Remains hemodynamically stable and afebrile.     Plan:  - Pain control as needed  - Restart heparin gtt today 5/14 am  - Flat until 5/14 am after evaluation by vascular surgeon  - PCA for pain control  - Keep gallegos until ambulatory at least  - IS.

## 2022-05-14 NOTE — PROGRESS NOTE ADULT - SUBJECTIVE AND OBJECTIVE BOX
INTERVAL HPI/OVERNIGHT EVENTS:    Patient evaluated at bedside. No acute distress. No acute events overnight.  s/p Right Fem to PT bypass with own ipsilateral saphenous vein graft. Pain better controlled now.     MEDICATIONS  (STANDING):  acetaminophen     Tablet .. 975 milliGRAM(s) Oral every 8 hours  amLODIPine   Tablet 10 milliGRAM(s) Oral daily  aspirin  chewable 81 milliGRAM(s) Oral daily  atorvastatin 80 milliGRAM(s) Oral at bedtime  fentaNYL    Injectable 50 MICROGram(s) IV Push once  gabapentin 600 milliGRAM(s) Oral every 8 hours  HYDROmorphone PCA (1 mG/mL) 30 milliLiter(s) PCA Continuous PCA Continuous  methocarbamol 750 milliGRAM(s) Oral three times a day  multiple electrolytes Injection Type 1 1000 milliLiter(s) (110 mL/Hr) IV Continuous <Continuous>  pantoprazole    Tablet 40 milliGRAM(s) Oral before breakfast  phenytoin ER Oral Tab/Cap - Peds 200 milliGRAM(s) Oral every 12 hours  QUEtiapine 200 milliGRAM(s) Oral at bedtime  senna 2 Tablet(s) Oral at bedtime  venlafaxine XR. 75 milliGRAM(s) Oral daily    MEDICATIONS  (PRN):  ondansetron Injectable 4 milliGRAM(s) IV Push every 6 hours PRN Nausea      Vital Signs Last 24 Hrs  T(C): 36.7 (13 May 2022 22:42), Max: 37.3 (13 May 2022 21:44)  T(F): 98 (13 May 2022 22:42), Max: 99.2 (13 May 2022 21:44)  HR: 114 (13 May 2022 22:42) (89 - 132)  BP: 106/52 (13 May 2022 22:42) (92/65 - 169/73)  BP(mean): 82 (13 May 2022 22:25) (71 - 107)  RR: 20 (13 May 2022 22:42) (10 - 20)  SpO2: 100% (13 May 2022 22:42) (96% - 100%)      Physical Exam:  General: NAD, resting comfortably in bed  Pulmonary: Nonlabored breathing, no respiratory distress  Cardiovascular: NSR  Abdominal: soft, NT/ND, R groin with prevena in place  : Rod with clear urine  RLE wrapped, compartments soft, dressing c/d/i. Doppler PT pulse.    I&O's Detail    12 May 2022 07:01  -  13 May 2022 07:00  --------------------------------------------------------  IN:    Heparin Infusion: 22 mL  Total IN: 22 mL    OUT:  Total OUT: 0 mL    Total NET: 22 mL      13 May 2022 07:01  -  14 May 2022 05:24  --------------------------------------------------------  IN:    multiple electrolytes Injection Type 1.: 770 mL  Total IN: 770 mL    OUT:    Indwelling Catheter - Urethral (mL): 250 mL  Total OUT: 250 mL    Total NET: 520 mL          LABS:                        9.4    12.49 )-----------( 206      ( 13 May 2022 17:10 )             27.9     05-13    138  |  108<H>  |  9.6  ----------------------------<  136<H>  4.1   |  17.0<L>  |  0.70    Ca    7.6<L>      13 May 2022 17:10  Phos  3.2     05-13  Mg     2.1     05-13    TPro  7.6  /  Alb  4.1  /  TBili  <0.2<L>  /  DBili  x   /  AST  18  /  ALT  27  /  AlkPhos  185<H>  05-13    PTT - ( 13 May 2022 06:58 )  PTT:69.0 sec      RADIOLOGY & ADDITIONAL STUDIES:

## 2022-05-15 LAB
ANION GAP SERPL CALC-SCNC: 11 MMOL/L — SIGNIFICANT CHANGE UP (ref 5–17)
APTT BLD: 27.6 SEC — SIGNIFICANT CHANGE UP (ref 27.5–35.5)
APTT BLD: 47.8 SEC — HIGH (ref 27.5–35.5)
BLD GP AB SCN SERPL QL: SIGNIFICANT CHANGE UP
BUN SERPL-MCNC: 11.6 MG/DL — SIGNIFICANT CHANGE UP (ref 8–20)
CALCIUM SERPL-MCNC: 7.7 MG/DL — LOW (ref 8.6–10.2)
CHLORIDE SERPL-SCNC: 104 MMOL/L — SIGNIFICANT CHANGE UP (ref 98–107)
CO2 SERPL-SCNC: 21 MMOL/L — LOW (ref 22–29)
CREAT SERPL-MCNC: 0.78 MG/DL — SIGNIFICANT CHANGE UP (ref 0.5–1.3)
EGFR: 103 ML/MIN/1.73M2 — SIGNIFICANT CHANGE UP
GLUCOSE SERPL-MCNC: 97 MG/DL — SIGNIFICANT CHANGE UP (ref 70–99)
HCT VFR BLD CALC: 18.6 % — CRITICAL LOW (ref 39–50)
HCT VFR BLD CALC: 19.1 % — CRITICAL LOW (ref 39–50)
HCT VFR BLD CALC: 29.9 % — LOW (ref 39–50)
HGB BLD-MCNC: 10 G/DL — LOW (ref 13–17)
HGB BLD-MCNC: 5.9 G/DL — CRITICAL LOW (ref 13–17)
HGB BLD-MCNC: 6.4 G/DL — CRITICAL LOW (ref 13–17)
MAGNESIUM SERPL-MCNC: 2.1 MG/DL — SIGNIFICANT CHANGE UP (ref 1.6–2.6)
MCHC RBC-ENTMCNC: 29.4 PG — SIGNIFICANT CHANGE UP (ref 27–34)
MCHC RBC-ENTMCNC: 30 PG — SIGNIFICANT CHANGE UP (ref 27–34)
MCHC RBC-ENTMCNC: 30.2 PG — SIGNIFICANT CHANGE UP (ref 27–34)
MCHC RBC-ENTMCNC: 31.7 GM/DL — LOW (ref 32–36)
MCHC RBC-ENTMCNC: 33.4 GM/DL — SIGNIFICANT CHANGE UP (ref 32–36)
MCHC RBC-ENTMCNC: 33.5 GM/DL — SIGNIFICANT CHANGE UP (ref 32–36)
MCV RBC AUTO: 89.8 FL — SIGNIFICANT CHANGE UP (ref 80–100)
MCV RBC AUTO: 90.1 FL — SIGNIFICANT CHANGE UP (ref 80–100)
MCV RBC AUTO: 92.5 FL — SIGNIFICANT CHANGE UP (ref 80–100)
PHOSPHATE SERPL-MCNC: 2.2 MG/DL — LOW (ref 2.4–4.7)
PLATELET # BLD AUTO: 171 K/UL — SIGNIFICANT CHANGE UP (ref 150–400)
PLATELET # BLD AUTO: 178 K/UL — SIGNIFICANT CHANGE UP (ref 150–400)
PLATELET # BLD AUTO: 225 K/UL — SIGNIFICANT CHANGE UP (ref 150–400)
POTASSIUM SERPL-MCNC: 3.9 MMOL/L — SIGNIFICANT CHANGE UP (ref 3.5–5.3)
POTASSIUM SERPL-SCNC: 3.9 MMOL/L — SIGNIFICANT CHANGE UP (ref 3.5–5.3)
RBC # BLD: 2.01 M/UL — LOW (ref 4.2–5.8)
RBC # BLD: 2.12 M/UL — LOW (ref 4.2–5.8)
RBC # BLD: 3.33 M/UL — LOW (ref 4.2–5.8)
RBC # FLD: 15.3 % — HIGH (ref 10.3–14.5)
RBC # FLD: 15.9 % — HIGH (ref 10.3–14.5)
RBC # FLD: 16.1 % — HIGH (ref 10.3–14.5)
SODIUM SERPL-SCNC: 136 MMOL/L — SIGNIFICANT CHANGE UP (ref 135–145)
WBC # BLD: 11.29 K/UL — HIGH (ref 3.8–10.5)
WBC # BLD: 11.81 K/UL — HIGH (ref 3.8–10.5)
WBC # BLD: 12.88 K/UL — HIGH (ref 3.8–10.5)
WBC # FLD AUTO: 11.29 K/UL — HIGH (ref 3.8–10.5)
WBC # FLD AUTO: 11.81 K/UL — HIGH (ref 3.8–10.5)
WBC # FLD AUTO: 12.88 K/UL — HIGH (ref 3.8–10.5)

## 2022-05-15 RX ORDER — SODIUM,POTASSIUM PHOSPHATES 278-250MG
1 POWDER IN PACKET (EA) ORAL ONCE
Refills: 0 | Status: COMPLETED | OUTPATIENT
Start: 2022-05-15 | End: 2022-05-15

## 2022-05-15 RX ORDER — BACITRACIN ZINC 500 UNIT/G
1 OINTMENT IN PACKET (EA) TOPICAL
Refills: 0 | Status: DISCONTINUED | OUTPATIENT
Start: 2022-05-15 | End: 2022-05-16

## 2022-05-15 RX ORDER — POTASSIUM PHOSPHATE, MONOBASIC POTASSIUM PHOSPHATE, DIBASIC 236; 224 MG/ML; MG/ML
30 INJECTION, SOLUTION INTRAVENOUS ONCE
Refills: 0 | Status: DISCONTINUED | OUTPATIENT
Start: 2022-05-15 | End: 2022-05-15

## 2022-05-15 RX ORDER — HEPARIN SODIUM 5000 [USP'U]/ML
INJECTION INTRAVENOUS; SUBCUTANEOUS
Qty: 25000 | Refills: 0 | Status: DISCONTINUED | OUTPATIENT
Start: 2022-05-15 | End: 2022-05-15

## 2022-05-15 RX ADMIN — HEPARIN SODIUM 1200 UNIT(S)/HR: 5000 INJECTION INTRAVENOUS; SUBCUTANEOUS at 03:40

## 2022-05-15 RX ADMIN — Medication 975 MILLIGRAM(S): at 12:58

## 2022-05-15 RX ADMIN — METHOCARBAMOL 750 MILLIGRAM(S): 500 TABLET, FILM COATED ORAL at 05:32

## 2022-05-15 RX ADMIN — ATORVASTATIN CALCIUM 80 MILLIGRAM(S): 80 TABLET, FILM COATED ORAL at 21:40

## 2022-05-15 RX ADMIN — Medication 75 MILLIGRAM(S): at 11:06

## 2022-05-15 RX ADMIN — Medication 200 MILLIGRAM(S): at 05:29

## 2022-05-15 RX ADMIN — Medication 1 APPLICATION(S): at 21:40

## 2022-05-15 RX ADMIN — Medication 1 PACKET(S): at 14:22

## 2022-05-15 RX ADMIN — AMLODIPINE BESYLATE 10 MILLIGRAM(S): 2.5 TABLET ORAL at 05:31

## 2022-05-15 RX ADMIN — Medication 975 MILLIGRAM(S): at 21:40

## 2022-05-15 RX ADMIN — GABAPENTIN 600 MILLIGRAM(S): 400 CAPSULE ORAL at 05:31

## 2022-05-15 RX ADMIN — PANTOPRAZOLE SODIUM 40 MILLIGRAM(S): 20 TABLET, DELAYED RELEASE ORAL at 05:29

## 2022-05-15 RX ADMIN — Medication 200 MILLIGRAM(S): at 17:31

## 2022-05-15 RX ADMIN — HYDROMORPHONE HYDROCHLORIDE 30 MILLILITER(S): 2 INJECTION INTRAMUSCULAR; INTRAVENOUS; SUBCUTANEOUS at 07:16

## 2022-05-15 RX ADMIN — QUETIAPINE FUMARATE 200 MILLIGRAM(S): 200 TABLET, FILM COATED ORAL at 21:39

## 2022-05-15 RX ADMIN — HYDROMORPHONE HYDROCHLORIDE 30 MILLILITER(S): 2 INJECTION INTRAMUSCULAR; INTRAVENOUS; SUBCUTANEOUS at 19:34

## 2022-05-15 RX ADMIN — METHOCARBAMOL 750 MILLIGRAM(S): 500 TABLET, FILM COATED ORAL at 12:56

## 2022-05-15 RX ADMIN — GABAPENTIN 600 MILLIGRAM(S): 400 CAPSULE ORAL at 12:55

## 2022-05-15 RX ADMIN — SODIUM CHLORIDE 10 MILLILITER(S): 9 INJECTION, SOLUTION INTRAVENOUS at 21:43

## 2022-05-15 RX ADMIN — METHOCARBAMOL 750 MILLIGRAM(S): 500 TABLET, FILM COATED ORAL at 21:39

## 2022-05-15 RX ADMIN — Medication 81 MILLIGRAM(S): at 11:06

## 2022-05-15 RX ADMIN — Medication 975 MILLIGRAM(S): at 22:40

## 2022-05-15 RX ADMIN — Medication 975 MILLIGRAM(S): at 12:55

## 2022-05-15 RX ADMIN — GABAPENTIN 600 MILLIGRAM(S): 400 CAPSULE ORAL at 21:40

## 2022-05-15 NOTE — PROGRESS NOTE ADULT - ASSESSMENT
Patient presents to clinic for 2 week suture removal from  site: L leg.   Patient's home site care included application of band aid.      Any problems/issues at home:   Pain:no  Bleeding: no  Drainage: no  Following wound care as directed: yes    Nursing assessment:  Sign of infection: no  Well approximated: yes  Crusting: minimal  Redness: no  Swelling: no    Site cleansed with alcohol wipe, sutures removed, site again cleansed with alcohol wipe, vaseline applied to site.  Instructed patient to apply vaseline to site for another 3 days.    Patient agreeable, verbalized understanding and has no further questions at this time.         The patient is a 57y Male post-op from a Right Fem to PT bypass with own ipsilateral saphenous vein graft. Patient doing well on PCA after medications adjustment. Remains hemodynamically well and afebrile. Prevena in place working properly.     Plan:  - PCA for pain control  - Monitor aPTT and adjust rate according to nomogram  - Revaluation by vascular surgeon  - Keep gallegos until 5/15  - Monitor oral intake  - IS.  - D/C prevena 5/18/22  -Dispo planning

## 2022-05-15 NOTE — PROGRESS NOTE ADULT - SUBJECTIVE AND OBJECTIVE BOX
Acute Care Surgery/Trauma Surgery Progress Note:    No acute overnight events. Patient afebrile, he's VS but lst BP seems soft in relation to previous ones. Pain well controlled. Tolerating diet. Denies n/v/f/c/cp/sob.     Diet, Regular (05-13-22 @ 16:52)      Scheduled Medications:   acetaminophen     Tablet .. 975 milliGRAM(s) Oral every 8 hours  amLODIPine   Tablet 10 milliGRAM(s) Oral daily  aspirin  chewable 81 milliGRAM(s) Oral daily  atorvastatin 80 milliGRAM(s) Oral at bedtime  fentaNYL    Injectable 50 MICROGram(s) IV Push once  gabapentin 600 milliGRAM(s) Oral every 8 hours  heparin  Infusion. 1100 Unit(s)/Hr (11 mL/Hr) IV Continuous <Continuous>  HYDROmorphone PCA (1 mG/mL) 30 milliLiter(s) PCA Continuous PCA Continuous  methocarbamol 750 milliGRAM(s) Oral three times a day  multiple electrolytes Injection Type 1 1000 milliLiter(s) (10 mL/Hr) IV Continuous <Continuous>  pantoprazole    Tablet 40 milliGRAM(s) Oral before breakfast  phenytoin ER Oral Tab/Cap - Peds 200 milliGRAM(s) Oral every 12 hours  QUEtiapine 200 milliGRAM(s) Oral at bedtime  senna 2 Tablet(s) Oral at bedtime  venlafaxine XR. 75 milliGRAM(s) Oral daily    PRN Medications:  ondansetron Injectable 4 milliGRAM(s) IV Push every 6 hours PRN Nausea      Objective:   T(F): 98.3 (05-14 @ 23:34), Max: 98.5 (05-14 @ 05:25)  HR: 94 (05-14 @ 23:34) (91 - 99)  BP: 102/59 (05-14 @ 23:34) (100/57 - 162/67)  BP(mean): --  ABP: --  ABP(mean): --  RR: 18 (05-14 @ 23:34) (18 - 20)  SpO2: 95% (05-14 @ 23:34) (95% - 99%)      Physical Exam:   GEN: patient resting comfortably in bed, in no acute distress  Pulmonary: Nonlabored breathing, no respiratory distress  Cardiovascular: NSR  Abdominal: soft, NT/ND, R groin with prevena in place  : Rod with clear urine  RLE wrapped, compartments soft, dressing c/d/i. Doppler PT pulse.    I&O's    05-13 @ 07:01  -  05-14 @ 07:00  --------------------------------------------------------  IN:    multiple electrolytes Injection Type 1.: 1430 mL  Total IN: 1430 mL    OUT:    Indwelling Catheter - Urethral (mL): 1250 mL  Total OUT: 1250 mL    Total NET: 180 mL      05-14 @ 07:01  -  05-15 @ 03:24  --------------------------------------------------------  IN:    Heparin Infusion: 66 mL    multiple electrolytes Injection Type 1.: 60 mL  Total IN: 126 mL    OUT:    Indwelling Catheter - Urethral (mL): 1700 mL  Total OUT: 1700 mL    Total NET: -1574 mL          LABS:                        8.1    9.61  )-----------( 186      ( 14 May 2022 05:18 )             24.8     05-15    136  |  104  |  11.6  ----------------------------<  97  3.9   |  21.0<L>  |  0.78    Ca    7.7<L>      15 May 2022 02:36  Phos  2.2     05-15  Mg     2.1     05-15    TPro  7.6  /  Alb  4.1  /  TBili  <0.2<L>  /  DBili  x   /  AST  18  /  ALT  27  /  AlkPhos  185<H>  05-13    PT/INR - ( 14 May 2022 05:18 )   PT: 12.0 sec;   INR: 1.03 ratio         PTT - ( 15 May 2022 02:36 )  PTT:47.8 sec      MICROBIOLOGY:       PATHOLOGY:

## 2022-05-15 NOTE — PROGRESS NOTE ADULT - ATTENDING COMMENTS
Patient evaluated at the bedside. RLE is well perfused. No compressive dressings present. Pain is much better controlled. Calf and thigh are soft. No obvious source of active bleeding. Drop in HH is concerning but also may represent intra op losses (1 L). Hep is on hold. Will be transfused 2 Units. Will re evaluate in PM

## 2022-05-16 LAB
ANION GAP SERPL CALC-SCNC: 11 MMOL/L — SIGNIFICANT CHANGE UP (ref 5–17)
ANISOCYTOSIS BLD QL: SLIGHT — SIGNIFICANT CHANGE UP
APTT BLD: 43 SEC — HIGH (ref 27.5–35.5)
APTT BLD: 63.4 SEC — HIGH (ref 27.5–35.5)
BASOPHILS # BLD AUTO: 0 K/UL — SIGNIFICANT CHANGE UP (ref 0–0.2)
BASOPHILS NFR BLD AUTO: 0 % — SIGNIFICANT CHANGE UP (ref 0–2)
BUN SERPL-MCNC: 10.3 MG/DL — SIGNIFICANT CHANGE UP (ref 8–20)
CALCIUM SERPL-MCNC: 8.2 MG/DL — LOW (ref 8.6–10.2)
CHLORIDE SERPL-SCNC: 108 MMOL/L — HIGH (ref 98–107)
CO2 SERPL-SCNC: 23 MMOL/L — SIGNIFICANT CHANGE UP (ref 22–29)
CREAT SERPL-MCNC: 0.89 MG/DL — SIGNIFICANT CHANGE UP (ref 0.5–1.3)
EGFR: 99 ML/MIN/1.73M2 — SIGNIFICANT CHANGE UP
EOSINOPHIL # BLD AUTO: 0.24 K/UL — SIGNIFICANT CHANGE UP (ref 0–0.5)
EOSINOPHIL NFR BLD AUTO: 2.6 % — SIGNIFICANT CHANGE UP (ref 0–6)
GIANT PLATELETS BLD QL SMEAR: PRESENT — SIGNIFICANT CHANGE UP
GLUCOSE SERPL-MCNC: 91 MG/DL — SIGNIFICANT CHANGE UP (ref 70–99)
HCT VFR BLD CALC: 26 % — LOW (ref 39–50)
HCT VFR BLD CALC: 30.9 % — LOW (ref 39–50)
HGB BLD-MCNC: 8.6 G/DL — LOW (ref 13–17)
HGB BLD-MCNC: 9.6 G/DL — LOW (ref 13–17)
INR BLD: 0.99 RATIO — SIGNIFICANT CHANGE UP (ref 0.88–1.16)
LYMPHOCYTES # BLD AUTO: 1.69 K/UL — SIGNIFICANT CHANGE UP (ref 1–3.3)
LYMPHOCYTES # BLD AUTO: 18.3 % — SIGNIFICANT CHANGE UP (ref 13–44)
MAGNESIUM SERPL-MCNC: 2 MG/DL — SIGNIFICANT CHANGE UP (ref 1.6–2.6)
MANUAL SMEAR VERIFICATION: SIGNIFICANT CHANGE UP
MCHC RBC-ENTMCNC: 29.5 PG — SIGNIFICANT CHANGE UP (ref 27–34)
MCHC RBC-ENTMCNC: 30.1 PG — SIGNIFICANT CHANGE UP (ref 27–34)
MCHC RBC-ENTMCNC: 31.1 GM/DL — LOW (ref 32–36)
MCHC RBC-ENTMCNC: 33.1 GM/DL — SIGNIFICANT CHANGE UP (ref 32–36)
MCV RBC AUTO: 90.9 FL — SIGNIFICANT CHANGE UP (ref 80–100)
MCV RBC AUTO: 95.1 FL — SIGNIFICANT CHANGE UP (ref 80–100)
MONOCYTES # BLD AUTO: 1.37 K/UL — HIGH (ref 0–0.9)
MONOCYTES NFR BLD AUTO: 14.8 % — HIGH (ref 2–14)
NEUTROPHILS # BLD AUTO: 5.94 K/UL — SIGNIFICANT CHANGE UP (ref 1.8–7.4)
NEUTROPHILS NFR BLD AUTO: 64.3 % — SIGNIFICANT CHANGE UP (ref 43–77)
PHOSPHATE SERPL-MCNC: 3 MG/DL — SIGNIFICANT CHANGE UP (ref 2.4–4.7)
PLAT MORPH BLD: NORMAL — SIGNIFICANT CHANGE UP
PLATELET # BLD AUTO: 180 K/UL — SIGNIFICANT CHANGE UP (ref 150–400)
PLATELET # BLD AUTO: 205 K/UL — SIGNIFICANT CHANGE UP (ref 150–400)
POLYCHROMASIA BLD QL SMEAR: SIGNIFICANT CHANGE UP
POTASSIUM SERPL-MCNC: 3.7 MMOL/L — SIGNIFICANT CHANGE UP (ref 3.5–5.3)
POTASSIUM SERPL-SCNC: 3.7 MMOL/L — SIGNIFICANT CHANGE UP (ref 3.5–5.3)
PROTHROM AB SERPL-ACNC: 11.5 SEC — SIGNIFICANT CHANGE UP (ref 10.5–13.4)
RBC # BLD: 2.86 M/UL — LOW (ref 4.2–5.8)
RBC # BLD: 3.25 M/UL — LOW (ref 4.2–5.8)
RBC # FLD: 15.5 % — HIGH (ref 10.3–14.5)
RBC # FLD: 15.8 % — HIGH (ref 10.3–14.5)
RBC BLD AUTO: ABNORMAL
SODIUM SERPL-SCNC: 142 MMOL/L — SIGNIFICANT CHANGE UP (ref 135–145)
WBC # BLD: 9.24 K/UL — SIGNIFICANT CHANGE UP (ref 3.8–10.5)
WBC # BLD: 9.39 K/UL — SIGNIFICANT CHANGE UP (ref 3.8–10.5)
WBC # FLD AUTO: 9.24 K/UL — SIGNIFICANT CHANGE UP (ref 3.8–10.5)
WBC # FLD AUTO: 9.39 K/UL — SIGNIFICANT CHANGE UP (ref 3.8–10.5)

## 2022-05-16 RX ORDER — HEPARIN SODIUM 5000 [USP'U]/ML
INJECTION INTRAVENOUS; SUBCUTANEOUS
Qty: 25000 | Refills: 0 | Status: DISCONTINUED | OUTPATIENT
Start: 2022-05-16 | End: 2022-05-17

## 2022-05-16 RX ORDER — HEPARIN SODIUM 5000 [USP'U]/ML
5500 INJECTION INTRAVENOUS; SUBCUTANEOUS EVERY 6 HOURS
Refills: 0 | Status: DISCONTINUED | OUTPATIENT
Start: 2022-05-16 | End: 2022-05-17

## 2022-05-16 RX ORDER — GABAPENTIN 400 MG/1
900 CAPSULE ORAL EVERY 8 HOURS
Refills: 0 | Status: DISCONTINUED | OUTPATIENT
Start: 2022-05-16 | End: 2022-05-18

## 2022-05-16 RX ORDER — HYDROMORPHONE HYDROCHLORIDE 2 MG/ML
30 INJECTION INTRAMUSCULAR; INTRAVENOUS; SUBCUTANEOUS
Refills: 0 | Status: DISCONTINUED | OUTPATIENT
Start: 2022-05-16 | End: 2022-05-19

## 2022-05-16 RX ORDER — HEPARIN SODIUM 5000 [USP'U]/ML
2500 INJECTION INTRAVENOUS; SUBCUTANEOUS EVERY 6 HOURS
Refills: 0 | Status: DISCONTINUED | OUTPATIENT
Start: 2022-05-16 | End: 2022-05-17

## 2022-05-16 RX ADMIN — Medication 1 APPLICATION(S): at 21:59

## 2022-05-16 RX ADMIN — QUETIAPINE FUMARATE 200 MILLIGRAM(S): 200 TABLET, FILM COATED ORAL at 21:59

## 2022-05-16 RX ADMIN — HEPARIN SODIUM 1200 UNIT(S)/HR: 5000 INJECTION INTRAVENOUS; SUBCUTANEOUS at 08:04

## 2022-05-16 RX ADMIN — Medication 975 MILLIGRAM(S): at 07:25

## 2022-05-16 RX ADMIN — HEPARIN SODIUM 1300 UNIT(S)/HR: 5000 INJECTION INTRAVENOUS; SUBCUTANEOUS at 19:29

## 2022-05-16 RX ADMIN — Medication 975 MILLIGRAM(S): at 22:00

## 2022-05-16 RX ADMIN — HYDROMORPHONE HYDROCHLORIDE 30 MILLILITER(S): 2 INJECTION INTRAMUSCULAR; INTRAVENOUS; SUBCUTANEOUS at 07:29

## 2022-05-16 RX ADMIN — PANTOPRAZOLE SODIUM 40 MILLIGRAM(S): 20 TABLET, DELAYED RELEASE ORAL at 06:23

## 2022-05-16 RX ADMIN — Medication 975 MILLIGRAM(S): at 13:24

## 2022-05-16 RX ADMIN — METHOCARBAMOL 750 MILLIGRAM(S): 500 TABLET, FILM COATED ORAL at 06:23

## 2022-05-16 RX ADMIN — Medication 1 APPLICATION(S): at 06:23

## 2022-05-16 RX ADMIN — GABAPENTIN 900 MILLIGRAM(S): 400 CAPSULE ORAL at 21:59

## 2022-05-16 RX ADMIN — HYDROMORPHONE HYDROCHLORIDE 30 MILLILITER(S): 2 INJECTION INTRAMUSCULAR; INTRAVENOUS; SUBCUTANEOUS at 19:20

## 2022-05-16 RX ADMIN — GABAPENTIN 600 MILLIGRAM(S): 400 CAPSULE ORAL at 06:29

## 2022-05-16 RX ADMIN — HYDROMORPHONE HYDROCHLORIDE 30 MILLILITER(S): 2 INJECTION INTRAMUSCULAR; INTRAVENOUS; SUBCUTANEOUS at 15:14

## 2022-05-16 RX ADMIN — METHOCARBAMOL 750 MILLIGRAM(S): 500 TABLET, FILM COATED ORAL at 21:59

## 2022-05-16 RX ADMIN — HEPARIN SODIUM 1300 UNIT(S)/HR: 5000 INJECTION INTRAVENOUS; SUBCUTANEOUS at 22:07

## 2022-05-16 RX ADMIN — AMLODIPINE BESYLATE 10 MILLIGRAM(S): 2.5 TABLET ORAL at 06:23

## 2022-05-16 RX ADMIN — Medication 975 MILLIGRAM(S): at 06:25

## 2022-05-16 RX ADMIN — Medication 975 MILLIGRAM(S): at 23:00

## 2022-05-16 RX ADMIN — Medication 200 MILLIGRAM(S): at 06:23

## 2022-05-16 RX ADMIN — ATORVASTATIN CALCIUM 80 MILLIGRAM(S): 80 TABLET, FILM COATED ORAL at 21:59

## 2022-05-16 RX ADMIN — Medication 75 MILLIGRAM(S): at 16:39

## 2022-05-16 RX ADMIN — HEPARIN SODIUM 1300 UNIT(S)/HR: 5000 INJECTION INTRAVENOUS; SUBCUTANEOUS at 14:38

## 2022-05-16 RX ADMIN — Medication 975 MILLIGRAM(S): at 13:54

## 2022-05-16 RX ADMIN — Medication 81 MILLIGRAM(S): at 11:54

## 2022-05-16 RX ADMIN — Medication 200 MILLIGRAM(S): at 16:08

## 2022-05-16 RX ADMIN — METHOCARBAMOL 750 MILLIGRAM(S): 500 TABLET, FILM COATED ORAL at 13:27

## 2022-05-16 RX ADMIN — GABAPENTIN 600 MILLIGRAM(S): 400 CAPSULE ORAL at 13:24

## 2022-05-16 NOTE — CONSULT NOTE ADULT - SUBJECTIVE AND OBJECTIVE BOX
Patient is a 57y old  Male who presents with a chief complaint of PAD, RLE ischemia (16 May 2022 06:47)      HPI:  57 year old male known to our service from prior lower extremity revascularization, PMHX PAD with recent femoral endarterectomy 4/15 with rest pain planned for bypass on RLE on 5/18 who presents with worsening pain and changes in toe coloration. Patient reports several weeks of right leg pain, initially with walking and progressing to be at rest. Now has worsening rest pain, improved with dangling legs and now has discoloration starting 2 days prior to presentation. Progressive splotches of discoloration progressing along his leg with bluish discoloration of his great toe. No chest pain, no sob, fever or chills.  No trauma, no fever or chills.    (11 May 2022 22:05)        Interval Hx:  Patient seen during rounds  Patient reports pain to be uncontrolled on current medications, on dPCA  4-hour use has ranged between 1mg-5mg  does not take any opioid at home  Patient denies sedation with medications     Analgesic Dosing for past 24 hours reviewed as below:  acetaminophen     Tablet ..   975 milliGRAM(s) Oral (05-16-22 @ 13:24)   975 milliGRAM(s) Oral (05-16-22 @ 06:25)   975 milliGRAM(s) Oral (05-15-22 @ 21:40)    gabapentin   600 milliGRAM(s) Oral (05-16-22 @ 13:24)   600 milliGRAM(s) Oral (05-16-22 @ 06:29)   600 milliGRAM(s) Oral (05-15-22 @ 21:40)    methocarbamol   750 milliGRAM(s) Oral (05-16-22 @ 13:27)   750 milliGRAM(s) Oral (05-16-22 @ 06:23)   750 milliGRAM(s) Oral (05-15-22 @ 21:39)    phenytoin ER Oral Tab/Cap - Peds   200 milliGRAM(s) Oral (05-16-22 @ 06:23)   200 milliGRAM(s) Oral (05-15-22 @ 17:31)    QUEtiapine   200 milliGRAM(s) Oral (05-15-22 @ 21:39)          T(C): 36.7 (05-16-22 @ 11:41), Max: 37 (05-16-22 @ 05:01)  HR: 92 (05-16-22 @ 11:41) (90 - 102)  BP: 152/75 (05-16-22 @ 11:41) (117/55 - 158/78)  RR: 18 (05-16-22 @ 11:41) (18 - 18)  SpO2: 97% (05-16-22 @ 11:41) (94% - 97%)      05-15-22 @ 07:01  -  05-16-22 @ 07:00  --------------------------------------------------------  IN: 0 mL / OUT: 3100 mL / NET: -3100 mL        acetaminophen     Tablet .. 975 milliGRAM(s) Oral every 8 hours  amLODIPine   Tablet 10 milliGRAM(s) Oral daily  aspirin  chewable 81 milliGRAM(s) Oral daily  atorvastatin 80 milliGRAM(s) Oral at bedtime  BACItracin   Ointment 1 Application(s) Topical two times a day  fentaNYL    Injectable 50 MICROGram(s) IV Push once  gabapentin 600 milliGRAM(s) Oral every 8 hours  heparin   Injectable 5500 Unit(s) IV Push every 6 hours PRN  heparin   Injectable 2500 Unit(s) IV Push every 6 hours PRN  heparin  Infusion.  Unit(s)/Hr IV Continuous <Continuous>  HYDROmorphone PCA (1 mG/mL) 30 milliLiter(s) PCA Continuous PCA Continuous  methocarbamol 750 milliGRAM(s) Oral three times a day  multiple electrolytes Injection Type 1 1000 milliLiter(s) IV Continuous <Continuous>  ondansetron Injectable 4 milliGRAM(s) IV Push every 6 hours PRN  pantoprazole    Tablet 40 milliGRAM(s) Oral before breakfast  phenytoin ER Oral Tab/Cap - Peds 200 milliGRAM(s) Oral every 12 hours  QUEtiapine 200 milliGRAM(s) Oral at bedtime  senna 2 Tablet(s) Oral at bedtime  venlafaxine XR. 75 milliGRAM(s) Oral daily                          8.6    9.24  )-----------( 180      ( 16 May 2022 02:46 )             26.0     05-16    142  |  108<H>  |  10.3  ----------------------------<  91  3.7   |  23.0  |  0.89    Ca    8.2<L>      16 May 2022 02:46  Phos  3.0     05-16  Mg     2.0     05-16      PT/INR - ( 16 May 2022 02:46 )   PT: 11.5 sec;   INR: 0.99 ratio         PTT - ( 15 May 2022 11:24 )  PTT:27.6 sec      Pain Service   [phone]

## 2022-05-16 NOTE — PROGRESS NOTE ADULT - SUBJECTIVE AND OBJECTIVE BOX
INTERVAL HPI/OVERNIGHT EVENTS:    Patient seen this AM with no acute events overnight. Required blood transfusion over weekend for drop in Hb. no visible source of bleeding. Prevena in place. thigh soft. Having toe pain, burning sensation. Patient without any acute complaints at this time. Patients' pain is well controlled on current pain regiment. Tolerating diet without any n/v. Remains hemodynamically stable and denies fevers, chills. No CP or SOB       MEDICATIONS  (STANDING):  acetaminophen     Tablet .. 975 milliGRAM(s) Oral every 8 hours  amLODIPine   Tablet 10 milliGRAM(s) Oral daily  aspirin  chewable 81 milliGRAM(s) Oral daily  atorvastatin 80 milliGRAM(s) Oral at bedtime  BACItracin   Ointment 1 Application(s) Topical two times a day  fentaNYL    Injectable 50 MICROGram(s) IV Push once  gabapentin 600 milliGRAM(s) Oral every 8 hours  HYDROmorphone PCA (1 mG/mL) 30 milliLiter(s) PCA Continuous PCA Continuous  methocarbamol 750 milliGRAM(s) Oral three times a day  multiple electrolytes Injection Type 1 1000 milliLiter(s) (10 mL/Hr) IV Continuous <Continuous>  pantoprazole    Tablet 40 milliGRAM(s) Oral before breakfast  phenytoin ER Oral Tab/Cap - Peds 200 milliGRAM(s) Oral every 12 hours  QUEtiapine 200 milliGRAM(s) Oral at bedtime  senna 2 Tablet(s) Oral at bedtime  venlafaxine XR. 75 milliGRAM(s) Oral daily    MEDICATIONS  (PRN):  ondansetron Injectable 4 milliGRAM(s) IV Push every 6 hours PRN Nausea      Vital Signs Last 24 Hrs  T(C): 37 (16 May 2022 05:01), Max: 37 (16 May 2022 05:01)  T(F): 98.6 (16 May 2022 05:01), Max: 98.6 (16 May 2022 05:01)  HR: 90 (16 May 2022 05:01) (90 - 102)  BP: 158/78 (16 May 2022 05:01) (117/55 - 158/78)  BP(mean): --  RR: 18 (16 May 2022 05:01) (18 - 18)  SpO2: 96% (16 May 2022 05:01) (94% - 96%)      Physical Exam:   GEN: patient resting comfortably in bed, in no acute distress  Pulmonary: Nonlabored breathing, no respiratory distress  Cardiovascular: NSR  Abdominal: soft, NT/ND, R groin with prevena in place  : Rod with clear urine  RLE wrapped, compartments soft, dressing c/d/i. Doppler PT pulse.      I&O's Detail    14 May 2022 07:01  -  15 May 2022 07:00  --------------------------------------------------------  IN:    Heparin Infusion: 77 mL    Heparin Infusion: 12 mL    multiple electrolytes Injection Type 1.: 80 mL  Total IN: 169 mL    OUT:    Indwelling Catheter - Urethral (mL): 2700 mL  Total OUT: 2700 mL    Total NET: -2531 mL      15 May 2022 07:01  -  16 May 2022 06:48  --------------------------------------------------------  IN:  Total IN: 0 mL    OUT:    Indwelling Catheter - Urethral (mL): 3100 mL  Total OUT: 3100 mL    Total NET: -3100 mL          LABS:                        8.6    9.24  )-----------( 180      ( 16 May 2022 02:46 )             26.0     05-16    142  |  108<H>  |  10.3  ----------------------------<  91  3.7   |  23.0  |  0.89    Ca    8.2<L>      16 May 2022 02:46  Phos  3.0     05-16  Mg     2.0     05-16      PT/INR - ( 16 May 2022 02:46 )   PT: 11.5 sec;   INR: 0.99 ratio         PTT - ( 15 May 2022 11:24 )  PTT:27.6 sec      RADIOLOGY & ADDITIONAL STUDIES:

## 2022-05-16 NOTE — CONSULT NOTE ADULT - ASSESSMENT
57M  hx of PAD  post-op from a Right Fem to PT bypass  acute RLE pain    increased PCA  - DIlaudid; 0.5mg initial dose; 0 continuous; 0.3 demand dose; 8 minute lockout ; 6mg 4-hour lockout  acetaminophen     Tablet .. 975 milliGRAM(s) Oral every 8 hours  change gabapentin 900 milliGRAM(s) Oral every 8 hours HOLD for sedation   start robaxin 750mg TID standing. HOLD for sedation

## 2022-05-16 NOTE — PROGRESS NOTE ADULT - ASSESSMENT
The patient is a 57y Male post-op from a Right Fem to PT bypass with own ipsilateral saphenous vein graft. Acute drop in Hb while on heparin drip which is now held s/p 2units RBC. Patient doing well on PCA after medications adjustment. Remains hemodynamically well and afebrile. Prevena in place working properly.     Plan:  - PCA for pain control  - pain consult  - heparin Drip held at this time.   - d/c Marcel when ambulatory  - Monitor oral intake  - IS.  - D/C prevena 5/18/22  -Dispo planning

## 2022-05-17 LAB
ANION GAP SERPL CALC-SCNC: 11 MMOL/L — SIGNIFICANT CHANGE UP (ref 5–17)
APTT BLD: 60.3 SEC — HIGH (ref 27.5–35.5)
BUN SERPL-MCNC: 13.4 MG/DL — SIGNIFICANT CHANGE UP (ref 8–20)
CALCIUM SERPL-MCNC: 8.4 MG/DL — LOW (ref 8.6–10.2)
CHLORIDE SERPL-SCNC: 104 MMOL/L — SIGNIFICANT CHANGE UP (ref 98–107)
CO2 SERPL-SCNC: 23 MMOL/L — SIGNIFICANT CHANGE UP (ref 22–29)
CREAT SERPL-MCNC: 0.72 MG/DL — SIGNIFICANT CHANGE UP (ref 0.5–1.3)
EGFR: 105 ML/MIN/1.73M2 — SIGNIFICANT CHANGE UP
GLUCOSE SERPL-MCNC: 100 MG/DL — HIGH (ref 70–99)
HCT VFR BLD CALC: 26.4 % — LOW (ref 39–50)
HGB BLD-MCNC: 8.6 G/DL — LOW (ref 13–17)
MCHC RBC-ENTMCNC: 30 PG — SIGNIFICANT CHANGE UP (ref 27–34)
MCHC RBC-ENTMCNC: 32.6 GM/DL — SIGNIFICANT CHANGE UP (ref 32–36)
MCV RBC AUTO: 92 FL — SIGNIFICANT CHANGE UP (ref 80–100)
PLATELET # BLD AUTO: 258 K/UL — SIGNIFICANT CHANGE UP (ref 150–400)
POTASSIUM SERPL-MCNC: 4.3 MMOL/L — SIGNIFICANT CHANGE UP (ref 3.5–5.3)
POTASSIUM SERPL-SCNC: 4.3 MMOL/L — SIGNIFICANT CHANGE UP (ref 3.5–5.3)
RBC # BLD: 2.87 M/UL — LOW (ref 4.2–5.8)
RBC # FLD: 15.3 % — HIGH (ref 10.3–14.5)
SODIUM SERPL-SCNC: 138 MMOL/L — SIGNIFICANT CHANGE UP (ref 135–145)
WBC # BLD: 9.11 K/UL — SIGNIFICANT CHANGE UP (ref 3.8–10.5)
WBC # FLD AUTO: 9.11 K/UL — SIGNIFICANT CHANGE UP (ref 3.8–10.5)

## 2022-05-17 RX ORDER — APIXABAN 2.5 MG/1
5 TABLET, FILM COATED ORAL
Refills: 0 | Status: COMPLETED | OUTPATIENT
Start: 2022-05-17 | End: 2022-05-24

## 2022-05-17 RX ADMIN — HYDROMORPHONE HYDROCHLORIDE 30 MILLILITER(S): 2 INJECTION INTRAMUSCULAR; INTRAVENOUS; SUBCUTANEOUS at 07:24

## 2022-05-17 RX ADMIN — Medication 975 MILLIGRAM(S): at 20:52

## 2022-05-17 RX ADMIN — Medication 975 MILLIGRAM(S): at 11:26

## 2022-05-17 RX ADMIN — GABAPENTIN 900 MILLIGRAM(S): 400 CAPSULE ORAL at 20:51

## 2022-05-17 RX ADMIN — ATORVASTATIN CALCIUM 80 MILLIGRAM(S): 80 TABLET, FILM COATED ORAL at 20:52

## 2022-05-17 RX ADMIN — HEPARIN SODIUM 1300 UNIT(S)/HR: 5000 INJECTION INTRAVENOUS; SUBCUTANEOUS at 07:23

## 2022-05-17 RX ADMIN — Medication 200 MILLIGRAM(S): at 17:33

## 2022-05-17 RX ADMIN — PANTOPRAZOLE SODIUM 40 MILLIGRAM(S): 20 TABLET, DELAYED RELEASE ORAL at 05:27

## 2022-05-17 RX ADMIN — Medication 200 MILLIGRAM(S): at 05:26

## 2022-05-17 RX ADMIN — Medication 1 APPLICATION(S): at 11:26

## 2022-05-17 RX ADMIN — Medication 975 MILLIGRAM(S): at 12:20

## 2022-05-17 RX ADMIN — METHOCARBAMOL 750 MILLIGRAM(S): 500 TABLET, FILM COATED ORAL at 05:25

## 2022-05-17 RX ADMIN — QUETIAPINE FUMARATE 200 MILLIGRAM(S): 200 TABLET, FILM COATED ORAL at 20:51

## 2022-05-17 RX ADMIN — METHOCARBAMOL 750 MILLIGRAM(S): 500 TABLET, FILM COATED ORAL at 20:51

## 2022-05-17 RX ADMIN — HEPARIN SODIUM 1300 UNIT(S)/HR: 5000 INJECTION INTRAVENOUS; SUBCUTANEOUS at 05:22

## 2022-05-17 RX ADMIN — METHOCARBAMOL 750 MILLIGRAM(S): 500 TABLET, FILM COATED ORAL at 11:27

## 2022-05-17 RX ADMIN — Medication 975 MILLIGRAM(S): at 06:26

## 2022-05-17 RX ADMIN — Medication 81 MILLIGRAM(S): at 11:27

## 2022-05-17 RX ADMIN — GABAPENTIN 900 MILLIGRAM(S): 400 CAPSULE ORAL at 05:26

## 2022-05-17 RX ADMIN — APIXABAN 5 MILLIGRAM(S): 2.5 TABLET, FILM COATED ORAL at 20:51

## 2022-05-17 RX ADMIN — Medication 975 MILLIGRAM(S): at 20:53

## 2022-05-17 RX ADMIN — GABAPENTIN 900 MILLIGRAM(S): 400 CAPSULE ORAL at 11:27

## 2022-05-17 RX ADMIN — Medication 75 MILLIGRAM(S): at 11:27

## 2022-05-17 RX ADMIN — Medication 975 MILLIGRAM(S): at 05:26

## 2022-05-17 RX ADMIN — AMLODIPINE BESYLATE 10 MILLIGRAM(S): 2.5 TABLET ORAL at 05:25

## 2022-05-17 NOTE — CONSULT NOTE ADULT - SUBJECTIVE AND OBJECTIVE BOX
Patient is a 57y old  Male who presents with a chief complaint of PAD, RLE ischemia (17 May 2022 11:31)  HPI:  57 year old male known to our service from prior lower extremity revascularization, PMHX PAD with recent femoral endarterectomy 4/15 with rest pain planned for bypass on RLE on 5/18 who presents with worsening pain and changes in toe coloration. Patient reports several weeks of right leg pain, initially with walking and progressing to be at rest. Now has worsening rest pain, improved with dangling legs and now has discoloration starting 2 days prior to presentation. Progressive splotches of discoloration progressing along his leg with bluish discoloration of his great toe. No chest pain, no sob, fever or chills.  No trauma, no fever or chills.    (11 May 2022 22:05)    Podiatry HPI: Pt seen bedside s/p R fem to PT bypass 5/13. Pt states R foot is burning constantly and is unable to bear weight to right foot. Pt states pain management saw him and put him on 900mg of gabapentin. States he takes gabapentin at home but at a lower dose. States he will not be discharged home if he cannot walk. Denies constitutional symptoms No other pedal complaints.     PMH:Hypercholesterolemia    Seizures    Depression    GERD (gastroesophageal reflux disease)    Hypertension    Peripheral vascular disease    Osteoarthritis    Former smoker    Prediabetes      Allergies: No Known Allergies    Medications: acetaminophen     Tablet .. 975 milliGRAM(s) Oral every 8 hours  amLODIPine   Tablet 10 milliGRAM(s) Oral daily  aspirin  chewable 81 milliGRAM(s) Oral daily  atorvastatin 80 milliGRAM(s) Oral at bedtime  fentaNYL    Injectable 50 MICROGram(s) IV Push once  gabapentin 900 milliGRAM(s) Oral every 8 hours  heparin   Injectable 5500 Unit(s) IV Push every 6 hours PRN  heparin   Injectable 2500 Unit(s) IV Push every 6 hours PRN  heparin  Infusion.  Unit(s)/Hr IV Continuous <Continuous>  HYDROmorphone PCA (1 mG/mL) 30 milliLiter(s) PCA Continuous PCA Continuous  methocarbamol 750 milliGRAM(s) Oral three times a day  multiple electrolytes Injection Type 1 1000 milliLiter(s) IV Continuous <Continuous>  ondansetron Injectable 4 milliGRAM(s) IV Push every 6 hours PRN  pantoprazole    Tablet 40 milliGRAM(s) Oral before breakfast  phenytoin ER Oral Tab/Cap - Peds 200 milliGRAM(s) Oral every 12 hours  QUEtiapine 200 milliGRAM(s) Oral at bedtime  senna 2 Tablet(s) Oral at bedtime  silver sulfADIAZINE 1% Cream 1 Application(s) Topical daily  venlafaxine XR. 75 milliGRAM(s) Oral daily    FH:Family history of breast cancer (Mother)    Family history of hepatitis (Mother)    Family history of heart disease (Mother, Sibling)    Family history of CABG (Father)    Family history of peripheral vascular disease (Father)    Family history of brain aneurysm (Sibling)      PSX: Inguinal hernia, left    S/P ORIF (open reduction internal fixation) fracture    S/P shoulder surgery    S/P appendectomy    H/O endarterectomy      SH: Social History:  Former smoker (11 May 2022 22:05)      Vital Signs Last 24 Hrs  T(C): 36.7 (17 May 2022 11:26), Max: 37.1 (17 May 2022 00:09)  T(F): 98.1 (17 May 2022 11:26), Max: 98.8 (17 May 2022 00:09)  HR: 98 (17 May 2022 11:26) (86 - 99)  BP: 147/70 (17 May 2022 11:26) (118/60 - 151/69)  BP(mean): --  RR: 18 (17 May 2022 11:26) (18 - 18)  SpO2: 98% (17 May 2022 11:26) (93% - 98%)    LABS                        8.6    9.11  )-----------( 258      ( 17 May 2022 04:29 )             26.4               05-17    138  |  104  |  13.4  ----------------------------<  100<H>  4.3   |  23.0  |  0.72    Ca    8.4<L>      17 May 2022 04:29  Phos  3.0     05-16  Mg     2.0     05-16       WBC Count: 9.11 K/uL (05-17-22 @ 04:29)  WBC Count: 9.39 K/uL (05-16-22 @ 14:00)  WBC Count: 9.24 K/uL (05-16-22 @ 02:46)  WBC Count: 12.88 K/uL (05-15-22 @ 19:37)  WBC Count: 11.29 K/uL (05-15-22 @ 11:24)  WBC Count: 11.81 K/uL (05-15-22 @ 06:16)  WBC Count: 9.61 K/uL (05-14-22 @ 05:18)  WBC Count: 12.49 K/uL (05-13-22 @ 17:10)  WBC Count: 8.98 K/uL (05-13-22 @ 05:36)  WBC Count: 8.02 K/uL (05-12-22 @ 03:23)  WBC Count: 8.64 K/uL (05-11-22 @ 20:30)  WBC Count: 12.09 K/uL (04-23-22 @ 17:40)  WBC Count: 7.73 K/uL (04-18-22 @ 06:33)      ROS  REVIEW OF SYSTEMS:    CONSTITUTIONAL: No fever, weight loss, or fatigue  EYES: No eye pain, visual disturbances, or discharge  ENMT:  No difficulty hearing, tinnitus, vertigo; No sinus or throat pain  NECK: No pain or stiffness  BREASTS: No pain, masses, or nipple discharge  RESPIRATORY: No cough, wheezing, chills or hemoptysis; No shortness of breath  CARDIOVASCULAR: No chest pain, palpitations, dizziness, or leg swelling  GASTROINTESTINAL: No abdominal or epigastric pain. No nausea, vomiting, or hematemesis; No diarrhea or constipation. No melena or hematochezia.  GENITOURINARY: No dysuria, frequency, hematuria, or incontinence  NEUROLOGICAL: No headaches, memory loss, loss of strength, numbness, or tremors  SKIN: No itching, burning, rashes, or lesions   LYMPH NODES: No enlarged glands  ENDOCRINE: No heat or cold intolerance; No hair loss  MUSCULOSKELETAL: No joint pain or swelling; No muscle, back, or extremity pain  PSYCHIATRIC: No depression, anxiety, mood swings, or difficulty sleeping  HEME/LYMPH: No easy bruising, or bleeding gums  ALLERY AND IMMUNOLOGIC: No hives or eczema      PHYSICAL EXAM  GEN: RADHA CHAKRABORTY is a pleasant well-nourished, well developed 57y Male in no acute distress, alert awake, and oriented to person, place and time.   LE Focused:    Vasc:  Pulses faintly palpable DP/PT, skin temp warm to warm prox to distal LE b/l, CFT <3 seconds distal shante R foot  Derm: Erythema and edema noted to R foot hallux, 2nd digit and 3rd digit distal shante with superficial blistering to distal shante, no drainage, no clinical signs of infection noted  Neuro: Protective sensation grossly intact  MSK: Able to wiggle toes, pain on palpation R hallux, R 2nd digit, R 3rd digit     Imaging: pending

## 2022-05-17 NOTE — CONSULT NOTE ADULT - ASSESSMENT
A:   s/p R fem to PT bypass 5/13  RLE ischemia     P:  Patient evaluated, chart reviewed  Applied betadine, DSD R foot  Will continue to monitor for demarcation R foot   WCO M/W/F betadine, DSD with tape to R foot   Upon discharge pt to be wb as tolerated to R foot, apply betadine soaked 4x4 gauze, scot with tape - change every other day and keep dressing clean, dry and intact R foot   Discussed and seen bedside with Attending dr. Pisano   A:   s/p R fem to PT bypass 5/13  RLE ischemia     P:  Patient evaluated, chart reviewed  Applied betadine, DSD R foot  Will continue to monitor for demarcation R foot   WCO M/W/F betadine, DSD with tape to R foot   Upon discharge pt to be wb as tolerated to R foot, apply betadine soaked 4x4 gauze, scot with tape - change every other day and keep dressing clean, dry and intact R foot   Discussed and seen bedside with Attending dr. Pisano      Patient was examined.  All documentation reviewed.  Discussed pathology and treatment plan with resident.  Reviewed written documentation and agreed with the above.  Patient will be followed while in house

## 2022-05-17 NOTE — PROGRESS NOTE ADULT - SUBJECTIVE AND OBJECTIVE BOX
Interval Hx:  Patient seen during rounds  Patient reports pain to be mod controlled on current medications  feeling better today but still requiring high doses from PCA (1mg-5mg q4 hours)  endorses worst pain being sensation of burning in R foot  Patient denies sedation with medications     Analgesic Dosing for past 24 hours reviewed as below:  acetaminophen     Tablet ..   975 milliGRAM(s) Oral (05-17-22 @ 11:26)   975 milliGRAM(s) Oral (05-17-22 @ 05:26)   975 milliGRAM(s) Oral (05-16-22 @ 22:00)   975 milliGRAM(s) Oral (05-16-22 @ 13:24)    gabapentin   600 milliGRAM(s) Oral (05-16-22 @ 13:24)    gabapentin   900 milliGRAM(s) Oral (05-17-22 @ 11:27)   900 milliGRAM(s) Oral (05-17-22 @ 05:26)   900 milliGRAM(s) Oral (05-16-22 @ 21:59)    methocarbamol   750 milliGRAM(s) Oral (05-17-22 @ 11:27)   750 milliGRAM(s) Oral (05-17-22 @ 05:25)   750 milliGRAM(s) Oral (05-16-22 @ 21:59)   750 milliGRAM(s) Oral (05-16-22 @ 13:27)    phenytoin ER Oral Tab/Cap - Peds   200 milliGRAM(s) Oral (05-17-22 @ 05:26)   200 milliGRAM(s) Oral (05-16-22 @ 16:08)    QUEtiapine   200 milliGRAM(s) Oral (05-16-22 @ 21:59)    venlafaxine XR.   75 milliGRAM(s) Oral (05-17-22 @ 11:27)   75 milliGRAM(s) Oral (05-16-22 @ 16:39)          T(C): 36.6 (05-17-22 @ 05:13), Max: 37.1 (05-17-22 @ 00:09)  HR: 94 (05-17-22 @ 05:13) (86 - 99)  BP: 128/69 (05-17-22 @ 05:13) (118/60 - 152/75)  RR: 18 (05-17-22 @ 05:13) (18 - 18)  SpO2: 93% (05-17-22 @ 05:13) (93% - 97%)      05-16-22 @ 07:01  -  05-17-22 @ 07:00  --------------------------------------------------------  IN: 0 mL / OUT: 750 mL / NET: -750 mL        acetaminophen     Tablet .. 975 milliGRAM(s) Oral every 8 hours  amLODIPine   Tablet 10 milliGRAM(s) Oral daily  aspirin  chewable 81 milliGRAM(s) Oral daily  atorvastatin 80 milliGRAM(s) Oral at bedtime  fentaNYL    Injectable 50 MICROGram(s) IV Push once  gabapentin 900 milliGRAM(s) Oral every 8 hours  heparin   Injectable 5500 Unit(s) IV Push every 6 hours PRN  heparin   Injectable 2500 Unit(s) IV Push every 6 hours PRN  heparin  Infusion.  Unit(s)/Hr IV Continuous <Continuous>  HYDROmorphone PCA (1 mG/mL) 30 milliLiter(s) PCA Continuous PCA Continuous  methocarbamol 750 milliGRAM(s) Oral three times a day  multiple electrolytes Injection Type 1 1000 milliLiter(s) IV Continuous <Continuous>  ondansetron Injectable 4 milliGRAM(s) IV Push every 6 hours PRN  pantoprazole    Tablet 40 milliGRAM(s) Oral before breakfast  phenytoin ER Oral Tab/Cap - Peds 200 milliGRAM(s) Oral every 12 hours  QUEtiapine 200 milliGRAM(s) Oral at bedtime  senna 2 Tablet(s) Oral at bedtime  silver sulfADIAZINE 1% Cream 1 Application(s) Topical daily  venlafaxine XR. 75 milliGRAM(s) Oral daily                          8.6    9.11  )-----------( 258      ( 17 May 2022 04:29 )             26.4     05-17    138  |  104  |  13.4  ----------------------------<  100<H>  4.3   |  23.0  |  0.72    Ca    8.4<L>      17 May 2022 04:29  Phos  3.0     05-16  Mg     2.0     05-16      PT/INR - ( 16 May 2022 02:46 )   PT: 11.5 sec;   INR: 0.99 ratio         PTT - ( 17 May 2022 04:30 )  PTT:60.3 sec      Pain Service   [phone]

## 2022-05-17 NOTE — PROGRESS NOTE ADULT - ASSESSMENT
57M  hx of PAD  post-op from a Right Fem to PT bypass  acute RLE pain    increased PCA  - DIlaudid; 0.5mg initial dose; 0 continuous; 0.3 demand dose; 8 minute lockout ; 6mg 4-hour lockout  acetaminophen     Tablet .. 975 milliGRAM(s) Oral every 8 hours  change gabapentin 1200 milliGRAM(s) Oral every 8 hours HOLD for sedation   change robaxin 1000mg TID standing. HOLD for sedation

## 2022-05-17 NOTE — PROGRESS NOTE ADULT - SUBJECTIVE AND OBJECTIVE BOX
RADHA CHAKRABORTY    [id number]    History:     Patient seen, now s/p right fem-PT bypass on 5/13. Patient is doing well, pain to the right foot improved. Denies nausea, vomiting, chest pain, shortness of breath, abdominal pain or fever. No new complaints. No acute motor or sensory changes are reported.    Vital Signs Last 24 Hrs  T(C): 36.6 (17 May 2022 05:13), Max: 37.1 (17 May 2022 00:09)  T(F): 97.9 (17 May 2022 05:13), Max: 98.8 (17 May 2022 00:09)  HR: 94 (17 May 2022 05:13) (86 - 99)  BP: 128/69 (17 May 2022 05:13) (118/60 - 152/75)  BP(mean): --  RR: 18 (17 May 2022 05:13) (18 - 18)  SpO2: 93% (17 May 2022 05:13) (93% - 97%)  I&O's Summary    16 May 2022 07:01  -  17 May 2022 07:00  --------------------------------------------------------  IN: 0 mL / OUT: 750 mL / NET: -750 mL                              8.6    9.11  )-----------( 258      ( 17 May 2022 04:29 )             26.4     05-17    138  |  104  |  13.4  ----------------------------<  100<H>  4.3   |  23.0  |  0.72    Ca    8.4<L>      17 May 2022 04:29  Phos  3.0     05-16  Mg     2.0     05-16        MEDICATIONS  (STANDING):  acetaminophen     Tablet .. 975 milliGRAM(s) Oral every 8 hours  amLODIPine   Tablet 10 milliGRAM(s) Oral daily  aspirin  chewable 81 milliGRAM(s) Oral daily  atorvastatin 80 milliGRAM(s) Oral at bedtime  fentaNYL    Injectable 50 MICROGram(s) IV Push once  gabapentin 900 milliGRAM(s) Oral every 8 hours  heparin  Infusion.  Unit(s)/Hr (12 mL/Hr) IV Continuous <Continuous>  HYDROmorphone PCA (1 mG/mL) 30 milliLiter(s) PCA Continuous PCA Continuous  methocarbamol 750 milliGRAM(s) Oral three times a day  multiple electrolytes Injection Type 1 1000 milliLiter(s) (10 mL/Hr) IV Continuous <Continuous>  pantoprazole    Tablet 40 milliGRAM(s) Oral before breakfast  phenytoin ER Oral Tab/Cap - Peds 200 milliGRAM(s) Oral every 12 hours  QUEtiapine 200 milliGRAM(s) Oral at bedtime  senna 2 Tablet(s) Oral at bedtime  silver sulfADIAZINE 1% Cream 1 Application(s) Topical daily  venlafaxine XR. 75 milliGRAM(s) Oral daily    MEDICATIONS  (PRN):  heparin   Injectable 5500 Unit(s) IV Push every 6 hours PRN For aPTT less than 40  heparin   Injectable 2500 Unit(s) IV Push every 6 hours PRN For aPTT between 40 - 57  ondansetron Injectable 4 milliGRAM(s) IV Push every 6 hours PRN Nausea      Physical exam:     no distress, alert and oriented  non labored breathing  operative extremity is warm, clean dressing in place  Biphasic right PT on doppler.    Primary  Assessment:  • patient right fem- pt bypass  - reperfusion right foot pain improved    •   Plan:   • Cont hep ggt with plan to transition to oral anticoagulant   - continue current pain regimen   - physical therapy eval  - awaiting Podiatry eval

## 2022-05-18 LAB
APTT BLD: 32.7 SEC — SIGNIFICANT CHANGE UP (ref 27.5–35.5)
HCT VFR BLD CALC: 29.4 % — LOW (ref 39–50)
HGB BLD-MCNC: 9.4 G/DL — LOW (ref 13–17)
MCHC RBC-ENTMCNC: 29.5 PG — SIGNIFICANT CHANGE UP (ref 27–34)
MCHC RBC-ENTMCNC: 32 GM/DL — SIGNIFICANT CHANGE UP (ref 32–36)
MCV RBC AUTO: 92.2 FL — SIGNIFICANT CHANGE UP (ref 80–100)
PLATELET # BLD AUTO: 344 K/UL — SIGNIFICANT CHANGE UP (ref 150–400)
RBC # BLD: 3.19 M/UL — LOW (ref 4.2–5.8)
RBC # FLD: 15.1 % — HIGH (ref 10.3–14.5)
SARS-COV-2 RNA SPEC QL NAA+PROBE: SIGNIFICANT CHANGE UP
WBC # BLD: 8.71 K/UL — SIGNIFICANT CHANGE UP (ref 3.8–10.5)
WBC # FLD AUTO: 8.71 K/UL — SIGNIFICANT CHANGE UP (ref 3.8–10.5)

## 2022-05-18 RX ORDER — GABAPENTIN 400 MG/1
1200 CAPSULE ORAL EVERY 8 HOURS
Refills: 0 | Status: DISCONTINUED | OUTPATIENT
Start: 2022-05-18 | End: 2022-05-25

## 2022-05-18 RX ORDER — METHOCARBAMOL 500 MG/1
1500 TABLET, FILM COATED ORAL THREE TIMES A DAY
Refills: 0 | Status: DISCONTINUED | OUTPATIENT
Start: 2022-05-18 | End: 2022-05-25

## 2022-05-18 RX ORDER — METHOCARBAMOL 500 MG/1
1000 TABLET, FILM COATED ORAL THREE TIMES A DAY
Refills: 0 | Status: DISCONTINUED | OUTPATIENT
Start: 2022-05-18 | End: 2022-05-18

## 2022-05-18 RX ORDER — OXYCODONE HYDROCHLORIDE 5 MG/1
15 TABLET ORAL EVERY 12 HOURS
Refills: 0 | Status: DISCONTINUED | OUTPATIENT
Start: 2022-05-18 | End: 2022-05-25

## 2022-05-18 RX ADMIN — GABAPENTIN 1200 MILLIGRAM(S): 400 CAPSULE ORAL at 21:18

## 2022-05-18 RX ADMIN — ATORVASTATIN CALCIUM 80 MILLIGRAM(S): 80 TABLET, FILM COATED ORAL at 21:18

## 2022-05-18 RX ADMIN — Medication 75 MILLIGRAM(S): at 11:53

## 2022-05-18 RX ADMIN — METHOCARBAMOL 1500 MILLIGRAM(S): 500 TABLET, FILM COATED ORAL at 21:17

## 2022-05-18 RX ADMIN — AMLODIPINE BESYLATE 10 MILLIGRAM(S): 2.5 TABLET ORAL at 05:30

## 2022-05-18 RX ADMIN — GABAPENTIN 1200 MILLIGRAM(S): 400 CAPSULE ORAL at 11:53

## 2022-05-18 RX ADMIN — Medication 975 MILLIGRAM(S): at 12:50

## 2022-05-18 RX ADMIN — APIXABAN 5 MILLIGRAM(S): 2.5 TABLET, FILM COATED ORAL at 08:14

## 2022-05-18 RX ADMIN — APIXABAN 5 MILLIGRAM(S): 2.5 TABLET, FILM COATED ORAL at 21:18

## 2022-05-18 RX ADMIN — GABAPENTIN 900 MILLIGRAM(S): 400 CAPSULE ORAL at 05:31

## 2022-05-18 RX ADMIN — Medication 975 MILLIGRAM(S): at 05:30

## 2022-05-18 RX ADMIN — Medication 200 MILLIGRAM(S): at 17:23

## 2022-05-18 RX ADMIN — Medication 81 MILLIGRAM(S): at 11:53

## 2022-05-18 RX ADMIN — Medication 975 MILLIGRAM(S): at 21:18

## 2022-05-18 RX ADMIN — METHOCARBAMOL 750 MILLIGRAM(S): 500 TABLET, FILM COATED ORAL at 05:30

## 2022-05-18 RX ADMIN — HYDROMORPHONE HYDROCHLORIDE 30 MILLILITER(S): 2 INJECTION INTRAMUSCULAR; INTRAVENOUS; SUBCUTANEOUS at 11:12

## 2022-05-18 RX ADMIN — OXYCODONE HYDROCHLORIDE 15 MILLIGRAM(S): 5 TABLET ORAL at 17:23

## 2022-05-18 RX ADMIN — Medication 975 MILLIGRAM(S): at 05:31

## 2022-05-18 RX ADMIN — Medication 1 APPLICATION(S): at 11:49

## 2022-05-18 RX ADMIN — Medication 975 MILLIGRAM(S): at 11:53

## 2022-05-18 RX ADMIN — HYDROMORPHONE HYDROCHLORIDE 30 MILLILITER(S): 2 INJECTION INTRAMUSCULAR; INTRAVENOUS; SUBCUTANEOUS at 07:19

## 2022-05-18 RX ADMIN — Medication 975 MILLIGRAM(S): at 21:19

## 2022-05-18 RX ADMIN — QUETIAPINE FUMARATE 200 MILLIGRAM(S): 200 TABLET, FILM COATED ORAL at 21:18

## 2022-05-18 RX ADMIN — PANTOPRAZOLE SODIUM 40 MILLIGRAM(S): 20 TABLET, DELAYED RELEASE ORAL at 05:30

## 2022-05-18 RX ADMIN — Medication 200 MILLIGRAM(S): at 05:30

## 2022-05-18 RX ADMIN — OXYCODONE HYDROCHLORIDE 15 MILLIGRAM(S): 5 TABLET ORAL at 18:20

## 2022-05-18 RX ADMIN — METHOCARBAMOL 1000 MILLIGRAM(S): 500 TABLET, FILM COATED ORAL at 11:56

## 2022-05-18 NOTE — PHYSICAL THERAPY INITIAL EVALUATION ADULT - RANGE OF MOTION EXAMINATION, REHAB EVAL
except right knee limited to 0-90 degrees secondary to pain, right ankle not tested due to foot pain/no ROM deficits were identified

## 2022-05-18 NOTE — PHYSICAL THERAPY INITIAL EVALUATION ADULT - PERTINENT HX OF CURRENT PROBLEM, REHAB EVAL
57y Male presenting with critical limb ischemia, now s/p Right Fem to PT bypass with own ipsilateral saphenous vein graft.

## 2022-05-18 NOTE — DIETITIAN INITIAL EVALUATION ADULT - PERTINENT LABORATORY DATA
05-17    138  |  104  |  13.4  ----------------------------<  100<H>  4.3   |  23.0  |  0.72    Ca    8.4<L>      17 May 2022 04:29    A1C with Estimated Average Glucose Result: 5.4 % (04-14-22 @ 03:24)  A1C with Estimated Average Glucose Result: 5.2 % (09-23-21 @ 14:03)

## 2022-05-18 NOTE — PHYSICAL THERAPY INITIAL EVALUATION ADULT - NAME OF CLINICIAN
This is an acute problem that the patient was seen in the emergency room on 2/24/2020 because after she got out of the shower she had bright red blood coming from the vaginal area.  Patient's had a previous hysterectomy does not retain her cervix.  She had a full work-up in the emergency room and they did a exam and could not see any reason that she would have been bleeding.  Patient brings with her pictures of how the towel looked and it definitely has bright red blood.  After doing her exam today she does have 2 small varicose veins in the left labia majora that may be irritated by the towel when she goes to dry off.  Were going to have her just pat that area and see if that would keep the bleeding from occurring.  I do not think this is anything to be overly concerned about but I do not want her to continue to worry about bleeding.   FOX Calabrese

## 2022-05-18 NOTE — PHYSICAL THERAPY INITIAL EVALUATION ADULT - GENERAL OBSERVATIONS, REHAB EVAL
Patient received seated in chair, NAD, breathing RA, +PCA, +IV, +tele/. Pt agreeable to Physical Therapy evaluation.

## 2022-05-18 NOTE — DIETITIAN INITIAL EVALUATION ADULT - PERTINENT MEDS FT
MEDICATIONS  (STANDING):  acetaminophen     Tablet .. 975 milliGRAM(s) Oral every 8 hours  amLODIPine   Tablet 10 milliGRAM(s) Oral daily  apixaban 5 milliGRAM(s) Oral two times a day  aspirin  chewable 81 milliGRAM(s) Oral daily  atorvastatin 80 milliGRAM(s) Oral at bedtime  fentaNYL    Injectable 50 MICROGram(s) IV Push once  gabapentin 1200 milliGRAM(s) Oral every 8 hours  HYDROmorphone PCA (1 mG/mL) 30 milliLiter(s) PCA Continuous PCA Continuous  methocarbamol 1000 milliGRAM(s) Oral three times a day  multiple electrolytes Injection Type 1 1000 milliLiter(s) (10 mL/Hr) IV Continuous <Continuous>  pantoprazole    Tablet 40 milliGRAM(s) Oral before breakfast  phenytoin ER Oral Tab/Cap - Peds 200 milliGRAM(s) Oral every 12 hours  QUEtiapine 200 milliGRAM(s) Oral at bedtime  senna 2 Tablet(s) Oral at bedtime  silver sulfADIAZINE 1% Cream 1 Application(s) Topical daily  venlafaxine XR. 75 milliGRAM(s) Oral daily    MEDICATIONS  (PRN):  ondansetron Injectable 4 milliGRAM(s) IV Push every 6 hours PRN Nausea

## 2022-05-18 NOTE — PHYSICAL THERAPY INITIAL EVALUATION ADULT - ADDITIONAL COMMENTS
Patient lives in a house with 2 MAXIMO +railing. He lives with his wife who is able to assist if needed. He was independent prior to admission without AD, no DME at home.

## 2022-05-18 NOTE — PROGRESS NOTE ADULT - ASSESSMENT
The patient is a 57y Male post-op from a Right Fem to PT bypass with own ipsilateral saphenous vein graft. Acute drop in Hb while on heparin drip which is now held s/p 2units RBC. Patient doing well on PCA after medications adjustment. Remains hemodynamically well and afebrile. Prevena in place working properly.     Plan:  - PCA for pain control  - pain consult  - Eliquis started 5/17  - d/c Marcel when ambulatory  - Monitor oral intake  - IS.  - Prevena was D/C  5/17/22  -Podiatria fu the pt along. Local povidone to right toes.  -Dispo planning

## 2022-05-18 NOTE — PHYSICAL THERAPY INITIAL EVALUATION ADULT - DIAGNOSIS, PT EVAL
Impaired functional mobility secondary to weakness, extreme pain with right LE weight bearing, impaired motor control

## 2022-05-18 NOTE — PROGRESS NOTE ADULT - SUBJECTIVE AND OBJECTIVE BOX
Interval Hx:  Patient seen during rounds  Patient reports pain to be mod controlled on current medications  endorses pain with walking  Patient denies sedation with medications     Analgesic Dosing for past 24 hours reviewed as below:  acetaminophen     Tablet ..   975 milliGRAM(s) Oral (05-18-22 @ 11:53)   975 milliGRAM(s) Oral (05-18-22 @ 05:30)   975 milliGRAM(s) Oral (05-17-22 @ 20:52)    gabapentin   1200 milliGRAM(s) Oral (05-18-22 @ 11:53)    gabapentin   900 milliGRAM(s) Oral (05-18-22 @ 05:31)   900 milliGRAM(s) Oral (05-17-22 @ 20:51)    methocarbamol   750 milliGRAM(s) Oral (05-18-22 @ 05:30)   750 milliGRAM(s) Oral (05-17-22 @ 20:51)    methocarbamol   1000 milliGRAM(s) Oral (05-18-22 @ 11:56)    phenytoin ER Oral Tab/Cap - Peds   200 milliGRAM(s) Oral (05-18-22 @ 05:30)   200 milliGRAM(s) Oral (05-17-22 @ 17:33)    QUEtiapine   200 milliGRAM(s) Oral (05-17-22 @ 20:51)    venlafaxine XR.   75 milliGRAM(s) Oral (05-18-22 @ 11:53)          T(C): 36.9 (05-18-22 @ 11:28), Max: 36.9 (05-17-22 @ 22:50)  HR: 90 (05-18-22 @ 11:28) (90 - 99)  BP: 138/74 (05-18-22 @ 11:28) (123/73 - 148/76)  RR: 19 (05-18-22 @ 11:28) (18 - 19)  SpO2: 94% (05-18-22 @ 11:28) (94% - 98%)      05-17-22 @ 07:01  -  05-18-22 @ 07:00  --------------------------------------------------------  IN: 889 mL / OUT: 900 mL / NET: -11 mL        acetaminophen     Tablet .. 975 milliGRAM(s) Oral every 8 hours  amLODIPine   Tablet 10 milliGRAM(s) Oral daily  apixaban 5 milliGRAM(s) Oral two times a day  aspirin  chewable 81 milliGRAM(s) Oral daily  atorvastatin 80 milliGRAM(s) Oral at bedtime  fentaNYL    Injectable 50 MICROGram(s) IV Push once  gabapentin 1200 milliGRAM(s) Oral every 8 hours  HYDROmorphone PCA (1 mG/mL) 30 milliLiter(s) PCA Continuous PCA Continuous  methocarbamol 1000 milliGRAM(s) Oral three times a day  multiple electrolytes Injection Type 1 1000 milliLiter(s) IV Continuous <Continuous>  ondansetron Injectable 4 milliGRAM(s) IV Push every 6 hours PRN  pantoprazole    Tablet 40 milliGRAM(s) Oral before breakfast  phenytoin ER Oral Tab/Cap - Peds 200 milliGRAM(s) Oral every 12 hours  QUEtiapine 200 milliGRAM(s) Oral at bedtime  senna 2 Tablet(s) Oral at bedtime  silver sulfADIAZINE 1% Cream 1 Application(s) Topical daily  venlafaxine XR. 75 milliGRAM(s) Oral daily                          9.4    8.71  )-----------( 344      ( 18 May 2022 06:07 )             29.4     05-17    138  |  104  |  13.4  ----------------------------<  100<H>  4.3   |  23.0  |  0.72    Ca    8.4<L>      17 May 2022 04:29      PTT - ( 18 May 2022 06:07 )  PTT:32.7 sec      Pain Service   [phone]

## 2022-05-18 NOTE — DIETITIAN INITIAL EVALUATION ADULT - ORAL INTAKE PTA/DIET HISTORY
Pt reported good po intake PTA and currently. Observed breakfast tray 100% completed. Stated pain sometimes hinders appetite. Pt denied significant wt changes,  lbs. Educated on a heart healthy/balanced diet, pt verbalized understanding.

## 2022-05-18 NOTE — PROGRESS NOTE ADULT - SUBJECTIVE AND OBJECTIVE BOX
Acute Care Surgery/Vascular Surgery Progress Note:    No acute overnight events. Patient afebrile, and hemodynamically well. Pain well controlled but, experienced to pain when walking. Tolerating diet. Denies n/v/f/c/cp/sob.     Diet, Regular (05-13-22 @ 16:52)      Scheduled Medications:   acetaminophen     Tablet .. 975 milliGRAM(s) Oral every 8 hours  amLODIPine   Tablet 10 milliGRAM(s) Oral daily  apixaban 5 milliGRAM(s) Oral two times a day  aspirin  chewable 81 milliGRAM(s) Oral daily  atorvastatin 80 milliGRAM(s) Oral at bedtime  fentaNYL    Injectable 50 MICROGram(s) IV Push once  gabapentin 900 milliGRAM(s) Oral every 8 hours  HYDROmorphone PCA (1 mG/mL) 30 milliLiter(s) PCA Continuous PCA Continuous  methocarbamol 750 milliGRAM(s) Oral three times a day  multiple electrolytes Injection Type 1 1000 milliLiter(s) (10 mL/Hr) IV Continuous <Continuous>  pantoprazole    Tablet 40 milliGRAM(s) Oral before breakfast  phenytoin ER Oral Tab/Cap - Peds 200 milliGRAM(s) Oral every 12 hours  QUEtiapine 200 milliGRAM(s) Oral at bedtime  senna 2 Tablet(s) Oral at bedtime  silver sulfADIAZINE 1% Cream 1 Application(s) Topical daily  venlafaxine XR. 75 milliGRAM(s) Oral daily    PRN Medications:  ondansetron Injectable 4 milliGRAM(s) IV Push every 6 hours PRN Nausea      Objective:   T(F): 98.5 (05-17 @ 22:50), Max: 98.5 (05-17 @ 22:50)  HR: 99 (05-17 @ 22:50) (91 - 99)  BP: 123/73 (05-17 @ 22:50) (123/73 - 148/76)  BP(mean): --  ABP: --  ABP(mean): --  RR: 18 (05-17 @ 22:50) (18 - 18)  SpO2: 97% (05-17 @ 22:50) (93% - 98%)      Physical Exam:   GEN: patient resting comfortably in bed, in no acute distress  RESP: respirations are unlabored, no accessory muscle use, no conversational dyspnea  Extremity: prevena removed from right lower extremity. No oozing. Applied adaptic and tegademr for protection. Bullae close to the hip area probably related to trauma from previous tubing of the prevena.   Neuro: oriented x 3  CVS: RRR  GI: Abdomen soft, non-tender, non-distended, no rebound tenderness / guarding    I&O's    05-16 @ 07:01  -  05-17 @ 07:00  --------------------------------------------------------  IN:  Total IN: 0 mL    OUT:    Voided (mL): 750 mL  Total OUT: 750 mL    Total NET: -750 mL      05-17 @ 07:01  -  05-18 @ 03:22  --------------------------------------------------------  IN:    Heparin Infusion: 169 mL    multiple electrolytes Injection Type 1.: 200 mL    Oral Fluid: 240 mL  Total IN: 609 mL    OUT:  Total OUT: 0 mL    Total NET: 609 mL          LABS:                        8.6    9.11  )-----------( 258      ( 17 May 2022 04:29 )             26.4     05-17    138  |  104  |  13.4  ----------------------------<  100<H>  4.3   |  23.0  |  0.72    Ca    8.4<L>      17 May 2022 04:29      PTT - ( 17 May 2022 04:30 )  PTT:60.3 sec      MICROBIOLOGY:       PATHOLOGY:       Acute Care Surgery/Vascular Surgery Progress Note:    No acute overnight events. Patient afebrile, and hemodynamically well. Pain well controlled but, experienced to pain when walking. Tolerating diet. Denies n/v/f/c/cp/sob.     Diet, Regular (05-13-22 @ 16:52)      Scheduled Medications:   acetaminophen     Tablet .. 975 milliGRAM(s) Oral every 8 hours  amLODIPine   Tablet 10 milliGRAM(s) Oral daily  apixaban 5 milliGRAM(s) Oral two times a day  aspirin  chewable 81 milliGRAM(s) Oral daily  atorvastatin 80 milliGRAM(s) Oral at bedtime  fentaNYL    Injectable 50 MICROGram(s) IV Push once  gabapentin 900 milliGRAM(s) Oral every 8 hours  HYDROmorphone PCA (1 mG/mL) 30 milliLiter(s) PCA Continuous PCA Continuous  methocarbamol 750 milliGRAM(s) Oral three times a day  multiple electrolytes Injection Type 1 1000 milliLiter(s) (10 mL/Hr) IV Continuous <Continuous>  pantoprazole    Tablet 40 milliGRAM(s) Oral before breakfast  phenytoin ER Oral Tab/Cap - Peds 200 milliGRAM(s) Oral every 12 hours  QUEtiapine 200 milliGRAM(s) Oral at bedtime  senna 2 Tablet(s) Oral at bedtime  silver sulfADIAZINE 1% Cream 1 Application(s) Topical daily  venlafaxine XR. 75 milliGRAM(s) Oral daily    PRN Medications:  ondansetron Injectable 4 milliGRAM(s) IV Push every 6 hours PRN Nausea      Objective:   T(F): 98.5 (05-17 @ 22:50), Max: 98.5 (05-17 @ 22:50)  HR: 99 (05-17 @ 22:50) (91 - 99)  BP: 123/73 (05-17 @ 22:50) (123/73 - 148/76)  BP(mean): --  ABP: --  ABP(mean): --  RR: 18 (05-17 @ 22:50) (18 - 18)  SpO2: 97% (05-17 @ 22:50) (93% - 98%)      Physical Exam:   GEN: patient resting comfortably in bed, in no acute distress  RESP: respirations are unlabored, no accessory muscle use, no conversational dyspnea  Extremity: prevena removed from right lower extremity. No oozing. Applied adaptic and tegademr for protection. Bullae close to the hip area probably related to trauma from previous tubing of the prevena. DP doppler on the right food.   Neuro: oriented x 3  CVS: RRR  GI: Abdomen soft, non-tender, non-distended, no rebound tenderness / guarding    I&O's    05-16 @ 07:01  -  05-17 @ 07:00  --------------------------------------------------------  IN:  Total IN: 0 mL    OUT:    Voided (mL): 750 mL  Total OUT: 750 mL    Total NET: -750 mL      05-17 @ 07:01  -  05-18 @ 03:22  --------------------------------------------------------  IN:    Heparin Infusion: 169 mL    multiple electrolytes Injection Type 1.: 200 mL    Oral Fluid: 240 mL  Total IN: 609 mL    OUT:  Total OUT: 0 mL    Total NET: 609 mL          LABS:                        8.6    9.11  )-----------( 258      ( 17 May 2022 04:29 )             26.4     05-17    138  |  104  |  13.4  ----------------------------<  100<H>  4.3   |  23.0  |  0.72    Ca    8.4<L>      17 May 2022 04:29      PTT - ( 17 May 2022 04:30 )  PTT:60.3 sec      MICROBIOLOGY:       PATHOLOGY:

## 2022-05-18 NOTE — PROGRESS NOTE ADULT - ASSESSMENT
57M  hx of PAD  post-op from a Right Fem to PT bypass  acute RLE pain    PCA  - DIlaudid; 0.5mg initial dose; 0 continuous; 0.3 demand dose; 8 minute lockout ; 6mg 4-hour lockout  acetaminophen     Tablet .. 975 milliGRAM(s) Oral every 8 hours  gabapentin 1200 milliGRAM(s) Oral every 8 hours HOLD for sedation   change robaxin 1500mg TID standing. HOLD for sedation   start oxycontin 15mg q12h HOLD for sedation

## 2022-05-19 LAB
HCT VFR BLD CALC: 30.3 % — LOW (ref 39–50)
HGB BLD-MCNC: 9.4 G/DL — LOW (ref 13–17)
MCHC RBC-ENTMCNC: 29.2 PG — SIGNIFICANT CHANGE UP (ref 27–34)
MCHC RBC-ENTMCNC: 31 GM/DL — LOW (ref 32–36)
MCV RBC AUTO: 94.1 FL — SIGNIFICANT CHANGE UP (ref 80–100)
PLATELET # BLD AUTO: 405 K/UL — HIGH (ref 150–400)
RBC # BLD: 3.22 M/UL — LOW (ref 4.2–5.8)
RBC # FLD: 15.1 % — HIGH (ref 10.3–14.5)
WBC # BLD: 9.92 K/UL — SIGNIFICANT CHANGE UP (ref 3.8–10.5)
WBC # FLD AUTO: 9.92 K/UL — SIGNIFICANT CHANGE UP (ref 3.8–10.5)

## 2022-05-19 RX ORDER — HYDROMORPHONE HYDROCHLORIDE 2 MG/ML
0.5 INJECTION INTRAMUSCULAR; INTRAVENOUS; SUBCUTANEOUS
Refills: 0 | Status: DISCONTINUED | OUTPATIENT
Start: 2022-05-19 | End: 2022-05-20

## 2022-05-19 RX ORDER — OXYCODONE HYDROCHLORIDE 5 MG/1
5 TABLET ORAL
Refills: 0 | Status: DISCONTINUED | OUTPATIENT
Start: 2022-05-19 | End: 2022-05-25

## 2022-05-19 RX ORDER — OXYCODONE HYDROCHLORIDE 5 MG/1
10 TABLET ORAL
Refills: 0 | Status: DISCONTINUED | OUTPATIENT
Start: 2022-05-19 | End: 2022-05-25

## 2022-05-19 RX ADMIN — Medication 975 MILLIGRAM(S): at 22:12

## 2022-05-19 RX ADMIN — Medication 200 MILLIGRAM(S): at 05:58

## 2022-05-19 RX ADMIN — GABAPENTIN 1200 MILLIGRAM(S): 400 CAPSULE ORAL at 14:31

## 2022-05-19 RX ADMIN — HYDROMORPHONE HYDROCHLORIDE 30 MILLILITER(S): 2 INJECTION INTRAMUSCULAR; INTRAVENOUS; SUBCUTANEOUS at 07:35

## 2022-05-19 RX ADMIN — Medication 975 MILLIGRAM(S): at 06:00

## 2022-05-19 RX ADMIN — Medication 200 MILLIGRAM(S): at 17:52

## 2022-05-19 RX ADMIN — Medication 81 MILLIGRAM(S): at 12:43

## 2022-05-19 RX ADMIN — ATORVASTATIN CALCIUM 80 MILLIGRAM(S): 80 TABLET, FILM COATED ORAL at 21:22

## 2022-05-19 RX ADMIN — OXYCODONE HYDROCHLORIDE 15 MILLIGRAM(S): 5 TABLET ORAL at 17:53

## 2022-05-19 RX ADMIN — Medication 75 MILLIGRAM(S): at 12:43

## 2022-05-19 RX ADMIN — Medication 975 MILLIGRAM(S): at 14:31

## 2022-05-19 RX ADMIN — Medication 975 MILLIGRAM(S): at 05:59

## 2022-05-19 RX ADMIN — Medication 975 MILLIGRAM(S): at 15:30

## 2022-05-19 RX ADMIN — METHOCARBAMOL 1500 MILLIGRAM(S): 500 TABLET, FILM COATED ORAL at 05:58

## 2022-05-19 RX ADMIN — APIXABAN 5 MILLIGRAM(S): 2.5 TABLET, FILM COATED ORAL at 21:23

## 2022-05-19 RX ADMIN — Medication 975 MILLIGRAM(S): at 21:22

## 2022-05-19 RX ADMIN — GABAPENTIN 1200 MILLIGRAM(S): 400 CAPSULE ORAL at 21:21

## 2022-05-19 RX ADMIN — APIXABAN 5 MILLIGRAM(S): 2.5 TABLET, FILM COATED ORAL at 09:32

## 2022-05-19 RX ADMIN — GABAPENTIN 1200 MILLIGRAM(S): 400 CAPSULE ORAL at 05:58

## 2022-05-19 RX ADMIN — OXYCODONE HYDROCHLORIDE 15 MILLIGRAM(S): 5 TABLET ORAL at 18:23

## 2022-05-19 RX ADMIN — OXYCODONE HYDROCHLORIDE 15 MILLIGRAM(S): 5 TABLET ORAL at 05:58

## 2022-05-19 RX ADMIN — PANTOPRAZOLE SODIUM 40 MILLIGRAM(S): 20 TABLET, DELAYED RELEASE ORAL at 05:58

## 2022-05-19 RX ADMIN — METHOCARBAMOL 1500 MILLIGRAM(S): 500 TABLET, FILM COATED ORAL at 14:32

## 2022-05-19 RX ADMIN — OXYCODONE HYDROCHLORIDE 10 MILLIGRAM(S): 5 TABLET ORAL at 21:27

## 2022-05-19 RX ADMIN — QUETIAPINE FUMARATE 200 MILLIGRAM(S): 200 TABLET, FILM COATED ORAL at 21:22

## 2022-05-19 RX ADMIN — METHOCARBAMOL 1500 MILLIGRAM(S): 500 TABLET, FILM COATED ORAL at 21:22

## 2022-05-19 RX ADMIN — OXYCODONE HYDROCHLORIDE 10 MILLIGRAM(S): 5 TABLET ORAL at 22:21

## 2022-05-19 RX ADMIN — OXYCODONE HYDROCHLORIDE 15 MILLIGRAM(S): 5 TABLET ORAL at 06:00

## 2022-05-19 RX ADMIN — AMLODIPINE BESYLATE 10 MILLIGRAM(S): 2.5 TABLET ORAL at 05:59

## 2022-05-19 NOTE — PROGRESS NOTE ADULT - ASSESSMENT
The patient is a 57y Male post-op from a Right Fem to PT bypass with own ipsilateral saphenous vein graft. Acute drop in Hb while on heparin drip which is now held s/p 2units RBC. Patient doing well on PCA after medications adjustment. Remains hemodynamically well and afebrile. Prevena discontinue 5/17. Anxious about his own future    Plan:  - PCA for pain control and patient judiciously trying no to use.   - pain consult and goal to shift to Oral pain meds.   - Eliquis started 5/17  - d/c Marcel when ambulatory  - Monitor oral intake  - IS.  - Prevena was D/C  5/17/22  - Podiatry fu the pt along. Local povidone to right toes.  -PT deemed pt home with services  - No dressing needed  - Dispo planning

## 2022-05-19 NOTE — PROGRESS NOTE ADULT - SUBJECTIVE AND OBJECTIVE BOX
Interval Hx:  Patient seen during rounds  Patient reports pain to be controlled on current medications  Patient denies sedation with medications     Analgesic Dosing for past 24 hours reviewed as below:    acetaminophen     Tablet ..   975 milliGRAM(s) Oral (05-19-22 @ 05:59)   975 milliGRAM(s) Oral (05-18-22 @ 21:18)   975 milliGRAM(s) Oral (05-18-22 @ 11:53)    gabapentin   1200 milliGRAM(s) Oral (05-19-22 @ 05:58)   1200 milliGRAM(s) Oral (05-18-22 @ 21:18)   1200 milliGRAM(s) Oral (05-18-22 @ 11:53)    methocarbamol   1000 milliGRAM(s) Oral (05-18-22 @ 11:56)    methocarbamol   1500 milliGRAM(s) Oral (05-19-22 @ 05:58)   1500 milliGRAM(s) Oral (05-18-22 @ 21:17)    oxyCODONE  ER Tablet   15 milliGRAM(s) Oral (05-19-22 @ 05:58)   15 milliGRAM(s) Oral (05-18-22 @ 17:23)    phenytoin ER Oral Tab/Cap - Peds   200 milliGRAM(s) Oral (05-19-22 @ 05:58)   200 milliGRAM(s) Oral (05-18-22 @ 17:23)    QUEtiapine   200 milliGRAM(s) Oral (05-18-22 @ 21:18)    venlafaxine XR.   75 milliGRAM(s) Oral (05-18-22 @ 11:53)          T(C): 36.7 (05-19-22 @ 10:51), Max: 37.1 (05-18-22 @ 15:59)  HR: 99 (05-19-22 @ 10:51) (89 - 109)  BP: 151/79 (05-19-22 @ 10:51) (110/81 - 154/76)  RR: 18 (05-19-22 @ 10:51) (18 - 19)  SpO2: 99% (05-19-22 @ 10:51) (93% - 99%)      05-18-22 @ 07:01  -  05-19-22 @ 07:00  --------------------------------------------------------  IN: 840 mL / OUT: 400 mL / NET: 440 mL        acetaminophen     Tablet .. 975 milliGRAM(s) Oral every 8 hours  amLODIPine   Tablet 10 milliGRAM(s) Oral daily  apixaban 5 milliGRAM(s) Oral two times a day  aspirin  chewable 81 milliGRAM(s) Oral daily  atorvastatin 80 milliGRAM(s) Oral at bedtime  fentaNYL    Injectable 50 MICROGram(s) IV Push once  gabapentin 1200 milliGRAM(s) Oral every 8 hours  HYDROmorphone PCA (1 mG/mL) 30 milliLiter(s) PCA Continuous PCA Continuous  methocarbamol 1500 milliGRAM(s) Oral three times a day  multiple electrolytes Injection Type 1 1000 milliLiter(s) IV Continuous <Continuous>  ondansetron Injectable 4 milliGRAM(s) IV Push every 6 hours PRN  oxyCODONE  ER Tablet 15 milliGRAM(s) Oral every 12 hours  pantoprazole    Tablet 40 milliGRAM(s) Oral before breakfast  phenytoin ER Oral Tab/Cap - Peds 200 milliGRAM(s) Oral every 12 hours  QUEtiapine 200 milliGRAM(s) Oral at bedtime  senna 2 Tablet(s) Oral at bedtime  silver sulfADIAZINE 1% Cream 1 Application(s) Topical daily  venlafaxine XR. 75 milliGRAM(s) Oral daily                          9.4    9.92  )-----------( 405      ( 19 May 2022 06:02 )             30.3           PTT - ( 18 May 2022 06:07 )  PTT:32.7 sec      Pain Service   [phone]

## 2022-05-19 NOTE — PROGRESS NOTE ADULT - ASSESSMENT
57M  hx of PAD  post-op from a Right Fem to PT bypass  acute RLE pain    acetaminophen     Tablet .. 975 milliGRAM(s) Oral every 8 hours  gabapentin 1200 milliGRAM(s) Oral every 8 hours HOLD for sedation   robaxin 1500mg TID standing. HOLD for sedation   oxycontin 15mg q12h HOLD for sedation     DC PCA  start oxycodone PO 5mg/10mg [id number] PRN mod/severe pain  start hydromorphone IVP 0.5mg q3hour PRN breakthrough pain

## 2022-05-19 NOTE — PROGRESS NOTE ADULT - SUBJECTIVE AND OBJECTIVE BOX
Acute Care Surgery/Vascular Surgery Progress Note:      No acute overnight events. Patient afebrile, and hemodynamically well. Pain well controlled. Tolerating diet. Denies n/v/f/c/cp/sob.     Diet, Regular (05-13-22 @ 16:52)      Scheduled Medications:   acetaminophen     Tablet .. 975 milliGRAM(s) Oral every 8 hours  amLODIPine   Tablet 10 milliGRAM(s) Oral daily  apixaban 5 milliGRAM(s) Oral two times a day  aspirin  chewable 81 milliGRAM(s) Oral daily  atorvastatin 80 milliGRAM(s) Oral at bedtime  fentaNYL    Injectable 50 MICROGram(s) IV Push once  gabapentin 1200 milliGRAM(s) Oral every 8 hours  HYDROmorphone PCA (1 mG/mL) 30 milliLiter(s) PCA Continuous PCA Continuous  methocarbamol 1500 milliGRAM(s) Oral three times a day  multiple electrolytes Injection Type 1 1000 milliLiter(s) (10 mL/Hr) IV Continuous <Continuous>  oxyCODONE  ER Tablet 15 milliGRAM(s) Oral every 12 hours  pantoprazole    Tablet 40 milliGRAM(s) Oral before breakfast  phenytoin ER Oral Tab/Cap - Peds 200 milliGRAM(s) Oral every 12 hours  QUEtiapine 200 milliGRAM(s) Oral at bedtime  senna 2 Tablet(s) Oral at bedtime  silver sulfADIAZINE 1% Cream 1 Application(s) Topical daily  venlafaxine XR. 75 milliGRAM(s) Oral daily    PRN Medications:  ondansetron Injectable 4 milliGRAM(s) IV Push every 6 hours PRN Nausea      Objective:   T(F): 97.8 (05-19 @ 04:44), Max: 98.7 (05-18 @ 15:59)  HR: 90 (05-19 @ 04:44) (89 - 109)  BP: 137/72 (05-19 @ 04:44) (110/81 - 154/76)  BP(mean): --  ABP: --  ABP(mean): --  RR: 18 (05-19 @ 04:44) (18 - 19)  SpO2: 95% (05-19 @ 04:44) (93% - 95%)      Physical Exam:   GEN: patient resting comfortably in bed, in no acute distress  RESP: respirations are unlabored, no accessory muscle use, no conversational dyspnea  CVS: RRR  GI: Abdomen soft, non-tender, non-distended, no rebound tenderness / guarding  Extremities: Inguinal area hardened in the right site, no oozing no expanding collections, or gushing Staple line on the right limb is intact. DP is deplerable in the right site.   Oriented: in time, place and location    I&O's    05-18 @ 07:01  -  05-19 @ 07:00  --------------------------------------------------------  IN:    multiple electrolytes Injection Type 1.: 240 mL    Oral Fluid: 600 mL  Total IN: 840 mL    OUT:    Voided (mL): 400 mL  Total OUT: 400 mL    Total NET: 440 mL          LABS:                        9.4    9.92  )-----------( 405      ( 19 May 2022 06:02 )             30.3           PTT - ( 18 May 2022 06:07 )  PTT:32.7 sec      MICROBIOLOGY:       PATHOLOGY:

## 2022-05-20 ENCOUNTER — TRANSCRIPTION ENCOUNTER (OUTPATIENT)
Age: 57
End: 2022-05-20

## 2022-05-20 LAB
HCT VFR BLD CALC: 30.6 % — LOW (ref 39–50)
HGB BLD-MCNC: 9.5 G/DL — LOW (ref 13–17)
MCHC RBC-ENTMCNC: 29.5 PG — SIGNIFICANT CHANGE UP (ref 27–34)
MCHC RBC-ENTMCNC: 31 GM/DL — LOW (ref 32–36)
MCV RBC AUTO: 95 FL — SIGNIFICANT CHANGE UP (ref 80–100)
PLATELET # BLD AUTO: 472 K/UL — HIGH (ref 150–400)
RBC # BLD: 3.22 M/UL — LOW (ref 4.2–5.8)
RBC # FLD: 14.9 % — HIGH (ref 10.3–14.5)
WBC # BLD: 9.81 K/UL — SIGNIFICANT CHANGE UP (ref 3.8–10.5)
WBC # FLD AUTO: 9.81 K/UL — SIGNIFICANT CHANGE UP (ref 3.8–10.5)

## 2022-05-20 RX ORDER — CEFAZOLIN SODIUM 1 G
VIAL (EA) INJECTION
Refills: 0 | Status: DISCONTINUED | OUTPATIENT
Start: 2022-05-20 | End: 2022-05-22

## 2022-05-20 RX ORDER — CEFAZOLIN SODIUM 1 G
2000 VIAL (EA) INJECTION ONCE
Refills: 0 | Status: COMPLETED | OUTPATIENT
Start: 2022-05-20 | End: 2022-05-20

## 2022-05-20 RX ORDER — HYDROMORPHONE HYDROCHLORIDE 2 MG/ML
0.5 INJECTION INTRAMUSCULAR; INTRAVENOUS; SUBCUTANEOUS ONCE
Refills: 0 | Status: DISCONTINUED | OUTPATIENT
Start: 2022-05-20 | End: 2022-05-20

## 2022-05-20 RX ORDER — CEFAZOLIN SODIUM 1 G
2000 VIAL (EA) INJECTION EVERY 8 HOURS
Refills: 0 | Status: DISCONTINUED | OUTPATIENT
Start: 2022-05-20 | End: 2022-05-22

## 2022-05-20 RX ADMIN — Medication 100 MILLIGRAM(S): at 15:21

## 2022-05-20 RX ADMIN — OXYCODONE HYDROCHLORIDE 10 MILLIGRAM(S): 5 TABLET ORAL at 16:41

## 2022-05-20 RX ADMIN — AMLODIPINE BESYLATE 10 MILLIGRAM(S): 2.5 TABLET ORAL at 05:13

## 2022-05-20 RX ADMIN — QUETIAPINE FUMARATE 200 MILLIGRAM(S): 200 TABLET, FILM COATED ORAL at 21:19

## 2022-05-20 RX ADMIN — OXYCODONE HYDROCHLORIDE 10 MILLIGRAM(S): 5 TABLET ORAL at 11:55

## 2022-05-20 RX ADMIN — GABAPENTIN 1200 MILLIGRAM(S): 400 CAPSULE ORAL at 21:19

## 2022-05-20 RX ADMIN — OXYCODONE HYDROCHLORIDE 15 MILLIGRAM(S): 5 TABLET ORAL at 06:20

## 2022-05-20 RX ADMIN — GABAPENTIN 1200 MILLIGRAM(S): 400 CAPSULE ORAL at 05:12

## 2022-05-20 RX ADMIN — Medication 75 MILLIGRAM(S): at 12:07

## 2022-05-20 RX ADMIN — APIXABAN 5 MILLIGRAM(S): 2.5 TABLET, FILM COATED ORAL at 11:50

## 2022-05-20 RX ADMIN — OXYCODONE HYDROCHLORIDE 10 MILLIGRAM(S): 5 TABLET ORAL at 21:17

## 2022-05-20 RX ADMIN — METHOCARBAMOL 1500 MILLIGRAM(S): 500 TABLET, FILM COATED ORAL at 15:18

## 2022-05-20 RX ADMIN — HYDROMORPHONE HYDROCHLORIDE 0.5 MILLIGRAM(S): 2 INJECTION INTRAMUSCULAR; INTRAVENOUS; SUBCUTANEOUS at 19:10

## 2022-05-20 RX ADMIN — HYDROMORPHONE HYDROCHLORIDE 0.5 MILLIGRAM(S): 2 INJECTION INTRAMUSCULAR; INTRAVENOUS; SUBCUTANEOUS at 19:03

## 2022-05-20 RX ADMIN — METHOCARBAMOL 1500 MILLIGRAM(S): 500 TABLET, FILM COATED ORAL at 05:13

## 2022-05-20 RX ADMIN — Medication 975 MILLIGRAM(S): at 05:12

## 2022-05-20 RX ADMIN — Medication 975 MILLIGRAM(S): at 06:20

## 2022-05-20 RX ADMIN — Medication 975 MILLIGRAM(S): at 15:21

## 2022-05-20 RX ADMIN — APIXABAN 5 MILLIGRAM(S): 2.5 TABLET, FILM COATED ORAL at 21:19

## 2022-05-20 RX ADMIN — OXYCODONE HYDROCHLORIDE 15 MILLIGRAM(S): 5 TABLET ORAL at 19:15

## 2022-05-20 RX ADMIN — OXYCODONE HYDROCHLORIDE 10 MILLIGRAM(S): 5 TABLET ORAL at 22:48

## 2022-05-20 RX ADMIN — ATORVASTATIN CALCIUM 80 MILLIGRAM(S): 80 TABLET, FILM COATED ORAL at 21:18

## 2022-05-20 RX ADMIN — Medication 975 MILLIGRAM(S): at 15:20

## 2022-05-20 RX ADMIN — METHOCARBAMOL 1500 MILLIGRAM(S): 500 TABLET, FILM COATED ORAL at 21:18

## 2022-05-20 RX ADMIN — OXYCODONE HYDROCHLORIDE 15 MILLIGRAM(S): 5 TABLET ORAL at 05:12

## 2022-05-20 RX ADMIN — Medication 81 MILLIGRAM(S): at 11:50

## 2022-05-20 RX ADMIN — Medication 975 MILLIGRAM(S): at 21:18

## 2022-05-20 RX ADMIN — Medication 200 MILLIGRAM(S): at 19:06

## 2022-05-20 RX ADMIN — OXYCODONE HYDROCHLORIDE 10 MILLIGRAM(S): 5 TABLET ORAL at 12:52

## 2022-05-20 RX ADMIN — OXYCODONE HYDROCHLORIDE 15 MILLIGRAM(S): 5 TABLET ORAL at 19:08

## 2022-05-20 RX ADMIN — GABAPENTIN 1200 MILLIGRAM(S): 400 CAPSULE ORAL at 15:17

## 2022-05-20 RX ADMIN — PANTOPRAZOLE SODIUM 40 MILLIGRAM(S): 20 TABLET, DELAYED RELEASE ORAL at 05:12

## 2022-05-20 RX ADMIN — OXYCODONE HYDROCHLORIDE 10 MILLIGRAM(S): 5 TABLET ORAL at 15:26

## 2022-05-20 RX ADMIN — Medication 100 MILLIGRAM(S): at 21:17

## 2022-05-20 RX ADMIN — Medication 975 MILLIGRAM(S): at 22:50

## 2022-05-20 RX ADMIN — Medication 200 MILLIGRAM(S): at 05:12

## 2022-05-20 NOTE — PROGRESS NOTE ADULT - SUBJECTIVE AND OBJECTIVE BOX
Interval Hx:  Patient seen during rounds  Patient reports pain to be controlled on current medications  has not needed iv analgesics in past 24 hours  Patient denies sedation with medications     Analgesic Dosing for past 24 hours reviewed as below:  acetaminophen     Tablet ..   975 milliGRAM(s) Oral (05-20-22 @ 05:12)   975 milliGRAM(s) Oral (05-19-22 @ 21:22)   975 milliGRAM(s) Oral (05-19-22 @ 14:31)    gabapentin   1200 milliGRAM(s) Oral (05-20-22 @ 05:12)   1200 milliGRAM(s) Oral (05-19-22 @ 21:21)   1200 milliGRAM(s) Oral (05-19-22 @ 14:31)    methocarbamol   1500 milliGRAM(s) Oral (05-20-22 @ 05:13)   1500 milliGRAM(s) Oral (05-19-22 @ 21:22)   1500 milliGRAM(s) Oral (05-19-22 @ 14:32)    oxyCODONE    IR   10 milliGRAM(s) Oral (05-20-22 @ 11:55)   10 milliGRAM(s) Oral (05-19-22 @ 21:27)    oxyCODONE  ER Tablet   15 milliGRAM(s) Oral (05-20-22 @ 05:12)   15 milliGRAM(s) Oral (05-19-22 @ 17:53)    phenytoin ER Oral Tab/Cap - Peds   200 milliGRAM(s) Oral (05-20-22 @ 05:12)   200 milliGRAM(s) Oral (05-19-22 @ 17:52)    QUEtiapine   200 milliGRAM(s) Oral (05-19-22 @ 21:22)    venlafaxine XR.   75 milliGRAM(s) Oral (05-20-22 @ 12:07)          T(C): 36.6 (05-20-22 @ 11:21), Max: 36.9 (05-19-22 @ 16:10)  HR: 103 (05-20-22 @ 11:21) (86 - 103)  BP: 167/76 (05-20-22 @ 11:21) (127/72 - 167/76)  RR: 18 (05-20-22 @ 11:21) (18 - 19)  SpO2: 95% (05-20-22 @ 11:21) (95% - 99%)      05-19-22 @ 07:01  -  05-20-22 @ 07:00  --------------------------------------------------------  IN: 100 mL / OUT: 0 mL / NET: 100 mL        acetaminophen     Tablet .. 975 milliGRAM(s) Oral every 8 hours  amLODIPine   Tablet 10 milliGRAM(s) Oral daily  apixaban 5 milliGRAM(s) Oral two times a day  aspirin  chewable 81 milliGRAM(s) Oral daily  atorvastatin 80 milliGRAM(s) Oral at bedtime  fentaNYL    Injectable 50 MICROGram(s) IV Push once  gabapentin 1200 milliGRAM(s) Oral every 8 hours  HYDROmorphone  Injectable 0.5 milliGRAM(s) IV Push every 3 hours PRN  methocarbamol 1500 milliGRAM(s) Oral three times a day  multiple electrolytes Injection Type 1 1000 milliLiter(s) IV Continuous <Continuous>  ondansetron Injectable 4 milliGRAM(s) IV Push every 6 hours PRN  oxyCODONE    IR 5 milliGRAM(s) Oral every 3 hours PRN  oxyCODONE    IR 10 milliGRAM(s) Oral every 3 hours PRN  oxyCODONE  ER Tablet 15 milliGRAM(s) Oral every 12 hours  pantoprazole    Tablet 40 milliGRAM(s) Oral before breakfast  phenytoin ER Oral Tab/Cap - Peds 200 milliGRAM(s) Oral every 12 hours  QUEtiapine 200 milliGRAM(s) Oral at bedtime  senna 2 Tablet(s) Oral at bedtime  venlafaxine XR. 75 milliGRAM(s) Oral daily                          9.5    9.81  )-----------( 472      ( 20 May 2022 05:52 )             30.6                 Pain Service   [phone]

## 2022-05-20 NOTE — DISCHARGE NOTE PROVIDER - NSDCMRMEDTOKEN_GEN_ALL_CORE_FT
amLODIPine 10 mg oral tablet: 1 tab(s) orally once a day  aspirin 81 mg oral tablet, chewable: 1 tab(s) orally once a day  atorvastatin 80 mg oral tablet: 1 tab(s) orally once a day  Fish Oil 1000 mg oral capsule: 1 cap(s) orally 2 times a day  gabapentin 300 mg oral capsule: 2 cap(s) orally every 8 hours  methocarbamol 750 mg oral tablet: 1 tab(s) orally 3 times a day   MiraLax oral powder for reconstitution: 17 gram(s) orally once a day   Oxaydo 5 mg oral tablet: 1 tab(s) orally every 6 hours, As Needed -Moderate Pain (4 - 6) MDD:4  oxyCODONE 5 mg oral capsule: 1 cap(s) orally every 12 hours, As Needed -for severe pain MDD:Max 2   oxyCODONE 5 mg oral tablet: 1 tab(s) orally every 6 hours, As Needed -for severe pain MDD:4   pantoprazole 40 mg oral delayed release tablet: 1 tab(s) orally once a day  phenytoin 200 mg oral capsule, extended release: 1 cap(s) orally every 12 hours  Plavix 75 mg oral tablet: 1 tab(s) orally once a day  QUEtiapine 200 mg oral tablet:  orally once a day (at bedtime)  senna oral tablet: 2 tab(s) orally once a day (at bedtime)   venlafaxine 75 mg oral capsule, extended release: 1 cap(s) orally once a day   amLODIPine 10 mg oral tablet: 1 tab(s) orally once a day  aspirin 81 mg oral tablet, chewable: 1 tab(s) orally once a day  atorvastatin 80 mg oral tablet: 1 tab(s) orally once a day  Fish Oil 1000 mg oral capsule: 1 cap(s) orally 2 times a day  gabapentin 300 mg oral capsule: 2 cap(s) orally every 8 hours  Orthotic boot to b/l lower extremity :   pantoprazole 40 mg oral delayed release tablet: 1 tab(s) orally once a day  phenytoin 200 mg oral capsule, extended release: 1 cap(s) orally every 12 hours  Plavix 75 mg oral tablet: 1 tab(s) orally once a day  QUEtiapine 200 mg oral tablet:  orally once a day (at bedtime)  venlafaxine 75 mg oral capsule, extended release: 1 cap(s) orally once a day   amLODIPine 10 mg oral tablet: 1 tab(s) orally once a day  amoxicillin-clavulanate 875 mg-125 mg oral tablet: 1 tab(s) orally every 12 hours  apixaban 5 mg oral tablet: 1 tab(s) orally every 12 hours  apixaban 5 mg oral tablet: 1 tab(s) orally every 12 hours  aspirin 81 mg oral tablet, chewable: 1 tab(s) orally once a day  atorvastatin 80 mg oral tablet: 1 tab(s) orally once a day  Fish Oil 1000 mg oral capsule: 1 cap(s) orally 2 times a day  gabapentin 300 mg oral capsule: 2 cap(s) orally every 8 hours  methylPREDNISolone: 20 milligram(s) orally once a day  Orthotic boot to b/l lower extremity :   pantoprazole 40 mg oral delayed release tablet: 1 tab(s) orally once a day  phenytoin 200 mg oral capsule, extended release: 1 cap(s) orally every 12 hours  QUEtiapine 200 mg oral tablet:  orally once a day (at bedtime)  venlafaxine 75 mg oral capsule, extended release: 1 cap(s) orally once a day

## 2022-05-20 NOTE — DISCHARGE NOTE PROVIDER - CARE PROVIDER_API CALL
Husam Montiel)  Surgery  [address] [facility], [address] [place] [address]  Phone: [phone]  Fax: [phone]  Follow Up Time: 1 week    Joseph Ferrer)  Podiatric Medicine and Surgery  [address] [place] [address]  Phone: [phone]  Fax: [phone]  Follow Up Time: 1 week

## 2022-05-20 NOTE — PROGRESS NOTE ADULT - ASSESSMENT
57M  hx of PAD  post-op from a Right Fem to PT bypass  acute RLE pain    acetaminophen     Tablet .. 975 milliGRAM(s) Oral every 8 hours  gabapentin 1200 milliGRAM(s) Oral every 8 hours HOLD for sedation   robaxin 1500mg TID standing. HOLD for sedation   oxycontin 15mg q12h HOLD for sedation   oxycodone PO 5mg/10mg [id number] PRN mod/severe pain  DC hydromorphone IVP 0.5mg q3hour PRN breakthrough pain     Pain controlled  Pain service will sign off  Please reconsult as needed     if pain becomes uncontrolled:  increase oxycodone to 10/15mg q3 PRN mod/sev pain

## 2022-05-20 NOTE — PROGRESS NOTE ADULT - SUBJECTIVE AND OBJECTIVE BOX
Acute Care Surgery/Trauma Surgery Progress Note:  No acute overnight events. Patient afebrile, VSS. Pain well controlled. Tolerating diet. Denies n/v/f/c/cp/sob.     Diet, Regular (05-13-22 @ 16:52)      Scheduled Medications:   acetaminophen     Tablet .. 975 milliGRAM(s) Oral every 8 hours  amLODIPine   Tablet 10 milliGRAM(s) Oral daily  apixaban 5 milliGRAM(s) Oral two times a day  aspirin  chewable 81 milliGRAM(s) Oral daily  atorvastatin 80 milliGRAM(s) Oral at bedtime  fentaNYL    Injectable 50 MICROGram(s) IV Push once  gabapentin 1200 milliGRAM(s) Oral every 8 hours  methocarbamol 1500 milliGRAM(s) Oral three times a day  multiple electrolytes Injection Type 1 1000 milliLiter(s) (10 mL/Hr) IV Continuous <Continuous>  oxyCODONE  ER Tablet 15 milliGRAM(s) Oral every 12 hours  pantoprazole    Tablet 40 milliGRAM(s) Oral before breakfast  phenytoin ER Oral Tab/Cap - Peds 200 milliGRAM(s) Oral every 12 hours  QUEtiapine 200 milliGRAM(s) Oral at bedtime  senna 2 Tablet(s) Oral at bedtime  venlafaxine XR. 75 milliGRAM(s) Oral daily    PRN Medications:  HYDROmorphone  Injectable 0.5 milliGRAM(s) IV Push every 3 hours PRN Severe Pain (7 - 10)  ondansetron Injectable 4 milliGRAM(s) IV Push every 6 hours PRN Nausea  oxyCODONE    IR 5 milliGRAM(s) Oral every 3 hours PRN Moderate Pain (4 - 6)  oxyCODONE    IR 10 milliGRAM(s) Oral every 3 hours PRN Severe Pain (7 - 10)      Objective:   T(F): 97.8 (05-20 @ 11:21), Max: 98.5 (05-19 @ 16:10)  HR: 103 (05-20 @ 11:21) (86 - 103)  BP: 167/76 (05-20 @ 11:21) (127/72 - 167/76)  BP(mean): --  ABP: --  ABP(mean): --  RR: 18 (05-20 @ 11:21) (18 - 19)  SpO2: 95% (05-20 @ 11:21) (95% - 99%)      Physical Exam:   GEN: patient resting comfortably in bed, in no acute distress  RESP: respirations are unlabored, no accessory muscle use, no conversational dyspnea  CVS: RRR  GI: Abdomen soft, non-tender, non-distended, no rebound tenderness / guarding  Extremities: Inguinal area hardened in the right site, no oozing no expanding collections, or gushing Staple line on the right limb is intact. DP is deplerable in the right site. Blister in the rt, inguinal area about 1.2cm diameter--intact.  Dressing covering rt toes.   Oriented: in time, place and location    I&O's    05-19 @ 07:01  -  05-20 @ 07:00  --------------------------------------------------------  IN:    multiple electrolytes Injection Type 1.: 100 mL  Total IN: 100 mL    OUT:  Total OUT: 0 mL    Total NET: 100 mL          LABS:                        9.5    9.81  )-----------( 472      ( 20 May 2022 05:52 )             30.6                 MICROBIOLOGY:       PATHOLOGY:

## 2022-05-20 NOTE — PROGRESS NOTE ADULT - ASSESSMENT
57y Male post-op from a Right Fem to PT bypass with own ipsilateral saphenous vein graft. Acute drop in Hb while on heparin drip which is now held s/p 2units RBC. Patient doing well on PCA after medications adjustment and has been weaned to oral analgesia. Remains hemodynamically well and afebrile. Prevena discontinue 5/17.     Plan:    Continue home medications  Pain control on oral analgesia  IS, and OOB  Diet as tolerated  Bowel Regimen  Eliquis  Ancef antibiotics.  Keflex for total 5 day course of antibiotics if discharged  PT/OT  Dispo: MAX plan for Monday 5/23

## 2022-05-20 NOTE — DISCHARGE NOTE PROVIDER - NSDCFUADDINST_GEN_ALL_CORE_FT
WOUND CARE: You may leave wound open to air. You may cover with gauze and tape if you have mild drainage.   BATHING: Please do not submerge wound underwater. You may shower and/or sponge bathe.   ACTIVITY: No heavy lifting or straining. Otherwise, you may return to your usual level of physical activity. If you are taking narcotic pain medication (such as oxycodone) DO NOT drive a car, operate machinery or make important decisions.  DIET: Return to your usual diet.  NOTIFY YOUR SURGEON IF: You have any bleeding that does not stop, any pus draining from your wound(s), chest pain, shortness of breath, any fever (over 100.4 F) or chills, persistent nausea/vomiting, persistent diarrhea, concerning symptoms, or if your pain is not controlled on your discharge pain medications.  FOLLOW-UP: Please follow up with Dr. Montiel in 1-2 weeks regarding your hospitalization. Call for appointment upon discharge.    WOUND CARE: You may leave wound open to air. You may cover with gauze and tape if you have mild drainage.   BATHING: Please do not submerge wound underwater. You may shower and/or sponge bathe.   ACTIVITY: No heavy lifting or straining. Otherwise, you may return to your usual level of physical activity. If you are taking narcotic pain medication (such as oxycodone) DO NOT drive a car, operate machinery or make important decisions.  Must Follow up with vascular surgery within a week.  Must follow up with podiatry within 2 weeks.  DIET: Return to your usual diet.  NOTIFY YOUR SURGEON IF: You have any bleeding that does not stop, any pus draining from your wound(s), chest pain, shortness of breath, any fever (over 100.4 F) or chills, persistent nausea/vomiting, persistent diarrhea, concerning symptoms, or if your pain is not controlled on your discharge pain medications.  FOLLOW-UP: Please follow up with Dr. Montiel in 1-2 weeks regarding your hospitalization. Call for appointment upon discharge.

## 2022-05-20 NOTE — DISCHARGE NOTE PROVIDER - PROVIDER TOKENS
PROVIDER:[TOKEN:[[id number]],FOLLOWUP:[1 week]],PROVIDER:[TOKEN:[[id number]],FOLLOWUP:[1 week]]

## 2022-05-20 NOTE — DISCHARGE NOTE PROVIDER - HOSPITAL COURSE
Patient is a 56 yo M who presented to [facility] with worsening R leg pain and discoloration in R toes 5/11. Pain progressed from initially while walking to rest pain. He is well known to vascular surgery at [facility], recent femoral endarterectomy on 4/15. Pain control was initiated and he was pre-oped to be taken to the OR 5/13. On 5/13 he was taken to the OR for a  femoral- posterior tibial bypass graft in-situ vein. Post operatively patient was hemodynamically stable, afebrile, and remained supine. Pain was under control with pain regimen, and heparin was restarted POD#1.                    Patient is a 56 yo M who presented to [facility] with worsening R leg pain and discoloration in R toes 5/11. Pain progressed from initially while walking to rest pain. He is well known to vascular surgery at [facility], recent femoral endarterectomy on 4/15. Pain control was initiated and he was pre-oped to be taken to the OR 5/13. On 5/13 he was taken to the OR for a  femoral- posterior tibial bypass graft in-situ vein. Post operatively patient was hemodynamically stable, afebrile, and remained supine. Pain was under control with pain regimen, and heparin was restarted POD#1. Patency of bypass remained intact throughout duration of hospitalization. he was transitioned to oral anticoagulation on Eliquis and tolerated it well without acute issues. During hospitalization, his 1st and 2nd digit on right foot developed re-perfusion injury and with prior ischemia evolved into ongoing tissue necrosis which was managed by podiatric services. plan for dressing changes and local wound care and outpatient follow up with finalized demarcation for amputation in the outpatient setting. He was given an offloading boot by orthotics to assist and ambulation. On 5/24 had palpitations and chest pain, cardiology consulted and was found to have non-anginal chest pain. Symptoms subsided on own with conservative measures. On 5/25 patient had worsening right stephanie-tonsilar swelling secondary to acute pharyngitis. Swelling progressively worsened following conservative management and given difficulty clearing airway, changes in voice, and difficulty breathing was transferred to the ICU for management of pharyngitis. ENT was able to assess the patient and noted on laryngoscopy and CT imaging to have a right pharyngeal phelgmon without abscess formation. Patient was treated on antibiotics and steroids. Swelling subsided and patient stable for transfer to the floors. Doing well on the floors and tolerating diet with plan for completion of oral antibiotics and medrol dosepak. At this time patient is stable for discharge. Staples to leg wound were removed with groin stitches and staples to remain in place until seen in the outpatient setting. The patients diet was advanced to soft diet and was placed on PO medications.  Once patient was ambulating well, voiding without difficulty, and tolerating a full diet, they were found to be stable for discharge to rehab.  Pain was well-controlled at time of discharge.

## 2022-05-20 NOTE — PROGRESS NOTE ADULT - ASSESSMENT
The patient is a 57y Male post-op from a Right Fem to PT bypass with own ipsilateral saphenous vein graft. Acute drop in Hb while on heparin drip which is now held s/p 2units RBC. Patient doing well on PCA after medications adjustment. Remains hemodynamically well and afebrile. Prevena discontinue 5/17. Anxious about his own future    Plan:    - Oral pain meds.   - Eliquis started 5/17  - Monitor oral intake  - IS.  - Prevena was D/C  5/17/22  - Podiatry fu the pt along. Local povidone to right toes.  - PT deemed pt home with services  - No dressing needed  -Discharge planning in place: PT, vascular full up, podiatry follow up.    The patient is a 57y Male post-op from a Right Fem to PT bypass with own ipsilateral saphenous vein graft. Acute drop in Hb while on heparin drip which is now held s/p 2units RBC. Patient doing well on PCA after medications adjustment. Remains hemodynamically well and afebrile. Prevena discontinue 5/17. Anxious about his own future    Plan:    - Oral pain meds.   - Eliquis started 5/17  - Monitor oral intake  - IS.  - Prevena was D/C  5/17/22  - Podiatry fu the pt along. Local povidone to right toes.  - PT deemed pt home with services  - No dressing needed  -Discharge planning in place: PT, vascular full up, podiatry follow up, and Eliquis for a/c.

## 2022-05-20 NOTE — PROGRESS NOTE ADULT - SUBJECTIVE AND OBJECTIVE BOX
Acute Care Surgery /Vascular  Surgery Progress Note:    No acute overnight events. Patient afebrile, hemodynamically well Pain well controlled. Tolerating diet. Denies n/v/f/c/cp/sob. Hyperemia of the right distal leg and foot with dorsalis pedis , PT that are palpable.    Diet, Regular (05-13-22 @ 16:52)      Scheduled Medications:   acetaminophen     Tablet .. 975 milliGRAM(s) Oral every 8 hours  amLODIPine   Tablet 10 milliGRAM(s) Oral daily  apixaban 5 milliGRAM(s) Oral two times a day  aspirin  chewable 81 milliGRAM(s) Oral daily  atorvastatin 80 milliGRAM(s) Oral at bedtime  ceFAZolin   IVPB      ceFAZolin   IVPB 2000 milliGRAM(s) IV Intermittent every 8 hours  fentaNYL    Injectable 50 MICROGram(s) IV Push once  gabapentin 1200 milliGRAM(s) Oral every 8 hours  methocarbamol 1500 milliGRAM(s) Oral three times a day  multiple electrolytes Injection Type 1 1000 milliLiter(s) (10 mL/Hr) IV Continuous <Continuous>  oxyCODONE  ER Tablet 15 milliGRAM(s) Oral every 12 hours  pantoprazole    Tablet 40 milliGRAM(s) Oral before breakfast  phenytoin ER Oral Tab/Cap - Peds 200 milliGRAM(s) Oral every 12 hours  QUEtiapine 200 milliGRAM(s) Oral at bedtime  senna 2 Tablet(s) Oral at bedtime  venlafaxine XR. 75 milliGRAM(s) Oral daily    PRN Medications:  ondansetron Injectable 4 milliGRAM(s) IV Push every 6 hours PRN Nausea  oxyCODONE    IR 5 milliGRAM(s) Oral every 3 hours PRN Moderate Pain (4 - 6)  oxyCODONE    IR 10 milliGRAM(s) Oral every 3 hours PRN Severe Pain (7 - 10)      Objective:   T(F): 98.6 (05-22 @ 00:15), Max: 98.6 (05-22 @ 00:15)  HR: 92 (05-22 @ 00:15) (92 - 97)  BP: 156/73 (05-22 @ 00:15) (142/74 - 177/96)  BP(mean): --  ABP: --  ABP(mean): --  RR: 18 (05-22 @ 00:15) (18 - 20)  SpO2: 93% (05-22 @ 00:15) (93% - 97%)      Physical Exam:   GEN: patient resting comfortably in bed, in no acute distress  RESP: respirations are unlabored, no accessory muscle use, no conversational dyspnea  CVS: RRR  GI: Abdomen soft, non-tender, non-distended, no rebound tenderness / guarding  Extremities: Hyperemia of the distal right leg and foot. Palpable Dp and PT.  Oriented x 3    I&O's    05-20 @ 07:01  -  05-21 @ 07:00  --------------------------------------------------------  IN:  Total IN: 0 mL    OUT:    Voided (mL): 700 mL  Total OUT: 700 mL    Total NET: -700 mL          LABS:                        10.0   10.77 )-----------( 521      ( 21 May 2022 06:20 )             31.8                 MICROBIOLOGY:       PATHOLOGY:

## 2022-05-20 NOTE — DISCHARGE NOTE PROVIDER - CARE PROVIDERS DIRECT ADDRESSES
[email or web address]

## 2022-05-20 NOTE — PROGRESS NOTE ADULT - TIME BILLING
Pain Management - chart review, patient interview

## 2022-05-20 NOTE — DISCHARGE NOTE PROVIDER - NSDCCPTREATMENT_GEN_ALL_CORE_FT
PRINCIPAL PROCEDURE  Procedure: Femoral popliteal bypass with vein  Findings and Treatment: with own safenous vein

## 2022-05-20 NOTE — DISCHARGE NOTE PROVIDER - NSDCFUSCHEDAPPT_GEN_ALL_CORE_FT
Husam Montiel  [facility]mit  Scheduled Appointment: 05/25/2022

## 2022-05-20 NOTE — DISCHARGE NOTE PROVIDER - NSDCCPCAREPLAN_GEN_ALL_CORE_FT
PRINCIPAL DISCHARGE DIAGNOSIS  Diagnosis: PVD (peripheral vascular disease)  Assessment and Plan of Treatment:       SECONDARY DISCHARGE DIAGNOSES  Diagnosis: Limb ischemia  Assessment and Plan of Treatment:

## 2022-05-21 LAB
HCT VFR BLD CALC: 31.8 % — LOW (ref 39–50)
HGB BLD-MCNC: 10 G/DL — LOW (ref 13–17)
MCHC RBC-ENTMCNC: 29.6 PG — SIGNIFICANT CHANGE UP (ref 27–34)
MCHC RBC-ENTMCNC: 31.4 GM/DL — LOW (ref 32–36)
MCV RBC AUTO: 94.1 FL — SIGNIFICANT CHANGE UP (ref 80–100)
PLATELET # BLD AUTO: 521 K/UL — HIGH (ref 150–400)
RBC # BLD: 3.38 M/UL — LOW (ref 4.2–5.8)
RBC # FLD: 14.8 % — HIGH (ref 10.3–14.5)
WBC # BLD: 10.77 K/UL — HIGH (ref 3.8–10.5)
WBC # FLD AUTO: 10.77 K/UL — HIGH (ref 3.8–10.5)

## 2022-05-21 RX ADMIN — Medication 81 MILLIGRAM(S): at 08:41

## 2022-05-21 RX ADMIN — APIXABAN 5 MILLIGRAM(S): 2.5 TABLET, FILM COATED ORAL at 08:41

## 2022-05-21 RX ADMIN — OXYCODONE HYDROCHLORIDE 10 MILLIGRAM(S): 5 TABLET ORAL at 17:29

## 2022-05-21 RX ADMIN — AMLODIPINE BESYLATE 10 MILLIGRAM(S): 2.5 TABLET ORAL at 05:25

## 2022-05-21 RX ADMIN — Medication 975 MILLIGRAM(S): at 22:15

## 2022-05-21 RX ADMIN — APIXABAN 5 MILLIGRAM(S): 2.5 TABLET, FILM COATED ORAL at 21:18

## 2022-05-21 RX ADMIN — Medication 100 MILLIGRAM(S): at 05:26

## 2022-05-21 RX ADMIN — Medication 200 MILLIGRAM(S): at 16:52

## 2022-05-21 RX ADMIN — Medication 975 MILLIGRAM(S): at 06:36

## 2022-05-21 RX ADMIN — OXYCODONE HYDROCHLORIDE 10 MILLIGRAM(S): 5 TABLET ORAL at 22:15

## 2022-05-21 RX ADMIN — OXYCODONE HYDROCHLORIDE 10 MILLIGRAM(S): 5 TABLET ORAL at 08:41

## 2022-05-21 RX ADMIN — Medication 100 MILLIGRAM(S): at 21:18

## 2022-05-21 RX ADMIN — GABAPENTIN 1200 MILLIGRAM(S): 400 CAPSULE ORAL at 12:15

## 2022-05-21 RX ADMIN — Medication 75 MILLIGRAM(S): at 09:18

## 2022-05-21 RX ADMIN — GABAPENTIN 1200 MILLIGRAM(S): 400 CAPSULE ORAL at 05:26

## 2022-05-21 RX ADMIN — OXYCODONE HYDROCHLORIDE 10 MILLIGRAM(S): 5 TABLET ORAL at 12:34

## 2022-05-21 RX ADMIN — Medication 975 MILLIGRAM(S): at 05:25

## 2022-05-21 RX ADMIN — OXYCODONE HYDROCHLORIDE 15 MILLIGRAM(S): 5 TABLET ORAL at 05:26

## 2022-05-21 RX ADMIN — Medication 975 MILLIGRAM(S): at 12:22

## 2022-05-21 RX ADMIN — OXYCODONE HYDROCHLORIDE 10 MILLIGRAM(S): 5 TABLET ORAL at 12:14

## 2022-05-21 RX ADMIN — OXYCODONE HYDROCHLORIDE 15 MILLIGRAM(S): 5 TABLET ORAL at 16:51

## 2022-05-21 RX ADMIN — ATORVASTATIN CALCIUM 80 MILLIGRAM(S): 80 TABLET, FILM COATED ORAL at 21:19

## 2022-05-21 RX ADMIN — Medication 975 MILLIGRAM(S): at 12:14

## 2022-05-21 RX ADMIN — METHOCARBAMOL 1500 MILLIGRAM(S): 500 TABLET, FILM COATED ORAL at 12:15

## 2022-05-21 RX ADMIN — PANTOPRAZOLE SODIUM 40 MILLIGRAM(S): 20 TABLET, DELAYED RELEASE ORAL at 05:26

## 2022-05-21 RX ADMIN — Medication 100 MILLIGRAM(S): at 12:15

## 2022-05-21 RX ADMIN — OXYCODONE HYDROCHLORIDE 10 MILLIGRAM(S): 5 TABLET ORAL at 09:00

## 2022-05-21 RX ADMIN — GABAPENTIN 1200 MILLIGRAM(S): 400 CAPSULE ORAL at 21:18

## 2022-05-21 RX ADMIN — OXYCODONE HYDROCHLORIDE 15 MILLIGRAM(S): 5 TABLET ORAL at 06:37

## 2022-05-21 RX ADMIN — Medication 200 MILLIGRAM(S): at 05:25

## 2022-05-21 RX ADMIN — METHOCARBAMOL 1500 MILLIGRAM(S): 500 TABLET, FILM COATED ORAL at 05:25

## 2022-05-21 RX ADMIN — OXYCODONE HYDROCHLORIDE 15 MILLIGRAM(S): 5 TABLET ORAL at 16:53

## 2022-05-21 RX ADMIN — OXYCODONE HYDROCHLORIDE 10 MILLIGRAM(S): 5 TABLET ORAL at 16:56

## 2022-05-21 RX ADMIN — OXYCODONE HYDROCHLORIDE 10 MILLIGRAM(S): 5 TABLET ORAL at 21:19

## 2022-05-21 RX ADMIN — Medication 975 MILLIGRAM(S): at 21:19

## 2022-05-21 RX ADMIN — METHOCARBAMOL 1500 MILLIGRAM(S): 500 TABLET, FILM COATED ORAL at 21:18

## 2022-05-21 RX ADMIN — QUETIAPINE FUMARATE 200 MILLIGRAM(S): 200 TABLET, FILM COATED ORAL at 21:18

## 2022-05-21 NOTE — PROGRESS NOTE ADULT - ASSESSMENT
57y Male post-op from a Right Fem to PT bypass with own ipsilateral saphenous vein graft. Acute drop in Hb while on heparin drip which is now held s/p 2units RBC. Patient doing well on PCA after medications adjustment and has been weaned to oral analgesia. Remains hemodynamically well and afebrile. Prevena discontinue 5/17.     Plan:    Continue home medications  Pain consult  Pain control on oral analgesia  IS,OOB  Diet as tolerated  Bowel Regimen  Eliquis  Ancef antibiotics. convert to keflex for total 5 day course of antibiotics if discharged  PT/OT    Dispo: MAX

## 2022-05-21 NOTE — PROGRESS NOTE ADULT - SUBJECTIVE AND OBJECTIVE BOX
INTERVAL HPI/OVERNIGHT EVENTS:    Patient seen this AM with no acute events overnight. Patient without any acute complaints at this time. Patients' pain is well controlled on current pain regiment with intermittent IV dosing with pain on ambulation. Tolerating diet without any n/v, has been having normal bowel function. Remains hemodynamically stable and denies fevers, chills. No CP or SOB     MEDICATIONS  (STANDING):  acetaminophen     Tablet .. 975 milliGRAM(s) Oral every 8 hours  amLODIPine   Tablet 10 milliGRAM(s) Oral daily  apixaban 5 milliGRAM(s) Oral two times a day  aspirin  chewable 81 milliGRAM(s) Oral daily  atorvastatin 80 milliGRAM(s) Oral at bedtime  ceFAZolin   IVPB      ceFAZolin   IVPB 2000 milliGRAM(s) IV Intermittent every 8 hours  fentaNYL    Injectable 50 MICROGram(s) IV Push once  gabapentin 1200 milliGRAM(s) Oral every 8 hours  methocarbamol 1500 milliGRAM(s) Oral three times a day  multiple electrolytes Injection Type 1 1000 milliLiter(s) (10 mL/Hr) IV Continuous <Continuous>  oxyCODONE  ER Tablet 15 milliGRAM(s) Oral every 12 hours  pantoprazole    Tablet 40 milliGRAM(s) Oral before breakfast  phenytoin ER Oral Tab/Cap - Peds 200 milliGRAM(s) Oral every 12 hours  QUEtiapine 200 milliGRAM(s) Oral at bedtime  senna 2 Tablet(s) Oral at bedtime  venlafaxine XR. 75 milliGRAM(s) Oral daily    MEDICATIONS  (PRN):  ondansetron Injectable 4 milliGRAM(s) IV Push every 6 hours PRN Nausea  oxyCODONE    IR 10 milliGRAM(s) Oral every 3 hours PRN Severe Pain (7 - 10)  oxyCODONE    IR 5 milliGRAM(s) Oral every 3 hours PRN Moderate Pain (4 - 6)      Vital Signs Last 24 Hrs  T(C): 36.7 (20 May 2022 19:34), Max: 36.8 (20 May 2022 16:19)  T(F): 98.1 (20 May 2022 19:34), Max: 98.3 (20 May 2022 16:19)  HR: 94 (20 May 2022 19:34) (87 - 103)  BP: 189/81 (20 May 2022 19:34) (127/72 - 189/81)  BP(mean): --  RR: 20 (20 May 2022 19:34) (18 - 20)  SpO2: 95% (20 May 2022 19:34) (95% - 99%)    Physical Exam:    GEN: patient resting comfortably in bed, in no acute distress  RESP: respirations are unlabored, no accessory muscle use, no conversational dyspnea  CVS: RRR  GI: Abdomen soft, non-tender, non-distended, no rebound tenderness / guarding  Extremities: groin without swelling or tenderness. small blister lateral to groin access. blanchable erythema of distal aspect of inccision. No bleeding or drainage from staple line  Dressing covering rt toes.   Oriented: in time, place and location  Vasc: palpable right PT pulse. Doppler signals to left DP and PT    I&O's Detail    19 May 2022 07:01  -  20 May 2022 07:00  --------------------------------------------------------  IN:    multiple electrolytes Injection Type 1.: 100 mL  Total IN: 100 mL    OUT:  Total OUT: 0 mL    Total NET: 100 mL          LABS:                        9.5    9.81  )-----------( 472      ( 20 May 2022 05:52 )             30.6                 RADIOLOGY & ADDITIONAL STUDIES:

## 2022-05-22 LAB
HCT VFR BLD CALC: 32 % — LOW (ref 39–50)
HGB BLD-MCNC: 10 G/DL — LOW (ref 13–17)
MCHC RBC-ENTMCNC: 29.8 PG — SIGNIFICANT CHANGE UP (ref 27–34)
MCHC RBC-ENTMCNC: 31.3 GM/DL — LOW (ref 32–36)
MCV RBC AUTO: 95.2 FL — SIGNIFICANT CHANGE UP (ref 80–100)
PLATELET # BLD AUTO: 562 K/UL — HIGH (ref 150–400)
RBC # BLD: 3.36 M/UL — LOW (ref 4.2–5.8)
RBC # FLD: 14.9 % — HIGH (ref 10.3–14.5)
SARS-COV-2 RNA SPEC QL NAA+PROBE: SIGNIFICANT CHANGE UP
WBC # BLD: 9.59 K/UL — SIGNIFICANT CHANGE UP (ref 3.8–10.5)
WBC # FLD AUTO: 9.59 K/UL — SIGNIFICANT CHANGE UP (ref 3.8–10.5)

## 2022-05-22 RX ORDER — VANCOMYCIN HCL 1 G
1000 VIAL (EA) INTRAVENOUS EVERY 12 HOURS
Refills: 0 | Status: DISCONTINUED | OUTPATIENT
Start: 2022-05-22 | End: 2022-05-24

## 2022-05-22 RX ADMIN — Medication 200 MILLIGRAM(S): at 05:45

## 2022-05-22 RX ADMIN — OXYCODONE HYDROCHLORIDE 10 MILLIGRAM(S): 5 TABLET ORAL at 08:59

## 2022-05-22 RX ADMIN — OXYCODONE HYDROCHLORIDE 10 MILLIGRAM(S): 5 TABLET ORAL at 09:50

## 2022-05-22 RX ADMIN — Medication 975 MILLIGRAM(S): at 12:09

## 2022-05-22 RX ADMIN — OXYCODONE HYDROCHLORIDE 15 MILLIGRAM(S): 5 TABLET ORAL at 05:44

## 2022-05-22 RX ADMIN — OXYCODONE HYDROCHLORIDE 10 MILLIGRAM(S): 5 TABLET ORAL at 05:45

## 2022-05-22 RX ADMIN — Medication 200 MILLIGRAM(S): at 17:46

## 2022-05-22 RX ADMIN — OXYCODONE HYDROCHLORIDE 10 MILLIGRAM(S): 5 TABLET ORAL at 19:46

## 2022-05-22 RX ADMIN — OXYCODONE HYDROCHLORIDE 10 MILLIGRAM(S): 5 TABLET ORAL at 06:40

## 2022-05-22 RX ADMIN — GABAPENTIN 1200 MILLIGRAM(S): 400 CAPSULE ORAL at 05:44

## 2022-05-22 RX ADMIN — OXYCODONE HYDROCHLORIDE 10 MILLIGRAM(S): 5 TABLET ORAL at 16:45

## 2022-05-22 RX ADMIN — OXYCODONE HYDROCHLORIDE 10 MILLIGRAM(S): 5 TABLET ORAL at 15:49

## 2022-05-22 RX ADMIN — OXYCODONE HYDROCHLORIDE 15 MILLIGRAM(S): 5 TABLET ORAL at 19:00

## 2022-05-22 RX ADMIN — Medication 975 MILLIGRAM(S): at 06:40

## 2022-05-22 RX ADMIN — AMLODIPINE BESYLATE 10 MILLIGRAM(S): 2.5 TABLET ORAL at 05:46

## 2022-05-22 RX ADMIN — OXYCODONE HYDROCHLORIDE 15 MILLIGRAM(S): 5 TABLET ORAL at 17:45

## 2022-05-22 RX ADMIN — OXYCODONE HYDROCHLORIDE 10 MILLIGRAM(S): 5 TABLET ORAL at 13:00

## 2022-05-22 RX ADMIN — METHOCARBAMOL 1500 MILLIGRAM(S): 500 TABLET, FILM COATED ORAL at 05:45

## 2022-05-22 RX ADMIN — QUETIAPINE FUMARATE 200 MILLIGRAM(S): 200 TABLET, FILM COATED ORAL at 21:01

## 2022-05-22 RX ADMIN — APIXABAN 5 MILLIGRAM(S): 2.5 TABLET, FILM COATED ORAL at 08:53

## 2022-05-22 RX ADMIN — Medication 975 MILLIGRAM(S): at 13:44

## 2022-05-22 RX ADMIN — GABAPENTIN 1200 MILLIGRAM(S): 400 CAPSULE ORAL at 12:08

## 2022-05-22 RX ADMIN — OXYCODONE HYDROCHLORIDE 15 MILLIGRAM(S): 5 TABLET ORAL at 06:40

## 2022-05-22 RX ADMIN — GABAPENTIN 1200 MILLIGRAM(S): 400 CAPSULE ORAL at 21:00

## 2022-05-22 RX ADMIN — OXYCODONE HYDROCHLORIDE 10 MILLIGRAM(S): 5 TABLET ORAL at 12:09

## 2022-05-22 RX ADMIN — Medication 100 MILLIGRAM(S): at 12:08

## 2022-05-22 RX ADMIN — PANTOPRAZOLE SODIUM 40 MILLIGRAM(S): 20 TABLET, DELAYED RELEASE ORAL at 05:45

## 2022-05-22 RX ADMIN — Medication 975 MILLIGRAM(S): at 21:01

## 2022-05-22 RX ADMIN — METHOCARBAMOL 1500 MILLIGRAM(S): 500 TABLET, FILM COATED ORAL at 12:08

## 2022-05-22 RX ADMIN — Medication 250 MILLIGRAM(S): at 17:48

## 2022-05-22 RX ADMIN — ATORVASTATIN CALCIUM 80 MILLIGRAM(S): 80 TABLET, FILM COATED ORAL at 21:01

## 2022-05-22 RX ADMIN — Medication 100 MILLIGRAM(S): at 05:44

## 2022-05-22 RX ADMIN — Medication 975 MILLIGRAM(S): at 21:00

## 2022-05-22 RX ADMIN — OXYCODONE HYDROCHLORIDE 10 MILLIGRAM(S): 5 TABLET ORAL at 20:30

## 2022-05-22 RX ADMIN — Medication 975 MILLIGRAM(S): at 05:45

## 2022-05-22 RX ADMIN — METHOCARBAMOL 1500 MILLIGRAM(S): 500 TABLET, FILM COATED ORAL at 21:00

## 2022-05-22 RX ADMIN — APIXABAN 5 MILLIGRAM(S): 2.5 TABLET, FILM COATED ORAL at 21:00

## 2022-05-22 RX ADMIN — Medication 81 MILLIGRAM(S): at 12:08

## 2022-05-22 RX ADMIN — Medication 75 MILLIGRAM(S): at 12:09

## 2022-05-22 NOTE — PROGRESS NOTE ADULT - SUBJECTIVE AND OBJECTIVE BOX
Vascular  Surgery Progress Note:  Re-perfusion skin changes to the rt foot and anterior aspect to the leg. Mild erythem to the staple line at the level of the calf.  Patient afebrile, no toxic appearance and hemodynamically well. Pain is controlled but ist and 2nd toes are "pounding" and very  painful when OOB--pt verbatim  Tolerating diet. Denies n/v/f/c/cp/sob.     Diet, Regular (05-13-22 @ 16:52)      Scheduled Medications:   acetaminophen     Tablet .. 975 milliGRAM(s) Oral every 8 hours  amLODIPine   Tablet 10 milliGRAM(s) Oral daily  apixaban 5 milliGRAM(s) Oral two times a day  aspirin  chewable 81 milliGRAM(s) Oral daily  atorvastatin 80 milliGRAM(s) Oral at bedtime  fentaNYL    Injectable 50 MICROGram(s) IV Push once  gabapentin 1200 milliGRAM(s) Oral every 8 hours  methocarbamol 1500 milliGRAM(s) Oral three times a day  multiple electrolytes Injection Type 1 1000 milliLiter(s) (10 mL/Hr) IV Continuous <Continuous>  oxyCODONE  ER Tablet 15 milliGRAM(s) Oral every 12 hours  pantoprazole    Tablet 40 milliGRAM(s) Oral before breakfast  phenytoin ER Oral Tab/Cap - Peds 200 milliGRAM(s) Oral every 12 hours  QUEtiapine 200 milliGRAM(s) Oral at bedtime  senna 2 Tablet(s) Oral at bedtime  vancomycin  IVPB 1000 milliGRAM(s) IV Intermittent every 12 hours  venlafaxine XR. 75 milliGRAM(s) Oral daily    PRN Medications:  ondansetron Injectable 4 milliGRAM(s) IV Push every 6 hours PRN Nausea  oxyCODONE    IR 5 milliGRAM(s) Oral every 3 hours PRN Moderate Pain (4 - 6)  oxyCODONE    IR 10 milliGRAM(s) Oral every 3 hours PRN Severe Pain (7 - 10)      Objective:   T(F): 97.9 (05-23 @ 05:51), Max: 98.1 (05-22 @ 16:46)  HR: 94 (05-23 @ 05:51) (88 - 100)  BP: 144/69 (05-23 @ 05:51) (127/70 - 159/83)  BP(mean): --  ABP: --  ABP(mean): --  RR: 18 (05-23 @ 05:51) (18 - 18)  SpO2: 97% (05-23 @ 05:51) (94% - 97%)      Physical Exam:   GEN: patient resting comfortably in bed, in no acute distress  RESP: respirations are unlabored, no accessory muscle use, no conversational dyspnea  CVS: RRR  GI: Abdomen soft, non-tender, non-distended, no rebound tenderness / guarding  Extremity: Erythema in staple line at calf level is improved since started on ancef. Re-perfusion skin changes are still present even when patient is laying flat-- this changes started a t 4 pm and re assessed at 8 pm. The discoloration is less intense compared which the one observed during OOB. No pre-progression of the re-perfusion changes noticed in comparison with baseline skin trace-rohan. 1st adn 2nd toe has peeling-off- on the dorsal aspect of the skin, area of scabs noticed. Dorsal area in the fore-foot is crusting as well but no necrotic.   Neuro: oriented x3     I&O's    05-22 @ 07:01  -  05-23 @ 07:00  --------------------------------------------------------  IN:    IV PiggyBack: 100 mL    IV PiggyBack: 250 mL    Oral Fluid: 1080 mL  Total IN: 1430 mL    OUT:    Voided (mL): 600 mL  Total OUT: 600 mL    Total NET: 830 mL          LABS:                        9.7    8.89  )-----------( 508      ( 23 May 2022 06:18 )             31.9     05-23    140  |  106  |  26.5<H>  ----------------------------<  92  4.6   |  20.0<L>  |  0.69    Ca    8.7      23 May 2022 06:18      PTT - ( 23 May 2022 06:53 )  PTT:31.4 sec      MICROBIOLOGY:       PATHOLOGY:

## 2022-05-22 NOTE — PROGRESS NOTE ADULT - ASSESSMENT
57y Male post-op from a Right Fem to PT bypass with own ipsilateral saphenous vein graft. Acute drop in Hb while on heparin drip which is now held s/p 2units RBC. Patient doing well on PCA after medications adjustment and has been weaned to oral analgesia. Remains hemodynamically well and afebrile.     Plan:  ·	Prevena discontinue 5/17.   ·	Continue home medications  ·	Pain control on oral analgesia  ·	IS and OOB  ·	Diet as tolerated  ·	Bowel Regimen  ·	Eliquis  ·	Ancef antibiotics. convert to keflex for total 5 day course of antibiotics if discharged. If erythem at the staple line does not improve switch to Vancomycin   ·	PT/OT   ·	Awaiting demarcation of 1sr and 2nd toe which have poor perfusion changes; noticed before admission. FU podiatry recs.   ·	Covid negative  ·	Dispo: MAX

## 2022-05-23 LAB
ACANTHOCYTES BLD QL SMEAR: SLIGHT — SIGNIFICANT CHANGE UP
ANION GAP SERPL CALC-SCNC: 12 MMOL/L — SIGNIFICANT CHANGE UP (ref 5–17)
ANION GAP SERPL CALC-SCNC: 14 MMOL/L — SIGNIFICANT CHANGE UP (ref 5–17)
ANISOCYTOSIS BLD QL: SLIGHT — SIGNIFICANT CHANGE UP
APTT BLD: 31.4 SEC — SIGNIFICANT CHANGE UP (ref 27.5–35.5)
BASOPHILS # BLD AUTO: 0 K/UL — SIGNIFICANT CHANGE UP (ref 0–0.2)
BASOPHILS NFR BLD AUTO: 0 % — SIGNIFICANT CHANGE UP (ref 0–2)
BUN SERPL-MCNC: 20.4 MG/DL — HIGH (ref 8–20)
BUN SERPL-MCNC: 26.5 MG/DL — HIGH (ref 8–20)
CALCIUM SERPL-MCNC: 8.5 MG/DL — LOW (ref 8.6–10.2)
CALCIUM SERPL-MCNC: 8.7 MG/DL — SIGNIFICANT CHANGE UP (ref 8.6–10.2)
CHLORIDE SERPL-SCNC: 103 MMOL/L — SIGNIFICANT CHANGE UP (ref 98–107)
CHLORIDE SERPL-SCNC: 106 MMOL/L — SIGNIFICANT CHANGE UP (ref 98–107)
CO2 SERPL-SCNC: 20 MMOL/L — LOW (ref 22–29)
CO2 SERPL-SCNC: 20 MMOL/L — LOW (ref 22–29)
CREAT SERPL-MCNC: 0.67 MG/DL — SIGNIFICANT CHANGE UP (ref 0.5–1.3)
CREAT SERPL-MCNC: 0.69 MG/DL — SIGNIFICANT CHANGE UP (ref 0.5–1.3)
EGFR: 107 ML/MIN/1.73M2 — SIGNIFICANT CHANGE UP
EGFR: 108 ML/MIN/1.73M2 — SIGNIFICANT CHANGE UP
ELLIPTOCYTES BLD QL SMEAR: SLIGHT — SIGNIFICANT CHANGE UP
EOSINOPHIL # BLD AUTO: 0.23 K/UL — SIGNIFICANT CHANGE UP (ref 0–0.5)
EOSINOPHIL NFR BLD AUTO: 2.6 % — SIGNIFICANT CHANGE UP (ref 0–6)
GIANT PLATELETS BLD QL SMEAR: PRESENT — SIGNIFICANT CHANGE UP
GLUCOSE SERPL-MCNC: 147 MG/DL — HIGH (ref 70–99)
GLUCOSE SERPL-MCNC: 92 MG/DL — SIGNIFICANT CHANGE UP (ref 70–99)
HCT VFR BLD CALC: 29.5 % — LOW (ref 39–50)
HCT VFR BLD CALC: 31.9 % — LOW (ref 39–50)
HGB BLD-MCNC: 9.2 G/DL — LOW (ref 13–17)
HGB BLD-MCNC: 9.7 G/DL — LOW (ref 13–17)
HYPOCHROMIA BLD QL: SLIGHT — SIGNIFICANT CHANGE UP
LYMPHOCYTES # BLD AUTO: 2.4 K/UL — SIGNIFICANT CHANGE UP (ref 1–3.3)
LYMPHOCYTES # BLD AUTO: 27 % — SIGNIFICANT CHANGE UP (ref 13–44)
MANUAL SMEAR VERIFICATION: SIGNIFICANT CHANGE UP
MCHC RBC-ENTMCNC: 29.3 PG — SIGNIFICANT CHANGE UP (ref 27–34)
MCHC RBC-ENTMCNC: 29.8 PG — SIGNIFICANT CHANGE UP (ref 27–34)
MCHC RBC-ENTMCNC: 30.4 GM/DL — LOW (ref 32–36)
MCHC RBC-ENTMCNC: 31.2 GM/DL — LOW (ref 32–36)
MCV RBC AUTO: 93.9 FL — SIGNIFICANT CHANGE UP (ref 80–100)
MCV RBC AUTO: 97.9 FL — SIGNIFICANT CHANGE UP (ref 80–100)
MICROCYTES BLD QL: SLIGHT — SIGNIFICANT CHANGE UP
MONOCYTES # BLD AUTO: 0.77 K/UL — SIGNIFICANT CHANGE UP (ref 0–0.9)
MONOCYTES NFR BLD AUTO: 8.7 % — SIGNIFICANT CHANGE UP (ref 2–14)
MYELOCYTES NFR BLD: 1.7 % — HIGH (ref 0–0)
NEUTROPHILS # BLD AUTO: 5.33 K/UL — SIGNIFICANT CHANGE UP (ref 1.8–7.4)
NEUTROPHILS NFR BLD AUTO: 60 % — SIGNIFICANT CHANGE UP (ref 43–77)
OVALOCYTES BLD QL SMEAR: SLIGHT — SIGNIFICANT CHANGE UP
PLAT MORPH BLD: NORMAL — SIGNIFICANT CHANGE UP
PLATELET # BLD AUTO: 505 K/UL — HIGH (ref 150–400)
PLATELET # BLD AUTO: 508 K/UL — HIGH (ref 150–400)
POIKILOCYTOSIS BLD QL AUTO: SLIGHT — SIGNIFICANT CHANGE UP
POLYCHROMASIA BLD QL SMEAR: SIGNIFICANT CHANGE UP
POTASSIUM SERPL-MCNC: 3.9 MMOL/L — SIGNIFICANT CHANGE UP (ref 3.5–5.3)
POTASSIUM SERPL-MCNC: 4.6 MMOL/L — SIGNIFICANT CHANGE UP (ref 3.5–5.3)
POTASSIUM SERPL-SCNC: 3.9 MMOL/L — SIGNIFICANT CHANGE UP (ref 3.5–5.3)
POTASSIUM SERPL-SCNC: 4.6 MMOL/L — SIGNIFICANT CHANGE UP (ref 3.5–5.3)
RBC # BLD: 3.14 M/UL — LOW (ref 4.2–5.8)
RBC # BLD: 3.26 M/UL — LOW (ref 4.2–5.8)
RBC # FLD: 14.6 % — HIGH (ref 10.3–14.5)
RBC # FLD: 14.9 % — HIGH (ref 10.3–14.5)
RBC BLD AUTO: ABNORMAL
SODIUM SERPL-SCNC: 135 MMOL/L — SIGNIFICANT CHANGE UP (ref 135–145)
SODIUM SERPL-SCNC: 140 MMOL/L — SIGNIFICANT CHANGE UP (ref 135–145)
TROPONIN T SERPL-MCNC: <0.01 NG/ML — SIGNIFICANT CHANGE UP (ref 0–0.06)
WBC # BLD: 13.52 K/UL — HIGH (ref 3.8–10.5)
WBC # BLD: 8.89 K/UL — SIGNIFICANT CHANGE UP (ref 3.8–10.5)
WBC # FLD AUTO: 13.52 K/UL — HIGH (ref 3.8–10.5)
WBC # FLD AUTO: 8.89 K/UL — SIGNIFICANT CHANGE UP (ref 3.8–10.5)

## 2022-05-23 PROCEDURE — 73630 X-RAY EXAM OF FOOT: CPT | Mod: 26,RT

## 2022-05-23 PROCEDURE — 93010 ELECTROCARDIOGRAM REPORT: CPT

## 2022-05-23 RX ORDER — BENZOCAINE AND MENTHOL 5; 1 G/100ML; G/100ML
1 LIQUID ORAL THREE TIMES A DAY
Refills: 0 | Status: DISCONTINUED | OUTPATIENT
Start: 2022-05-23 | End: 2022-05-25

## 2022-05-23 RX ORDER — CADEXOMER IODINE 0.9 %
1 PADS, MEDICATED (EA) TOPICAL DAILY
Refills: 0 | Status: DISCONTINUED | OUTPATIENT
Start: 2022-05-23 | End: 2022-05-29

## 2022-05-23 RX ORDER — LIDOCAINE AND PRILOCAINE CREAM 25; 25 MG/G; MG/G
1 CREAM TOPICAL DAILY
Refills: 0 | Status: DISCONTINUED | OUTPATIENT
Start: 2022-05-23 | End: 2022-05-29

## 2022-05-23 RX ADMIN — GABAPENTIN 1200 MILLIGRAM(S): 400 CAPSULE ORAL at 22:09

## 2022-05-23 RX ADMIN — OXYCODONE HYDROCHLORIDE 10 MILLIGRAM(S): 5 TABLET ORAL at 14:18

## 2022-05-23 RX ADMIN — BENZOCAINE AND MENTHOL 1 LOZENGE: 5; 1 LIQUID ORAL at 22:11

## 2022-05-23 RX ADMIN — APIXABAN 5 MILLIGRAM(S): 2.5 TABLET, FILM COATED ORAL at 22:08

## 2022-05-23 RX ADMIN — BENZOCAINE AND MENTHOL 1 LOZENGE: 5; 1 LIQUID ORAL at 16:13

## 2022-05-23 RX ADMIN — METHOCARBAMOL 1500 MILLIGRAM(S): 500 TABLET, FILM COATED ORAL at 14:04

## 2022-05-23 RX ADMIN — Medication 975 MILLIGRAM(S): at 06:50

## 2022-05-23 RX ADMIN — Medication 975 MILLIGRAM(S): at 22:09

## 2022-05-23 RX ADMIN — AMLODIPINE BESYLATE 10 MILLIGRAM(S): 2.5 TABLET ORAL at 05:56

## 2022-05-23 RX ADMIN — Medication 200 MILLIGRAM(S): at 05:55

## 2022-05-23 RX ADMIN — Medication 250 MILLIGRAM(S): at 18:48

## 2022-05-23 RX ADMIN — OXYCODONE HYDROCHLORIDE 15 MILLIGRAM(S): 5 TABLET ORAL at 05:55

## 2022-05-23 RX ADMIN — Medication 975 MILLIGRAM(S): at 14:05

## 2022-05-23 RX ADMIN — Medication 1 APPLICATION(S): at 22:10

## 2022-05-23 RX ADMIN — Medication 975 MILLIGRAM(S): at 15:00

## 2022-05-23 RX ADMIN — GABAPENTIN 1200 MILLIGRAM(S): 400 CAPSULE ORAL at 05:55

## 2022-05-23 RX ADMIN — LIDOCAINE AND PRILOCAINE CREAM 1 APPLICATION(S): 25; 25 CREAM TOPICAL at 22:11

## 2022-05-23 RX ADMIN — OXYCODONE HYDROCHLORIDE 15 MILLIGRAM(S): 5 TABLET ORAL at 18:46

## 2022-05-23 RX ADMIN — OXYCODONE HYDROCHLORIDE 10 MILLIGRAM(S): 5 TABLET ORAL at 06:50

## 2022-05-23 RX ADMIN — OXYCODONE HYDROCHLORIDE 10 MILLIGRAM(S): 5 TABLET ORAL at 11:55

## 2022-05-23 RX ADMIN — Medication 975 MILLIGRAM(S): at 05:54

## 2022-05-23 RX ADMIN — Medication 250 MILLIGRAM(S): at 05:54

## 2022-05-23 RX ADMIN — OXYCODONE HYDROCHLORIDE 10 MILLIGRAM(S): 5 TABLET ORAL at 10:57

## 2022-05-23 RX ADMIN — PANTOPRAZOLE SODIUM 40 MILLIGRAM(S): 20 TABLET, DELAYED RELEASE ORAL at 05:56

## 2022-05-23 RX ADMIN — GABAPENTIN 1200 MILLIGRAM(S): 400 CAPSULE ORAL at 14:06

## 2022-05-23 RX ADMIN — Medication 81 MILLIGRAM(S): at 14:04

## 2022-05-23 RX ADMIN — QUETIAPINE FUMARATE 200 MILLIGRAM(S): 200 TABLET, FILM COATED ORAL at 22:11

## 2022-05-23 RX ADMIN — METHOCARBAMOL 1500 MILLIGRAM(S): 500 TABLET, FILM COATED ORAL at 05:56

## 2022-05-23 RX ADMIN — ATORVASTATIN CALCIUM 80 MILLIGRAM(S): 80 TABLET, FILM COATED ORAL at 22:10

## 2022-05-23 RX ADMIN — Medication 75 MILLIGRAM(S): at 14:05

## 2022-05-23 RX ADMIN — METHOCARBAMOL 1500 MILLIGRAM(S): 500 TABLET, FILM COATED ORAL at 22:08

## 2022-05-23 RX ADMIN — OXYCODONE HYDROCHLORIDE 15 MILLIGRAM(S): 5 TABLET ORAL at 06:50

## 2022-05-23 RX ADMIN — Medication 200 MILLIGRAM(S): at 18:46

## 2022-05-23 RX ADMIN — OXYCODONE HYDROCHLORIDE 10 MILLIGRAM(S): 5 TABLET ORAL at 05:55

## 2022-05-23 RX ADMIN — OXYCODONE HYDROCHLORIDE 10 MILLIGRAM(S): 5 TABLET ORAL at 15:15

## 2022-05-23 RX ADMIN — APIXABAN 5 MILLIGRAM(S): 2.5 TABLET, FILM COATED ORAL at 08:27

## 2022-05-23 RX ADMIN — OXYCODONE HYDROCHLORIDE 10 MILLIGRAM(S): 5 TABLET ORAL at 22:10

## 2022-05-23 RX ADMIN — OXYCODONE HYDROCHLORIDE 15 MILLIGRAM(S): 5 TABLET ORAL at 19:16

## 2022-05-23 RX ADMIN — OXYCODONE HYDROCHLORIDE 10 MILLIGRAM(S): 5 TABLET ORAL at 22:56

## 2022-05-23 RX ADMIN — Medication 975 MILLIGRAM(S): at 22:56

## 2022-05-23 NOTE — PROGRESS NOTE ADULT - ASSESSMENT
57y Male post-op from a Right Fem to PT bypass with own ipsilateral saphenous vein graft. Acute drop in Hb while on heparin drip which is now held s/p 2units RBC. Patient doing well on PCA after medications adjustment and has been weaned to oral analgesia. Remains hemodynamically well and afebrile. Prevena discontinue 5/17.     Plan:    ·	Continue home medications  ·	Pain control on oral analgesia  ·	IS and OOB  ·	Diet as tolerated  ·	Bowel Regimen  ·	Eliquis  ·	Ancef antibiotics. convert to keflex for total 5 day course of antibiotics if discharged  ·	PT/OT  ·	Changed Ancef on 5/22 for Vanco due to re-perfusion skin changes and local erythema to the staple line. Today improvement noticed.   ·	Awaiting demarcation of 1sr and 2nd toe which has poor perfusion Changes noticed before admission. FU podiatry recs.   ·	Covid negative  ·	Dispo: MAX

## 2022-05-23 NOTE — PROGRESS NOTE ADULT - ASSESSMENT
A:   s/p R fem to PT bypass 5/13  RLE ischemia     P:  Patient evaluated, chart reviewed  Applied betadine, no dressing applied  Ordered iodosorb   Ordered Emla cream to be applied to proximal R toe prn for no more than 30 mins   WCO: apply iodosorb to R hallux daily   Will continue to monitor for demarcation R hallux  Discussed with attending Dr. Ferrer

## 2022-05-23 NOTE — PROGRESS NOTE ADULT - SUBJECTIVE AND OBJECTIVE BOX
Podiatry Interval HPI: Patient evaluated at bedside, AAOx3. Notes the pain to his R foot has worsen since the bypass. Endorses pain to R big toe, particularly on palpation. States he has been taking pain meds. Patient endorses numbness to R foot, states his toes move involuntarily. Denies F/C/N/V/SOB.     Patient is a 57y old  Male who presents with a chief complaint of PAD, RLE ischemia (17 May 2022 11:31)    HPI:  57 year old male known to our service from prior lower extremity revascularization, PMHX PAD with recent femoral endarterectomy 4/15 with rest pain planned for bypass on RLE on 5/18 who presents with worsening pain and changes in toe coloration. Patient reports several weeks of right leg pain, initially with walking and progressing to be at rest. Now has worsening rest pain, improved with dangling legs and now has discoloration starting 2 days prior to presentation. Progressive splotches of discoloration progressing along his leg with bluish discoloration of his great toe. No chest pain, no sob, fever or chills.  No trauma, no fever or chills.    (11 May 2022 22:05)    Podiatry HPI: Pt seen bedside s/p R fem to PT bypass 5/13. Pt states R foot is burning constantly and is unable to bear weight to right foot. Pt states pain management saw him and put him on 900mg of gabapentin. States he takes gabapentin at home but at a lower dose. States he will not be discharged home if he cannot walk. Denies constitutional symptoms No other pedal complaints.     Medications acetaminophen     Tablet .. 975 milliGRAM(s) Oral every 8 hours  amLODIPine   Tablet 10 milliGRAM(s) Oral daily  apixaban 5 milliGRAM(s) Oral two times a day  aspirin  chewable 81 milliGRAM(s) Oral daily  atorvastatin 80 milliGRAM(s) Oral at bedtime  cadexomer iodine 0.9% Gel 1 Application(s) Topical daily  fentaNYL    Injectable 50 MICROGram(s) IV Push once  gabapentin 1200 milliGRAM(s) Oral every 8 hours  lidocaine/prilocaine Cream 1 Application(s) Topical daily  methocarbamol 1500 milliGRAM(s) Oral three times a day  multiple electrolytes Injection Type 1 1000 milliLiter(s) IV Continuous <Continuous>  ondansetron Injectable 4 milliGRAM(s) IV Push every 6 hours PRN  oxyCODONE    IR 5 milliGRAM(s) Oral every 3 hours PRN  oxyCODONE    IR 10 milliGRAM(s) Oral every 3 hours PRN  oxyCODONE  ER Tablet 15 milliGRAM(s) Oral every 12 hours  pantoprazole    Tablet 40 milliGRAM(s) Oral before breakfast  phenytoin ER Oral Tab/Cap - Peds 200 milliGRAM(s) Oral every 12 hours  QUEtiapine 200 milliGRAM(s) Oral at bedtime  senna 2 Tablet(s) Oral at bedtime  vancomycin  IVPB 1000 milliGRAM(s) IV Intermittent every 12 hours  venlafaxine XR. 75 milliGRAM(s) Oral daily    FHFamily history of breast cancer (Mother)    Family history of hepatitis (Mother)    Family history of heart disease (Mother, Sibling)    Family history of CABG (Father)    Family history of peripheral vascular disease (Father)    Family history of brain aneurysm (Sibling)    ,   PMHHypercholesterolemia    Seizures    Depression    GERD (gastroesophageal reflux disease)    Hypertension    Peripheral vascular disease    Osteoarthritis    Former smoker    Prediabetes       PSHInguinal hernia, left    S/P ORIF (open reduction internal fixation) fracture    S/P shoulder surgery    S/P appendectomy    H/O endarterectomy        Labs                          9.7    8.89  )-----------( 508      ( 23 May 2022 06:18 )             31.9      05-23    140  |  106  |  26.5<H>  ----------------------------<  92  4.6   |  20.0<L>  |  0.69    Ca    8.7      23 May 2022 06:18       Vital Signs Last 24 Hrs  T(C): 36.5 (23 May 2022 10:35), Max: 36.7 (22 May 2022 16:46)  T(F): 97.7 (23 May 2022 10:35), Max: 98.1 (22 May 2022 16:46)  HR: 96 (23 May 2022 10:35) (88 - 100)  BP: 127/67 (23 May 2022 10:35) (127/67 - 159/83)  BP(mean): --  RR: 20 (23 May 2022 10:35) (18 - 20)  SpO2: 95% (23 May 2022 10:35) (94% - 97%)          WBC Count: 8.89 K/uL (05-23-22 @ 06:18)        ROS  REVIEW OF SYSTEMS: all other ROS negative except as noted on HPI       PHYSICAL EXAM  LE Focused:    Vasc:  Pulses palpable DP/PT, TG warm to cool LE b/l, CFT <3 seconds distal shante R foot except R hallux  Derm: ischemic changes noted to R hallux, improved edema and erythema from previous exam, tenderness on palpation to R hallux particularly medial aspect, no purulent drainage noted; dry scabbing noted to dorsal R 2nd digit with minimal ischemic changes, no drainage, no clinical signs of infection noted  Neuro: Protective sensation grossly intact  MSK: Able to wiggle toes, pain on palpation R hallux,     Imaging: pending

## 2022-05-23 NOTE — PROGRESS NOTE ADULT - SUBJECTIVE AND OBJECTIVE BOX
Vascular Surgery Progress Note:    No acute overnight events. Patient afebrile, and hemodynamically well. Skin discoloration while OOB that extend from the rt foot to the anterior aspect of the leg--re-perfusion changes. Pain still present in 1st and 2nd toes that look dusky and capillary refill more than 2 seg. According to pt these two toes are bothesome due to kelsey pain. Podiatry applying betadine. Awaiting demarcation of this poor perfused toes that were present before admission for vascular intervension. Tolerating diet. Denies n/v/f/c/cp/sob.     Diet, Regular (05-13-22 @ 16:52)      Scheduled Medications:   acetaminophen     Tablet .. 975 milliGRAM(s) Oral every 8 hours  amLODIPine   Tablet 10 milliGRAM(s) Oral daily  apixaban 5 milliGRAM(s) Oral two times a day  aspirin  chewable 81 milliGRAM(s) Oral daily  atorvastatin 80 milliGRAM(s) Oral at bedtime  fentaNYL    Injectable 50 MICROGram(s) IV Push once  gabapentin 1200 milliGRAM(s) Oral every 8 hours  methocarbamol 1500 milliGRAM(s) Oral three times a day  multiple electrolytes Injection Type 1 1000 milliLiter(s) (10 mL/Hr) IV Continuous <Continuous>  oxyCODONE  ER Tablet 15 milliGRAM(s) Oral every 12 hours  pantoprazole    Tablet 40 milliGRAM(s) Oral before breakfast  phenytoin ER Oral Tab/Cap - Peds 200 milliGRAM(s) Oral every 12 hours  QUEtiapine 200 milliGRAM(s) Oral at bedtime  senna 2 Tablet(s) Oral at bedtime  vancomycin  IVPB 1000 milliGRAM(s) IV Intermittent every 12 hours  venlafaxine XR. 75 milliGRAM(s) Oral daily    PRN Medications:  ondansetron Injectable 4 milliGRAM(s) IV Push every 6 hours PRN Nausea  oxyCODONE    IR 5 milliGRAM(s) Oral every 3 hours PRN Moderate Pain (4 - 6)  oxyCODONE    IR 10 milliGRAM(s) Oral every 3 hours PRN Severe Pain (7 - 10)      Objective:   T(F): 97.9 (05-23 @ 05:51), Max: 98.1 (05-22 @ 16:46)  HR: 94 (05-23 @ 05:51) (88 - 100)  BP: 144/69 (05-23 @ 05:51) (127/70 - 159/83)  BP(mean): --  ABP: --  ABP(mean): --  RR: 18 (05-23 @ 05:51) (18 - 18)  SpO2: 97% (05-23 @ 05:51) (94% - 97%)      Physical Exam:   GEN: patient resting comfortably in bed, in no acute distress  RESP: respirations are unlabored, no accessory muscle use, no conversational dyspnea  CVS: RRR  GI: Abdomen soft, non-tender, non-distended, no rebound tenderness / guarding  Extremity: Erythema in staple line at calf level is improved since started on ancef, now on vanco. Re-perfusion skin changes are still present even when patient is laying flat. The discoloration is less intense compared which the one observed during OOB. No pre-progression of the re-perfusion changes noticed in comparison with baseline skin toni.     I&O's    05-22 @ 07:01  -  05-23 @ 07:00  --------------------------------------------------------  IN:    IV PiggyBack: 100 mL    IV PiggyBack: 250 mL    Oral Fluid: 1080 mL  Total IN: 1430 mL    OUT:    Voided (mL): 600 mL  Total OUT: 600 mL    Total NET: 830 mL          LABS:                        9.7    8.89  )-----------( 508      ( 23 May 2022 06:18 )             31.9     05-23    140  |  106  |  26.5<H>  ----------------------------<  92  4.6   |  20.0<L>  |  0.69    Ca    8.7      23 May 2022 06:18      PTT - ( 23 May 2022 06:53 )  PTT:31.4 sec      MICROBIOLOGY:       PATHOLOGY:

## 2022-05-24 LAB
ANION GAP SERPL CALC-SCNC: 13 MMOL/L — SIGNIFICANT CHANGE UP (ref 5–17)
APTT BLD: 31.8 SEC — SIGNIFICANT CHANGE UP (ref 27.5–35.5)
BASOPHILS # BLD AUTO: 0 K/UL — SIGNIFICANT CHANGE UP (ref 0–0.2)
BASOPHILS NFR BLD AUTO: 0 % — SIGNIFICANT CHANGE UP (ref 0–2)
BUN SERPL-MCNC: 18.7 MG/DL — SIGNIFICANT CHANGE UP (ref 8–20)
BURR CELLS BLD QL SMEAR: PRESENT — SIGNIFICANT CHANGE UP
CALCIUM SERPL-MCNC: 8.7 MG/DL — SIGNIFICANT CHANGE UP (ref 8.6–10.2)
CHLORIDE SERPL-SCNC: 105 MMOL/L — SIGNIFICANT CHANGE UP (ref 98–107)
CO2 SERPL-SCNC: 20 MMOL/L — LOW (ref 22–29)
CREAT SERPL-MCNC: 0.68 MG/DL — SIGNIFICANT CHANGE UP (ref 0.5–1.3)
EGFR: 107 ML/MIN/1.73M2 — SIGNIFICANT CHANGE UP
EOSINOPHIL # BLD AUTO: 0 K/UL — SIGNIFICANT CHANGE UP (ref 0–0.5)
EOSINOPHIL NFR BLD AUTO: 0 % — SIGNIFICANT CHANGE UP (ref 0–6)
GLUCOSE SERPL-MCNC: 103 MG/DL — HIGH (ref 70–99)
HCT VFR BLD CALC: 29.6 % — LOW (ref 39–50)
HGB BLD-MCNC: 9.1 G/DL — LOW (ref 13–17)
INR BLD: 1.15 RATIO — SIGNIFICANT CHANGE UP (ref 0.88–1.16)
LYMPHOCYTES # BLD AUTO: 16.5 % — SIGNIFICANT CHANGE UP (ref 13–44)
LYMPHOCYTES # BLD AUTO: 2.09 K/UL — SIGNIFICANT CHANGE UP (ref 1–3.3)
MANUAL SMEAR VERIFICATION: SIGNIFICANT CHANGE UP
MCHC RBC-ENTMCNC: 29.3 PG — SIGNIFICANT CHANGE UP (ref 27–34)
MCHC RBC-ENTMCNC: 30.7 GM/DL — LOW (ref 32–36)
MCV RBC AUTO: 95.2 FL — SIGNIFICANT CHANGE UP (ref 80–100)
MONOCYTES # BLD AUTO: 0.99 K/UL — HIGH (ref 0–0.9)
MONOCYTES NFR BLD AUTO: 7.8 % — SIGNIFICANT CHANGE UP (ref 2–14)
NEUTROPHILS # BLD AUTO: 9.58 K/UL — HIGH (ref 1.8–7.4)
NEUTROPHILS NFR BLD AUTO: 75.7 % — SIGNIFICANT CHANGE UP (ref 43–77)
PLAT MORPH BLD: NORMAL — SIGNIFICANT CHANGE UP
PLATELET # BLD AUTO: 483 K/UL — HIGH (ref 150–400)
POIKILOCYTOSIS BLD QL AUTO: SLIGHT — SIGNIFICANT CHANGE UP
POLYCHROMASIA BLD QL SMEAR: SLIGHT — SIGNIFICANT CHANGE UP
POTASSIUM SERPL-MCNC: 4.4 MMOL/L — SIGNIFICANT CHANGE UP (ref 3.5–5.3)
POTASSIUM SERPL-SCNC: 4.4 MMOL/L — SIGNIFICANT CHANGE UP (ref 3.5–5.3)
PROTHROM AB SERPL-ACNC: 13.4 SEC — SIGNIFICANT CHANGE UP (ref 10.5–13.4)
RBC # BLD: 3.11 M/UL — LOW (ref 4.2–5.8)
RBC # FLD: 14.6 % — HIGH (ref 10.3–14.5)
RBC BLD AUTO: ABNORMAL
SCHISTOCYTES BLD QL AUTO: SLIGHT — SIGNIFICANT CHANGE UP
SODIUM SERPL-SCNC: 138 MMOL/L — SIGNIFICANT CHANGE UP (ref 135–145)
TROPONIN T SERPL-MCNC: <0.01 NG/ML — SIGNIFICANT CHANGE UP (ref 0–0.06)
VANCOMYCIN TROUGH SERPL-MCNC: 11 UG/ML — SIGNIFICANT CHANGE UP (ref 10–20)
WBC # BLD: 12.66 K/UL — HIGH (ref 3.8–10.5)
WBC # FLD AUTO: 12.66 K/UL — HIGH (ref 3.8–10.5)

## 2022-05-24 PROCEDURE — 99232 SBSQ HOSP IP/OBS MODERATE 35: CPT

## 2022-05-24 PROCEDURE — 71045 X-RAY EXAM CHEST 1 VIEW: CPT | Mod: 26

## 2022-05-24 RX ORDER — FAMOTIDINE 10 MG/ML
20 INJECTION INTRAVENOUS ONCE
Refills: 0 | Status: COMPLETED | OUTPATIENT
Start: 2022-05-24 | End: 2022-05-24

## 2022-05-24 RX ORDER — LIDOCAINE 4 G/100G
1 CREAM TOPICAL DAILY
Refills: 0 | Status: DISCONTINUED | OUTPATIENT
Start: 2022-05-24 | End: 2022-05-29

## 2022-05-24 RX ORDER — FAMOTIDINE 10 MG/ML
20 INJECTION INTRAVENOUS DAILY
Refills: 0 | Status: DISCONTINUED | OUTPATIENT
Start: 2022-05-24 | End: 2022-05-25

## 2022-05-24 RX ADMIN — OXYCODONE HYDROCHLORIDE 5 MILLIGRAM(S): 5 TABLET ORAL at 01:10

## 2022-05-24 RX ADMIN — QUETIAPINE FUMARATE 200 MILLIGRAM(S): 200 TABLET, FILM COATED ORAL at 21:21

## 2022-05-24 RX ADMIN — PANTOPRAZOLE SODIUM 40 MILLIGRAM(S): 20 TABLET, DELAYED RELEASE ORAL at 06:01

## 2022-05-24 RX ADMIN — OXYCODONE HYDROCHLORIDE 10 MILLIGRAM(S): 5 TABLET ORAL at 16:11

## 2022-05-24 RX ADMIN — AMLODIPINE BESYLATE 10 MILLIGRAM(S): 2.5 TABLET ORAL at 06:01

## 2022-05-24 RX ADMIN — Medication 975 MILLIGRAM(S): at 06:01

## 2022-05-24 RX ADMIN — GABAPENTIN 1200 MILLIGRAM(S): 400 CAPSULE ORAL at 16:12

## 2022-05-24 RX ADMIN — BENZOCAINE AND MENTHOL 1 LOZENGE: 5; 1 LIQUID ORAL at 16:13

## 2022-05-24 RX ADMIN — Medication 975 MILLIGRAM(S): at 16:42

## 2022-05-24 RX ADMIN — OXYCODONE HYDROCHLORIDE 5 MILLIGRAM(S): 5 TABLET ORAL at 02:06

## 2022-05-24 RX ADMIN — BENZOCAINE AND MENTHOL 1 LOZENGE: 5; 1 LIQUID ORAL at 06:02

## 2022-05-24 RX ADMIN — METHOCARBAMOL 1500 MILLIGRAM(S): 500 TABLET, FILM COATED ORAL at 21:20

## 2022-05-24 RX ADMIN — Medication 975 MILLIGRAM(S): at 21:21

## 2022-05-24 RX ADMIN — OXYCODONE HYDROCHLORIDE 15 MILLIGRAM(S): 5 TABLET ORAL at 17:00

## 2022-05-24 RX ADMIN — OXYCODONE HYDROCHLORIDE 10 MILLIGRAM(S): 5 TABLET ORAL at 17:05

## 2022-05-24 RX ADMIN — OXYCODONE HYDROCHLORIDE 15 MILLIGRAM(S): 5 TABLET ORAL at 06:26

## 2022-05-24 RX ADMIN — Medication 975 MILLIGRAM(S): at 16:12

## 2022-05-24 RX ADMIN — Medication 81 MILLIGRAM(S): at 11:20

## 2022-05-24 RX ADMIN — LIDOCAINE 1 PATCH: 4 CREAM TOPICAL at 16:11

## 2022-05-24 RX ADMIN — Medication 200 MILLIGRAM(S): at 17:01

## 2022-05-24 RX ADMIN — Medication 1 APPLICATION(S): at 11:19

## 2022-05-24 RX ADMIN — LIDOCAINE AND PRILOCAINE CREAM 1 APPLICATION(S): 25; 25 CREAM TOPICAL at 11:19

## 2022-05-24 RX ADMIN — FAMOTIDINE 20 MILLIGRAM(S): 10 INJECTION INTRAVENOUS at 12:16

## 2022-05-24 RX ADMIN — METHOCARBAMOL 1500 MILLIGRAM(S): 500 TABLET, FILM COATED ORAL at 06:02

## 2022-05-24 RX ADMIN — APIXABAN 5 MILLIGRAM(S): 2.5 TABLET, FILM COATED ORAL at 08:39

## 2022-05-24 RX ADMIN — OXYCODONE HYDROCHLORIDE 15 MILLIGRAM(S): 5 TABLET ORAL at 06:01

## 2022-05-24 RX ADMIN — LIDOCAINE 1 PATCH: 4 CREAM TOPICAL at 20:00

## 2022-05-24 RX ADMIN — Medication 75 MILLIGRAM(S): at 17:02

## 2022-05-24 RX ADMIN — GABAPENTIN 1200 MILLIGRAM(S): 400 CAPSULE ORAL at 21:20

## 2022-05-24 RX ADMIN — GABAPENTIN 1200 MILLIGRAM(S): 400 CAPSULE ORAL at 06:02

## 2022-05-24 RX ADMIN — OXYCODONE HYDROCHLORIDE 15 MILLIGRAM(S): 5 TABLET ORAL at 17:05

## 2022-05-24 RX ADMIN — ATORVASTATIN CALCIUM 80 MILLIGRAM(S): 80 TABLET, FILM COATED ORAL at 21:21

## 2022-05-24 RX ADMIN — OXYCODONE HYDROCHLORIDE 10 MILLIGRAM(S): 5 TABLET ORAL at 08:44

## 2022-05-24 RX ADMIN — Medication 975 MILLIGRAM(S): at 06:28

## 2022-05-24 RX ADMIN — OXYCODONE HYDROCHLORIDE 10 MILLIGRAM(S): 5 TABLET ORAL at 22:55

## 2022-05-24 RX ADMIN — Medication 975 MILLIGRAM(S): at 21:50

## 2022-05-24 RX ADMIN — BENZOCAINE AND MENTHOL 1 LOZENGE: 5; 1 LIQUID ORAL at 21:22

## 2022-05-24 RX ADMIN — METHOCARBAMOL 1500 MILLIGRAM(S): 500 TABLET, FILM COATED ORAL at 16:12

## 2022-05-24 RX ADMIN — Medication 250 MILLIGRAM(S): at 06:56

## 2022-05-24 RX ADMIN — OXYCODONE HYDROCHLORIDE 10 MILLIGRAM(S): 5 TABLET ORAL at 09:20

## 2022-05-24 RX ADMIN — Medication 200 MILLIGRAM(S): at 06:01

## 2022-05-24 RX ADMIN — OXYCODONE HYDROCHLORIDE 10 MILLIGRAM(S): 5 TABLET ORAL at 22:25

## 2022-05-24 NOTE — PROGRESS NOTE ADULT - SUBJECTIVE AND OBJECTIVE BOX
Podiatry Interval HPI: Patient evaluated at bedside, AAOx3. Endorses pain to R foot, has not worsened. Notes he feels as if his toe is hard as a rock. Denies F/C/N/V/SOB.     Patient is a 57y old  Male who presents with a chief complaint of PAD, RLE ischemia (17 May 2022 11:31)    HPI:  57 year old male known to our service from prior lower extremity revascularization, PMHX PAD with recent femoral endarterectomy 4/15 with rest pain planned for bypass on RLE on 5/18 who presents with worsening pain and changes in toe coloration. Patient reports several weeks of right leg pain, initially with walking and progressing to be at rest. Now has worsening rest pain, improved with dangling legs and now has discoloration starting 2 days prior to presentation. Progressive splotches of discoloration progressing along his leg with bluish discoloration of his great toe. No chest pain, no sob, fever or chills.  No trauma, no fever or chills.    (11 May 2022 22:05)    Podiatry HPI: Pt seen bedside s/p R fem to PT bypass 5/13. Pt states R foot is burning constantly and is unable to bear weight to right foot. Pt states pain management saw him and put him on 900mg of gabapentin. States he takes gabapentin at home but at a lower dose. States he will not be discharged home if he cannot walk. Denies constitutional symptoms No other pedal complaints.     Medications acetaminophen     Tablet .. 975 milliGRAM(s) Oral every 8 hours  amLODIPine   Tablet 10 milliGRAM(s) Oral daily  aspirin  chewable 81 milliGRAM(s) Oral daily  atorvastatin 80 milliGRAM(s) Oral at bedtime  benzocaine 15 mG/menthol 3.6 mG Lozenge 1 Lozenge Oral three times a day  cadexomer iodine 0.9% Gel 1 Application(s) Topical daily  famotidine    Tablet 20 milliGRAM(s) Oral daily  fentaNYL    Injectable 50 MICROGram(s) IV Push once  gabapentin 1200 milliGRAM(s) Oral every 8 hours  lidocaine/prilocaine Cream 1 Application(s) Topical daily  methocarbamol 1500 milliGRAM(s) Oral three times a day  ondansetron Injectable 4 milliGRAM(s) IV Push every 6 hours PRN  oxyCODONE    IR 5 milliGRAM(s) Oral every 3 hours PRN  oxyCODONE    IR 10 milliGRAM(s) Oral every 3 hours PRN  oxyCODONE  ER Tablet 15 milliGRAM(s) Oral every 12 hours  pantoprazole    Tablet 40 milliGRAM(s) Oral before breakfast  phenytoin ER Oral Tab/Cap - Peds 200 milliGRAM(s) Oral every 12 hours  QUEtiapine 200 milliGRAM(s) Oral at bedtime  senna 2 Tablet(s) Oral at bedtime  venlafaxine XR. 75 milliGRAM(s) Oral daily    FHFamily history of breast cancer (Mother)    Family history of hepatitis (Mother)    Family history of heart disease (Mother, Sibling)    Family history of CABG (Father)    Family history of peripheral vascular disease (Father)    Family history of brain aneurysm (Sibling)    ,   PMHHypercholesterolemia    Seizures    Depression    GERD (gastroesophageal reflux disease)    Hypertension    Peripheral vascular disease    Osteoarthritis    Former smoker    Prediabetes       PSHInguinal hernia, left    S/P ORIF (open reduction internal fixation) fracture    S/P shoulder surgery    S/P appendectomy    H/O endarterectomy        Labs                          9.1    12.66 )-----------( 483      ( 24 May 2022 05:49 )             29.6      05-24    138  |  105  |  18.7  ----------------------------<  103<H>  4.4   |  20.0<L>  |  0.68    Ca    8.7      24 May 2022 05:49       Vital Signs Last 24 Hrs  T(C): 36.6 (24 May 2022 11:20), Max: 36.8 (23 May 2022 16:20)  T(F): 97.9 (24 May 2022 11:20), Max: 98.3 (23 May 2022 16:20)  HR: 96 (24 May 2022 11:20) (92 - 99)  BP: 158/73 (24 May 2022 11:20) (120/61 - 158/73)  BP(mean): --  RR: 20 (24 May 2022 11:20) (18 - 20)  SpO2: 96% (24 May 2022 11:20) (95% - 98%)          WBC Count: 12.66 K/uL *H* (05-24-22 @ 05:49)  WBC Count: 13.52 K/uL *H* (05-23-22 @ 21:37)          ROS  REVIEW OF SYSTEMS: all other ROS negative except as noted on HPI       PHYSICAL EXAM  LE Focused:    Vasc:  Pulses palpable DP/PT, TG warm to cool LE b/l, CFT <3 seconds distal shante R foot except R hallux  Derm: ischemic changes noted to R hallux, improved edema and erythema from previous exam, tenderness on palpation to R hallux particularly medial aspect, no purulent drainage noted; dry scabbing noted to dorsal R 2nd digit with ischemic changes, no drainage, no clinical signs of infection noted  Neuro: Protective sensation grossly intact  MSK: Able to wiggle toes, pain on palpation R hallux,     Imaging:   < from: Xray Foot AP + Lateral + Oblique, Right (05.23.22 @ 14:50) >    ACC: [id number] EXAM:  XR FOOT COMP MIN 3 VIEWS RT                          PROCEDURE DATE:  05/23/2022          INTERPRETATION:  RIGHT foot  Comparison: 4/9/2022 RIGHT foot radiographs  CLINICAL INFORMATION: RIGHT hallux. Dry gangrene..    TECHNIQUE: AP,lateral and oblique views.    FINDINGS:    The bones and joint spaces are intact.  No fracture or dislocation.  Soft tissues unremarkable.    IMPRESSION:    No acute radiographic osseous pathology.    --- End of Report ---            CASPER LAI MD; Attending Radiologist  This document has been electronically signed. May 24 2022 10:48AM    < end of copied text >

## 2022-05-24 NOTE — PROGRESS NOTE ADULT - SUBJECTIVE AND OBJECTIVE BOX
RADHA CHAKRABORTY  [id number]      Chief Complaint:  leg pain/RLE bypass     Interval History:  s/p RLE bypass    Tele:    not available     acetaminophen     Tablet .. 975 milliGRAM(s) Oral every 8 hours  amLODIPine   Tablet 10 milliGRAM(s) Oral daily  aspirin  chewable 81 milliGRAM(s) Oral daily  atorvastatin 80 milliGRAM(s) Oral at bedtime  benzocaine 15 mG/menthol 3.6 mG Lozenge 1 Lozenge Oral three times a day  cadexomer iodine 0.9% Gel 1 Application(s) Topical daily  famotidine    Tablet 20 milliGRAM(s) Oral daily  fentaNYL    Injectable 50 MICROGram(s) IV Push once  gabapentin 1200 milliGRAM(s) Oral every 8 hours  lidocaine/prilocaine Cream 1 Application(s) Topical daily  methocarbamol 1500 milliGRAM(s) Oral three times a day  ondansetron Injectable 4 milliGRAM(s) IV Push every 6 hours PRN  oxyCODONE    IR 5 milliGRAM(s) Oral every 3 hours PRN  oxyCODONE    IR 10 milliGRAM(s) Oral every 3 hours PRN  oxyCODONE  ER Tablet 15 milliGRAM(s) Oral every 12 hours  pantoprazole    Tablet 40 milliGRAM(s) Oral before breakfast  phenytoin ER Oral Tab/Cap - Peds 200 milliGRAM(s) Oral every 12 hours  QUEtiapine 200 milliGRAM(s) Oral at bedtime  senna 2 Tablet(s) Oral at bedtime  vancomycin  IVPB 1000 milliGRAM(s) IV Intermittent every 12 hours  venlafaxine XR. 75 milliGRAM(s) Oral daily          Physical Exam:  T(C): 36.6 (05-24-22 @ 11:20), Max: 36.8 (05-23-22 @ 16:20)  HR: 96 (05-24-22 @ 11:20) (92 - 99)  BP: 158/73 (05-24-22 @ 11:20) (120/61 - 158/73)  RR: 20 (05-24-22 @ 11:20) (18 - 20)  SpO2: 96% (05-24-22 @ 11:20) (95% - 98%)  Wt(kg): --  General: Comfortable in NAD  Neck: No JVD  CVS: nl s1s2, no s3  Pulm: CTA b/l  Abd: soft, non-tender  Ext: No c/c/e  Neuro A&O x3  Psych: Normal affect      Labs:   24 May 2022 05:49    138    |  105    |  18.7   ----------------------------<  103    4.4     |  20.0   |  0.68     Ca    8.7        24 May 2022 05:49                            9.1    12.66 )-----------( 483      ( 24 May 2022 05:49 )             29.6     PT/INR - ( 24 May 2022 05:49 )   PT: 13.4 sec;   INR: 1.15 ratio         PTT - ( 24 May 2022 05:49 )  PTT:31.8 sec  CARDIAC MARKERS ( 24 May 2022 11:30 )  x     / <0.01 ng/mL / x     / x     / x      CARDIAC MARKERS ( 23 May 2022 21:37 )  x     / <0.01 ng/mL / x     / x     / x              Assessment:  57yMale PMHX PAD, carotid disease, HTN, HLD, seizures here with limb ischemia. S/p R LE bypass. Cardiology service requested to follow up patient due to complains of chest pain. Patient was seen, during interview patient reports intermittent chest pressure, associated with back pain, leg pain, during interview noted anxious. He denies shortness of breath, no N/V, no radiation.     Plan:  nonanginal chest pain  unremarkable EKG and cardiac enzymes   no evidence of active cardiac issues at the time   no additional CV testing indicated at this time   from Cardiology perspective patient can be dc home   outpatient follow up with Dr Tripathi ([facility] cardiology group)

## 2022-05-24 NOTE — PROGRESS NOTE ADULT - ASSESSMENT
A:   s/p R fem to PT bypass 5/13  RLE ischemia     P:  Patient evaluated, chart reviewed  Applied iodosorb to R 1st and 2nd digits , no dressing applied  Ordered lidoderm patch to be applied to affected painful digits   WCO: apply iodosorb to R hallux daily   No acute surgical intervention from podiatric standpoint   Will continue to monitor for demarcation R hallux  Pt stable for d/c from podiatric standpoint  Pt to follow up with Podiatry within 1 week of d/c for continued care   Seen and evaluated with attending Dr. Ferrer

## 2022-05-24 NOTE — PROGRESS NOTE ADULT - ASSESSMENT
57y Male post-op from a Right Fem to PT bypass with own ipsilateral saphenous vein graft. Acute drop in Hb while on heparin drip which is now held s/p 2units RBC. Patient doing well on PCA after medications adjustment and has been weaned to oral analgesia. Remains hemodynamically well and afebrile. Prevena discontinue 5/17.   By podiatry:  ·	Applied betadine, no dressing applied  ·	Ordered iodosorb   ·	Ordered Emla cream to be applied to proximal R toe prn for no more than 30 mins   ·	WCO: apply iodosorb to R hallux daily   By Vascular   ·	Continue home medications  ·	Pain control on oral analgesia  ·	IS and OOB  ·	Diet as tolerated  ·	Bowel Regimen  ·	Eliquis  ·	Ancef antibiotics. convert to keflex for total 5 day course of antibiotics if discharged  ·	PT/OT  ·	Changed Ancef on 5/22 for Vanco due to re-perfusion skin changes and local erythema to the staple line. Today improvement noticed.   ·	Awaiting demarcation of 1sr and 2nd toe which has poor perfusion Changes noticed before admission. FU podiatry recs.   ·	Covid negative  Dispo: MAX

## 2022-05-24 NOTE — PROGRESS NOTE ADULT - SUBJECTIVE AND OBJECTIVE BOX
Vascular Surgery Progress Note:    No acute overnight events. Patient afebrile, and hemodynamically well. Pain well controlled. Tolerating diet. Denies n/v/f/c/cp/sob. Working OOB with weight-off toes boot    Diet, Regular (05-13-22 @ 16:52)      Scheduled Medications:   acetaminophen     Tablet .. 975 milliGRAM(s) Oral every 8 hours  amLODIPine   Tablet 10 milliGRAM(s) Oral daily  apixaban 5 milliGRAM(s) Oral two times a day  aspirin  chewable 81 milliGRAM(s) Oral daily  atorvastatin 80 milliGRAM(s) Oral at bedtime  benzocaine 15 mG/menthol 3.6 mG Lozenge 1 Lozenge Oral three times a day  cadexomer iodine 0.9% Gel 1 Application(s) Topical daily  fentaNYL    Injectable 50 MICROGram(s) IV Push once  gabapentin 1200 milliGRAM(s) Oral every 8 hours  lidocaine/prilocaine Cream 1 Application(s) Topical daily  methocarbamol 1500 milliGRAM(s) Oral three times a day  multiple electrolytes Injection Type 1 1000 milliLiter(s) (10 mL/Hr) IV Continuous <Continuous>  oxyCODONE  ER Tablet 15 milliGRAM(s) Oral every 12 hours  pantoprazole    Tablet 40 milliGRAM(s) Oral before breakfast  phenytoin ER Oral Tab/Cap - Peds 200 milliGRAM(s) Oral every 12 hours  QUEtiapine 200 milliGRAM(s) Oral at bedtime  senna 2 Tablet(s) Oral at bedtime  vancomycin  IVPB 1000 milliGRAM(s) IV Intermittent every 12 hours  venlafaxine XR. 75 milliGRAM(s) Oral daily    PRN Medications:  ondansetron Injectable 4 milliGRAM(s) IV Push every 6 hours PRN Nausea  oxyCODONE    IR 5 milliGRAM(s) Oral every 3 hours PRN Moderate Pain (4 - 6)  oxyCODONE    IR 10 milliGRAM(s) Oral every 3 hours PRN Severe Pain (7 - 10)      Objective:   T(F): 98.2 (05-24 @ 03:50), Max: 98.3 (05-23 @ 16:20)  HR: 95 (05-24 @ 03:50) (92 - 99)  BP: 120/61 (05-24 @ 03:50) (120/61 - 153/70)  BP(mean): --  ABP: --  ABP(mean): --  RR: 18 (05-24 @ 03:50) (18 - 20)  SpO2: 95% (05-24 @ 03:50) (95% - 98%)      Physical Exam:   GEN: patient resting comfortably in bed, in no acute distress  RESP: respirations are unlabored, no accessory muscle use, no conversational dyspnea  CVS: RRR  GI: Abdomen soft, non-tender, non-distended, no rebound tenderness / guarding  Extremity: Erythema in staple line at calf level is improved since started on ancef, now on vanco. Re-perfusion skin changes are still present even when patient is laying flat. The discoloration is less intense compared which the one observed during OOB. No pre-progression of the re-perfusion changes noticed in comparison with baseline skin toni. No progression noticed.  I&O's    05-22 @ 07:01  -  05-23 @ 07:00  --------------------------------------------------------  IN:    IV PiggyBack: 100 mL    IV PiggyBack: 250 mL    Oral Fluid: 1080 mL  Total IN: 1430 mL    OUT:    Voided (mL): 600 mL  Total OUT: 600 mL    Total NET: 830 mL      05-23 @ 07:01  -  05-24 @ 05:52  --------------------------------------------------------  IN:  Total IN: 0 mL    OUT:    Voided (mL): 600 mL  Total OUT: 600 mL    Total NET: -600 mL          LABS:                        9.2    13.52 )-----------( 505      ( 23 May 2022 21:37 )             29.5     05-23    135  |  103  |  20.4<H>  ----------------------------<  147<H>  3.9   |  20.0<L>  |  0.67    Ca    8.5<L>      23 May 2022 21:37      PTT - ( 23 May 2022 06:53 )  PTT:31.4 sec      MICROBIOLOGY:       PATHOLOGY:

## 2022-05-25 LAB
ANION GAP SERPL CALC-SCNC: 13 MMOL/L — SIGNIFICANT CHANGE UP (ref 5–17)
APTT BLD: 31.7 SEC — SIGNIFICANT CHANGE UP (ref 27.5–35.5)
APTT BLD: 55.4 SEC — HIGH (ref 27.5–35.5)
BASOPHILS # BLD AUTO: 0.06 K/UL — SIGNIFICANT CHANGE UP (ref 0–0.2)
BASOPHILS NFR BLD AUTO: 0.3 % — SIGNIFICANT CHANGE UP (ref 0–2)
BLD GP AB SCN SERPL QL: SIGNIFICANT CHANGE UP
BUN SERPL-MCNC: 12.1 MG/DL — SIGNIFICANT CHANGE UP (ref 8–20)
CALCIUM SERPL-MCNC: 8.8 MG/DL — SIGNIFICANT CHANGE UP (ref 8.6–10.2)
CHLORIDE SERPL-SCNC: 98 MMOL/L — SIGNIFICANT CHANGE UP (ref 98–107)
CO2 SERPL-SCNC: 24 MMOL/L — SIGNIFICANT CHANGE UP (ref 22–29)
CREAT SERPL-MCNC: 0.63 MG/DL — SIGNIFICANT CHANGE UP (ref 0.5–1.3)
EGFR: 110 ML/MIN/1.73M2 — SIGNIFICANT CHANGE UP
EOSINOPHIL # BLD AUTO: 0.08 K/UL — SIGNIFICANT CHANGE UP (ref 0–0.5)
EOSINOPHIL NFR BLD AUTO: 0.4 % — SIGNIFICANT CHANGE UP (ref 0–6)
GAS PNL BLDA: SIGNIFICANT CHANGE UP
GLUCOSE SERPL-MCNC: 115 MG/DL — HIGH (ref 70–99)
HCT VFR BLD CALC: 29.7 % — LOW (ref 39–50)
HCT VFR BLD CALC: 29.8 % — LOW (ref 39–50)
HCT VFR BLD CALC: 32.8 % — LOW (ref 39–50)
HGB BLD-MCNC: 10.6 G/DL — LOW (ref 13–17)
HGB BLD-MCNC: 9.8 G/DL — LOW (ref 13–17)
HGB BLD-MCNC: 9.8 G/DL — LOW (ref 13–17)
IMM GRANULOCYTES NFR BLD AUTO: 1.2 % — SIGNIFICANT CHANGE UP (ref 0–1.5)
INR BLD: 1.2 RATIO — HIGH (ref 0.88–1.16)
LYMPHOCYTES # BLD AUTO: 1.76 K/UL — SIGNIFICANT CHANGE UP (ref 1–3.3)
LYMPHOCYTES # BLD AUTO: 8.2 % — LOW (ref 13–44)
MAGNESIUM SERPL-MCNC: 1.8 MG/DL — SIGNIFICANT CHANGE UP (ref 1.6–2.6)
MCHC RBC-ENTMCNC: 30.1 PG — SIGNIFICANT CHANGE UP (ref 27–34)
MCHC RBC-ENTMCNC: 30.4 PG — SIGNIFICANT CHANGE UP (ref 27–34)
MCHC RBC-ENTMCNC: 30.4 PG — SIGNIFICANT CHANGE UP (ref 27–34)
MCHC RBC-ENTMCNC: 32.3 GM/DL — SIGNIFICANT CHANGE UP (ref 32–36)
MCHC RBC-ENTMCNC: 32.9 GM/DL — SIGNIFICANT CHANGE UP (ref 32–36)
MCHC RBC-ENTMCNC: 33 GM/DL — SIGNIFICANT CHANGE UP (ref 32–36)
MCV RBC AUTO: 91.4 FL — SIGNIFICANT CHANGE UP (ref 80–100)
MCV RBC AUTO: 92.2 FL — SIGNIFICANT CHANGE UP (ref 80–100)
MCV RBC AUTO: 94 FL — SIGNIFICANT CHANGE UP (ref 80–100)
MONOCYTES # BLD AUTO: 2.79 K/UL — HIGH (ref 0–0.9)
MONOCYTES NFR BLD AUTO: 13 % — SIGNIFICANT CHANGE UP (ref 2–14)
NEUTROPHILS # BLD AUTO: 16.48 K/UL — HIGH (ref 1.8–7.4)
NEUTROPHILS NFR BLD AUTO: 76.9 % — SIGNIFICANT CHANGE UP (ref 43–77)
PHOSPHATE SERPL-MCNC: 2.9 MG/DL — SIGNIFICANT CHANGE UP (ref 2.4–4.7)
PLATELET # BLD AUTO: 497 K/UL — HIGH (ref 150–400)
PLATELET # BLD AUTO: 510 K/UL — HIGH (ref 150–400)
PLATELET # BLD AUTO: 516 K/UL — HIGH (ref 150–400)
POTASSIUM SERPL-MCNC: 4.3 MMOL/L — SIGNIFICANT CHANGE UP (ref 3.5–5.3)
POTASSIUM SERPL-SCNC: 4.3 MMOL/L — SIGNIFICANT CHANGE UP (ref 3.5–5.3)
PROTHROM AB SERPL-ACNC: 14 SEC — HIGH (ref 10.5–13.4)
RBC # BLD: 3.22 M/UL — LOW (ref 4.2–5.8)
RBC # BLD: 3.26 M/UL — LOW (ref 4.2–5.8)
RBC # BLD: 3.49 M/UL — LOW (ref 4.2–5.8)
RBC # FLD: 14.4 % — SIGNIFICANT CHANGE UP (ref 10.3–14.5)
RBC # FLD: 14.6 % — HIGH (ref 10.3–14.5)
RBC # FLD: 14.6 % — HIGH (ref 10.3–14.5)
SODIUM SERPL-SCNC: 134 MMOL/L — LOW (ref 135–145)
WBC # BLD: 19.94 K/UL — HIGH (ref 3.8–10.5)
WBC # BLD: 21.43 K/UL — HIGH (ref 3.8–10.5)
WBC # BLD: 24.47 K/UL — HIGH (ref 3.8–10.5)
WBC # FLD AUTO: 19.94 K/UL — HIGH (ref 3.8–10.5)
WBC # FLD AUTO: 21.43 K/UL — HIGH (ref 3.8–10.5)
WBC # FLD AUTO: 24.47 K/UL — HIGH (ref 3.8–10.5)

## 2022-05-25 PROCEDURE — 99223 1ST HOSP IP/OBS HIGH 75: CPT

## 2022-05-25 PROCEDURE — 70491 CT SOFT TISSUE NECK W/DYE: CPT | Mod: 26

## 2022-05-25 PROCEDURE — 99291 CRITICAL CARE FIRST HOUR: CPT

## 2022-05-25 RX ORDER — FOSPHENYTOIN 50 MG/ML
200 INJECTION INTRAMUSCULAR; INTRAVENOUS EVERY 12 HOURS
Refills: 0 | Status: DISCONTINUED | OUTPATIENT
Start: 2022-05-25 | End: 2022-05-27

## 2022-05-25 RX ORDER — HYDROMORPHONE HYDROCHLORIDE 2 MG/ML
0.5 INJECTION INTRAMUSCULAR; INTRAVENOUS; SUBCUTANEOUS ONCE
Refills: 0 | Status: DISCONTINUED | OUTPATIENT
Start: 2022-05-25 | End: 2022-05-25

## 2022-05-25 RX ORDER — SODIUM CHLORIDE 9 MG/ML
1000 INJECTION, SOLUTION INTRAVENOUS
Refills: 0 | Status: DISCONTINUED | OUTPATIENT
Start: 2022-05-25 | End: 2022-05-26

## 2022-05-25 RX ORDER — AMPICILLIN SODIUM AND SULBACTAM SODIUM 250; 125 MG/ML; MG/ML
INJECTION, POWDER, FOR SUSPENSION INTRAMUSCULAR; INTRAVENOUS
Refills: 0 | Status: DISCONTINUED | OUTPATIENT
Start: 2022-05-25 | End: 2022-05-25

## 2022-05-25 RX ORDER — CHLORHEXIDINE GLUCONATE 213 G/1000ML
1 SOLUTION TOPICAL
Refills: 0 | Status: DISCONTINUED | OUTPATIENT
Start: 2022-05-25 | End: 2022-05-27

## 2022-05-25 RX ORDER — ASPIRIN/CALCIUM CARB/MAGNESIUM 324 MG
300 TABLET ORAL ONCE
Refills: 0 | Status: COMPLETED | OUTPATIENT
Start: 2022-05-25 | End: 2022-05-25

## 2022-05-25 RX ORDER — VANCOMYCIN HCL 1 G
1250 VIAL (EA) INTRAVENOUS EVERY 12 HOURS
Refills: 0 | Status: DISCONTINUED | OUTPATIENT
Start: 2022-05-25 | End: 2022-05-26

## 2022-05-25 RX ORDER — KETOROLAC TROMETHAMINE 30 MG/ML
15 SYRINGE (ML) INJECTION ONCE
Refills: 0 | Status: DISCONTINUED | OUTPATIENT
Start: 2022-05-25 | End: 2022-05-25

## 2022-05-25 RX ORDER — ACETAMINOPHEN 500 MG
1000 TABLET ORAL ONCE
Refills: 0 | Status: COMPLETED | OUTPATIENT
Start: 2022-05-25 | End: 2022-05-25

## 2022-05-25 RX ORDER — TETRACAINE/BENZOCAINE/BUTAMBEN 2%-14%-2%
1 OINTMENT (GRAM) TOPICAL EVERY 4 HOURS
Refills: 0 | Status: DISCONTINUED | OUTPATIENT
Start: 2022-05-25 | End: 2022-05-29

## 2022-05-25 RX ORDER — PIPERACILLIN AND TAZOBACTAM 4; .5 G/20ML; G/20ML
3.38 INJECTION, POWDER, LYOPHILIZED, FOR SOLUTION INTRAVENOUS ONCE
Refills: 0 | Status: COMPLETED | OUTPATIENT
Start: 2022-05-25 | End: 2022-05-25

## 2022-05-25 RX ORDER — SODIUM CHLORIDE 9 MG/ML
1000 INJECTION, SOLUTION INTRAVENOUS
Refills: 0 | Status: DISCONTINUED | OUTPATIENT
Start: 2022-05-25 | End: 2022-05-25

## 2022-05-25 RX ORDER — METOPROLOL TARTRATE 50 MG
5 TABLET ORAL EVERY 6 HOURS
Refills: 0 | Status: DISCONTINUED | OUTPATIENT
Start: 2022-05-25 | End: 2022-05-27

## 2022-05-25 RX ORDER — HEPARIN SODIUM 5000 [USP'U]/ML
INJECTION INTRAVENOUS; SUBCUTANEOUS
Qty: 25000 | Refills: 0 | Status: DISCONTINUED | OUTPATIENT
Start: 2022-05-25 | End: 2022-05-27

## 2022-05-25 RX ORDER — AMPICILLIN SODIUM AND SULBACTAM SODIUM 250; 125 MG/ML; MG/ML
3 INJECTION, POWDER, FOR SUSPENSION INTRAMUSCULAR; INTRAVENOUS ONCE
Refills: 0 | Status: COMPLETED | OUTPATIENT
Start: 2022-05-25 | End: 2022-05-25

## 2022-05-25 RX ORDER — VANCOMYCIN HCL 1 G
1000 VIAL (EA) INTRAVENOUS EVERY 12 HOURS
Refills: 0 | Status: DISCONTINUED | OUTPATIENT
Start: 2022-05-25 | End: 2022-05-25

## 2022-05-25 RX ORDER — AMPICILLIN SODIUM AND SULBACTAM SODIUM 250; 125 MG/ML; MG/ML
3 INJECTION, POWDER, FOR SUSPENSION INTRAMUSCULAR; INTRAVENOUS EVERY 6 HOURS
Refills: 0 | Status: DISCONTINUED | OUTPATIENT
Start: 2022-05-25 | End: 2022-05-25

## 2022-05-25 RX ORDER — PIPERACILLIN AND TAZOBACTAM 4; .5 G/20ML; G/20ML
3.38 INJECTION, POWDER, LYOPHILIZED, FOR SOLUTION INTRAVENOUS EVERY 8 HOURS
Refills: 0 | Status: DISCONTINUED | OUTPATIENT
Start: 2022-05-25 | End: 2022-05-27

## 2022-05-25 RX ADMIN — PIPERACILLIN AND TAZOBACTAM 25 GRAM(S): 4; .5 INJECTION, POWDER, LYOPHILIZED, FOR SOLUTION INTRAVENOUS at 20:21

## 2022-05-25 RX ADMIN — Medication 1 SPRAY(S): at 21:44

## 2022-05-25 RX ADMIN — LIDOCAINE 1 PATCH: 4 CREAM TOPICAL at 19:27

## 2022-05-25 RX ADMIN — Medication 15 MILLIGRAM(S): at 22:15

## 2022-05-25 RX ADMIN — Medication 50 MILLIGRAM(S): at 22:05

## 2022-05-25 RX ADMIN — HYDROMORPHONE HYDROCHLORIDE 0.5 MILLIGRAM(S): 2 INJECTION INTRAMUSCULAR; INTRAVENOUS; SUBCUTANEOUS at 22:00

## 2022-05-25 RX ADMIN — Medication 15 MILLIGRAM(S): at 22:30

## 2022-05-25 RX ADMIN — HYDROMORPHONE HYDROCHLORIDE 0.5 MILLIGRAM(S): 2 INJECTION INTRAMUSCULAR; INTRAVENOUS; SUBCUTANEOUS at 21:46

## 2022-05-25 RX ADMIN — CHLORHEXIDINE GLUCONATE 1 APPLICATION(S): 213 SOLUTION TOPICAL at 09:02

## 2022-05-25 RX ADMIN — Medication 50 MILLIGRAM(S): at 06:12

## 2022-05-25 RX ADMIN — LIDOCAINE 1 PATCH: 4 CREAM TOPICAL at 11:15

## 2022-05-25 RX ADMIN — LIDOCAINE 1 PATCH: 4 CREAM TOPICAL at 23:00

## 2022-05-25 RX ADMIN — LIDOCAINE 1 PATCH: 4 CREAM TOPICAL at 06:58

## 2022-05-25 RX ADMIN — Medication 1 SPRAY(S): at 11:16

## 2022-05-25 RX ADMIN — Medication 50 MILLIGRAM(S): at 13:52

## 2022-05-25 RX ADMIN — HEPARIN SODIUM 1300 UNIT(S)/HR: 5000 INJECTION INTRAVENOUS; SUBCUTANEOUS at 19:32

## 2022-05-25 RX ADMIN — LIDOCAINE AND PRILOCAINE CREAM 1 APPLICATION(S): 25; 25 CREAM TOPICAL at 11:12

## 2022-05-25 RX ADMIN — Medication 5 MILLIGRAM(S): at 17:47

## 2022-05-25 RX ADMIN — HEPARIN SODIUM 1200 UNIT(S)/HR: 5000 INJECTION INTRAVENOUS; SUBCUTANEOUS at 13:05

## 2022-05-25 RX ADMIN — SODIUM CHLORIDE 125 MILLILITER(S): 9 INJECTION, SOLUTION INTRAVENOUS at 09:01

## 2022-05-25 RX ADMIN — OXYCODONE HYDROCHLORIDE 10 MILLIGRAM(S): 5 TABLET ORAL at 01:44

## 2022-05-25 RX ADMIN — FOSPHENYTOIN 108 MILLIGRAM(S) PE: 50 INJECTION INTRAMUSCULAR; INTRAVENOUS at 17:40

## 2022-05-25 RX ADMIN — Medication 1 SPRAY(S): at 17:40

## 2022-05-25 RX ADMIN — Medication 1 APPLICATION(S): at 11:12

## 2022-05-25 RX ADMIN — OXYCODONE HYDROCHLORIDE 10 MILLIGRAM(S): 5 TABLET ORAL at 02:14

## 2022-05-25 RX ADMIN — AMPICILLIN SODIUM AND SULBACTAM SODIUM 200 GRAM(S): 250; 125 INJECTION, POWDER, FOR SUSPENSION INTRAMUSCULAR; INTRAVENOUS at 06:17

## 2022-05-25 RX ADMIN — PIPERACILLIN AND TAZOBACTAM 200 GRAM(S): 4; .5 INJECTION, POWDER, LYOPHILIZED, FOR SOLUTION INTRAVENOUS at 12:18

## 2022-05-25 RX ADMIN — Medication 166.67 MILLIGRAM(S): at 12:18

## 2022-05-25 RX ADMIN — SODIUM CHLORIDE 125 MILLILITER(S): 9 INJECTION, SOLUTION INTRAVENOUS at 17:45

## 2022-05-25 NOTE — CONSULT NOTE ADULT - NS ATTEND AMEND GEN_ALL_CORE FT
Agree with note above. No identified critical care needs at this time. Pain control recommendations as above.
Patient seen and examined in ICU. R tonsilar pilar with obvious erythema and edema. Phonating well, protecting his airway. Will observe in ICU. NPO, ENT consult. IV abx

## 2022-05-25 NOTE — PROGRESS NOTE ADULT - ASSESSMENT
57y Male post-op from a Right Fem to PT bypass with own ipsilateral saphenous vein graft. Acute drop in Hb while on heparin drip which is now held s/p 2units RBC. Patient doing well on PCA after medications adjustment and has been weaned to oral analgesia. Remains hemodynamically well and afebrile. Prevena discontinue 5/17.   By podiatry:  ·	Applied betadine, no dressing applied  ·	Ordered iodosorb   ·	Ordered Emla cream to be applied to proximal R toe prn for no more than 30 mins   ·	WCO: apply iodosorb to R hallux daily   By Vascular   ·	Active pharyngoamygdalitis probably viral. Monitor progression of rt preauricular lymph nodes and submandibular. Monitor for  parotiditis ?  ·	Continue home medications  ·	Pain control on oral analgesia  ·	IS and OOB  ·	Diet as tolerated  ·	Bowel Regimen  ·	Eliquis  ·	Ancef antibiotics. Convert to keflex for total 5 day course of antibiotics if discharged  ·	PT/OT  ·	Changed Ancef on 5/22 for Vanco due to re-perfusion skin changes and local erythema to the staple line. Today improvement noticed.   ·	Awaiting demarcation of 1sr and 2nd toe which has poor perfusion Changes noticed before admission. FU podiatry recs.   ·	Covid negative  ·	Dispo: MAX 57y Male post-op from a Right Fem to PT bypass with own ipsilateral saphenous vein graft. Acute drop in Hb while on heparin drip which is now held s/p 2units RBC. Patient doing well on PCA after medications adjustment and has been weaned to oral analgesia. Remains hemodynamically well and afebrile. Prevena discontinue 5/17.   By podiatry:  ·	Applied betadine, no dressing applied  ·	Ordered iodosorb   ·	Ordered Emla cream to be applied to proximal R toe prn for no more than 30 mins   ·	WCO: apply iodosorb to R hallux daily   By Vascular   ·	Active rt pharyngoamygdalitis probably viral. Monitor progression of rt preauricular lymph nodes and submandibular. Monitor for rt   parotiditis or rt pharyngeal abscess?  ·	Continue home medications  ·	Pain control on oral analgesia  ·	IS and OOB  ·	Diet as tolerated  ·	Bowel Regimen  ·	Eliquis  ·	Ancef antibiotics. Convert to keflex for total 5 day course of antibiotics if discharged  ·	PT/OT  ·	Changed Ancef on 5/22 for Vanco due to re-perfusion skin changes and local erythema to the staple line. Today improvement noticed.   ·	Awaiting demarcation of 1sr and 2nd toe which has poor perfusion Changes noticed before admission. FU podiatry recs.   ·	Covid negative  ·	Dispo: MAX

## 2022-05-25 NOTE — CONSULT NOTE ADULT - SUBJECTIVE AND OBJECTIVE BOX
HPI:  57 year old male well known to vascular service from prior lower extremity revascularization, PMHX PAD with recent femoral endarterectomy 4/15 with rest pain, admitted 5/11 with RLE pain at rest, s/p R fem to PT bypass with ipsilateral saphenous vein graft on 5/13. Pt recovering on the surgical floor, preparing for discharge home when he developed odynophagia, which he reported became progressively worse over the course of the day on 5/24. Early 5/25 AM pt noted to have muffled voice and trouble with secretion management. Physical exam reveal uvular deviation, and significant swelling to the R posterior pharynx. SICU consulted for further management. Pt seen and examined by SICU at 6:30 AM, and plan discussed with overnight attending at that point. Pt went for STAT CT neck, ENT consulted. CT scan initial review concerning for possible R retropharyngeal abscess. ENT presented bedside, scoped patient, R lateral pharyngeal wall edema noted, airway patient. Abscess drainage attempted, no aspirate obtained. Final read of CT scan concludes edema of R oropharynx extending into the R area epiglottic fold and R longus coli muscle, consistent with extensive pharyngitis.    (11 May 2022 22:05)    PAST MEDICAL & SURGICAL HISTORY:  Hypercholesterolemia    Seizures    Depression    GERD (gastroesophageal reflux disease)    Hypertension    Peripheral vascular disease  with R SFA stent    Osteoarthritis    Former smoker    Prediabetes    Inguinal hernia, left    S/P ORIF (open reduction internal fixation) fracture  Left    S/P shoulder surgery  right    S/P appendectomy    H/O endarterectomy  L femoral a.  R femoral a.    Home Medications:  amLODIPine 10 mg oral tablet: 1 tab(s) orally once a day (25 May 2022 10:38)  aspirin 81 mg oral tablet, chewable: 1 tab(s) orally once a day (25 May 2022 10:38)  atorvastatin 80 mg oral tablet: 1 tab(s) orally once a day (25 May 2022 10:38)  Fish Oil 1000 mg oral capsule: 1 cap(s) orally 2 times a day (25 May 2022 10:38)  pantoprazole 40 mg oral delayed release tablet: 1 tab(s) orally once a day (25 May 2022 10:38)  phenytoin 200 mg oral capsule, extended release: 1 cap(s) orally every 12 hours (25 May 2022 10:38)  Plavix 75 mg oral tablet: 1 tab(s) orally once a day (25 May 2022 10:38)  QUEtiapine 200 mg oral tablet:  orally once a day (at bedtime) (25 May 2022 10:38)  venlafaxine 75 mg oral capsule, extended release: 1 cap(s) orally once a day (25 May 2022 10:38)    Review of Systems: All negative except where noted in HPI    MEDICATIONS  (STANDING):  aspirin Suppository 300 milliGRAM(s) Rectal once  cadexomer iodine 0.9% Gel 1 Application(s) Topical daily  chlorhexidine 2% Cloths 1 Application(s) Topical <User Schedule>  heparin  Infusion.  Unit(s)/Hr (12 mL/Hr) IV Continuous <Continuous>  lidocaine   4% Patch 1 Patch Transdermal daily  lidocaine/prilocaine Cream 1 Application(s) Topical daily  methylPREDNISolone sodium succinate Injectable 50 milliGRAM(s) IV Push every 8 hours  multiple electrolytes Injection Type 1 1000 milliLiter(s) (125 mL/Hr) IV Continuous <Continuous>  phenytoin ER Oral Tab/Cap - Peds 200 milliGRAM(s) Oral every 12 hours  piperacillin/tazobactam IVPB. 3.375 Gram(s) IV Intermittent once  piperacillin/tazobactam IVPB.. 3.375 Gram(s) IV Intermittent every 8 hours  vancomycin  IVPB 1000 milliGRAM(s) IV Intermittent every 12 hours    MEDICATIONS  (PRN):  tetracaine/benzocaine/butamben Spray 1 Spray(s) Topical every 4 hours PRN sore throat    Vital Signs Last 24 Hrs  T(C): 36.9 (25 May 2022 08:00), Max: 37.6 (25 May 2022 06:08)  T(F): 98.5 (25 May 2022 08:00), Max: 99.6 (25 May 2022 06:08)  HR: 106 (25 May 2022 10:00) (96 - 117)  BP: 148/69 (25 May 2022 10:00) (123/62 - 162/73)  BP(mean): 90 (25 May 2022 10:00) (90 - 117)  RR: 17 (25 May 2022 10:00) (12 - 20)  SpO2: 92% (25 May 2022 10:00) (92% - 100%)    Physical Exam:    Gen: Resting comfortably in bed  HEENT: PERRL, EOMI, L uvular deviation  Neurological: Alert and oriented x3 without focal deficit  Neck: Trachea midline, no evidence of JVD, R sided submandibular swelling  Pulmonary: CTAB with decreased breath sounds at the bases  Cardiovascular: regular rate and rhythm  Gastrointestinal: Soft, non-tender, non-distended  : Rod in place draining clear yellow urine  Extremities: RLE discoloration to 1st two toes, RLE lateral staple line intact  Skin: Intact    LABS:                        9.8    19.94 )-----------( 497      ( 25 May 2022 07:57 )             29.7     05-25    134<L>  |  98  |  12.1  ----------------------------<  115<H>  4.3   |  24.0  |  0.63    Ca    8.8      25 May 2022 07:57  Phos  2.9     05-25  Mg     1.8     05-25      PT/INR - ( 25 May 2022 07:57 )   PT: 14.0 sec;   INR: 1.20 ratio      PTT - ( 25 May 2022 07:57 )  PTT:31.7 sec    Impression and Plan:  57 year old male well known to vascular service from prior lower extremity revascularization, PMHX PAD with recent femoral endarterectomy 4/15 with rest pain, admitted 5/11 with RLE pain at rest, s/p R fem to PT bypass with ipsilateral saphenous vein graft on 5/13, hospital course complicated by development of odonyphonia w/ L uvular deviation, concerning for airway obstruction. Pt upgraded to SICU for further management.     Neuro:  - convert phenytoin to fosphenytoin  - hold Seroquel while NPO  - benzocaine spray and IV Tylenol for pharyngeal cellulitis     Pulm:  - observe in SICU for possible airway emergency  - emergent trach kit at bedside  - ABG WNL, breathing comfortably on room air  - HOB > 45 degrees  - frequent suctioning for secretion management    CV:  - start heparin ggt for full ACS, cannot take NOAC due to strict NPO status  - ASA 81 converted to  NM  - low grade tachycardia, continue IVF for volume expansion  - BP wnl    GI:  - strict NPO for airway management    /Renal:  - voiding, strict I/Os  - replete lytes as needed  - plyte @ 125 ml/hr    Endo:  - no issues    Heme/DVT  - hep ggt, PTT q6 until therapeutic  - ASA daily  - H/H stable    ID:  - abx broadened from unasyn to Vanc/Zosyn  - up trending leukocytosis, continue to monitor    Lines/tubes:  - PIVs    Dispo:  - Care per SICU    Plan to be discussed with

## 2022-05-25 NOTE — PROGRESS NOTE ADULT - SUBJECTIVE AND OBJECTIVE BOX
Vascular Surgery Progress Note:    Patient complaining of preauricular pain and sore through last night. Patient afebrile, and hemodynamically well. Foot pain well controlled. Tolerating diet. Denies n/v/f/c/cp/sob.     Diet, Regular (05-13-22 @ 16:52)      Scheduled Medications:   acetaminophen     Tablet .. 975 milliGRAM(s) Oral every 8 hours  amLODIPine   Tablet 10 milliGRAM(s) Oral daily  aspirin  chewable 81 milliGRAM(s) Oral daily  atorvastatin 80 milliGRAM(s) Oral at bedtime  benzocaine 15 mG/menthol 3.6 mG Lozenge 1 Lozenge Oral three times a day  cadexomer iodine 0.9% Gel 1 Application(s) Topical daily  famotidine    Tablet 20 milliGRAM(s) Oral daily  fentaNYL    Injectable 50 MICROGram(s) IV Push once  gabapentin 1200 milliGRAM(s) Oral every 8 hours  lidocaine   4% Patch 1 Patch Transdermal daily  lidocaine/prilocaine Cream 1 Application(s) Topical daily  methocarbamol 1500 milliGRAM(s) Oral three times a day  oxyCODONE  ER Tablet 15 milliGRAM(s) Oral every 12 hours  pantoprazole    Tablet 40 milliGRAM(s) Oral before breakfast  phenytoin ER Oral Tab/Cap - Peds 200 milliGRAM(s) Oral every 12 hours  QUEtiapine 200 milliGRAM(s) Oral at bedtime  senna 2 Tablet(s) Oral at bedtime  venlafaxine XR. 75 milliGRAM(s) Oral daily    PRN Medications:  ondansetron Injectable 4 milliGRAM(s) IV Push every 6 hours PRN Nausea  oxyCODONE    IR 5 milliGRAM(s) Oral every 3 hours PRN Moderate Pain (4 - 6)  oxyCODONE    IR 10 milliGRAM(s) Oral every 3 hours PRN Severe Pain (7 - 10)      Objective:   T(F): 99 (05-24 @ 23:32), Max: 99 (05-24 @ 23:32)  HR: 98 (05-24 @ 23:32) (95 - 100)  BP: 123/62 (05-24 @ 23:32) (120/61 - 162/73)  BP(mean): --  ABP: --  ABP(mean): --  RR: 18 (05-24 @ 23:32) (18 - 20)  SpO2: 95% (05-24 @ 23:32) (95% - 98%)      Physical Exam:   GEN: patient resting comfortably in bed, in no acute distress  HENT: preauricular rt side inflamation and rt tonsil reaching midline and contacting the uvula. White Exudate present on tonsil. No petechiae in soft palate  RESP: respirations are unlabored, no accessory muscle use, no conversational dyspnea  CVS: RRR  GI: Abdomen soft, non-tender, non-distended, no rebound tenderness / guarding  Neuro: oriented in time, place and location    I&O's    05-23 @ 07:01  -  05-24 @ 07:00  --------------------------------------------------------  IN:  Total IN: 0 mL    OUT:    Voided (mL): 600 mL  Total OUT: 600 mL    Total NET: -600 mL          LABS:                        9.1    12.66 )-----------( 483      ( 24 May 2022 05:49 )             29.6     05-24    138  |  105  |  18.7  ----------------------------<  103<H>  4.4   |  20.0<L>  |  0.68    Ca    8.7      24 May 2022 05:49      PT/INR - ( 24 May 2022 05:49 )   PT: 13.4 sec;   INR: 1.15 ratio         PTT - ( 24 May 2022 05:49 )  PTT:31.8 sec      MICROBIOLOGY:       PATHOLOGY:

## 2022-05-26 LAB
ANION GAP SERPL CALC-SCNC: 13 MMOL/L — SIGNIFICANT CHANGE UP (ref 5–17)
APTT BLD: 53.3 SEC — HIGH (ref 27.5–35.5)
APTT BLD: 64.6 SEC — HIGH (ref 27.5–35.5)
APTT BLD: 69.2 SEC — HIGH (ref 27.5–35.5)
BASOPHILS # BLD AUTO: 0.23 K/UL — HIGH (ref 0–0.2)
BASOPHILS NFR BLD AUTO: 0.9 % — SIGNIFICANT CHANGE UP (ref 0–2)
BUN SERPL-MCNC: 12.3 MG/DL — SIGNIFICANT CHANGE UP (ref 8–20)
CALCIUM SERPL-MCNC: 9 MG/DL — SIGNIFICANT CHANGE UP (ref 8.6–10.2)
CHLORIDE SERPL-SCNC: 101 MMOL/L — SIGNIFICANT CHANGE UP (ref 98–107)
CO2 SERPL-SCNC: 25 MMOL/L — SIGNIFICANT CHANGE UP (ref 22–29)
CREAT SERPL-MCNC: 0.59 MG/DL — SIGNIFICANT CHANGE UP (ref 0.5–1.3)
EGFR: 112 ML/MIN/1.73M2 — SIGNIFICANT CHANGE UP
EOSINOPHIL # BLD AUTO: 0 K/UL — SIGNIFICANT CHANGE UP (ref 0–0.5)
EOSINOPHIL NFR BLD AUTO: 0 % — SIGNIFICANT CHANGE UP (ref 0–6)
GLUCOSE BLDC GLUCOMTR-MCNC: 130 MG/DL — HIGH (ref 70–99)
GLUCOSE BLDC GLUCOMTR-MCNC: 135 MG/DL — HIGH (ref 70–99)
GLUCOSE BLDC GLUCOMTR-MCNC: 142 MG/DL — HIGH (ref 70–99)
GLUCOSE BLDC GLUCOMTR-MCNC: 160 MG/DL — HIGH (ref 70–99)
GLUCOSE SERPL-MCNC: 121 MG/DL — HIGH (ref 70–99)
HCT VFR BLD CALC: 28.6 % — LOW (ref 39–50)
HGB BLD-MCNC: 9.2 G/DL — LOW (ref 13–17)
LYMPHOCYTES # BLD AUTO: 1.33 K/UL — SIGNIFICANT CHANGE UP (ref 1–3.3)
LYMPHOCYTES # BLD AUTO: 5.2 % — LOW (ref 13–44)
MAGNESIUM SERPL-MCNC: 2.3 MG/DL — SIGNIFICANT CHANGE UP (ref 1.6–2.6)
MANUAL SMEAR VERIFICATION: SIGNIFICANT CHANGE UP
MCHC RBC-ENTMCNC: 29.5 PG — SIGNIFICANT CHANGE UP (ref 27–34)
MCHC RBC-ENTMCNC: 32.2 GM/DL — SIGNIFICANT CHANGE UP (ref 32–36)
MCV RBC AUTO: 91.7 FL — SIGNIFICANT CHANGE UP (ref 80–100)
MONOCYTES # BLD AUTO: 3.33 K/UL — HIGH (ref 0–0.9)
MONOCYTES NFR BLD AUTO: 13 % — SIGNIFICANT CHANGE UP (ref 2–14)
MRSA PCR RESULT.: SIGNIFICANT CHANGE UP
NEUTROPHILS # BLD AUTO: 20.73 K/UL — HIGH (ref 1.8–7.4)
NEUTROPHILS NFR BLD AUTO: 80.9 % — HIGH (ref 43–77)
PHOSPHATE SERPL-MCNC: 3.6 MG/DL — SIGNIFICANT CHANGE UP (ref 2.4–4.7)
PLAT MORPH BLD: NORMAL — SIGNIFICANT CHANGE UP
PLATELET # BLD AUTO: 488 K/UL — HIGH (ref 150–400)
POTASSIUM SERPL-MCNC: 4.3 MMOL/L — SIGNIFICANT CHANGE UP (ref 3.5–5.3)
POTASSIUM SERPL-SCNC: 4.3 MMOL/L — SIGNIFICANT CHANGE UP (ref 3.5–5.3)
RBC # BLD: 3.12 M/UL — LOW (ref 4.2–5.8)
RBC # FLD: 14.4 % — SIGNIFICANT CHANGE UP (ref 10.3–14.5)
RBC BLD AUTO: NORMAL — SIGNIFICANT CHANGE UP
S AUREUS DNA NOSE QL NAA+PROBE: SIGNIFICANT CHANGE UP
SODIUM SERPL-SCNC: 139 MMOL/L — SIGNIFICANT CHANGE UP (ref 135–145)
WBC # BLD: 25.62 K/UL — HIGH (ref 3.8–10.5)
WBC # FLD AUTO: 25.62 K/UL — HIGH (ref 3.8–10.5)

## 2022-05-26 PROCEDURE — 99233 SBSQ HOSP IP/OBS HIGH 50: CPT

## 2022-05-26 RX ORDER — DEXTROSE 50 % IN WATER 50 %
15 SYRINGE (ML) INTRAVENOUS ONCE
Refills: 0 | Status: DISCONTINUED | OUTPATIENT
Start: 2022-05-26 | End: 2022-05-29

## 2022-05-26 RX ORDER — SODIUM CHLORIDE 9 MG/ML
1000 INJECTION, SOLUTION INTRAVENOUS
Refills: 0 | Status: DISCONTINUED | OUTPATIENT
Start: 2022-05-26 | End: 2022-05-29

## 2022-05-26 RX ORDER — DEXTROSE 50 % IN WATER 50 %
12.5 SYRINGE (ML) INTRAVENOUS ONCE
Refills: 0 | Status: DISCONTINUED | OUTPATIENT
Start: 2022-05-26 | End: 2022-05-29

## 2022-05-26 RX ORDER — INSULIN LISPRO 100/ML
VIAL (ML) SUBCUTANEOUS
Refills: 0 | Status: DISCONTINUED | OUTPATIENT
Start: 2022-05-26 | End: 2022-05-29

## 2022-05-26 RX ORDER — VENLAFAXINE HCL 75 MG
75 CAPSULE, EXT RELEASE 24 HR ORAL DAILY
Refills: 0 | Status: DISCONTINUED | OUTPATIENT
Start: 2022-05-26 | End: 2022-05-29

## 2022-05-26 RX ORDER — DEXTROSE 50 % IN WATER 50 %
25 SYRINGE (ML) INTRAVENOUS ONCE
Refills: 0 | Status: DISCONTINUED | OUTPATIENT
Start: 2022-05-26 | End: 2022-05-29

## 2022-05-26 RX ORDER — GLUCAGON INJECTION, SOLUTION 0.5 MG/.1ML
1 INJECTION, SOLUTION SUBCUTANEOUS ONCE
Refills: 0 | Status: DISCONTINUED | OUTPATIENT
Start: 2022-05-26 | End: 2022-05-29

## 2022-05-26 RX ORDER — INSULIN LISPRO 100/ML
VIAL (ML) SUBCUTANEOUS EVERY 6 HOURS
Refills: 0 | Status: DISCONTINUED | OUTPATIENT
Start: 2022-05-26 | End: 2022-05-26

## 2022-05-26 RX ORDER — QUETIAPINE FUMARATE 200 MG/1
200 TABLET, FILM COATED ORAL AT BEDTIME
Refills: 0 | Status: DISCONTINUED | OUTPATIENT
Start: 2022-05-26 | End: 2022-05-29

## 2022-05-26 RX ADMIN — LIDOCAINE 1 PATCH: 4 CREAM TOPICAL at 19:00

## 2022-05-26 RX ADMIN — PIPERACILLIN AND TAZOBACTAM 25 GRAM(S): 4; .5 INJECTION, POWDER, LYOPHILIZED, FOR SOLUTION INTRAVENOUS at 13:08

## 2022-05-26 RX ADMIN — HEPARIN SODIUM 1400 UNIT(S)/HR: 5000 INJECTION INTRAVENOUS; SUBCUTANEOUS at 18:00

## 2022-05-26 RX ADMIN — Medication 1 APPLICATION(S): at 13:08

## 2022-05-26 RX ADMIN — Medication 1 SPRAY(S): at 12:07

## 2022-05-26 RX ADMIN — Medication 5 MILLIGRAM(S): at 12:07

## 2022-05-26 RX ADMIN — Medication 5 MILLIGRAM(S): at 00:46

## 2022-05-26 RX ADMIN — LIDOCAINE 1 PATCH: 4 CREAM TOPICAL at 12:06

## 2022-05-26 RX ADMIN — CHLORHEXIDINE GLUCONATE 1 APPLICATION(S): 213 SOLUTION TOPICAL at 05:15

## 2022-05-26 RX ADMIN — LIDOCAINE AND PRILOCAINE CREAM 1 APPLICATION(S): 25; 25 CREAM TOPICAL at 13:37

## 2022-05-26 RX ADMIN — Medication 5 MILLIGRAM(S): at 17:16

## 2022-05-26 RX ADMIN — Medication 50 MILLIGRAM(S): at 05:14

## 2022-05-26 RX ADMIN — HEPARIN SODIUM 1400 UNIT(S)/HR: 5000 INJECTION INTRAVENOUS; SUBCUTANEOUS at 01:48

## 2022-05-26 RX ADMIN — FOSPHENYTOIN 108 MILLIGRAM(S) PE: 50 INJECTION INTRAMUSCULAR; INTRAVENOUS at 05:14

## 2022-05-26 RX ADMIN — Medication 1 SPRAY(S): at 03:27

## 2022-05-26 RX ADMIN — Medication 1 SPRAY(S): at 07:47

## 2022-05-26 RX ADMIN — QUETIAPINE FUMARATE 200 MILLIGRAM(S): 200 TABLET, FILM COATED ORAL at 22:25

## 2022-05-26 RX ADMIN — Medication 5 MILLIGRAM(S): at 05:14

## 2022-05-26 RX ADMIN — Medication 1: at 12:06

## 2022-05-26 RX ADMIN — Medication 166.67 MILLIGRAM(S): at 00:46

## 2022-05-26 RX ADMIN — PIPERACILLIN AND TAZOBACTAM 25 GRAM(S): 4; .5 INJECTION, POWDER, LYOPHILIZED, FOR SOLUTION INTRAVENOUS at 20:58

## 2022-05-26 RX ADMIN — Medication 50 MILLIGRAM(S): at 22:23

## 2022-05-26 RX ADMIN — FOSPHENYTOIN 108 MILLIGRAM(S) PE: 50 INJECTION INTRAMUSCULAR; INTRAVENOUS at 18:01

## 2022-05-26 RX ADMIN — Medication 50 MILLIGRAM(S): at 13:11

## 2022-05-26 RX ADMIN — PIPERACILLIN AND TAZOBACTAM 25 GRAM(S): 4; .5 INJECTION, POWDER, LYOPHILIZED, FOR SOLUTION INTRAVENOUS at 04:18

## 2022-05-26 NOTE — CONSULT NOTE ADULT - CONSULT REQUESTED DATE/TIME
16-May-2022 13:59
12-May-2022 02:41
17-May-2022 15:28
13-May-2022 20:07
13-May-2022 10:00
25-May-2022 07:00
26-May-2022 15:11

## 2022-05-26 NOTE — CONSULT NOTE ADULT - SUBJECTIVE AND OBJECTIVE BOX
[facility]  INFECTIOUS DISEASES AND INTERNAL MEDICINE at [place] and [place]  =======================================================                               Jorge Sosa MD#  Leonid Cisse MD*                                     Iveth Torres MD*    Daxa Santos MD*            Diplomates American Board of Internal Medicine & Infectious Diseases                # [place] Office - Appt - Tel  [phone] Fax [phone]                * [place] Office - Appt - Tel [phone] Fax [phone]                                  Hospital Consult line:  [phone]  =======================================================      [id number]  RADHA CHAKRABORTY    CC: Patient is a 57y old  Male who presents with a chief complaint of PAD, RLE ischemia (26 May 2022 08:01)      57y  Male with h/o PAD with recent femoral endarterectomy 4/15, admitted 5/11 with RLE pain at rest, s/p R fem to PT bypass with ipsilateral saphenous vein graft on 5/13. Patient was recovering on the surgical floor, preparing for discharge home when he developed odynophagia, which he reported became progressively worse over the course of the day on 5/24. Early 5/25 AM he noted to have muffled voice and trouble with secretion management. Physical exam reveal uvular deviation, and significant swelling to the R posterior pharynx. SICU consulted and patient was transferred to SICU. Patient taken for CT neck, ENT consulted. CT scan initial review concerning for possible R retropharyngeal abscess. ENT was called and underwent scope 5/25, R lateral pharyngeal wall edema noted, airway patent. Abscess drainage attempted, no aspirate obtained. Final read of CT scan concludes edema of R oropharynx extending into the R area epiglottic fold and R longus coli muscle, consistent with extensive pharyngitis. Patient was started on IV Zosyn. ID input requested.       Past Medical & Surgical Hx:  Hypercholesterolemia  Seizures  Depression  GERD (gastroesophageal reflux disease)  Hypertension  Peripheral vascular disease, with R SFA stent  Osteoarthritis  Former smoker  Prediabetes  Inguinal hernia, left  S/P ORIF (open reduction internal fixation) fracture, Left  S/P shoulder surgery, right  S/P appendectomy  H/O endarterectomy  L femoral a.  R femoral a.      Social Hx:  Former smoker       FAMILY HISTORY:  Family history of breast cancer (Mother)  Family history of hepatitis (Mother)  Family history of heart disease (Mother, Sibling)  Family history of CABG (Father)  Family history of peripheral vascular disease (Father)  Family history of brain aneurysm (Sibling)      Allergies  No Known Allergies       REVIEW OF SYSTEMS:  CONSTITUTIONAL:  No Fever or chills  HEENT:  No diplopia or blurred vision.  No earache, sore throat or runny nose.  CARDIOVASCULAR:  No pressure, squeezing, strangling, tightness, heaviness or aching about the chest, neck, axilla or epigastrium.  RESPIRATORY:  No cough, shortness of breath  GASTROINTESTINAL:  No nausea, vomiting or diarrhea.  GENITOURINARY:  No dysuria, frequency or urgency.   MUSCULOSKELETAL:  no joint aches, no muscle pain  SKIN:  No change in skin, hair or nails.  NEUROLOGIC:  No Headaches, seizures   PSYCHIATRIC:  No disorder of thought or mood.  ENDOCRINE:  No heat or cold intolerance  HEMATOLOGICAL:  No easy bruising or bleeding.       Physical Exam:  GEN: NAD  HEENT: normocephalic and atraumatic. EOMI. PERRL.  + swelling uvula    NECK: Supple.   LUNGS: CTA B/L.  HEART: RRR  ABDOMEN: Soft, NT, ND.  +BS.    : No CVA tenderness  EXTREMITIES: Without  edema.  MSK: No joint swelling  NEUROLOGIC: No Focal Deficits   PSYCHIATRIC: Appropriate affect .  SKIN: Sutures on RLE      Vitals:  T(F): 97.7 (26 May 2022 11:00), Max: 99.3 (25 May 2022 20:00)  HR: 69 (26 May 2022 13:00)  BP: 130/66 (26 May 2022 13:00)  RR: 13 (26 May 2022 13:00)  SpO2: 99% (26 May 2022 13:00) (92% - 99%)  temp max in last 48H T(F): , Max: 99.6 (05-25-22 @ 06:08)    Current Antibiotics:  piperacillin/tazobactam IVPB.. 3.375 Gram(s) IV Intermittent every 8 hours    Other medications:  cadexomer iodine 0.9% Gel 1 Application(s) Topical daily  chlorhexidine 2% Cloths 1 Application(s) Topical <User Schedule>  dextrose 5%. 1000 milliLiter(s) IV Continuous <Continuous>  dextrose 5%. 1000 milliLiter(s) IV Continuous <Continuous>  dextrose 50% Injectable 25 Gram(s) IV Push once  dextrose 50% Injectable 12.5 Gram(s) IV Push once  dextrose 50% Injectable 25 Gram(s) IV Push once  fosphenytoin IVPB 200 milliGRAM(s) PE IV Intermittent every 12 hours  glucagon  Injectable 1 milliGRAM(s) IntraMuscular once  heparin  Infusion.  Unit(s)/Hr IV Continuous <Continuous>  insulin lispro (ADMELOG) corrective regimen sliding scale   SubCutaneous every 6 hours  lidocaine   4% Patch 1 Patch Transdermal daily  lidocaine/prilocaine Cream 1 Application(s) Topical daily  methylPREDNISolone sodium succinate Injectable 50 milliGRAM(s) IV Push every 8 hours  metoprolol tartrate Injectable 5 milliGRAM(s) IV Push every 6 hours                 9.2    25.62 )-----------( 488      ( 26 May 2022 03:35 )             28.6     05-26    139  |  101  |  12.3  ----------------------------<  121<H>  4.3   |  25.0  |  0.59    Ca    9.0      26 May 2022 03:35  Phos  3.6     05-26  Mg     2.3     05-26      WBC Count: 25.62 K/uL (05-26-22 @ 03:35)  WBC Count: 24.47 K/uL (05-25-22 @ 19:06)  WBC Count: 19.94 K/uL (05-25-22 @ 07:57)  WBC Count: 21.43 K/uL (05-25-22 @ 05:57)  WBC Count: 12.66 K/uL (05-24-22 @ 05:49)  WBC Count: 13.52 K/uL (05-23-22 @ 21:37)  WBC Count: 8.89 K/uL (05-23-22 @ 06:18)  WBC Count: 9.59 K/uL (05-22-22 @ 06:12)    Creatinine, Serum: 0.59 mg/dL (05-26-22 @ 03:35)  Creatinine, Serum: 0.63 mg/dL (05-25-22 @ 07:57)  Creatinine, Serum: 0.68 mg/dL (05-24-22 @ 05:49)  Creatinine, Serum: 0.67 mg/dL (05-23-22 @ 21:37)  Creatinine, Serum: 0.69 mg/dL (05-23-22 @ 06:18)     COVID-19 PCR: NotDetec (05-22-22 @ 06:33)  COVID-19 PCR: NotDetec (05-18-22 @ 06:07)  SARS-CoV-2 Result: NotDetec (05-11-22 @ 20:43)        < from: CT Neck Soft Tissue w/ IV Cont (05.25.22 @ 06:36) >  ACC: [id number] EXAM:  CT NECK SOFT TISSUE IC                          PROCEDURE DATE:  05/25/2022      INTERPRETATION:  CT neck with and without IV contrast    CLINICAL INFORMATION: Retropharyngeal abscess    TECHNIQUE:   First, axial images were obtained through the neck as a   single helical acquisition and reconstructed at 3 thickness.  Second,   contiguous axial 3 mm thick sections were obtained through the neck using   single helical acquisition.  Images were acquired during the intravenous   administration of 64 cc of Omnipaque 350/ 36 cc discarded.  Image data   was reconstructed in the 3 mm sagittal and coronal planes.   This scan   was performed using automatic exposure control (radiation dose reduction   software) to obtain a diagnostic image quality scan with patient dose as   low as reasonably achievable.    FINDINGS:   No prior similar studies are available for review.    There is edema of the RIGHT oropharynx extending into the RIGHT area   epiglottic fold and RIGHT longus coli muscle consistent with extensive   pharyngitis. No well-defined drainable abscess is seen.    No neck mass is found.  No pathologically enlarged lymph nodes are found.    The visualized lymph nodes demonstrate no central necrosis or extranodal   extension.    The mucosal surfaces of the upper aerodigestive tract demonstrate   physiologic mucosal enhancement and appear symmetric and unremarkable.    The larynx is intact.  The preepiglottic and paralaryngeal spaces are   intact.  Laryngeal cartilages remain intact.    The nasopharynx is symmetric.  No lateral retropharyngeal mass is found.    The underlying central skull base is intact.  The petrous temporal bones   and mastoids remain clear.  The visualized base of brain appears   unremarkable.    The parotid and submandibular glands are intact.  The thyroid gland is   intact.    The visualized paranasal sinuses are clear.  The nasal cavity is   unremarkable.    The cervical spine moderate show moderate degenerative disc disease and   spondylosis at C5-6 and C6-7.    The carotid and vertebral arteries demonstrate enhancement indicating   their patency. Extensive vascular calcification is seen at the common   carotid artery bifurcations.    The lung apices are clear, allowing for the for the neck CT technique.      IMPRESSION: Edema of the RIGHT oropharynx extending into the RIGHT area   epiglottic fold and RIGHT longus coli muscle consistent with extensive   pharyngitis. No well-defined drainable abscess is seen. Moderate   degenerative disc disease and spondylosis.    --- End of Report ---  < end of copied text >

## 2022-05-26 NOTE — CHART NOTE - NSCHARTNOTEFT_GEN_A_CORE
At patient's request called the patient's wife (Tasha Eason at [phone]).  Updated her on patient's condition.  Answered questions.  Patient currently HD normal.  Adequate O2 sats on RA.  Breathing comfortably.  Having some difficulty swallowing secretions but able to cough well and then suction himself.  Complaining of pain in right neck/face.  Will give IV tylenol.  Serial exams.
ENT consult called by floor resident at 0540 when pt noted to have uvula deviation and inability to manage secretions    SICU consulted.  By 0630, no response from ENT.  Service was called a second time by myself at 0630. Unable to speak with MD directly, called directed to answering service.    Left message for EMERGENT consult for impending airway obstruction.    I was called back by covering ENT at 0700 however he was off call and needed to speak with his partner.    I was then contacted by Dr Moura at 0708 who said he will be coming in from [place].      SICU attending aware of concern for airway compromise and need for intervention should pt deteriorate prior to ENT arriving.
Mr. Rider was seen today for a forefoot offloading boot. The boot fit well. I gave him instruction on how to wear it. He will need it for ambulation. He will need a shoe for the opposite foot for balance. I gave him protective socks to eventually use.     MONA Lewis  [facility]  [phone]
SICU TRANSFER NOTE  -----------------------------  ICU Admission Date: 5/25/2022  Transfer Date: 05-26-22 @ 08:23    Admission Diagnosis: RLE ischemia    Active Problems/injuries:  RLE limb ischemia, Peritonsillar phlegmon / cellulitis     Procedures: 5/13: R fem bypass with ipsilateral saphenous vein graft                    5/25: ENT scope, needle aspiration of peritonsillar phlegmon     Consultants:    Infectious Disease    Medications  cadexomer iodine 0.9% Gel 1 Application(s) Topical daily  chlorhexidine 2% Cloths 1 Application(s) Topical <User Schedule>  dextrose 5%. 1000 milliLiter(s) IV Continuous <Continuous>  dextrose 5%. 1000 milliLiter(s) IV Continuous <Continuous>  dextrose 50% Injectable 25 Gram(s) IV Push once  dextrose 50% Injectable 12.5 Gram(s) IV Push once  dextrose 50% Injectable 25 Gram(s) IV Push once  dextrose Oral Gel 15 Gram(s) Oral once PRN  fosphenytoin IVPB 200 milliGRAM(s) PE IV Intermittent every 12 hours  glucagon  Injectable 1 milliGRAM(s) IntraMuscular once  heparin  Infusion.  Unit(s)/Hr IV Continuous <Continuous>  insulin lispro (ADMELOG) corrective regimen sliding scale   SubCutaneous every 6 hours  lidocaine   4% Patch 1 Patch Transdermal daily  lidocaine/prilocaine Cream 1 Application(s) Topical daily  methylPREDNISolone sodium succinate Injectable 50 milliGRAM(s) IV Push every 8 hours  metoprolol tartrate Injectable 5 milliGRAM(s) IV Push every 6 hours  piperacillin/tazobactam IVPB.. 3.375 Gram(s) IV Intermittent every 8 hours  tetracaine/benzocaine/butamben Spray 1 Spray(s) Topical every 4 hours PRN      [ x] I attest I have reviewed and reconciled all medications prior to transfer    IV Fluids  lactated ringers.: Solution, 1000 milliLiter(s) infuse at 60 mL/Hr    Indication: NPO    Antibiotics:  piperacillin/tazobactam IVPB.. 3.375 Gram(s) IV Intermittent every 8 hours    Indication: Peritonsillar phlegmon / cellulitis  End Date: ?      I have discussed this case with Dr. Reyes upon transfer and all questions regarding ICU course were answered.  The following items are to be followed up:    - Continue Zosyn and decadron  - Decadron taper vs short course of oral prednisone vs medrol dose lionel  - F/up Infectious disease consultation recs  - Tolerating clears.  ADAT  - Serial vascular checks  - Heparin gtt currently therapeutic.  Transition to DOAC as per vascular team  - Medical management of co-morbid conditions  - Dispo planning
internal medicine follow up.    patient complaining of worsening RLE pain and discoloration of foot.  tolerating heparin drip.    agree with consult note and h&P.  labs/imaging reviewed.    awaiting OR  d/w vascular surgery  advise IV dilaudid 1mg q4prn mod pain and 2mg q4prn severe pain   increase as tolerated given limb ischemia.
Consulted for clearance discussed with Vascular surgery that patient follows with Dr. Irby this is the [facility] ([facility]) cardiology group.     Lizbet Frost D.O. [facility]  Cardiology/Vascular Cardiology -[facility]   Telephone # [phone]
POST-OPERATIVE NOTE    Subjective:  Patient is s/p Right Fem to PT bypass with own ipsilateral saphenous vein graft. Patient complained of intense pain and anesthesia adjusted medications and PCA, SICU evaluated the patient after anesthesia request and does not meet criteria for SICU. After medications were adjusted the pain was better controlled. Remains hemodynamically stable and afebrile.     Vital Signs Last 24 Hrs  T(C): 36.5 (13 May 2022 19:41), Max: 37.1 (13 May 2022 16:42)  T(F): 97.7 (13 May 2022 19:41), Max: 98.8 (13 May 2022 16:42)  HR: 105 (13 May 2022 20:30) (84 - 132)  BP: 164/74 (13 May 2022 20:30) (92/65 - 183/80)  BP(mean): 97 (13 May 2022 20:30) (71 - 107)  RR: 16 (13 May 2022 20:30) (10 - 20)  SpO2: 100% (13 May 2022 20:30) (95% - 100%)  I&O's Detail    12 May 2022 07:01  -  13 May 2022 07:00  --------------------------------------------------------  IN:    Heparin Infusion: 22 mL  Total IN: 22 mL    OUT:  Total OUT: 0 mL    Total NET: 22 mL        amLODIPine   Tablet 10  aspirin  chewable 81    PAST MEDICAL & SURGICAL HISTORY:  Hypercholesterolemia      Seizures      Depression      GERD (gastroesophageal reflux disease)      Hypertension      Peripheral vascular disease  with R SFA stent      Osteoarthritis      Former smoker      Prediabetes      Inguinal hernia, left      S/P ORIF (open reduction internal fixation) fracture  Left      S/P shoulder surgery  right      S/P appendectomy      H/O endarterectomy  L femoral a.  R femoral a.            Physical Exam:  General: NAD, resting comfortably in bed  Pulmonary: Nonlabored breathing, no respiratory distress  Cardiovascular: NSR  Abdominal: soft, NT/ND, R groin with prevena in place  : Gallegos with clear urine  RLE wrapped, compartments soft, dressing c/d/i. Doppler PT pulse.    LABS:                        9.4    12.49 )-----------( 206      ( 13 May 2022 17:10 )             27.9     05-13    138  |  108<H>  |  9.6  ----------------------------<  136<H>  4.1   |  17.0<L>  |  0.70    Ca    7.6<L>      13 May 2022 17:10  Phos  3.2     05-13  Mg     2.1     05-13    TPro  7.6  /  Alb  4.1  /  TBili  <0.2<L>  /  DBili  x   /  AST  18  /  ALT  27  /  AlkPhos  185<H>  05-13    PTT - ( 13 May 2022 06:58 )  PTT:69.0 sec  CAPILLARY BLOOD GLUCOSE          Radiology and Additional Studies:    Assessment:  The patient is a 57y Male who is now several hours post-op from a Right Fem to PT bypass with own ipsilateral saphenous vein graft. Patient doing well on PCA after medicatioins adjustment. Remains hemodynamically stable and afebrile.     Plan:  - Pain control as needed  - Restart heparin gtt tomorrow 5/14 am  - Flat until 5/14 am after evaluation by vascular surgeon  - PCA for pain control  - Keep gallegos until ambulatory at least  - IS
Patient hemodynamically stable, being downgraded to floor.    Plan:    Please, consult ID for further recommendations on pharyngeal abscess treatment
Source: Patient [ ]  Family [ ]   other [x ]    Current Diet: Diet, NPO (05-25-22 @ 05:46)    Current Weight:   (5/24) 158.7 lbs  (5/18) 160.4 lbs  (5/13) 149.4 lbs  ? accuracy of weights, continue to trend and maintain strict Is&Os     Pertinent Medications: MEDICATIONS  (STANDING):  ampicillin/sulbactam  IVPB      ampicillin/sulbactam  IVPB 3 Gram(s) IV Intermittent every 6 hours  aspirin  chewable 81 milliGRAM(s) Oral daily  cadexomer iodine 0.9% Gel 1 Application(s) Topical daily  chlorhexidine 2% Cloths 1 Application(s) Topical <User Schedule>  lidocaine   4% Patch 1 Patch Transdermal daily  lidocaine/prilocaine Cream 1 Application(s) Topical daily  methylPREDNISolone sodium succinate Injectable 50 milliGRAM(s) IV Push every 8 hours  multiple electrolytes Injection Type 1 1000 milliLiter(s) (125 mL/Hr) IV Continuous <Continuous>  phenytoin ER Oral Tab/Cap - Peds 200 milliGRAM(s) Oral every 12 hours    MEDICATIONS  (PRN):    Pertinent Labs: CBC Full  -  ( 25 May 2022 07:57 )  WBC Count : 19.94 K/uL  RBC Count : 3.22 M/uL  Hemoglobin : 9.8 g/dL  Hematocrit : 29.7 %  Platelet Count - Automated : 497 K/uL  Mean Cell Volume : 92.2 fl  Mean Cell Hemoglobin : 30.4 pg  Mean Cell Hemoglobin Concentration : 33.0 gm/dL  Auto Neutrophil # : x  Auto Lymphocyte # : x  Auto Monocyte # : x  Auto Eosinophil # : x  Auto Basophil # : x  Auto Neutrophil % : x  Auto Lymphocyte % : x  Auto Monocyte % : x  Auto Eosinophil % : x  Auto Basophil % : x    05-25 Na134 mmol/L<L> Glu 115 mg/dL<H> K+ 4.3 mmol/L Cr  0.63 mg/dL BUN 12.1 mg/dL Phos 2.9 mg/dL Alb n/a   PAB n/a       Skin: Surgical incision to Right inner leg and Right groin per documentation  Eliseo: 19    Nutrition focused physical exam conducted - found signs of malnutrition [x ]absent [ ]present    Subcutaneous fat loss: [ ] Orbital fat pads region, [ ]Buccal fat region, [ ]Triceps region,  [ ]Ribs region    Muscle wasting: [ ]Temples region, [ ]Clavicle region, [ ]Shoulder region, [ ]Scapula region, [ ]Interosseous region,  [ ]thigh region, [ ]Calf region    Estimated Needs:   [ x] no change since previous assessment  [ ] recalculated:     Current Nutrition Diagnosis: Pt remains at nutrition risk secondary to increased nutrient needs related to increased physiological demand for healing as evidenced by post-op from a Right Fem to PT bypass with own ipsilateral saphenous vein graft. Pt upgraded to ICU as pt was noted to have uvula deviation and inability to manage secretions, concern for airway compromise- ENT consulted. Now made NPO. Aware of plan for OR per rounds. RD to follow up.       Recommendations:   1) Provide nutrition/hydration via tolerate route as medically feasible.  2) Rx: MVI and vitamin C 500mg daily as feasible.  3) Obtain daily weights to monitor trends.     Monitoring and Evaluation:   [ ] PO intake [ ] Tolerance to diet prescription [X] Weights  [X] Follow up per protocol [X] Labs

## 2022-05-26 NOTE — CONSULT NOTE ADULT - ASSESSMENT
57y  Male with h/o PAD with recent femoral endarterectomy 4/15, admitted 5/11 with RLE pain at rest, s/p R fem to PT bypass with ipsilateral saphenous vein graft on 5/13. Patient was recovering on the surgical floor, preparing for discharge home when he developed odynophagia, which he reported became progressively worse over the course of the day on 5/24. Early 5/25 AM he noted to have muffled voice and trouble with secretion management. Physical exam reveal uvular deviation, and significant swelling to the R posterior pharynx. SICU consulted and patient was transferred to SICU. Patient taken for CT neck, ENT consulted. CT scan initial review concerning for possible R retropharyngeal abscess. ENT was called and underwent scope 5/25, R lateral pharyngeal wall edema noted, airway patent. Abscess drainage attempted, no aspirate obtained. Final read of CT scan concludes edema of R oropharynx extending into the R area epiglottic fold and R longus coli muscle, consistent with extensive pharyngitis. Patient was started on IV Zosyn.      R retropharyngeal phlegmon   pharyngitis  ENT scope, needle aspiration of peritonsillar phlegmon 5/25  Leukocytosis due to steroids   RLE pain at rest, s/p R fem to PT bypass with ipsilateral saphenous vein graft on 5/13      - No culture results available   - COVID 19 PCR 5/22 negative   - CT Neck results reviewed  - Continue Zosyn   - Can switch to PO Augmentin when tolerating oral  - Plan for 10 day course of antibiotics   - Follow up cultures  - Trend Fever  - Trend WBC      Thank you for allowing me to participate in the care of your patient.   Will sign off. Please call PRN.     d/w SICU team        57y  Male with h/o PAD with recent femoral endarterectomy 4/15, admitted 5/11 with RLE pain at rest, s/p R fem to PT bypass with ipsilateral saphenous vein graft on 5/13. Patient was recovering on the surgical floor, preparing for discharge home when he developed odynophagia, which he reported became progressively worse over the course of the day on 5/24. Early 5/25 AM he noted to have muffled voice and trouble with secretion management. Physical exam reveal uvular deviation, and significant swelling to the R posterior pharynx. SICU consulted and patient was transferred to SICU. Patient taken for CT neck, ENT consulted. CT scan initial review concerning for possible R retropharyngeal abscess. ENT was called and underwent scope 5/25, R lateral pharyngeal wall edema noted, airway patent. Abscess drainage attempted, no aspirate obtained. Final read of CT scan concludes edema of R oropharynx extending into the R area epiglottic fold and R longus coli muscle, consistent with extensive pharyngitis. Patient was started on IV Zosyn.      R retropharyngeal phlegmon   pharyngitis  ENT scope, needle aspiration of peritonsillar phlegmon 5/25  Leukocytosis due to steroids   RLE pain at rest, s/p R fem to PT bypass with ipsilateral saphenous vein graft on 5/13      - No culture results available   - COVID 19 PCR 5/22 negative   - CT Neck results reviewed  - Continue Zosyn   - Can switch to PO Augmentin when tolerating oral  - Plan for 10 day course of antibiotics (last day 5/31)  - Follow up cultures  - Trend Fever  - Trend WBC      Thank you for allowing me to participate in the care of your patient.   Will sign off. Please call PRN.     d/w SICU team

## 2022-05-26 NOTE — PROGRESS NOTE ADULT - ASSESSMENT
57 year old male s/p R CFA to PT bypass w rGSV/ Hospital course complicated by post-reperfusion syndrome and development of odonyphonia w/ L uvular deviation, concerning for airway obstruction. Pt upgraded to SICU for further management.     / SICU down grade  / Pain control as needed  / advance diet as tolerated   / ID consult   / continue AC w ASA and NOAC  / Pods wound care and OP follow up   . Dispo to MAX once tolerating diet, ID plan, and ENT follow-up

## 2022-05-26 NOTE — PROGRESS NOTE ADULT - ASSESSMENT
57 year old male well known to vascular service from prior lower extremity revascularization, PMHX PAD with recent femoral endarterectomy 4/15 with rest pain, admitted 5/11 with RLE pain at rest, s/p R fem to PT bypass with ipsilateral saphenous vein graft on 5/13, hospital course complicated by development of odonyphonia w/ L uvular deviation, concerning for airway obstruction. Pt upgraded to SICU for further management.     Neuro:  - convert phenytoin to fosphenytoin  - hold Seroquel while NPO  - benzocaine spray and IV Tylenol for pharyngeal cellulitis     Pulm:  - observe in SICU for possible airway emergency  - emergent trach kit at bedside  - ABG WNL, breathing comfortably on room air  - HOB > 45 degrees  - frequent suctioning for secretion management    CV:  - start heparin ggt for full ACS, cannot take NOAC due to strict NPO status  - ASA 81 converted to  PA  - BP wnl    GI:  - strict NPO for airway management  - plasmalyte @ 125cc/hr    /Renal:  - voiding, strict I/Os  - replete lytes as needed    Endo:  - no issues    Heme/DVT  - hep ggt for full AC, PTT q6 until therapeutic  - H/H stable    ID:  - Vanc/zosyn  - increasing leukocytosis, continue to monitor, afebrile    Lines/tubes:  - PIVs    Dispo:  - Care per SICU

## 2022-05-26 NOTE — CONSULT NOTE ADULT - CONSULT REASON
Pain control
Pre-operative assessment, risk stratification
R foot blisters
Pain Management
peritonsillar phlegmon
critical airway
pre-op

## 2022-05-26 NOTE — CHART NOTE - NSCHARTNOTESELECT_GEN_ALL_CORE
Cardiology Note
Consult ID/Event Note
Nutrition Services
Post-op check
Event Note
Transfer Note

## 2022-05-26 NOTE — CONSULT NOTE ADULT - REASON FOR ADMISSION
PAD, RLE ischemia

## 2022-05-26 NOTE — PROGRESS NOTE ADULT - SUBJECTIVE AND OBJECTIVE BOX
24 HOUR EVENTS: Patient transferred to SICU for airway monitoring in the setting of severe pharyngitis with uvula deviation. CT neck showed extensive edema of the R oropharynx extending to the R lillie-epiglottic fold and R longus coli muscle. No focal collection identified on CT scan or by ENT scope. Bedside aspiration by ENT did not yield fluid. Airway not worsening at this time. Patient still endorses odynophagia. Currently on antibiotics and steroids. Patient also complaining of pain to right foot since R fem to PT bypass on this admission. Has monophasic DP and biphasic PT on doppler.     SUBJECTIVE/ROS:  [x] A ten-point review of systems was otherwise negative except as noted.  [ ] Due to altered mental status/intubation, subjective information were not able to be obtained from the patient. History was obtained, to the extent possible, from review of the chart and collateral sources of information.      NEURO  RASS: 0    GCS:  15   CAM ICU: negative  Exam: A&Ox3; neurologically intact  Meds: fosphenytoin IVPB 200 milliGRAM(s) PE IV Intermittent every 12 hours    [x] Adequacy of sedation and pain control has been assessed and adjusted      RESPIRATORY  RR: 12 (05-26-22 @ 01:00) (9 - 20)  SpO2: 95% (05-26-22 @ 01:00) (92% - 100%)  Wt(kg): --  Exam: unlabored, clear to auscultation bilaterally  Mechanical Ventilation: N/A  ABG - ( 25 May 2022 08:51 )  pH: 7.480 /  pCO2: 38    /  pO2: 75    / HCO3: 28    / Base Excess: 4.8   /  SaO2: 98.2    Lactate: x          Meds:       CARDIOVASCULAR  HR: 72 (05-26-22 @ 01:00) (72 - 117)  BP: 132/55 (05-26-22 @ 01:00) (123/82 - 172/73)  BP(mean): 78 (05-26-22 @ 01:00) (78 - 117)  ABP: --  ABP(mean): --  Wt(kg): --  CVP(cm H2O): --      Exam: NSR  Cardiac Rhythm: NSR  Perfusion     [x]Adequate   [ ]Inadequate  Mentation   [x]Normal       [ ]Reduced  Extremities  [x]Warm         [ ]Cool  Volume Status [ ]Hypervolemic [x]Euvolemic [ ]Hypovolemic  Meds: metoprolol tartrate Injectable 5 milliGRAM(s) IV Push every 6 hours        GI/NUTRITION  Exam: soft, NT, ND  Diet: NPO  Meds:     GENITOURINARY  I&O's Detail    05-25 @ 07:01  -  05-26 @ 02:03  --------------------------------------------------------  IN:    Heparin Infusion: 174 mL    IV PiggyBack: 500 mL    IV PiggyBack: 100 mL    IV PiggyBack: 50 mL    IV PiggyBack: 100 mL    multiple electrolytes Injection Type 1.: 2125 mL  Total IN: 3049 mL    OUT:    Voided (mL): 2100 mL  Total OUT: 2100 mL    Total NET: 949 mL        05-25    134<L>  |  98  |  12.1  ----------------------------<  115<H>  4.3   |  24.0  |  0.63    Ca    8.8      25 May 2022 07:57  Phos  2.9     05-25  Mg     1.8     05-25      [ ] Rod catheter, indication: N/A; patient voiding  Meds: dextrose 5%. 1000 milliLiter(s) IV Continuous <Continuous>  dextrose 5%. 1000 milliLiter(s) IV Continuous <Continuous>  multiple electrolytes Injection Type 1 1000 milliLiter(s) IV Continuous <Continuous>        HEMATOLOGIC  Meds: heparin  Infusion.  Unit(s)/Hr IV Continuous <Continuous>    [x] VTE Prophylaxis                        9.8    24.47 )-----------( 510      ( 25 May 2022 19:06 )             29.8     PT/INR - ( 25 May 2022 07:57 )   PT: 14.0 sec;   INR: 1.20 ratio         PTT - ( 26 May 2022 01:12 )  PTT:53.3 sec  Transfusion     [ ] PRBC   [ ] Platelets   [ ] FFP   [ ] Cryoprecipitate        INFECTIOUS DISEASES  T(C): 36.8 (05-25-22 @ 23:12), Max: 37.6 (05-25-22 @ 06:08)  Wt(kg): --  WBC Count: 24.47 K/uL (05-25 @ 19:06)  WBC Count: 19.94 K/uL (05-25 @ 07:57)  WBC Count: 21.43 K/uL (05-25 @ 05:57)    Recent Cultures:    Meds: piperacillin/tazobactam IVPB.. 3.375 Gram(s) IV Intermittent every 8 hours  vancomycin  IVPB 1250 milliGRAM(s) IV Intermittent every 12 hours        ENDOCRINE  Capillary Blood Glucose    Meds: dextrose 50% Injectable 25 Gram(s) IV Push once  dextrose 50% Injectable 12.5 Gram(s) IV Push once  dextrose 50% Injectable 25 Gram(s) IV Push once  dextrose Oral Gel 15 Gram(s) Oral once PRN  glucagon  Injectable 1 milliGRAM(s) IntraMuscular once  insulin lispro (ADMELOG) corrective regimen sliding scale   SubCutaneous every 6 hours  methylPREDNISolone sodium succinate Injectable 50 milliGRAM(s) IV Push every 8 hours        ACCESS DEVICES:  [x] Peripheral IV  [ ] Central Venous Line	[ ] R	[ ] L	[ ] IJ	[ ] Fem	[ ] SC	Placed:   [ ] Arterial Line		[ ] R	[ ] L	[ ] Fem	[ ] Rad	[ ] Ax	Placed:   [ ] PICC:					[ ] Mediport  [ ] Urinary Catheter, Date Placed:   [ ] Necessity of urinary, arterial, and venous catheters discussed    OTHER MEDICATIONS:  cadexomer iodine 0.9% Gel 1 Application(s) Topical daily  chlorhexidine 2% Cloths 1 Application(s) Topical <User Schedule>  lidocaine   4% Patch 1 Patch Transdermal daily  lidocaine/prilocaine Cream 1 Application(s) Topical daily  tetracaine/benzocaine/butamben Spray 1 Spray(s) Topical every 4 hours PRN      CODE STATUS: Full

## 2022-05-26 NOTE — PROGRESS NOTE ADULT - SUBJECTIVE AND OBJECTIVE BOX
INTERVAL HPI/OVERNIGHT EVENTS:  Patient in SICU for airway monitoring. Patient reporting feels better and wants to be downgraded to floor. Couldn't sleep much. Reports slight improvement, but reports some throat discomfort.  Able to tolerate secretions, no difficulty breathing.       MEDICATIONS  (STANDING):  cadexomer iodine 0.9% Gel 1 Application(s) Topical daily  chlorhexidine 2% Cloths 1 Application(s) Topical <User Schedule>  dextrose 5%. 1000 milliLiter(s) (50 mL/Hr) IV Continuous <Continuous>  dextrose 5%. 1000 milliLiter(s) (100 mL/Hr) IV Continuous <Continuous>  dextrose 50% Injectable 25 Gram(s) IV Push once  dextrose 50% Injectable 12.5 Gram(s) IV Push once  dextrose 50% Injectable 25 Gram(s) IV Push once  fosphenytoin IVPB 200 milliGRAM(s) PE IV Intermittent every 12 hours  glucagon  Injectable 1 milliGRAM(s) IntraMuscular once  heparin  Infusion.  Unit(s)/Hr (12 mL/Hr) IV Continuous <Continuous>  insulin lispro (ADMELOG) corrective regimen sliding scale   SubCutaneous every 6 hours  lidocaine   4% Patch 1 Patch Transdermal daily  lidocaine/prilocaine Cream 1 Application(s) Topical daily  methylPREDNISolone sodium succinate Injectable 50 milliGRAM(s) IV Push every 8 hours  metoprolol tartrate Injectable 5 milliGRAM(s) IV Push every 6 hours  piperacillin/tazobactam IVPB.. 3.375 Gram(s) IV Intermittent every 8 hours    MEDICATIONS  (PRN):  dextrose Oral Gel 15 Gram(s) Oral once PRN Blood Glucose LESS THAN 70 milliGRAM(s)/deciliter  tetracaine/benzocaine/butamben Spray 1 Spray(s) Topical every 4 hours PRN sore throat      Vital Signs Last 24 Hrs  T(C): 37.2 (26 May 2022 08:00), Max: 37.4 (25 May 2022 20:00)  T(F): 98.9 (26 May 2022 08:00), Max: 99.3 (25 May 2022 20:00)  HR: 94 (26 May 2022 07:00) (70 - 108)  BP: 129/73 (26 May 2022 06:00) (129/73 - 172/73)  BP(mean): 90 (26 May 2022 06:00) (78 - 105)  RR: 13 (26 May 2022 07:00) (9 - 20)  SpO2: 95% (26 May 2022 07:00) (92% - 100%)    PHYSICAL EXAM:      Constitutional: AAOx3, NAD    Eyes: EOMI, PERRLA    ENMT: moist mucosa     Neck: slight swelling w inflamed oropharynx     Respiratory: non-labored breathing     Cardiovascular: RRR    Gastrointestinal: ND, soft and depressible    Extremities: LLE w palpable PT pulse, post reperfusion syndrome, tissue loss in 1st/2nd toes. Incision c/d/i. no erythema or drainage         I&O's Detail    25 May 2022 07:01  -  26 May 2022 07:00  --------------------------------------------------------  IN:    Heparin Infusion: 258 mL    IV PiggyBack: 100 mL    IV PiggyBack: 100 mL    IV PiggyBack: 500 mL    IV PiggyBack: 100 mL    multiple electrolytes Injection Type 1.: 2875 mL  Total IN: 3933 mL    OUT:    Voided (mL): 3150 mL  Total OUT: 3150 mL    Total NET: 783 mL          LABS:                        9.2    25.62 )-----------( 488      ( 26 May 2022 03:35 )             28.6     05-26    139  |  101  |  12.3  ----------------------------<  121<H>  4.3   |  25.0  |  0.59    Ca    9.0      26 May 2022 03:35  Phos  2.9     05-25  Mg     1.8     05-25      PT/INR - ( 25 May 2022 07:57 )   PT: 14.0 sec;   INR: 1.20 ratio         PTT - ( 26 May 2022 01:12 )  PTT:53.3 sec      RADIOLOGY & ADDITIONAL STUDIES:

## 2022-05-27 LAB
ANION GAP SERPL CALC-SCNC: 16 MMOL/L — SIGNIFICANT CHANGE UP (ref 5–17)
APTT BLD: 59.8 SEC — HIGH (ref 27.5–35.5)
BASOPHILS # BLD AUTO: 0 K/UL — SIGNIFICANT CHANGE UP (ref 0–0.2)
BASOPHILS NFR BLD AUTO: 0 % — SIGNIFICANT CHANGE UP (ref 0–2)
BUN SERPL-MCNC: 13.5 MG/DL — SIGNIFICANT CHANGE UP (ref 8–20)
CALCIUM SERPL-MCNC: 9.2 MG/DL — SIGNIFICANT CHANGE UP (ref 8.6–10.2)
CHLORIDE SERPL-SCNC: 101 MMOL/L — SIGNIFICANT CHANGE UP (ref 98–107)
CO2 SERPL-SCNC: 23 MMOL/L — SIGNIFICANT CHANGE UP (ref 22–29)
CREAT SERPL-MCNC: 0.72 MG/DL — SIGNIFICANT CHANGE UP (ref 0.5–1.3)
EGFR: 105 ML/MIN/1.73M2 — SIGNIFICANT CHANGE UP
EOSINOPHIL # BLD AUTO: 0 K/UL — SIGNIFICANT CHANGE UP (ref 0–0.5)
EOSINOPHIL NFR BLD AUTO: 0 % — SIGNIFICANT CHANGE UP (ref 0–6)
GIANT PLATELETS BLD QL SMEAR: PRESENT — SIGNIFICANT CHANGE UP
GLUCOSE BLDC GLUCOMTR-MCNC: 106 MG/DL — HIGH (ref 70–99)
GLUCOSE BLDC GLUCOMTR-MCNC: 110 MG/DL — HIGH (ref 70–99)
GLUCOSE BLDC GLUCOMTR-MCNC: 119 MG/DL — HIGH (ref 70–99)
GLUCOSE BLDC GLUCOMTR-MCNC: 188 MG/DL — HIGH (ref 70–99)
GLUCOSE SERPL-MCNC: 138 MG/DL — HIGH (ref 70–99)
HCT VFR BLD CALC: 29.6 % — LOW (ref 39–50)
HGB BLD-MCNC: 9.4 G/DL — LOW (ref 13–17)
HYPOCHROMIA BLD QL: SLIGHT — SIGNIFICANT CHANGE UP
LYMPHOCYTES # BLD AUTO: 0.6 K/UL — LOW (ref 1–3.3)
LYMPHOCYTES # BLD AUTO: 3.5 % — LOW (ref 13–44)
MAGNESIUM SERPL-MCNC: 2.2 MG/DL — SIGNIFICANT CHANGE UP (ref 1.6–2.6)
MANUAL SMEAR VERIFICATION: SIGNIFICANT CHANGE UP
MCHC RBC-ENTMCNC: 29.5 PG — SIGNIFICANT CHANGE UP (ref 27–34)
MCHC RBC-ENTMCNC: 31.8 GM/DL — LOW (ref 32–36)
MCV RBC AUTO: 92.8 FL — SIGNIFICANT CHANGE UP (ref 80–100)
MONOCYTES # BLD AUTO: 0.91 K/UL — HIGH (ref 0–0.9)
MONOCYTES NFR BLD AUTO: 5.3 % — SIGNIFICANT CHANGE UP (ref 2–14)
NEUTROPHILS # BLD AUTO: 15.64 K/UL — HIGH (ref 1.8–7.4)
NEUTROPHILS NFR BLD AUTO: 91.2 % — HIGH (ref 43–77)
PHOSPHATE SERPL-MCNC: 3.2 MG/DL — SIGNIFICANT CHANGE UP (ref 2.4–4.7)
PLAT MORPH BLD: NORMAL — SIGNIFICANT CHANGE UP
PLATELET # BLD AUTO: 460 K/UL — HIGH (ref 150–400)
POLYCHROMASIA BLD QL SMEAR: SIGNIFICANT CHANGE UP
POTASSIUM SERPL-MCNC: 3.8 MMOL/L — SIGNIFICANT CHANGE UP (ref 3.5–5.3)
POTASSIUM SERPL-SCNC: 3.8 MMOL/L — SIGNIFICANT CHANGE UP (ref 3.5–5.3)
RBC # BLD: 3.19 M/UL — LOW (ref 4.2–5.8)
RBC # FLD: 14.3 % — SIGNIFICANT CHANGE UP (ref 10.3–14.5)
RBC BLD AUTO: ABNORMAL
SODIUM SERPL-SCNC: 140 MMOL/L — SIGNIFICANT CHANGE UP (ref 135–145)
WBC # BLD: 17.15 K/UL — HIGH (ref 3.8–10.5)
WBC # FLD AUTO: 17.15 K/UL — HIGH (ref 3.8–10.5)

## 2022-05-27 RX ORDER — POTASSIUM CHLORIDE 20 MEQ
20 PACKET (EA) ORAL ONCE
Refills: 0 | Status: COMPLETED | OUTPATIENT
Start: 2022-05-27 | End: 2022-05-27

## 2022-05-27 RX ORDER — AMLODIPINE BESYLATE 2.5 MG/1
10 TABLET ORAL DAILY
Refills: 0 | Status: DISCONTINUED | OUTPATIENT
Start: 2022-05-27 | End: 2022-05-27

## 2022-05-27 RX ORDER — AMLODIPINE BESYLATE 2.5 MG/1
10 TABLET ORAL DAILY
Refills: 0 | Status: DISCONTINUED | OUTPATIENT
Start: 2022-05-27 | End: 2022-05-29

## 2022-05-27 RX ORDER — APIXABAN 2.5 MG/1
5 TABLET, FILM COATED ORAL EVERY 12 HOURS
Refills: 0 | Status: DISCONTINUED | OUTPATIENT
Start: 2022-05-27 | End: 2022-05-29

## 2022-05-27 RX ORDER — ASPIRIN/CALCIUM CARB/MAGNESIUM 324 MG
81 TABLET ORAL DAILY
Refills: 0 | Status: DISCONTINUED | OUTPATIENT
Start: 2022-05-27 | End: 2022-05-29

## 2022-05-27 RX ADMIN — LIDOCAINE 1 PATCH: 4 CREAM TOPICAL at 19:15

## 2022-05-27 RX ADMIN — LIDOCAINE AND PRILOCAINE CREAM 1 APPLICATION(S): 25; 25 CREAM TOPICAL at 12:57

## 2022-05-27 RX ADMIN — CHLORHEXIDINE GLUCONATE 1 APPLICATION(S): 213 SOLUTION TOPICAL at 05:47

## 2022-05-27 RX ADMIN — Medication 75 MILLIGRAM(S): at 08:20

## 2022-05-27 RX ADMIN — APIXABAN 5 MILLIGRAM(S): 2.5 TABLET, FILM COATED ORAL at 16:48

## 2022-05-27 RX ADMIN — QUETIAPINE FUMARATE 200 MILLIGRAM(S): 200 TABLET, FILM COATED ORAL at 21:53

## 2022-05-27 RX ADMIN — Medication 200 MILLIGRAM(S): at 16:46

## 2022-05-27 RX ADMIN — Medication 20 MILLIEQUIVALENT(S): at 08:15

## 2022-05-27 RX ADMIN — Medication 81 MILLIGRAM(S): at 08:19

## 2022-05-27 RX ADMIN — Medication 24 MILLIGRAM(S): at 12:56

## 2022-05-27 RX ADMIN — LIDOCAINE 1 PATCH: 4 CREAM TOPICAL at 00:05

## 2022-05-27 RX ADMIN — FOSPHENYTOIN 108 MILLIGRAM(S) PE: 50 INJECTION INTRAMUSCULAR; INTRAVENOUS at 05:15

## 2022-05-27 RX ADMIN — Medication 1 APPLICATION(S): at 12:57

## 2022-05-27 RX ADMIN — HEPARIN SODIUM 1400 UNIT(S)/HR: 5000 INJECTION INTRAVENOUS; SUBCUTANEOUS at 03:28

## 2022-05-27 RX ADMIN — Medication 5 MILLIGRAM(S): at 00:29

## 2022-05-27 RX ADMIN — LIDOCAINE 1 PATCH: 4 CREAM TOPICAL at 13:00

## 2022-05-27 RX ADMIN — PIPERACILLIN AND TAZOBACTAM 25 GRAM(S): 4; .5 INJECTION, POWDER, LYOPHILIZED, FOR SOLUTION INTRAVENOUS at 05:07

## 2022-05-27 RX ADMIN — Medication 1 TABLET(S): at 16:46

## 2022-05-27 RX ADMIN — Medication 1: at 08:20

## 2022-05-27 RX ADMIN — Medication 5 MILLIGRAM(S): at 05:49

## 2022-05-27 RX ADMIN — Medication 50 MILLIGRAM(S): at 05:49

## 2022-05-27 NOTE — PROGRESS NOTE ADULT - ASSESSMENT
A:   s/p R fem to PT bypass 5/13  RLE ischemia     P:  Patient evaluated, chart reviewed  Applied iodosorb to R 1st and 2nd digits , no dressing applied  c/w lidoderm patch prn   WCO: apply iodosorb to R hallux daily   No acute surgical intervention from podiatric standpoint   Will continue to monitor for ischemic changes to lateral aspect of hallux   Pt stable for d/c from podiatric standpoint  Pt to follow up with Podiatry within 1 week of d/c for continued care   Discussed with attending Dr. Ferrer

## 2022-05-27 NOTE — PROGRESS NOTE ADULT - SUBJECTIVE AND OBJECTIVE BOX
Podiatry Interval HPI: Patient evaluated at bedside, AAOx3. Notes significant improvement to R foot pain. Notes the lidoderm patch has helped alleviate his symptoms. Denies F/C/N/V/SOB.     Patient is a 57y old  Male who presents with a chief complaint of PAD, RLE ischemia (17 May 2022 11:31)    HPI:  57 year old male known to our service from prior lower extremity revascularization, PMHX PAD with recent femoral endarterectomy 4/15 with rest pain planned for bypass on RLE on 5/18 who presents with worsening pain and changes in toe coloration. Patient reports several weeks of right leg pain, initially with walking and progressing to be at rest. Now has worsening rest pain, improved with dangling legs and now has discoloration starting 2 days prior to presentation. Progressive splotches of discoloration progressing along his leg with bluish discoloration of his great toe. No chest pain, no sob, fever or chills.  No trauma, no fever or chills.    (11 May 2022 22:05)    Podiatry HPI: Pt seen bedside s/p R fem to PT bypass 5/13. Pt states R foot is burning constantly and is unable to bear weight to right foot. Pt states pain management saw him and put him on 900mg of gabapentin. States he takes gabapentin at home but at a lower dose. States he will not be discharged home if he cannot walk. Denies constitutional symptoms No other pedal complaints.     Medications amLODIPine   Tablet 10 milliGRAM(s) Oral daily  amoxicillin  875 milliGRAM(s)/clavulanate 1 Tablet(s) Oral every 12 hours  apixaban 5 milliGRAM(s) Oral every 12 hours  aspirin  chewable 81 milliGRAM(s) Oral daily  cadexomer iodine 0.9% Gel 1 Application(s) Topical daily  dextrose 5%. 1000 milliLiter(s) IV Continuous <Continuous>  dextrose 5%. 1000 milliLiter(s) IV Continuous <Continuous>  dextrose 50% Injectable 25 Gram(s) IV Push once  dextrose 50% Injectable 12.5 Gram(s) IV Push once  dextrose 50% Injectable 25 Gram(s) IV Push once  dextrose Oral Gel 15 Gram(s) Oral once PRN  glucagon  Injectable 1 milliGRAM(s) IntraMuscular once  insulin lispro (ADMELOG) corrective regimen sliding scale   SubCutaneous Before meals and at bedtime  lidocaine   4% Patch 1 Patch Transdermal daily  lidocaine/prilocaine Cream 1 Application(s) Topical daily  methylPREDNISolone   Oral   phenytoin ER Oral Tab/Cap - Peds 200 milliGRAM(s) Oral every 12 hours  QUEtiapine 200 milliGRAM(s) Oral at bedtime  tetracaine/benzocaine/butamben Spray 1 Spray(s) Topical every 4 hours PRN  venlafaxine XR. 75 milliGRAM(s) Oral daily    FHFamily history of breast cancer (Mother)    Family history of hepatitis (Mother)    Family history of heart disease (Mother, Sibling)    Family history of CABG (Father)    Family history of peripheral vascular disease (Father)    Family history of brain aneurysm (Sibling)    ,   PMHHypercholesterolemia    Seizures    Depression    GERD (gastroesophageal reflux disease)    Hypertension    Peripheral vascular disease    Osteoarthritis    Former smoker    Prediabetes       PSHInguinal hernia, left    S/P ORIF (open reduction internal fixation) fracture    S/P shoulder surgery    S/P appendectomy    H/O endarterectomy        Labs                          9.4    17.15 )-----------( 460      ( 27 May 2022 03:01 )             29.6      05-27    140  |  101  |  13.5  ----------------------------<  138<H>  3.8   |  23.0  |  0.72    Ca    9.2      27 May 2022 03:01  Phos  3.2     05-27  Mg     2.2     05-27       Vital Signs Last 24 Hrs  T(C): 36.8 (27 May 2022 11:34), Max: 37.2 (26 May 2022 21:30)  T(F): 98.3 (27 May 2022 11:34), Max: 99 (26 May 2022 21:30)  HR: 74 (27 May 2022 11:34) (65 - 99)  BP: 115/57 (27 May 2022 11:34) (112/70 - 164/70)  BP(mean): --  RR: 18 (27 May 2022 11:34) (17 - 20)  SpO2: 95% (27 May 2022 11:34) (92% - 99%)          WBC Count: 17.15 K/uL *H* (05-27-22 @ 03:01)      ROS  REVIEW OF SYSTEMS: all other ROS negative except as noted on HPI       PHYSICAL EXAM  LE Focused:    Vasc:  Pulses palpable DP/PT, TG warm to cool LE b/l, CFT <3 seconds distal shante R foot except R hallux  Derm: ischemic changes noted to lateral aspect of R hallux and proximal plantar aspect at level of sulcus, improved color and appearance to medial and plantar aspect of digit, no tenderness on palpation, improved color and appearance to R 2nd digit; no clinical signs of infection noted  Neuro: Protective sensation grossly intact  MSK: Able to wiggle toes, no tenderness on palpation to R hallux and 2nd digit     Imaging:   < from: Xray Foot AP + Lateral + Oblique, Right (05.23.22 @ 14:50) >    ACC: [id number] EXAM:  XR FOOT COMP MIN 3 VIEWS RT                          PROCEDURE DATE:  05/23/2022          INTERPRETATION:  RIGHT foot  Comparison: 4/9/2022 RIGHT foot radiographs  CLINICAL INFORMATION: RIGHT hallux. Dry gangrene..    TECHNIQUE: AP,lateral and oblique views.    FINDINGS:    The bones and joint spaces are intact.  No fracture or dislocation.  Soft tissues unremarkable.    IMPRESSION:    No acute radiographic osseous pathology.    --- End of Report ---            CASPER LAI MD; Attending Radiologist  This document has been electronically signed. May 24 2022 10:48AM    < end of copied text >

## 2022-05-27 NOTE — PROGRESS NOTE ADULT - SUBJECTIVE AND OBJECTIVE BOX
ENT Follow Up Note    Patient seen and examined.  Reports significant improvement in pain.  Tolerating PO without issue.    On exam there is soft fullness in the right peritonsillar region.  No significant erythema or exudates.  Site of Aspiration healing.  Neck soft and flat.    Continue Abx.  Can transition to oral as per primary team  Call ENT with any clinical worsening

## 2022-05-28 LAB
APTT BLD: 29 SEC — SIGNIFICANT CHANGE UP (ref 27.5–35.5)
BASOPHILS # BLD AUTO: 0.03 K/UL — SIGNIFICANT CHANGE UP (ref 0–0.2)
BASOPHILS NFR BLD AUTO: 0.3 % — SIGNIFICANT CHANGE UP (ref 0–2)
EOSINOPHIL # BLD AUTO: 0.02 K/UL — SIGNIFICANT CHANGE UP (ref 0–0.5)
EOSINOPHIL NFR BLD AUTO: 0.2 % — SIGNIFICANT CHANGE UP (ref 0–6)
GLUCOSE BLDC GLUCOMTR-MCNC: 112 MG/DL — HIGH (ref 70–99)
GLUCOSE BLDC GLUCOMTR-MCNC: 142 MG/DL — HIGH (ref 70–99)
GLUCOSE BLDC GLUCOMTR-MCNC: 144 MG/DL — HIGH (ref 70–99)
GLUCOSE BLDC GLUCOMTR-MCNC: 94 MG/DL — SIGNIFICANT CHANGE UP (ref 70–99)
HCT VFR BLD CALC: 31.3 % — LOW (ref 39–50)
HGB BLD-MCNC: 9.8 G/DL — LOW (ref 13–17)
IMM GRANULOCYTES NFR BLD AUTO: 0.6 % — SIGNIFICANT CHANGE UP (ref 0–1.5)
LYMPHOCYTES # BLD AUTO: 2.41 K/UL — SIGNIFICANT CHANGE UP (ref 1–3.3)
LYMPHOCYTES # BLD AUTO: 21.3 % — SIGNIFICANT CHANGE UP (ref 13–44)
MCHC RBC-ENTMCNC: 29.3 PG — SIGNIFICANT CHANGE UP (ref 27–34)
MCHC RBC-ENTMCNC: 31.3 GM/DL — LOW (ref 32–36)
MCV RBC AUTO: 93.4 FL — SIGNIFICANT CHANGE UP (ref 80–100)
MONOCYTES # BLD AUTO: 1.44 K/UL — HIGH (ref 0–0.9)
MONOCYTES NFR BLD AUTO: 12.7 % — SIGNIFICANT CHANGE UP (ref 2–14)
NEUTROPHILS # BLD AUTO: 7.37 K/UL — SIGNIFICANT CHANGE UP (ref 1.8–7.4)
NEUTROPHILS NFR BLD AUTO: 64.9 % — SIGNIFICANT CHANGE UP (ref 43–77)
PLATELET # BLD AUTO: 449 K/UL — HIGH (ref 150–400)
RBC # BLD: 3.35 M/UL — LOW (ref 4.2–5.8)
RBC # FLD: 14.3 % — SIGNIFICANT CHANGE UP (ref 10.3–14.5)
WBC # BLD: 11.34 K/UL — HIGH (ref 3.8–10.5)
WBC # FLD AUTO: 11.34 K/UL — HIGH (ref 3.8–10.5)

## 2022-05-28 RX ORDER — APIXABAN 2.5 MG/1
1 TABLET, FILM COATED ORAL
Qty: 60 | Refills: 0
Start: 2022-05-28 | End: 2022-06-26

## 2022-05-28 RX ADMIN — Medication 4 MILLIGRAM(S): at 13:15

## 2022-05-28 RX ADMIN — Medication 200 MILLIGRAM(S): at 05:47

## 2022-05-28 RX ADMIN — APIXABAN 5 MILLIGRAM(S): 2.5 TABLET, FILM COATED ORAL at 05:47

## 2022-05-28 RX ADMIN — AMLODIPINE BESYLATE 10 MILLIGRAM(S): 2.5 TABLET ORAL at 05:47

## 2022-05-28 RX ADMIN — Medication 4 MILLIGRAM(S): at 16:52

## 2022-05-28 RX ADMIN — QUETIAPINE FUMARATE 200 MILLIGRAM(S): 200 TABLET, FILM COATED ORAL at 21:54

## 2022-05-28 RX ADMIN — LIDOCAINE 1 PATCH: 4 CREAM TOPICAL at 19:16

## 2022-05-28 RX ADMIN — LIDOCAINE AND PRILOCAINE CREAM 1 APPLICATION(S): 25; 25 CREAM TOPICAL at 13:19

## 2022-05-28 RX ADMIN — Medication 81 MILLIGRAM(S): at 08:45

## 2022-05-28 RX ADMIN — LIDOCAINE 1 PATCH: 4 CREAM TOPICAL at 13:19

## 2022-05-28 RX ADMIN — Medication 200 MILLIGRAM(S): at 16:52

## 2022-05-28 RX ADMIN — Medication 1 TABLET(S): at 05:47

## 2022-05-28 RX ADMIN — LIDOCAINE 1 PATCH: 4 CREAM TOPICAL at 01:01

## 2022-05-28 RX ADMIN — Medication 4 MILLIGRAM(S): at 05:47

## 2022-05-28 RX ADMIN — Medication 1 TABLET(S): at 16:52

## 2022-05-28 RX ADMIN — APIXABAN 5 MILLIGRAM(S): 2.5 TABLET, FILM COATED ORAL at 16:52

## 2022-05-28 RX ADMIN — Medication 75 MILLIGRAM(S): at 08:44

## 2022-05-28 RX ADMIN — Medication 1 APPLICATION(S): at 13:19

## 2022-05-28 RX ADMIN — Medication 8 MILLIGRAM(S): at 21:54

## 2022-05-28 NOTE — PROGRESS NOTE ADULT - ASSESSMENT
57 year old male s/p R CFA to PT bypass w rGSV/ Hospital course complicated by post-reperfusion syndrome and development of odonyphonia w/ L uvular deviation, concerning for airway obstruction. Went to ICU and downgraded 5/27/00    / Pain control as needed  / advance diet as tolerated   / ID consult   / continue AC w ASA and NOAC  / Pods wound care and OP follow up   . Dispo to [facility] once tolerating diet, ID plan, and ENT follow-up

## 2022-05-28 NOTE — PROGRESS NOTE ADULT - SUBJECTIVE AND OBJECTIVE BOX
Surgery Progress Note:  No acute overnight events. Patient afebrile, VSS. Pain well controlled. Tolerating diet. Denies n/v/f/c/cp/sob.         Scheduled Medications:   amLODIPine   Tablet 10 milliGRAM(s) Oral daily  amoxicillin  875 milliGRAM(s)/clavulanate 1 Tablet(s) Oral every 12 hours  apixaban 5 milliGRAM(s) Oral every 12 hours  aspirin  chewable 81 milliGRAM(s) Oral daily  cadexomer iodine 0.9% Gel 1 Application(s) Topical daily  dextrose 5%. 1000 milliLiter(s) (50 mL/Hr) IV Continuous <Continuous>  dextrose 5%. 1000 milliLiter(s) (100 mL/Hr) IV Continuous <Continuous>  dextrose 50% Injectable 25 Gram(s) IV Push once  dextrose 50% Injectable 12.5 Gram(s) IV Push once  dextrose 50% Injectable 25 Gram(s) IV Push once  glucagon  Injectable 1 milliGRAM(s) IntraMuscular once  insulin lispro (ADMELOG) corrective regimen sliding scale   SubCutaneous Before meals and at bedtime  lidocaine   4% Patch 1 Patch Transdermal daily  lidocaine/prilocaine Cream 1 Application(s) Topical daily  methylPREDNISolone 4 milliGRAM(s) Oral before breakfast  methylPREDNISolone 4 milliGRAM(s) Oral after lunch  methylPREDNISolone   Oral   methylPREDNISolone 4 milliGRAM(s) Oral after dinner  methylPREDNISolone 8 milliGRAM(s) Oral at bedtime  phenytoin ER Oral Tab/Cap - Peds 200 milliGRAM(s) Oral every 12 hours  QUEtiapine 200 milliGRAM(s) Oral at bedtime  venlafaxine XR. 75 milliGRAM(s) Oral daily    PRN Medications:  dextrose Oral Gel 15 Gram(s) Oral once PRN Blood Glucose LESS THAN 70 milliGRAM(s)/deciliter  tetracaine/benzocaine/butamben Spray 1 Spray(s) Topical every 4 hours PRN sore throat      Objective:   T(F): 98.3 (05-28 @ 01:33), Max: 98.4 (05-27 @ 17:51)  HR: 87 (05-28 @ 01:33) (74 - 90)  BP: 135/68 (05-28 @ 01:33) (112/70 - 146/75)  BP(mean): --  ABP: --  ABP(mean): --  RR: 18 (05-28 @ 01:33) (18 - 20)  SpO2: 93% (05-28 @ 01:33) (93% - 95%)      Physical Exam:   GEN: patient resting comfortably in bed, in no acute distress  RESP: respirations are unlabored, no accessory muscle use, no conversational dyspnea  CVS: RRR  GI: Abdomen soft, non-tender, non-distended, no rebound tenderness / guarding    I&O's    05-26 @ 07:01  -  05-27 @ 07:00  --------------------------------------------------------  IN:    Heparin Infusion: 98 mL    IV PiggyBack: 100 mL    Oral Fluid: 550 mL  Total IN: 748 mL    OUT:    Voided (mL): 400 mL  Total OUT: 400 mL    Total NET: 348 mL      05-27 @ 07:01  -  05-28 @ 01:39  --------------------------------------------------------  IN:    Oral Fluid: 360 mL  Total IN: 360 mL    OUT:    Voided (mL): 350 mL  Total OUT: 350 mL    Total NET: 10 mL          LABS:                        9.4    17.15 )-----------( 460      ( 27 May 2022 03:01 )             29.6     05-27    140  |  101  |  13.5  ----------------------------<  138<H>  3.8   |  23.0  |  0.72    Ca    9.2      27 May 2022 03:01  Phos  3.2     05-27  Mg     2.2     05-27      PTT - ( 27 May 2022 03:01 )  PTT:59.8 sec      MICROBIOLOGY:       PATHOLOGY:

## 2022-05-29 ENCOUNTER — TRANSCRIPTION ENCOUNTER (OUTPATIENT)
Age: 57
End: 2022-05-29

## 2022-05-29 VITALS
OXYGEN SATURATION: 97 % | TEMPERATURE: 98 F | DIASTOLIC BLOOD PRESSURE: 80 MMHG | HEART RATE: 109 BPM | SYSTOLIC BLOOD PRESSURE: 132 MMHG

## 2022-05-29 LAB
GLUCOSE BLDC GLUCOMTR-MCNC: 154 MG/DL — HIGH (ref 70–99)
GLUCOSE BLDC GLUCOMTR-MCNC: 86 MG/DL — SIGNIFICANT CHANGE UP (ref 70–99)
HCT VFR BLD CALC: 34 % — LOW (ref 39–50)
HGB BLD-MCNC: 10.8 G/DL — LOW (ref 13–17)
MCHC RBC-ENTMCNC: 29.8 PG — SIGNIFICANT CHANGE UP (ref 27–34)
MCHC RBC-ENTMCNC: 31.8 GM/DL — LOW (ref 32–36)
MCV RBC AUTO: 93.7 FL — SIGNIFICANT CHANGE UP (ref 80–100)
PLATELET # BLD AUTO: 449 K/UL — HIGH (ref 150–400)
RBC # BLD: 3.63 M/UL — LOW (ref 4.2–5.8)
RBC # FLD: 14.2 % — SIGNIFICANT CHANGE UP (ref 10.3–14.5)
SARS-COV-2 RNA SPEC QL NAA+PROBE: SIGNIFICANT CHANGE UP
WBC # BLD: 7.44 K/UL — SIGNIFICANT CHANGE UP (ref 3.8–10.5)
WBC # FLD AUTO: 7.44 K/UL — SIGNIFICANT CHANGE UP (ref 3.8–10.5)

## 2022-05-29 PROCEDURE — 82435 ASSAY OF BLOOD CHLORIDE: CPT

## 2022-05-29 PROCEDURE — U0005: CPT

## 2022-05-29 PROCEDURE — 84100 ASSAY OF PHOSPHORUS: CPT

## 2022-05-29 PROCEDURE — 85018 HEMOGLOBIN: CPT

## 2022-05-29 PROCEDURE — 99285 EMERGENCY DEPT VISIT HI MDM: CPT

## 2022-05-29 PROCEDURE — 83735 ASSAY OF MAGNESIUM: CPT

## 2022-05-29 PROCEDURE — 36430 TRANSFUSION BLD/BLD COMPNT: CPT

## 2022-05-29 PROCEDURE — 97163 PT EVAL HIGH COMPLEX 45 MIN: CPT

## 2022-05-29 PROCEDURE — 73630 X-RAY EXAM OF FOOT: CPT

## 2022-05-29 PROCEDURE — 70491 CT SOFT TISSUE NECK W/DYE: CPT

## 2022-05-29 PROCEDURE — 80202 ASSAY OF VANCOMYCIN: CPT

## 2022-05-29 PROCEDURE — 85027 COMPLETE CBC AUTOMATED: CPT

## 2022-05-29 PROCEDURE — C1757: CPT

## 2022-05-29 PROCEDURE — 80048 BASIC METABOLIC PNL TOTAL CA: CPT

## 2022-05-29 PROCEDURE — 84484 ASSAY OF TROPONIN QUANT: CPT

## 2022-05-29 PROCEDURE — 87640 STAPH A DNA AMP PROBE: CPT

## 2022-05-29 PROCEDURE — 84132 ASSAY OF SERUM POTASSIUM: CPT

## 2022-05-29 PROCEDURE — 86900 BLOOD TYPING SEROLOGIC ABO: CPT

## 2022-05-29 PROCEDURE — 36600 WITHDRAWAL OF ARTERIAL BLOOD: CPT

## 2022-05-29 PROCEDURE — 82803 BLOOD GASES ANY COMBINATION: CPT

## 2022-05-29 PROCEDURE — 84295 ASSAY OF SERUM SODIUM: CPT

## 2022-05-29 PROCEDURE — 86923 COMPATIBILITY TEST ELECTRIC: CPT

## 2022-05-29 PROCEDURE — 85730 THROMBOPLASTIN TIME PARTIAL: CPT

## 2022-05-29 PROCEDURE — 80053 COMPREHEN METABOLIC PANEL: CPT

## 2022-05-29 PROCEDURE — 82330 ASSAY OF CALCIUM: CPT

## 2022-05-29 PROCEDURE — 85610 PROTHROMBIN TIME: CPT

## 2022-05-29 PROCEDURE — 82550 ASSAY OF CK (CPK): CPT

## 2022-05-29 PROCEDURE — 86901 BLOOD TYPING SEROLOGIC RH(D): CPT

## 2022-05-29 PROCEDURE — 82947 ASSAY GLUCOSE BLOOD QUANT: CPT

## 2022-05-29 PROCEDURE — U0003: CPT

## 2022-05-29 PROCEDURE — 87641 MR-STAPH DNA AMP PROBE: CPT

## 2022-05-29 PROCEDURE — 83605 ASSAY OF LACTIC ACID: CPT

## 2022-05-29 PROCEDURE — 86850 RBC ANTIBODY SCREEN: CPT

## 2022-05-29 PROCEDURE — 36415 COLL VENOUS BLD VENIPUNCTURE: CPT

## 2022-05-29 PROCEDURE — P9016: CPT

## 2022-05-29 PROCEDURE — 87637 SARSCOV2&INF A&B&RSV AMP PRB: CPT

## 2022-05-29 PROCEDURE — 96374 THER/PROPH/DIAG INJ IV PUSH: CPT

## 2022-05-29 PROCEDURE — 82962 GLUCOSE BLOOD TEST: CPT

## 2022-05-29 PROCEDURE — 85025 COMPLETE CBC W/AUTO DIFF WBC: CPT

## 2022-05-29 PROCEDURE — C1889: CPT

## 2022-05-29 PROCEDURE — 85014 HEMATOCRIT: CPT

## 2022-05-29 PROCEDURE — 93005 ELECTROCARDIOGRAM TRACING: CPT

## 2022-05-29 PROCEDURE — 71045 X-RAY EXAM CHEST 1 VIEW: CPT

## 2022-05-29 RX ORDER — APIXABAN 2.5 MG/1
1 TABLET, FILM COATED ORAL
Qty: 0 | Refills: 0 | DISCHARGE
Start: 2022-05-29

## 2022-05-29 RX ADMIN — Medication 81 MILLIGRAM(S): at 12:04

## 2022-05-29 RX ADMIN — Medication 1 TABLET(S): at 05:40

## 2022-05-29 RX ADMIN — Medication 75 MILLIGRAM(S): at 12:04

## 2022-05-29 RX ADMIN — Medication 4 MILLIGRAM(S): at 14:28

## 2022-05-29 RX ADMIN — Medication 4 MILLIGRAM(S): at 05:41

## 2022-05-29 RX ADMIN — Medication 1 APPLICATION(S): at 12:03

## 2022-05-29 RX ADMIN — Medication 1: at 08:02

## 2022-05-29 RX ADMIN — Medication 200 MILLIGRAM(S): at 05:40

## 2022-05-29 RX ADMIN — LIDOCAINE 1 PATCH: 4 CREAM TOPICAL at 01:05

## 2022-05-29 RX ADMIN — LIDOCAINE AND PRILOCAINE CREAM 1 APPLICATION(S): 25; 25 CREAM TOPICAL at 12:03

## 2022-05-29 RX ADMIN — LIDOCAINE 1 PATCH: 4 CREAM TOPICAL at 12:04

## 2022-05-29 RX ADMIN — AMLODIPINE BESYLATE 10 MILLIGRAM(S): 2.5 TABLET ORAL at 05:41

## 2022-05-29 RX ADMIN — APIXABAN 5 MILLIGRAM(S): 2.5 TABLET, FILM COATED ORAL at 05:41

## 2022-05-29 NOTE — PROGRESS NOTE ADULT - REASON FOR ADMISSION
PAD, RLE ischemia

## 2022-05-29 NOTE — DISCHARGE NOTE NURSING/CASE MANAGEMENT/SOCIAL WORK - NSDCPEFALRISK_GEN_ALL_CORE
For information on Fall & Injury Prevention, visit: [email or web address] OR  [email or web address] OR  https://www.cdc.gov/steadi/patient.html

## 2022-05-29 NOTE — PROGRESS NOTE ADULT - SUBJECTIVE AND OBJECTIVE BOX
INTERVAL HPI/OVERNIGHT EVENTS:    Patient seen this AM with no acute events overnight. Patient without any acute complaints at this time. Patients' pain is well controlled on current pain regiment. Tolerating diet without any n/v, has been having normal bowel function. Remains hemodynamically stable and denies fevers, chills. No CP or SOB       MEDICATIONS  (STANDING):  amLODIPine   Tablet 10 milliGRAM(s) Oral daily  amoxicillin  875 milliGRAM(s)/clavulanate 1 Tablet(s) Oral every 12 hours  apixaban 5 milliGRAM(s) Oral every 12 hours  aspirin  chewable 81 milliGRAM(s) Oral daily  cadexomer iodine 0.9% Gel 1 Application(s) Topical daily  dextrose 5%. 1000 milliLiter(s) (100 mL/Hr) IV Continuous <Continuous>  dextrose 5%. 1000 milliLiter(s) (50 mL/Hr) IV Continuous <Continuous>  dextrose 50% Injectable 25 Gram(s) IV Push once  dextrose 50% Injectable 12.5 Gram(s) IV Push once  dextrose 50% Injectable 25 Gram(s) IV Push once  glucagon  Injectable 1 milliGRAM(s) IntraMuscular once  insulin lispro (ADMELOG) corrective regimen sliding scale   SubCutaneous Before meals and at bedtime  lidocaine   4% Patch 1 Patch Transdermal daily  lidocaine/prilocaine Cream 1 Application(s) Topical daily  methylPREDNISolone   Oral   methylPREDNISolone 4 milliGRAM(s) Oral after dinner  methylPREDNISolone 4 milliGRAM(s) Oral at bedtime  methylPREDNISolone 4 milliGRAM(s) Oral before breakfast  methylPREDNISolone 4 milliGRAM(s) Oral after lunch  phenytoin ER Oral Tab/Cap - Peds 200 milliGRAM(s) Oral every 12 hours  QUEtiapine 200 milliGRAM(s) Oral at bedtime  venlafaxine XR. 75 milliGRAM(s) Oral daily    MEDICATIONS  (PRN):  dextrose Oral Gel 15 Gram(s) Oral once PRN Blood Glucose LESS THAN 70 milliGRAM(s)/deciliter  tetracaine/benzocaine/butamben Spray 1 Spray(s) Topical every 4 hours PRN sore throat      Vital Signs Last 24 Hrs  T(C): 37.1 (29 May 2022 01:33), Max: 37.1 (29 May 2022 01:33)  T(F): 98.8 (29 May 2022 01:33), Max: 98.8 (29 May 2022 01:33)  HR: 93 (29 May 2022 01:33) (66 - 98)  BP: 111/65 (29 May 2022 01:33) (111/65 - 133/67)  BP(mean): --  RR: 19 (29 May 2022 01:33) (18 - 19)  SpO2: 93% (29 May 2022 01:33) (93% - 94%)    Physical Exam:    Physical Exam:   GEN: patient resting comfortably in bed, in no acute distress  HEENT: oropharnyx with right sided swelling and mild uvular deviation, mildly improved overall. no exudate from juan-pharynx  RESP: respirations are unlabored, no accessory muscle use, no conversational dyspnea  CVS: RRR  GI: Abdomen soft, non-tender, non-distended, no rebound tenderness / guarding  Extremity: RLE s/p bypass with leg staples removed. groin well healing with sutures and staples in place, no swelling. Pulses 2+ PT.      I&O's Detail    27 May 2022 07:01  -  28 May 2022 07:00  --------------------------------------------------------  IN:    Oral Fluid: 360 mL  Total IN: 360 mL    OUT:    Voided (mL): 350 mL  Total OUT: 350 mL    Total NET: 10 mL      28 May 2022 07:01  -  29 May 2022 01:57  --------------------------------------------------------  IN:  Total IN: 0 mL    OUT:    Voided (mL): 300 mL  Total OUT: 300 mL    Total NET: -300 mL          LABS:                        9.8    11.34 )-----------( 449      ( 28 May 2022 07:11 )             31.3     05-27    140  |  101  |  13.5  ----------------------------<  138<H>  3.8   |  23.0  |  0.72    Ca    9.2      27 May 2022 03:01  Phos  3.2     05-27  Mg     2.2     05-27      PTT - ( 28 May 2022 07:11 )  PTT:29.0 sec      RADIOLOGY & ADDITIONAL STUDIES:

## 2022-05-29 NOTE — PROGRESS NOTE ADULT - PROVIDER SPECIALTY LIST ADULT
Pain Medicine
Vascular Surgery
Vascular Surgery
Anesthesia
Cardiology
ENT
Internal Medicine
Podiatry
SICU
Vascular Surgery
Podiatry
Vascular Surgery
Pain Medicine
Podiatry
Vascular Surgery
Pain Medicine
Pain Medicine

## 2022-05-29 NOTE — PROGRESS NOTE ADULT - ASSESSMENT
57 year old male s/p R CFA to PT bypass w rGSV/ Hospital course complicated by post-reperfusion syndrome and development of odonyphonia w/ L uvular deviation, concerning for airway obstruction. Pt upgraded to SICU for further management and is now tolerating soft diet and on oral medications. Cleared by ENT who aspirated the pharyngeal phlegmon without abscess    / Pain control as needed  /on soft diet  / ID consult with plan for end of antibiotics on 5/31  / continue AC w ASA and NOAC  / Complete Medrol dosepak  / Pods wound care and OP follow up   Clear for for Dispo to MAX at this time

## 2022-05-29 NOTE — DISCHARGE NOTE NURSING/CASE MANAGEMENT/SOCIAL WORK - PATIENT PORTAL LINK FT
You can access the FollowMyHealth Patient Portal offered by [facility] by registering at the following website: [email or web address]. By joining [facility]’s FollowMyHealth portal, you will also be able to view your health information using other applications (apps) compatible with our system.

## 2022-05-30 PROCEDURE — 93970 EXTREMITY STUDY: CPT

## 2022-05-30 PROCEDURE — U0003: CPT

## 2022-05-30 PROCEDURE — 85025 COMPLETE CBC W/AUTO DIFF WBC: CPT

## 2022-05-30 PROCEDURE — C1889: CPT

## 2022-05-30 PROCEDURE — 86901 BLOOD TYPING SEROLOGIC RH(D): CPT

## 2022-05-30 PROCEDURE — 85610 PROTHROMBIN TIME: CPT

## 2022-05-30 PROCEDURE — 80048 BASIC METABOLIC PNL TOTAL CA: CPT

## 2022-05-30 PROCEDURE — 85730 THROMBOPLASTIN TIME PARTIAL: CPT

## 2022-05-30 PROCEDURE — 36415 COLL VENOUS BLD VENIPUNCTURE: CPT

## 2022-05-30 PROCEDURE — 86850 RBC ANTIBODY SCREEN: CPT

## 2022-05-30 PROCEDURE — 88304 TISSUE EXAM BY PATHOLOGIST: CPT

## 2022-05-30 PROCEDURE — 83036 HEMOGLOBIN GLYCOSYLATED A1C: CPT

## 2022-05-30 PROCEDURE — 84100 ASSAY OF PHOSPHORUS: CPT

## 2022-05-30 PROCEDURE — 80053 COMPREHEN METABOLIC PANEL: CPT

## 2022-05-30 PROCEDURE — 71045 X-RAY EXAM CHEST 1 VIEW: CPT

## 2022-05-30 PROCEDURE — U0005: CPT

## 2022-05-30 PROCEDURE — 99285 EMERGENCY DEPT VISIT HI MDM: CPT | Mod: 25

## 2022-05-30 PROCEDURE — 86900 BLOOD TYPING SEROLOGIC ABO: CPT

## 2022-05-30 PROCEDURE — 75635 CT ANGIO ABDOMINAL ARTERIES: CPT

## 2022-05-30 PROCEDURE — 83735 ASSAY OF MAGNESIUM: CPT

## 2022-05-30 PROCEDURE — 85027 COMPLETE CBC AUTOMATED: CPT

## 2022-05-30 PROCEDURE — 88311 DECALCIFY TISSUE: CPT

## 2022-05-30 PROCEDURE — 93005 ELECTROCARDIOGRAM TRACING: CPT

## 2022-05-31 ENCOUNTER — NON-APPOINTMENT (OUTPATIENT)
Age: 57
End: 2022-05-31

## 2022-06-02 ENCOUNTER — APPOINTMENT (OUTPATIENT)
Dept: VASCULAR SURGERY | Facility: CLINIC | Age: 57
End: 2022-06-02
Payer: MEDICAID

## 2022-06-02 VITALS
HEART RATE: 97 BPM | SYSTOLIC BLOOD PRESSURE: 108 MMHG | TEMPERATURE: 97.3 F | RESPIRATION RATE: 14 BRPM | DIASTOLIC BLOOD PRESSURE: 70 MMHG | OXYGEN SATURATION: 98 %

## 2022-06-02 PROCEDURE — 99024 POSTOP FOLLOW-UP VISIT: CPT

## 2022-06-02 NOTE — HISTORY OF PRESENT ILLNESS
[FreeTextEntry1] : 56 year old man, previous heavy smoker with PAD (s/p right SFA stenting in 2017), carotid stenosis.\par Patient is s/p left femoral endarterectomy on 10/1/21\par  [de-identified] : Patient s/p right fem-PT bypass with GSV on 5/13/22 for right great toe dry gangrene\par Discharged to rehab last week but he left AMA because he was unhappy with conditions at rehab\par Has not received Eliquis since he left the hospital\par Seen yesterday by podiatry and is scheduled for pain management appointment next week

## 2022-06-02 NOTE — PHYSICAL EXAM
[JVD] : no jugular venous distention  [Normal Breath Sounds] : Normal breath sounds [Normal Rate and Rhythm] : normal rate and rhythm [2+] : right 2+ [1+] : left 1+ [0] : left 0 [Ankle Swelling (On Exam)] : not present [Varicose Veins Of Lower Extremities] : not present [] : not present [Abdomen Masses] : No abdominal masses [Abdomen Tenderness] : ~T ~M No abdominal tenderness [No Rash or Lesion] : No rash or lesion [Alert] : alert [Oriented to Person] : oriented to person [Oriented to Place] : oriented to place [Oriented to Time] : oriented to time [Calm] : calm [de-identified] : Well appearing, NAD [de-identified] : NCAT [FreeTextEntry1] : Right foot warm . Stable dry gangrene of 1st toe\par Palpable PT\par All wounds are well healed

## 2022-06-02 NOTE — ASSESSMENT
[FreeTextEntry1] : 57 year old man, previous heavy smoker with PAD (s/p right SFA stenting in 2017), carotid stenosis presenting with ongoing right foot pain following right femoral endarterectomy on 4/15/22. \par Patient s/p right fem-PT bypass with GSV on 5/13/22 for right great toe dry gangrene\par -Will arrange for outpatient PT\par -Prescription for Eliquis and pain medications provided\par -Will return in 4 weeks for surveillance US\par -He has untreated severe ICA stenosis that will require CEA in the next 1-2 months

## 2022-07-07 ENCOUNTER — APPOINTMENT (OUTPATIENT)
Dept: VASCULAR SURGERY | Facility: CLINIC | Age: 57
End: 2022-07-07

## 2022-07-07 VITALS
TEMPERATURE: 97.3 F | WEIGHT: 146 LBS | RESPIRATION RATE: 16 BRPM | HEART RATE: 95 BPM | DIASTOLIC BLOOD PRESSURE: 67 MMHG | OXYGEN SATURATION: 95 % | SYSTOLIC BLOOD PRESSURE: 122 MMHG | HEIGHT: 66 IN | BODY MASS INDEX: 23.46 KG/M2

## 2022-07-07 PROCEDURE — 93926 LOWER EXTREMITY STUDY: CPT

## 2022-07-07 PROCEDURE — 99024 POSTOP FOLLOW-UP VISIT: CPT

## 2022-07-07 NOTE — PHYSICAL EXAM
[JVD] : no jugular venous distention  [Normal Breath Sounds] : Normal breath sounds [Normal Rate and Rhythm] : normal rate and rhythm [2+] : right 2+ [1+] : left 1+ [0] : left 0 [Ankle Swelling (On Exam)] : not present [Varicose Veins Of Lower Extremities] : not present [] : not present [Abdomen Masses] : No abdominal masses [Abdomen Tenderness] : ~T ~M No abdominal tenderness [No Rash or Lesion] : No rash or lesion [Alert] : alert [Oriented to Person] : oriented to person [Oriented to Place] : oriented to place [Oriented to Time] : oriented to time [Calm] : calm [de-identified] : Well appearing, NAD [de-identified] : NCAT [FreeTextEntry1] : Right foot warm . No ulcers\par Palpable PT\par All wounds are well healed

## 2022-07-07 NOTE — HISTORY OF PRESENT ILLNESS
[FreeTextEntry1] : 56 year old man, previous heavy smoker with PAD (s/p right SFA stenting in 2017), carotid stenosis.\par Patient is s/p left femoral endarterectomy on 10/1/21\par  [de-identified] : Patient s/p right fem-PT bypass with GSV on 5/13/22 for right great toe dry gangrene\par Patient presents for follow up.\par The dead skin on right great toe has sloughed off and he has no foot wounds\par He reports to have quit smoking

## 2022-07-07 NOTE — ASSESSMENT
[FreeTextEntry1] : 57 year old man, previous heavy smoker with PAD (s/p right SFA stenting in 2017), carotid stenosis presenting with ongoing right foot pain following right femoral endarterectomy on 4/15/22. \par Patient s/p right fem-PT bypass with GSV on 5/13/22 for right great toe dry gangrene\par -Surveillance US performed today demonstrates a patent bypass with severe stenosis at the proximal anastomosis suggestive of >75% stenosis. Will plan for RLE angiogram by Dr Lelia Elder\par Risks and benefits of the procedure discussed with patient, all questions answered\par -He has untreated severe ICA stenosis that may require intervention

## 2022-07-07 NOTE — ED ADULT TRIAGE NOTE - ARRIVAL FROM
Home
Additional Notes: Measures at 2.5 x 2.5 cm.
Detail Level: Simple
Render Risk Assessment In Note?: no

## 2022-07-30 NOTE — ED ADULT NURSE NOTE - COVID-19 RESULT DATE/TIME
Subjective:      Patient ID: Lily Boo is a 19 m.o. male.    Chief Complaint: Pain of the Left Lower Leg and Pain (4 days out DOI:7/23/22 fell out of his brothers bed, Other closed fx of distal end of Left tibia)    Lily Boo is a [id number] male who presents today with his mother and grandmother for evaluation of his left lower extremity.  His mother provides an independent history.  They report that on 07/23/2022 he fell out of his brother's bunk bed.  He had immediate pain to the left lower extremity.  He was taken to the emergency department where radiographs showed a buckle fracture of the left distal tibia.  He is placed into a splint.  Mother reports that his pain is improved.  He has been more active lately.  He has been trying to keep the splint off and it has not fit well.  No sensory or motor changes are reported.    Review of patient's allergies indicates:   Allergen Reactions    Milk containing products     Nuts [tree nut]      Pecans       History reviewed. No pertinent past medical history.  History reviewed. No pertinent surgical history.  History reviewed. No pertinent family history.    No current outpatient medications on file prior to visit.     No current facility-administered medications on file prior to visit.       Social History     Social History Narrative    Not on file       Review of Systems   Constitutional: Negative for fever.   Musculoskeletal: Negative for neck pain.   Neurological: Negative for weakness.         Objective:      General    Development well-developed   Nutrition well-nourished   Body Habitus normal weight   Mood no distress            Lower        Lower Leg  Tenderness   Left tibia tenderness   Alignment   Left no deformity      Ankle  Range of Motion Dorsiflexion:     Left normal  Plantarflexion:     Left normal     Stability   left ankle stable    Muscle Strength   normal left ankle strength    Alignment   Left normal     Swelling   Left no swelling                  He has brisk capillary refill to all toes.  He is moving toes freely.  He does have a developing pressure sore to the heel.  Skin is intact but there is erythema.        Imaging:   X-Ray Pelvis Routine AP  Narrative: EXAMINATION:  One-view pelvis    CLINICAL HISTORY:  Fall    COMPARISON:  None    FINDINGS:  Alignment is normal. No displaced fracture is evident.  Impression: No displaced fracture    Electronically signed by: Yosef Camacho MD  Date:    07/23/2022  Time:    10:16  X-Ray Lower Extremity Infant 2 View Min Left  Narrative: EXAMINATION:  XR LOWER EXTREMITY INFANT 2 VIEW MIN LEFT    CLINICAL HISTORY:  Unspecified fall, initial encounter    TECHNIQUE:  Two views of the left lower extremity were obtained    COMPARISON:  None    FINDINGS:  There is a fracture of the distal tibial metaphysis.  It is nondisplaced.  No other fractures are seen.  There is soft tissue swelling in the ankle.  Impression: Fracture of the distal tibial metaphysis    Electronically signed by: Katerine Shea  Date:    07/23/2022  Time:    10:15             Assessment:       1. Closed fracture of distal end of left tibia, unspecified fracture morphology, initial encounter           Plan:       Imaging and exam findings discussed with the family.  He sustained a closed buckle fracture of the distal tibia metaphysis.  This is amenable to nonsurgical management.  Is already developed a small erythematous area to the posterior heel.  He was placed into a very well-padded long leg cast today.  I will see him back in 3 weeks with radiographs out of the cast.  They will notify us of any loosening so that we can take this off and check skin and replace the cast if needed.  All their questions were answered and they were in agreement.         13-Apr-2022 16:14

## 2022-08-04 ENCOUNTER — APPOINTMENT (OUTPATIENT)
Dept: CARDIOLOGY | Facility: CLINIC | Age: 57
End: 2022-08-04

## 2022-08-05 ENCOUNTER — OUTPATIENT (OUTPATIENT)
Dept: OUTPATIENT SERVICES | Facility: HOSPITAL | Age: 57
LOS: 1 days | End: 2022-08-05
Payer: COMMERCIAL

## 2022-08-05 VITALS
SYSTOLIC BLOOD PRESSURE: 141 MMHG | RESPIRATION RATE: 20 BRPM | DIASTOLIC BLOOD PRESSURE: 74 MMHG | HEART RATE: 74 BPM | WEIGHT: 135.14 LBS | TEMPERATURE: 97 F | HEIGHT: 65 IN | OXYGEN SATURATION: 98 %

## 2022-08-05 DIAGNOSIS — Z98.890 OTHER SPECIFIED POSTPROCEDURAL STATES: Chronic | ICD-10-CM

## 2022-08-05 DIAGNOSIS — Z98.89 OTHER SPECIFIED POSTPROCEDURAL STATES: Chronic | ICD-10-CM

## 2022-08-05 DIAGNOSIS — Z96.7 PRESENCE OF OTHER BONE AND TENDON IMPLANTS: Chronic | ICD-10-CM

## 2022-08-05 DIAGNOSIS — I73.9 PERIPHERAL VASCULAR DISEASE, UNSPECIFIED: ICD-10-CM

## 2022-08-05 DIAGNOSIS — R56.9 UNSPECIFIED CONVULSIONS: ICD-10-CM

## 2022-08-05 DIAGNOSIS — Z01.818 ENCOUNTER FOR OTHER PREPROCEDURAL EXAMINATION: ICD-10-CM

## 2022-08-05 DIAGNOSIS — U07.1 COVID-19: ICD-10-CM

## 2022-08-05 DIAGNOSIS — K40.90 UNILATERAL INGUINAL HERNIA, WITHOUT OBSTRUCTION OR GANGRENE, NOT SPECIFIED AS RECURRENT: Chronic | ICD-10-CM

## 2022-08-05 LAB
ALBUMIN SERPL ELPH-MCNC: 4.5 G/DL — SIGNIFICANT CHANGE UP (ref 3.3–5.2)
ALP SERPL-CCNC: 183 U/L — HIGH (ref 40–120)
ALT FLD-CCNC: 15 U/L — SIGNIFICANT CHANGE UP
ANION GAP SERPL CALC-SCNC: 13 MMOL/L — SIGNIFICANT CHANGE UP (ref 5–17)
APTT BLD: 35.2 SEC — SIGNIFICANT CHANGE UP (ref 27.5–35.5)
AST SERPL-CCNC: 19 U/L — SIGNIFICANT CHANGE UP
BASOPHILS # BLD AUTO: 0.04 K/UL — SIGNIFICANT CHANGE UP (ref 0–0.2)
BASOPHILS NFR BLD AUTO: 0.4 % — SIGNIFICANT CHANGE UP (ref 0–2)
BILIRUB SERPL-MCNC: <0.2 MG/DL — LOW (ref 0.4–2)
BLD GP AB SCN SERPL QL: SIGNIFICANT CHANGE UP
BUN SERPL-MCNC: 9.7 MG/DL — SIGNIFICANT CHANGE UP (ref 8–20)
CALCIUM SERPL-MCNC: 9.2 MG/DL — SIGNIFICANT CHANGE UP (ref 8.4–10.5)
CHLORIDE SERPL-SCNC: 102 MMOL/L — SIGNIFICANT CHANGE UP (ref 98–107)
CO2 SERPL-SCNC: 25 MMOL/L — SIGNIFICANT CHANGE UP (ref 22–29)
COMMENT - BLOOD BANK: SIGNIFICANT CHANGE UP
CREAT SERPL-MCNC: 0.71 MG/DL — SIGNIFICANT CHANGE UP (ref 0.5–1.3)
EGFR: 106 ML/MIN/1.73M2 — SIGNIFICANT CHANGE UP
EOSINOPHIL # BLD AUTO: 0.22 K/UL — SIGNIFICANT CHANGE UP (ref 0–0.5)
EOSINOPHIL NFR BLD AUTO: 2.2 % — SIGNIFICANT CHANGE UP (ref 0–6)
GLUCOSE SERPL-MCNC: 99 MG/DL — SIGNIFICANT CHANGE UP (ref 70–99)
HCT VFR BLD CALC: 36 % — LOW (ref 39–50)
HGB BLD-MCNC: 11.5 G/DL — LOW (ref 13–17)
IMM GRANULOCYTES NFR BLD AUTO: 0.3 % — SIGNIFICANT CHANGE UP (ref 0–1.5)
INR BLD: 1.13 RATIO — SIGNIFICANT CHANGE UP (ref 0.88–1.16)
LYMPHOCYTES # BLD AUTO: 2.35 K/UL — SIGNIFICANT CHANGE UP (ref 1–3.3)
LYMPHOCYTES # BLD AUTO: 24 % — SIGNIFICANT CHANGE UP (ref 13–44)
MAGNESIUM SERPL-MCNC: 2.3 MG/DL — SIGNIFICANT CHANGE UP (ref 1.6–2.6)
MCHC RBC-ENTMCNC: 28 PG — SIGNIFICANT CHANGE UP (ref 27–34)
MCHC RBC-ENTMCNC: 31.9 GM/DL — LOW (ref 32–36)
MCV RBC AUTO: 87.6 FL — SIGNIFICANT CHANGE UP (ref 80–100)
MONOCYTES # BLD AUTO: 0.94 K/UL — HIGH (ref 0–0.9)
MONOCYTES NFR BLD AUTO: 9.6 % — SIGNIFICANT CHANGE UP (ref 2–14)
NEUTROPHILS # BLD AUTO: 6.23 K/UL — SIGNIFICANT CHANGE UP (ref 1.8–7.4)
NEUTROPHILS NFR BLD AUTO: 63.5 % — SIGNIFICANT CHANGE UP (ref 43–77)
PLATELET # BLD AUTO: 219 K/UL — SIGNIFICANT CHANGE UP (ref 150–400)
POTASSIUM SERPL-MCNC: 4.7 MMOL/L — SIGNIFICANT CHANGE UP (ref 3.5–5.3)
POTASSIUM SERPL-SCNC: 4.7 MMOL/L — SIGNIFICANT CHANGE UP (ref 3.5–5.3)
PROT SERPL-MCNC: 7.9 G/DL — SIGNIFICANT CHANGE UP (ref 6.6–8.7)
PROTHROM AB SERPL-ACNC: 13.1 SEC — SIGNIFICANT CHANGE UP (ref 10.5–13.4)
RBC # BLD: 4.11 M/UL — LOW (ref 4.2–5.8)
RBC # FLD: 15.6 % — HIGH (ref 10.3–14.5)
SARS-COV-2 RNA SPEC QL NAA+PROBE: SIGNIFICANT CHANGE UP
SODIUM SERPL-SCNC: 140 MMOL/L — SIGNIFICANT CHANGE UP (ref 135–145)
WBC # BLD: 9.81 K/UL — SIGNIFICANT CHANGE UP (ref 3.8–10.5)
WBC # FLD AUTO: 9.81 K/UL — SIGNIFICANT CHANGE UP (ref 3.8–10.5)

## 2022-08-05 PROCEDURE — 93005 ELECTROCARDIOGRAM TRACING: CPT

## 2022-08-05 PROCEDURE — 93010 ELECTROCARDIOGRAM REPORT: CPT

## 2022-08-05 NOTE — H&P PST ADULT - ENMT COMMENTS
bruise to right lip bruise to right eye and right lip , fell out of bed and hit face on table as per pt

## 2022-08-05 NOTE — H&P PST ADULT - NSICDXPASTMEDICALHX_GEN_ALL_CORE_FT
PAST MEDICAL HISTORY:  Depression     Former smoker     GERD (gastroesophageal reflux disease)     Hypercholesterolemia     Hypertension     Osteoarthritis     Peripheral vascular disease with R SFA stent    Prediabetes     Seizures 2021   last seizure

## 2022-08-05 NOTE — H&P PST ADULT - ASSESSMENT
Narrative: 57  yr old male presents with history of htn, seizures (last ) , high cholesterol  PAD  s/p right SFA stenting in 2017 left leg , carotid stenosis , s/p left femoral endarterectomy 10/1/21, right femoral endarterectomy on 4/15/22  s/p right fem -PT bypass with GSV on 22 for right great toe dry gangrene . Pt has c/o right foot pain that has burning sensation and has become severe over past week , using cane and wheelchair at times, c/o pain with standing and sitting 10/10 has no relief. states he cannot sleep without pain , fell out of bed last night and hit face on night stand ,  Lower foot and calf discolored red  with right calf incision   scab noted , no drainage . US done  demonstrating patent bypass with severe stenosis at the proximal anastomosis suggestive of >75% stenosis. pt is scheduled for RLE angiogram possible intervention with Dr. Lelia Elder on 22. Pt has untreated severe ICA stenosis that may require intervention.   pre op pcr done at [facility] pt is not vaccinated for covid ,   medical clearance to be obtained with DR. Huang   OPIOID RISK TOOL    [place] EACH BOX THAT APPLIES AND ADD TOTALS AT THE END    FAMILY HISTORY OF SUBSTANCE ABUSE                 FEMALE         MALE                                                Alcohol                             [  ]1 pt          [  ]3pts                                               Illegal Durgs                     [  ]2 pts        [  ]3pts                                               Rx Drugs                           [  ]4 pts        [  ]4 pts    PERSONAL HISTORY OF SUBSTANCE ABUSE                                                                                          Alcohol                             [  ]3 pts       [  ]3 pts                                               Illegal Durgs                     [  ]4 pts        [  ]4 pts                                               Rx Drugs                           [  ]5 pts        [  ]5 pts    AGE BETWEEN 16-45 YEARS                                      [  ]1 pt         [  ]1 pt    HISTORY OF PREADOLESCENT   SEXUAL ABUSE                                                             [  ]3 pts        [  ]0pts    PSYCHOLOGICAL DISEASE                     ADD, OCD, Bipolar, Schizophrenia        [  ]2 pts         [  ]2 pts                      Depression                                               [  ]1 pt           [  ]1 pt           SCORING TOTAL   (add numbers and type here)              (*0**)                                     A score of 3 or lower indicated LOW risk for future opiod abuse  A score of 4 to 7 indicated moderate risk for future opiod abuse  A score of 8 or higher indicates a high risk for opiod abuse  CAPRINI VTE 2.0 SCORE [CLOT updated 2019]    AGE RELATED RISK FACTORS                                                       MOBILITY RELATED FACTORS  [X ] Age 41-60 years                                            (1 Point)                    [ ] Bed rest                                                        (1 Point)  [ ] Age: 61-74 years                                           (2 Points)                  [ ] Plaster cast                                                   (2 Points)  [ ] Age= 75 years                                              (3 Points)                    [ ] Bed bound for more than 72 hours                 (2 Points)    DISEASE RELATED RISK FACTORS                                               GENDER SPECIFIC FACTORS  [ ] Edema in the lower extremities                       (1 Point)              [ ] Pregnancy                                                     (1 Point)  [ ] Varicose veins                                               (1 Point)                     [ ] Post-partum < 6 weeks                                   (1 Point)             [ X] BMI > 25 Kg/m2                                            (1 Point)                     [ ] Hormonal therapy  or oral contraception          (1 Point)                 [ ] Sepsis (in the previous month)                        (1 Point)               [ ] History of pregnancy complications                 (1 point)  [ ] Pneumonia or serious lung disease                                               [ ] Unexplained or recurrent                     (1 Point)           (in the previous month)                               (1 Point)  [ ] Abnormal pulmonary function test                     (1 Point)                 SURGERY RELATED RISK FACTORS  [ ] Acute myocardial infarction                              (1 Point)               [ ]  Section                                             (1 Point)  [ ] Congestive heart failure (in the previous month)  (1 Point)      [ X] Minor surgery                                                  (1 Point)   [ ] Inflammatory bowel disease                             (1 Point)               [ ] Arthroscopic surgery                                        (2 Points)  [ ] Central venous access                                      (2 Points)                [ ] General surgery lasting more than 45 minutes (2 points)  [ ] Malignancy- Present or previous                   (2 Points)                [ ] Elective arthroplasty                                         (5 points)    [ ] Stroke (in the previous month)                          (5 Points)                                                                                                                                                           HEMATOLOGY RELATED FACTORS                                                 TRAUMA RELATED RISK FACTORS  [ ] Prior episodes of VTE                                     (3 Points)                [ ] Fracture of the hip, pelvis, or leg                       (5 Points)  [ ] Positive family history for VTE                         (3 Points)             [ ] Acute spinal cord injury (in the previous month)  (5 Points)  [ ] Prothrombin 03694 A                                     (3 Points)               [ ] Paralysis  (less than 1 month)                             (5 Points)  [ ] Factor V Leiden                                             (3 Points)                  [ ] Multiple Trauma within 1 month                        (5 Points)  [ ] Lupus anticoagulants                                     (3 Points)                                                           [ ] Anticardiolipin antibodies                               (3 Points)                                                       [ ] High homocysteine in the blood                      (3 Points)                                             [ ] Other congenital or acquired thrombophilia      (3 Points)                                                [ ] Heparin induced thrombocytopenia                  (3 Points)                                     Total Score [     3     ]    Risk Factors for PAD       Age: 57       DM: N/A       Smoking History: yes  quit a few yrs ago  .5 ppd x 35 yrs        Hyperlipidemia: yes       Hypertension: yes        Family History of PAD: N/A       Known Atherosclerotic Disease (coronary, carotid, subclavian, renal, mesenteric, AAA):     Subjective Complaints:        Claudication Nic Class: III       Impaired Walking Function: yes       Ischemic Rest Pain: yes       Non-healing Ulcers gangrene right great toe improving        Other Non-Joint Related Exertional Lower Extremity Symptoms (not typical of claudication): N/A    Objective Findings:        Lower Extremity Pulses: Unable to palpate DP pulses, doppler pulses noted. pedal , post tibial , femoral , DP, TP  present with doppler       Nonhealing Lower Extremity Wound/ Ulcers: right calf incision scab over scar        Lower Extremity Gangrene: right great toe improving        Vascular Bruit: N/A       Other Suggestive Lower Extremity Findings (elevation pallor, dependent rubor): rubor     Vascular Testing:        JOSE (Normal/Borderline/Abnormal/Noncompressable):  NOT AVAILABLE        Arterial Dopplers: US demonstrates a patent bypass with severe stenosis at the proximal anastomosis suggestive of >75% stenosis         CTA/MRA:        Prior Peripheral Angiograms/ Interventions : (Date/ Findings)  s/p right fem- PT bypass with GSV on 22 for right great toe dry gangrene         right femoral endarterectomy   4/15/22 , s/p left femoral endarterectomy on 10/1/21     Medical Management:       Antiplatelets: eliquis 5 mg bid , asa 81 mg plavix 75 mg qd        Cilostazol:        Statin: atorvastatin  80 mg qd         Nic Claudication Classification:        I: Asymptomatic       IIa: Walking > 200 meters (2.5 blocks)       IIb: Walking < 200 meters (2.5 blocks)       III: Rest/nocturnal pain       IV: Necrosis/gangrene    Ankle-Brachial Index:       Noncompressable: > 1.4       Normal: 1.0 to 1.4       Borderline: 0.91 to 0.99       Abnormal: < 0.91

## 2022-08-05 NOTE — H&P PST ADULT - HISTORY OF PRESENT ILLNESS
57  yr old male presents with history of htn, seizures (last 2021) , high cholesterol  PAD  s/p right SFA stenting in 2017 left leg , carotid stenosis , s/p left femoral endarterectomy 10/1/21, right femoral endarterectomy on 4/15/22  s/p right fem -PT bypass with GSV on 5/13/22 for right great toe dry gangrene . Pt has c/o right foot pain that has burning sensation and has become severe over past week , using cane and wheelchair at times, c/o pain with standing and sitting 10/10 has no relief. states he cannot sleep without pain , fell out of bed last night and hit face on night stand ,  Lower foot and calf discolored red  with right calf incision   scab noted , no drainage . US done july 7/7 /22 demonstrating patent bypass with severe stenosis at the proximal anastomosis suggestive of >75% stenosis. pt is scheduled for RLE angiogram possible intervention with Dr. Lelia Elder on 8/9/22. Pt has untreated severe ICA stenosis that may require intervention.

## 2022-08-05 NOTE — H&P PST ADULT - ADDITIONAL PE
right leg in  boot lower leg and foot rubor color c/o pain at rest scab to lower calf intact pulses present with doppler toes warm and mobile

## 2022-08-05 NOTE — H&P PST ADULT - NSICDXPASTSURGICALHX_GEN_ALL_CORE_FT
PAST SURGICAL HISTORY:  H/O endarterectomy L femoral a.  R femoral a.    Inguinal hernia, left     S/P appendectomy     S/P ORIF (open reduction internal fixation) fracture Left    S/P shoulder surgery right     PAST SURGICAL HISTORY:  H/O endarterectomy L femoral a.  R femoral a. april 2022    Inguinal hernia, left     S/P appendectomy     S/P ORIF (open reduction internal fixation) fracture Left    S/P shoulder surgery right    Status post femorotibial bypass with GSV 5/13/22

## 2022-08-08 ENCOUNTER — APPOINTMENT (OUTPATIENT)
Dept: INTERNAL MEDICINE | Facility: CLINIC | Age: 57
End: 2022-08-08

## 2022-08-08 VITALS
RESPIRATION RATE: 16 BRPM | HEIGHT: 66 IN | OXYGEN SATURATION: 96 % | BODY MASS INDEX: 23.78 KG/M2 | DIASTOLIC BLOOD PRESSURE: 72 MMHG | HEART RATE: 90 BPM | TEMPERATURE: 98.4 F | WEIGHT: 148 LBS | SYSTOLIC BLOOD PRESSURE: 148 MMHG

## 2022-08-08 DIAGNOSIS — Z01.818 ENCOUNTER FOR OTHER PREPROCEDURAL EXAMINATION: ICD-10-CM

## 2022-08-08 DIAGNOSIS — H11.31 CONJUNCTIVAL HEMORRHAGE, RIGHT EYE: ICD-10-CM

## 2022-08-08 DIAGNOSIS — S09.93XA UNSPECIFIED INJURY OF FACE, INITIAL ENCOUNTER: ICD-10-CM

## 2022-08-08 DIAGNOSIS — S00.83XA CONTUSION OF OTHER PART OF HEAD, INITIAL ENCOUNTER: ICD-10-CM

## 2022-08-08 PROCEDURE — 99214 OFFICE O/P EST MOD 30 MIN: CPT

## 2022-08-08 RX ORDER — ASPIRIN 81 MG/1
81 TABLET, COATED ORAL DAILY
Qty: 90 | Refills: 0 | Status: COMPLETED | COMMUNITY
Start: 2021-10-29 | End: 2022-08-08

## 2022-08-08 RX ORDER — SILVER 2" X 2"
0.9 BANDAGE TOPICAL
Qty: 1 | Refills: 0 | Status: COMPLETED | COMMUNITY
Start: 2022-06-08 | End: 2022-08-08

## 2022-08-08 RX ORDER — AMOXICILLIN AND CLAVULANATE POTASSIUM 875; 125 MG/1; MG/1
875-125 TABLET, COATED ORAL
Qty: 14 | Refills: 0 | Status: COMPLETED | COMMUNITY
Start: 2022-05-31 | End: 2022-08-08

## 2022-08-08 NOTE — HISTORY OF PRESENT ILLNESS
[FreeTextEntry1] : RLE angiogram [FreeTextEntry2] : 8/9/2022 [FreeTextEntry3] : Dr Lelia Elder [FreeTextEntry4] : Here for pre-op evaluation for right LE angiogram scheduled for tomorrow\par \par he states 2 days ago he fell out of bed and hit his head and states he has bleeding in eye and has some facial bruising.  He denies headache, arm\par \par He denies chest pain, sob, palpitations\par \par No hx of surgical of anesthesia complication\par \par He denies any easy bruising\par \par He reports he has chronic pain in right big toe due to circulation.  he does have a chronic lower extremity would on right inner right leg which he states he has had since his surgery

## 2022-08-08 NOTE — ASSESSMENT
[FreeTextEntry1] : Preop evaluation: Scheduled for right lower extremity angiogram due to severe PAD\par -Preop labs and EKG reviewed\par -Given his recent fall with head trauma and subconjunctival hemorrhage, I called Dr. Lelia Elder-she reports procedure will need to be postponed as he would be getting heparin bolus in OR\par -I discussed this with patient.  I have advised that he schedule follow-up appointment with vascular surgery\par \par PAD with claudication\par -he is on atorvastatin, plavix, Eliquis, and ASA\par -He reports he has chronic right leg wound since his surgery-no signs of infection or cellulitis currently\par -I have advised that he schedule appointment to see vascular surgery ASAP for evaluation-he may need wound care\par -If he develops worsening pain, redness, discharge, fever/chills he is to go to the emergency room\par \par Head trauma with right subconjunctival hemorrhage and facial ecchymosis\par -He is on ASA, Plavix, Eliquis\par -I have advised stat CT of head to rule out bleed\par -If he develops headache, eye pain or vision loss, weakness in arms or legs he is to call 911 and go to the emergency room\par \par Carotid stenosis:\par -carotid US showed 50-69% stenosis B/L 2021\par -he is on ASA,  plavix, and atorvastatin\par -He will eventually need follow-up carotid ultrasound\par -Fasting labs ordered\par \par Seizure disorder:\par -no recent seizures\par -He remains on Dilantin\par \par Vitamin D Deficiency:\par -Check vitamin D 25-OH level\par \par GERD:\par -on pantoprazole\par \par Dyslipidemia\par -on atorvastatin 80mg PO daily and Lovaza\par -Check fasting labs\par \par Elevated LFTs:\par -Previous abdominal ultrasound was normal\par -His most recent alkaline phosphatase level was 183\par -hepatitis B and C serology was negative\par -seen by GI previously\par -Check labs including GGT\par \par Anxiety/Insomnia/Depression:\par -on Venlafaxine ER 75mg PO daily and seroquel to 200mg PO QHS \par \par HTN:\par -BP mildly elevated today but he is having some right foot pain\par -on Amlodipine 10mg Po daily\par \par Pre-DM:\par -Check fasting labs\par \par Anemia\par -Last hemoglobin was 11.5\par -Check CBC and iron studies, B12, folic acid level\par \par \par HCM:\par \par CPE 2017\par \par [id number]\par \par Flu shot:\par \par Tdap: 2017\par \par Pneumovax: 11/3/2015\par \par HIV testing offered: pt declined testing 2021\par \par Hep C screening: negative 2015\par \par Colonoscopy: Advised previously.  Will discuss at next visit\par \par FIT testin2021 positive\par \par Depression screenin2021 PHQ 2 score 0\par \par PSA: 2021 0.42-PSA ordered today\par \par \par F/U 1 week.  Fasting labs ordered which she will have done at the lab.  Medications refilled today

## 2022-08-08 NOTE — REVIEW OF SYSTEMS
[Negative] : Psychiatric [Fever] : no fever [Chills] : no chills [Fatigue] : no fatigue [Chest Pain] : no chest pain [Palpitations] : no palpitations [Lower Ext Edema] : no lower extremity edema [Shortness Of Breath] : no shortness of breath [Wheezing] : no wheezing [Cough] : no cough [Abdominal Pain] : no abdominal pain [Nausea] : no nausea [Diarrhea] : no diarrhea [Vomiting] : no vomiting [Easy Bleeding] : no easy bleeding [Easy Bruising] : no easy bruising [FreeTextEntry9] : see HPI [de-identified] : see HPI

## 2022-08-09 ENCOUNTER — APPOINTMENT (OUTPATIENT)
Dept: VASCULAR SURGERY | Facility: HOSPITAL | Age: 57
End: 2022-08-09

## 2022-08-12 ENCOUNTER — OUTPATIENT (OUTPATIENT)
Dept: OUTPATIENT SERVICES | Facility: HOSPITAL | Age: 57
LOS: 1 days | End: 2022-08-12
Payer: MEDICAID

## 2022-08-12 ENCOUNTER — APPOINTMENT (OUTPATIENT)
Dept: CT IMAGING | Facility: CLINIC | Age: 57
End: 2022-08-12

## 2022-08-12 DIAGNOSIS — Z98.890 OTHER SPECIFIED POSTPROCEDURAL STATES: Chronic | ICD-10-CM

## 2022-08-12 DIAGNOSIS — Z98.89 OTHER SPECIFIED POSTPROCEDURAL STATES: Chronic | ICD-10-CM

## 2022-08-12 DIAGNOSIS — H11.31 CONJUNCTIVAL HEMORRHAGE, RIGHT EYE: ICD-10-CM

## 2022-08-12 DIAGNOSIS — Z96.7 PRESENCE OF OTHER BONE AND TENDON IMPLANTS: Chronic | ICD-10-CM

## 2022-08-12 DIAGNOSIS — Z00.8 ENCOUNTER FOR OTHER GENERAL EXAMINATION: ICD-10-CM

## 2022-08-12 DIAGNOSIS — K40.90 UNILATERAL INGUINAL HERNIA, WITHOUT OBSTRUCTION OR GANGRENE, NOT SPECIFIED AS RECURRENT: Chronic | ICD-10-CM

## 2022-08-12 PROCEDURE — 70450 CT HEAD/BRAIN W/O DYE: CPT | Mod: 26

## 2022-08-12 PROCEDURE — 70450 CT HEAD/BRAIN W/O DYE: CPT

## 2022-08-16 ENCOUNTER — APPOINTMENT (OUTPATIENT)
Dept: INTERNAL MEDICINE | Facility: CLINIC | Age: 57
End: 2022-08-16

## 2022-08-16 VITALS
RESPIRATION RATE: 16 BRPM | OXYGEN SATURATION: 98 % | HEART RATE: 93 BPM | DIASTOLIC BLOOD PRESSURE: 62 MMHG | TEMPERATURE: 98.1 F | HEIGHT: 66 IN | SYSTOLIC BLOOD PRESSURE: 128 MMHG

## 2022-08-16 DIAGNOSIS — I73.9 PERIPHERAL VASCULAR DISEASE, UNSPECIFIED: ICD-10-CM

## 2022-08-16 PROCEDURE — 99215 OFFICE O/P EST HI 40 MIN: CPT

## 2022-08-18 ENCOUNTER — EMERGENCY (EMERGENCY)
Facility: HOSPITAL | Age: 57
LOS: 1 days | Discharge: HOME | End: 2022-08-18
Attending: STUDENT IN AN ORGANIZED HEALTH CARE EDUCATION/TRAINING PROGRAM
Payer: COMMERCIAL

## 2022-08-18 VITALS
SYSTOLIC BLOOD PRESSURE: 130 MMHG | HEIGHT: 65 IN | RESPIRATION RATE: 20 BRPM | TEMPERATURE: 98 F | WEIGHT: 173.94 LBS | DIASTOLIC BLOOD PRESSURE: 77 MMHG | OXYGEN SATURATION: 99 % | HEART RATE: 92 BPM

## 2022-08-18 DIAGNOSIS — K40.90 UNILATERAL INGUINAL HERNIA, WITHOUT OBSTRUCTION OR GANGRENE, NOT SPECIFIED AS RECURRENT: Chronic | ICD-10-CM

## 2022-08-18 DIAGNOSIS — Z98.89 OTHER SPECIFIED POSTPROCEDURAL STATES: Chronic | ICD-10-CM

## 2022-08-18 DIAGNOSIS — Z98.890 OTHER SPECIFIED POSTPROCEDURAL STATES: Chronic | ICD-10-CM

## 2022-08-18 DIAGNOSIS — Z96.7 PRESENCE OF OTHER BONE AND TENDON IMPLANTS: Chronic | ICD-10-CM

## 2022-08-18 PROBLEM — I73.9 CLAUDICATION: Status: ACTIVE | Noted: 2017-02-24

## 2022-08-18 PROCEDURE — 70450 CT HEAD/BRAIN W/O DYE: CPT | Mod: 26,MA

## 2022-08-18 PROCEDURE — 12011 RPR F/E/E/N/L/M 2.5 CM/<: CPT

## 2022-08-18 PROCEDURE — 90471 IMMUNIZATION ADMIN: CPT

## 2022-08-18 PROCEDURE — 99284 EMERGENCY DEPT VISIT MOD MDM: CPT | Mod: 25

## 2022-08-18 PROCEDURE — 99284 EMERGENCY DEPT VISIT MOD MDM: CPT

## 2022-08-18 PROCEDURE — 70450 CT HEAD/BRAIN W/O DYE: CPT | Mod: MA

## 2022-08-18 PROCEDURE — 90715 TDAP VACCINE 7 YRS/> IM: CPT

## 2022-08-18 RX ORDER — TETANUS TOXOID, REDUCED DIPHTHERIA TOXOID AND ACELLULAR PERTUSSIS VACCINE, ADSORBED 5; 2.5; 8; 8; 2.5 [IU]/.5ML; [IU]/.5ML; UG/.5ML; UG/.5ML; UG/.5ML
0.5 SUSPENSION INTRAMUSCULAR ONCE
Refills: 0 | Status: COMPLETED | OUTPATIENT
Start: 2022-08-18 | End: 2022-08-18

## 2022-08-18 RX ORDER — TETANUS TOXOID, REDUCED DIPHTHERIA TOXOID AND ACELLULAR PERTUSSIS VACCINE, ADSORBED 5; 2.5; 8; 8; 2.5 [IU]/.5ML; [IU]/.5ML; UG/.5ML; UG/.5ML; UG/.5ML
0.5 SUSPENSION INTRAMUSCULAR ONCE
Refills: 0 | Status: DISCONTINUED | OUTPATIENT
Start: 2022-08-18 | End: 2022-08-18

## 2022-08-18 RX ADMIN — TETANUS TOXOID, REDUCED DIPHTHERIA TOXOID AND ACELLULAR PERTUSSIS VACCINE, ADSORBED 0.5 MILLILITER(S): 5; 2.5; 8; 8; 2.5 SUSPENSION INTRAMUSCULAR at 10:59

## 2022-08-18 NOTE — ED PROVIDER NOTE - NSFOLLOWUPINSTRUCTIONS_ED_ALL_ED_FT
1. Keep sutures dry for 24 hours. After the first day, you can shower but don't soak the sutures (no swimming).   2. Change bandage and apply bacitracin or neosporin every day.   3. Return to your doctor or to the emergency room for suture removal (3 sutures) in 7 days.  4. Return to the emergency room for any new or worsening symptoms, such as redness, swelling, pus in the wound.     Fall Prevention    WHAT YOU NEED TO KNOW:    Fall prevention includes ways to make your home and other areas safer. It also includes ways you can move more carefully to prevent a fall. Health conditions that cause changes in your blood pressure, vision, or muscle strength and coordination may increase your risk for falls. Medicines may also increase your risk for falls if they make you dizzy, weak, or sleepy.    DISCHARGE INSTRUCTIONS:    Call 911 or have someone else call if:   •You have fallen and are unconscious.  •You have fallen and cannot move part of your body.      Contact your healthcare provider if:   •You have fallen and have pain or a headache.  •You have questions or concerns about your condition or care.      Fall prevention tips:   •Stand or sit up slowly. This may help you keep your balance and prevent falls.    •Use assistive devices as directed. Your healthcare provider may suggest that you use a cane or walker to help you keep your balance. You may need to have grab bars put in your bathroom near the toilet or in the shower.    •Wear shoes that fit well and have soles that . Wear shoes both inside and outside. Use slippers with good . Do not wear shoes with high heels.    •Wear a personal alarm. This is a device that allows you to call 911 if you fall and need help. Ask your healthcare provider for more information.    •Stay active. Exercise can help strengthen your muscles and improve your balance. Your healthcare provider may recommend water aerobics or walking. He or she may also recommend physical therapy to improve your coordination. Never start an exercise program without talking to your healthcare provider first.    •Manage your medical conditions.  Keep all appointments with your healthcare providers. Visit your eye doctor as directed.      Home safety tips:     Fall Prevention for Adults     •Add items to prevent falls in the bathroom. Put nonslip strips on your bath or shower floor to prevent you from slipping. Use a bath mat if you do not have carpet in the bathroom. This will prevent you from falling when you step out of the bath or shower. Use a shower seat so you do not need to stand while you shower. Sit on the toilet or a chair in your bathroom to dry yourself and put on clothing. This will prevent you from losing your balance from drying or dressing yourself while you are standing.      •Keep paths clear. Remove books, shoes, and other objects from walkways and stairs. Place cords for telephones and lamps out of the way so that you do not need to walk over them. Tape them down if you cannot move them. Remove small rugs. If you cannot remove a rug, secure it with double-sided tape. This will prevent you from tripping.      •Install bright lights in your home. Use night lights to help light paths to the bathroom or kitchen. Always turn on the light before you start walking.  •Keep items you use often on shelves within reach. Do not use a step stool to help you reach an item.  •Paint or place reflective tape on the edges of your stairs. This will help you see the stairs better.      Follow up with your doctor as directed: Write down your questions so you remember to ask them during your visits.

## 2022-08-18 NOTE — ED ADULT TRIAGE NOTE - BSA (M2)
1.86 [FreeTextEntry2] : Unable to obtain 2/2 patient not verbal  [FreeTextEntry3] : Unable to obtain 2/2 patient not verbal  [FreeTextEntry4] : Unable to obtain 2/2 patient not verbal  [FreeTextEntry5] : Unable to obtain 2/2 patient not verbal  [FreeTextEntry6] : Unable to obtain 2/2 patient not verbal  [FreeTextEntry8] : Unable to obtain 2/2 patient not verbal  [FreeTextEntry7] : Unable to obtain 2/2 patient not verbal  [FreeTextEntry9] : Unable to obtain 2/2 patient not verbal  [de-identified] : Unable to obtain 2/2 patient not verbal  [de-identified] : Unable to obtain 2/2 patient not verbal  [de-identified] : Unable to obtain 2/2 patient not verbal  [de-identified] : Unable to obtain 2/2 patient not verbal  [FreeTextEntry1] : Unable to obtain 2/2 patient not verbal

## 2022-08-18 NOTE — ED PROVIDER NOTE - PHYSICAL EXAMINATION
General: well appearing, NAD  Head:  NC, AT scalp. 1.5cm laceration of right eyebrow  Eyes: EOMI, PERRLA  Ears: no erythema/drainage  Nose: midline, no bleeding/drainage  Mouth: no lacerations, no loose teeth  Neck/Back: No c/t/l ttp or step-off  Cardiac: RRR, no m/r/g  Respiratory: CTABL, equal chest wall expansions, no conversational dyspnea  Abdomen: soft, ND, NT  Pelvis: stable  MSK/Vascular: full ROM, distal pulses intact, soft compartments, warm extremities  Neuro: AAOx3, GCS 15, following commands, answering questions appropriately  Psych: calm, cooperative, normal affect

## 2022-08-18 NOTE — ED PROCEDURE NOTE - CPROC ED POST PROC CARE GUIDE1
Verbal/written post procedure instructions were given to patient/caregiver./Instructed patient/caregiver to follow-up with primary care physician./Instructed patient/caregiver regarding signs and symptoms of infection./Keep the cast/splint/dressing clean and dry. Verbal/written post procedure instructions were given to patient/caregiver./Instructed patient/caregiver to follow-up with primary care physician.

## 2022-08-18 NOTE — ASSESSMENT
[FreeTextEntry1] : Preop evaluation: Scheduled for right lower extremity angiogram due to severe PAD\par -Preop labs and EKG reviewed\par -EKG NSR at 81 with no ST abnormalities\par -There is no obvious medical contraindication to proposed procedure.  He may proceed with planned procedure/surgery\par \par Severe PAD with claudication\par -He is having severe pain in his right foot and nonhealing wound of right lower extremity\par -Given severity of pain today I did advise that he go to the emergency room for evaluation as he may need more urgent revascularization procedure.  He states he refuses to go to the emergency room.  He states he had a bad experience in the emergency room and he refuses to go back at this time\par -If his symptoms worsen he should go to the emergency room\par -He is requesting stronger medication for pain because his pain is preventing him from sleeping\par -He reports Motrin, Tylenol, gabapentin are not helping\par -I will prescribe Percocet 10/325 1 tablet every 6 hours as needed (4-day supply given) I STOP reviewed\par -He has an appointment to see vascular surgery this Friday and he will discuss upcoming right lower extremity angiogram and nonhealing wound.  He may benefit from wound care\par -He is currently on Augmentin\par -he is on atorvastatin, plavix, Eliquis, and ASA\par -At this time unclear why he is on Eliquis which was started by vascular surgery\par -I have advised that he discuss Eliquis with vascular surgery to determine if he needs to continue on this medication\par -If he develops worsening pain, redness, discharge, fever/chills he is to go to the emergency room\par \par Carotid stenosis:\par -carotid US showed 50-69% stenosis B/L 2021\par -he is on ASA,  plavix, and atorvastatin\par -He will eventually need follow-up carotid ultrasound\par -Fasting labs ordered-he states he will have done at the lab\par \par Seizure disorder:\par -no recent seizures\par -He remains on Dilantin\par \par Vitamin D Deficiency:\par -Check vitamin D 25-OH level\par \par GERD:\par -on pantoprazole\par \par Dyslipidemia\par -on atorvastatin 80mg PO daily and Lovaza\par -Check fasting labs\par \par Elevated LFTs:\par -Previous abdominal ultrasound was normal\par -His most recent alkaline phosphatase level was 183\par -hepatitis B and C serology was negative\par -seen by GI previously\par -Check labs including GGT\par \par Anxiety/Insomnia/Depression:\par -on Venlafaxine ER 75mg PO daily and seroquel to 200mg PO QHS \par \par HTN:\par -BP at goal today\par -on Amlodipine 10mg Po daily\par \par Pre-DM:\par -Check fasting labs\par \par Anemia\par -Check CBC and iron studies, B12, folic acid level\par \par \par HCM:\par \par CPE 2017\par \par [id number]\par \par Flu shot:\par \par Tdap: 2017\par \par Pneumovax: 11/3/2015\par \par HIV testing offered: pt declined testing 2021\par \par Hep C screening: negative 2015\par \par Colonoscopy: Advised previously.  Will discuss at next visit\par \par FIT testin2021 positive\par \par Depression screenin2021 PHQ 2 score 0\par \par PSA: 2021 0.42-PSA ordered last visit\par \par \par F/U 4 weeks.  Fasting labs ordered last visit and he states he will have done at the lab

## 2022-08-18 NOTE — HISTORY OF PRESENT ILLNESS
[de-identified] : Here today for follow-up and to complete preop evaluation for planned right lower extremity angiogram as per Dr. Lelia Elder.  Date still to be determined.  He has follow-up with vascular surgery this Friday\par \par He denies chest pain, sob, palpitations\par \par No hx of surgical of anesthesia complication\par \par He denies any easy bruising\par \par He reports he is having severe pain in his right foot.  He states for the last 3 days he has not been able to sleep.  He states pain in his toes is severe and sharp and twisting in nature.  He states it feels like somebody is sticking needles in his toes.  He has been using Motrin and Tylenol as needed but he states this is not helping.  He reports he was previously prescribed Percocet by vascular surgery and pain management.  He states this helped a little.  He states he saw pain management once back in June 2022 but has not gone back.  At that time Percocet and Lyrica were prescribed.  He states he is not taking Lyrica.  He states he has been taking gabapentin but it is not helping either.  He states symptoms are severe.  He denies allergy to codeine.\par \par He reports with regards to right lower extremity wound he feels that Augmentin has been helping.  He states he has noted less discharge and redness in the area.  He denies any fever/chills

## 2022-08-18 NOTE — ED PROVIDER NOTE - PATIENT PORTAL LINK FT
You can access the FollowMyHealth Patient Portal offered by [facility] by registering at the following website: [email or web address]. By joining [employer]’s FollowMyHealth portal, you will also be able to view your health information using other applications (apps) compatible with our system.

## 2022-08-18 NOTE — ED PROVIDER NOTE - NS ED ROS FT
HEENT: No HA, no vision changes, +facial trauma  Chest: no chest wall pain, no dyspnea  GI: No abdominal pain, no nausea, no vomiting  Neuro: No LOC, no paresthesias, no weakness  MSK: No msk pain, no swelling  Skin: No abrasions, +lacerations, +ecchymosis  ROS otherwise negative except as noted in HPI

## 2022-08-18 NOTE — ED PROVIDER NOTE - OBJECTIVE STATEMENT
56yo M w/pmh CABG on Plavix and Eliquis presents for head injury 2/2 fall. Reports he rolled out of bed and hit his head on the end table. Denies HA, CP, SOB, n/v, dizziness. Denies LOC, extremity pain.

## 2022-08-18 NOTE — ED PROVIDER NOTE - ATTENDING CONTRIBUTION TO CARE
58yo M w/pmh CABG on Plavix and Eliquis presents for head injury 2/2 fall. Reports he rolled out of bed and hit his head on the end table. Denies HA, CP, SOB, n/v, dizziness. Unsure of last tdap  AP - will get CTH r/o traumatic injury. need suture repair, wound care

## 2022-08-18 NOTE — ED ADULT TRIAGE NOTE - CHIEF COMPLAINT QUOTE
s/p slip out of bed hit head on night table denies loc, on xarelto and plavix. bleeding controlled prior to arrival.

## 2022-08-18 NOTE — REVIEW OF SYSTEMS
[Negative] : Psychiatric [Fever] : no fever [Chills] : no chills [Fatigue] : no fatigue [Chest Pain] : no chest pain [Palpitations] : no palpitations [Lower Ext Edema] : no lower extremity edema [Shortness Of Breath] : no shortness of breath [Wheezing] : no wheezing [Cough] : no cough [Abdominal Pain] : no abdominal pain [Nausea] : no nausea [Diarrhea] : no diarrhea [Vomiting] : no vomiting [Easy Bleeding] : no easy bleeding [Easy Bruising] : no easy bruising [FreeTextEntry9] : see HPI [de-identified] : see HPI

## 2022-08-18 NOTE — ED PROVIDER NOTE - CLINICAL SUMMARY MEDICAL DECISION MAKING FREE TEXT BOX
56yo M w/pmh CABG on Plavix and Eliquis presents for head injury 2/2 fall. CT head pending. Plan for lac repair.

## 2022-08-18 NOTE — ED PROVIDER NOTE - NSICDXPASTSURGICALHX_GEN_ALL_CORE_FT
PAST SURGICAL HISTORY:  H/O endarterectomy L femoral a.  R femoral a. april 2022    Inguinal hernia, left     S/P appendectomy     S/P ORIF (open reduction internal fixation) fracture Left    S/P shoulder surgery right    Status post femorotibial bypass with GSV 5/13/22

## 2022-08-18 NOTE — PHYSICAL EXAM
[No Acute Distress] : no acute distress [Normal Voice/Communication] : normal voice/communication [PERRL] : pupils equal round and reactive to light [EOMI] : extraocular movements intact [Normal Oropharynx] : the oropharynx was normal [Normal] : normal rate, regular rhythm, normal S1 and S2 and no murmur heard [No Edema] : there was no peripheral edema [Soft] : abdomen soft [Non Tender] : non-tender [Non-distended] : non-distended [No Masses] : no abdominal mass palpated [No HSM] : no HSM [Normal Bowel Sounds] : normal bowel sounds [No Rash] : no rash [No Focal Deficits] : no focal deficits [Normal Affect] : the affect was normal [Alert and Oriented x3] : oriented to person, place, and time [Normal Mood] : the mood was normal [Normal Insight/Judgement] : insight and judgment were intact [Normal Sclera/Conjunctiva] : normal sclera/conjunctiva [de-identified] : Right foot with no obvious skin lesions.  Capillary refill less than 3 seconds.  He has tenderness of right toes.  There is no foot swelling.  Very faint DP/PT pulses on right foot [de-identified] : Right lower extremity along medial calf at lower end of previous incision he has a 2 cm x 1 cm open wound.  There is no surrounding erythema.  There is no discharge.  There is no foul odor.

## 2022-08-18 NOTE — ED ADULT NURSE NOTE - OBJECTIVE STATEMENT
56 y/o M A&Ox4 c/c while in bed this morning rolled off bed and hit head on corner of table. Pt stated that it started bleeding. Head is wrapped and bleeding is controlled. Pt stated that he is on Plavix and Xarelto. Priority CT called. Provider at bedside for eval. Safety precautions maintained. VSS.

## 2022-08-19 ENCOUNTER — APPOINTMENT (OUTPATIENT)
Dept: VASCULAR SURGERY | Facility: CLINIC | Age: 57
End: 2022-08-19

## 2022-08-19 VITALS
OXYGEN SATURATION: 95 % | DIASTOLIC BLOOD PRESSURE: 75 MMHG | RESPIRATION RATE: 16 BRPM | TEMPERATURE: 97.3 F | HEART RATE: 98 BPM | SYSTOLIC BLOOD PRESSURE: 115 MMHG

## 2022-08-19 DIAGNOSIS — Z95.820 PERIPHERAL VASCULAR ANGIOPLASTY STATUS WITH IMPLANTS AND GRAFTS: ICD-10-CM

## 2022-08-19 PROCEDURE — 99214 OFFICE O/P EST MOD 30 MIN: CPT

## 2022-08-19 NOTE — PHYSICAL EXAM
[Normal Breath Sounds] : Normal breath sounds [Normal Rate and Rhythm] : normal rate and rhythm [2+] : right 2+ [1+] : left 1+ [0] : left 0 [No Rash or Lesion] : No rash or lesion [Alert] : alert [Oriented to Person] : oriented to person [Oriented to Place] : oriented to place [Oriented to Time] : oriented to time [Calm] : calm [JVD] : no jugular venous distention  [Ankle Swelling (On Exam)] : not present [Varicose Veins Of Lower Extremities] : not present [] : not present [Abdomen Masses] : No abdominal masses [Abdomen Tenderness] : ~T ~M No abdominal tenderness [de-identified] : Well appearing, NAD [de-identified] : NCAT [FreeTextEntry1] : Right foot warm . \par Palpable PT\par All wounds are well healed

## 2022-08-19 NOTE — HISTORY OF PRESENT ILLNESS
[FreeTextEntry1] : 56 year old man, previous heavy smoker with PAD (s/p right SFA stenting in 2017), carotid stenosis.\par Patient is s/p left femoral endarterectomy on 10/1/21\par Patient then had R great toe gangreene s/p right fem-PT bypass with GSV on 5/13/22\par  [de-identified] : Patient complains of persistent right lower extremity pain and right foot pain.\par He was scheduled for RLE angiogram 2 weeks ago, but it was cancelled due to fall out of bed and blunt head injury. CT head x2 negative for ICH. \par Pt now presents to be evaluated and rescheduled for angiogram.

## 2022-08-19 NOTE — ASSESSMENT
[FreeTextEntry1] : 57 year old man, previous heavy smoker with PAD (s/p right SFA stenting in 2017), carotid stenosis presenting with ongoing right foot pain following right femoral endarterectomy on 4/15/22. \par Patient s/p right fem-PT bypass with GSV on 5/13/22 for right great toe dry gangrene\par -Surveillance US performed on previous visit demonstrates a patent bypass with severe stenosis at the proximal anastomosis suggestive of >75% stenosis. \par Risks and benefits of the procedure discussed with patient, all questions answered\par -He has untreated severe ICA stenosis that may require intervention in the future [Arterial/Venous Disease] : arterial/venous disease

## 2022-08-22 RX ORDER — CHLORHEXIDINE GLUCONATE 213 G/1000ML
1 SOLUTION TOPICAL ONCE
Refills: 0 | Status: COMPLETED | OUTPATIENT
Start: 2022-08-23 | End: 2022-08-27

## 2022-08-22 NOTE — H&P PST ADULT - LAST ECHOCARDIOGRAM
9/13/2021 ef 55-60%impaired relaxation pattern of LV diastolic filling; mild thickening of the anterior and posterior mitral valve leaflets; limited in assessment of regional wall motion abnormalities due to poor endocardial visualization.

## 2022-08-22 NOTE — H&P PST ADULT - HISTORY OF PRESENT ILLNESS
57  yr old male presents with history of htn, seizures (last 2021) , high cholesterol  PAD  s/p right SFA stenting in 2017 left leg , carotid stenosis (may require ICA intervention in future) , s/p left femoral endarterectomy 10/1/21, right femoral endarterectomy on 4/15/22  s/p right fem -PT bypass with GSV on 5/13/22 for right great toe dry gangrene . Pt has c/o right foot pain that has burning sensation and has become severe over past week , using cane and wheelchair at times, c/o pain with standing and sitting 10/10 has no relief. states he cannot sleep without pain , fell out of bed last night and hit face on night stand ,  Lower foot and calf discolored red  with right calf incision   scab noted , no drainage . US done july 7/7 /22 demonstrating patent bypass with severe stenosis at the proximal anastomosis suggestive of >75% stenosis. pt is scheduled for RLE angiogram possible intervention with Dr. Lelia Elder on 8/9/22.       Risk Factors for PAD       Age: 57       DM: N/A       Smoking History: N/A       Hyperlipidemia: yes       Hypertension: yes       Family History of PAD: N/A       Known Atherosclerotic Disease (coronary, carotid, subclavian, renal, mesenteric, AAA): yes carotid dz, PAD    Subjective Complaints:        Claudication Nic Class:        Impaired Walking Function: N/A       Ischemic Rest Pain: N/A       Other Non-Joint Related Exertional Lower Extremity Symptoms (not typical of claudication): N/A    Objective Findings:        Lower Extremity Pulses: Unable to palpate DP pulses, doppler pulses noted.       Nonhealing Lower Extremity Wound: N/A       Lower Extremity Gangrene: N/A       Vascular Bruit: N/A       Other Suggestive Lower Extremity Findings (elevation pallor, dependent rubor): N/A    Vascular Testing:        JOSE (Normal/Borderline/Abnormal/Noncompressable): < from: VA Physiol Extremity Lower 3+ Level, BI (04.09.22 @ 13:23) >  IMPRESSION: There is bilateral arterial disease.    The right JOSE of 0.62 indicates moderate disease on the right. The likely   location of the disease is the right femoral-popliteal segment.    The left JOSE of 0.94 indicates subclinical disease on the left.    --- End of Report ---    < end of copied text >         Arterial Dopplers: < from: US Duplex Arterial Lower Ext Ltd, Right (04.09.22 @ 13:21) >  RIGHT:  CFA: Biphasic; 67  Proximal SFA: Biphasic; 49  Mid SFA: Biphasic; 27  Distal SFA: Occluded;  Popliteal: Occluded;  Anterior tibial: Not seen;  Posterior tibial: Monophasic; 49  Peroneal: Not seen;  Dorsalis pedis: Not seen;    IMPRESSION:    Occlusion from the mid SFA to the popliteal with tardus parvus flow in   the posterior tibial artery. The remainder of the below the knee vessels   are not seen.    --- End of Report ---            DIANE ESPINO MD; Attending Radiologist  This document has been electronically signed. Apr 9 2022  3:29PM    < end of copied text >         CTA/MRA: < from: CT Angio Abd Aorta w/run-off w/ IV Cont (04.23.22 @ 20:46) >  IMPRESSION:    1. Interval occlusion of the right superficial femoral artery superior to   the stent. Redemonstration of distal right superficial femoral artery   stent occlusion. Distal reconstitution with 3 vessel runoff to the right   foot which is diffusely attenuated.  2. Interval development of a right inguinal subcutaneous hematoma at the   level of the right common femoral artery.            < end of copied text >    ARTERIAL DUPLEX PERFORMED 7/7/2022: right femoral to posterior tibial artery vein bypass graft with elevated velocities noted in the proximal anastamosis noted at this time. Patent runoff arteries noted.             Medical Management:       Antiplatelets: Aspirin 81, Plavix 75mg        Statin: atorvastatin 80mg        Nic Claudication Classification:        I: Asymptomatic       IIa: Walking > 200 meters (2.5 blocks)       IIb: Walking < 200 meters (2.5 blocks)       III: Rest/nocturnal pain       IV: Necrosis/gangrene    Ankle-Brachial Index:       Noncompressable: > 1.4       Normal: 1.0 to 1.4       Borderline: 0.91 to 0.99       Abnormal: < 0.91

## 2022-08-22 NOTE — H&P PST ADULT - BIRTH SEX
----- Message from Hilary Smith sent at 8/17/2020  1:22 PM CDT -----  Prior Authorization, 8/16/20   Male

## 2022-08-22 NOTE — H&P PST ADULT - ASSESSMENT
57  yr old male presents with history of htn, seizures (last 2021) , high cholesterol  PAD  s/p right SFA stenting in 2017 left leg , carotid stenosis (may require ICA intervention in future) , s/p left femoral endarterectomy 10/1/21, right femoral endarterectomy on 4/15/22  s/p right fem -PT bypass with GSV on 5/13/22 for right great toe dry gangrene . Pt has c/o right foot pain that has burning sensation and has become severe over past week , using cane and wheelchair at times, c/o pain with standing and sitting 10/10 has no relief. states he cannot sleep without pain , fell out of bed last night and hit face on night stand ,  Lower foot and calf discolored red  with right calf incision   scab noted , no drainage . US done july 7/7 /22 demonstrating patent bypass with severe stenosis at the proximal anastomosis suggestive of >75% stenosis. pt is scheduled for RLE angiogram possible intervention with Dr. Lelia Elder on 8/9/22.    Plan: PRE-PROCEDURE ASSESSMENT  peripheral angiogram of RLE critical Limb ischemia   possible thrombectomy possible stent          -Patient seen and examined  -Labs reviewed  -Pre-procedure teaching completed with patient   -Questions answered about patients concerns     -instructed to NPO after midnight.   - Pt instructed to have escort to and from procedure

## 2022-08-23 ENCOUNTER — INPATIENT (INPATIENT)
Facility: HOSPITAL | Age: 57
LOS: 17 days | Discharge: HOME | DRG: 253 | End: 2022-09-10
Attending: SURGERY | Admitting: SURGERY
Payer: COMMERCIAL

## 2022-08-23 ENCOUNTER — APPOINTMENT (OUTPATIENT)
Dept: VASCULAR SURGERY | Facility: HOSPITAL | Age: 57
End: 2022-08-23

## 2022-08-23 ENCOUNTER — NON-APPOINTMENT (OUTPATIENT)
Age: 57
End: 2022-08-23

## 2022-08-23 ENCOUNTER — TRANSCRIPTION ENCOUNTER (OUTPATIENT)
Age: 57
End: 2022-08-23

## 2022-08-23 VITALS
TEMPERATURE: 99 F | DIASTOLIC BLOOD PRESSURE: 86 MMHG | OXYGEN SATURATION: 98 % | RESPIRATION RATE: 17 BRPM | HEART RATE: 77 BPM | SYSTOLIC BLOOD PRESSURE: 190 MMHG

## 2022-08-23 DIAGNOSIS — Z96.7 PRESENCE OF OTHER BONE AND TENDON IMPLANTS: Chronic | ICD-10-CM

## 2022-08-23 DIAGNOSIS — K40.90 UNILATERAL INGUINAL HERNIA, WITHOUT OBSTRUCTION OR GANGRENE, NOT SPECIFIED AS RECURRENT: Chronic | ICD-10-CM

## 2022-08-23 DIAGNOSIS — Z98.890 OTHER SPECIFIED POSTPROCEDURAL STATES: Chronic | ICD-10-CM

## 2022-08-23 DIAGNOSIS — I73.9 PERIPHERAL VASCULAR DISEASE, UNSPECIFIED: ICD-10-CM

## 2022-08-23 DIAGNOSIS — Z98.89 OTHER SPECIFIED POSTPROCEDURAL STATES: Chronic | ICD-10-CM

## 2022-08-23 LAB
A1C WITH ESTIMATED AVERAGE GLUCOSE RESULT: 5.3 % — SIGNIFICANT CHANGE UP (ref 4–5.6)
ALBUMIN SERPL ELPH-MCNC: 4.4 G/DL — SIGNIFICANT CHANGE UP (ref 3.3–5.2)
ALP SERPL-CCNC: 189 U/L — HIGH (ref 40–120)
ALT FLD-CCNC: 13 U/L — SIGNIFICANT CHANGE UP
ANION GAP SERPL CALC-SCNC: 14 MMOL/L — SIGNIFICANT CHANGE UP (ref 5–17)
APTT BLD: >200 SEC — CRITICAL HIGH (ref 27.5–35.5)
AST SERPL-CCNC: 23 U/L — SIGNIFICANT CHANGE UP
BASOPHILS # BLD AUTO: 0.06 K/UL — SIGNIFICANT CHANGE UP (ref 0–0.2)
BASOPHILS NFR BLD AUTO: 0.5 % — SIGNIFICANT CHANGE UP (ref 0–2)
BILIRUB SERPL-MCNC: <0.2 MG/DL — LOW (ref 0.4–2)
BUN SERPL-MCNC: 9.4 MG/DL — SIGNIFICANT CHANGE UP (ref 8–20)
CALCIUM SERPL-MCNC: 9.4 MG/DL — SIGNIFICANT CHANGE UP (ref 8.4–10.5)
CHLORIDE SERPL-SCNC: 102 MMOL/L — SIGNIFICANT CHANGE UP (ref 98–107)
CHOLEST SERPL-MCNC: 217 MG/DL — HIGH
CO2 SERPL-SCNC: 24 MMOL/L — SIGNIFICANT CHANGE UP (ref 22–29)
CREAT SERPL-MCNC: 0.65 MG/DL — SIGNIFICANT CHANGE UP (ref 0.5–1.3)
EGFR: 109 ML/MIN/1.73M2 — SIGNIFICANT CHANGE UP
EOSINOPHIL # BLD AUTO: 0.31 K/UL — SIGNIFICANT CHANGE UP (ref 0–0.5)
EOSINOPHIL NFR BLD AUTO: 2.7 % — SIGNIFICANT CHANGE UP (ref 0–6)
ESTIMATED AVERAGE GLUCOSE: 105 MG/DL — SIGNIFICANT CHANGE UP (ref 68–114)
GLUCOSE SERPL-MCNC: 86 MG/DL — SIGNIFICANT CHANGE UP (ref 70–99)
HCT VFR BLD CALC: 34.6 % — LOW (ref 39–50)
HDLC SERPL-MCNC: 46 MG/DL — SIGNIFICANT CHANGE UP
HGB BLD-MCNC: 11.2 G/DL — LOW (ref 13–17)
IMM GRANULOCYTES NFR BLD AUTO: 0.4 % — SIGNIFICANT CHANGE UP (ref 0–1.5)
INR BLD: 1.12 RATIO — SIGNIFICANT CHANGE UP (ref 0.88–1.16)
LIPID PNL WITH DIRECT LDL SERPL: 128 MG/DL — HIGH
LYMPHOCYTES # BLD AUTO: 18.7 % — SIGNIFICANT CHANGE UP (ref 13–44)
LYMPHOCYTES # BLD AUTO: 2.14 K/UL — SIGNIFICANT CHANGE UP (ref 1–3.3)
MAGNESIUM SERPL-MCNC: 2.1 MG/DL — SIGNIFICANT CHANGE UP (ref 1.6–2.6)
MCHC RBC-ENTMCNC: 27.9 PG — SIGNIFICANT CHANGE UP (ref 27–34)
MCHC RBC-ENTMCNC: 32.4 GM/DL — SIGNIFICANT CHANGE UP (ref 32–36)
MCV RBC AUTO: 86.3 FL — SIGNIFICANT CHANGE UP (ref 80–100)
MONOCYTES # BLD AUTO: 1.03 K/UL — HIGH (ref 0–0.9)
MONOCYTES NFR BLD AUTO: 9 % — SIGNIFICANT CHANGE UP (ref 2–14)
NEUTROPHILS # BLD AUTO: 7.86 K/UL — HIGH (ref 1.8–7.4)
NEUTROPHILS NFR BLD AUTO: 68.7 % — SIGNIFICANT CHANGE UP (ref 43–77)
NON HDL CHOLESTEROL: 171 MG/DL — HIGH
PLATELET # BLD AUTO: 309 K/UL — SIGNIFICANT CHANGE UP (ref 150–400)
POTASSIUM SERPL-MCNC: 4.5 MMOL/L — SIGNIFICANT CHANGE UP (ref 3.5–5.3)
POTASSIUM SERPL-SCNC: 4.5 MMOL/L — SIGNIFICANT CHANGE UP (ref 3.5–5.3)
PROT SERPL-MCNC: 8.1 G/DL — SIGNIFICANT CHANGE UP (ref 6.6–8.7)
PROTHROM AB SERPL-ACNC: 13 SEC — SIGNIFICANT CHANGE UP (ref 10.5–13.4)
RBC # BLD: 4.01 M/UL — LOW (ref 4.2–5.8)
RBC # FLD: 15.7 % — HIGH (ref 10.3–14.5)
SARS-COV-2 RNA SPEC QL NAA+PROBE: SIGNIFICANT CHANGE UP
SODIUM SERPL-SCNC: 140 MMOL/L — SIGNIFICANT CHANGE UP (ref 135–145)
TRIGL SERPL-MCNC: 214 MG/DL — HIGH
WBC # BLD: 11.45 K/UL — HIGH (ref 3.8–10.5)
WBC # FLD AUTO: 11.45 K/UL — HIGH (ref 3.8–10.5)

## 2022-08-23 PROCEDURE — 36246 INS CATH ABD/L-EXT ART 2ND: CPT | Mod: GC

## 2022-08-23 PROCEDURE — 93010 ELECTROCARDIOGRAM REPORT: CPT

## 2022-08-23 PROCEDURE — 76937 US GUIDE VASCULAR ACCESS: CPT | Mod: 26,GC

## 2022-08-23 PROCEDURE — 75625 CONTRAST EXAM ABDOMINL AORTA: CPT | Mod: 26,GC

## 2022-08-23 PROCEDURE — 99152 MOD SED SAME PHYS/QHP 5/>YRS: CPT | Mod: GC

## 2022-08-23 RX ORDER — PANTOPRAZOLE SODIUM 20 MG/1
40 TABLET, DELAYED RELEASE ORAL
Refills: 0 | Status: DISCONTINUED | OUTPATIENT
Start: 2022-08-23 | End: 2022-08-30

## 2022-08-23 RX ORDER — HEPARIN SODIUM 5000 [USP'U]/ML
5000 INJECTION INTRAVENOUS; SUBCUTANEOUS EVERY 6 HOURS
Refills: 0 | Status: DISCONTINUED | OUTPATIENT
Start: 2022-08-23 | End: 2022-08-23

## 2022-08-23 RX ORDER — ASPIRIN/CALCIUM CARB/MAGNESIUM 324 MG
81 TABLET ORAL DAILY
Refills: 0 | Status: DISCONTINUED | OUTPATIENT
Start: 2022-08-23 | End: 2022-08-30

## 2022-08-23 RX ORDER — QUETIAPINE FUMARATE 200 MG/1
200 TABLET, FILM COATED ORAL AT BEDTIME
Refills: 0 | Status: DISCONTINUED | OUTPATIENT
Start: 2022-08-23 | End: 2022-08-30

## 2022-08-23 RX ORDER — ACETAMINOPHEN 500 MG
1000 TABLET ORAL ONCE
Refills: 0 | Status: COMPLETED | OUTPATIENT
Start: 2022-08-23 | End: 2022-08-23

## 2022-08-23 RX ORDER — OMEGA-3 ACID ETHYL ESTERS 1 G
2 CAPSULE ORAL
Refills: 0 | Status: DISCONTINUED | OUTPATIENT
Start: 2022-08-23 | End: 2022-08-30

## 2022-08-23 RX ORDER — ATORVASTATIN CALCIUM 80 MG/1
80 TABLET, FILM COATED ORAL AT BEDTIME
Refills: 0 | Status: DISCONTINUED | OUTPATIENT
Start: 2022-08-23 | End: 2022-08-30

## 2022-08-23 RX ORDER — VENLAFAXINE HCL 75 MG
75 CAPSULE, EXT RELEASE 24 HR ORAL DAILY
Refills: 0 | Status: DISCONTINUED | OUTPATIENT
Start: 2022-08-23 | End: 2022-08-30

## 2022-08-23 RX ORDER — ONDANSETRON 8 MG/1
4 TABLET, FILM COATED ORAL EVERY 6 HOURS
Refills: 0 | Status: DISCONTINUED | OUTPATIENT
Start: 2022-08-23 | End: 2022-08-30

## 2022-08-23 RX ORDER — OXYCODONE HYDROCHLORIDE 5 MG/1
5 TABLET ORAL EVERY 4 HOURS
Refills: 0 | Status: DISCONTINUED | OUTPATIENT
Start: 2022-08-23 | End: 2022-08-23

## 2022-08-23 RX ORDER — AMLODIPINE BESYLATE 2.5 MG/1
10 TABLET ORAL DAILY
Refills: 0 | Status: DISCONTINUED | OUTPATIENT
Start: 2022-08-23 | End: 2022-08-30

## 2022-08-23 RX ORDER — OXYCODONE HYDROCHLORIDE 5 MG/1
10 TABLET ORAL EVERY 4 HOURS
Refills: 0 | Status: DISCONTINUED | OUTPATIENT
Start: 2022-08-23 | End: 2022-08-24

## 2022-08-23 RX ORDER — OXYCODONE HYDROCHLORIDE 5 MG/1
15 TABLET ORAL EVERY 4 HOURS
Refills: 0 | Status: DISCONTINUED | OUTPATIENT
Start: 2022-08-23 | End: 2022-08-24

## 2022-08-23 RX ORDER — ACETAMINOPHEN 500 MG
650 TABLET ORAL EVERY 6 HOURS
Refills: 0 | Status: DISCONTINUED | OUTPATIENT
Start: 2022-08-23 | End: 2022-08-23

## 2022-08-23 RX ORDER — CLOPIDOGREL BISULFATE 75 MG/1
75 TABLET, FILM COATED ORAL ONCE
Refills: 0 | Status: COMPLETED | OUTPATIENT
Start: 2022-08-23 | End: 2022-08-23

## 2022-08-23 RX ORDER — HEPARIN SODIUM 5000 [USP'U]/ML
INJECTION INTRAVENOUS; SUBCUTANEOUS
Qty: 25000 | Refills: 0 | Status: DISCONTINUED | OUTPATIENT
Start: 2022-08-23 | End: 2022-08-23

## 2022-08-23 RX ORDER — HYDROMORPHONE HYDROCHLORIDE 2 MG/ML
0.5 INJECTION INTRAMUSCULAR; INTRAVENOUS; SUBCUTANEOUS EVERY 4 HOURS
Refills: 0 | Status: DISCONTINUED | OUTPATIENT
Start: 2022-08-23 | End: 2022-08-23

## 2022-08-23 RX ORDER — KETOROLAC TROMETHAMINE 30 MG/ML
15 SYRINGE (ML) INJECTION EVERY 6 HOURS
Refills: 0 | Status: DISCONTINUED | OUTPATIENT
Start: 2022-08-23 | End: 2022-08-24

## 2022-08-23 RX ORDER — HEPARIN SODIUM 5000 [USP'U]/ML
1000 INJECTION INTRAVENOUS; SUBCUTANEOUS
Qty: 25000 | Refills: 0 | Status: DISCONTINUED | OUTPATIENT
Start: 2022-08-23 | End: 2022-08-26

## 2022-08-23 RX ORDER — GABAPENTIN 400 MG/1
600 CAPSULE ORAL THREE TIMES A DAY
Refills: 0 | Status: DISCONTINUED | OUTPATIENT
Start: 2022-08-23 | End: 2022-08-25

## 2022-08-23 RX ORDER — HEPARIN SODIUM 5000 [USP'U]/ML
2500 INJECTION INTRAVENOUS; SUBCUTANEOUS EVERY 6 HOURS
Refills: 0 | Status: DISCONTINUED | OUTPATIENT
Start: 2022-08-23 | End: 2022-08-23

## 2022-08-23 RX ORDER — SENNA PLUS 8.6 MG/1
2 TABLET ORAL AT BEDTIME
Refills: 0 | Status: DISCONTINUED | OUTPATIENT
Start: 2022-08-23 | End: 2022-08-30

## 2022-08-23 RX ORDER — ACETAMINOPHEN 500 MG
975 TABLET ORAL EVERY 6 HOURS
Refills: 0 | Status: DISCONTINUED | OUTPATIENT
Start: 2022-08-23 | End: 2022-08-30

## 2022-08-23 RX ORDER — HEPARIN SODIUM 5000 [USP'U]/ML
5000 INJECTION INTRAVENOUS; SUBCUTANEOUS ONCE
Refills: 0 | Status: DISCONTINUED | OUTPATIENT
Start: 2022-08-23 | End: 2022-08-23

## 2022-08-23 RX ORDER — OXYCODONE HYDROCHLORIDE 5 MG/1
10 TABLET ORAL EVERY 4 HOURS
Refills: 0 | Status: DISCONTINUED | OUTPATIENT
Start: 2022-08-23 | End: 2022-08-23

## 2022-08-23 RX ORDER — HYDROMORPHONE HYDROCHLORIDE 2 MG/ML
1 INJECTION INTRAMUSCULAR; INTRAVENOUS; SUBCUTANEOUS EVERY 4 HOURS
Refills: 0 | Status: DISCONTINUED | OUTPATIENT
Start: 2022-08-23 | End: 2022-08-24

## 2022-08-23 RX ORDER — ASPIRIN/CALCIUM CARB/MAGNESIUM 324 MG
81 TABLET ORAL ONCE
Refills: 0 | Status: COMPLETED | OUTPATIENT
Start: 2022-08-23 | End: 2022-08-23

## 2022-08-23 RX ORDER — CLOPIDOGREL BISULFATE 75 MG/1
75 TABLET, FILM COATED ORAL DAILY
Refills: 0 | Status: DISCONTINUED | OUTPATIENT
Start: 2022-08-23 | End: 2022-08-30

## 2022-08-23 RX ORDER — FENTANYL CITRATE 50 UG/ML
25 INJECTION INTRAVENOUS ONCE
Refills: 0 | Status: DISCONTINUED | OUTPATIENT
Start: 2022-08-23 | End: 2022-08-23

## 2022-08-23 RX ADMIN — Medication 81 MILLIGRAM(S): at 15:27

## 2022-08-23 RX ADMIN — Medication 400 MILLIGRAM(S): at 15:27

## 2022-08-23 RX ADMIN — QUETIAPINE FUMARATE 200 MILLIGRAM(S): 200 TABLET, FILM COATED ORAL at 22:33

## 2022-08-23 RX ADMIN — OXYCODONE HYDROCHLORIDE 10 MILLIGRAM(S): 5 TABLET ORAL at 22:04

## 2022-08-23 RX ADMIN — FENTANYL CITRATE 25 MICROGRAM(S): 50 INJECTION INTRAVENOUS at 17:37

## 2022-08-23 RX ADMIN — Medication 75 MILLIGRAM(S): at 22:33

## 2022-08-23 RX ADMIN — HYDROMORPHONE HYDROCHLORIDE 0.5 MILLIGRAM(S): 2 INJECTION INTRAMUSCULAR; INTRAVENOUS; SUBCUTANEOUS at 20:00

## 2022-08-23 RX ADMIN — GABAPENTIN 600 MILLIGRAM(S): 400 CAPSULE ORAL at 22:04

## 2022-08-23 RX ADMIN — HYDROMORPHONE HYDROCHLORIDE 0.5 MILLIGRAM(S): 2 INJECTION INTRAMUSCULAR; INTRAVENOUS; SUBCUTANEOUS at 19:50

## 2022-08-23 RX ADMIN — ATORVASTATIN CALCIUM 80 MILLIGRAM(S): 80 TABLET, FILM COATED ORAL at 22:33

## 2022-08-23 RX ADMIN — HEPARIN SODIUM 10 UNIT(S)/HR: 5000 INJECTION INTRAVENOUS; SUBCUTANEOUS at 22:30

## 2022-08-23 RX ADMIN — Medication 1000 MILLIGRAM(S): at 17:37

## 2022-08-23 RX ADMIN — FENTANYL CITRATE 25 MICROGRAM(S): 50 INJECTION INTRAVENOUS at 14:26

## 2022-08-23 RX ADMIN — CLOPIDOGREL BISULFATE 75 MILLIGRAM(S): 75 TABLET, FILM COATED ORAL at 15:28

## 2022-08-23 NOTE — PROGRESS NOTE ADULT - SUBJECTIVE AND OBJECTIVE BOX
Post- Procedure Note: Lower Extremity Angiogram      Narrative: 57  yr old male presents with history of htn, seizures (last 2021) , high cholesterol  PAD  s/p right SFA stenting in 2017 left leg , carotid stenosis (may require ICA intervention in future) , s/p left femoral endarterectomy 10/1/21, right femoral endarterectomy on 4/15/22  s/p right fem -PT bypass with GSV on 5/13/22 for right great toe dry gangrene . Pt has c/o right foot pain that has burning sensation and has become severe over past week , using cane and wheelchair at times, c/o pain with standing and sitting 10/10 has no relief. states he cannot sleep without pain , fell out of bed last night and hit face on night stand ,  Lower foot and calf discolored red  with right calf incision   scab noted , no drainage . US done july 7/7 /22 demonstrating patent bypass with severe stenosis at the proximal anastomosis suggestive of >75% stenosis. pt is scheduled for RLE angiogram possible intervention with Dr. Lelia Elder on 8/9/22.  57y  Male is now s/p RLE angoigram via LFA with failed attempt to revascularize, unable to pass wire. Arrived to recovery room NAD and hemodynamically stable, increased pain     PAST MEDICAL & SURGICAL HISTORY:  Hypercholesterolemia  Seizures  2021   last seizure  Depression  GERD (gastroesophageal reflux disease)  Hypertension  Peripheral vascular disease  with R SFA stent  Osteoarthritis  Former smoker  Prediabetes  Inguinal hernia, left  S/P ORIF (open reduction internal fixation) fracture  Left  S/P shoulder surgery  right  S/P appendectomy  H/O endarterectomy  L femoral a.  R femoral a. april 2022  Status post femorotibial bypass  with GSV 5/13/22        Home Medications:  amLODIPine 10 mg oral tablet: 1 tab(s) orally once a day (23 Aug 2022 16:49)  apixaban 5 mg oral tablet: 1 tab(s) orally every 12 hours (23 Aug 2022 16:49)  aspirin 81 mg oral tablet, chewable: 1 tab(s) orally once a day (23 Aug 2022 16:49)  atorvastatin 80 mg oral tablet: 1 tab(s) orally once a day (23 Aug 2022 16:49)  Fish Oil 1000 mg oral capsule: 1 cap(s) orally 2 times a day (23 Aug 2022 16:49)  gabapentin 600 mg oral tablet: 1 tab(s) orally 3 times a day (23 Aug 2022 17:39)  pantoprazole 40 mg oral delayed release tablet: 1 tab(s) orally once a day (23 Aug 2022 17:39)  phenytoin 200 mg oral capsule, extended release: 1 cap(s) orally every 12 hours (23 Aug 2022 17:39)  Plavix 75 mg oral tablet: 1 tab(s) orally once a day (23 Aug 2022 17:39)  QUEtiapine 200 mg oral tablet:  orally once a day (at bedtime) (23 Aug 2022 17:39)  venlafaxine 75 mg oral capsule, extended release: 1 cap(s) orally once a day (23 Aug 2022 17:39)        No Known Allergies      Objective:  Vital Signs Last 24 Hrs  T(C): 37.2 (23 Aug 2022 13:46), Max: 37.2 (23 Aug 2022 13:46)  T(F): 98.9 (23 Aug 2022 13:46), Max: 98.9 (23 Aug 2022 13:46)  HR: 77 (23 Aug 2022 13:46) (77 - 77)  BP: 190/86 (23 Aug 2022 13:46) (190/86 - 190/86)  RR: 17 (23 Aug 2022 13:46) (17 - 17)  SpO2: 98% (23 Aug 2022 13:46) (98% - 98%)    Parameters below as of 23 Aug 2022 13:46  Patient On (Oxygen Delivery Method): room air        GENERAL: Pt lying comfortably, NAD.  ENMT: PERRL, +EOMI.  NECK: soft, Supple, No JVD,   CHEST/LUNG: Clear to auscultation bilaterally; No wheezing.  HEART: S1S2+, Regular rate and rhythm; No murmurs.  ABDOMEN: Soft, Nontender, Nondistended; Bowel sounds present.  MUSCULOSKELETAL: Normal range of motion.  SKIN: No rashes or lesions.  NEURO: AAOX3, no focal deficits, no motor r sensory loss.  PSYCH: normal mood.  Procedure site: .........., no signs of bleeding or hematoma, neurovascular intact   VASCULAR:   Radial +2 R/+2 L  Femoral +2 R/+2 L  PT +2 R/+2 L  DP +2 R/+2 L                          11.2   11.45 )-----------( 309      ( 23 Aug 2022 13:58 )             34.6     08-23    140  |  102  |  9.4  ----------------------------<  86  4.5   |  24.0  |  0.65    Ca    9.4      23 Aug 2022 13:58  Mg     2.1     08-23    TPro  8.1  /  Alb  4.4  /  TBili  <0.2<L>  /  DBili  x   /  AST  23  /  ALT  13  /  AlkPhos  189<H>  08-23                                                                                                                                  Post- Procedure Note: Lower Extremity Angiogram      Narrative: 57  yr old male presents with history of htn, seizures (last 2021) , high cholesterol  PAD  s/p right SFA stenting in 2017 left leg , carotid stenosis (may require ICA intervention in future) , s/p left femoral endarterectomy 10/1/21, right femoral endarterectomy on 4/15/22  s/p right fem -PT bypass with GSV on 5/13/22 for right great toe dry gangrene . Pt has c/o right foot pain that has burning sensation and has become severe over past week , using cane and wheelchair at times, c/o pain with standing and sitting 10/10 has no relief. states he cannot sleep without pain , fell out of bed last night and hit face on night stand ,  Lower foot and calf discolored red  with right calf incision   scab noted , no drainage . US done july 7/7 /22 demonstrating patent bypass with severe stenosis at the proximal anastomosis suggestive of >75% stenosis. pt is scheduled for RLE angiogram possible intervention with Dr. Lelia Elder on 8/9/22.  57y  Male is now s/p RLE angoigram via LFA with failed attempt to revascularize, unable to pass wire. Arrived to recovery room NAD and hemodynamically stable, increased pain     PAST MEDICAL & SURGICAL HISTORY:  Hypercholesterolemia  Seizures  2021   last seizure  Depression  GERD (gastroesophageal reflux disease)  Hypertension  Peripheral vascular disease  with R SFA stent  Osteoarthritis  Former smoker  Prediabetes  Inguinal hernia, left  S/P ORIF (open reduction internal fixation) fracture  Left  S/P shoulder surgery  right  S/P appendectomy  H/O endarterectomy  L femoral a.  R femoral a. april 2022  Status post femorotibial bypass  with GSV 5/13/22        Home Medications:  amLODIPine 10 mg oral tablet: 1 tab(s) orally once a day (23 Aug 2022 16:49)  apixaban 5 mg oral tablet: 1 tab(s) orally every 12 hours (23 Aug 2022 16:49)  aspirin 81 mg oral tablet, chewable: 1 tab(s) orally once a day (23 Aug 2022 16:49)  atorvastatin 80 mg oral tablet: 1 tab(s) orally once a day (23 Aug 2022 16:49)  Fish Oil 1000 mg oral capsule: 1 cap(s) orally 2 times a day (23 Aug 2022 16:49)  gabapentin 600 mg oral tablet: 1 tab(s) orally 3 times a day (23 Aug 2022 17:39)  pantoprazole 40 mg oral delayed release tablet: 1 tab(s) orally once a day (23 Aug 2022 17:39)  phenytoin 200 mg oral capsule, extended release: 1 cap(s) orally every 12 hours (23 Aug 2022 17:39)  Plavix 75 mg oral tablet: 1 tab(s) orally once a day (23 Aug 2022 17:39)  QUEtiapine 200 mg oral tablet:  orally once a day (at bedtime) (23 Aug 2022 17:39)  venlafaxine 75 mg oral capsule, extended release: 1 cap(s) orally once a day (23 Aug 2022 17:39)        No Known Allergies      Objective:  Vital Signs Last 24 Hrs  T(C): 37.2 (23 Aug 2022 13:46), Max: 37.2 (23 Aug 2022 13:46)  T(F): 98.9 (23 Aug 2022 13:46), Max: 98.9 (23 Aug 2022 13:46)  HR: 77 (23 Aug 2022 13:46) (77 - 77)  BP: 190/86 (23 Aug 2022 13:46) (190/86 - 190/86)  RR: 17 (23 Aug 2022 13:46) (17 - 17)  SpO2: 98% (23 Aug 2022 13:46) (98% - 98%)    Parameters below as of 23 Aug 2022 13:46  Patient On (Oxygen Delivery Method): room air        GENERAL: Pt lying comfortably, NAD.  ENMT: PERRL, +EOMI.  NECK: soft, Supple, No JVD,   CHEST/LUNG: Clear to auscultation bilaterally; No wheezing.  HEART: S1S2+, Regular rate and rhythm; No murmurs.  ABDOMEN: Soft, Nontender, Nondistended; Bowel sounds present.  MUSCULOSKELETAL: Normal range of motion.  SKIN: No rashes or lesions.  NEURO: AAOX3, no focal deficits, no motor r sensory loss.  PSYCH: normal mood.  Procedure site: LFA, no signs of bleeding or hematoma, neurovascular intact   VASCULAR:   Radial +2 R/+2 L  Femoral +2 R/+2 L  PT absent right/+doppler L  DP absent right/ +doppler L                          11.2   11.45 )-----------( 309      ( 23 Aug 2022 13:58 )             34.6     08-23    140  |  102  |  9.4  ----------------------------<  86  4.5   |  24.0  |  0.65    Ca    9.4      23 Aug 2022 13:58  Mg     2.1     08-23    TPro  8.1  /  Alb  4.4  /  TBili  <0.2<L>  /  DBili  x   /  AST  23  /  ALT  13  /  AlkPhos  189<H>  08-23

## 2022-08-23 NOTE — ASU PATIENT PROFILE, ADULT - FALL HARM RISK - HARM RISK INTERVENTIONS

## 2022-08-23 NOTE — BRIEF OPERATIVE NOTE - OPERATION/FINDINGS
left inguinal access, right lower extremity angiogram, right SFA found to be occluded with no contrast seen distal to right SFA stent and wire was not able to be passed, mynx inserted in left inguinal access site, pressure was held for 3 min, hemostasis successfully obtained left inguinal access, right lower extremity angiogram, right fem-PT bypass found to be occluded and wire was not able to be passed, mynx inserted in left inguinal access site, pressure was held for 3 min, hemostasis successfully obtained

## 2022-08-23 NOTE — PROGRESS NOTE ADULT - ASSESSMENT
Patient is a 57y old  Male who presents with a chief complaint of I have pain to my right toes (22 Aug 2022 19:00)    57y  Male is now s/p RLE angoigram via LFA with failed attempt to revascularize, unable to pass wire. Arrived to recovery room NAD and hemodynamically stable, increased pain   Intraprocedurally:  Findings:   Post Cath EKG:    -ADMIT due to: / Pt is already in-patient  -post cardiac cath orders  -radial or groin precautions  -bedrest x hours post procedure  -EKG post cath  -labs and EKG in am  -NS 0.9% 250ml/hr x 1 bolus: post procedure LEXIE ppx   -continue current medical therapy  -Dual anti platelet therapy with aspirin/ plavix (brilinta), reinforced importance of strict adherence to DAPT   -statin therapy  -beta blocker  -follow up outpt in 2 weeks with Cardiologist  -[facility] following during hospitalization  -Lifestyle modifications discussed to reduce cardiovascular risk factors including weight reduction, smoking cessation, medication compliance, and routine follow up with Cardiologist to track your BMI, cholesterol, and glucose levels.   - .rehab  -Management per Hospitalist / ICU  -Discharge in am if overnight tele, EKG, labs in am all remain WNL   Patient is a 57y old  Male who presents with a chief complaint of I have pain to my right toes (22 Aug 2022 19:00)    57y  Male is now s/p RLE angoigram via LFA with failed attempt to revascularize, unable to pass wire. Arrived to recovery room NAD and hemodynamically stable, increased pain   Intraprocedurally: Pt rec'd IV sedation, heparin 6,000 units, and visipaque 50ml  Findings: right SFA found to be occluded with no contrast seen distal to right SFA stent and wire was not able to be passed, mynx inserted in left inguinal access site, pressure was held for 3 min, hemostasis successfully obtained. Plan for 2nd bypass    -ADMIT to vascular due to limb ischemia  -groin precautions  -keep supine until 1900  -bedrest x hours post procedure  -continue current medical therapy  -further management per vascular surgery     Discussed with Dr. Elder and spoke to Vascular PA, Franko Multani

## 2022-08-24 LAB
ANION GAP SERPL CALC-SCNC: 13 MMOL/L — SIGNIFICANT CHANGE UP (ref 5–17)
APTT BLD: 53.1 SEC — HIGH (ref 27.5–35.5)
APTT BLD: 63.4 SEC — HIGH (ref 27.5–35.5)
APTT BLD: 73.6 SEC — HIGH (ref 27.5–35.5)
BASOPHILS # BLD AUTO: 0.05 K/UL — SIGNIFICANT CHANGE UP (ref 0–0.2)
BASOPHILS NFR BLD AUTO: 0.6 % — SIGNIFICANT CHANGE UP (ref 0–2)
BUN SERPL-MCNC: 16.3 MG/DL — SIGNIFICANT CHANGE UP (ref 8–20)
CALCIUM SERPL-MCNC: 8.8 MG/DL — SIGNIFICANT CHANGE UP (ref 8.4–10.5)
CHLORIDE SERPL-SCNC: 102 MMOL/L — SIGNIFICANT CHANGE UP (ref 98–107)
CO2 SERPL-SCNC: 24 MMOL/L — SIGNIFICANT CHANGE UP (ref 22–29)
CREAT SERPL-MCNC: 1.01 MG/DL — SIGNIFICANT CHANGE UP (ref 0.5–1.3)
EGFR: 86 ML/MIN/1.73M2 — SIGNIFICANT CHANGE UP
EOSINOPHIL # BLD AUTO: 0.26 K/UL — SIGNIFICANT CHANGE UP (ref 0–0.5)
EOSINOPHIL NFR BLD AUTO: 2.9 % — SIGNIFICANT CHANGE UP (ref 0–6)
GLUCOSE SERPL-MCNC: 99 MG/DL — SIGNIFICANT CHANGE UP (ref 70–99)
HCT VFR BLD CALC: 29.1 % — LOW (ref 39–50)
HGB BLD-MCNC: 9.2 G/DL — LOW (ref 13–17)
IMM GRANULOCYTES NFR BLD AUTO: 0.3 % — SIGNIFICANT CHANGE UP (ref 0–1.5)
LYMPHOCYTES # BLD AUTO: 2.34 K/UL — SIGNIFICANT CHANGE UP (ref 1–3.3)
LYMPHOCYTES # BLD AUTO: 25.8 % — SIGNIFICANT CHANGE UP (ref 13–44)
MCHC RBC-ENTMCNC: 27.8 PG — SIGNIFICANT CHANGE UP (ref 27–34)
MCHC RBC-ENTMCNC: 31.6 GM/DL — LOW (ref 32–36)
MCV RBC AUTO: 87.9 FL — SIGNIFICANT CHANGE UP (ref 80–100)
MONOCYTES # BLD AUTO: 1.09 K/UL — HIGH (ref 0–0.9)
MONOCYTES NFR BLD AUTO: 12 % — SIGNIFICANT CHANGE UP (ref 2–14)
NEUTROPHILS # BLD AUTO: 5.3 K/UL — SIGNIFICANT CHANGE UP (ref 1.8–7.4)
NEUTROPHILS NFR BLD AUTO: 58.4 % — SIGNIFICANT CHANGE UP (ref 43–77)
PLATELET # BLD AUTO: 242 K/UL — SIGNIFICANT CHANGE UP (ref 150–400)
POTASSIUM SERPL-MCNC: 4.2 MMOL/L — SIGNIFICANT CHANGE UP (ref 3.5–5.3)
POTASSIUM SERPL-SCNC: 4.2 MMOL/L — SIGNIFICANT CHANGE UP (ref 3.5–5.3)
RBC # BLD: 3.31 M/UL — LOW (ref 4.2–5.8)
RBC # FLD: 15.9 % — HIGH (ref 10.3–14.5)
SODIUM SERPL-SCNC: 139 MMOL/L — SIGNIFICANT CHANGE UP (ref 135–145)
WBC # BLD: 9.07 K/UL — SIGNIFICANT CHANGE UP (ref 3.8–10.5)
WBC # FLD AUTO: 9.07 K/UL — SIGNIFICANT CHANGE UP (ref 3.8–10.5)

## 2022-08-24 PROCEDURE — 99232 SBSQ HOSP IP/OBS MODERATE 35: CPT | Mod: GC

## 2022-08-24 RX ORDER — ERYTHROPOIETIN 10000 [IU]/ML
20000 INJECTION, SOLUTION INTRAVENOUS; SUBCUTANEOUS ONCE
Refills: 0 | Status: DISCONTINUED | OUTPATIENT
Start: 2022-08-24 | End: 2022-08-24

## 2022-08-24 RX ORDER — METHOCARBAMOL 500 MG/1
750 TABLET, FILM COATED ORAL THREE TIMES A DAY
Refills: 0 | Status: DISCONTINUED | OUTPATIENT
Start: 2022-08-24 | End: 2022-08-30

## 2022-08-24 RX ORDER — ERYTHROPOIETIN 10000 [IU]/ML
20000 INJECTION, SOLUTION INTRAVENOUS; SUBCUTANEOUS ONCE
Refills: 0 | Status: COMPLETED | OUTPATIENT
Start: 2022-08-24 | End: 2022-08-24

## 2022-08-24 RX ORDER — FERROUS SULFATE 325(65) MG
325 TABLET ORAL DAILY
Refills: 0 | Status: DISCONTINUED | OUTPATIENT
Start: 2022-08-24 | End: 2022-08-26

## 2022-08-24 RX ORDER — HYDROMORPHONE HYDROCHLORIDE 2 MG/ML
2 INJECTION INTRAMUSCULAR; INTRAVENOUS; SUBCUTANEOUS EVERY 4 HOURS
Refills: 0 | Status: DISCONTINUED | OUTPATIENT
Start: 2022-08-24 | End: 2022-08-25

## 2022-08-24 RX ORDER — SODIUM CHLORIDE 9 MG/ML
1000 INJECTION INTRAMUSCULAR; INTRAVENOUS; SUBCUTANEOUS
Refills: 0 | Status: DISCONTINUED | OUTPATIENT
Start: 2022-08-24 | End: 2022-08-26

## 2022-08-24 RX ORDER — POLYETHYLENE GLYCOL 3350 17 G/17G
17 POWDER, FOR SOLUTION ORAL
Refills: 0 | Status: DISCONTINUED | OUTPATIENT
Start: 2022-08-24 | End: 2022-08-30

## 2022-08-24 RX ORDER — HYDROMORPHONE HYDROCHLORIDE 2 MG/ML
4 INJECTION INTRAMUSCULAR; INTRAVENOUS; SUBCUTANEOUS EVERY 4 HOURS
Refills: 0 | Status: DISCONTINUED | OUTPATIENT
Start: 2022-08-24 | End: 2022-08-25

## 2022-08-24 RX ADMIN — OXYCODONE HYDROCHLORIDE 15 MILLIGRAM(S): 5 TABLET ORAL at 13:14

## 2022-08-24 RX ADMIN — PANTOPRAZOLE SODIUM 40 MILLIGRAM(S): 20 TABLET, DELAYED RELEASE ORAL at 05:25

## 2022-08-24 RX ADMIN — Medication 975 MILLIGRAM(S): at 01:28

## 2022-08-24 RX ADMIN — QUETIAPINE FUMARATE 200 MILLIGRAM(S): 200 TABLET, FILM COATED ORAL at 21:45

## 2022-08-24 RX ADMIN — HEPARIN SODIUM 10 UNIT(S)/HR: 5000 INJECTION INTRAVENOUS; SUBCUTANEOUS at 07:40

## 2022-08-24 RX ADMIN — Medication 2 GRAM(S): at 17:35

## 2022-08-24 RX ADMIN — Medication 15 MILLIGRAM(S): at 13:03

## 2022-08-24 RX ADMIN — ERYTHROPOIETIN 20000 UNIT(S): 10000 INJECTION, SOLUTION INTRAVENOUS; SUBCUTANEOUS at 12:56

## 2022-08-24 RX ADMIN — POLYETHYLENE GLYCOL 3350 17 GRAM(S): 17 POWDER, FOR SOLUTION ORAL at 17:36

## 2022-08-24 RX ADMIN — AMLODIPINE BESYLATE 10 MILLIGRAM(S): 2.5 TABLET ORAL at 12:53

## 2022-08-24 RX ADMIN — Medication 15 MILLIGRAM(S): at 12:54

## 2022-08-24 RX ADMIN — Medication 2 GRAM(S): at 05:24

## 2022-08-24 RX ADMIN — Medication 81 MILLIGRAM(S): at 12:53

## 2022-08-24 RX ADMIN — HEPARIN SODIUM 10 UNIT(S)/HR: 5000 INJECTION INTRAVENOUS; SUBCUTANEOUS at 18:45

## 2022-08-24 RX ADMIN — GABAPENTIN 600 MILLIGRAM(S): 400 CAPSULE ORAL at 05:25

## 2022-08-24 RX ADMIN — Medication 975 MILLIGRAM(S): at 12:55

## 2022-08-24 RX ADMIN — Medication 975 MILLIGRAM(S): at 06:00

## 2022-08-24 RX ADMIN — Medication 15 MILLIGRAM(S): at 06:00

## 2022-08-24 RX ADMIN — Medication 325 MILLIGRAM(S): at 13:13

## 2022-08-24 RX ADMIN — Medication 15 MILLIGRAM(S): at 02:00

## 2022-08-24 RX ADMIN — Medication 15 MILLIGRAM(S): at 17:40

## 2022-08-24 RX ADMIN — SODIUM CHLORIDE 100 MILLILITER(S): 9 INJECTION INTRAMUSCULAR; INTRAVENOUS; SUBCUTANEOUS at 21:45

## 2022-08-24 RX ADMIN — Medication 975 MILLIGRAM(S): at 17:35

## 2022-08-24 RX ADMIN — GABAPENTIN 600 MILLIGRAM(S): 400 CAPSULE ORAL at 13:12

## 2022-08-24 RX ADMIN — ATORVASTATIN CALCIUM 80 MILLIGRAM(S): 80 TABLET, FILM COATED ORAL at 21:44

## 2022-08-24 RX ADMIN — Medication 15 MILLIGRAM(S): at 01:28

## 2022-08-24 RX ADMIN — OXYCODONE HYDROCHLORIDE 15 MILLIGRAM(S): 5 TABLET ORAL at 13:12

## 2022-08-24 RX ADMIN — METHOCARBAMOL 750 MILLIGRAM(S): 500 TABLET, FILM COATED ORAL at 21:44

## 2022-08-24 RX ADMIN — Medication 75 MILLIGRAM(S): at 12:55

## 2022-08-24 RX ADMIN — Medication 975 MILLIGRAM(S): at 13:03

## 2022-08-24 RX ADMIN — Medication 975 MILLIGRAM(S): at 23:38

## 2022-08-24 RX ADMIN — Medication 975 MILLIGRAM(S): at 02:59

## 2022-08-24 RX ADMIN — Medication 15 MILLIGRAM(S): at 17:33

## 2022-08-24 RX ADMIN — Medication 15 MILLIGRAM(S): at 05:25

## 2022-08-24 RX ADMIN — Medication 975 MILLIGRAM(S): at 17:40

## 2022-08-24 RX ADMIN — Medication 200 MILLIGRAM(S): at 05:24

## 2022-08-24 RX ADMIN — GABAPENTIN 600 MILLIGRAM(S): 400 CAPSULE ORAL at 21:44

## 2022-08-24 RX ADMIN — Medication 200 MILLIGRAM(S): at 17:33

## 2022-08-24 RX ADMIN — SODIUM CHLORIDE 100 MILLILITER(S): 9 INJECTION INTRAMUSCULAR; INTRAVENOUS; SUBCUTANEOUS at 17:28

## 2022-08-24 RX ADMIN — OXYCODONE HYDROCHLORIDE 10 MILLIGRAM(S): 5 TABLET ORAL at 06:00

## 2022-08-24 RX ADMIN — CLOPIDOGREL BISULFATE 75 MILLIGRAM(S): 75 TABLET, FILM COATED ORAL at 12:54

## 2022-08-24 RX ADMIN — Medication 975 MILLIGRAM(S): at 05:27

## 2022-08-24 NOTE — PROGRESS NOTE ADULT - SUBJECTIVE AND OBJECTIVE BOX
Subjective: Patient seen and examined at bedside. Pain is better controlled. No events o/n.       MEDICATIONS  (STANDING):  acetaminophen     Tablet .. 975 milliGRAM(s) Oral every 6 hours  amLODIPine   Tablet 10 milliGRAM(s) Oral daily  aspirin  chewable 81 milliGRAM(s) Oral daily  atorvastatin 80 milliGRAM(s) Oral at bedtime  chlorhexidine 4% Liquid 1 Application(s) Topical Once  clopidogrel Tablet 75 milliGRAM(s) Oral daily  epoetin nancy-epbx (RETACRIT) Injectable 45000 Unit(s) SubCutaneous once  ferrous    sulfate 325 milliGRAM(s) Oral daily  gabapentin 600 milliGRAM(s) Oral three times a day  heparin  Infusion 1000 Unit(s)/Hr (10 mL/Hr) IV Continuous <Continuous>  ketorolac   Injectable 15 milliGRAM(s) IV Push every 6 hours  omega-3-Acid Ethyl Esters 2 Gram(s) Oral two times a day  pantoprazole    Tablet 40 milliGRAM(s) Oral before breakfast  phenytoin   Capsule 200 milliGRAM(s) Oral every 12 hours  polyethylene glycol 3350 17 Gram(s) Oral two times a day  QUEtiapine 200 milliGRAM(s) Oral at bedtime  venlafaxine XR. 75 milliGRAM(s) Oral daily    MEDICATIONS  (PRN):  aluminum hydroxide/magnesium hydroxide/simethicone Suspension 30 milliLiter(s) Oral every 8 hours PRN Dyspepsia  HYDROmorphone  Injectable 1 milliGRAM(s) IV Push every 4 hours PRN breakthrough  ondansetron Injectable 4 milliGRAM(s) IV Push every 6 hours PRN Nausea  oxyCODONE    IR 10 milliGRAM(s) Oral every 4 hours PRN Moderate Pain (4 - 6)  oxyCODONE    IR 15 milliGRAM(s) Oral every 4 hours PRN Severe Pain (7 - 10)  senna 2 Tablet(s) Oral at bedtime PRN Constipation      No Known Allergies        Objective:     ICU Vital Signs Last 24 Hrs  T(C): 36.5 (24 Aug 2022 04:19), Max: 37.2 (23 Aug 2022 13:46)  T(F): 97.7 (24 Aug 2022 04:19), Max: 98.9 (23 Aug 2022 13:46)  HR: 82 (24 Aug 2022 04:19) (68 - 94)  BP: 107/71 (24 Aug 2022 04:19) (107/71 - 190/86)  BP(mean): --  ABP: --  ABP(mean): --  RR: 18 (24 Aug 2022 04:19) (17 - 18)  SpO2: 94% (24 Aug 2022 04:19) (94% - 98%)    O2 Parameters below as of 24 Aug 2022 04:19  Patient On (Oxygen Delivery Method): room air            I&O's Detail    23 Aug 2022 07:01  -  24 Aug 2022 07:00  --------------------------------------------------------  IN:  Total IN: 0 mL    OUT:    Voided (mL): 1150 mL  Total OUT: 1150 mL    Total NET: -1150 mL          Physical Exam:  General: NAD. Lying comfortably in bed.   HEENT: NCAT. Neck supple. EOMI.   Respiratory: Non labored breathing. No respiratory distress.   Cardio: RRR  Abdomen: Soft, non-tender, non-distended. No guarding or rebound.   Extremities: No clubbing or cyanosis. R foot great toe gangrene.   Right DP: 0        Left DP: +2  Right PT: 0         Left PT: +2  Musculoskeletal: No obvious bony masses or joint pain   Neuro: A&O x 3. CNs II-XII grossly inatct.                           9.2    9.07  )-----------( 242      ( 24 Aug 2022 04:15 )             29.1       08-24    139  |  102  |  16.3  ----------------------------<  99  4.2   |  24.0  |  1.01    Ca    8.8      24 Aug 2022 04:15  Mg     2.1     08-23    TPro  8.1  /  Alb  4.4  /  TBili  <0.2<L>  /  DBili  x   /  AST  23  /  ALT  13  /  AlkPhos  189<H>  08-23      LIVER FUNCTIONS - ( 23 Aug 2022 13:58 )  Alb: 4.4 g/dL / Pro: 8.1 g/dL / ALK PHOS: 189 U/L / ALT: 13 U/L / AST: 23 U/L / GGT: x              Subjective: Patient seen and examined at bedside. Pain is better controlled. No events o/n.       MEDICATIONS  (STANDING):  acetaminophen     Tablet .. 975 milliGRAM(s) Oral every 6 hours  amLODIPine   Tablet 10 milliGRAM(s) Oral daily  aspirin  chewable 81 milliGRAM(s) Oral daily  atorvastatin 80 milliGRAM(s) Oral at bedtime  chlorhexidine 4% Liquid 1 Application(s) Topical Once  clopidogrel Tablet 75 milliGRAM(s) Oral daily  epoetin nancy-epbx (RETACRIT) Injectable 87083 Unit(s) SubCutaneous once  ferrous    sulfate 325 milliGRAM(s) Oral daily  gabapentin 600 milliGRAM(s) Oral three times a day  heparin  Infusion 1000 Unit(s)/Hr (10 mL/Hr) IV Continuous <Continuous>  ketorolac   Injectable 15 milliGRAM(s) IV Push every 6 hours  omega-3-Acid Ethyl Esters 2 Gram(s) Oral two times a day  pantoprazole    Tablet 40 milliGRAM(s) Oral before breakfast  phenytoin   Capsule 200 milliGRAM(s) Oral every 12 hours  polyethylene glycol 3350 17 Gram(s) Oral two times a day  QUEtiapine 200 milliGRAM(s) Oral at bedtime  venlafaxine XR. 75 milliGRAM(s) Oral daily    MEDICATIONS  (PRN):  aluminum hydroxide/magnesium hydroxide/simethicone Suspension 30 milliLiter(s) Oral every 8 hours PRN Dyspepsia  HYDROmorphone  Injectable 1 milliGRAM(s) IV Push every 4 hours PRN breakthrough  ondansetron Injectable 4 milliGRAM(s) IV Push every 6 hours PRN Nausea  oxyCODONE    IR 10 milliGRAM(s) Oral every 4 hours PRN Moderate Pain (4 - 6)  oxyCODONE    IR 15 milliGRAM(s) Oral every 4 hours PRN Severe Pain (7 - 10)  senna 2 Tablet(s) Oral at bedtime PRN Constipation      No Known Allergies        Objective:     ICU Vital Signs Last 24 Hrs  T(C): 36.5 (24 Aug 2022 04:19), Max: 37.2 (23 Aug 2022 13:46)  T(F): 97.7 (24 Aug 2022 04:19), Max: 98.9 (23 Aug 2022 13:46)  HR: 82 (24 Aug 2022 04:19) (68 - 94)  BP: 107/71 (24 Aug 2022 04:19) (107/71 - 190/86)  BP(mean): --  ABP: --  ABP(mean): --  RR: 18 (24 Aug 2022 04:19) (17 - 18)  SpO2: 94% (24 Aug 2022 04:19) (94% - 98%)    O2 Parameters below as of 24 Aug 2022 04:19  Patient On (Oxygen Delivery Method): room air            I&O's Detail    23 Aug 2022 07:01  -  24 Aug 2022 07:00  --------------------------------------------------------  IN:  Total IN: 0 mL    OUT:    Voided (mL): 1150 mL  Total OUT: 1150 mL    Total NET: -1150 mL          Physical Exam:  General: NAD. Lying comfortably in bed.   HEENT: NCAT. Neck supple. EOMI.   Respiratory: Non labored breathing. No respiratory distress.   Cardio: RRR  Abdomen: Soft, non-tender, non-distended. No guarding or rebound.   Extremities: No clubbing or cyanosis. R foot great toe gangrene. R shin ulcer.   Right DP: 0        Left DP: +2  Right PT: 0         Left PT: +2  Musculoskeletal: No obvious bony masses or joint pain   Neuro: A&O x 3. CNs II-XII grossly inatct.                           9.2    9.07  )-----------( 242      ( 24 Aug 2022 04:15 )             29.1       08-24    139  |  102  |  16.3  ----------------------------<  99  4.2   |  24.0  |  1.01    Ca    8.8      24 Aug 2022 04:15  Mg     2.1     08-23    TPro  8.1  /  Alb  4.4  /  TBili  <0.2<L>  /  DBili  x   /  AST  23  /  ALT  13  /  AlkPhos  189<H>  08-23      LIVER FUNCTIONS - ( 23 Aug 2022 13:58 )  Alb: 4.4 g/dL / Pro: 8.1 g/dL / ALK PHOS: 189 U/L / ALT: 13 U/L / AST: 23 U/L / GGT: x

## 2022-08-24 NOTE — CONSULT NOTE ADULT - SUBJECTIVE AND OBJECTIVE BOX
CC: foot pain    HPI: per chart review:  57  yr old male presents with history of htn, seizures (last 2021) , high cholesterol  PAD  s/p right SFA stenting in 2017 left leg , carotid stenosis (may require ICA intervention in future) , s/p left femoral endarterectomy 10/1/21, right femoral endarterectomy on 4/15/22  s/p right fem -PT bypass with GSV on 5/13/22 for right great toe dry gangrene . Pt has c/o right foot pain that has burning sensation and has become severe over past week , using cane and wheelchair at times, c/o pain with standing and sitting 10/10 has no relief. states he cannot sleep without pain , fell out of bed last night and hit face on night stand ,  Lower foot and calf discolored red  with right calf incision   scab noted , no drainage . US done july 7/7 /22 demonstrating patent bypass with severe stenosis at the proximal anastomosis suggestive of >75% stenosis. pt is scheduled for RLE angiogram possible intervention with Dr. Lelia Elder on 8/9/22.    Interval Hx:  Patient seen during rounds  Patient reports pain to be mod controlled on current medications  endorses only an hr relief with oxy po  denies any outpt opioid use  due for OR next tues  Patient denies sedation with medications       Analgesic Dosing for past 24 hours reviewed as below:    acetaminophen     Tablet ..   975 milliGRAM(s) Oral (08-24-22 @ 05:27)   975 milliGRAM(s) Oral (08-24-22 @ 01:28)    acetaminophen   IVPB ..   400 mL/Hr IV Intermittent (08-23-22 @ 15:27)    fentaNYL    Injectable   25 MICROGram(s) IV Push (08-23-22 @ 14:26)    gabapentin   600 milliGRAM(s) Oral (08-24-22 @ 05:25)   600 milliGRAM(s) Oral (08-23-22 @ 22:04)    HYDROmorphone  Injectable   0.5 milliGRAM(s) IV Push (08-23-22 @ 19:50)    ketorolac   Injectable   15 milliGRAM(s) IV Push (08-24-22 @ 05:25)   15 milliGRAM(s) IV Push (08-24-22 @ 01:28)    oxyCODONE    IR   10 milliGRAM(s) Oral (08-23-22 @ 22:04)    phenytoin   Capsule   200 milliGRAM(s) Oral (08-24-22 @ 05:24)    QUEtiapine   200 milliGRAM(s) Oral (08-23-22 @ 22:33)    venlafaxine XR.   75 milliGRAM(s) Oral (08-23-22 @ 22:33)          T(C): 36.5 (08-24-22 @ 11:20), Max: 37.2 (08-23-22 @ 13:46)  HR: 96 (08-24-22 @ 11:20) (68 - 96)  BP: 103/73 (08-24-22 @ 11:20) (103/73 - 190/86)  RR: 18 (08-24-22 @ 11:20) (17 - 18)  SpO2: 94% (08-24-22 @ 11:20) (94% - 98%)      08-23-22 @ 07:01  -  08-24-22 @ 07:00  --------------------------------------------------------  IN: 0 mL / OUT: 1150 mL / NET: -1150 mL        acetaminophen     Tablet .. 975 milliGRAM(s) Oral every 6 hours  aluminum hydroxide/magnesium hydroxide/simethicone Suspension 30 milliLiter(s) Oral every 8 hours PRN  amLODIPine   Tablet 10 milliGRAM(s) Oral daily  aspirin  chewable 81 milliGRAM(s) Oral daily  atorvastatin 80 milliGRAM(s) Oral at bedtime  chlorhexidine 4% Liquid 1 Application(s) Topical Once  clopidogrel Tablet 75 milliGRAM(s) Oral daily  epoetin nancy-epbx (RETACRIT) Injectable 24296 Unit(s) SubCutaneous once  ferrous    sulfate 325 milliGRAM(s) Oral daily  gabapentin 600 milliGRAM(s) Oral three times a day  heparin  Infusion 1000 Unit(s)/Hr IV Continuous <Continuous>  HYDROmorphone  Injectable 1 milliGRAM(s) IV Push every 4 hours PRN  ketorolac   Injectable 15 milliGRAM(s) IV Push every 6 hours  omega-3-Acid Ethyl Esters 2 Gram(s) Oral two times a day  ondansetron Injectable 4 milliGRAM(s) IV Push every 6 hours PRN  oxyCODONE    IR 10 milliGRAM(s) Oral every 4 hours PRN  oxyCODONE    IR 15 milliGRAM(s) Oral every 4 hours PRN  pantoprazole    Tablet 40 milliGRAM(s) Oral before breakfast  phenytoin   Capsule 200 milliGRAM(s) Oral every 12 hours  polyethylene glycol 3350 17 Gram(s) Oral two times a day  QUEtiapine 200 milliGRAM(s) Oral at bedtime  senna 2 Tablet(s) Oral at bedtime PRN  venlafaxine XR. 75 milliGRAM(s) Oral daily                          9.2    9.07  )-----------( 242      ( 24 Aug 2022 04:15 )             29.1     08-24    139  |  102  |  16.3  ----------------------------<  99  4.2   |  24.0  |  1.01    Ca    8.8      24 Aug 2022 04:15  Mg     2.1     08-23    TPro  8.1  /  Alb  4.4  /  TBili  <0.2<L>  /  DBili  x   /  AST  23  /  ALT  13  /  AlkPhos  189<H>  08-23    PT/INR - ( 23 Aug 2022 17:45 )   PT: 13.0 sec;   INR: 1.12 ratio         PTT - ( 24 Aug 2022 08:35 )  PTT:73.6 sec      Pain Service   [phone]

## 2022-08-24 NOTE — PROGRESS NOTE ADULT - ASSESSMENT
57M with extensive PSH including L SFA stenting, L femoral endarterectomy, R femoral endarterectomy, R fem-PT bypass with GSV presenting for ambulatory angiogram for persistent chronic leg ischemia    - Pain control, would benefit from pain management consult in AM  - Hep gtt, f/u aPTT  - Will require bypass revision this admission 57M with extensive PSH including L SFA stenting, L femoral endarterectomy, R femoral endarterectomy, R fem-PT bypass with GSV presenting for ambulatory angiogram for persistent chronic leg ischemia    - Pain control, would benefit from pain management consult in AM  - Hep gtt, f/u aPTT  - Will require bypass revision this admission    Patient seen earlier, note entered later

## 2022-08-24 NOTE — PROGRESS NOTE ADULT - SUBJECTIVE AND OBJECTIVE BOX
POST-OP & DAILY VASCULAR PROGRESS NOTE    RADHA CHAKRABORTY | [id number] | [id number]    Procedure: s/p failed angiogram    Subjective: Patient seen and examined at bedside. States he is experiencing a great deal of pain in his leg which is poorly controlled at this time. Otherwise no N/V, SOB.     Vital Signs Last 24 Hrs  T(C): 36.8 (23 Aug 2022 21:39), Max: 37.2 (23 Aug 2022 13:46)  T(F): 98.2 (23 Aug 2022 21:39), Max: 98.9 (23 Aug 2022 13:46)  HR: 68 (23 Aug 2022 21:39) (68 - 94)  BP: 112/77 (23 Aug 2022 21:39) (112/77 - 190/86)  BP(mean): --  RR: 18 (23 Aug 2022 21:00) (17 - 18)  SpO2: 98% (23 Aug 2022 21:00) (94% - 98%)    Parameters below as of 23 Aug 2022 21:00  Patient On (Oxygen Delivery Method): room air      I&O's Summary    23 Aug 2022 07:01  -  24 Aug 2022 00:49  --------------------------------------------------------  IN: 0 mL / OUT: 1150 mL / NET: -1150 mL                            11.2   11.45 )-----------( 309      ( 23 Aug 2022 13:58 )             34.6     08-23    140  |  102  |  9.4  ----------------------------<  86  4.5   |  24.0  |  0.65    Ca    9.4      23 Aug 2022 13:58  Mg     2.1     08-23    TPro  8.1  /  Alb  4.4  /  TBili  <0.2<L>  /  DBili  x   /  AST  23  /  ALT  13  /  AlkPhos  189<H>  08-23   PT/INR - ( 23 Aug 2022 17:45 )   PT: 13.0 sec;   INR: 1.12 ratio         PTT - ( 23 Aug 2022 17:45 )  PTT:>200.0 sec    PHYSICAL EXAM:  Gen: NAD  Resp: breathing easily, no stridor  CV: RRR  Abdomen: soft, nontender, nondistended  Skin: Incision c/d/i. RLE with hyperemic/congested appearance, warm to touch. No palpable pedal pulses

## 2022-08-24 NOTE — CONSULT NOTE ADULT - SUBJECTIVE AND OBJECTIVE BOX
RADHA CHAKRABORTY  [id number]      HPI:  57 yr old male PMHx HTN, HLD, extensive PAD s/p right SFA stenting in 2017 left leg , s/p left femoral endarterectomy 10/1/21, right femoral endarterectomy on 4/15/22  s/p right fem-PT bypass with GSV on 5/13/22 for right great toe dry gangrene, carotid stenosis p/w ongoing right foot pain that has become severe over past week , using cane and wheelchair at times.  Patient c/o pain with standing and sitting.  Patient s/p RLE angio and in need for RLE bypass.  Patient s/p bypass last year otherwise tolerated well.  Stress test 8/2021 with no ischemia prior to bypass.  Patient reports no c/o other than issues with his R foot.  His ambulation is limited but can go up stairs and do his ADLs with no other difficulty.  Denies CP, SOB, palps, orthopnea, syncope.         ALLERGIES:  No Known Allergies      PAST MEDICAL & SURGICAL HISTORY:  Seizures  Depression  GERD (gastroesophageal reflux disease)  Osteoarthritis  Prediabetes  Inguinal hernia, left  S/P ORIF (open reduction internal fixation) fracture  S/P shoulder surgery  S/P appendectomy  Otherwise, as noted above      MEDICATIONS (HOME):  Home Medications:  amLODIPine 10 mg oral tablet: 1 tab(s) orally once a day (23 Aug 2022 16:49)  apixaban 5 mg oral tablet: 1 tab(s) orally every 12 hours (23 Aug 2022 16:49)  aspirin 81 mg oral tablet, chewable: 1 tab(s) orally once a day (23 Aug 2022 16:49)  atorvastatin 80 mg oral tablet: 1 tab(s) orally once a day (23 Aug 2022 16:49)  Fish Oil 1000 mg oral capsule: 1 cap(s) orally 2 times a day (23 Aug 2022 16:49)  gabapentin 600 mg oral tablet: 1 tab(s) orally 3 times a day (23 Aug 2022 17:39)  pantoprazole 40 mg oral delayed release tablet: 1 tab(s) orally once a day (23 Aug 2022 17:39)  phenytoin 200 mg oral capsule, extended release: 1 cap(s) orally every 12 hours (23 Aug 2022 17:39)  Plavix 75 mg oral tablet: 1 tab(s) orally once a day (23 Aug 2022 17:39)  QUEtiapine 200 mg oral tablet:  orally once a day (at bedtime) (23 Aug 2022 17:39)  venlafaxine 75 mg oral capsule, extended release: 1 cap(s) orally once a day (23 Aug 2022 17:39)        SOCIAL HISTORY:  Former smoker.  No EtOH.    FAMILY HISTORY:  Family history of breast cancer (Mother)  Family history of hepatitis (Mother)  Family history of heart disease (Mother, Sibling)  Family history of CABG (Father)  Family history of peripheral vascular disease (Father)  Family history of brain aneurysm (Sibling)      ROS:  Patient denies cough, and other than noted above full ROS is unremarkable        PHYSICAL EXAM:  Vital Signs Last 24 Hrs  T(C): 36.5 (24 Aug 2022 04:19), Max: 37.2 (23 Aug 2022 13:46)  T(F): 97.7 (24 Aug 2022 04:19), Max: 98.9 (23 Aug 2022 13:46)  HR: 82 (24 Aug 2022 04:19) (68 - 94)  BP: 107/71 (24 Aug 2022 04:19) (107/71 - 190/86)  BP(mean): --  RR: 18 (24 Aug 2022 04:19) (17 - 18)  SpO2: 94% (24 Aug 2022 04:19) (94% - 98%)  General: Patient comfortable in NAD  HEENT: NCAT, mmm, EOMI  Neck: no JVD, no carotid bruits  CVS: nl s1, split s2, no s3, +s4, no murmur or rubs, RRR  Chest: CTA b/l  Abdomen: soft, nt/nd  Extremities: R foot dusky with erythema, cool to touch, no edema b/l  Neuro: A&O x3  Psych: Normal affect      ECG:  SR with no significant ST-T wave changes      LABS:                        9.2    9.07  )-----------( 242      ( 24 Aug 2022 04:15 )             29.1     08-24    139  |  102  |  16.3  ----------------------------<  99  4.2   |  24.0  |  1.01    Ca    8.8      24 Aug 2022 04:15  Mg     2.1     08-23    TPro  8.1  /  Alb  4.4  /  TBili  <0.2<L>  /  DBili  x   /  AST  23  /  ALT  13  /  AlkPhos  189<H>  08-23        PT/INR - ( 23 Aug 2022 17:45 )   PT: 13.0 sec;   INR: 1.12 ratio         PTT - ( 24 Aug 2022 08:35 )  PTT:73.6 sec          Assessment:  57 yr old male PMHx HTN, HLD, extensive PAD s/p right SFA stenting in 2017 left leg , s/p left femoral endarterectomy 10/1/21, right femoral endarterectomy on 4/15/22  s/p right fem-PT bypass with GSV on 5/13/22 for right great toe dry gangrene, carotid stenosis p/w ongoing right foot pain now planned for RLE bypass.  Stress test 8/2021 with no ischemia prior to prior bypass.  Echo at that time with normal EF and no significant VHD.  Patients ambulation is limited but can go up stairs and do his ADLs with no other difficulty.  No evidence of CHF/ACS.  Patient at moderate risk but no CV contraindication to surgery.  Patient did not f/u after last bypass and needs f/u to consider at least additional Rx for lipids.  LDL elevated despite high dose statin.    Plan:  1. Proceed to OR.  2. Periop monitor until stable postop.  3. Continue DAPT.  4. A/C per vascular.  5. Continue high dose statin.  Consider PCSK9 inhibitor as OP.  6. No additional CV testing now.  7. Continue other current CV meds at current doses as PTA.  BP controlled now.  8. Pain control.    Will follow post op and as needed.  Thanks!

## 2022-08-24 NOTE — PROGRESS NOTE ADULT - ASSESSMENT
57M with extensive PSH including L SFA stenting, L femoral endarterectomy, R femoral endarterectomy, R fem-PT bypass with GSV s/p ambulatory angiogram with no intervention 2/2 to R fem-PT occlusion admitted for persistent chronic leg ischemia, pain management, and medical optimization prior to R fem-PT bypass revision. Patient stable with pain better controlled. Will continue to monitor.     Plan:  - Hep gtt, f/u aPTT  - Pain control, obtaining pain consult  - cards consult for medical optimization  - medicine consult for medical optimization  - Will require bypass revision this admission

## 2022-08-25 DIAGNOSIS — I10 ESSENTIAL (PRIMARY) HYPERTENSION: ICD-10-CM

## 2022-08-25 DIAGNOSIS — E78.0 PURE HYPERCHOLESTEROLEMIA: ICD-10-CM

## 2022-08-25 DIAGNOSIS — R56.9 UNSPECIFIED CONVULSIONS: ICD-10-CM

## 2022-08-25 DIAGNOSIS — D64.9 ANEMIA, UNSPECIFIED: ICD-10-CM

## 2022-08-25 DIAGNOSIS — I73.9 PERIPHERAL VASCULAR DISEASE, UNSPECIFIED: ICD-10-CM

## 2022-08-25 LAB
APTT BLD: 65.8 SEC — HIGH (ref 27.5–35.5)
APTT BLD: 69.5 SEC — HIGH (ref 27.5–35.5)
INR BLD: 1.01 RATIO — SIGNIFICANT CHANGE UP (ref 0.88–1.16)
PROTHROM AB SERPL-ACNC: 11.7 SEC — SIGNIFICANT CHANGE UP (ref 10.5–13.4)

## 2022-08-25 PROCEDURE — 99232 SBSQ HOSP IP/OBS MODERATE 35: CPT | Mod: GC

## 2022-08-25 PROCEDURE — 99223 1ST HOSP IP/OBS HIGH 75: CPT

## 2022-08-25 RX ORDER — HYDROMORPHONE HYDROCHLORIDE 2 MG/ML
0.5 INJECTION INTRAMUSCULAR; INTRAVENOUS; SUBCUTANEOUS EVERY 4 HOURS
Refills: 0 | Status: DISCONTINUED | OUTPATIENT
Start: 2022-08-25 | End: 2022-08-25

## 2022-08-25 RX ORDER — NALOXONE HYDROCHLORIDE 4 MG/.1ML
0.1 SPRAY NASAL
Refills: 0 | Status: DISCONTINUED | OUTPATIENT
Start: 2022-08-25 | End: 2022-08-30

## 2022-08-25 RX ORDER — HYDROMORPHONE HYDROCHLORIDE 2 MG/ML
30 INJECTION INTRAMUSCULAR; INTRAVENOUS; SUBCUTANEOUS
Refills: 0 | Status: DISCONTINUED | OUTPATIENT
Start: 2022-08-25 | End: 2022-08-26

## 2022-08-25 RX ORDER — GABAPENTIN 400 MG/1
900 CAPSULE ORAL THREE TIMES A DAY
Refills: 0 | Status: DISCONTINUED | OUTPATIENT
Start: 2022-08-25 | End: 2022-08-30

## 2022-08-25 RX ORDER — HYDROMORPHONE HYDROCHLORIDE 2 MG/ML
0.5 INJECTION INTRAMUSCULAR; INTRAVENOUS; SUBCUTANEOUS ONCE
Refills: 0 | Status: DISCONTINUED | OUTPATIENT
Start: 2022-08-25 | End: 2022-08-25

## 2022-08-25 RX ADMIN — GABAPENTIN 600 MILLIGRAM(S): 400 CAPSULE ORAL at 05:47

## 2022-08-25 RX ADMIN — HYDROMORPHONE HYDROCHLORIDE 30 MILLILITER(S): 2 INJECTION INTRAMUSCULAR; INTRAVENOUS; SUBCUTANEOUS at 19:28

## 2022-08-25 RX ADMIN — Medication 2 GRAM(S): at 17:36

## 2022-08-25 RX ADMIN — HEPARIN SODIUM 10 UNIT(S)/HR: 5000 INJECTION INTRAVENOUS; SUBCUTANEOUS at 07:58

## 2022-08-25 RX ADMIN — HYDROMORPHONE HYDROCHLORIDE 4 MILLIGRAM(S): 2 INJECTION INTRAMUSCULAR; INTRAVENOUS; SUBCUTANEOUS at 09:20

## 2022-08-25 RX ADMIN — HYDROMORPHONE HYDROCHLORIDE 4 MILLIGRAM(S): 2 INJECTION INTRAMUSCULAR; INTRAVENOUS; SUBCUTANEOUS at 00:34

## 2022-08-25 RX ADMIN — Medication 975 MILLIGRAM(S): at 17:36

## 2022-08-25 RX ADMIN — HYDROMORPHONE HYDROCHLORIDE 4 MILLIGRAM(S): 2 INJECTION INTRAMUSCULAR; INTRAVENOUS; SUBCUTANEOUS at 01:34

## 2022-08-25 RX ADMIN — Medication 2 GRAM(S): at 05:47

## 2022-08-25 RX ADMIN — HYDROMORPHONE HYDROCHLORIDE 0.5 MILLIGRAM(S): 2 INJECTION INTRAMUSCULAR; INTRAVENOUS; SUBCUTANEOUS at 14:04

## 2022-08-25 RX ADMIN — Medication 325 MILLIGRAM(S): at 12:37

## 2022-08-25 RX ADMIN — CLOPIDOGREL BISULFATE 75 MILLIGRAM(S): 75 TABLET, FILM COATED ORAL at 12:37

## 2022-08-25 RX ADMIN — Medication 975 MILLIGRAM(S): at 23:15

## 2022-08-25 RX ADMIN — HYDROMORPHONE HYDROCHLORIDE 0.5 MILLIGRAM(S): 2 INJECTION INTRAMUSCULAR; INTRAVENOUS; SUBCUTANEOUS at 11:00

## 2022-08-25 RX ADMIN — AMLODIPINE BESYLATE 10 MILLIGRAM(S): 2.5 TABLET ORAL at 05:47

## 2022-08-25 RX ADMIN — HEPARIN SODIUM 10 UNIT(S)/HR: 5000 INJECTION INTRAVENOUS; SUBCUTANEOUS at 19:26

## 2022-08-25 RX ADMIN — METHOCARBAMOL 750 MILLIGRAM(S): 500 TABLET, FILM COATED ORAL at 14:04

## 2022-08-25 RX ADMIN — ATORVASTATIN CALCIUM 80 MILLIGRAM(S): 80 TABLET, FILM COATED ORAL at 21:36

## 2022-08-25 RX ADMIN — METHOCARBAMOL 750 MILLIGRAM(S): 500 TABLET, FILM COATED ORAL at 21:34

## 2022-08-25 RX ADMIN — Medication 975 MILLIGRAM(S): at 18:35

## 2022-08-25 RX ADMIN — Medication 75 MILLIGRAM(S): at 12:37

## 2022-08-25 RX ADMIN — QUETIAPINE FUMARATE 200 MILLIGRAM(S): 200 TABLET, FILM COATED ORAL at 21:36

## 2022-08-25 RX ADMIN — GABAPENTIN 900 MILLIGRAM(S): 400 CAPSULE ORAL at 16:46

## 2022-08-25 RX ADMIN — Medication 200 MILLIGRAM(S): at 17:36

## 2022-08-25 RX ADMIN — Medication 200 MILLIGRAM(S): at 05:46

## 2022-08-25 RX ADMIN — HYDROMORPHONE HYDROCHLORIDE 4 MILLIGRAM(S): 2 INJECTION INTRAMUSCULAR; INTRAVENOUS; SUBCUTANEOUS at 12:36

## 2022-08-25 RX ADMIN — Medication 975 MILLIGRAM(S): at 05:20

## 2022-08-25 RX ADMIN — PANTOPRAZOLE SODIUM 40 MILLIGRAM(S): 20 TABLET, DELAYED RELEASE ORAL at 05:47

## 2022-08-25 RX ADMIN — Medication 975 MILLIGRAM(S): at 06:36

## 2022-08-25 RX ADMIN — HYDROMORPHONE HYDROCHLORIDE 4 MILLIGRAM(S): 2 INJECTION INTRAMUSCULAR; INTRAVENOUS; SUBCUTANEOUS at 13:35

## 2022-08-25 RX ADMIN — HYDROMORPHONE HYDROCHLORIDE 4 MILLIGRAM(S): 2 INJECTION INTRAMUSCULAR; INTRAVENOUS; SUBCUTANEOUS at 08:21

## 2022-08-25 RX ADMIN — HYDROMORPHONE HYDROCHLORIDE 0.5 MILLIGRAM(S): 2 INJECTION INTRAMUSCULAR; INTRAVENOUS; SUBCUTANEOUS at 14:17

## 2022-08-25 RX ADMIN — Medication 975 MILLIGRAM(S): at 13:36

## 2022-08-25 RX ADMIN — METHOCARBAMOL 750 MILLIGRAM(S): 500 TABLET, FILM COATED ORAL at 05:46

## 2022-08-25 RX ADMIN — HYDROMORPHONE HYDROCHLORIDE 30 MILLILITER(S): 2 INJECTION INTRAMUSCULAR; INTRAVENOUS; SUBCUTANEOUS at 16:23

## 2022-08-25 RX ADMIN — Medication 975 MILLIGRAM(S): at 12:37

## 2022-08-25 RX ADMIN — GABAPENTIN 900 MILLIGRAM(S): 400 CAPSULE ORAL at 21:34

## 2022-08-25 RX ADMIN — HYDROMORPHONE HYDROCHLORIDE 0.5 MILLIGRAM(S): 2 INJECTION INTRAMUSCULAR; INTRAVENOUS; SUBCUTANEOUS at 10:48

## 2022-08-25 RX ADMIN — Medication 975 MILLIGRAM(S): at 00:30

## 2022-08-25 RX ADMIN — Medication 81 MILLIGRAM(S): at 12:37

## 2022-08-25 NOTE — CONSULT NOTE ADULT - PROBLEM SELECTOR RECOMMENDATION 9
Scheduled for OR Tuesday 8/30 for fem-PT bypass  Heparin drip  Pain control as per pain management, discussed, IV dilaudid for BTP, increase Gabapentin. Continue Robaxin  IS  bowel RX for opioid induced constipation ppx Scheduled for OR Tuesday 8/30 for fem-PT bypass  Heparin drip  Pain control as per pain management, discussed, IV Dilaudid for BTP, increase Gabapentin. Continue Robaxin  IS  bowel RX for opioid induced constipation ppx

## 2022-08-25 NOTE — CONSULT NOTE ADULT - NS ATTEND AMEND GEN_ALL_CORE FT
Patient seen and examined , c/o severe R foot pain , denies n/v , voiding ,   denies sob , chest pain , appetite fair .     Vital Signs Last 24 Hrs  T(C): 36.4 (25 Aug 2022 08:19), Max: 36.9 (24 Aug 2022 20:38)  T(F): 97.5 (25 Aug 2022 08:19), Max: 98.5 (24 Aug 2022 20:38)  HR: 91 (25 Aug 2022 10:47) (68 - 95)  BP: 162/76 (25 Aug 2022 10:47) (84/57 - 162/76  RR: 18 (25 Aug 2022 10:47) (18 - 18)  SpO2: 97% (25 Aug 2022 10:47) (91% - 97%)  Parameters below as of 25 Aug 2022 10:47  Patient On (Oxygen Delivery Method): room air    Lungs: CTA B/L   CVS: S1S2 , no rubs , no murmur   Abd: soft , b/s +  RLE with shin with ulceration, toes cool to touch, no palpable DP/PT    Above noted and reviewed with NP .   Anemia noted - likely due to chronic disease -   consider to add anemia w/u     HLD - continue statins     Hx of carotid stenosis - continue ASA/ Plavix / Statins - further mgmt as per vascular   Cardiology noted and appreciated - cleared from cardiology stand point     There is no contraindications for planned procedure ,   patient is medically optimized .   Thank you for the courtesy of this consult ,   will not follow up daily ,   if any medical issue please call PRN

## 2022-08-25 NOTE — PROGRESS NOTE ADULT - ASSESSMENT
57M  extensive PSH including L SFA stenting, L femoral endarterectomy, R femoral endarterectomy, R fem-PT bypass with GSV s/p ambulatory angiogram with no intervention 2/2 to R fem-PT occlusion admitted for persistent chronic leg ischemi  due for R fem-PT bypass revision    Med Recs:  will start PCA - DIlaudid; 0.5mg initial dose; 0 continuous; 0.2 demand dose; 8 minute lockout   can DC other opioid orders when PCA is setup

## 2022-08-25 NOTE — PROGRESS NOTE ADULT - SUBJECTIVE AND OBJECTIVE BOX
VASCULAR SURGERY PROGRESS NOTE    Subjective: Patient examined at bedside this AM. Reports his initial pain has improved with the change in medication regimen, however still reporting skin burning pain. SBP soft yesterday, started on maintenance IVF. No acute events overnight    Objective:  Vital Signs  T(C): 36.7 (08-25 @ 04:23), Max: 36.9 (08-24 @ 20:38)  HR: 89 (08-25 @ 04:23) (68 - 96)  BP: 131/73 (08-25 @ 04:23) (84/57 - 159/79)  RR: 18 (08-25 @ 04:23) (18 - 18)  SpO2: 96% (08-25 @ 04:23) (91% - 96%)  08-23-22 @ 07:01  -  08-24-22 @ 07:00  --------------------------------------------------------  IN:  Total IN: 0 mL    OUT:    Voided (mL): 1150 mL  Total OUT: 1150 mL    Total NET: -1150 mL      08-24-22 @ 07:01  -  08-25-22 @ 06:43  --------------------------------------------------------  IN:  Total IN: 0 mL    OUT:    Voided (mL): 650 mL  Total OUT: 650 mL    Total NET: -650 mL          Physical Exam:  General: alert and oriented, NAD  Resp: airway patent, respirations unlabored  Abdomen: soft, nontender, nondistended  Extremities: Incision c/d/i. RLE with hyperemic/congested appearance, warm to touch. No palpable pedal pulses  Skin: R shin with ulceration    Labs:                        9.2    9.07  )-----------( 242      ( 24 Aug 2022 04:15 )             29.1   08-24    139  |  102  |  16.3  ----------------------------<  99  4.2   |  24.0  |  1.01    Ca    8.8      24 Aug 2022 04:15  Mg     2.1     08-23    TPro  8.1  /  Alb  4.4  /  TBili  <0.2<L>  /  DBili  x   /  AST  23  /  ALT  13  /  AlkPhos  189<H>  08-23    CAPILLARY BLOOD GLUCOSE

## 2022-08-25 NOTE — CONSULT NOTE ADULT - SUBJECTIVE AND OBJECTIVE BOX
HPI:  57  yr old male presents with history of htn, seizures (last 2021) , high cholesterol  PAD  s/p right SFA stenting in 2017 left leg , carotid stenosis (may require ICA intervention in future) , s/p left femoral endarterectomy 10/1/21, right femoral endarterectomy on 4/15/22  s/p right fem -PT bypass with GSV on 5/13/22 for right great toe dry gangrene . Pt has c/o right foot pain that has burning sensation and has become severe over past week , using cane and wheelchair at times, c/o pain with standing and sitting 10/10 has no relief. US done july 7/7 /22 demonstrating patent bypass with severe stenosis at the proximal anastomosis suggestive of >75% stenosis. Patient s/p RLE angio and in need for RLE bypass. Medicine consulted for comanagement and clearance. Patient seen and examined. C/O severe left foot pain, not controlled on current RX.            PAST MEDICAL & SURGICAL HISTORY:  Hypercholesterolemia      Seizures  2021   last seizure      Depression      GERD (gastroesophageal reflux disease)      Hypertension      Peripheral vascular disease  with R SFA stent      Osteoarthritis      Former smoker      Prediabetes      Inguinal hernia, left      S/P ORIF (open reduction internal fixation) fracture  Left      S/P shoulder surgery  right      S/P appendectomy      H/O endarterectomy  L femoral a.  R femoral a. april 2022      Status post femorotibial bypass  with GSV 5/13/22          MEDICATIONS  (STANDING):  acetaminophen     Tablet .. 975 milliGRAM(s) Oral every 6 hours  amLODIPine   Tablet 10 milliGRAM(s) Oral daily  aspirin  chewable 81 milliGRAM(s) Oral daily  atorvastatin 80 milliGRAM(s) Oral at bedtime  chlorhexidine 4% Liquid 1 Application(s) Topical Once  clopidogrel Tablet 75 milliGRAM(s) Oral daily  ferrous    sulfate 325 milliGRAM(s) Oral daily  gabapentin 900 milliGRAM(s) Oral three times a day  heparin  Infusion 1000 Unit(s)/Hr (10 mL/Hr) IV Continuous <Continuous>  methocarbamol 750 milliGRAM(s) Oral three times a day  omega-3-Acid Ethyl Esters 2 Gram(s) Oral two times a day  pantoprazole    Tablet 40 milliGRAM(s) Oral before breakfast  phenytoin   Capsule 200 milliGRAM(s) Oral every 12 hours  polyethylene glycol 3350 17 Gram(s) Oral two times a day  QUEtiapine 200 milliGRAM(s) Oral at bedtime  sodium chloride 0.9%. 1000 milliLiter(s) (100 mL/Hr) IV Continuous <Continuous>  venlafaxine XR. 75 milliGRAM(s) Oral daily    MEDICATIONS  (PRN):  aluminum hydroxide/magnesium hydroxide/simethicone Suspension 30 milliLiter(s) Oral every 8 hours PRN Dyspepsia  HYDROmorphone   Tablet 2 milliGRAM(s) Oral every 4 hours PRN Moderate Pain (4 - 6)  HYDROmorphone   Tablet 4 milliGRAM(s) Oral every 4 hours PRN Severe Pain (7 - 10)  HYDROmorphone  Injectable 0.5 milliGRAM(s) IV Push every 4 hours PRN breakthrough pain  ondansetron Injectable 4 milliGRAM(s) IV Push every 6 hours PRN Nausea  senna 2 Tablet(s) Oral at bedtime PRN Constipation      Allergies    No Known Allergies    Intolerances        SOCIAL HISTORY:  Former smoker  Denies ETOH/IVDA    FAMILY HISTORY:  Family history of breast cancer (Mother)    Family history of hepatitis (Mother)    Family history of heart disease (Mother, Sibling)    Family history of CABG (Father)    Family history of peripheral vascular disease (Father)    Family history of brain aneurysm (Sibling)        Vital Signs Last 24 Hrs  T(C): 36.4 (25 Aug 2022 08:19), Max: 36.9 (24 Aug 2022 20:38)  T(F): 97.5 (25 Aug 2022 08:19), Max: 98.5 (24 Aug 2022 20:38)  HR: 91 (25 Aug 2022 10:47) (68 - 96)  BP: 162/76 (25 Aug 2022 10:47) (84/57 - 162/76)  BP(mean): --  RR: 18 (25 Aug 2022 10:47) (18 - 18)  SpO2: 97% (25 Aug 2022 10:47) (91% - 97%)    Parameters below as of 25 Aug 2022 10:47  Patient On (Oxygen Delivery Method): room air    ROS:  Constitutional, Eyes, ENT, Cardiovascular, Respiratory, Gastrointestinal, Genitourinary, Neurological, Integumentary, Psychiatric, Endocrine, Heme/Lymph are otherwise negative.    PHYSICAL EXAM:    General: Awake, alert, mild distress due to pain  Eyes: PERRLA, EOMI; conjunctiva and sclera clear  Head: Normocephalic; atraumatic  ENMT: No nasal discharge; airway clear  Neck: Supple; non tender; no JVD  Respiratory: No wheezes, rales or rhonchi  Cardiovascular: Regular rate and rhythm. S1 and S2 Normal; No murmurs, gallops or rubs  Gastrointestinal: Soft non-tender non-distended; Normal bowel sounds  Extremities: RLE with shin with ulceration, toes cool to touch, no palpable DP/PT  Neurological: Non focal  Psychiatric: Anxiety           LABS:                        9.2    9.07  )-----------( 242      ( 24 Aug 2022 04:15 )             29.1     08-24    139  |  102  |  16.3  ----------------------------<  99  4.2   |  24.0  |  1.01    Ca    8.8      24 Aug 2022 04:15  Mg     2.1     08-23    TPro  8.1  /  Alb  4.4  /  TBili  <0.2<L>  /  DBili  x   /  AST  23  /  ALT  13  /  AlkPhos  189<H>  08-23    PT/INR - ( 25 Aug 2022 04:40 )   PT: 11.7 sec;   INR: 1.01 ratio         PTT - ( 25 Aug 2022 04:40 )  PTT:65.8 sec        RADIOLOGY & ADDITIONAL STUDIES:

## 2022-08-25 NOTE — PROGRESS NOTE ADULT - SUBJECTIVE AND OBJECTIVE BOX
Interval Hx:  Patient seen during rounds  Patient reports pain to be uncontrolled on current medications  minimal relief despite increase in pain meds  discussed PCA with patient  Patient denies sedation with medications     Analgesic Dosing for past 24 hours reviewed as below:    acetaminophen     Tablet ..   975 milliGRAM(s) Oral (08-25-22 @ 12:37)   975 milliGRAM(s) Oral (08-25-22 @ 05:20)   975 milliGRAM(s) Oral (08-24-22 @ 23:38)   975 milliGRAM(s) Oral (08-24-22 @ 17:35)    gabapentin   600 milliGRAM(s) Oral (08-25-22 @ 05:47)   600 milliGRAM(s) Oral (08-24-22 @ 21:44)    HYDROmorphone   Tablet   4 milliGRAM(s) Oral (08-25-22 @ 12:36)   4 milliGRAM(s) Oral (08-25-22 @ 08:21)   4 milliGRAM(s) Oral (08-25-22 @ 00:34)    HYDROmorphone  Injectable   0.5 milliGRAM(s) IV Push (08-25-22 @ 10:48)    HYDROmorphone  Injectable   0.5 milliGRAM(s) IV Push (08-25-22 @ 14:04)    ketorolac   Injectable   15 milliGRAM(s) IV Push (08-24-22 @ 17:33)    methocarbamol   750 milliGRAM(s) Oral (08-25-22 @ 14:04)   750 milliGRAM(s) Oral (08-25-22 @ 05:46)   750 milliGRAM(s) Oral (08-24-22 @ 21:44)    phenytoin   Capsule   200 milliGRAM(s) Oral (08-25-22 @ 05:46)   200 milliGRAM(s) Oral (08-24-22 @ 17:33)    QUEtiapine   200 milliGRAM(s) Oral (08-24-22 @ 21:45)    venlafaxine XR.   75 milliGRAM(s) Oral (08-25-22 @ 12:37)          T(C): 36.4 (08-25-22 @ 08:19), Max: 36.9 (08-24-22 @ 20:38)  HR: 91 (08-25-22 @ 10:47) (88 - 95)  BP: 162/76 (08-25-22 @ 10:47) (84/57 - 162/76)  RR: 18 (08-25-22 @ 10:47) (18 - 18)  SpO2: 97% (08-25-22 @ 10:47) (91% - 97%)      08-24-22 @ 07:01  -  08-25-22 @ 07:00  --------------------------------------------------------  IN: 0 mL / OUT: 650 mL / NET: -650 mL    08-25-22 @ 07:01  -  08-25-22 @ 14:16  --------------------------------------------------------  IN: 540 mL / OUT: 850 mL / NET: -310 mL        acetaminophen     Tablet .. 975 milliGRAM(s) Oral every 6 hours  aluminum hydroxide/magnesium hydroxide/simethicone Suspension 30 milliLiter(s) Oral every 8 hours PRN  amLODIPine   Tablet 10 milliGRAM(s) Oral daily  aspirin  chewable 81 milliGRAM(s) Oral daily  atorvastatin 80 milliGRAM(s) Oral at bedtime  chlorhexidine 4% Liquid 1 Application(s) Topical Once  clopidogrel Tablet 75 milliGRAM(s) Oral daily  ferrous    sulfate 325 milliGRAM(s) Oral daily  gabapentin 900 milliGRAM(s) Oral three times a day  heparin  Infusion 1000 Unit(s)/Hr IV Continuous <Continuous>  HYDROmorphone   Tablet 2 milliGRAM(s) Oral every 4 hours PRN  HYDROmorphone   Tablet 4 milliGRAM(s) Oral every 4 hours PRN  HYDROmorphone  Injectable 0.5 milliGRAM(s) IV Push every 4 hours PRN  methocarbamol 750 milliGRAM(s) Oral three times a day  omega-3-Acid Ethyl Esters 2 Gram(s) Oral two times a day  ondansetron Injectable 4 milliGRAM(s) IV Push every 6 hours PRN  pantoprazole    Tablet 40 milliGRAM(s) Oral before breakfast  phenytoin   Capsule 200 milliGRAM(s) Oral every 12 hours  polyethylene glycol 3350 17 Gram(s) Oral two times a day  QUEtiapine 200 milliGRAM(s) Oral at bedtime  senna 2 Tablet(s) Oral at bedtime PRN  sodium chloride 0.9%. 1000 milliLiter(s) IV Continuous <Continuous>  venlafaxine XR. 75 milliGRAM(s) Oral daily                          9.2    9.07  )-----------( 242      ( 24 Aug 2022 04:15 )             29.1     08-24    139  |  102  |  16.3  ----------------------------<  99  4.2   |  24.0  |  1.01    Ca    8.8      24 Aug 2022 04:15      PT/INR - ( 25 Aug 2022 04:40 )   PT: 11.7 sec;   INR: 1.01 ratio         PTT - ( 25 Aug 2022 11:40 )  PTT:69.5 sec      Pain Service   [phone]

## 2022-08-25 NOTE — PROGRESS NOTE ADULT - ASSESSMENT
Assessment: 56yo Male with PMH of RLE PAD s/p multiple failed interventions admitted after attempted angio without intervention.    Plan:   - Cont hep gtt  - DASH diet  - Pain control PRN  - Patient optimized for OR from Cards standpoint  - Scheduled for OR Tuesday 8/30 for fem-PT bypass  - Dispo: Pending operation

## 2022-08-25 NOTE — CONSULT NOTE ADULT - PROBLEM SELECTOR RECOMMENDATION 3
BP was low yesterday, was started on maintenance fluids, now BP elevated, suggest DC IVF  Continue Amlodipine

## 2022-08-26 LAB
APTT BLD: 111.1 SEC — HIGH (ref 27.5–35.5)
APTT BLD: 33.1 SEC — SIGNIFICANT CHANGE UP (ref 27.5–35.5)
APTT BLD: 68.9 SEC — HIGH (ref 27.5–35.5)
FERRITIN SERPL-MCNC: 61 NG/ML — SIGNIFICANT CHANGE UP (ref 30–400)
FOLATE SERPL-MCNC: 16.4 NG/ML — SIGNIFICANT CHANGE UP
HCT VFR BLD CALC: 29.4 % — LOW (ref 39–50)
HGB BLD-MCNC: 9.4 G/DL — LOW (ref 13–17)
IRON SATN MFR SERPL: 22 UG/DL — LOW (ref 59–158)
IRON SATN MFR SERPL: 7 % — LOW (ref 16–55)
MCHC RBC-ENTMCNC: 27.9 PG — SIGNIFICANT CHANGE UP (ref 27–34)
MCHC RBC-ENTMCNC: 32 GM/DL — SIGNIFICANT CHANGE UP (ref 32–36)
MCV RBC AUTO: 87.2 FL — SIGNIFICANT CHANGE UP (ref 80–100)
PHENYTOIN FREE SERPL-MCNC: 4.1 UG/ML — LOW (ref 10–20)
PLATELET # BLD AUTO: 276 K/UL — SIGNIFICANT CHANGE UP (ref 150–400)
RBC # BLD: 3.37 M/UL — LOW (ref 4.2–5.8)
RBC # FLD: 15.7 % — HIGH (ref 10.3–14.5)
TIBC SERPL-MCNC: 337 UG/DL — SIGNIFICANT CHANGE UP (ref 220–430)
TRANSFERRIN SERPL-MCNC: 236 MG/DL — SIGNIFICANT CHANGE UP (ref 180–329)
WBC # BLD: 7.61 K/UL — SIGNIFICANT CHANGE UP (ref 3.8–10.5)
WBC # FLD AUTO: 7.61 K/UL — SIGNIFICANT CHANGE UP (ref 3.8–10.5)

## 2022-08-26 PROCEDURE — 99232 SBSQ HOSP IP/OBS MODERATE 35: CPT | Mod: GC

## 2022-08-26 PROCEDURE — 99232 SBSQ HOSP IP/OBS MODERATE 35: CPT

## 2022-08-26 RX ORDER — SODIUM CHLORIDE 9 MG/ML
1000 INJECTION INTRAMUSCULAR; INTRAVENOUS; SUBCUTANEOUS
Refills: 0 | Status: DISCONTINUED | OUTPATIENT
Start: 2022-08-26 | End: 2022-08-30

## 2022-08-26 RX ORDER — IRON SUCROSE 20 MG/ML
200 INJECTION, SOLUTION INTRAVENOUS ONCE
Refills: 0 | Status: DISCONTINUED | OUTPATIENT
Start: 2022-08-26 | End: 2022-08-26

## 2022-08-26 RX ORDER — HEPARIN SODIUM 5000 [USP'U]/ML
200 INJECTION INTRAVENOUS; SUBCUTANEOUS
Qty: 25000 | Refills: 0 | Status: DISCONTINUED | OUTPATIENT
Start: 2022-08-26 | End: 2022-08-27

## 2022-08-26 RX ORDER — HYDROMORPHONE HYDROCHLORIDE 2 MG/ML
30 INJECTION INTRAMUSCULAR; INTRAVENOUS; SUBCUTANEOUS
Refills: 0 | Status: DISCONTINUED | OUTPATIENT
Start: 2022-08-26 | End: 2022-08-27

## 2022-08-26 RX ORDER — IRON SUCROSE 20 MG/ML
200 INJECTION, SOLUTION INTRAVENOUS ONCE
Refills: 0 | Status: COMPLETED | OUTPATIENT
Start: 2022-08-26 | End: 2022-08-26

## 2022-08-26 RX ORDER — HEPARIN SODIUM 5000 [USP'U]/ML
3800 INJECTION INTRAVENOUS; SUBCUTANEOUS ONCE
Refills: 0 | Status: DISCONTINUED | OUTPATIENT
Start: 2022-08-26 | End: 2022-08-26

## 2022-08-26 RX ORDER — LANOLIN ALCOHOL/MO/W.PET/CERES
3 CREAM (GRAM) TOPICAL AT BEDTIME
Refills: 0 | Status: DISCONTINUED | OUTPATIENT
Start: 2022-08-26 | End: 2022-08-30

## 2022-08-26 RX ADMIN — IRON SUCROSE 110 MILLIGRAM(S): 20 INJECTION, SOLUTION INTRAVENOUS at 16:46

## 2022-08-26 RX ADMIN — AMLODIPINE BESYLATE 10 MILLIGRAM(S): 2.5 TABLET ORAL at 05:50

## 2022-08-26 RX ADMIN — Medication 2 GRAM(S): at 05:50

## 2022-08-26 RX ADMIN — POLYETHYLENE GLYCOL 3350 17 GRAM(S): 17 POWDER, FOR SOLUTION ORAL at 05:50

## 2022-08-26 RX ADMIN — Medication 975 MILLIGRAM(S): at 06:00

## 2022-08-26 RX ADMIN — Medication 200 MILLIGRAM(S): at 18:26

## 2022-08-26 RX ADMIN — HEPARIN SODIUM 13 UNIT(S)/HR: 5000 INJECTION INTRAVENOUS; SUBCUTANEOUS at 21:48

## 2022-08-26 RX ADMIN — QUETIAPINE FUMARATE 200 MILLIGRAM(S): 200 TABLET, FILM COATED ORAL at 22:12

## 2022-08-26 RX ADMIN — HEPARIN SODIUM 13 UNIT(S)/HR: 5000 INJECTION INTRAVENOUS; SUBCUTANEOUS at 19:35

## 2022-08-26 RX ADMIN — HYDROMORPHONE HYDROCHLORIDE 30 MILLILITER(S): 2 INJECTION INTRAMUSCULAR; INTRAVENOUS; SUBCUTANEOUS at 07:10

## 2022-08-26 RX ADMIN — Medication 2 GRAM(S): at 18:26

## 2022-08-26 RX ADMIN — Medication 81 MILLIGRAM(S): at 11:49

## 2022-08-26 RX ADMIN — HEPARIN SODIUM 13 UNIT(S)/HR: 5000 INJECTION INTRAVENOUS; SUBCUTANEOUS at 08:21

## 2022-08-26 RX ADMIN — METHOCARBAMOL 750 MILLIGRAM(S): 500 TABLET, FILM COATED ORAL at 14:05

## 2022-08-26 RX ADMIN — Medication 75 MILLIGRAM(S): at 11:49

## 2022-08-26 RX ADMIN — HYDROMORPHONE HYDROCHLORIDE 30 MILLILITER(S): 2 INJECTION INTRAMUSCULAR; INTRAVENOUS; SUBCUTANEOUS at 15:43

## 2022-08-26 RX ADMIN — Medication 975 MILLIGRAM(S): at 19:26

## 2022-08-26 RX ADMIN — Medication 975 MILLIGRAM(S): at 00:00

## 2022-08-26 RX ADMIN — HEPARIN SODIUM 10 UNIT(S)/HR: 5000 INJECTION INTRAVENOUS; SUBCUTANEOUS at 07:12

## 2022-08-26 RX ADMIN — Medication 3 MILLIGRAM(S): at 22:13

## 2022-08-26 RX ADMIN — METHOCARBAMOL 750 MILLIGRAM(S): 500 TABLET, FILM COATED ORAL at 22:12

## 2022-08-26 RX ADMIN — ATORVASTATIN CALCIUM 80 MILLIGRAM(S): 80 TABLET, FILM COATED ORAL at 22:12

## 2022-08-26 RX ADMIN — GABAPENTIN 900 MILLIGRAM(S): 400 CAPSULE ORAL at 05:49

## 2022-08-26 RX ADMIN — GABAPENTIN 900 MILLIGRAM(S): 400 CAPSULE ORAL at 22:13

## 2022-08-26 RX ADMIN — Medication 975 MILLIGRAM(S): at 11:49

## 2022-08-26 RX ADMIN — CLOPIDOGREL BISULFATE 75 MILLIGRAM(S): 75 TABLET, FILM COATED ORAL at 11:49

## 2022-08-26 RX ADMIN — Medication 200 MILLIGRAM(S): at 05:50

## 2022-08-26 RX ADMIN — Medication 975 MILLIGRAM(S): at 12:48

## 2022-08-26 RX ADMIN — METHOCARBAMOL 750 MILLIGRAM(S): 500 TABLET, FILM COATED ORAL at 05:49

## 2022-08-26 RX ADMIN — Medication 975 MILLIGRAM(S): at 18:27

## 2022-08-26 RX ADMIN — Medication 975 MILLIGRAM(S): at 23:00

## 2022-08-26 RX ADMIN — Medication 975 MILLIGRAM(S): at 22:11

## 2022-08-26 RX ADMIN — GABAPENTIN 900 MILLIGRAM(S): 400 CAPSULE ORAL at 14:05

## 2022-08-26 RX ADMIN — HYDROMORPHONE HYDROCHLORIDE 30 MILLILITER(S): 2 INJECTION INTRAMUSCULAR; INTRAVENOUS; SUBCUTANEOUS at 19:32

## 2022-08-26 RX ADMIN — HEPARIN SODIUM 10 UNIT(S)/HR: 5000 INJECTION INTRAVENOUS; SUBCUTANEOUS at 05:49

## 2022-08-26 RX ADMIN — Medication 975 MILLIGRAM(S): at 05:49

## 2022-08-26 RX ADMIN — HYDROMORPHONE HYDROCHLORIDE 30 MILLILITER(S): 2 INJECTION INTRAMUSCULAR; INTRAVENOUS; SUBCUTANEOUS at 21:49

## 2022-08-26 RX ADMIN — PANTOPRAZOLE SODIUM 40 MILLIGRAM(S): 20 TABLET, DELAYED RELEASE ORAL at 05:50

## 2022-08-26 NOTE — PROGRESS NOTE ADULT - SUBJECTIVE AND OBJECTIVE BOX
Interval Hx:  Patient seen during rounds  Patient reports pain to be uncontrolled on current medications  Patient denies sedation with medications     Analgesic Dosing for past 24 hours reviewed as below:    acetaminophen     Tablet ..   975 milliGRAM(s) Oral (08-26-22 @ 11:49)   975 milliGRAM(s) Oral (08-26-22 @ 05:49)   975 milliGRAM(s) Oral (08-25-22 @ 23:15)   975 milliGRAM(s) Oral (08-25-22 @ 17:36)    gabapentin   900 milliGRAM(s) Oral (08-26-22 @ 14:05)   900 milliGRAM(s) Oral (08-26-22 @ 05:49)   900 milliGRAM(s) Oral (08-25-22 @ 21:34)   900 milliGRAM(s) Oral (08-25-22 @ 16:46)    methocarbamol   750 milliGRAM(s) Oral (08-26-22 @ 14:05)   750 milliGRAM(s) Oral (08-26-22 @ 05:49)   750 milliGRAM(s) Oral (08-25-22 @ 21:34)    phenytoin   Capsule   200 milliGRAM(s) Oral (08-26-22 @ 05:50)   200 milliGRAM(s) Oral (08-25-22 @ 17:36)    QUEtiapine   200 milliGRAM(s) Oral (08-25-22 @ 21:36)    venlafaxine XR.   75 milliGRAM(s) Oral (08-26-22 @ 11:49)          T(C): 36.5 (08-26-22 @ 11:04), Max: 37.1 (08-26-22 @ 04:18)  HR: 95 (08-26-22 @ 11:11) (62 - 97)  BP: 160/62 (08-26-22 @ 11:11) (90/61 - 163/74)  RR: 18 (08-26-22 @ 11:11) (17 - 18)  SpO2: 96% (08-26-22 @ 11:11) (93% - 97%)      08-25-22 @ 07:01  -  08-26-22 @ 07:00  --------------------------------------------------------  IN: 1210 mL / OUT: 3550 mL / NET: -2340 mL    08-26-22 @ 07:01  -  08-26-22 @ 14:55  --------------------------------------------------------  IN: 0 mL / OUT: 450 mL / NET: -450 mL        acetaminophen     Tablet .. 975 milliGRAM(s) Oral every 6 hours  aluminum hydroxide/magnesium hydroxide/simethicone Suspension 30 milliLiter(s) Oral every 8 hours PRN  amLODIPine   Tablet 10 milliGRAM(s) Oral daily  aspirin  chewable 81 milliGRAM(s) Oral daily  atorvastatin 80 milliGRAM(s) Oral at bedtime  chlorhexidine 4% Liquid 1 Application(s) Topical Once  clopidogrel Tablet 75 milliGRAM(s) Oral daily  gabapentin 900 milliGRAM(s) Oral three times a day  heparin  Infusion 200 Unit(s)/Hr IV Continuous <Continuous>  HYDROmorphone PCA (1 mG/mL) 30 milliLiter(s) PCA Continuous PCA Continuous  iron sucrose IVPB 200 milliGRAM(s) IV Intermittent once  melatonin 3 milliGRAM(s) Oral at bedtime  methocarbamol 750 milliGRAM(s) Oral three times a day  naloxone Injectable 0.1 milliGRAM(s) IV Push every 3 minutes PRN  omega-3-Acid Ethyl Esters 2 Gram(s) Oral two times a day  ondansetron Injectable 4 milliGRAM(s) IV Push every 6 hours PRN  pantoprazole    Tablet 40 milliGRAM(s) Oral before breakfast  phenytoin   Capsule 200 milliGRAM(s) Oral every 12 hours  polyethylene glycol 3350 17 Gram(s) Oral two times a day  QUEtiapine 200 milliGRAM(s) Oral at bedtime  senna 2 Tablet(s) Oral at bedtime PRN  venlafaxine XR. 75 milliGRAM(s) Oral daily                          9.4    7.61  )-----------( 276      ( 26 Aug 2022 06:25 )             29.4           PT/INR - ( 25 Aug 2022 04:40 )   PT: 11.7 sec;   INR: 1.01 ratio         PTT - ( 26 Aug 2022 13:55 )  PTT:68.9 sec      Pain Service   [phone]

## 2022-08-26 NOTE — PROGRESS NOTE ADULT - NS ATTEND AMEND GEN_ALL_CORE FT
Patient seen and examined . Feels better but R foot pain still not well controlled ,   not sleeping well for last 3 nights , no n/v , voiding , having BM   States had fall few days ago due to R foot pain , hit his head , no LOC ,   seen in ED and had R facial laceration sutured .      Vital Signs Last 24 Hrs  T(C): 36.5 (26 Aug 2022 11:04), Max: 37.1 (26 Aug 2022 04:18)  T(F): 97.7 (26 Aug 2022 11:04), Max: 98.7 (26 Aug 2022 04:18)  HR: 95 (26 Aug 2022 11:11) (62 - 97)  BP: 160/62 (26 Aug 2022 11:11) (90/61 - 163/74)  RR: 18 (26 Aug 2022 11:11) (17 - 18)  SpO2: 96% (26 Aug 2022 11:11) (93% - 97%)    O2 Parameters below as of 26 Aug 2022 11:11  Patient On (Oxygen Delivery Method): room air    Lungs: CTA B/L  R periorbital ecchymosis, suture + ,    CVS: S1S2 , no rubs , no murmurs   R foot cold , no palpable DP/PT,   R shin inner ulceration - with dry scab +     Patient medically optimized for planned procedure ,   will not follow daily , please if any issue recall

## 2022-08-26 NOTE — PROGRESS NOTE ADULT - ASSESSMENT
57  yr old male presents with history of htn, seizures (last 2021) , high cholesterol  PAD  s/p right SFA stenting in 2017 left leg , carotid stenosis (may require ICA intervention in future) , s/p left femoral endarterectomy 10/1/21, right femoral endarterectomy on 4/15/22  s/p right fem -PT bypass with GSV on 5/13/22 for right great toe dry gangrene . Pt has c/o right foot pain that has burning sensation and has become severe over past week , using cane and wheelchair at times, c/o pain with standing and sitting 10/10 has no relief. US done july 7/7 /22 demonstrating patent bypass with severe stenosis at the proximal anastomosis suggestive of >75% stenosis. Patient s/p RLE angio and in need for RLE bypass.     Problem/Recommendation - 1:  ·  Problem: Peripheral vascular disease.   ·  Recommendation: Scheduled for OR Tuesday 8/30 for fem-PT bypass  Heparin drip  Pain control as per pain management, changes to PCA, patient states pain still not controlled. Pain management notified. Melatonin added for sleep  IS  bowel RX for opioid induced constipation ppx.     Problem/Recommendation - 2:  ·  Problem: Hypercholesterolemia.   ·  Recommendation: statin-high dose.     Problem/Recommendation - 3:  ·  Problem: Hypertension.   ·  Recommendation: BP was low 8/24 , was started on maintenance fluids, now BP elevated,  DC IVF  Continue Amlodipine.     Problem/Recommendation - 4:  ·  Problem: Seizures.   ·  Recommendation: Dilantin level 4.1, no seizure activity     Problem/Recommendation - 5:  ·  Problem: Anemia.   ·  Recommendation:   Iron saturation is low, recommend IV  Venofer  DC fluids  repeat CBC.    Problem/Recommendation:  Problem: Right eyebrow laceration. Recommendation: From prior fall 8/18. As per DC from ER: suture removal (3 sutures) in 7 days.  57  yr old male presents with history of htn, seizures (last 2021) , high cholesterol  PAD  s/p right SFA stenting in 2017 left leg , carotid stenosis (may require ICA intervention in future) , s/p left femoral endarterectomy 10/1/21, right femoral endarterectomy on 4/15/22  s/p right fem -PT bypass with GSV on 5/13/22 for right great toe dry gangrene . Pt has c/o right foot pain that has burning sensation and has become severe over past week , using cane and wheelchair at times, c/o pain with standing and sitting 10/10 has no relief. US done july 7/7 /22 demonstrating patent bypass with severe stenosis at the proximal anastomosis suggestive of >75% stenosis. Patient s/p RLE angio and in need for RLE bypass.     Problem/Recommendation - 1:  ·  Problem: Peripheral vascular disease/ RLE ischemia   ·  Recommendation: Scheduled for OR Tuesday 8/30 for fem-PT bypass  continue Heparin drip/ ASA/ Plavix / high dose statins   Pain control as per pain management, changes to PCA, patient states pain still not controlled. Pain management notified. Melatonin added for sleep  IS  bowel RX for opioid induced constipation ppx.     Problem/Recommendation - 2:  ·  Problem: Hypercholesterolemia.   ·  Recommendation: statin-high dose.     Problem/Recommendation - 3:  ·  Problem: Hypertension.   ·  Recommendation: BP was low 8/24 , was started on maintenance fluids, now BP elevated,  DC IVF  Continue Amlodipine with parameters      Problem/Recommendation - 4:  ·  Problem: Seizures.   ·  Recommendation: Dilantin level 4.1, no seizure activity     Problem/Recommendation - 5:  ·  Problem: Anemia.   ·  Recommendation:   Iron saturation is low- chronic Iron deficiency ane,ia    recommend IV  Venofer x 3 days       Problem/Recommendation:  Problem: Right eyebrow laceration. Recommendation: From prior fall 8/18. As per DC from ER: suture removal (3 sutures) in 7 days.

## 2022-08-26 NOTE — PROGRESS NOTE ADULT - ASSESSMENT
57M  extensive PSH including L SFA stenting, L femoral endarterectomy, R femoral endarterectomy, R fem-PT bypass with GSV s/p ambulatory angiogram with no intervention 2/2 to R fem-PT occlusion admitted for persistent chronic leg ischemi  due for R fem-PT bypass revision    Med Recs:  increased PCA - DIlaudid; 0.5mg initial dose; 0 continuous; 0.3 demand dose; 8 minute lockout; 8mg 4hr max

## 2022-08-26 NOTE — PROGRESS NOTE ADULT - ASSESSMENT
56yo Male with PMH of RLE PAD s/p multiple failed interventions, R great toe gangrene, admitted after attempted angio without intervention.    Plan:   - Cont hep gtt  - DASH diet  - melatonin at bedtime   - IV venofer x 3 days per medicine  - Pain control per pain management team, pt on PCA pump   - Light fluids for PCA pump  - Remove periorbital sutures 9/2  - Patient optimized for OR from Cards standpoint  - Scheduled for OR Tuesday 8/30 for fem-PT bypass  - Dispo: Pending operation

## 2022-08-26 NOTE — PROGRESS NOTE ADULT - SUBJECTIVE AND OBJECTIVE BOX
Subjective: Patient seen and examined at bedside. Reports he is not able to get sleep overnight. Pain a little better controlled. Has not seen podiatry in house.       MEDICATIONS  (STANDING):  acetaminophen     Tablet .. 975 milliGRAM(s) Oral every 6 hours  amLODIPine   Tablet 10 milliGRAM(s) Oral daily  aspirin  chewable 81 milliGRAM(s) Oral daily  atorvastatin 80 milliGRAM(s) Oral at bedtime  chlorhexidine 4% Liquid 1 Application(s) Topical Once  clopidogrel Tablet 75 milliGRAM(s) Oral daily  ferrous    sulfate 325 milliGRAM(s) Oral daily  gabapentin 900 milliGRAM(s) Oral three times a day  heparin  Infusion 200 Unit(s)/Hr (13 mL/Hr) IV Continuous <Continuous>  HYDROmorphone PCA (1 mG/mL) 30 milliLiter(s) PCA Continuous PCA Continuous  melatonin 3 milliGRAM(s) Oral at bedtime  methocarbamol 750 milliGRAM(s) Oral three times a day  omega-3-Acid Ethyl Esters 2 Gram(s) Oral two times a day  pantoprazole    Tablet 40 milliGRAM(s) Oral before breakfast  phenytoin   Capsule 200 milliGRAM(s) Oral every 12 hours  polyethylene glycol 3350 17 Gram(s) Oral two times a day  QUEtiapine 200 milliGRAM(s) Oral at bedtime  venlafaxine XR. 75 milliGRAM(s) Oral daily    MEDICATIONS  (PRN):  aluminum hydroxide/magnesium hydroxide/simethicone Suspension 30 milliLiter(s) Oral every 8 hours PRN Dyspepsia  naloxone Injectable 0.1 milliGRAM(s) IV Push every 3 minutes PRN For ANY of the following changes in patient status:  A. RR LESS THAN 10 breaths per minute, B. Oxygen saturation LESS THAN 90%, C. Sedation score of 6  ondansetron Injectable 4 milliGRAM(s) IV Push every 6 hours PRN Nausea  senna 2 Tablet(s) Oral at bedtime PRN Constipation      No Known Allergies        Objective:     ICU Vital Signs Last 24 Hrs  T(C): 36.5 (26 Aug 2022 11:04), Max: 37.1 (26 Aug 2022 04:18)  T(F): 97.7 (26 Aug 2022 11:04), Max: 98.7 (26 Aug 2022 04:18)  HR: 95 (26 Aug 2022 11:11) (62 - 97)  BP: 160/62 (26 Aug 2022 11:11) (90/61 - 163/74)  BP(mean): --  ABP: --  ABP(mean): --  RR: 18 (26 Aug 2022 11:11) (17 - 18)  SpO2: 96% (26 Aug 2022 11:11) (93% - 97%)    O2 Parameters below as of 26 Aug 2022 11:11  Patient On (Oxygen Delivery Method): room air            I&O's Detail    25 Aug 2022 07:01  -  26 Aug 2022 07:00  --------------------------------------------------------  IN:    Heparin: 110 mL    sodium chloride 0.9%: 1100 mL  Total IN: 1210 mL    OUT:    Voided (mL): 3550 mL  Total OUT: 3550 mL    Total NET: -2340 mL          Physical Exam:  General: NAD. Lying comfortably in bed.   HEENT: NCAT. Neck supple. EOMI. R periorbital echymoses with suture  Respiratory: Non labored breathing. No respiratory distress.   Cardio: RRR  Abdomen: Soft, non-tender, non-distended. No guarding or rebound.   Extremities: No clubbing or cyanosis.   RLE DP/PT nonpalpable, non-dopplerable, however ROM intact with pain and RLE pink   RLE big toe non-healing wound   Musculoskeletal: No obvious bony masses or joint pain   Neuro: A&O x 3. CNs II-XII grossly inatct.                           9.4    7.61  )-----------( 276      ( 26 Aug 2022 06:25 )             29.4

## 2022-08-26 NOTE — PROGRESS NOTE ADULT - SUBJECTIVE AND OBJECTIVE BOX
CC: PVD       INTERVAL HPI/OVERNIGHT EVENTS: PAtient seen and examined. Sitting up at bedside. Still c/o uncontrolled pain but appears more comfortable then yesterday. Currently on PCA. States "I can't sleep or eat  because of the pain. ". Denies chest pain, SOB, dizziness, nausea, vomiting, fever, chills.     Vital Signs Last 24 Hrs  T(C): 37.1 (26 Aug 2022 04:18), Max: 37.1 (26 Aug 2022 04:18)  T(F): 98.7 (26 Aug 2022 04:18), Max: 98.7 (26 Aug 2022 04:18)  HR: 83 (26 Aug 2022 04:18) (62 - 97)  BP: 149/76 (26 Aug 2022 04:18) (149/76 - 163/74)  BP(mean): --  RR: 18 (26 Aug 2022 04:18) (17 - 18)  SpO2: 93% (26 Aug 2022 04:18) (93% - 97%)    Parameters below as of 26 Aug 2022 04:18  Patient On (Oxygen Delivery Method): room air    PHYSICAL EXAM:    General: Awake, alert, mild distress due to pain  Eyes: PERRLA, EOMI; conjunctiva and sclera clear Right eye with periorbital ecchymosis from prior fall, right eyebrow stitches C/D/I  Head: Normocephalic; atraumatic  ENMT: No nasal discharge; airway clear  Neck: Supple; non tender; no JVD  Respiratory: No wheezes, rales or rhonchi  Cardiovascular: Regular rate and rhythm. S1 and S2 Normal; No murmurs, gallops or rubs  Gastrointestinal: Soft non-tender non-distended; Normal bowel sounds  Extremities: RLE with shin with ulceration, toes cool to touch, no palpable DP/PT  Neurological: Non focal  Psychiatric: Mild anxiety        I&O's Detail    25 Aug 2022 07:01  -  26 Aug 2022 07:00  --------------------------------------------------------  IN:    Heparin: 110 mL    sodium chloride 0.9%: 1100 mL  Total IN: 1210 mL    OUT:    Voided (mL): 3550 mL  Total OUT: 3550 mL    Total NET: -2340 mL                                    9.4    7.61  )-----------( 276      ( 26 Aug 2022 06:25 )             29.4           PT/INR - ( 25 Aug 2022 04:40 )   PT: 11.7 sec;   INR: 1.01 ratio         PTT - ( 26 Aug 2022 06:25 )  PTT:33.1 sec  CAPILLARY BLOOD GLUCOSE              MEDICATIONS  (STANDING):  acetaminophen     Tablet .. 975 milliGRAM(s) Oral every 6 hours  amLODIPine   Tablet 10 milliGRAM(s) Oral daily  aspirin  chewable 81 milliGRAM(s) Oral daily  atorvastatin 80 milliGRAM(s) Oral at bedtime  chlorhexidine 4% Liquid 1 Application(s) Topical Once  clopidogrel Tablet 75 milliGRAM(s) Oral daily  ferrous    sulfate 325 milliGRAM(s) Oral daily  gabapentin 900 milliGRAM(s) Oral three times a day  heparin  Infusion 200 Unit(s)/Hr (13 mL/Hr) IV Continuous <Continuous>  HYDROmorphone PCA (1 mG/mL) 30 milliLiter(s) PCA Continuous PCA Continuous  melatonin 3 milliGRAM(s) Oral at bedtime  methocarbamol 750 milliGRAM(s) Oral three times a day  omega-3-Acid Ethyl Esters 2 Gram(s) Oral two times a day  pantoprazole    Tablet 40 milliGRAM(s) Oral before breakfast  phenytoin   Capsule 200 milliGRAM(s) Oral every 12 hours  polyethylene glycol 3350 17 Gram(s) Oral two times a day  QUEtiapine 200 milliGRAM(s) Oral at bedtime  sodium chloride 0.9%. 1000 milliLiter(s) (100 mL/Hr) IV Continuous <Continuous>  venlafaxine XR. 75 milliGRAM(s) Oral daily    MEDICATIONS  (PRN):  aluminum hydroxide/magnesium hydroxide/simethicone Suspension 30 milliLiter(s) Oral every 8 hours PRN Dyspepsia  naloxone Injectable 0.1 milliGRAM(s) IV Push every 3 minutes PRN For ANY of the following changes in patient status:  A. RR LESS THAN 10 breaths per minute, B. Oxygen saturation LESS THAN 90%, C. Sedation score of 6  ondansetron Injectable 4 milliGRAM(s) IV Push every 6 hours PRN Nausea  senna 2 Tablet(s) Oral at bedtime PRN Constipation      RADIOLOGY & ADDITIONAL TESTS:   CC: PVD       INTERVAL HPI/OVERNIGHT EVENTS: PAtient seen and examined. Sitting up at bedside. Still c/o uncontrolled pain but appears more comfortable then yesterday. Currently on PCA. States "I can't sleep or eat  because of the pain. ". Denies chest pain, SOB, dizziness, nausea, vomiting, fever, chills.     Vital Signs Last 24 Hrs  T(C): 37.1 (26 Aug 2022 04:18), Max: 37.1 (26 Aug 2022 04:18)  T(F): 98.7 (26 Aug 2022 04:18), Max: 98.7 (26 Aug 2022 04:18)  HR: 83 (26 Aug 2022 04:18) (62 - 97)  BP: 149/76 (26 Aug 2022 04:18) (149/76 - 163/74)  BP(mean): --  RR: 18 (26 Aug 2022 04:18) (17 - 18)  SpO2: 93% (26 Aug 2022 04:18) (93% - 97%)    Parameters below as of 26 Aug 2022 04:18  Patient On (Oxygen Delivery Method): room air    PHYSICAL EXAM:    General: Awake, alert, mild distress due to pain  Eyes: PERRLA, EOMI; conjunctiva and sclera clear Right eye with periorbital ecchymosis from prior fall, right eyebrow stitches C/D/I  Head: Normocephalic; atraumatic  ENMT: No nasal discharge; airway clear  Neck: Supple; non tender; no JVD  Respiratory: No wheezes, rales or rhonchi  Cardiovascular: Regular rate and rhythm. S1 and S2 Normal; No murmurs, gallops or rubs  Gastrointestinal: Soft non-tender non-distended; Normal bowel sounds  Extremities: RLE with shin with ulceration, toes cool to touch, no palpable DP/PT  Neurological: Non focal  Psychiatric: Mild anxiety        I&O's Detail    25 Aug 2022 07:01  -  26 Aug 2022 07:00  --------------------------------------------------------  IN:    Heparin: 110 mL    sodium chloride 0.9%: 1100 mL  Total IN: 1210 mL    OUT:    Voided (mL): 3550 mL  Total OUT: 3550 mL    Total NET: -2340 mL                                    9.4    7.61  )-----------( 276      ( 26 Aug 2022 06:25 )             29.4           PT/INR - ( 25 Aug 2022 04:40 )   PT: 11.7 sec;   INR: 1.01 ratio         PTT - ( 26 Aug 2022 06:25 )  PTT:33.1 sec  CAPILLARY BLOOD GLUCOSE              MEDICATIONS  (STANDING):  acetaminophen     Tablet .. 975 milliGRAM(s) Oral every 6 hours  amLODIPine   Tablet 10 milliGRAM(s) Oral daily  aspirin  chewable 81 milliGRAM(s) Oral daily  atorvastatin 80 milliGRAM(s) Oral at bedtime  chlorhexidine 4% Liquid 1 Application(s) Topical Once  clopidogrel Tablet 75 milliGRAM(s) Oral daily  ferrous    sulfate 325 milliGRAM(s) Oral daily  gabapentin 900 milliGRAM(s) Oral three times a day  heparin  Infusion 200 Unit(s)/Hr (13 mL/Hr) IV Continuous <Continuous>  HYDROmorphone PCA (1 mG/mL) 30 milliLiter(s) PCA Continuous PCA Continuous  melatonin 3 milliGRAM(s) Oral at bedtime  methocarbamol 750 milliGRAM(s) Oral three times a day  omega-3-Acid Ethyl Esters 2 Gram(s) Oral two times a day  pantoprazole    Tablet 40 milliGRAM(s) Oral before breakfast  phenytoin   Capsule 200 milliGRAM(s) Oral every 12 hours  polyethylene glycol 3350 17 Gram(s) Oral two times a day  QUEtiapine 200 milliGRAM(s) Oral at bedtime  sodium chloride 0.9%. 1000 milliLiter(s) (100 mL/Hr) IV Continuous <Continuous>  venlafaxine XR. 75 milliGRAM(s) Oral daily    MEDICATIONS  (PRN):  aluminum hydroxide/magnesium hydroxide/simethicone Suspension 30 milliLiter(s) Oral every 8 hours PRN Dyspepsia  naloxone Injectable 0.1 milliGRAM(s) IV Push every 3 minutes PRN For ANY of the following changes in patient status:  A. RR LESS THAN 10 breaths per minute, B. Oxygen saturation LESS THAN 90%, C. Sedation score of 6  ondansetron Injectable 4 milliGRAM(s) IV Push every 6 hours PRN Nausea  senna 2 Tablet(s) Oral at bedtime PRN Constipation      RADIOLOGY & ADDITIONAL TESTS:

## 2022-08-27 LAB
APTT BLD: 115.3 SEC — HIGH (ref 27.5–35.5)
APTT BLD: 64.3 SEC — HIGH (ref 27.5–35.5)
APTT BLD: 94.5 SEC — HIGH (ref 27.5–35.5)
HCT VFR BLD CALC: 30.8 % — LOW (ref 39–50)
HGB BLD-MCNC: 9.7 G/DL — LOW (ref 13–17)
MCHC RBC-ENTMCNC: 28 PG — SIGNIFICANT CHANGE UP (ref 27–34)
MCHC RBC-ENTMCNC: 31.5 GM/DL — LOW (ref 32–36)
MCV RBC AUTO: 88.8 FL — SIGNIFICANT CHANGE UP (ref 80–100)
PLATELET # BLD AUTO: 282 K/UL — SIGNIFICANT CHANGE UP (ref 150–400)
RBC # BLD: 3.47 M/UL — LOW (ref 4.2–5.8)
RBC # FLD: 15.8 % — HIGH (ref 10.3–14.5)
WBC # BLD: 8.27 K/UL — SIGNIFICANT CHANGE UP (ref 3.8–10.5)
WBC # FLD AUTO: 8.27 K/UL — SIGNIFICANT CHANGE UP (ref 3.8–10.5)

## 2022-08-27 PROCEDURE — 99232 SBSQ HOSP IP/OBS MODERATE 35: CPT | Mod: GC

## 2022-08-27 RX ORDER — HYDROMORPHONE HYDROCHLORIDE 2 MG/ML
1 INJECTION INTRAMUSCULAR; INTRAVENOUS; SUBCUTANEOUS ONCE
Refills: 0 | Status: DISCONTINUED | OUTPATIENT
Start: 2022-08-27 | End: 2022-08-27

## 2022-08-27 RX ORDER — HYDROMORPHONE HYDROCHLORIDE 2 MG/ML
30 INJECTION INTRAMUSCULAR; INTRAVENOUS; SUBCUTANEOUS
Refills: 0 | Status: DISCONTINUED | OUTPATIENT
Start: 2022-08-27 | End: 2022-08-30

## 2022-08-27 RX ORDER — KETOROLAC TROMETHAMINE 30 MG/ML
15 SYRINGE (ML) INJECTION EVERY 6 HOURS
Refills: 0 | Status: DISCONTINUED | OUTPATIENT
Start: 2022-08-27 | End: 2022-08-27

## 2022-08-27 RX ORDER — HEPARIN SODIUM 5000 [USP'U]/ML
1200 INJECTION INTRAVENOUS; SUBCUTANEOUS
Qty: 25000 | Refills: 0 | Status: DISCONTINUED | OUTPATIENT
Start: 2022-08-27 | End: 2022-08-30

## 2022-08-27 RX ADMIN — Medication 15 MILLIGRAM(S): at 22:18

## 2022-08-27 RX ADMIN — Medication 81 MILLIGRAM(S): at 12:14

## 2022-08-27 RX ADMIN — HEPARIN SODIUM 1200 UNIT(S)/HR: 5000 INJECTION INTRAVENOUS; SUBCUTANEOUS at 07:42

## 2022-08-27 RX ADMIN — HEPARIN SODIUM 1100 UNIT(S)/HR: 5000 INJECTION INTRAVENOUS; SUBCUTANEOUS at 19:28

## 2022-08-27 RX ADMIN — HYDROMORPHONE HYDROCHLORIDE 1 MILLIGRAM(S): 2 INJECTION INTRAMUSCULAR; INTRAVENOUS; SUBCUTANEOUS at 12:11

## 2022-08-27 RX ADMIN — CLOPIDOGREL BISULFATE 75 MILLIGRAM(S): 75 TABLET, FILM COATED ORAL at 12:14

## 2022-08-27 RX ADMIN — GABAPENTIN 900 MILLIGRAM(S): 400 CAPSULE ORAL at 22:13

## 2022-08-27 RX ADMIN — PANTOPRAZOLE SODIUM 40 MILLIGRAM(S): 20 TABLET, DELAYED RELEASE ORAL at 05:48

## 2022-08-27 RX ADMIN — GABAPENTIN 900 MILLIGRAM(S): 400 CAPSULE ORAL at 05:47

## 2022-08-27 RX ADMIN — HYDROMORPHONE HYDROCHLORIDE 30 MILLILITER(S): 2 INJECTION INTRAMUSCULAR; INTRAVENOUS; SUBCUTANEOUS at 07:46

## 2022-08-27 RX ADMIN — Medication 975 MILLIGRAM(S): at 18:31

## 2022-08-27 RX ADMIN — GABAPENTIN 900 MILLIGRAM(S): 400 CAPSULE ORAL at 18:21

## 2022-08-27 RX ADMIN — CHLORHEXIDINE GLUCONATE 1 APPLICATION(S): 213 SOLUTION TOPICAL at 05:54

## 2022-08-27 RX ADMIN — METHOCARBAMOL 750 MILLIGRAM(S): 500 TABLET, FILM COATED ORAL at 22:14

## 2022-08-27 RX ADMIN — Medication 975 MILLIGRAM(S): at 05:47

## 2022-08-27 RX ADMIN — HEPARIN SODIUM 1200 UNIT(S)/HR: 5000 INJECTION INTRAVENOUS; SUBCUTANEOUS at 02:01

## 2022-08-27 RX ADMIN — Medication 15 MILLIGRAM(S): at 18:30

## 2022-08-27 RX ADMIN — Medication 2 GRAM(S): at 05:47

## 2022-08-27 RX ADMIN — HEPARIN SODIUM 1100 UNIT(S)/HR: 5000 INJECTION INTRAVENOUS; SUBCUTANEOUS at 18:44

## 2022-08-27 RX ADMIN — METHOCARBAMOL 750 MILLIGRAM(S): 500 TABLET, FILM COATED ORAL at 05:48

## 2022-08-27 RX ADMIN — Medication 15 MILLIGRAM(S): at 23:00

## 2022-08-27 RX ADMIN — Medication 975 MILLIGRAM(S): at 18:55

## 2022-08-27 RX ADMIN — Medication 975 MILLIGRAM(S): at 23:00

## 2022-08-27 RX ADMIN — METHOCARBAMOL 750 MILLIGRAM(S): 500 TABLET, FILM COATED ORAL at 18:30

## 2022-08-27 RX ADMIN — ATORVASTATIN CALCIUM 80 MILLIGRAM(S): 80 TABLET, FILM COATED ORAL at 22:13

## 2022-08-27 RX ADMIN — HYDROMORPHONE HYDROCHLORIDE 30 MILLILITER(S): 2 INJECTION INTRAMUSCULAR; INTRAVENOUS; SUBCUTANEOUS at 14:40

## 2022-08-27 RX ADMIN — Medication 15 MILLIGRAM(S): at 09:32

## 2022-08-27 RX ADMIN — Medication 200 MILLIGRAM(S): at 18:25

## 2022-08-27 RX ADMIN — HYDROMORPHONE HYDROCHLORIDE 1 MILLIGRAM(S): 2 INJECTION INTRAMUSCULAR; INTRAVENOUS; SUBCUTANEOUS at 12:45

## 2022-08-27 RX ADMIN — Medication 15 MILLIGRAM(S): at 09:22

## 2022-08-27 RX ADMIN — Medication 975 MILLIGRAM(S): at 06:30

## 2022-08-27 RX ADMIN — HYDROMORPHONE HYDROCHLORIDE 30 MILLILITER(S): 2 INJECTION INTRAMUSCULAR; INTRAVENOUS; SUBCUTANEOUS at 19:29

## 2022-08-27 RX ADMIN — Medication 15 MILLIGRAM(S): at 12:45

## 2022-08-27 RX ADMIN — Medication 15 MILLIGRAM(S): at 12:16

## 2022-08-27 RX ADMIN — Medication 975 MILLIGRAM(S): at 22:12

## 2022-08-27 RX ADMIN — HEPARIN SODIUM 1100 UNIT(S)/HR: 5000 INJECTION INTRAVENOUS; SUBCUTANEOUS at 07:44

## 2022-08-27 RX ADMIN — Medication 3 MILLIGRAM(S): at 22:13

## 2022-08-27 RX ADMIN — Medication 15 MILLIGRAM(S): at 18:40

## 2022-08-27 RX ADMIN — Medication 200 MILLIGRAM(S): at 05:48

## 2022-08-27 RX ADMIN — AMLODIPINE BESYLATE 10 MILLIGRAM(S): 2.5 TABLET ORAL at 05:47

## 2022-08-27 RX ADMIN — QUETIAPINE FUMARATE 200 MILLIGRAM(S): 200 TABLET, FILM COATED ORAL at 22:13

## 2022-08-27 RX ADMIN — Medication 75 MILLIGRAM(S): at 12:15

## 2022-08-27 RX ADMIN — Medication 2 GRAM(S): at 18:23

## 2022-08-27 NOTE — PROGRESS NOTE ADULT - SUBJECTIVE AND OBJECTIVE BOX
Subjective and Objective:  Patient seen during rounds  Patient reports pain to be uncontrolled on current medications  Patient denies sedation with medications, appears to be in severe pain, states he cannot sleep or eat due to pain.  Receiving dilaudid pca with appropriate use, gabapentin, toradol, apap, robaxin       T(C): 36.5 (08-26-22 @ 11:04), Max: 37.1 (08-26-22 @ 04:18)  HR: 95 (08-26-22 @ 11:11) (62 - 97)  BP: 160/62 (08-26-22 @ 11:11) (90/61 - 163/74)  RR: 18 (08-26-22 @ 11:11) (17 - 18)  SpO2: 96% (08-26-22 @ 11:11) (93% - 97%)      acetaminophen     Tablet .. 975 milliGRAM(s) Oral every 6 hours  aluminum hydroxide/magnesium hydroxide/simethicone Suspension 30 milliLiter(s) Oral every 8 hours PRN  amLODIPine   Tablet 10 milliGRAM(s) Oral daily  aspirin  chewable 81 milliGRAM(s) Oral daily  atorvastatin 80 milliGRAM(s) Oral at bedtime  chlorhexidine 4% Liquid 1 Application(s) Topical Once  clopidogrel Tablet 75 milliGRAM(s) Oral daily  gabapentin 900 milliGRAM(s) Oral three times a day  heparin  Infusion 200 Unit(s)/Hr IV Continuous <Continuous>  HYDROmorphone PCA (1 mG/mL) 30 milliLiter(s) PCA Continuous PCA Continuous  iron sucrose IVPB 200 milliGRAM(s) IV Intermittent once  melatonin 3 milliGRAM(s) Oral at bedtime  methocarbamol 750 milliGRAM(s) Oral three times a day  naloxone Injectable 0.1 milliGRAM(s) IV Push every 3 minutes PRN  omega-3-Acid Ethyl Esters 2 Gram(s) Oral two times a day  ondansetron Injectable 4 milliGRAM(s) IV Push every 6 hours PRN  pantoprazole    Tablet 40 milliGRAM(s) Oral before breakfast  phenytoin   Capsule 200 milliGRAM(s) Oral every 12 hours  polyethylene glycol 3350 17 Gram(s) Oral two times a day  QUEtiapine 200 milliGRAM(s) Oral at bedtime  senna 2 Tablet(s) Oral at bedtime PRN  venlafaxine XR. 75 milliGRAM(s) Oral daily    Assessment and Plan:   · Assessment	  57M  extensive PSH including L SFA stenting, L femoral endarterectomy, R femoral endarterectomy, R fem-PT bypass with GSV s/p ambulatory angiogram with no intervention 2/2 to R fem-PT occlusion admitted for persistent chronic leg ischemi  due for R fem-PT bypass revision in 3 days    Plan:  SC dilaudid 1mg X1 now while PCA bag/settings changed           Increase PCA orders:  dilaudid 0.3mg q6min, 4-hr limit 10mg  Will f/u  -Nena Cabrera NP-C    Pain Service   [phone]

## 2022-08-27 NOTE — PROGRESS NOTE ADULT - SUBJECTIVE AND OBJECTIVE BOX
Subjective: Patient seen and examined at bedside. no acute events overnight       MEDICATIONS  (STANDING):  acetaminophen     Tablet .. 975 milliGRAM(s) Oral every 6 hours  amLODIPine   Tablet 10 milliGRAM(s) Oral daily  aspirin  chewable 81 milliGRAM(s) Oral daily  atorvastatin 80 milliGRAM(s) Oral at bedtime  chlorhexidine 4% Liquid 1 Application(s) Topical Once  clopidogrel Tablet 75 milliGRAM(s) Oral daily  ferrous    sulfate 325 milliGRAM(s) Oral daily  gabapentin 900 milliGRAM(s) Oral three times a day  heparin  Infusion 200 Unit(s)/Hr (13 mL/Hr) IV Continuous <Continuous>  HYDROmorphone PCA (1 mG/mL) 30 milliLiter(s) PCA Continuous PCA Continuous  melatonin 3 milliGRAM(s) Oral at bedtime  methocarbamol 750 milliGRAM(s) Oral three times a day  omega-3-Acid Ethyl Esters 2 Gram(s) Oral two times a day  pantoprazole    Tablet 40 milliGRAM(s) Oral before breakfast  phenytoin   Capsule 200 milliGRAM(s) Oral every 12 hours  polyethylene glycol 3350 17 Gram(s) Oral two times a day  QUEtiapine 200 milliGRAM(s) Oral at bedtime  venlafaxine XR. 75 milliGRAM(s) Oral daily    MEDICATIONS  (PRN):  aluminum hydroxide/magnesium hydroxide/simethicone Suspension 30 milliLiter(s) Oral every 8 hours PRN Dyspepsia  naloxone Injectable 0.1 milliGRAM(s) IV Push every 3 minutes PRN For ANY of the following changes in patient status:  A. RR LESS THAN 10 breaths per minute, B. Oxygen saturation LESS THAN 90%, C. Sedation score of 6  ondansetron Injectable 4 milliGRAM(s) IV Push every 6 hours PRN Nausea  senna 2 Tablet(s) Oral at bedtime PRN Constipation      No Known Allergies        Objective:     Vital Signs Last 24 Hrs  T(C): 36.3 (27 Aug 2022 05:04), Max: 36.7 (26 Aug 2022 15:00)  T(F): 97.4 (27 Aug 2022 05:04), Max: 98.1 (26 Aug 2022 20:26)  HR: 84 (27 Aug 2022 05:04) (84 - 105)  BP: 143/71 (27 Aug 2022 05:04) (90/61 - 170/81)  BP(mean): --  RR: 18 (27 Aug 2022 05:04) (18 - 18)  SpO2: 94% (27 Aug 2022 05:04) (94% - 96%)    Parameters below as of 27 Aug 2022 05:04  Patient On (Oxygen Delivery Method): room air            Physical Exam:  General: NAD. Lying comfortably in bed.   HEENT: NCAT. Neck supple. EOMI. R periorbital echymoses with suture  Respiratory: Non labored breathing. No respiratory distress.   Cardio: RRR  Abdomen: Soft, non-tender, non-distended. No guarding or rebound.   Extremities: No clubbing or cyanosis.   RLE DP/PT nonpalpable, non-dopplerable, however ROM intact with pain and RLE pink   RLE big toe non-healing wound   Musculoskeletal: No obvious bony masses or joint pain   Neuro: A&O x 3. CNs II-XII grossly inatct.

## 2022-08-27 NOTE — PROGRESS NOTE ADULT - ASSESSMENT
58yo Male with PMH of RLE PAD s/p multiple failed interventions, R great toe gangrene, admitted after attempted angio without intervention.    Plan:   - Cont hep gtt  - DASH diet  - melatonin at bedtime   - IV venofer x 3 days per medicine  - Pain control per pain management team, pt on PCA pump   - Light fluids for PCA pump  - Remove periorbital sutures 9/2  - Patient optimized for OR from Cards standpoint  - Scheduled for OR Tuesday 8/30 for fem-PT bypass  - Dispo: Pending operation

## 2022-08-28 LAB
APTT BLD: 82 SEC — HIGH (ref 27.5–35.5)
BASOPHILS # BLD AUTO: 0.05 K/UL — SIGNIFICANT CHANGE UP (ref 0–0.2)
BASOPHILS NFR BLD AUTO: 0.5 % — SIGNIFICANT CHANGE UP (ref 0–2)
EOSINOPHIL # BLD AUTO: 0.39 K/UL — SIGNIFICANT CHANGE UP (ref 0–0.5)
EOSINOPHIL NFR BLD AUTO: 4.1 % — SIGNIFICANT CHANGE UP (ref 0–6)
HCT VFR BLD CALC: 28.6 % — LOW (ref 39–50)
HGB BLD-MCNC: 9.2 G/DL — LOW (ref 13–17)
IMM GRANULOCYTES NFR BLD AUTO: 0.6 % — SIGNIFICANT CHANGE UP (ref 0–1.5)
LYMPHOCYTES # BLD AUTO: 2.91 K/UL — SIGNIFICANT CHANGE UP (ref 1–3.3)
LYMPHOCYTES # BLD AUTO: 30.9 % — SIGNIFICANT CHANGE UP (ref 13–44)
MCHC RBC-ENTMCNC: 28.6 PG — SIGNIFICANT CHANGE UP (ref 27–34)
MCHC RBC-ENTMCNC: 32.2 GM/DL — SIGNIFICANT CHANGE UP (ref 32–36)
MCV RBC AUTO: 88.8 FL — SIGNIFICANT CHANGE UP (ref 80–100)
MONOCYTES # BLD AUTO: 0.95 K/UL — HIGH (ref 0–0.9)
MONOCYTES NFR BLD AUTO: 10.1 % — SIGNIFICANT CHANGE UP (ref 2–14)
NEUTROPHILS # BLD AUTO: 5.05 K/UL — SIGNIFICANT CHANGE UP (ref 1.8–7.4)
NEUTROPHILS NFR BLD AUTO: 53.8 % — SIGNIFICANT CHANGE UP (ref 43–77)
PLATELET # BLD AUTO: 310 K/UL — SIGNIFICANT CHANGE UP (ref 150–400)
RBC # BLD: 3.22 M/UL — LOW (ref 4.2–5.8)
RBC # FLD: 16.3 % — HIGH (ref 10.3–14.5)
WBC # BLD: 9.41 K/UL — SIGNIFICANT CHANGE UP (ref 3.8–10.5)
WBC # FLD AUTO: 9.41 K/UL — SIGNIFICANT CHANGE UP (ref 3.8–10.5)

## 2022-08-28 RX ADMIN — GABAPENTIN 900 MILLIGRAM(S): 400 CAPSULE ORAL at 22:26

## 2022-08-28 RX ADMIN — Medication 975 MILLIGRAM(S): at 13:45

## 2022-08-28 RX ADMIN — Medication 75 MILLIGRAM(S): at 11:58

## 2022-08-28 RX ADMIN — QUETIAPINE FUMARATE 200 MILLIGRAM(S): 200 TABLET, FILM COATED ORAL at 22:26

## 2022-08-28 RX ADMIN — HEPARIN SODIUM 1100 UNIT(S)/HR: 5000 INJECTION INTRAVENOUS; SUBCUTANEOUS at 07:42

## 2022-08-28 RX ADMIN — Medication 2 GRAM(S): at 18:02

## 2022-08-28 RX ADMIN — Medication 975 MILLIGRAM(S): at 18:03

## 2022-08-28 RX ADMIN — Medication 975 MILLIGRAM(S): at 19:02

## 2022-08-28 RX ADMIN — CLOPIDOGREL BISULFATE 75 MILLIGRAM(S): 75 TABLET, FILM COATED ORAL at 11:58

## 2022-08-28 RX ADMIN — GABAPENTIN 900 MILLIGRAM(S): 400 CAPSULE ORAL at 15:47

## 2022-08-28 RX ADMIN — METHOCARBAMOL 750 MILLIGRAM(S): 500 TABLET, FILM COATED ORAL at 22:26

## 2022-08-28 RX ADMIN — Medication 200 MILLIGRAM(S): at 18:02

## 2022-08-28 RX ADMIN — Medication 975 MILLIGRAM(S): at 22:27

## 2022-08-28 RX ADMIN — HYDROMORPHONE HYDROCHLORIDE 30 MILLILITER(S): 2 INJECTION INTRAMUSCULAR; INTRAVENOUS; SUBCUTANEOUS at 19:10

## 2022-08-28 RX ADMIN — Medication 975 MILLIGRAM(S): at 05:30

## 2022-08-28 RX ADMIN — GABAPENTIN 900 MILLIGRAM(S): 400 CAPSULE ORAL at 05:35

## 2022-08-28 RX ADMIN — Medication 975 MILLIGRAM(S): at 14:44

## 2022-08-28 RX ADMIN — HEPARIN SODIUM 1100 UNIT(S)/HR: 5000 INJECTION INTRAVENOUS; SUBCUTANEOUS at 01:14

## 2022-08-28 RX ADMIN — Medication 2 GRAM(S): at 05:33

## 2022-08-28 RX ADMIN — PANTOPRAZOLE SODIUM 40 MILLIGRAM(S): 20 TABLET, DELAYED RELEASE ORAL at 05:36

## 2022-08-28 RX ADMIN — Medication 200 MILLIGRAM(S): at 05:34

## 2022-08-28 RX ADMIN — Medication 975 MILLIGRAM(S): at 23:30

## 2022-08-28 RX ADMIN — METHOCARBAMOL 750 MILLIGRAM(S): 500 TABLET, FILM COATED ORAL at 15:48

## 2022-08-28 RX ADMIN — METHOCARBAMOL 750 MILLIGRAM(S): 500 TABLET, FILM COATED ORAL at 05:35

## 2022-08-28 RX ADMIN — Medication 975 MILLIGRAM(S): at 06:11

## 2022-08-28 RX ADMIN — ATORVASTATIN CALCIUM 80 MILLIGRAM(S): 80 TABLET, FILM COATED ORAL at 22:26

## 2022-08-28 RX ADMIN — HEPARIN SODIUM 1100 UNIT(S)/HR: 5000 INJECTION INTRAVENOUS; SUBCUTANEOUS at 19:12

## 2022-08-28 RX ADMIN — Medication 81 MILLIGRAM(S): at 11:58

## 2022-08-28 RX ADMIN — AMLODIPINE BESYLATE 10 MILLIGRAM(S): 2.5 TABLET ORAL at 05:35

## 2022-08-28 RX ADMIN — HYDROMORPHONE HYDROCHLORIDE 30 MILLILITER(S): 2 INJECTION INTRAMUSCULAR; INTRAVENOUS; SUBCUTANEOUS at 07:41

## 2022-08-28 RX ADMIN — Medication 3 MILLIGRAM(S): at 22:26

## 2022-08-28 NOTE — PROGRESS NOTE ADULT - SUBJECTIVE AND OBJECTIVE BOX
INTERVAL HPI/OVERNIGHT EVENTS/SUBJECTIVE:  Pt seen and examined. still complaining of severe left foot pain, pain improved with foot off the bed. Also pain management increased PCA today which has helped him. planned for OR on tuesday for revision of bypass     ICU Vital Signs Last 24 Hrs  T(C): 36.7 (27 Aug 2022 20:44), Max: 36.7 (27 Aug 2022 16:48)  T(F): 98 (27 Aug 2022 20:44), Max: 98 (27 Aug 2022 16:48)  HR: 100 (27 Aug 2022 20:44) (72 - 100)  BP: 115/69 (27 Aug 2022 16:48) (115/69 - 143/71)  BP(mean): --  ABP: --  ABP(mean): --  RR: 17 (27 Aug 2022 20:44) (17 - 18)  SpO2: 95% (27 Aug 2022 20:44) (91% - 95%)    O2 Parameters below as of 27 Aug 2022 20:44  Patient On (Oxygen Delivery Method): room air    I&O's Detail    26 Aug 2022 07:01  -  27 Aug 2022 07:00  --------------------------------------------------------  IN:  Total IN: 0 mL    OUT:    Voided (mL): 1450 mL  Total OUT: 1450 mL    Total NET: -1450 mL      MEDICATIONS  (STANDING):  acetaminophen     Tablet .. 975 milliGRAM(s) Oral every 6 hours  amLODIPine   Tablet 10 milliGRAM(s) Oral daily  aspirin  chewable 81 milliGRAM(s) Oral daily  atorvastatin 80 milliGRAM(s) Oral at bedtime  clopidogrel Tablet 75 milliGRAM(s) Oral daily  gabapentin 900 milliGRAM(s) Oral three times a day  heparin  Infusion. 1200 Unit(s)/Hr (12 mL/Hr) IV Continuous <Continuous>  HYDROmorphone PCA (1 mG/mL) 30 milliLiter(s) PCA Continuous PCA Continuous  melatonin 3 milliGRAM(s) Oral at bedtime  methocarbamol 750 milliGRAM(s) Oral three times a day  omega-3-Acid Ethyl Esters 2 Gram(s) Oral two times a day  pantoprazole    Tablet 40 milliGRAM(s) Oral before breakfast  phenytoin   Capsule 200 milliGRAM(s) Oral every 12 hours  polyethylene glycol 3350 17 Gram(s) Oral two times a day  QUEtiapine 200 milliGRAM(s) Oral at bedtime  sodium chloride 0.9%. 1000 milliLiter(s) (30 mL/Hr) IV Continuous <Continuous>  venlafaxine XR. 75 milliGRAM(s) Oral daily    MEDICATIONS  (PRN):  aluminum hydroxide/magnesium hydroxide/simethicone Suspension 30 milliLiter(s) Oral every 8 hours PRN Dyspepsia  naloxone Injectable 0.1 milliGRAM(s) IV Push every 3 minutes PRN For ANY of the following changes in patient status:  A. RR LESS THAN 10 breaths per minute, B. Oxygen saturation LESS THAN 90%, C. Sedation score of 6  ondansetron Injectable 4 milliGRAM(s) IV Push every 6 hours PRN Nausea  senna 2 Tablet(s) Oral at bedtime PRN Constipation        MISC:     PHYSICAL EXAM:  General: NAD. Lying comfortably in bed.   Respiratory: Non labored breathing. No respiratory distress.   Cardio: RRR  Abdomen: Soft, non-tender, non-distended. No guarding or rebound.   Extremities: No clubbing or cyanosis.   RLE DP/PT nonpalpable, non-dopplerable, however ROM intact with pain and RLE pink   RLE big toe non-healing wound   Musculoskeletal: No obvious bony masses or joint pain       LABS:  CBC Full  -  ( 27 Aug 2022 08:44 )  WBC Count : 8.27 K/uL  RBC Count : 3.47 M/uL  Hemoglobin : 9.7 g/dL  Hematocrit : 30.8 %  Platelet Count - Automated : 282 K/uL  Mean Cell Volume : 88.8 fl  Mean Cell Hemoglobin : 28.0 pg  Mean Cell Hemoglobin Concentration : 31.5 gm/dL  Auto Neutrophil # : x  Auto Lymphocyte # : x  Auto Monocyte # : x  Auto Eosinophil # : x  Auto Basophil # : x  Auto Neutrophil % : x  Auto Lymphocyte % : x  Auto Monocyte % : x  Auto Eosinophil % : x  Auto Basophil % : x      PTT - ( 27 Aug 2022 16:13 )  PTT:64.3 sec    RECENT CULTURES:    ASSESSMENT/PLAN:  57yMale PMH of RLE PAD s/p multiple failed interventions, R great toe gangrene, admitted after attempted angio without intervention.    Plan:   - Cont hep gtt  - DASH diet  - melatonin at bedtime   - IV venofer x 3 days per medicine  - Pain control per pain management team, pt on PCA pump   - Light fluids for PCA pump  - Remove periorbital sutures 9/2  - Patient optimized for OR from Cards standpoint  - Scheduled for OR Tuesday 8/30 for fem-PT bypass  -PCA for pain   - Dispo: Pending operation

## 2022-08-29 ENCOUNTER — TRANSCRIPTION ENCOUNTER (OUTPATIENT)
Age: 57
End: 2022-08-29

## 2022-08-29 LAB
APTT BLD: 77.3 SEC — HIGH (ref 27.5–35.5)
BLD GP AB SCN SERPL QL: SIGNIFICANT CHANGE UP
HCT VFR BLD CALC: 29.1 % — LOW (ref 39–50)
HGB BLD-MCNC: 8.8 G/DL — LOW (ref 13–17)
MCHC RBC-ENTMCNC: 27.4 PG — SIGNIFICANT CHANGE UP (ref 27–34)
MCHC RBC-ENTMCNC: 30.2 GM/DL — LOW (ref 32–36)
MCV RBC AUTO: 90.7 FL — SIGNIFICANT CHANGE UP (ref 80–100)
PLATELET # BLD AUTO: 301 K/UL — SIGNIFICANT CHANGE UP (ref 150–400)
RBC # BLD: 3.21 M/UL — LOW (ref 4.2–5.8)
RBC # FLD: 17.3 % — HIGH (ref 10.3–14.5)
SARS-COV-2 RNA SPEC QL NAA+PROBE: SIGNIFICANT CHANGE UP
WBC # BLD: 11.13 K/UL — HIGH (ref 3.8–10.5)
WBC # FLD AUTO: 11.13 K/UL — HIGH (ref 3.8–10.5)

## 2022-08-29 PROCEDURE — 99232 SBSQ HOSP IP/OBS MODERATE 35: CPT

## 2022-08-29 RX ORDER — KETOROLAC TROMETHAMINE 30 MG/ML
15 SYRINGE (ML) INJECTION EVERY 6 HOURS
Refills: 0 | Status: DISCONTINUED | OUTPATIENT
Start: 2022-08-29 | End: 2022-08-30

## 2022-08-29 RX ORDER — HYDROMORPHONE HYDROCHLORIDE 2 MG/ML
1 INJECTION INTRAMUSCULAR; INTRAVENOUS; SUBCUTANEOUS ONCE
Refills: 0 | Status: DISCONTINUED | OUTPATIENT
Start: 2022-08-29 | End: 2022-08-29

## 2022-08-29 RX ORDER — ACETAMINOPHEN 500 MG
1000 TABLET ORAL ONCE
Refills: 0 | Status: COMPLETED | OUTPATIENT
Start: 2022-08-29 | End: 2022-08-29

## 2022-08-29 RX ADMIN — Medication 2 GRAM(S): at 05:36

## 2022-08-29 RX ADMIN — Medication 975 MILLIGRAM(S): at 19:54

## 2022-08-29 RX ADMIN — Medication 15 MILLIGRAM(S): at 22:00

## 2022-08-29 RX ADMIN — METHOCARBAMOL 750 MILLIGRAM(S): 500 TABLET, FILM COATED ORAL at 05:35

## 2022-08-29 RX ADMIN — Medication 15 MILLIGRAM(S): at 17:50

## 2022-08-29 RX ADMIN — Medication 75 MILLIGRAM(S): at 11:51

## 2022-08-29 RX ADMIN — HYDROMORPHONE HYDROCHLORIDE 30 MILLILITER(S): 2 INJECTION INTRAMUSCULAR; INTRAVENOUS; SUBCUTANEOUS at 09:41

## 2022-08-29 RX ADMIN — Medication 3 MILLIGRAM(S): at 21:38

## 2022-08-29 RX ADMIN — Medication 200 MILLIGRAM(S): at 16:33

## 2022-08-29 RX ADMIN — Medication 975 MILLIGRAM(S): at 23:58

## 2022-08-29 RX ADMIN — Medication 15 MILLIGRAM(S): at 21:41

## 2022-08-29 RX ADMIN — CLOPIDOGREL BISULFATE 75 MILLIGRAM(S): 75 TABLET, FILM COATED ORAL at 11:49

## 2022-08-29 RX ADMIN — GABAPENTIN 900 MILLIGRAM(S): 400 CAPSULE ORAL at 21:38

## 2022-08-29 RX ADMIN — Medication 15 MILLIGRAM(S): at 16:33

## 2022-08-29 RX ADMIN — PANTOPRAZOLE SODIUM 40 MILLIGRAM(S): 20 TABLET, DELAYED RELEASE ORAL at 05:38

## 2022-08-29 RX ADMIN — Medication 15 MILLIGRAM(S): at 12:50

## 2022-08-29 RX ADMIN — QUETIAPINE FUMARATE 200 MILLIGRAM(S): 200 TABLET, FILM COATED ORAL at 21:38

## 2022-08-29 RX ADMIN — HYDROMORPHONE HYDROCHLORIDE 30 MILLILITER(S): 2 INJECTION INTRAMUSCULAR; INTRAVENOUS; SUBCUTANEOUS at 21:24

## 2022-08-29 RX ADMIN — HEPARIN SODIUM 1100 UNIT(S)/HR: 5000 INJECTION INTRAVENOUS; SUBCUTANEOUS at 21:30

## 2022-08-29 RX ADMIN — HYDROMORPHONE HYDROCHLORIDE 1 MILLIGRAM(S): 2 INJECTION INTRAMUSCULAR; INTRAVENOUS; SUBCUTANEOUS at 12:20

## 2022-08-29 RX ADMIN — HEPARIN SODIUM 1100 UNIT(S)/HR: 5000 INJECTION INTRAVENOUS; SUBCUTANEOUS at 22:27

## 2022-08-29 RX ADMIN — METHOCARBAMOL 750 MILLIGRAM(S): 500 TABLET, FILM COATED ORAL at 16:33

## 2022-08-29 RX ADMIN — ATORVASTATIN CALCIUM 80 MILLIGRAM(S): 80 TABLET, FILM COATED ORAL at 21:38

## 2022-08-29 RX ADMIN — METHOCARBAMOL 750 MILLIGRAM(S): 500 TABLET, FILM COATED ORAL at 21:38

## 2022-08-29 RX ADMIN — Medication 1000 MILLIGRAM(S): at 12:50

## 2022-08-29 RX ADMIN — Medication 200 MILLIGRAM(S): at 05:36

## 2022-08-29 RX ADMIN — Medication 400 MILLIGRAM(S): at 10:41

## 2022-08-29 RX ADMIN — GABAPENTIN 900 MILLIGRAM(S): 400 CAPSULE ORAL at 16:33

## 2022-08-29 RX ADMIN — HYDROMORPHONE HYDROCHLORIDE 1 MILLIGRAM(S): 2 INJECTION INTRAMUSCULAR; INTRAVENOUS; SUBCUTANEOUS at 11:48

## 2022-08-29 RX ADMIN — HEPARIN SODIUM 1100 UNIT(S)/HR: 5000 INJECTION INTRAVENOUS; SUBCUTANEOUS at 00:17

## 2022-08-29 RX ADMIN — GABAPENTIN 900 MILLIGRAM(S): 400 CAPSULE ORAL at 05:35

## 2022-08-29 RX ADMIN — Medication 2 GRAM(S): at 16:34

## 2022-08-29 RX ADMIN — Medication 975 MILLIGRAM(S): at 06:15

## 2022-08-29 RX ADMIN — Medication 975 MILLIGRAM(S): at 05:30

## 2022-08-29 RX ADMIN — Medication 81 MILLIGRAM(S): at 11:49

## 2022-08-29 RX ADMIN — Medication 975 MILLIGRAM(S): at 20:30

## 2022-08-29 RX ADMIN — AMLODIPINE BESYLATE 10 MILLIGRAM(S): 2.5 TABLET ORAL at 05:35

## 2022-08-29 RX ADMIN — Medication 15 MILLIGRAM(S): at 11:49

## 2022-08-29 NOTE — PROGRESS NOTE ADULT - ASSESSMENT
57y Male PMHx of RLE PAD s/p multiple failed interventions, R great toe gangrene, admitted after attempted angio without intervention. Patient continues to have pain to RLE foot and shin. Patient scheduled for the OR tomorrow.     Plan:  s/p failed angio 8/23  Will go to OR tomorrow for fem-PT bypass  Patient optimized for OR from Cards standpoint  Patient NPO after midnight   COVID, type and screen ordered  Cont. hep gtt  AM labs   Pain control per pain management team, pt on PCA pump   IV venofer x 3 days per medicine  Remove periorbital sutures 9/2

## 2022-08-29 NOTE — PROGRESS NOTE ADULT - SUBJECTIVE AND OBJECTIVE BOX
HPI/Overnight Events:   57y Male PMHx of RLE PAD s/p multiple failed interventions, R great toe gangrene, admitted after attempted angio without intervention.  No acute events overnight, patient continues to have pain control issues. Patient scheduled for surgery tomorrow.       PAST MEDICAL HISTORY:  Hypercholesterolemia  Seizures  Depression  GERD gastroesophageal reflux disease)  Hypertension  Peripheral vascular disease  Osteoarthritis  Former smoker  Prediabetes  Hypertension      PAST SURGICAL HISTORY:  Inguinal hernia, left  S/P ORIF (open reduction internal fixation) fracture  S/P shoulder surgery  S/P appendectomy  H/O endarterectomy  Status post femorotibial bypass          MEDICATIONS:  acetaminophen     Tablet .. 975 milliGRAM(s) Oral every 6 hours  aluminum hydroxide/magnesium hydroxide/simethicone Suspension 30 milliLiter(s) Oral every 8 hours PRN  amLODIPine   Tablet 10 milliGRAM(s) Oral daily  aspirin  chewable 81 milliGRAM(s) Oral daily  atorvastatin 80 milliGRAM(s) Oral at bedtime  clopidogrel Tablet 75 milliGRAM(s) Oral daily  gabapentin 900 milliGRAM(s) Oral three times a day  heparin  Infusion. 1200 Unit(s)/Hr IV Continuous <Continuous>  HYDROmorphone PCA (1 mG/mL) 30 milliLiter(s) PCA Continuous PCA Continuous  melatonin 3 milliGRAM(s) Oral at bedtime  methocarbamol 750 milliGRAM(s) Oral three times a day  naloxone Injectable 0.1 milliGRAM(s) IV Push every 3 minutes PRN  omega-3-Acid Ethyl Esters 2 Gram(s) Oral two times a day  ondansetron Injectable 4 milliGRAM(s) IV Push every 6 hours PRN  pantoprazole    Tablet 40 milliGRAM(s) Oral before breakfast  phenytoin   Capsule 200 milliGRAM(s) Oral every 12 hours  polyethylene glycol 3350 17 Gram(s) Oral two times a day  QUEtiapine 200 milliGRAM(s) Oral at bedtime  senna 2 Tablet(s) Oral at bedtime PRN  sodium chloride 0.9%. 1000 milliLiter(s) IV Continuous <Continuous>  venlafaxine XR. 75 milliGRAM(s) Oral daily      ALLERGIES:  No Known Allergies        VASCULAR IMAGING:        Prior Peripheral Angiograms/ Interventions : < from: Invasive Peripheral Vascular Reporting (08.23.22 @ 16:45) >  Presentation:   57 years old male. Severe pain in right leg History of diabetes,  hypertension and peripheral arterial disease.    Diagnostic Conclusions:     Occluded femoral-tibial bypass with RSVG     Final catheter site R CFA    Unable to recannulize vein bypass     Recommendations:   Patient to be admitted for pain control, heparin gtt, medicine and  cardiology optimization for a Right femoral-PT bypass for  severe limb ischemia, pain and nonhealing leg ulcers     < end of copied text >        VITALS & I/Os:  Vital Signs Last 24 Hrs  T(C): 36.4 (29 Aug 2022 04:45), Max: 36.8 (28 Aug 2022 20:21)  T(F): 97.5 (29 Aug 2022 04:45), Max: 98.2 (28 Aug 2022 20:21)  HR: 96 (29 Aug 2022 04:45) (88 - 98)  BP: 157/94 (29 Aug 2022 04:45) (130/80 - 157/94)  BP(mean): --  RR: 18 (29 Aug 2022 04:45) (17 - 18)  SpO2: 94% (29 Aug 2022 04:45) (93% - 97%)    Parameters below as of 29 Aug 2022 04:45  Patient On (Oxygen Delivery Method): room air        I&O's Summary    28 Aug 2022 07:01  -  29 Aug 2022 07:00  --------------------------------------------------------  IN: 121 mL / OUT: 0 mL / NET: 121 mL          PHYSICAL EXAM:  General: NAD. Lying comfortably in bed.   Respiratory: Non labored breathing. No respiratory distress.   Cardio: RRR  Abdomen: Soft, non-tender, non-distended. No guarding or rebound.   Extremities: No clubbing or cyanosis.   RLE DP/PT nonpalpable, non-dopplerable, however ROM intact with pain and RLE pink   RLE big toe non-healing wound, R shin wound non-healing wound   Musculoskeletal: No obvious bony masses or joint pain         LABS:                        8.8    11.13 )-----------( 301      ( 29 Aug 2022 05:39 )             29.1       PTT - ( 29 Aug 2022 05:39 )  PTT:77.3 sec

## 2022-08-29 NOTE — PROGRESS NOTE ADULT - ASSESSMENT
57  yr old male presents with history of htn, seizures (last 2021) , high cholesterol  PAD  s/p right SFA stenting in 2017 left leg , carotid stenosis (may require ICA intervention in future) , s/p left femoral endarterectomy 10/1/21, right femoral endarterectomy on 4/15/22  s/p right fem -PT bypass with GSV on 5/13/22 for right great toe dry gangrene . Pt has c/o right foot pain that has burning sensation and has become severe over past week , using cane and wheelchair at times, c/o pain with standing and sitting 10/10 has no relief. US done july 7/7 /22 demonstrating patent bypass with severe stenosis at the proximal anastomosis suggestive of >75% stenosis. Patient s/p RLE angio and in need for RLE bypass.     Problem/Recommendation - 1:  ·  Problem: Peripheral vascular disease/ RLE ischemia   ·  Recommendation: Scheduled for OR Tuesday 8/30 for fem-PT bypass  continue Heparin drip/ ASA/ Plavix / high dose statins   Pain control as per pain management, changes to PCA, patient states pain still not controlled. Pain management notified. Melatonin added for sleep  IS  bowel RX for opioid induced constipation ppx.  Pain not well controlled - pain mgmt appreciated .      Problem/Recommendation - 2:  ·  Problem: Hypercholesterolemia.   ·  Recommendation: statin-high dose.     Problem/Recommendation - 3:  ·  Problem: Hypertension.   Continue Amlodipine with parameters      Problem/Recommendation - 4:  ·  Problem: Seizures.   ·  Recommendation: Dilantin level 4.1, no seizure activity     Problem/Recommendation - 5:  ·  Problem: Anemia.   ·  Recommendation:   Iron saturation is low- chronic Iron deficiency ane,ia    recommend IV  Venofer x 3 days       Problem/Recommendation:  Problem: Right eyebrow laceration. Recommendation: From prior fall 8/18. As per DC from ER: suture removal (3 sutures) in 7 days.    Medically optimized for planned procedure .   Will  sign off , recall if needed

## 2022-08-29 NOTE — PROGRESS NOTE ADULT - ASSESSMENT
57M  extensive PSH including L SFA stenting, L femoral endarterectomy, R femoral endarterectomy, R fem-PT bypass with GSV s/p ambulatory angiogram with no intervention 2/2 to R fem-PT occlusion admitted for persistent chronic leg ischemi  due for R fem-PT bypass revision tomorrow    Med Recs:  increased PCA - DIlaudid; 0 continuous; 0.4 demand dose; 4 minute lockout; 10mg 4hr max    patient informed to use PCA as needed   4hr max dose currently not reached

## 2022-08-29 NOTE — PROGRESS NOTE ADULT - SUBJECTIVE AND OBJECTIVE BOX
Patient seen and examined . C/O severe pain of R foot , On PCA , not sedated . Denies sob/ chest pain ,   denies nausea / vomiting , voiding , having BM .     CC : R foot pain         MEDICATIONS  (STANDING):  acetaminophen     Tablet .. 975 milliGRAM(s) Oral every 6 hours  amLODIPine   Tablet 10 milliGRAM(s) Oral daily  aspirin  chewable 81 milliGRAM(s) Oral daily  atorvastatin 80 milliGRAM(s) Oral at bedtime  clopidogrel Tablet 75 milliGRAM(s) Oral daily  gabapentin 900 milliGRAM(s) Oral three times a day  heparin  Infusion. 1200 Unit(s)/Hr (12 mL/Hr) IV Continuous <Continuous>  HYDROmorphone PCA (1 mG/mL) 30 milliLiter(s) PCA Continuous PCA Continuous  ketorolac   Injectable 15 milliGRAM(s) IV Push every 6 hours  melatonin 3 milliGRAM(s) Oral at bedtime  methocarbamol 750 milliGRAM(s) Oral three times a day  omega-3-Acid Ethyl Esters 2 Gram(s) Oral two times a day  pantoprazole    Tablet 40 milliGRAM(s) Oral before breakfast  phenytoin   Capsule 200 milliGRAM(s) Oral every 12 hours  polyethylene glycol 3350 17 Gram(s) Oral two times a day  QUEtiapine 200 milliGRAM(s) Oral at bedtime  sodium chloride 0.9%. 1000 milliLiter(s) (30 mL/Hr) IV Continuous <Continuous>  venlafaxine XR. 75 milliGRAM(s) Oral daily    MEDICATIONS  (PRN):  aluminum hydroxide/magnesium hydroxide/simethicone Suspension 30 milliLiter(s) Oral every 8 hours PRN Dyspepsia  naloxone Injectable 0.1 milliGRAM(s) IV Push every 3 minutes PRN For ANY of the following changes in patient status:  A. RR LESS THAN 10 breaths per minute, B. Oxygen saturation LESS THAN 90%, C. Sedation score of 6  ondansetron Injectable 4 milliGRAM(s) IV Push every 6 hours PRN Nausea  senna 2 Tablet(s) Oral at bedtime PRN Constipation      LABS:                          8.8    11.13 )-----------( 301      ( 29 Aug 2022 05:39 )             29.1           PTT - ( 29 Aug 2022 05:39 )  PTT:77.3 sec      RADIOLOGY & ADDITIONAL TESTS:        REVIEW OF SYSTEMS:    R foot pain , all other systems are reviewed and are negative .     Vital Signs Last 24 Hrs  T(C): 36.5 (29 Aug 2022 11:14), Max: 36.8 (28 Aug 2022 20:21)  T(F): 97.7 (29 Aug 2022 11:14), Max: 98.2 (28 Aug 2022 20:21)  HR: 63 (29 Aug 2022 11:14) (63 - 96)  BP: 107/64 (29 Aug 2022 11:14) (107/64 - 157/94)  BP(mean): --  RR: 18 (29 Aug 2022 11:14) (17 - 18)  SpO2: 100% (29 Aug 2022 11:14) (93% - 100%)    Parameters below as of 29 Aug 2022 11:14  Patient On (Oxygen Delivery Method): room air      PHYSICAL EXAM:    GENERAL: NAD, well-groomed, well-developed  HEAD:  Atraumatic, Normocephalic  EYES: EOMI, PERRLA, conjunctiva and sclera clear  NECK: Supple, No JVD, Normal thyroid  NERVOUS SYSTEM:  Alert & Oriented X3, no focal deficit  CHEST/LUNG: CTA b/l ,  no  rales, rhonchi, wheezing, or rubs  HEART: Regular rate and rhythm; No murmurs, rubs, or gallops  ABDOMEN: Soft, Nontender, Nondistended; Bowel sounds present  EXTREMITIES:  RLE DP/PT nonpalpable, non-dopplerable, pink - tender   RLE big toe non-healing wound, R shin wound non-healing wound   LYMPH: No lymphadenopathy noted  SKIN: No rashes or lesions

## 2022-08-29 NOTE — PROGRESS NOTE ADULT - SUBJECTIVE AND OBJECTIVE BOX
Pre-operative Note    Pre-operative Diagnosis: right lower extremity acute on chronic limb ischemia  Procedure: femoral-popliteal bypass  Surgeon: Dr. Maria    Labs:                        8.8    11.13 )-----------( 301      ( 29 Aug 2022 05:39 )             29.1           PTT - ( 29 Aug 2022 05:39 )  PTT:77.3 sec  Type & Screen #1:   Type & Screen #2: ABO RH Interpretation: B POS (08-29-22 @ 07:37)    COVID-19 PCR: COVID-19 PCR: NotDetec (23 Aug 2022 14:49)  COVID-19 PCR: NotDetec (05 Aug 2022 11:00)  COVID-19 PCR: NotDetec (28 May 2022 23:37)  COVID-19 PCR: NotDetec (22 May 2022 06:33)  COVID-19 PCR: NotDetec (18 May 2022 06:07)  COVID-19 PCR: NotDetec (13 Apr 2022 14:41)  SARS-CoV-2: NotDetec (09 Apr 2022 08:10)      Imaging  - CXR:  - EKG:    AICD/Pacemaker Interrogation Date:     - Plan:  NPO at midnight  IVF while NPO per primary team  Insulin adjusted for NPO status  Type and Cross resulted   Coags pending  Please continue VTE ppx until taken to cath lab  Verbal consent obtained  Written consent will be obtained in the morning  Pending COVID swab in AM   Pre-operative Note    Pre-operative Diagnosis: peripheral vascular disease  Procedure: femoral-tibial bypass  Surgeon: Dr. Maria    Labs:                        8.8    11.13 )-----------( 301      ( 29 Aug 2022 05:39 )             29.1           PTT - ( 29 Aug 2022 05:39 )  PTT:77.3 sec  Type & Screen #1:   Type & Screen #2: ABO RH Interpretation: B POS (08-29-22 @ 07:37)    COVID-19 PCR: COVID-19 PCR: NotDetec (23 Aug 2022 14:49)  COVID-19 PCR: NotDetec (05 Aug 2022 11:00)  COVID-19 PCR: NotDetec (28 May 2022 23:37)  COVID-19 PCR: NotDetec (22 May 2022 06:33)  COVID-19 PCR: NotDetec (18 May 2022 06:07)  COVID-19 PCR: NotDetec (13 Apr 2022 14:41)  SARS-CoV-2: NotDetec (09 Apr 2022 08:10)      Imaging  - CXR:  - EKG:    AICD/Pacemaker Interrogation Date:     - Plan:  NPO at midnight  IVF while NPO per primary team  Insulin adjusted for NPO status  Type and Cross resulted   Coags pending  Please continue VTE ppx until taken to cath lab  Verbal consent obtained  Written consent will be obtained in the morning  Pending COVID swab in AM

## 2022-08-30 ENCOUNTER — TRANSCRIPTION ENCOUNTER (OUTPATIENT)
Age: 57
End: 2022-08-30

## 2022-08-30 ENCOUNTER — APPOINTMENT (OUTPATIENT)
Dept: VASCULAR SURGERY | Facility: HOSPITAL | Age: 57
End: 2022-08-30

## 2022-08-30 LAB
ANION GAP SERPL CALC-SCNC: 11 MMOL/L — SIGNIFICANT CHANGE UP (ref 5–17)
APTT BLD: 38.2 SEC — HIGH (ref 27.5–35.5)
APTT BLD: 82.7 SEC — HIGH (ref 27.5–35.5)
BASOPHILS # BLD AUTO: 0.02 K/UL — SIGNIFICANT CHANGE UP (ref 0–0.2)
BASOPHILS NFR BLD AUTO: 0.2 % — SIGNIFICANT CHANGE UP (ref 0–2)
BUN SERPL-MCNC: 9.2 MG/DL — SIGNIFICANT CHANGE UP (ref 8–20)
CALCIUM SERPL-MCNC: 7.9 MG/DL — LOW (ref 8.4–10.5)
CHLORIDE SERPL-SCNC: 104 MMOL/L — SIGNIFICANT CHANGE UP (ref 98–107)
CO2 SERPL-SCNC: 20 MMOL/L — LOW (ref 22–29)
CREAT SERPL-MCNC: 0.65 MG/DL — SIGNIFICANT CHANGE UP (ref 0.5–1.3)
EGFR: 109 ML/MIN/1.73M2 — SIGNIFICANT CHANGE UP
EOSINOPHIL # BLD AUTO: 0.01 K/UL — SIGNIFICANT CHANGE UP (ref 0–0.5)
EOSINOPHIL NFR BLD AUTO: 0.1 % — SIGNIFICANT CHANGE UP (ref 0–6)
GAS PNL BLDA: SIGNIFICANT CHANGE UP
GLUCOSE SERPL-MCNC: 127 MG/DL — HIGH (ref 70–99)
HCT VFR BLD CALC: 25.9 % — LOW (ref 39–50)
HCT VFR BLD CALC: 27.7 % — LOW (ref 39–50)
HGB BLD-MCNC: 8.7 G/DL — LOW (ref 13–17)
HGB BLD-MCNC: 8.8 G/DL — LOW (ref 13–17)
IMM GRANULOCYTES NFR BLD AUTO: 1.1 % — SIGNIFICANT CHANGE UP (ref 0–1.5)
INR BLD: 1.03 RATIO — SIGNIFICANT CHANGE UP (ref 0.88–1.16)
INR BLD: 1.09 RATIO — SIGNIFICANT CHANGE UP (ref 0.88–1.16)
LYMPHOCYTES # BLD AUTO: 0.99 K/UL — LOW (ref 1–3.3)
LYMPHOCYTES # BLD AUTO: 9.1 % — LOW (ref 13–44)
MAGNESIUM SERPL-MCNC: 1.6 MG/DL — SIGNIFICANT CHANGE UP (ref 1.6–2.6)
MCHC RBC-ENTMCNC: 28 PG — SIGNIFICANT CHANGE UP (ref 27–34)
MCHC RBC-ENTMCNC: 29.6 PG — SIGNIFICANT CHANGE UP (ref 27–34)
MCHC RBC-ENTMCNC: 31.4 GM/DL — LOW (ref 32–36)
MCHC RBC-ENTMCNC: 34 GM/DL — SIGNIFICANT CHANGE UP (ref 32–36)
MCV RBC AUTO: 87.2 FL — SIGNIFICANT CHANGE UP (ref 80–100)
MCV RBC AUTO: 89.1 FL — SIGNIFICANT CHANGE UP (ref 80–100)
MONOCYTES # BLD AUTO: 0.36 K/UL — SIGNIFICANT CHANGE UP (ref 0–0.9)
MONOCYTES NFR BLD AUTO: 3.3 % — SIGNIFICANT CHANGE UP (ref 2–14)
NEUTROPHILS # BLD AUTO: 9.41 K/UL — HIGH (ref 1.8–7.4)
NEUTROPHILS NFR BLD AUTO: 86.2 % — HIGH (ref 43–77)
PHOSPHATE SERPL-MCNC: 2.9 MG/DL — SIGNIFICANT CHANGE UP (ref 2.4–4.7)
PLATELET # BLD AUTO: 284 K/UL — SIGNIFICANT CHANGE UP (ref 150–400)
PLATELET # BLD AUTO: 290 K/UL — SIGNIFICANT CHANGE UP (ref 150–400)
POTASSIUM SERPL-MCNC: 4.2 MMOL/L — SIGNIFICANT CHANGE UP (ref 3.5–5.3)
POTASSIUM SERPL-SCNC: 4.2 MMOL/L — SIGNIFICANT CHANGE UP (ref 3.5–5.3)
PROTHROM AB SERPL-ACNC: 11.9 SEC — SIGNIFICANT CHANGE UP (ref 10.5–13.4)
PROTHROM AB SERPL-ACNC: 12.7 SEC — SIGNIFICANT CHANGE UP (ref 10.5–13.4)
RBC # BLD: 2.97 M/UL — LOW (ref 4.2–5.8)
RBC # BLD: 3.11 M/UL — LOW (ref 4.2–5.8)
RBC # FLD: 17.4 % — HIGH (ref 10.3–14.5)
RBC # FLD: 18.4 % — HIGH (ref 10.3–14.5)
SODIUM SERPL-SCNC: 135 MMOL/L — SIGNIFICANT CHANGE UP (ref 135–145)
WBC # BLD: 10.91 K/UL — HIGH (ref 3.8–10.5)
WBC # BLD: 8.58 K/UL — SIGNIFICANT CHANGE UP (ref 3.8–10.5)
WBC # FLD AUTO: 10.91 K/UL — HIGH (ref 3.8–10.5)
WBC # FLD AUTO: 8.58 K/UL — SIGNIFICANT CHANGE UP (ref 3.8–10.5)

## 2022-08-30 PROCEDURE — 35666 BPG FEM-ANT TIB PST TIB/PRNL: CPT | Mod: GC

## 2022-08-30 DEVICE — GRAFT VASC PROPATEN 6MMX40X50CM TW REMOV RING: Type: IMPLANTABLE DEVICE | Status: FUNCTIONAL

## 2022-08-30 DEVICE — SURGIFOAM PAD SZ 100: Type: IMPLANTABLE DEVICE | Status: FUNCTIONAL

## 2022-08-30 DEVICE — HEMOSTAT ARISTA 3GR: Type: IMPLANTABLE DEVICE | Status: FUNCTIONAL

## 2022-08-30 DEVICE — CLIP APPLIER COVIDIEN SURGICLIP 13" LARGE: Type: IMPLANTABLE DEVICE | Status: FUNCTIONAL

## 2022-08-30 DEVICE — KIT A-LINE 1LUM 20GX12CM SAFE KIT: Type: IMPLANTABLE DEVICE | Status: FUNCTIONAL

## 2022-08-30 DEVICE — CLIP APPLIER COVIDIEN SURGICLIP 11.5" MEDIUM: Type: IMPLANTABLE DEVICE | Status: FUNCTIONAL

## 2022-08-30 DEVICE — SPONGE GELFOAM SZ 100 UNCOMPRESSED: Type: IMPLANTABLE DEVICE | Status: FUNCTIONAL

## 2022-08-30 DEVICE — CLIP APPLIER COVIDIEN SURGICLIP III 9" SM: Type: IMPLANTABLE DEVICE | Status: FUNCTIONAL

## 2022-08-30 DEVICE — IMPLANTABLE DEVICE: Type: IMPLANTABLE DEVICE | Status: FUNCTIONAL

## 2022-08-30 DEVICE — GRAFT VASC PROPATEN 8MMX40X50CM TW REMOV RING: Type: IMPLANTABLE DEVICE | Status: FUNCTIONAL

## 2022-08-30 RX ORDER — FENTANYL CITRATE 50 UG/ML
50 INJECTION INTRAVENOUS
Refills: 0 | Status: DISCONTINUED | OUTPATIENT
Start: 2022-08-30 | End: 2022-08-30

## 2022-08-30 RX ORDER — QUETIAPINE FUMARATE 200 MG/1
200 TABLET, FILM COATED ORAL AT BEDTIME
Refills: 0 | Status: DISCONTINUED | OUTPATIENT
Start: 2022-08-30 | End: 2022-09-10

## 2022-08-30 RX ORDER — NALOXONE HYDROCHLORIDE 4 MG/.1ML
0.1 SPRAY NASAL
Refills: 0 | Status: DISCONTINUED | OUTPATIENT
Start: 2022-08-30 | End: 2022-08-30

## 2022-08-30 RX ORDER — KETOROLAC TROMETHAMINE 30 MG/ML
30 SYRINGE (ML) INJECTION ONCE
Refills: 0 | Status: DISCONTINUED | OUTPATIENT
Start: 2022-08-30 | End: 2022-08-30

## 2022-08-30 RX ORDER — NALOXONE HYDROCHLORIDE 4 MG/.1ML
0.1 SPRAY NASAL
Refills: 0 | Status: DISCONTINUED | OUTPATIENT
Start: 2022-08-30 | End: 2022-09-10

## 2022-08-30 RX ORDER — KETOROLAC TROMETHAMINE 30 MG/ML
15 SYRINGE (ML) INJECTION ONCE
Refills: 0 | Status: DISCONTINUED | OUTPATIENT
Start: 2022-08-30 | End: 2022-08-30

## 2022-08-30 RX ORDER — HYDROMORPHONE HYDROCHLORIDE 2 MG/ML
1 INJECTION INTRAMUSCULAR; INTRAVENOUS; SUBCUTANEOUS ONCE
Refills: 0 | Status: DISCONTINUED | OUTPATIENT
Start: 2022-08-30 | End: 2022-08-30

## 2022-08-30 RX ORDER — AMLODIPINE BESYLATE 2.5 MG/1
10 TABLET ORAL DAILY
Refills: 0 | Status: DISCONTINUED | OUTPATIENT
Start: 2022-08-30 | End: 2022-09-10

## 2022-08-30 RX ORDER — ACETAMINOPHEN 500 MG
1000 TABLET ORAL ONCE
Refills: 0 | Status: COMPLETED | OUTPATIENT
Start: 2022-08-30 | End: 2022-08-30

## 2022-08-30 RX ORDER — OMEGA-3 ACID ETHYL ESTERS 1 G
2 CAPSULE ORAL
Refills: 0 | Status: DISCONTINUED | OUTPATIENT
Start: 2022-08-30 | End: 2022-09-10

## 2022-08-30 RX ORDER — CLOPIDOGREL BISULFATE 75 MG/1
75 TABLET, FILM COATED ORAL DAILY
Refills: 0 | Status: DISCONTINUED | OUTPATIENT
Start: 2022-08-30 | End: 2022-08-30

## 2022-08-30 RX ORDER — HYDROMORPHONE HYDROCHLORIDE 2 MG/ML
1 INJECTION INTRAMUSCULAR; INTRAVENOUS; SUBCUTANEOUS
Refills: 0 | Status: DISCONTINUED | OUTPATIENT
Start: 2022-08-30 | End: 2022-08-30

## 2022-08-30 RX ORDER — PANTOPRAZOLE SODIUM 20 MG/1
40 TABLET, DELAYED RELEASE ORAL
Refills: 0 | Status: DISCONTINUED | OUTPATIENT
Start: 2022-08-30 | End: 2022-09-10

## 2022-08-30 RX ORDER — SODIUM CHLORIDE 9 MG/ML
1000 INJECTION, SOLUTION INTRAVENOUS
Refills: 0 | Status: DISCONTINUED | OUTPATIENT
Start: 2022-08-30 | End: 2022-08-30

## 2022-08-30 RX ORDER — GABAPENTIN 400 MG/1
900 CAPSULE ORAL THREE TIMES A DAY
Refills: 0 | Status: DISCONTINUED | OUTPATIENT
Start: 2022-08-30 | End: 2022-09-10

## 2022-08-30 RX ORDER — HYDROMORPHONE HYDROCHLORIDE 2 MG/ML
30 INJECTION INTRAMUSCULAR; INTRAVENOUS; SUBCUTANEOUS
Refills: 0 | Status: DISCONTINUED | OUTPATIENT
Start: 2022-08-30 | End: 2022-09-02

## 2022-08-30 RX ORDER — HYDROMORPHONE HYDROCHLORIDE 2 MG/ML
30 INJECTION INTRAMUSCULAR; INTRAVENOUS; SUBCUTANEOUS
Refills: 0 | Status: DISCONTINUED | OUTPATIENT
Start: 2022-08-30 | End: 2022-08-30

## 2022-08-30 RX ORDER — POLYETHYLENE GLYCOL 3350 17 G/17G
17 POWDER, FOR SOLUTION ORAL
Refills: 0 | Status: DISCONTINUED | OUTPATIENT
Start: 2022-08-30 | End: 2022-09-10

## 2022-08-30 RX ORDER — HEPARIN SODIUM 5000 [USP'U]/ML
5000 INJECTION INTRAVENOUS; SUBCUTANEOUS EVERY 8 HOURS
Refills: 0 | Status: DISCONTINUED | OUTPATIENT
Start: 2022-08-30 | End: 2022-08-31

## 2022-08-30 RX ORDER — SENNA PLUS 8.6 MG/1
2 TABLET ORAL AT BEDTIME
Refills: 0 | Status: DISCONTINUED | OUTPATIENT
Start: 2022-08-30 | End: 2022-09-10

## 2022-08-30 RX ORDER — ACETAMINOPHEN 500 MG
975 TABLET ORAL EVERY 6 HOURS
Refills: 0 | Status: DISCONTINUED | OUTPATIENT
Start: 2022-08-30 | End: 2022-09-10

## 2022-08-30 RX ORDER — ATORVASTATIN CALCIUM 80 MG/1
80 TABLET, FILM COATED ORAL AT BEDTIME
Refills: 0 | Status: DISCONTINUED | OUTPATIENT
Start: 2022-08-30 | End: 2022-09-10

## 2022-08-30 RX ORDER — ONDANSETRON 8 MG/1
4 TABLET, FILM COATED ORAL EVERY 6 HOURS
Refills: 0 | Status: DISCONTINUED | OUTPATIENT
Start: 2022-08-30 | End: 2022-08-30

## 2022-08-30 RX ORDER — LANOLIN ALCOHOL/MO/W.PET/CERES
3 CREAM (GRAM) TOPICAL AT BEDTIME
Refills: 0 | Status: DISCONTINUED | OUTPATIENT
Start: 2022-08-30 | End: 2022-09-10

## 2022-08-30 RX ORDER — MAGNESIUM SULFATE 500 MG/ML
2 VIAL (ML) INJECTION ONCE
Refills: 0 | Status: DISCONTINUED | OUTPATIENT
Start: 2022-08-30 | End: 2022-08-30

## 2022-08-30 RX ORDER — ONDANSETRON 8 MG/1
4 TABLET, FILM COATED ORAL EVERY 6 HOURS
Refills: 0 | Status: DISCONTINUED | OUTPATIENT
Start: 2022-08-30 | End: 2022-09-10

## 2022-08-30 RX ORDER — METHOCARBAMOL 500 MG/1
750 TABLET, FILM COATED ORAL THREE TIMES A DAY
Refills: 0 | Status: DISCONTINUED | OUTPATIENT
Start: 2022-08-30 | End: 2022-08-31

## 2022-08-30 RX ORDER — VENLAFAXINE HCL 75 MG
75 CAPSULE, EXT RELEASE 24 HR ORAL DAILY
Refills: 0 | Status: DISCONTINUED | OUTPATIENT
Start: 2022-08-30 | End: 2022-09-10

## 2022-08-30 RX ORDER — MAGNESIUM SULFATE 500 MG/ML
2 VIAL (ML) INJECTION ONCE
Refills: 0 | Status: COMPLETED | OUTPATIENT
Start: 2022-08-30 | End: 2022-08-30

## 2022-08-30 RX ORDER — ONDANSETRON 8 MG/1
4 TABLET, FILM COATED ORAL ONCE
Refills: 0 | Status: DISCONTINUED | OUTPATIENT
Start: 2022-08-30 | End: 2022-08-30

## 2022-08-30 RX ADMIN — Medication 975 MILLIGRAM(S): at 06:15

## 2022-08-30 RX ADMIN — HYDROMORPHONE HYDROCHLORIDE 30 MILLILITER(S): 2 INJECTION INTRAMUSCULAR; INTRAVENOUS; SUBCUTANEOUS at 21:27

## 2022-08-30 RX ADMIN — METHOCARBAMOL 750 MILLIGRAM(S): 500 TABLET, FILM COATED ORAL at 05:37

## 2022-08-30 RX ADMIN — HYDROMORPHONE HYDROCHLORIDE 1 MILLIGRAM(S): 2 INJECTION INTRAMUSCULAR; INTRAVENOUS; SUBCUTANEOUS at 17:44

## 2022-08-30 RX ADMIN — Medication 30 MILLIGRAM(S): at 20:00

## 2022-08-30 RX ADMIN — Medication 1 MILLIGRAM(S): at 19:10

## 2022-08-30 RX ADMIN — FENTANYL CITRATE 50 MICROGRAM(S): 50 INJECTION INTRAVENOUS at 18:00

## 2022-08-30 RX ADMIN — FENTANYL CITRATE 50 MICROGRAM(S): 50 INJECTION INTRAVENOUS at 17:25

## 2022-08-30 RX ADMIN — HYDROMORPHONE HYDROCHLORIDE 1 MILLIGRAM(S): 2 INJECTION INTRAMUSCULAR; INTRAVENOUS; SUBCUTANEOUS at 18:04

## 2022-08-30 RX ADMIN — GABAPENTIN 900 MILLIGRAM(S): 400 CAPSULE ORAL at 05:37

## 2022-08-30 RX ADMIN — HEPARIN SODIUM 1100 UNIT(S)/HR: 5000 INJECTION INTRAVENOUS; SUBCUTANEOUS at 07:32

## 2022-08-30 RX ADMIN — Medication 200 MILLIGRAM(S): at 21:39

## 2022-08-30 RX ADMIN — HYDROMORPHONE HYDROCHLORIDE 30 MILLILITER(S): 2 INJECTION INTRAMUSCULAR; INTRAVENOUS; SUBCUTANEOUS at 22:34

## 2022-08-30 RX ADMIN — HYDROMORPHONE HYDROCHLORIDE 1 MILLIGRAM(S): 2 INJECTION INTRAMUSCULAR; INTRAVENOUS; SUBCUTANEOUS at 18:29

## 2022-08-30 RX ADMIN — Medication 15 MILLIGRAM(S): at 05:37

## 2022-08-30 RX ADMIN — HYDROMORPHONE HYDROCHLORIDE 30 MILLILITER(S): 2 INJECTION INTRAMUSCULAR; INTRAVENOUS; SUBCUTANEOUS at 17:52

## 2022-08-30 RX ADMIN — HYDROMORPHONE HYDROCHLORIDE 1 MILLIGRAM(S): 2 INJECTION INTRAMUSCULAR; INTRAVENOUS; SUBCUTANEOUS at 20:00

## 2022-08-30 RX ADMIN — Medication 400 MILLIGRAM(S): at 19:10

## 2022-08-30 RX ADMIN — GABAPENTIN 900 MILLIGRAM(S): 400 CAPSULE ORAL at 21:38

## 2022-08-30 RX ADMIN — PANTOPRAZOLE SODIUM 40 MILLIGRAM(S): 20 TABLET, DELAYED RELEASE ORAL at 05:38

## 2022-08-30 RX ADMIN — Medication 30 MILLIGRAM(S): at 19:10

## 2022-08-30 RX ADMIN — Medication 200 MILLIGRAM(S): at 05:37

## 2022-08-30 RX ADMIN — HYDROMORPHONE HYDROCHLORIDE 1 MILLIGRAM(S): 2 INJECTION INTRAMUSCULAR; INTRAVENOUS; SUBCUTANEOUS at 19:10

## 2022-08-30 RX ADMIN — Medication 1000 MILLIGRAM(S): at 12:00

## 2022-08-30 RX ADMIN — Medication 3 MILLIGRAM(S): at 21:38

## 2022-08-30 RX ADMIN — HEPARIN SODIUM 5000 UNIT(S): 5000 INJECTION INTRAVENOUS; SUBCUTANEOUS at 21:38

## 2022-08-30 RX ADMIN — Medication 975 MILLIGRAM(S): at 00:30

## 2022-08-30 RX ADMIN — Medication 25 GRAM(S): at 21:39

## 2022-08-30 RX ADMIN — Medication 2 GRAM(S): at 05:37

## 2022-08-30 RX ADMIN — ATORVASTATIN CALCIUM 80 MILLIGRAM(S): 80 TABLET, FILM COATED ORAL at 21:39

## 2022-08-30 RX ADMIN — HYDROMORPHONE HYDROCHLORIDE 1 MILLIGRAM(S): 2 INJECTION INTRAMUSCULAR; INTRAVENOUS; SUBCUTANEOUS at 17:12

## 2022-08-30 RX ADMIN — METHOCARBAMOL 750 MILLIGRAM(S): 500 TABLET, FILM COATED ORAL at 21:38

## 2022-08-30 RX ADMIN — HYDROMORPHONE HYDROCHLORIDE 30 MILLILITER(S): 2 INJECTION INTRAMUSCULAR; INTRAVENOUS; SUBCUTANEOUS at 20:38

## 2022-08-30 RX ADMIN — HYDROMORPHONE HYDROCHLORIDE 30 MILLILITER(S): 2 INJECTION INTRAMUSCULAR; INTRAVENOUS; SUBCUTANEOUS at 08:17

## 2022-08-30 RX ADMIN — FENTANYL CITRATE 50 MICROGRAM(S): 50 INJECTION INTRAVENOUS at 17:16

## 2022-08-30 RX ADMIN — HYDROMORPHONE HYDROCHLORIDE 1 MILLIGRAM(S): 2 INJECTION INTRAMUSCULAR; INTRAVENOUS; SUBCUTANEOUS at 18:00

## 2022-08-30 RX ADMIN — Medication 975 MILLIGRAM(S): at 23:03

## 2022-08-30 RX ADMIN — Medication 15 MILLIGRAM(S): at 06:15

## 2022-08-30 RX ADMIN — QUETIAPINE FUMARATE 200 MILLIGRAM(S): 200 TABLET, FILM COATED ORAL at 21:39

## 2022-08-30 RX ADMIN — Medication 1000 MILLIGRAM(S): at 20:00

## 2022-08-30 RX ADMIN — HYDROMORPHONE HYDROCHLORIDE 1 MILLIGRAM(S): 2 INJECTION INTRAMUSCULAR; INTRAVENOUS; SUBCUTANEOUS at 19:00

## 2022-08-30 RX ADMIN — Medication 975 MILLIGRAM(S): at 05:36

## 2022-08-30 RX ADMIN — Medication 400 MILLIGRAM(S): at 11:34

## 2022-08-30 RX ADMIN — AMLODIPINE BESYLATE 10 MILLIGRAM(S): 2.5 TABLET ORAL at 05:36

## 2022-08-30 NOTE — PRE-OP CHECKLIST - NS PREOP [facility] TEST_COVID RESULT_GEN_ALL_CORE
Called to set up physical w date 11-21-21 around that date. No answer- left message w [facility] direct line.    Anya Vasquez EMT 4:22 PM on June 25, 2021  [facility]  [phone]     Negative

## 2022-08-30 NOTE — BRIEF OPERATIVE NOTE - NSICDXBRIEFPROCEDURE_GEN_ALL_CORE_FT
PROCEDURES:  Creation of bypass from femoral artery to distal tibial artery 30-Aug-2022 17:13:03 cryo vein Franko Multani  
PROCEDURES:  CT angiogram extremity lower right 23-Aug-2022 18:03:53  Radha Diaz

## 2022-08-30 NOTE — PROGRESS NOTE ADULT - SUBJECTIVE AND OBJECTIVE BOX
Vascular Surgery     labs/vitals reviewed  Heprin ggt active  PCa active    Right LE acute on chronic ischemia, terrible rest pain  Surgical intervention planned for today  NPO since night   covid neg  Blood products on hold

## 2022-08-30 NOTE — BRIEF OPERATIVE NOTE - NSICDXBRIEFPREOP_GEN_ALL_CORE_FT
PRE-OP DIAGNOSIS:  Peripheral vascular disorder 23-Aug-2022 18:04:24  Radha Diaz  
PRE-OP DIAGNOSIS:  Peripheral vascular disorder 23-Aug-2022 18:04:24  Radha Diaz

## 2022-08-30 NOTE — DIETITIAN INITIAL EVALUATION ADULT - PERTINENT LABORATORY DATA
A1C with Estimated Average Glucose Result: 5.3 % (08-23-22 @ 13:58)  A1C with Estimated Average Glucose Result: 5.4 % (04-14-22 @ 03:24)  A1C with Estimated Average Glucose Result: 5.2 % (09-23-21 @ 14:03)

## 2022-08-30 NOTE — BRIEF OPERATIVE NOTE - NSICDXBRIEFPOSTOP_GEN_ALL_CORE_FT
POST-OP DIAGNOSIS:  Peripheral vascular disease 23-Aug-2022 18:04:37  Radha Diaz  
POST-OP DIAGNOSIS:  Peripheral vascular disease 23-Aug-2022 18:04:37  Radha Diaz

## 2022-08-30 NOTE — DIETITIAN INITIAL EVALUATION ADULT - ORAL INTAKE PTA/DIET HISTORY
Pt in OR for fem-PT bypass during assessment. RD to follow up to obtain hx and provide education when feasible.

## 2022-08-30 NOTE — BRIEF OPERATIVE NOTE - ANTIBIOTIC PROTOCOL
Mercaptobenzothiazole: no reaction Followed protocol Number Of Patches Read: 36 Show Negative Results In The Note?: No Gold Sodium Thiosulfate: +/- Detail Level: Zone Colophony: 1+ What Reading Time Point?: 48 hour

## 2022-08-31 LAB
ANION GAP SERPL CALC-SCNC: 13 MMOL/L — SIGNIFICANT CHANGE UP (ref 5–17)
APTT BLD: 68 SEC — HIGH (ref 27.5–35.5)
BASOPHILS # BLD AUTO: 0 K/UL — SIGNIFICANT CHANGE UP (ref 0–0.2)
BASOPHILS NFR BLD AUTO: 0 % — SIGNIFICANT CHANGE UP (ref 0–2)
BUN SERPL-MCNC: 11.9 MG/DL — SIGNIFICANT CHANGE UP (ref 8–20)
CALCIUM SERPL-MCNC: 8.1 MG/DL — LOW (ref 8.4–10.5)
CHLORIDE SERPL-SCNC: 103 MMOL/L — SIGNIFICANT CHANGE UP (ref 98–107)
CO2 SERPL-SCNC: 23 MMOL/L — SIGNIFICANT CHANGE UP (ref 22–29)
CREAT SERPL-MCNC: 0.81 MG/DL — SIGNIFICANT CHANGE UP (ref 0.5–1.3)
EGFR: 102 ML/MIN/1.73M2 — SIGNIFICANT CHANGE UP
EOSINOPHIL # BLD AUTO: 0.07 K/UL — SIGNIFICANT CHANGE UP (ref 0–0.5)
EOSINOPHIL NFR BLD AUTO: 0.9 % — SIGNIFICANT CHANGE UP (ref 0–6)
GLUCOSE SERPL-MCNC: 102 MG/DL — HIGH (ref 70–99)
HCT VFR BLD CALC: 25.6 % — LOW (ref 39–50)
HCT VFR BLD CALC: 26.1 % — LOW (ref 39–50)
HGB BLD-MCNC: 8.2 G/DL — LOW (ref 13–17)
HGB BLD-MCNC: 8.4 G/DL — LOW (ref 13–17)
LYMPHOCYTES # BLD AUTO: 1.83 K/UL — SIGNIFICANT CHANGE UP (ref 1–3.3)
LYMPHOCYTES # BLD AUTO: 22.6 % — SIGNIFICANT CHANGE UP (ref 13–44)
MAGNESIUM SERPL-MCNC: 2.1 MG/DL — SIGNIFICANT CHANGE UP (ref 1.6–2.6)
MCHC RBC-ENTMCNC: 28.4 PG — SIGNIFICANT CHANGE UP (ref 27–34)
MCHC RBC-ENTMCNC: 29.2 PG — SIGNIFICANT CHANGE UP (ref 27–34)
MCHC RBC-ENTMCNC: 31.4 GM/DL — LOW (ref 32–36)
MCHC RBC-ENTMCNC: 32.8 GM/DL — SIGNIFICANT CHANGE UP (ref 32–36)
MCV RBC AUTO: 88.9 FL — SIGNIFICANT CHANGE UP (ref 80–100)
MCV RBC AUTO: 90.3 FL — SIGNIFICANT CHANGE UP (ref 80–100)
MONOCYTES # BLD AUTO: 0.7 K/UL — SIGNIFICANT CHANGE UP (ref 0–0.9)
MONOCYTES NFR BLD AUTO: 8.7 % — SIGNIFICANT CHANGE UP (ref 2–14)
NEUTROPHILS # BLD AUTO: 5.41 K/UL — SIGNIFICANT CHANGE UP (ref 1.8–7.4)
NEUTROPHILS NFR BLD AUTO: 66.9 % — SIGNIFICANT CHANGE UP (ref 43–77)
PHOSPHATE SERPL-MCNC: 3.9 MG/DL — SIGNIFICANT CHANGE UP (ref 2.4–4.7)
PLATELET # BLD AUTO: 256 K/UL — SIGNIFICANT CHANGE UP (ref 150–400)
PLATELET # BLD AUTO: 261 K/UL — SIGNIFICANT CHANGE UP (ref 150–400)
POTASSIUM SERPL-MCNC: 4.4 MMOL/L — SIGNIFICANT CHANGE UP (ref 3.5–5.3)
POTASSIUM SERPL-SCNC: 4.4 MMOL/L — SIGNIFICANT CHANGE UP (ref 3.5–5.3)
RBC # BLD: 2.88 M/UL — LOW (ref 4.2–5.8)
RBC # BLD: 2.89 M/UL — LOW (ref 4.2–5.8)
RBC # FLD: 18.6 % — HIGH (ref 10.3–14.5)
RBC # FLD: 19.2 % — HIGH (ref 10.3–14.5)
SODIUM SERPL-SCNC: 139 MMOL/L — SIGNIFICANT CHANGE UP (ref 135–145)
URATE SERPL-MCNC: 4.6 MG/DL — SIGNIFICANT CHANGE UP (ref 3.4–7)
WBC # BLD: 12.04 K/UL — HIGH (ref 3.8–10.5)
WBC # BLD: 8.09 K/UL — SIGNIFICANT CHANGE UP (ref 3.8–10.5)
WBC # FLD AUTO: 12.04 K/UL — HIGH (ref 3.8–10.5)
WBC # FLD AUTO: 8.09 K/UL — SIGNIFICANT CHANGE UP (ref 3.8–10.5)

## 2022-08-31 RX ORDER — ASPIRIN/CALCIUM CARB/MAGNESIUM 324 MG
81 TABLET ORAL DAILY
Refills: 0 | Status: DISCONTINUED | OUTPATIENT
Start: 2022-08-31 | End: 2022-09-10

## 2022-08-31 RX ORDER — METHOCARBAMOL 500 MG/1
1000 TABLET, FILM COATED ORAL EVERY 8 HOURS
Refills: 0 | Status: DISCONTINUED | OUTPATIENT
Start: 2022-08-31 | End: 2022-09-10

## 2022-08-31 RX ORDER — ERYTHROPOIETIN 10000 [IU]/ML
40000 INJECTION, SOLUTION INTRAVENOUS; SUBCUTANEOUS
Refills: 0 | Status: DISCONTINUED | OUTPATIENT
Start: 2022-08-31 | End: 2022-09-10

## 2022-08-31 RX ORDER — HEPARIN SODIUM 5000 [USP'U]/ML
1200 INJECTION INTRAVENOUS; SUBCUTANEOUS
Qty: 25000 | Refills: 0 | Status: DISCONTINUED | OUTPATIENT
Start: 2022-08-31 | End: 2022-09-02

## 2022-08-31 RX ORDER — FERROUS SULFATE 325(65) MG
325 TABLET ORAL THREE TIMES A DAY
Refills: 0 | Status: DISCONTINUED | OUTPATIENT
Start: 2022-08-31 | End: 2022-09-10

## 2022-08-31 RX ORDER — METHOCARBAMOL 500 MG/1
250 TABLET, FILM COATED ORAL ONCE
Refills: 0 | Status: COMPLETED | OUTPATIENT
Start: 2022-08-31 | End: 2022-08-31

## 2022-08-31 RX ORDER — CLOPIDOGREL BISULFATE 75 MG/1
75 TABLET, FILM COATED ORAL DAILY
Refills: 0 | Status: DISCONTINUED | OUTPATIENT
Start: 2022-08-31 | End: 2022-09-10

## 2022-08-31 RX ADMIN — Medication 2 GRAM(S): at 05:44

## 2022-08-31 RX ADMIN — Medication 975 MILLIGRAM(S): at 06:40

## 2022-08-31 RX ADMIN — METHOCARBAMOL 750 MILLIGRAM(S): 500 TABLET, FILM COATED ORAL at 05:44

## 2022-08-31 RX ADMIN — Medication 975 MILLIGRAM(S): at 18:40

## 2022-08-31 RX ADMIN — Medication 975 MILLIGRAM(S): at 17:42

## 2022-08-31 RX ADMIN — METHOCARBAMOL 750 MILLIGRAM(S): 500 TABLET, FILM COATED ORAL at 20:38

## 2022-08-31 RX ADMIN — HYDROMORPHONE HYDROCHLORIDE 30 MILLILITER(S): 2 INJECTION INTRAMUSCULAR; INTRAVENOUS; SUBCUTANEOUS at 19:19

## 2022-08-31 RX ADMIN — HEPARIN SODIUM 5000 UNIT(S): 5000 INJECTION INTRAVENOUS; SUBCUTANEOUS at 05:43

## 2022-08-31 RX ADMIN — HEPARIN SODIUM 12 UNIT(S)/HR: 5000 INJECTION INTRAVENOUS; SUBCUTANEOUS at 19:18

## 2022-08-31 RX ADMIN — HEPARIN SODIUM 12 UNIT(S)/HR: 5000 INJECTION INTRAVENOUS; SUBCUTANEOUS at 09:23

## 2022-08-31 RX ADMIN — GABAPENTIN 900 MILLIGRAM(S): 400 CAPSULE ORAL at 14:57

## 2022-08-31 RX ADMIN — Medication 3 MILLIGRAM(S): at 20:38

## 2022-08-31 RX ADMIN — ERYTHROPOIETIN 40000 UNIT(S): 10000 INJECTION, SOLUTION INTRAVENOUS; SUBCUTANEOUS at 12:09

## 2022-08-31 RX ADMIN — PANTOPRAZOLE SODIUM 40 MILLIGRAM(S): 20 TABLET, DELAYED RELEASE ORAL at 05:44

## 2022-08-31 RX ADMIN — Medication 975 MILLIGRAM(S): at 12:09

## 2022-08-31 RX ADMIN — Medication 2 GRAM(S): at 17:42

## 2022-08-31 RX ADMIN — METHOCARBAMOL 750 MILLIGRAM(S): 500 TABLET, FILM COATED ORAL at 14:57

## 2022-08-31 RX ADMIN — Medication 75 MILLIGRAM(S): at 12:09

## 2022-08-31 RX ADMIN — METHOCARBAMOL 250 MILLIGRAM(S): 500 TABLET, FILM COATED ORAL at 23:24

## 2022-08-31 RX ADMIN — HYDROMORPHONE HYDROCHLORIDE 30 MILLILITER(S): 2 INJECTION INTRAMUSCULAR; INTRAVENOUS; SUBCUTANEOUS at 18:28

## 2022-08-31 RX ADMIN — HYDROMORPHONE HYDROCHLORIDE 30 MILLILITER(S): 2 INJECTION INTRAMUSCULAR; INTRAVENOUS; SUBCUTANEOUS at 05:54

## 2022-08-31 RX ADMIN — HEPARIN SODIUM 12 UNIT(S)/HR: 5000 INJECTION INTRAVENOUS; SUBCUTANEOUS at 19:53

## 2022-08-31 RX ADMIN — Medication 325 MILLIGRAM(S): at 14:57

## 2022-08-31 RX ADMIN — QUETIAPINE FUMARATE 200 MILLIGRAM(S): 200 TABLET, FILM COATED ORAL at 20:38

## 2022-08-31 RX ADMIN — Medication 975 MILLIGRAM(S): at 05:44

## 2022-08-31 RX ADMIN — AMLODIPINE BESYLATE 10 MILLIGRAM(S): 2.5 TABLET ORAL at 05:45

## 2022-08-31 RX ADMIN — ATORVASTATIN CALCIUM 80 MILLIGRAM(S): 80 TABLET, FILM COATED ORAL at 20:38

## 2022-08-31 RX ADMIN — Medication 81 MILLIGRAM(S): at 12:09

## 2022-08-31 RX ADMIN — Medication 975 MILLIGRAM(S): at 00:00

## 2022-08-31 RX ADMIN — GABAPENTIN 900 MILLIGRAM(S): 400 CAPSULE ORAL at 05:43

## 2022-08-31 RX ADMIN — Medication 200 MILLIGRAM(S): at 05:45

## 2022-08-31 RX ADMIN — Medication 975 MILLIGRAM(S): at 13:00

## 2022-08-31 RX ADMIN — Medication 200 MILLIGRAM(S): at 17:43

## 2022-08-31 RX ADMIN — Medication 325 MILLIGRAM(S): at 20:38

## 2022-08-31 RX ADMIN — CLOPIDOGREL BISULFATE 75 MILLIGRAM(S): 75 TABLET, FILM COATED ORAL at 12:09

## 2022-08-31 RX ADMIN — GABAPENTIN 900 MILLIGRAM(S): 400 CAPSULE ORAL at 20:37

## 2022-08-31 RX ADMIN — Medication 975 MILLIGRAM(S): at 23:23

## 2022-08-31 NOTE — PROGRESS NOTE ADULT - ASSESSMENT
57M POD1 right fem-tibial bypass with cyrovein. Pain not well controlled despite maximum PCA dose. Will discuss possibility of amputation with patient pending postoperative recovery course.    Plan:  - ASA/plavix  - pain control  - possible amputation  - SQH for dvt prophylaxis

## 2022-08-31 NOTE — CONSULT NOTE ADULT - SUBJECTIVE AND OBJECTIVE BOX
Patient is a 57y old  Male who presents with a chief complaint of Peripheral vascular disease     (30 Aug 2022 13:53)    HPI:  57  yr old male presents with history of htn, seizures (last 2021) , high cholesterol  PAD  s/p right SFA stenting in 2017 left leg , carotid stenosis (may require ICA intervention in future) , s/p left femoral endarterectomy 10/1/21, right femoral endarterectomy on 4/15/22  s/p right fem -PT bypass with GSV on 5/13/22 for right great toe dry gangrene . Pt has c/o right foot pain that has burning sensation and has become severe over past week , using cane and wheelchair at times, c/o pain with standing and sitting 10/10 has no relief. states he cannot sleep without pain , fell out of bed last night and hit face on night stand ,  Lower foot and calf discolored red  with right calf incision   scab noted , no drainage . US done july 7/7 /22 demonstrating patent bypass with severe stenosis at the proximal anastomosis suggestive of >75% stenosis. pt is scheduled for RLE angiogram possible intervention with Dr. Lelia Elder on 8/9/22.      Podiatry HPI: Patient was seen bedside in no acute distress. States that he is feeling a lot of pain to his right foot recently. Patient wants pain medications. Denies any other constitutional symptoms of N/V/C/F/SOB. Denies any other pedal complaints.      Risk Factors for PAD       Age: 57       DM: N/A       Smoking History: N/A       Hyperlipidemia: yes       Hypertension: yes       Family History of PAD: N/A       Known Atherosclerotic Disease (coronary, carotid, subclavian, renal, mesenteric, AAA): yes carotid dz, PAD    Subjective Complaints:        Claudication Nic Class:        Impaired Walking Function: N/A       Ischemic Rest Pain: N/A       Other Non-Joint Related Exertional Lower Extremity Symptoms (not typical of claudication): N/A    Objective Findings:        Lower Extremity Pulses: Unable to palpate DP pulses, doppler pulses noted.       Nonhealing Lower Extremity Wound: N/A       Lower Extremity Gangrene: N/A       Vascular Bruit: N/A       Other Suggestive Lower Extremity Findings (elevation pallor, dependent rubor): N/A    Vascular Testing:        JOSE (Normal/Borderline/Abnormal/Noncompressable): < from: VA Physiol Extremity Lower 3+ Level, BI (04.09.22 @ 13:23) >  IMPRESSION: There is bilateral arterial disease.    The right JOSE of 0.62 indicates moderate disease on the right. The likely   location of the disease is the right femoral-popliteal segment.    The left JOSE of 0.94 indicates subclinical disease on the left.    --- End of Report ---    < end of copied text >         Arterial Dopplers: < from: US Duplex Arterial Lower Ext Ltd, Right (04.09.22 @ 13:21) >  RIGHT:  CFA: Biphasic; 67  Proximal SFA: Biphasic; 49  Mid SFA: Biphasic; 27  Distal SFA: Occluded;  Popliteal: Occluded;  Anterior tibial: Not seen;  Posterior tibial: Monophasic; 49  Peroneal: Not seen;  Dorsalis pedis: Not seen;    IMPRESSION:    Occlusion from the mid SFA to the popliteal with tardus parvus flow in   the posterior tibial artery. The remainder of the below the knee vessels   are not seen.    --- End of Report ---            DIANENISHA ESPINO MD; Attending Radiologist  This document has been electronically signed. Apr 9 2022  3:29PM    < end of copied text >         CTA/MRA: < from: CT Angio Abd Aorta w/run-off w/ IV Cont (04.23.22 @ 20:46) >  IMPRESSION:    1. Interval occlusion of the right superficial femoral artery superior to   the stent. Redemonstration of distal right superficial femoral artery   stent occlusion. Distal reconstitution with 3 vessel runoff to the right   foot which is diffusely attenuated.  2. Interval development of a right inguinal subcutaneous hematoma at the   level of the right common femoral artery.            < end of copied text >    ARTERIAL DUPLEX PERFORMED 7/7/2022: right femoral to posterior tibial artery vein bypass graft with elevated velocities noted in the proximal anastamosis noted at this time. Patent runoff arteries noted.             Medical Management:       Antiplatelets: Aspirin 81, Plavix 75mg        Statin: atorvastatin 80mg        Nic Claudication Classification:        I: Asymptomatic       IIa: Walking > 200 meters (2.5 blocks)       IIb: Walking < 200 meters (2.5 blocks)       III: Rest/nocturnal pain       IV: Necrosis/gangrene    Ankle-Brachial Index:       Noncompressable: > 1.4       Normal: 1.0 to 1.4       Borderline: 0.91 to 0.99       Abnormal: < 0.91   (22 Aug 2022 19:00)      Podiatry HPI:    PMH:Hypercholesterolemia    Seizures    Depression    GERD (gastroesophageal reflux disease)    Hypertension    Peripheral vascular disease    Osteoarthritis    Former smoker    Prediabetes    Hypertension      Allergies: No Known Allergies    Medications: acetaminophen     Tablet .. 975 milliGRAM(s) Oral every 6 hours  aluminum hydroxide/magnesium hydroxide/simethicone Suspension 30 milliLiter(s) Oral every 8 hours PRN  amLODIPine   Tablet 10 milliGRAM(s) Oral daily  aspirin  chewable 81 milliGRAM(s) Oral daily  atorvastatin 80 milliGRAM(s) Oral at bedtime  clopidogrel Tablet 75 milliGRAM(s) Oral daily  epoetin nancy-epbx (RETACRIT) Injectable 16265 Unit(s) SubCutaneous every 7 days  ferrous    sulfate 325 milliGRAM(s) Oral three times a day  gabapentin 900 milliGRAM(s) Oral three times a day  heparin  Infusion 1200 Unit(s)/Hr IV Continuous <Continuous>  HYDROmorphone PCA (1 mG/mL) 30 milliLiter(s) PCA Continuous PCA Continuous  melatonin 3 milliGRAM(s) Oral at bedtime  methocarbamol 750 milliGRAM(s) Oral three times a day  naloxone Injectable 0.1 milliGRAM(s) IV Push every 3 minutes PRN  omega-3-Acid Ethyl Esters 2 Gram(s) Oral two times a day  ondansetron Injectable 4 milliGRAM(s) IV Push every 6 hours PRN  pantoprazole    Tablet 40 milliGRAM(s) Oral before breakfast  phenytoin   Capsule 200 milliGRAM(s) Oral every 12 hours  polyethylene glycol 3350 17 Gram(s) Oral two times a day  QUEtiapine 200 milliGRAM(s) Oral at bedtime  senna 2 Tablet(s) Oral at bedtime PRN  venlafaxine XR. 75 milliGRAM(s) Oral daily    FH:Family history of breast cancer (Mother)    Family history of hepatitis (Mother)    Family history of heart disease (Mother, Sibling)    Family history of CABG (Father)    Family history of peripheral vascular disease (Father)    Family history of brain aneurysm (Sibling)      PSX: Inguinal hernia, left    S/P ORIF (open reduction internal fixation) fracture    S/P shoulder surgery    S/P appendectomy    H/O endarterectomy    Status post femorotibial bypass      SH: Social History:      Vital Signs Last 24 Hrs  T(C): 37.1 (31 Aug 2022 12:12), Max: 37.6 (30 Aug 2022 17:04)  T(F): 98.7 (31 Aug 2022 12:12), Max: 99.6 (30 Aug 2022 17:04)  HR: 97 (31 Aug 2022 12:12) (68 - 97)  BP: 127/65 (31 Aug 2022 12:12) (80/50 - 149/63)  BP(mean): 88 (30 Aug 2022 21:00) (77 - 88)  RR: 18 (31 Aug 2022 12:12) (12 - 18)  SpO2: 94% (31 Aug 2022 12:12) (92% - 100%)    Parameters below as of 31 Aug 2022 12:12  Patient On (Oxygen Delivery Method): room air        LABS                        8.4    8.09  )-----------( 261      ( 31 Aug 2022 05:00 )             25.6               08-31    139  |  103  |  11.9  ----------------------------<  102<H>  4.4   |  23.0  |  0.81    Ca    8.1<L>      31 Aug 2022 05:00  Phos  3.9     08-31  Mg     2.1     08-31       WBC Count: 8.09 K/uL (08-31-22 @ 05:00)  WBC Count: 10.91 K/uL (08-30-22 @ 18:07)  WBC Count: 8.58 K/uL (08-30-22 @ 05:27)  WBC Count: 11.13 K/uL (08-29-22 @ 05:39)  WBC Count: 9.41 K/uL (08-28-22 @ 05:15)  WBC Count: 8.27 K/uL (08-27-22 @ 08:44)  WBC Count: 7.61 K/uL (08-26-22 @ 06:25)  WBC Count: 9.07 K/uL (08-24-22 @ 04:15)  WBC Count: 11.45 K/uL (08-23-22 @ 13:58)  WBC Count: 9.81 K/uL (08-05-22 @ 11:35)      PHYSICAL EXAM  GEN: RADHA CHAKRABORTY is a pleasant well-nourished, well developed 57y Male in no acute distress, alert awake, and oriented to person, place and time.   LE Focused:    Vasc:  DP/PT pulses non-palpable  Derm: No open wound noted with no acute signs of a clinical infection noted.   Neuro: Protective sensation mildly diminished  MSK: pain on palpation noted to the distal toes of the right.        A:   left foot pain    P:  Patient evaluated, chart reviewed  Xrays reviewed  Recc JOSE/PVrs  Will speak with patient tomorrow in regards to further intervention  no dressing needed right now  Podiatry will follow while in house.  Discussed with Attending Dr. Ferrer

## 2022-08-31 NOTE — PROGRESS NOTE ADULT - SUBJECTIVE AND OBJECTIVE BOX
Subjective: Patient seen and examined at bedside. POD1 R fem-PT bypass revision. Patient still reporting increasing pain, despite maxed out PCA.     MEDICATIONS  (STANDING):  acetaminophen     Tablet .. 975 milliGRAM(s) Oral every 6 hours  amLODIPine   Tablet 10 milliGRAM(s) Oral daily  atorvastatin 80 milliGRAM(s) Oral at bedtime  gabapentin 900 milliGRAM(s) Oral three times a day  heparin   Injectable 5000 Unit(s) SubCutaneous every 8 hours  HYDROmorphone PCA (1 mG/mL) 30 milliLiter(s) PCA Continuous PCA Continuous  melatonin 3 milliGRAM(s) Oral at bedtime  methocarbamol 750 milliGRAM(s) Oral three times a day  omega-3-Acid Ethyl Esters 2 Gram(s) Oral two times a day  pantoprazole    Tablet 40 milliGRAM(s) Oral before breakfast  phenytoin   Capsule 200 milliGRAM(s) Oral every 12 hours  polyethylene glycol 3350 17 Gram(s) Oral two times a day  QUEtiapine 200 milliGRAM(s) Oral at bedtime  venlafaxine XR. 75 milliGRAM(s) Oral daily    MEDICATIONS  (PRN):  aluminum hydroxide/magnesium hydroxide/simethicone Suspension 30 milliLiter(s) Oral every 8 hours PRN Dyspepsia  naloxone Injectable 0.1 milliGRAM(s) IV Push every 3 minutes PRN For ANY of the following changes in patient status:  A. RR LESS THAN 10 breaths per minute, B. Oxygen saturation LESS THAN 90%, C. Sedation score of 6  ondansetron Injectable 4 milliGRAM(s) IV Push every 6 hours PRN Nausea  senna 2 Tablet(s) Oral at bedtime PRN Constipation      No Known Allergies        Objective:     ICU Vital Signs Last 24 Hrs  T(C): 37.2 (31 Aug 2022 04:19), Max: 37.6 (30 Aug 2022 17:04)  T(F): 98.9 (31 Aug 2022 04:19), Max: 99.6 (30 Aug 2022 17:04)  HR: 94 (31 Aug 2022 04:19) (68 - 97)  BP: 147/69 (31 Aug 2022 04:19) (80/50 - 149/63)  BP(mean): 88 (30 Aug 2022 21:00) (77 - 88)  ABP: 141/59 (30 Aug 2022 20:00) (114/52 - 176/70)  ABP(mean): 78 (30 Aug 2022 20:00) (78 - 99)  RR: 18 (31 Aug 2022 04:19) (12 - 18)  SpO2: 96% (31 Aug 2022 04:19) (92% - 100%)    O2 Parameters below as of 31 Aug 2022 04:19  Patient On (Oxygen Delivery Method): room air            I&O's Detail    30 Aug 2022 07:01  -  31 Aug 2022 07:00  --------------------------------------------------------  IN:    IV PiggyBack: 50 mL    Oral Fluid: 600 mL  Total IN: 650 mL    OUT:    Indwelling Catheter - Urethral (mL): 1750 mL  Total OUT: 1750 mL    Total NET: -1100 mL          Physical Exam:  General: NAD. Lying comfortably in bed.   HEENT: NCAT. Neck supple. EOMI.   Respiratory: Non labored breathing. No respiratory distress.   Cardio: RRR  Abdomen: Soft, non-tender, non-distended. No guarding or rebound.   Extremities: No clubbing or cyanosis. RLE rubor. Right PT signals, no DP signals or pulse   Musculoskeletal: No obvious bony masses or joint pain   Neuro: A&O x 3. CNs II-XII grossly inatct.                           8.4    8.09  )-----------( 261      ( 31 Aug 2022 05:00 )             25.6       08-31    139  |  103  |  11.9  ----------------------------<  102<H>  4.4   |  23.0  |  0.81    Ca    8.1<L>      31 Aug 2022 05:00  Phos  3.9     08-31  Mg     2.1     08-31

## 2022-08-31 NOTE — PROGRESS NOTE ADULT - ASSESSMENT
57M  extensive PSH including L SFA stenting, L femoral endarterectomy, R femoral endarterectomy, R fem-PT bypass with GSV s/p ambulatory angiogram with no intervention 2/2 to R fem-PT occlusion admitted for persistent chronic leg ischemi  s/p R fem-PT bypass revision  8/30  poss amputation pending    reports pain despite high dose PCA  PCA - DIlaudid; 0 continuous; 0.4 demand dose; 4 minute lockout; 10mg 4hr max    reports pain at distal toes    Med Recs:  increase methocarbamol 1000 milliGRAM(s) Oral three times a day HOLD for sedation

## 2022-08-31 NOTE — PROGRESS NOTE ADULT - SUBJECTIVE AND OBJECTIVE BOX
Interval Hx:  Patient seen during rounds  cont to report pain s/p bypass  Patient reports pain to be mod controlled on current medications - PCA use ranges 8mg-10mg/4hr  Patient denies sedation with medications     Analgesic Dosing for past 24 hours reviewed as below:  acetaminophen     Tablet ..   975 milliGRAM(s) Oral (08-31-22 @ 05:44)   975 milliGRAM(s) Oral (08-30-22 @ 23:03)    acetaminophen   IVPB ..   400 mL/Hr IV Intermittent (08-30-22 @ 11:34)    acetaminophen   IVPB ..   400 mL/Hr IV Intermittent (08-30-22 @ 19:10)    fentaNYL    Injectable   50 MICROGram(s) IV Push (08-30-22 @ 17:25)   50 MICROGram(s) IV Push (08-30-22 @ 17:16)    gabapentin   900 milliGRAM(s) Oral (08-31-22 @ 05:43)   900 milliGRAM(s) Oral (08-30-22 @ 21:38)    HYDROmorphone  Injectable   1 milliGRAM(s) IV Push (08-30-22 @ 19:10)    HYDROmorphone  Injectable   1 milliGRAM(s) IV Push (08-30-22 @ 18:29)   1 milliGRAM(s) IV Push (08-30-22 @ 18:04)   1 milliGRAM(s) IV Push (08-30-22 @ 17:44)   1 milliGRAM(s) IV Push (08-30-22 @ 17:12)    ketorolac   Injectable   30 milliGRAM(s) IV Push (08-30-22 @ 19:10)    LORazepam   Injectable   1 milliGRAM(s) IV Push (08-30-22 @ 19:10)    melatonin   3 milliGRAM(s) Oral (08-30-22 @ 21:38)    methocarbamol   750 milliGRAM(s) Oral (08-31-22 @ 05:44)   750 milliGRAM(s) Oral (08-30-22 @ 21:38)    phenytoin   Capsule   200 milliGRAM(s) Oral (08-31-22 @ 05:45)   200 milliGRAM(s) Oral (08-30-22 @ 21:39)    QUEtiapine   200 milliGRAM(s) Oral (08-30-22 @ 21:39)          T(C): 37.2 (08-31-22 @ 04:19), Max: 37.6 (08-30-22 @ 17:04)  HR: 94 (08-31-22 @ 04:19) (68 - 94)  BP: 147/69 (08-31-22 @ 04:19) (80/50 - 149/63)  RR: 18 (08-31-22 @ 04:19) (12 - 18)  SpO2: 96% (08-31-22 @ 04:19) (92% - 100%)      08-30-22 @ 07:01  -  08-31-22 @ 07:00  --------------------------------------------------------  IN: 650 mL / OUT: 1750 mL / NET: -1100 mL        acetaminophen     Tablet .. 975 milliGRAM(s) Oral every 6 hours  aluminum hydroxide/magnesium hydroxide/simethicone Suspension 30 milliLiter(s) Oral every 8 hours PRN  amLODIPine   Tablet 10 milliGRAM(s) Oral daily  aspirin  chewable 81 milliGRAM(s) Oral daily  atorvastatin 80 milliGRAM(s) Oral at bedtime  clopidogrel Tablet 75 milliGRAM(s) Oral daily  epoetin nancy-epbx (RETACRIT) Injectable 51679 Unit(s) SubCutaneous every 7 days  ferrous    sulfate 325 milliGRAM(s) Oral three times a day  gabapentin 900 milliGRAM(s) Oral three times a day  heparin   Injectable 5000 Unit(s) SubCutaneous every 8 hours  heparin  Infusion 1200 Unit(s)/Hr IV Continuous <Continuous>  HYDROmorphone PCA (1 mG/mL) 30 milliLiter(s) PCA Continuous PCA Continuous  melatonin 3 milliGRAM(s) Oral at bedtime  methocarbamol 750 milliGRAM(s) Oral three times a day  naloxone Injectable 0.1 milliGRAM(s) IV Push every 3 minutes PRN  omega-3-Acid Ethyl Esters 2 Gram(s) Oral two times a day  ondansetron Injectable 4 milliGRAM(s) IV Push every 6 hours PRN  pantoprazole    Tablet 40 milliGRAM(s) Oral before breakfast  phenytoin   Capsule 200 milliGRAM(s) Oral every 12 hours  polyethylene glycol 3350 17 Gram(s) Oral two times a day  QUEtiapine 200 milliGRAM(s) Oral at bedtime  senna 2 Tablet(s) Oral at bedtime PRN  venlafaxine XR. 75 milliGRAM(s) Oral daily                          8.4    8.09  )-----------( 261      ( 31 Aug 2022 05:00 )             25.6     08-31    139  |  103  |  11.9  ----------------------------<  102<H>  4.4   |  23.0  |  0.81    Ca    8.1<L>      31 Aug 2022 05:00  Phos  3.9     08-31  Mg     2.1     08-31      PT/INR - ( 30 Aug 2022 18:07 )   PT: 12.7 sec;   INR: 1.09 ratio         PTT - ( 30 Aug 2022 18:07 )  PTT:38.2 sec      Pain Service   [phone]

## 2022-09-01 LAB
APTT BLD: 58.2 SEC — HIGH (ref 27.5–35.5)
APTT BLD: 81.3 SEC — HIGH (ref 27.5–35.5)
APTT BLD: 94.9 SEC — HIGH (ref 27.5–35.5)

## 2022-09-01 RX ADMIN — Medication 975 MILLIGRAM(S): at 22:30

## 2022-09-01 RX ADMIN — AMLODIPINE BESYLATE 10 MILLIGRAM(S): 2.5 TABLET ORAL at 05:20

## 2022-09-01 RX ADMIN — Medication 975 MILLIGRAM(S): at 13:02

## 2022-09-01 RX ADMIN — HYDROMORPHONE HYDROCHLORIDE 30 MILLILITER(S): 2 INJECTION INTRAMUSCULAR; INTRAVENOUS; SUBCUTANEOUS at 07:22

## 2022-09-01 RX ADMIN — Medication 2 GRAM(S): at 05:19

## 2022-09-01 RX ADMIN — METHOCARBAMOL 1000 MILLIGRAM(S): 500 TABLET, FILM COATED ORAL at 13:18

## 2022-09-01 RX ADMIN — Medication 975 MILLIGRAM(S): at 11:23

## 2022-09-01 RX ADMIN — Medication 325 MILLIGRAM(S): at 21:41

## 2022-09-01 RX ADMIN — HEPARIN SODIUM 14 UNIT(S)/HR: 5000 INJECTION INTRAVENOUS; SUBCUTANEOUS at 15:15

## 2022-09-01 RX ADMIN — Medication 325 MILLIGRAM(S): at 11:23

## 2022-09-01 RX ADMIN — Medication 325 MILLIGRAM(S): at 05:20

## 2022-09-01 RX ADMIN — Medication 2 GRAM(S): at 17:45

## 2022-09-01 RX ADMIN — GABAPENTIN 900 MILLIGRAM(S): 400 CAPSULE ORAL at 05:19

## 2022-09-01 RX ADMIN — Medication 975 MILLIGRAM(S): at 17:45

## 2022-09-01 RX ADMIN — Medication 3 MILLIGRAM(S): at 21:42

## 2022-09-01 RX ADMIN — Medication 975 MILLIGRAM(S): at 00:20

## 2022-09-01 RX ADMIN — Medication 975 MILLIGRAM(S): at 05:20

## 2022-09-01 RX ADMIN — Medication 975 MILLIGRAM(S): at 17:47

## 2022-09-01 RX ADMIN — CLOPIDOGREL BISULFATE 75 MILLIGRAM(S): 75 TABLET, FILM COATED ORAL at 11:23

## 2022-09-01 RX ADMIN — Medication 200 MILLIGRAM(S): at 17:45

## 2022-09-01 RX ADMIN — Medication 81 MILLIGRAM(S): at 11:23

## 2022-09-01 RX ADMIN — GABAPENTIN 900 MILLIGRAM(S): 400 CAPSULE ORAL at 13:18

## 2022-09-01 RX ADMIN — PANTOPRAZOLE SODIUM 40 MILLIGRAM(S): 20 TABLET, DELAYED RELEASE ORAL at 05:20

## 2022-09-01 RX ADMIN — ATORVASTATIN CALCIUM 80 MILLIGRAM(S): 80 TABLET, FILM COATED ORAL at 21:41

## 2022-09-01 RX ADMIN — Medication 975 MILLIGRAM(S): at 21:41

## 2022-09-01 RX ADMIN — HEPARIN SODIUM 12 UNIT(S)/HR: 5000 INJECTION INTRAVENOUS; SUBCUTANEOUS at 06:17

## 2022-09-01 RX ADMIN — HEPARIN SODIUM 14 UNIT(S)/HR: 5000 INJECTION INTRAVENOUS; SUBCUTANEOUS at 07:21

## 2022-09-01 RX ADMIN — METHOCARBAMOL 1000 MILLIGRAM(S): 500 TABLET, FILM COATED ORAL at 05:20

## 2022-09-01 RX ADMIN — HYDROMORPHONE HYDROCHLORIDE 30 MILLILITER(S): 2 INJECTION INTRAMUSCULAR; INTRAVENOUS; SUBCUTANEOUS at 19:35

## 2022-09-01 RX ADMIN — Medication 200 MILLIGRAM(S): at 05:19

## 2022-09-01 RX ADMIN — METHOCARBAMOL 1000 MILLIGRAM(S): 500 TABLET, FILM COATED ORAL at 21:42

## 2022-09-01 RX ADMIN — HEPARIN SODIUM 14 UNIT(S)/HR: 5000 INJECTION INTRAVENOUS; SUBCUTANEOUS at 19:37

## 2022-09-01 RX ADMIN — Medication 75 MILLIGRAM(S): at 11:23

## 2022-09-01 RX ADMIN — GABAPENTIN 900 MILLIGRAM(S): 400 CAPSULE ORAL at 21:41

## 2022-09-01 RX ADMIN — QUETIAPINE FUMARATE 200 MILLIGRAM(S): 200 TABLET, FILM COATED ORAL at 21:42

## 2022-09-01 NOTE — PROGRESS NOTE ADULT - ASSESSMENT
57M  extensive PSH including L SFA stenting, L femoral endarterectomy, R femoral endarterectomy, R fem-PT bypass with GSV s/p ambulatory angiogram with no intervention 2/2 to R fem-PT occlusion admitted for persistent chronic leg ischemi  s/p R fem-PT bypass revision  8/30    pain controlled on  PCA - DIlaudid; 0 continuous; 0.4 demand dose; 4 minute lockout; 10mg 4hr max  +multimodal meds      Med Recs:  Carmen AM:  - DC PCA  - Recommend starting oxycodone PO 5mg/10mg q3ours PRN mod/severe pain  - Recommend starting hydromorphone IVP 1mg q3our PRN breakthrough pain

## 2022-09-01 NOTE — PROGRESS NOTE ADULT - SUBJECTIVE AND OBJECTIVE BOX
HPI/Overnight Events:  57 year old male, PMHx Sz, HLD, HTN, and PVD, now POD #2 from right fem-tibial bypass with cyrovein. Patient has been having issues with pain to right first and second toe s/p surgery. Podiatry consulted for possible TMA. Overnight pain well controlled and patient reports decreased pain this morning.     PAST MEDICAL HISTORY:  Hypercholesterolemia  Seizures  Depression  GERD (gastroesophageal reflux disease)  Hypertension  Peripheral vascular disease  Osteoarthritis  Former smoker  Prediabetes  Hypertension      PAST SURGICAL HISTORY:  Inguinal hernia, left  S/P ORIF (open reduction internal fixation) fracture  S/P shoulder surgery  S/P appendectomy  H/O endarterectomy  Status post femorotibial bypass        MEDICATIONS:  acetaminophen     Tablet .. 975 milliGRAM(s) Oral every 6 hours  aluminum hydroxide/magnesium hydroxide/simethicone Suspension 30 milliLiter(s) Oral every 8 hours PRN  amLODIPine   Tablet 10 milliGRAM(s) Oral daily  aspirin  chewable 81 milliGRAM(s) Oral daily  atorvastatin 80 milliGRAM(s) Oral at bedtime  clopidogrel Tablet 75 milliGRAM(s) Oral daily  epoetin nancy-epbx (RETACRIT) Injectable 33837 Unit(s) SubCutaneous every 7 days  ferrous    sulfate 325 milliGRAM(s) Oral three times a day  gabapentin 900 milliGRAM(s) Oral three times a day  heparin  Infusion 1200 Unit(s)/Hr IV Continuous <Continuous>  HYDROmorphone PCA (1 mG/mL) 30 milliLiter(s) PCA Continuous PCA Continuous  melatonin 3 milliGRAM(s) Oral at bedtime  methocarbamol 1000 milliGRAM(s) Oral every 8 hours  naloxone Injectable 0.1 milliGRAM(s) IV Push every 3 minutes PRN  omega-3-Acid Ethyl Esters 2 Gram(s) Oral two times a day  ondansetron Injectable 4 milliGRAM(s) IV Push every 6 hours PRN  pantoprazole    Tablet 40 milliGRAM(s) Oral before breakfast  phenytoin   Capsule 200 milliGRAM(s) Oral every 12 hours  polyethylene glycol 3350 17 Gram(s) Oral two times a day  QUEtiapine 200 milliGRAM(s) Oral at bedtime  senna 2 Tablet(s) Oral at bedtime PRN  venlafaxine XR. 75 milliGRAM(s) Oral daily      ALLERGIES:  No Known Allergies        VITALS & I/Os:  Vital Signs Last 24 Hrs  T(C): 36.6 (01 Sep 2022 04:28), Max: 37.1 (31 Aug 2022 12:12)  T(F): 97.8 (01 Sep 2022 04:28), Max: 98.7 (31 Aug 2022 12:12)  HR: 73 (01 Sep 2022 04:28) (73 - 99)  BP: 107/64 (01 Sep 2022 04:28) (107/60 - 146/69)  BP(mean): --  RR: 18 (01 Sep 2022 04:28) (18 - 18)  SpO2: 97% (01 Sep 2022 04:28) (91% - 97%)    Parameters below as of 01 Sep 2022 04:28  Patient On (Oxygen Delivery Method): room air        I&O's Summary    31 Aug 2022 07:01  -  01 Sep 2022 07:00  --------------------------------------------------------  IN: 624 mL / OUT: 2700 mL / NET: -2076 mL      Physical Exam:  General: NAD. Lying comfortably in bed.   HEENT: NCAT. Neck supple. EOMI.   Respiratory: Non labored breathing. No respiratory distress.   Cardio: RRR  Abdomen: Soft, non-tender, non-distended. No guarding or rebound.   Extremities: No clubbing or cyanosis. RLE rubor. Right PT signals, incision sites are intact   Musculoskeletal: No obvious bony masses or joint pain   Neuro: A&O x 3. CNs II-XII grossly intact      LABS:                        8.2    12.04 )-----------( 256      ( 31 Aug 2022 19:18 )             26.1     08-31    139  |  103  |  11.9  ----------------------------<  102<H>  4.4   |  23.0  |  0.81    Ca    8.1<L>      31 Aug 2022 05:00  Phos  3.9     08-31  Mg     2.1     08-31      Lactate:    PT/INR - ( 30 Aug 2022 18:07 )   PT: 12.7 sec;   INR: 1.09 ratio    PTT - ( 01 Sep 2022 04:59 )  PTT:58.2 sec  ABG - ( 30 Aug 2022 15:21 )  pH, Arterial: 7.390 pH, Blood: x     /  pCO2: 35    /  pO2: 233   / HCO3: 21    / Base Excess: -3.8  /  SaO2: 99.0

## 2022-09-01 NOTE — PROGRESS NOTE ADULT - SUBJECTIVE AND OBJECTIVE BOX
Interval Hx:  Patient seen during rounds  Patient reports pain to be controlled on current medications - minimal PCA use  Patient denies sedation with medications   reports pain is much improved today  concerned about pain overnight if PCA is dc'd during day        Analgesic Dosing for past 24 hours reviewed as below:  acetaminophen     Tablet ..   975 milliGRAM(s) Oral (09-01-22 @ 11:23)   975 milliGRAM(s) Oral (09-01-22 @ 05:20)   975 milliGRAM(s) Oral (08-31-22 @ 23:23)   975 milliGRAM(s) Oral (08-31-22 @ 17:42)    gabapentin   900 milliGRAM(s) Oral (09-01-22 @ 05:19)   900 milliGRAM(s) Oral (08-31-22 @ 20:37)   900 milliGRAM(s) Oral (08-31-22 @ 14:57)    melatonin   3 milliGRAM(s) Oral (08-31-22 @ 20:38)    methocarbamol   750 milliGRAM(s) Oral (08-31-22 @ 20:38)   750 milliGRAM(s) Oral (08-31-22 @ 14:57)    methocarbamol   1000 milliGRAM(s) Oral (09-01-22 @ 05:20)    methocarbamol   250 milliGRAM(s) Oral (08-31-22 @ 23:24)    phenytoin   Capsule   200 milliGRAM(s) Oral (09-01-22 @ 05:19)   200 milliGRAM(s) Oral (08-31-22 @ 17:43)    QUEtiapine   200 milliGRAM(s) Oral (08-31-22 @ 20:38)    venlafaxine XR.   75 milliGRAM(s) Oral (09-01-22 @ 11:23)          T(C): 36.6 (09-01-22 @ 11:20), Max: 36.7 (08-31-22 @ 20:13)  HR: 90 (09-01-22 @ 11:20) (73 - 99)  BP: 108/73 (09-01-22 @ 11:20) (107/60 - 146/69)  RR: 18 (09-01-22 @ 11:20) (18 - 18)  SpO2: 92% (09-01-22 @ 11:20) (91% - 97%)      08-31-22 @ 07:01  -  09-01-22 @ 07:00  --------------------------------------------------------  IN: 626 mL / OUT: 2700 mL / NET: -2074 mL        acetaminophen     Tablet .. 975 milliGRAM(s) Oral every 6 hours  aluminum hydroxide/magnesium hydroxide/simethicone Suspension 30 milliLiter(s) Oral every 8 hours PRN  amLODIPine   Tablet 10 milliGRAM(s) Oral daily  aspirin  chewable 81 milliGRAM(s) Oral daily  atorvastatin 80 milliGRAM(s) Oral at bedtime  clopidogrel Tablet 75 milliGRAM(s) Oral daily  epoetin nancy-epbx (RETACRIT) Injectable 72982 Unit(s) SubCutaneous every 7 days  ferrous    sulfate 325 milliGRAM(s) Oral three times a day  gabapentin 900 milliGRAM(s) Oral three times a day  heparin  Infusion 1200 Unit(s)/Hr IV Continuous <Continuous>  HYDROmorphone PCA (1 mG/mL) 30 milliLiter(s) PCA Continuous PCA Continuous  melatonin 3 milliGRAM(s) Oral at bedtime  methocarbamol 1000 milliGRAM(s) Oral every 8 hours  naloxone Injectable 0.1 milliGRAM(s) IV Push every 3 minutes PRN  omega-3-Acid Ethyl Esters 2 Gram(s) Oral two times a day  ondansetron Injectable 4 milliGRAM(s) IV Push every 6 hours PRN  pantoprazole    Tablet 40 milliGRAM(s) Oral before breakfast  phenytoin   Capsule 200 milliGRAM(s) Oral every 12 hours  polyethylene glycol 3350 17 Gram(s) Oral two times a day  QUEtiapine 200 milliGRAM(s) Oral at bedtime  senna 2 Tablet(s) Oral at bedtime PRN  venlafaxine XR. 75 milliGRAM(s) Oral daily                          8.2    12.04 )-----------( 256      ( 31 Aug 2022 19:18 )             26.1     08-31    139  |  103  |  11.9  ----------------------------<  102<H>  4.4   |  23.0  |  0.81    Ca    8.1<L>      31 Aug 2022 05:00  Phos  3.9     08-31  Mg     2.1     08-31      PT/INR - ( 30 Aug 2022 18:07 )   PT: 12.7 sec;   INR: 1.09 ratio         PTT - ( 01 Sep 2022 04:59 )  PTT:58.2 sec      Pain Service   [phone]

## 2022-09-01 NOTE — PROGRESS NOTE ADULT - ASSESSMENT
57 year old male, PMHx Sz, HLD, HTN, and PVD, now POD #2 from right fem-tibial bypass with cyrovein. Patient has been having issues with pain to right first and second toe s/p surgery, coolness noted to toes. Podiatry consulted for possible TMA. Overnight pain well controlled and patient reports decreased pain this morning. Incision sites are dry and intact, RLE is warm and well perfused.     Plan:  POD #2 s/p right fem-PT bypass with cryovein  Coolness and pain to right 1st/2nd toes  Continue heparin drip, ASA, plavix  PCA pump for pain control per pain management   JOSE/PVR recommended by podiatry, JOSE/PVR's not needed as patient had revascularization procedure with confirmation of improved flow, will f/u with podiatry for possible TMA

## 2022-09-01 NOTE — PROGRESS NOTE ADULT - ASSESSMENT
A:   Left foot pain  PAD    P:  Patient evaluated, chart reviewed  Xrays reviewed  No JOSE/PVR as per vasc due to recent intervention   No dressing applied   D/w pt clinical findings  Pod Plan: No plan for surgical intervention at this time. Continue monitoring for ischemic changes to digits outpatient.   Continue pain management   Weight bearing status to be determined by vascular as pt just had recent intervention    No wound care orders   Discussed with Attending Dr. Sanchez

## 2022-09-01 NOTE — PROGRESS NOTE ADULT - SUBJECTIVE AND OBJECTIVE BOX
HPI:57  yr old male presents with history of htn, seizures (last 2021) , high cholesterol  PAD  s/p right SFA stenting in 2017 left leg , carotid stenosis (may require ICA intervention in future) , s/p left femoral endarterectomy 10/1/21, right femoral endarterectomy on 4/15/22  s/p right fem -PT bypass with GSV on 5/13/22 for right great toe dry gangrene . Pt has c/o right foot pain that has burning sensation and has become severe over past week , using cane and wheelchair at times, c/o pain with standing and sitting 10/10 has no relief. states he cannot sleep without pain , fell out of bed last night and hit face on night stand ,  Lower foot and calf discolored red  with right calf incision   scab noted , no drainage . US done july 7/7 /22 demonstrating patent bypass with severe stenosis at the proximal anastomosis suggestive of >75% stenosis. pt is scheduled for RLE angiogram possible intervention with Dr. Lelia Elder on 8/9/22.      Podiatry HPI: Patient was seen bedside in no acute distress. States that he is feeling a lot of pain to his right foot recently. Patient wants pain medications. Denies any other constitutional symptoms of N/V/C/F/SOB. Denies any other pedal complaints.    Podiatry Interval HPI: 57M evaluated resting in bed. Pt admits to 8/10 to RLE, specificaly to digits. Denies N/V/F/C/SOB.       Medications acetaminophen     Tablet .. 975 milliGRAM(s) Oral every 6 hours  aluminum hydroxide/magnesium hydroxide/simethicone Suspension 30 milliLiter(s) Oral every 8 hours PRN  amLODIPine   Tablet 10 milliGRAM(s) Oral daily  aspirin  chewable 81 milliGRAM(s) Oral daily  atorvastatin 80 milliGRAM(s) Oral at bedtime  clopidogrel Tablet 75 milliGRAM(s) Oral daily  epoetin nancy-epbx (RETACRIT) Injectable 80997 Unit(s) SubCutaneous every 7 days  ferrous    sulfate 325 milliGRAM(s) Oral three times a day  gabapentin 900 milliGRAM(s) Oral three times a day  heparin  Infusion 1200 Unit(s)/Hr IV Continuous <Continuous>  HYDROmorphone PCA (1 mG/mL) 30 milliLiter(s) PCA Continuous PCA Continuous  melatonin 3 milliGRAM(s) Oral at bedtime  methocarbamol 1000 milliGRAM(s) Oral every 8 hours  naloxone Injectable 0.1 milliGRAM(s) IV Push every 3 minutes PRN  omega-3-Acid Ethyl Esters 2 Gram(s) Oral two times a day  ondansetron Injectable 4 milliGRAM(s) IV Push every 6 hours PRN  pantoprazole    Tablet 40 milliGRAM(s) Oral before breakfast  phenytoin   Capsule 200 milliGRAM(s) Oral every 12 hours  polyethylene glycol 3350 17 Gram(s) Oral two times a day  QUEtiapine 200 milliGRAM(s) Oral at bedtime  senna 2 Tablet(s) Oral at bedtime PRN  venlafaxine XR. 75 milliGRAM(s) Oral daily    FHFamily history of breast cancer (Mother)    Family history of hepatitis (Mother)    Family history of heart disease (Mother, Sibling)    Family history of CABG (Father)    Family history of peripheral vascular disease (Father)    Family history of brain aneurysm (Sibling)    ,   PMHHypercholesterolemia    Seizures    Depression    GERD (gastroesophageal reflux disease)    Hypertension    Peripheral vascular disease    Osteoarthritis    Former smoker    Prediabetes    Hypertension       PSHInguinal hernia, left    S/P ORIF (open reduction internal fixation) fracture    S/P shoulder surgery    S/P appendectomy    H/O endarterectomy    Status post femorotibial bypass        Labs                          8.2    12.04 )-----------( 256      ( 31 Aug 2022 19:18 )             26.1      08-31    139  |  103  |  11.9  ----------------------------<  102<H>  4.4   |  23.0  |  0.81    Ca    8.1<L>      31 Aug 2022 05:00  Phos  3.9     08-31  Mg     2.1     08-31       Vital Signs Last 24 Hrs  T(C): 36.6 (01 Sep 2022 04:28), Max: 37.1 (31 Aug 2022 12:12)  T(F): 97.8 (01 Sep 2022 04:28), Max: 98.7 (31 Aug 2022 12:12)  HR: 73 (01 Sep 2022 04:28) (73 - 99)  BP: 107/64 (01 Sep 2022 04:28) (107/60 - 146/69)  BP(mean): --  RR: 18 (01 Sep 2022 04:28) (18 - 18)  SpO2: 97% (01 Sep 2022 04:28) (91% - 97%)    Parameters below as of 01 Sep 2022 04:28  Patient On (Oxygen Delivery Method): room air              WBC Count: 12.04 K/uL *H* (08-31-22 @ 19:18)      PHYSICAL EXAM  LE Focused:  RLE Focused   Vasc:  DP/PT pulses non-palpable  Derm: No open wound noted with no acute signs of a clinical infection noted. Delayed CFT noted to RLE digits 1-5  Neuro: Protective sensation mildly diminished  MSK: pain on palpation noted to the distal toes of the right.

## 2022-09-02 LAB
ANION GAP SERPL CALC-SCNC: 12 MMOL/L — SIGNIFICANT CHANGE UP (ref 5–17)
ANISOCYTOSIS BLD QL: SLIGHT — SIGNIFICANT CHANGE UP
APTT BLD: 85.6 SEC — HIGH (ref 27.5–35.5)
BASOPHILS # BLD AUTO: 0 K/UL — SIGNIFICANT CHANGE UP (ref 0–0.2)
BASOPHILS NFR BLD AUTO: 0 % — SIGNIFICANT CHANGE UP (ref 0–2)
BUN SERPL-MCNC: 16.8 MG/DL — SIGNIFICANT CHANGE UP (ref 8–20)
BURR CELLS BLD QL SMEAR: PRESENT — SIGNIFICANT CHANGE UP
CALCIUM SERPL-MCNC: 8.8 MG/DL — SIGNIFICANT CHANGE UP (ref 8.4–10.5)
CHLORIDE SERPL-SCNC: 103 MMOL/L — SIGNIFICANT CHANGE UP (ref 98–107)
CO2 SERPL-SCNC: 21 MMOL/L — LOW (ref 22–29)
CREAT SERPL-MCNC: 0.7 MG/DL — SIGNIFICANT CHANGE UP (ref 0.5–1.3)
EGFR: 106 ML/MIN/1.73M2 — SIGNIFICANT CHANGE UP
EOSINOPHIL # BLD AUTO: 0.63 K/UL — HIGH (ref 0–0.5)
EOSINOPHIL NFR BLD AUTO: 4.3 % — SIGNIFICANT CHANGE UP (ref 0–6)
GIANT PLATELETS BLD QL SMEAR: PRESENT — SIGNIFICANT CHANGE UP
GLUCOSE SERPL-MCNC: 137 MG/DL — HIGH (ref 70–99)
HCT VFR BLD CALC: 26.1 % — LOW (ref 39–50)
HGB BLD-MCNC: 8.3 G/DL — LOW (ref 13–17)
LYMPHOCYTES # BLD AUTO: 1.9 K/UL — SIGNIFICANT CHANGE UP (ref 1–3.3)
LYMPHOCYTES # BLD AUTO: 13 % — SIGNIFICANT CHANGE UP (ref 13–44)
MAGNESIUM SERPL-MCNC: 1.8 MG/DL — SIGNIFICANT CHANGE UP (ref 1.6–2.6)
MANUAL SMEAR VERIFICATION: SIGNIFICANT CHANGE UP
MCHC RBC-ENTMCNC: 29.3 PG — SIGNIFICANT CHANGE UP (ref 27–34)
MCHC RBC-ENTMCNC: 31.8 GM/DL — LOW (ref 32–36)
MCV RBC AUTO: 92.2 FL — SIGNIFICANT CHANGE UP (ref 80–100)
MONOCYTES # BLD AUTO: 0.76 K/UL — SIGNIFICANT CHANGE UP (ref 0–0.9)
MONOCYTES NFR BLD AUTO: 5.2 % — SIGNIFICANT CHANGE UP (ref 2–14)
NEUTROPHILS # BLD AUTO: 11.21 K/UL — HIGH (ref 1.8–7.4)
NEUTROPHILS NFR BLD AUTO: 75.7 % — SIGNIFICANT CHANGE UP (ref 43–77)
NEUTS BAND # BLD: 0.9 % — SIGNIFICANT CHANGE UP (ref 0–8)
OVALOCYTES BLD QL SMEAR: SLIGHT — SIGNIFICANT CHANGE UP
PHOSPHATE SERPL-MCNC: 3.4 MG/DL — SIGNIFICANT CHANGE UP (ref 2.4–4.7)
PLAT MORPH BLD: NORMAL — SIGNIFICANT CHANGE UP
PLATELET # BLD AUTO: 296 K/UL — SIGNIFICANT CHANGE UP (ref 150–400)
POIKILOCYTOSIS BLD QL AUTO: SLIGHT — SIGNIFICANT CHANGE UP
POLYCHROMASIA BLD QL SMEAR: SLIGHT — SIGNIFICANT CHANGE UP
POTASSIUM SERPL-MCNC: 4.6 MMOL/L — SIGNIFICANT CHANGE UP (ref 3.5–5.3)
POTASSIUM SERPL-SCNC: 4.6 MMOL/L — SIGNIFICANT CHANGE UP (ref 3.5–5.3)
RBC # BLD: 2.83 M/UL — LOW (ref 4.2–5.8)
RBC # FLD: 19.4 % — HIGH (ref 10.3–14.5)
RBC BLD AUTO: ABNORMAL
SODIUM SERPL-SCNC: 135 MMOL/L — SIGNIFICANT CHANGE UP (ref 135–145)
VARIANT LYMPHS # BLD: 0.9 % — SIGNIFICANT CHANGE UP (ref 0–6)
WBC # BLD: 14.64 K/UL — HIGH (ref 3.8–10.5)
WBC # FLD AUTO: 14.64 K/UL — HIGH (ref 3.8–10.5)

## 2022-09-02 RX ORDER — OXYCODONE HYDROCHLORIDE 5 MG/1
10 TABLET ORAL
Refills: 0 | Status: DISCONTINUED | OUTPATIENT
Start: 2022-09-02 | End: 2022-09-02

## 2022-09-02 RX ORDER — HYDROMORPHONE HYDROCHLORIDE 2 MG/ML
6 INJECTION INTRAMUSCULAR; INTRAVENOUS; SUBCUTANEOUS
Refills: 0 | Status: DISCONTINUED | OUTPATIENT
Start: 2022-09-02 | End: 2022-09-09

## 2022-09-02 RX ORDER — ENOXAPARIN SODIUM 100 MG/ML
40 INJECTION SUBCUTANEOUS EVERY 24 HOURS
Refills: 0 | Status: DISCONTINUED | OUTPATIENT
Start: 2022-09-02 | End: 2022-09-10

## 2022-09-02 RX ORDER — OXYCODONE HYDROCHLORIDE 5 MG/1
5 TABLET ORAL
Refills: 0 | Status: DISCONTINUED | OUTPATIENT
Start: 2022-09-02 | End: 2022-09-02

## 2022-09-02 RX ORDER — HYDROMORPHONE HYDROCHLORIDE 2 MG/ML
1 INJECTION INTRAMUSCULAR; INTRAVENOUS; SUBCUTANEOUS
Refills: 0 | Status: DISCONTINUED | OUTPATIENT
Start: 2022-09-02 | End: 2022-09-08

## 2022-09-02 RX ORDER — MAGNESIUM SULFATE 500 MG/ML
1 VIAL (ML) INJECTION ONCE
Refills: 0 | Status: COMPLETED | OUTPATIENT
Start: 2022-09-02 | End: 2022-09-02

## 2022-09-02 RX ORDER — HYDROMORPHONE HYDROCHLORIDE 2 MG/ML
4 INJECTION INTRAMUSCULAR; INTRAVENOUS; SUBCUTANEOUS
Refills: 0 | Status: DISCONTINUED | OUTPATIENT
Start: 2022-09-02 | End: 2022-09-02

## 2022-09-02 RX ADMIN — AMLODIPINE BESYLATE 10 MILLIGRAM(S): 2.5 TABLET ORAL at 06:19

## 2022-09-02 RX ADMIN — HYDROMORPHONE HYDROCHLORIDE 30 MILLILITER(S): 2 INJECTION INTRAMUSCULAR; INTRAVENOUS; SUBCUTANEOUS at 07:20

## 2022-09-02 RX ADMIN — GABAPENTIN 900 MILLIGRAM(S): 400 CAPSULE ORAL at 13:29

## 2022-09-02 RX ADMIN — HYDROMORPHONE HYDROCHLORIDE 1 MILLIGRAM(S): 2 INJECTION INTRAMUSCULAR; INTRAVENOUS; SUBCUTANEOUS at 21:59

## 2022-09-02 RX ADMIN — Medication 325 MILLIGRAM(S): at 21:42

## 2022-09-02 RX ADMIN — METHOCARBAMOL 1000 MILLIGRAM(S): 500 TABLET, FILM COATED ORAL at 13:29

## 2022-09-02 RX ADMIN — HEPARIN SODIUM 14 UNIT(S)/HR: 5000 INJECTION INTRAVENOUS; SUBCUTANEOUS at 00:31

## 2022-09-02 RX ADMIN — HYDROMORPHONE HYDROCHLORIDE 6 MILLIGRAM(S): 2 INJECTION INTRAMUSCULAR; INTRAVENOUS; SUBCUTANEOUS at 13:33

## 2022-09-02 RX ADMIN — GABAPENTIN 900 MILLIGRAM(S): 400 CAPSULE ORAL at 05:26

## 2022-09-02 RX ADMIN — HYDROMORPHONE HYDROCHLORIDE 1 MILLIGRAM(S): 2 INJECTION INTRAMUSCULAR; INTRAVENOUS; SUBCUTANEOUS at 18:42

## 2022-09-02 RX ADMIN — OXYCODONE HYDROCHLORIDE 10 MILLIGRAM(S): 5 TABLET ORAL at 09:33

## 2022-09-02 RX ADMIN — HYDROMORPHONE HYDROCHLORIDE 1 MILLIGRAM(S): 2 INJECTION INTRAMUSCULAR; INTRAVENOUS; SUBCUTANEOUS at 08:54

## 2022-09-02 RX ADMIN — HYDROMORPHONE HYDROCHLORIDE 1 MILLIGRAM(S): 2 INJECTION INTRAMUSCULAR; INTRAVENOUS; SUBCUTANEOUS at 16:29

## 2022-09-02 RX ADMIN — ATORVASTATIN CALCIUM 80 MILLIGRAM(S): 80 TABLET, FILM COATED ORAL at 21:43

## 2022-09-02 RX ADMIN — Medication 975 MILLIGRAM(S): at 06:17

## 2022-09-02 RX ADMIN — Medication 325 MILLIGRAM(S): at 13:29

## 2022-09-02 RX ADMIN — HYDROMORPHONE HYDROCHLORIDE 1 MILLIGRAM(S): 2 INJECTION INTRAMUSCULAR; INTRAVENOUS; SUBCUTANEOUS at 12:11

## 2022-09-02 RX ADMIN — Medication 100 GRAM(S): at 11:19

## 2022-09-02 RX ADMIN — Medication 81 MILLIGRAM(S): at 11:08

## 2022-09-02 RX ADMIN — Medication 975 MILLIGRAM(S): at 12:10

## 2022-09-02 RX ADMIN — HYDROMORPHONE HYDROCHLORIDE 6 MILLIGRAM(S): 2 INJECTION INTRAMUSCULAR; INTRAVENOUS; SUBCUTANEOUS at 16:42

## 2022-09-02 RX ADMIN — Medication 2 GRAM(S): at 05:26

## 2022-09-02 RX ADMIN — Medication 200 MILLIGRAM(S): at 16:43

## 2022-09-02 RX ADMIN — HYDROMORPHONE HYDROCHLORIDE 1 MILLIGRAM(S): 2 INJECTION INTRAMUSCULAR; INTRAVENOUS; SUBCUTANEOUS at 18:17

## 2022-09-02 RX ADMIN — PANTOPRAZOLE SODIUM 40 MILLIGRAM(S): 20 TABLET, DELAYED RELEASE ORAL at 05:28

## 2022-09-02 RX ADMIN — OXYCODONE HYDROCHLORIDE 10 MILLIGRAM(S): 5 TABLET ORAL at 11:04

## 2022-09-02 RX ADMIN — HYDROMORPHONE HYDROCHLORIDE 1 MILLIGRAM(S): 2 INJECTION INTRAMUSCULAR; INTRAVENOUS; SUBCUTANEOUS at 11:19

## 2022-09-02 RX ADMIN — QUETIAPINE FUMARATE 200 MILLIGRAM(S): 200 TABLET, FILM COATED ORAL at 21:42

## 2022-09-02 RX ADMIN — Medication 3 MILLIGRAM(S): at 21:42

## 2022-09-02 RX ADMIN — HEPARIN SODIUM 14 UNIT(S)/HR: 5000 INJECTION INTRAVENOUS; SUBCUTANEOUS at 07:18

## 2022-09-02 RX ADMIN — HYDROMORPHONE HYDROCHLORIDE 1 MILLIGRAM(S): 2 INJECTION INTRAMUSCULAR; INTRAVENOUS; SUBCUTANEOUS at 08:11

## 2022-09-02 RX ADMIN — Medication 975 MILLIGRAM(S): at 11:08

## 2022-09-02 RX ADMIN — HYDROMORPHONE HYDROCHLORIDE 6 MILLIGRAM(S): 2 INJECTION INTRAMUSCULAR; INTRAVENOUS; SUBCUTANEOUS at 18:42

## 2022-09-02 RX ADMIN — Medication 75 MILLIGRAM(S): at 11:08

## 2022-09-02 RX ADMIN — Medication 975 MILLIGRAM(S): at 05:26

## 2022-09-02 RX ADMIN — METHOCARBAMOL 1000 MILLIGRAM(S): 500 TABLET, FILM COATED ORAL at 05:27

## 2022-09-02 RX ADMIN — CLOPIDOGREL BISULFATE 75 MILLIGRAM(S): 75 TABLET, FILM COATED ORAL at 11:08

## 2022-09-02 RX ADMIN — Medication 975 MILLIGRAM(S): at 17:06

## 2022-09-02 RX ADMIN — Medication 325 MILLIGRAM(S): at 06:19

## 2022-09-02 RX ADMIN — ENOXAPARIN SODIUM 40 MILLIGRAM(S): 100 INJECTION SUBCUTANEOUS at 13:29

## 2022-09-02 RX ADMIN — GABAPENTIN 900 MILLIGRAM(S): 400 CAPSULE ORAL at 21:42

## 2022-09-02 RX ADMIN — Medication 200 MILLIGRAM(S): at 05:27

## 2022-09-02 RX ADMIN — HYDROMORPHONE HYDROCHLORIDE 1 MILLIGRAM(S): 2 INJECTION INTRAMUSCULAR; INTRAVENOUS; SUBCUTANEOUS at 21:29

## 2022-09-02 RX ADMIN — HYDROMORPHONE HYDROCHLORIDE 6 MILLIGRAM(S): 2 INJECTION INTRAMUSCULAR; INTRAVENOUS; SUBCUTANEOUS at 20:15

## 2022-09-02 RX ADMIN — METHOCARBAMOL 1000 MILLIGRAM(S): 500 TABLET, FILM COATED ORAL at 21:42

## 2022-09-02 RX ADMIN — Medication 975 MILLIGRAM(S): at 16:42

## 2022-09-02 RX ADMIN — Medication 2 GRAM(S): at 16:43

## 2022-09-02 RX ADMIN — HYDROMORPHONE HYDROCHLORIDE 6 MILLIGRAM(S): 2 INJECTION INTRAMUSCULAR; INTRAVENOUS; SUBCUTANEOUS at 13:29

## 2022-09-02 RX ADMIN — HYDROMORPHONE HYDROCHLORIDE 1 MILLIGRAM(S): 2 INJECTION INTRAMUSCULAR; INTRAVENOUS; SUBCUTANEOUS at 15:06

## 2022-09-02 NOTE — PROGRESS NOTE ADULT - ASSESSMENT
57M  extensive PSH including L SFA stenting, L femoral endarterectomy, R femoral endarterectomy, R fem-PT bypass with GSV s/p ambulatory angiogram with no intervention 2/2 to R fem-PT occlusion admitted for persistent chronic leg ischemi  s/p R fem-PT bypass revision  8/30        Med Recs:  - DC oxycodone PO 5mg/10mg q3ours PRN mod/severe pain  - Recommend starting hydromorphone PO 4mg/6mg [id number] PRN mod/severe pain  cont other meds as ordered

## 2022-09-02 NOTE — PROGRESS NOTE ADULT - SUBJECTIVE AND OBJECTIVE BOX
Interval Hx:  Patient seen during rounds  Patient reports pain to be mod controlled on current medications  not tolerating pain off pca transition  no plan for further surgeries as inpatient  patient understands that the plan would be to have pain eventually controlled on po meds  Patient denies sedation with medications     Analgesic Dosing for past 24 hours reviewed as below:  acetaminophen     Tablet ..   975 milliGRAM(s) Oral (09-02-22 @ 11:08)   975 milliGRAM(s) Oral (09-02-22 @ 05:26)   975 milliGRAM(s) Oral (09-01-22 @ 21:41)   975 milliGRAM(s) Oral (09-01-22 @ 17:45)    gabapentin   900 milliGRAM(s) Oral (09-02-22 @ 05:26)   900 milliGRAM(s) Oral (09-01-22 @ 21:41)   900 milliGRAM(s) Oral (09-01-22 @ 13:18)    HYDROmorphone  Injectable   1 milliGRAM(s) IV Push (09-02-22 @ 11:19)   1 milliGRAM(s) IV Push (09-02-22 @ 08:11)    melatonin   3 milliGRAM(s) Oral (09-01-22 @ 21:42)    methocarbamol   1000 milliGRAM(s) Oral (09-02-22 @ 05:27)   1000 milliGRAM(s) Oral (09-01-22 @ 21:42)   1000 milliGRAM(s) Oral (09-01-22 @ 13:18)    oxyCODONE    IR   10 milliGRAM(s) Oral (09-02-22 @ 09:33)    phenytoin   Capsule   200 milliGRAM(s) Oral (09-02-22 @ 05:27)   200 milliGRAM(s) Oral (09-01-22 @ 17:45)    QUEtiapine   200 milliGRAM(s) Oral (09-01-22 @ 21:42)    venlafaxine XR.   75 milliGRAM(s) Oral (09-02-22 @ 11:08)          T(C): 36.7 (09-02-22 @ 12:11), Max: 36.9 (09-01-22 @ 20:01)  HR: 90 (09-02-22 @ 12:11) (89 - 102)  BP: 136/69 (09-02-22 @ 12:11) (115/77 - 154/69)  RR: 18 (09-02-22 @ 12:11) (18 - 18)  SpO2: 97% (09-02-22 @ 12:11) (93% - 97%)      09-01-22 @ 07:01  -  09-02-22 @ 07:00  --------------------------------------------------------  IN: 168 mL / OUT: 1550 mL / NET: -1382 mL        acetaminophen     Tablet .. 975 milliGRAM(s) Oral every 6 hours  aluminum hydroxide/magnesium hydroxide/simethicone Suspension 30 milliLiter(s) Oral every 8 hours PRN  amLODIPine   Tablet 10 milliGRAM(s) Oral daily  aspirin  chewable 81 milliGRAM(s) Oral daily  atorvastatin 80 milliGRAM(s) Oral at bedtime  clopidogrel Tablet 75 milliGRAM(s) Oral daily  enoxaparin Injectable 40 milliGRAM(s) SubCutaneous every 24 hours  epoetin nancy-epbx (RETACRIT) Injectable 66607 Unit(s) SubCutaneous every 7 days  ferrous    sulfate 325 milliGRAM(s) Oral three times a day  gabapentin 900 milliGRAM(s) Oral three times a day  HYDROmorphone  Injectable 1 milliGRAM(s) IV Push every 3 hours PRN  melatonin 3 milliGRAM(s) Oral at bedtime  methocarbamol 1000 milliGRAM(s) Oral every 8 hours  naloxone Injectable 0.1 milliGRAM(s) IV Push every 3 minutes PRN  omega-3-Acid Ethyl Esters 2 Gram(s) Oral two times a day  ondansetron Injectable 4 milliGRAM(s) IV Push every 6 hours PRN  oxyCODONE    IR 5 milliGRAM(s) Oral every 3 hours PRN  oxyCODONE    IR 10 milliGRAM(s) Oral every 3 hours PRN  pantoprazole    Tablet 40 milliGRAM(s) Oral before breakfast  phenytoin   Capsule 200 milliGRAM(s) Oral every 12 hours  polyethylene glycol 3350 17 Gram(s) Oral two times a day  QUEtiapine 200 milliGRAM(s) Oral at bedtime  senna 2 Tablet(s) Oral at bedtime PRN  venlafaxine XR. 75 milliGRAM(s) Oral daily                          8.3    14.64 )-----------( 296      ( 02 Sep 2022 07:41 )             26.1     09-02    135  |  103  |  16.8  ----------------------------<  137<H>  4.6   |  21.0<L>  |  0.70    Ca    8.8      02 Sep 2022 07:41  Phos  3.4     09-02  Mg     1.8     09-02      PTT - ( 02 Sep 2022 06:35 )  PTT:85.6 sec      Pain Service   [phone]

## 2022-09-02 NOTE — PROGRESS NOTE ADULT - SUBJECTIVE AND OBJECTIVE BOX
HPI/OVERNIGHT EVENTS:  No acute overnight events. Vital signs stable. This morning reports improved foot pain, but with some lingering toe pain after dressing change. Pain mgmt following, recommended d/c of PCA and replacement with PRN oxy/dilaudid. Denies nausea, vomiting, fever, chills, chest pain, shortness of breath, or any new or concerning symptoms. No plan per podiatry for surgical intervention.       MEDICATIONS  (STANDING):  acetaminophen     Tablet .. 975 milliGRAM(s) Oral every 6 hours  amLODIPine   Tablet 10 milliGRAM(s) Oral daily  aspirin  chewable 81 milliGRAM(s) Oral daily  atorvastatin 80 milliGRAM(s) Oral at bedtime  clopidogrel Tablet 75 milliGRAM(s) Oral daily  epoetin nancy-epbx (RETACRIT) Injectable 22092 Unit(s) SubCutaneous every 7 days  ferrous    sulfate 325 milliGRAM(s) Oral three times a day  gabapentin 900 milliGRAM(s) Oral three times a day  heparin  Infusion 1200 Unit(s)/Hr (14 mL/Hr) IV Continuous <Continuous>  melatonin 3 milliGRAM(s) Oral at bedtime  methocarbamol 1000 milliGRAM(s) Oral every 8 hours  omega-3-Acid Ethyl Esters 2 Gram(s) Oral two times a day  pantoprazole    Tablet 40 milliGRAM(s) Oral before breakfast  phenytoin   Capsule 200 milliGRAM(s) Oral every 12 hours  polyethylene glycol 3350 17 Gram(s) Oral two times a day  QUEtiapine 200 milliGRAM(s) Oral at bedtime  venlafaxine XR. 75 milliGRAM(s) Oral daily    MEDICATIONS  (PRN):  aluminum hydroxide/magnesium hydroxide/simethicone Suspension 30 milliLiter(s) Oral every 8 hours PRN Dyspepsia  HYDROmorphone  Injectable 1 milliGRAM(s) IV Push every 3 hours PRN BREAKTHROUGH  naloxone Injectable 0.1 milliGRAM(s) IV Push every 3 minutes PRN For ANY of the following changes in patient status:  A. RR LESS THAN 10 breaths per minute, B. Oxygen saturation LESS THAN 90%, C. Sedation score of 6  ondansetron Injectable 4 milliGRAM(s) IV Push every 6 hours PRN Nausea  oxyCODONE    IR 5 milliGRAM(s) Oral every 3 hours PRN Moderate Pain (4 - 6)  oxyCODONE    IR 10 milliGRAM(s) Oral every 3 hours PRN Severe Pain (7 - 10)  senna 2 Tablet(s) Oral at bedtime PRN Constipation      Vital Signs Last 24 Hrs  T(C): 36.7 (02 Sep 2022 04:14), Max: 36.9 (01 Sep 2022 20:01)  T(F): 98.1 (02 Sep 2022 04:14), Max: 98.5 (01 Sep 2022 20:01)  HR: 89 (02 Sep 2022 04:14) (89 - 102)  BP: 154/69 (02 Sep 2022 04:14) (108/73 - 154/69)  BP(mean): --  RR: 18 (02 Sep 2022 04:14) (18 - 18)  SpO2: 97% (02 Sep 2022 04:14) (92% - 97%)    Parameters below as of 02 Sep 2022 04:14  Patient On (Oxygen Delivery Method): room air        Constitutional: patient resting comfortably in bed, in no acute distress  HEENT: EOMI, no active drainage or redness  Neck: Full ROM without pain  Respiratory: respirations are unlabored, no accessory muscle use, no conversational dyspnea  Cardiovascular: regular rate & rhythm  Gastrointestinal: Abdomen soft, non-tender, non-distended  Neurological: A&O x 3; no gross sensory / motor / coordination deficits  Musculoskeletal: RLE in knee immobilizer. Groin dressing stained, changed without acitve bleeding noted. Right ankle dressing mildly stained. PT signals appreciated. Toe wounds stable.       I&O's Detail    01 Sep 2022 07:01  -  02 Sep 2022 07:00  --------------------------------------------------------  IN:    Heparin: 168 mL  Total IN: 168 mL    OUT:    Indwelling Catheter - Urethral (mL): 1550 mL  Total OUT: 1550 mL    Total NET: -1382 mL          LABS:                        8.2    12.04 )-----------( 256      ( 31 Aug 2022 19:18 )             26.1           PTT - ( 02 Sep 2022 06:35 )  PTT:85.6 sec

## 2022-09-02 NOTE — PROGRESS NOTE ADULT - ASSESSMENT
57 year old male, PMHx Sz, HLD, HTN, and PVD, now POD #3 from right fem-tibial bypass with cyrovein. Patient has been having issues with pain to right first and second toe s/p surgery, coolness noted to toes. Podiatry consulted for possible TMA, reports no plan for surgical intervention. Pain continuing to improve, RLE is warm and well perfused.     Plan:  Continue heparin drip, ASA, plavix  PCA pump discontinued, PRN medications ordered.  improved flow, no plan from podiatry for surgical intervention.   Local wound care.

## 2022-09-03 LAB
ANION GAP SERPL CALC-SCNC: 12 MMOL/L — SIGNIFICANT CHANGE UP (ref 5–17)
BASOPHILS # BLD AUTO: 0.04 K/UL — SIGNIFICANT CHANGE UP (ref 0–0.2)
BASOPHILS NFR BLD AUTO: 0.4 % — SIGNIFICANT CHANGE UP (ref 0–2)
BUN SERPL-MCNC: 10.3 MG/DL — SIGNIFICANT CHANGE UP (ref 8–20)
CALCIUM SERPL-MCNC: 9.5 MG/DL — SIGNIFICANT CHANGE UP (ref 8.4–10.5)
CHLORIDE SERPL-SCNC: 103 MMOL/L — SIGNIFICANT CHANGE UP (ref 98–107)
CO2 SERPL-SCNC: 23 MMOL/L — SIGNIFICANT CHANGE UP (ref 22–29)
CREAT SERPL-MCNC: 0.67 MG/DL — SIGNIFICANT CHANGE UP (ref 0.5–1.3)
EGFR: 108 ML/MIN/1.73M2 — SIGNIFICANT CHANGE UP
EOSINOPHIL # BLD AUTO: 0.31 K/UL — SIGNIFICANT CHANGE UP (ref 0–0.5)
EOSINOPHIL NFR BLD AUTO: 3.2 % — SIGNIFICANT CHANGE UP (ref 0–6)
GLUCOSE SERPL-MCNC: 96 MG/DL — SIGNIFICANT CHANGE UP (ref 70–99)
HCT VFR BLD CALC: 28.2 % — LOW (ref 39–50)
HGB BLD-MCNC: 8.7 G/DL — LOW (ref 13–17)
IMM GRANULOCYTES NFR BLD AUTO: 1.3 % — SIGNIFICANT CHANGE UP (ref 0–1.5)
LYMPHOCYTES # BLD AUTO: 2.1 K/UL — SIGNIFICANT CHANGE UP (ref 1–3.3)
LYMPHOCYTES # BLD AUTO: 21.7 % — SIGNIFICANT CHANGE UP (ref 13–44)
MAGNESIUM SERPL-MCNC: 1.9 MG/DL — SIGNIFICANT CHANGE UP (ref 1.6–2.6)
MCHC RBC-ENTMCNC: 28.6 PG — SIGNIFICANT CHANGE UP (ref 27–34)
MCHC RBC-ENTMCNC: 30.9 GM/DL — LOW (ref 32–36)
MCV RBC AUTO: 92.8 FL — SIGNIFICANT CHANGE UP (ref 80–100)
MONOCYTES # BLD AUTO: 1.12 K/UL — HIGH (ref 0–0.9)
MONOCYTES NFR BLD AUTO: 11.6 % — SIGNIFICANT CHANGE UP (ref 2–14)
NEUTROPHILS # BLD AUTO: 5.96 K/UL — SIGNIFICANT CHANGE UP (ref 1.8–7.4)
NEUTROPHILS NFR BLD AUTO: 61.8 % — SIGNIFICANT CHANGE UP (ref 43–77)
PHOSPHATE SERPL-MCNC: 4.1 MG/DL — SIGNIFICANT CHANGE UP (ref 2.4–4.7)
PLATELET # BLD AUTO: 342 K/UL — SIGNIFICANT CHANGE UP (ref 150–400)
POTASSIUM SERPL-MCNC: 4.2 MMOL/L — SIGNIFICANT CHANGE UP (ref 3.5–5.3)
POTASSIUM SERPL-SCNC: 4.2 MMOL/L — SIGNIFICANT CHANGE UP (ref 3.5–5.3)
RBC # BLD: 3.04 M/UL — LOW (ref 4.2–5.8)
RBC # FLD: 19.8 % — HIGH (ref 10.3–14.5)
SODIUM SERPL-SCNC: 138 MMOL/L — SIGNIFICANT CHANGE UP (ref 135–145)
WBC # BLD: 9.66 K/UL — SIGNIFICANT CHANGE UP (ref 3.8–10.5)
WBC # FLD AUTO: 9.66 K/UL — SIGNIFICANT CHANGE UP (ref 3.8–10.5)

## 2022-09-03 PROCEDURE — 73630 X-RAY EXAM OF FOOT: CPT | Mod: 26,RT

## 2022-09-03 RX ORDER — CADEXOMER IODINE 0.9 %
1 PADS, MEDICATED (EA) TOPICAL DAILY
Refills: 0 | Status: DISCONTINUED | OUTPATIENT
Start: 2022-09-03 | End: 2022-09-06

## 2022-09-03 RX ORDER — ACETAMINOPHEN 500 MG
650 TABLET ORAL EVERY 6 HOURS
Refills: 0 | Status: DISCONTINUED | OUTPATIENT
Start: 2022-09-03 | End: 2022-09-03

## 2022-09-03 RX ADMIN — Medication 975 MILLIGRAM(S): at 23:45

## 2022-09-03 RX ADMIN — PANTOPRAZOLE SODIUM 40 MILLIGRAM(S): 20 TABLET, DELAYED RELEASE ORAL at 05:38

## 2022-09-03 RX ADMIN — Medication 200 MILLIGRAM(S): at 17:31

## 2022-09-03 RX ADMIN — HYDROMORPHONE HYDROCHLORIDE 1 MILLIGRAM(S): 2 INJECTION INTRAMUSCULAR; INTRAVENOUS; SUBCUTANEOUS at 09:09

## 2022-09-03 RX ADMIN — Medication 975 MILLIGRAM(S): at 02:43

## 2022-09-03 RX ADMIN — Medication 3 MILLIGRAM(S): at 22:02

## 2022-09-03 RX ADMIN — METHOCARBAMOL 1000 MILLIGRAM(S): 500 TABLET, FILM COATED ORAL at 22:01

## 2022-09-03 RX ADMIN — Medication 81 MILLIGRAM(S): at 12:04

## 2022-09-03 RX ADMIN — Medication 75 MILLIGRAM(S): at 12:04

## 2022-09-03 RX ADMIN — GABAPENTIN 900 MILLIGRAM(S): 400 CAPSULE ORAL at 12:05

## 2022-09-03 RX ADMIN — Medication 975 MILLIGRAM(S): at 17:31

## 2022-09-03 RX ADMIN — Medication 2 GRAM(S): at 05:36

## 2022-09-03 RX ADMIN — Medication 975 MILLIGRAM(S): at 22:00

## 2022-09-03 RX ADMIN — Medication 325 MILLIGRAM(S): at 05:37

## 2022-09-03 RX ADMIN — HYDROMORPHONE HYDROCHLORIDE 6 MILLIGRAM(S): 2 INJECTION INTRAMUSCULAR; INTRAVENOUS; SUBCUTANEOUS at 06:25

## 2022-09-03 RX ADMIN — Medication 200 MILLIGRAM(S): at 05:35

## 2022-09-03 RX ADMIN — HYDROMORPHONE HYDROCHLORIDE 6 MILLIGRAM(S): 2 INJECTION INTRAMUSCULAR; INTRAVENOUS; SUBCUTANEOUS at 07:00

## 2022-09-03 RX ADMIN — HYDROMORPHONE HYDROCHLORIDE 6 MILLIGRAM(S): 2 INJECTION INTRAMUSCULAR; INTRAVENOUS; SUBCUTANEOUS at 06:17

## 2022-09-03 RX ADMIN — HYDROMORPHONE HYDROCHLORIDE 6 MILLIGRAM(S): 2 INJECTION INTRAMUSCULAR; INTRAVENOUS; SUBCUTANEOUS at 18:31

## 2022-09-03 RX ADMIN — ATORVASTATIN CALCIUM 80 MILLIGRAM(S): 80 TABLET, FILM COATED ORAL at 22:00

## 2022-09-03 RX ADMIN — HYDROMORPHONE HYDROCHLORIDE 6 MILLIGRAM(S): 2 INJECTION INTRAMUSCULAR; INTRAVENOUS; SUBCUTANEOUS at 17:31

## 2022-09-03 RX ADMIN — Medication 975 MILLIGRAM(S): at 12:04

## 2022-09-03 RX ADMIN — Medication 975 MILLIGRAM(S): at 18:31

## 2022-09-03 RX ADMIN — Medication 325 MILLIGRAM(S): at 22:01

## 2022-09-03 RX ADMIN — HYDROMORPHONE HYDROCHLORIDE 6 MILLIGRAM(S): 2 INJECTION INTRAMUSCULAR; INTRAVENOUS; SUBCUTANEOUS at 13:04

## 2022-09-03 RX ADMIN — METHOCARBAMOL 1000 MILLIGRAM(S): 500 TABLET, FILM COATED ORAL at 12:04

## 2022-09-03 RX ADMIN — HYDROMORPHONE HYDROCHLORIDE 1 MILLIGRAM(S): 2 INJECTION INTRAMUSCULAR; INTRAVENOUS; SUBCUTANEOUS at 08:39

## 2022-09-03 RX ADMIN — QUETIAPINE FUMARATE 200 MILLIGRAM(S): 200 TABLET, FILM COATED ORAL at 22:02

## 2022-09-03 RX ADMIN — ENOXAPARIN SODIUM 40 MILLIGRAM(S): 100 INJECTION SUBCUTANEOUS at 12:05

## 2022-09-03 RX ADMIN — AMLODIPINE BESYLATE 10 MILLIGRAM(S): 2.5 TABLET ORAL at 05:37

## 2022-09-03 RX ADMIN — Medication 975 MILLIGRAM(S): at 13:04

## 2022-09-03 RX ADMIN — HYDROMORPHONE HYDROCHLORIDE 6 MILLIGRAM(S): 2 INJECTION INTRAMUSCULAR; INTRAVENOUS; SUBCUTANEOUS at 20:58

## 2022-09-03 RX ADMIN — Medication 975 MILLIGRAM(S): at 07:22

## 2022-09-03 RX ADMIN — Medication 2 GRAM(S): at 17:31

## 2022-09-03 RX ADMIN — Medication 975 MILLIGRAM(S): at 05:37

## 2022-09-03 RX ADMIN — GABAPENTIN 900 MILLIGRAM(S): 400 CAPSULE ORAL at 05:37

## 2022-09-03 RX ADMIN — HYDROMORPHONE HYDROCHLORIDE 6 MILLIGRAM(S): 2 INJECTION INTRAMUSCULAR; INTRAVENOUS; SUBCUTANEOUS at 21:30

## 2022-09-03 RX ADMIN — HYDROMORPHONE HYDROCHLORIDE 6 MILLIGRAM(S): 2 INJECTION INTRAMUSCULAR; INTRAVENOUS; SUBCUTANEOUS at 12:04

## 2022-09-03 RX ADMIN — Medication 975 MILLIGRAM(S): at 00:42

## 2022-09-03 RX ADMIN — CLOPIDOGREL BISULFATE 75 MILLIGRAM(S): 75 TABLET, FILM COATED ORAL at 12:04

## 2022-09-03 RX ADMIN — METHOCARBAMOL 1000 MILLIGRAM(S): 500 TABLET, FILM COATED ORAL at 05:36

## 2022-09-03 RX ADMIN — GABAPENTIN 900 MILLIGRAM(S): 400 CAPSULE ORAL at 22:00

## 2022-09-03 NOTE — PROGRESS NOTE ADULT - SUBJECTIVE AND OBJECTIVE BOX
Subjective: Pt feeling well and states that his pain is improving. Does still have some groin pain but states that it is controlled. No fevers, chills, n/v/d, CP, SOB. Vitals stable, no acute events.     STATUS POST: R fem-PT bypass with cryovein     POST OPERATIVE DAY #: 4    MEDICATIONS  (STANDING):  acetaminophen     Tablet .. 975 milliGRAM(s) Oral every 6 hours  amLODIPine   Tablet 10 milliGRAM(s) Oral daily  aspirin  chewable 81 milliGRAM(s) Oral daily  atorvastatin 80 milliGRAM(s) Oral at bedtime  clopidogrel Tablet 75 milliGRAM(s) Oral daily  enoxaparin Injectable 40 milliGRAM(s) SubCutaneous every 24 hours  epoetin nancy-epbx (RETACRIT) Injectable 72060 Unit(s) SubCutaneous every 7 days  ferrous    sulfate 325 milliGRAM(s) Oral three times a day  gabapentin 900 milliGRAM(s) Oral three times a day  melatonin 3 milliGRAM(s) Oral at bedtime  methocarbamol 1000 milliGRAM(s) Oral every 8 hours  omega-3-Acid Ethyl Esters 2 Gram(s) Oral two times a day  pantoprazole    Tablet 40 milliGRAM(s) Oral before breakfast  phenytoin   Capsule 200 milliGRAM(s) Oral every 12 hours  polyethylene glycol 3350 17 Gram(s) Oral two times a day  QUEtiapine 200 milliGRAM(s) Oral at bedtime  venlafaxine XR. 75 milliGRAM(s) Oral daily    MEDICATIONS  (PRN):  aluminum hydroxide/magnesium hydroxide/simethicone Suspension 30 milliLiter(s) Oral every 8 hours PRN Dyspepsia  HYDROmorphone   Tablet 4 milliGRAM(s) Oral every 3 hours PRN Moderate Pain (4 - 6)  HYDROmorphone   Tablet 6 milliGRAM(s) Oral every 3 hours PRN Severe Pain (7 - 10)  HYDROmorphone  Injectable 1 milliGRAM(s) IV Push every 3 hours PRN BREAKTHROUGH  naloxone Injectable 0.1 milliGRAM(s) IV Push every 3 minutes PRN For ANY of the following changes in patient status:  A. RR LESS THAN 10 breaths per minute, B. Oxygen saturation LESS THAN 90%, C. Sedation score of 6  ondansetron Injectable 4 milliGRAM(s) IV Push every 6 hours PRN Nausea  senna 2 Tablet(s) Oral at bedtime PRN Constipation      Vital Signs Last 24 Hrs  T(C): 36.6 (02 Sep 2022 20:12), Max: 36.7 (02 Sep 2022 04:14)  T(F): 97.9 (02 Sep 2022 20:12), Max: 98.1 (02 Sep 2022 04:14)  HR: 95 (02 Sep 2022 20:12) (89 - 95)  BP: 166/73 (02 Sep 2022 20:12) (136/69 - 166/73)  BP(mean): --  RR: 18 (02 Sep 2022 20:12) (18 - 18)  SpO2: 95% (02 Sep 2022 20:12) (95% - 97%)    Parameters below as of 02 Sep 2022 20:12  Patient On (Oxygen Delivery Method): room air        Physical Exam:  Constitutional: patient resting comfortably in bed, in no acute distress  HEENT: EOMI, no active drainage or redness  Neck: Full ROM without pain  Respiratory: respirations are unlabored, no accessory muscle use, no conversational dyspnea  Cardiovascular: regular rate & rhythm  Gastrointestinal: Abdomen soft, non-tender, non-distended  Neurological: A&O x 3; no gross sensory / motor / coordination deficits  Musculoskeletal: RLE in knee immobilizer. Groin dressing clean dry and intact. Right ankle dressing mildly stained. Toe wounds stable.       LABS:                        8.3    14.64 )-----------( 296      ( 02 Sep 2022 07:41 )             26.1     09-02    135  |  103  |  16.8  ----------------------------<  137<H>  4.6   |  21.0<L>  |  0.70    Ca    8.8      02 Sep 2022 07:41  Phos  3.4     09-02  Mg     1.8     09-02      PTT - ( 02 Sep 2022 06:35 )  PTT:85.6 sec     Subjective: Having some pain in toes. Asking for more pain medication. No SOB, CP, n/v/d. Vitals stable, no acute distress.     STATUS POST: R fem-PT bypass with cryovein     POST OPERATIVE DAY #: 4    MEDICATIONS  (STANDING):  acetaminophen     Tablet .. 975 milliGRAM(s) Oral every 6 hours  amLODIPine   Tablet 10 milliGRAM(s) Oral daily  aspirin  chewable 81 milliGRAM(s) Oral daily  atorvastatin 80 milliGRAM(s) Oral at bedtime  clopidogrel Tablet 75 milliGRAM(s) Oral daily  enoxaparin Injectable 40 milliGRAM(s) SubCutaneous every 24 hours  epoetin nancy-epbx (RETACRIT) Injectable 21005 Unit(s) SubCutaneous every 7 days  ferrous    sulfate 325 milliGRAM(s) Oral three times a day  gabapentin 900 milliGRAM(s) Oral three times a day  melatonin 3 milliGRAM(s) Oral at bedtime  methocarbamol 1000 milliGRAM(s) Oral every 8 hours  omega-3-Acid Ethyl Esters 2 Gram(s) Oral two times a day  pantoprazole    Tablet 40 milliGRAM(s) Oral before breakfast  phenytoin   Capsule 200 milliGRAM(s) Oral every 12 hours  polyethylene glycol 3350 17 Gram(s) Oral two times a day  QUEtiapine 200 milliGRAM(s) Oral at bedtime  venlafaxine XR. 75 milliGRAM(s) Oral daily    MEDICATIONS  (PRN):  aluminum hydroxide/magnesium hydroxide/simethicone Suspension 30 milliLiter(s) Oral every 8 hours PRN Dyspepsia  HYDROmorphone   Tablet 4 milliGRAM(s) Oral every 3 hours PRN Moderate Pain (4 - 6)  HYDROmorphone   Tablet 6 milliGRAM(s) Oral every 3 hours PRN Severe Pain (7 - 10)  HYDROmorphone  Injectable 1 milliGRAM(s) IV Push every 3 hours PRN BREAKTHROUGH  naloxone Injectable 0.1 milliGRAM(s) IV Push every 3 minutes PRN For ANY of the following changes in patient status:  A. RR LESS THAN 10 breaths per minute, B. Oxygen saturation LESS THAN 90%, C. Sedation score of 6  ondansetron Injectable 4 milliGRAM(s) IV Push every 6 hours PRN Nausea  senna 2 Tablet(s) Oral at bedtime PRN Constipation      Vital Signs Last 24 Hrs  T(C): 36.6 (02 Sep 2022 20:12), Max: 36.7 (02 Sep 2022 04:14)  T(F): 97.9 (02 Sep 2022 20:12), Max: 98.1 (02 Sep 2022 04:14)  HR: 95 (02 Sep 2022 20:12) (89 - 95)  BP: 166/73 (02 Sep 2022 20:12) (136/69 - 166/73)  BP(mean): --  RR: 18 (02 Sep 2022 20:12) (18 - 18)  SpO2: 95% (02 Sep 2022 20:12) (95% - 97%)    Parameters below as of 02 Sep 2022 20:12  Patient On (Oxygen Delivery Method): room air        Physical Exam:  Constitutional: patient resting comfortably in bed, in no acute distress  HEENT: EOMI, no active drainage or redness  Neck: Full ROM without pain  Respiratory: respirations are unlabored, no accessory muscle use, no conversational dyspnea  Cardiovascular: regular rate & rhythm  Gastrointestinal: Abdomen soft, non-tender, non-distended  Neurological: A&O x 3; no gross sensory / motor / coordination deficits  Musculoskeletal: RLE in knee immobilizer. Groin dressing clean dry and intact. Right ankle dressing mildly stained. Toe wounds stable.       LABS:                        8.3    14.64 )-----------( 296      ( 02 Sep 2022 07:41 )             26.1     09-02    135  |  103  |  16.8  ----------------------------<  137<H>  4.6   |  21.0<L>  |  0.70    Ca    8.8      02 Sep 2022 07:41  Phos  3.4     09-02  Mg     1.8     09-02      PTT - ( 02 Sep 2022 06:35 )  PTT:85.6 sec

## 2022-09-03 NOTE — PROGRESS NOTE ADULT - ASSESSMENT
57 year old male, PMHx Sz, HLD, HTN, and PVD, now POD #3 from right fem-tibial bypass with cyrovein. Patient has been having issues with pain to right first and second toe s/p surgery, coolness noted to toes. Podiatry consulted for possible TMA, reports no plan for surgical intervention. Pain continuing to improve, RLE is warm and well perfused. Continue current management.     Plan:  Hep gtt stopped, continue ASA, plavix  PCA pump discontinued, PRN medications ordered.  improved flow, no plan from podiatry for surgical intervention.   Local wound care.

## 2022-09-03 NOTE — PROGRESS NOTE ADULT - SUBJECTIVE AND OBJECTIVE BOX
HPI:57  yr old male presents with history of htn, seizures (last 2021) , high cholesterol  PAD  s/p right SFA stenting in 2017 left leg , carotid stenosis (may require ICA intervention in future) , s/p left femoral endarterectomy 10/1/21, right femoral endarterectomy on 4/15/22  s/p right fem -PT bypass with GSV on 5/13/22 for right great toe dry gangrene . Pt has c/o right foot pain that has burning sensation and has become severe over past week , using cane and wheelchair at times, c/o pain with standing and sitting 10/10 has no relief. states he cannot sleep without pain , fell out of bed last night and hit face on night stand ,  Lower foot and calf discolored red  with right calf incision   scab noted , no drainage . US done july 7/7 /22 demonstrating patent bypass with severe stenosis at the proximal anastomosis suggestive of >75% stenosis. pt is scheduled for RLE angiogram possible intervention with Dr. Lelia Elder on 8/9/22.      Podiatry HPI: Patient was seen bedside in no acute distress. States that he is feeling a lot of pain to his right foot recently. Patient wants pain medications. Denies any other constitutional symptoms of N/V/C/F/SOB. Denies any other pedal complaints.    Podiatry Interval HPI: 57M evaluated resting in bed. Pt admits to 8/10 to RLE, specificaly to digits. Denies N/V/F/C/SOB.       Medications acetaminophen     Tablet .. 975 milliGRAM(s) Oral every 6 hours  aluminum hydroxide/magnesium hydroxide/simethicone Suspension 30 milliLiter(s) Oral every 8 hours PRN  amLODIPine   Tablet 10 milliGRAM(s) Oral daily  aspirin  chewable 81 milliGRAM(s) Oral daily  atorvastatin 80 milliGRAM(s) Oral at bedtime  clopidogrel Tablet 75 milliGRAM(s) Oral daily  enoxaparin Injectable 40 milliGRAM(s) SubCutaneous every 24 hours  epoetin nancy-epbx (RETACRIT) Injectable 42262 Unit(s) SubCutaneous every 7 days  ferrous    sulfate 325 milliGRAM(s) Oral three times a day  gabapentin 900 milliGRAM(s) Oral three times a day  HYDROmorphone   Tablet 4 milliGRAM(s) Oral every 3 hours PRN  HYDROmorphone   Tablet 6 milliGRAM(s) Oral every 3 hours PRN  HYDROmorphone  Injectable 1 milliGRAM(s) IV Push every 3 hours PRN  melatonin 3 milliGRAM(s) Oral at bedtime  methocarbamol 1000 milliGRAM(s) Oral every 8 hours  naloxone Injectable 0.1 milliGRAM(s) IV Push every 3 minutes PRN  omega-3-Acid Ethyl Esters 2 Gram(s) Oral two times a day  ondansetron Injectable 4 milliGRAM(s) IV Push every 6 hours PRN  pantoprazole    Tablet 40 milliGRAM(s) Oral before breakfast  phenytoin   Capsule 200 milliGRAM(s) Oral every 12 hours  polyethylene glycol 3350 17 Gram(s) Oral two times a day  QUEtiapine 200 milliGRAM(s) Oral at bedtime  senna 2 Tablet(s) Oral at bedtime PRN  venlafaxine XR. 75 milliGRAM(s) Oral daily    FHFamily history of breast cancer (Mother)    Family history of hepatitis (Mother)    Family history of heart disease (Mother, Sibling)    Family history of CABG (Father)    Family history of peripheral vascular disease (Father)    Family history of brain aneurysm (Sibling)    ,   PMHHypercholesterolemia    Seizures    Depression    GERD (gastroesophageal reflux disease)    Hypertension    Peripheral vascular disease    Osteoarthritis    Former smoker    Prediabetes    Hypertension       PSHInguinal hernia, left    S/P ORIF (open reduction internal fixation) fracture    S/P shoulder surgery    S/P appendectomy    H/O endarterectomy    Status post femorotibial bypass        Labs                          8.3    14.64 )-----------( 296      ( 02 Sep 2022 07:41 )             26.1      09-02    135  |  103  |  16.8  ----------------------------<  137<H>  4.6   |  21.0<L>  |  0.70    Ca    8.8      02 Sep 2022 07:41  Phos  3.4     09-02  Mg     1.8     09-02       Vital Signs Last 24 Hrs  T(C): 36.7 (03 Sep 2022 04:35), Max: 36.7 (02 Sep 2022 12:11)  T(F): 98.1 (03 Sep 2022 04:35), Max: 98.1 (02 Sep 2022 12:11)  HR: 99 (03 Sep 2022 04:35) (90 - 99)  BP: 164/78 (03 Sep 2022 04:35) (136/69 - 166/73)  BP(mean): --  RR: 18 (03 Sep 2022 04:35) (18 - 18)  SpO2: 96% (03 Sep 2022 04:35) (95% - 97%)    Parameters below as of 03 Sep 2022 04:35  Patient On (Oxygen Delivery Method): room air              WBC Count: 14.64 K/uL *H* (09-02-22 @ 07:41)        PHYSICAL EXAM  LE Focused:  RLE Focused   Vasc:  DP/PT pulses non-palpable  Derm: No open wound noted with no acute signs of a clinical infection noted. Delayed CFT noted to RLE digits 1-5  Neuro: Protective sensation mildly diminished  MSK: pain on palpation noted to the distal toes of the right.         HPI:57  yr old male presents with history of htn, seizures (last 2021) , high cholesterol  PAD  s/p right SFA stenting in 2017 left leg , carotid stenosis (may require ICA intervention in future) , s/p left femoral endarterectomy 10/1/21, right femoral endarterectomy on 4/15/22  s/p right fem -PT bypass with GSV on 5/13/22 for right great toe dry gangrene . Pt has c/o right foot pain that has burning sensation and has become severe over past week , using cane and wheelchair at times, c/o pain with standing and sitting 10/10 has no relief. states he cannot sleep without pain , fell out of bed last night and hit face on night stand ,  Lower foot and calf discolored red  with right calf incision   scab noted , no drainage . US done july 7/7 /22 demonstrating patent bypass with severe stenosis at the proximal anastomosis suggestive of >75% stenosis. pt is scheduled for RLE angiogram possible intervention with Dr. Lelia Elder on 8/9/22.      Podiatry HPI: Patient was seen bedside in no acute distress. States that he is feeling a lot of pain to his right foot recently. Patient wants pain medications. Denies any other constitutional symptoms of N/V/C/F/SOB. Denies any other pedal complaints.    Podiatry Interval HPI: 57M evaluated resting in bed. Pt admits to 7/10 to RLE, specificaly to digits. Denies N/V/F/C/SOB.       Medications acetaminophen     Tablet .. 975 milliGRAM(s) Oral every 6 hours  aluminum hydroxide/magnesium hydroxide/simethicone Suspension 30 milliLiter(s) Oral every 8 hours PRN  amLODIPine   Tablet 10 milliGRAM(s) Oral daily  aspirin  chewable 81 milliGRAM(s) Oral daily  atorvastatin 80 milliGRAM(s) Oral at bedtime  clopidogrel Tablet 75 milliGRAM(s) Oral daily  enoxaparin Injectable 40 milliGRAM(s) SubCutaneous every 24 hours  epoetin nancy-epbx (RETACRIT) Injectable 05404 Unit(s) SubCutaneous every 7 days  ferrous    sulfate 325 milliGRAM(s) Oral three times a day  gabapentin 900 milliGRAM(s) Oral three times a day  HYDROmorphone   Tablet 4 milliGRAM(s) Oral every 3 hours PRN  HYDROmorphone   Tablet 6 milliGRAM(s) Oral every 3 hours PRN  HYDROmorphone  Injectable 1 milliGRAM(s) IV Push every 3 hours PRN  melatonin 3 milliGRAM(s) Oral at bedtime  methocarbamol 1000 milliGRAM(s) Oral every 8 hours  naloxone Injectable 0.1 milliGRAM(s) IV Push every 3 minutes PRN  omega-3-Acid Ethyl Esters 2 Gram(s) Oral two times a day  ondansetron Injectable 4 milliGRAM(s) IV Push every 6 hours PRN  pantoprazole    Tablet 40 milliGRAM(s) Oral before breakfast  phenytoin   Capsule 200 milliGRAM(s) Oral every 12 hours  polyethylene glycol 3350 17 Gram(s) Oral two times a day  QUEtiapine 200 milliGRAM(s) Oral at bedtime  senna 2 Tablet(s) Oral at bedtime PRN  venlafaxine XR. 75 milliGRAM(s) Oral daily    FHFamily history of breast cancer (Mother)    Family history of hepatitis (Mother)    Family history of heart disease (Mother, Sibling)    Family history of CABG (Father)    Family history of peripheral vascular disease (Father)    Family history of brain aneurysm (Sibling)    ,   PMHHypercholesterolemia    Seizures    Depression    GERD (gastroesophageal reflux disease)    Hypertension    Peripheral vascular disease    Osteoarthritis    Former smoker    Prediabetes    Hypertension       PSHInguinal hernia, left    S/P ORIF (open reduction internal fixation) fracture    S/P shoulder surgery    S/P appendectomy    H/O endarterectomy    Status post femorotibial bypass        Labs                          8.3    14.64 )-----------( 296      ( 02 Sep 2022 07:41 )             26.1      09-02    135  |  103  |  16.8  ----------------------------<  137<H>  4.6   |  21.0<L>  |  0.70    Ca    8.8      02 Sep 2022 07:41  Phos  3.4     09-02  Mg     1.8     09-02       Vital Signs Last 24 Hrs  T(C): 36.7 (03 Sep 2022 04:35), Max: 36.7 (02 Sep 2022 12:11)  T(F): 98.1 (03 Sep 2022 04:35), Max: 98.1 (02 Sep 2022 12:11)  HR: 99 (03 Sep 2022 04:35) (90 - 99)  BP: 164/78 (03 Sep 2022 04:35) (136/69 - 166/73)  BP(mean): --  RR: 18 (03 Sep 2022 04:35) (18 - 18)  SpO2: 96% (03 Sep 2022 04:35) (95% - 97%)    Parameters below as of 03 Sep 2022 04:35  Patient On (Oxygen Delivery Method): room air              WBC Count: 14.64 K/uL *H* (09-02-22 @ 07:41)        PHYSICAL EXAM  LE Focused:  RLE Focused   Vasc:  DP/PT faintly palpable, TG: warm to warm   Derm: Right 2nd digit digit wound noted with 2 cc of purulent drainage expressed +PTB, no malodor, erythema and edema noted to the foot, R hallux medial distal wound noted partial thickness with purulent drainage noted, no PTB noted, no fluctuance noted   Neuro: Protective sensation intact   MSK: pain on palpation noted to the distal toes of the right    Xrays pending

## 2022-09-03 NOTE — PROGRESS NOTE ADULT - ASSESSMENT
A:   Left foot pain  PAD    P:  Patient evaluated, chart reviewed  Xrays reviewed  No JOSE/PVR as per vasc due to recent intervention   No dressing applied   D/w pt clinical findings  Pod Plan: No plan for surgical intervention at this time. Continue monitoring for ischemic changes to digits outpatient.   Continue pain management   Weight bearing status to be determined by vascular as pt just had recent intervention    No wound care orders   Discussed with Attending Dr. Ferrer   A:   Left foot pain  PAD    P:  Patient evaluated, chart reviewed  Xray ordered   No JOSE/PVR as per vasc due to recent intervention   Applied DSD, No ACE  D/w pt clinical findings  Pod Plan: Due to probe to bone wound, recommend MRI of R foot to r/o OM  Continue pain management   Weight bearing status to be determined by vascular as pt just had recent intervention    Rx Iodosorb   Discussed with Attending Dr. Ferrer

## 2022-09-04 RX ORDER — MORPHINE SULFATE 50 MG/1
15 CAPSULE, EXTENDED RELEASE ORAL EVERY 12 HOURS
Refills: 0 | Status: DISCONTINUED | OUTPATIENT
Start: 2022-09-04 | End: 2022-09-10

## 2022-09-04 RX ORDER — ACETAMINOPHEN 500 MG
1000 TABLET ORAL ONCE
Refills: 0 | Status: DISCONTINUED | OUTPATIENT
Start: 2022-09-04 | End: 2022-09-10

## 2022-09-04 RX ADMIN — MORPHINE SULFATE 15 MILLIGRAM(S): 50 CAPSULE, EXTENDED RELEASE ORAL at 18:36

## 2022-09-04 RX ADMIN — Medication 200 MILLIGRAM(S): at 17:32

## 2022-09-04 RX ADMIN — Medication 81 MILLIGRAM(S): at 13:12

## 2022-09-04 RX ADMIN — HYDROMORPHONE HYDROCHLORIDE 1 MILLIGRAM(S): 2 INJECTION INTRAMUSCULAR; INTRAVENOUS; SUBCUTANEOUS at 05:48

## 2022-09-04 RX ADMIN — AMLODIPINE BESYLATE 10 MILLIGRAM(S): 2.5 TABLET ORAL at 05:47

## 2022-09-04 RX ADMIN — ATORVASTATIN CALCIUM 80 MILLIGRAM(S): 80 TABLET, FILM COATED ORAL at 21:12

## 2022-09-04 RX ADMIN — GABAPENTIN 900 MILLIGRAM(S): 400 CAPSULE ORAL at 21:11

## 2022-09-04 RX ADMIN — HYDROMORPHONE HYDROCHLORIDE 6 MILLIGRAM(S): 2 INJECTION INTRAMUSCULAR; INTRAVENOUS; SUBCUTANEOUS at 17:32

## 2022-09-04 RX ADMIN — Medication 975 MILLIGRAM(S): at 05:46

## 2022-09-04 RX ADMIN — ENOXAPARIN SODIUM 40 MILLIGRAM(S): 100 INJECTION SUBCUTANEOUS at 13:13

## 2022-09-04 RX ADMIN — HYDROMORPHONE HYDROCHLORIDE 6 MILLIGRAM(S): 2 INJECTION INTRAMUSCULAR; INTRAVENOUS; SUBCUTANEOUS at 13:13

## 2022-09-04 RX ADMIN — METHOCARBAMOL 1000 MILLIGRAM(S): 500 TABLET, FILM COATED ORAL at 15:02

## 2022-09-04 RX ADMIN — HYDROMORPHONE HYDROCHLORIDE 1 MILLIGRAM(S): 2 INJECTION INTRAMUSCULAR; INTRAVENOUS; SUBCUTANEOUS at 16:25

## 2022-09-04 RX ADMIN — Medication 3 MILLIGRAM(S): at 21:12

## 2022-09-04 RX ADMIN — HYDROMORPHONE HYDROCHLORIDE 6 MILLIGRAM(S): 2 INJECTION INTRAMUSCULAR; INTRAVENOUS; SUBCUTANEOUS at 03:45

## 2022-09-04 RX ADMIN — HYDROMORPHONE HYDROCHLORIDE 6 MILLIGRAM(S): 2 INJECTION INTRAMUSCULAR; INTRAVENOUS; SUBCUTANEOUS at 14:10

## 2022-09-04 RX ADMIN — Medication 975 MILLIGRAM(S): at 17:33

## 2022-09-04 RX ADMIN — Medication 200 MILLIGRAM(S): at 05:48

## 2022-09-04 RX ADMIN — HYDROMORPHONE HYDROCHLORIDE 6 MILLIGRAM(S): 2 INJECTION INTRAMUSCULAR; INTRAVENOUS; SUBCUTANEOUS at 08:46

## 2022-09-04 RX ADMIN — HYDROMORPHONE HYDROCHLORIDE 6 MILLIGRAM(S): 2 INJECTION INTRAMUSCULAR; INTRAVENOUS; SUBCUTANEOUS at 09:45

## 2022-09-04 RX ADMIN — HYDROMORPHONE HYDROCHLORIDE 6 MILLIGRAM(S): 2 INJECTION INTRAMUSCULAR; INTRAVENOUS; SUBCUTANEOUS at 18:30

## 2022-09-04 RX ADMIN — Medication 75 MILLIGRAM(S): at 13:12

## 2022-09-04 RX ADMIN — PANTOPRAZOLE SODIUM 40 MILLIGRAM(S): 20 TABLET, DELAYED RELEASE ORAL at 05:47

## 2022-09-04 RX ADMIN — GABAPENTIN 900 MILLIGRAM(S): 400 CAPSULE ORAL at 13:13

## 2022-09-04 RX ADMIN — Medication 325 MILLIGRAM(S): at 13:12

## 2022-09-04 RX ADMIN — HYDROMORPHONE HYDROCHLORIDE 1 MILLIGRAM(S): 2 INJECTION INTRAMUSCULAR; INTRAVENOUS; SUBCUTANEOUS at 16:50

## 2022-09-04 RX ADMIN — HYDROMORPHONE HYDROCHLORIDE 1 MILLIGRAM(S): 2 INJECTION INTRAMUSCULAR; INTRAVENOUS; SUBCUTANEOUS at 06:30

## 2022-09-04 RX ADMIN — Medication 975 MILLIGRAM(S): at 18:30

## 2022-09-04 RX ADMIN — QUETIAPINE FUMARATE 200 MILLIGRAM(S): 200 TABLET, FILM COATED ORAL at 21:12

## 2022-09-04 RX ADMIN — HYDROMORPHONE HYDROCHLORIDE 6 MILLIGRAM(S): 2 INJECTION INTRAMUSCULAR; INTRAVENOUS; SUBCUTANEOUS at 20:41

## 2022-09-04 RX ADMIN — Medication 325 MILLIGRAM(S): at 05:47

## 2022-09-04 RX ADMIN — Medication 2 GRAM(S): at 17:33

## 2022-09-04 RX ADMIN — HYDROMORPHONE HYDROCHLORIDE 1 MILLIGRAM(S): 2 INJECTION INTRAMUSCULAR; INTRAVENOUS; SUBCUTANEOUS at 10:47

## 2022-09-04 RX ADMIN — HYDROMORPHONE HYDROCHLORIDE 6 MILLIGRAM(S): 2 INJECTION INTRAMUSCULAR; INTRAVENOUS; SUBCUTANEOUS at 03:07

## 2022-09-04 RX ADMIN — METHOCARBAMOL 1000 MILLIGRAM(S): 500 TABLET, FILM COATED ORAL at 21:13

## 2022-09-04 RX ADMIN — Medication 2 GRAM(S): at 05:47

## 2022-09-04 RX ADMIN — Medication 975 MILLIGRAM(S): at 06:30

## 2022-09-04 RX ADMIN — GABAPENTIN 900 MILLIGRAM(S): 400 CAPSULE ORAL at 05:46

## 2022-09-04 RX ADMIN — Medication 325 MILLIGRAM(S): at 21:13

## 2022-09-04 RX ADMIN — Medication 975 MILLIGRAM(S): at 14:10

## 2022-09-04 RX ADMIN — HYDROMORPHONE HYDROCHLORIDE 1 MILLIGRAM(S): 2 INJECTION INTRAMUSCULAR; INTRAVENOUS; SUBCUTANEOUS at 11:15

## 2022-09-04 RX ADMIN — MORPHINE SULFATE 15 MILLIGRAM(S): 50 CAPSULE, EXTENDED RELEASE ORAL at 19:00

## 2022-09-04 RX ADMIN — METHOCARBAMOL 1000 MILLIGRAM(S): 500 TABLET, FILM COATED ORAL at 05:48

## 2022-09-04 RX ADMIN — CLOPIDOGREL BISULFATE 75 MILLIGRAM(S): 75 TABLET, FILM COATED ORAL at 13:13

## 2022-09-04 RX ADMIN — Medication 975 MILLIGRAM(S): at 13:13

## 2022-09-04 RX ADMIN — HYDROMORPHONE HYDROCHLORIDE 6 MILLIGRAM(S): 2 INJECTION INTRAMUSCULAR; INTRAVENOUS; SUBCUTANEOUS at 21:30

## 2022-09-04 NOTE — PHYSICAL THERAPY INITIAL EVALUATION ADULT - ADDITIONAL COMMENTS
Pt. lives with his girlfriend and step son in a house with 3 MAXIMO and no rail and no stairs inside. Pt.'s girlfriend is at home at all times and able to assist as needed. Pt. was modified independent with a SAC prior to admission and also owns a RW.

## 2022-09-04 NOTE — PROGRESS NOTE ADULT - SUBJECTIVE AND OBJECTIVE BOX
Vital Signs Last 24 Hrs  T(C): 36.7 (04 Sep 2022 05:01), Max: 36.9 (03 Sep 2022 19:53)  T(F): 98 (04 Sep 2022 05:01), Max: 98.4 (03 Sep 2022 19:53)  HR: 96 (04 Sep 2022 05:01) (96 - 107)  BP: 102/65 (04 Sep 2022 05:01) (85/54 - 156/71)  RR: 18 (04 Sep 2022 05:01) (18 - 18)  SpO2: 96% (04 Sep 2022 05:01) (93% - 97%)    O2 Parameters below as of 04 Sep 2022 05:01  Patient On (Oxygen Delivery Method): room air                            8.7    9.66  )-----------( 342      ( 03 Sep 2022 06:01 )             28.2   09-03    138  |  103  |  10.3  ----------------------------<  96  4.2   |  23.0  |  0.67    Ca    9.5      03 Sep 2022 06:01  Phos  4.1     09-03  Mg     1.9     09-03    MEDICATIONS  (STANDING):  acetaminophen     Tablet .. 975 milliGRAM(s) Oral every 6 hours  acetaminophen   IVPB .. 1000 milliGRAM(s) IV Intermittent once  amLODIPine   Tablet 10 milliGRAM(s) Oral daily  aspirin  chewable 81 milliGRAM(s) Oral daily  atorvastatin 80 milliGRAM(s) Oral at bedtime  cadexomer iodine 0.9% Gel 1 Application(s) Topical daily  clopidogrel Tablet 75 milliGRAM(s) Oral daily  enoxaparin Injectable 40 milliGRAM(s) SubCutaneous every 24 hours  epoetin nancy-epbx (RETACRIT) Injectable 28556 Unit(s) SubCutaneous every 7 days  ferrous    sulfate 325 milliGRAM(s) Oral three times a day  gabapentin 900 milliGRAM(s) Oral three times a day  melatonin 3 milliGRAM(s) Oral at bedtime  methocarbamol 1000 milliGRAM(s) Oral every 8 hours  omega-3-Acid Ethyl Esters 2 Gram(s) Oral two times a day  pantoprazole    Tablet 40 milliGRAM(s) Oral before breakfast  phenytoin   Capsule 200 milliGRAM(s) Oral every 12 hours  polyethylene glycol 3350 17 Gram(s) Oral two times a day  QUEtiapine 200 milliGRAM(s) Oral at bedtime  venlafaxine XR. 75 milliGRAM(s) Oral daily    MEDICATIONS  (PRN):  aluminum hydroxide/magnesium hydroxide/simethicone Suspension 30 milliLiter(s) Oral every 8 hours PRN Dyspepsia  HYDROmorphone   Tablet 4 milliGRAM(s) Oral every 3 hours PRN Moderate Pain (4 - 6)  HYDROmorphone   Tablet 6 milliGRAM(s) Oral every 3 hours PRN Severe Pain (7 - 10)  HYDROmorphone  Injectable 1 milliGRAM(s) IV Push every 3 hours PRN BREAKTHROUGH  naloxone Injectable 0.1 milliGRAM(s) IV Push every 3 minutes PRN For ANY of the following changes in patient status:  A. RR LESS THAN 10 breaths per minute, B. Oxygen saturation LESS THAN 90%, C. Sedation score of 6  ondansetron Injectable 4 milliGRAM(s) IV Push every 6 hours PRN Nausea  senna 2 Tablet(s) Oral at bedtime PRN Constipation     Patient seen and evaluated at bedside with attending today. Patient reports continued right foot pain, only able to get comfortable for short periods of time with current medication dosing before having to adjust and request more medication. Denies chest pain, shortness of breath, headache, fever, chills, new weakness.    Vital Signs Last 24 Hrs  T(C): 36.7 (04 Sep 2022 05:01), Max: 36.9 (03 Sep 2022 19:53)  T(F): 98 (04 Sep 2022 05:01), Max: 98.4 (03 Sep 2022 19:53)  HR: 96 (04 Sep 2022 05:01) (96 - 107)  BP: 102/65 (04 Sep 2022 05:01) (85/54 - 156/71)  RR: 18 (04 Sep 2022 05:01) (18 - 18)  SpO2: 96% (04 Sep 2022 05:01) (93% - 97%)    O2 Parameters below as of 04 Sep 2022 05:01  Patient On (Oxygen Delivery Method): room air    Awake, sitting up in bed, appears uncomfortable  AOx3, speaking in full sentences  Nonlabored breathing  TAN x4 spontaneously  RLE dressing c/d/i                        8.7    9.66  )-----------( 342      ( 03 Sep 2022 06:01 )             28.2   09-03    138  |  103  |  10.3  ----------------------------<  96  4.2   |  23.0  |  0.67    Ca    9.5      03 Sep 2022 06:01  Phos  4.1     09-03  Mg     1.9     09-03    MEDICATIONS  (STANDING):  acetaminophen     Tablet .. 975 milliGRAM(s) Oral every 6 hours  acetaminophen   IVPB .. 1000 milliGRAM(s) IV Intermittent once  amLODIPine   Tablet 10 milliGRAM(s) Oral daily  aspirin  chewable 81 milliGRAM(s) Oral daily  atorvastatin 80 milliGRAM(s) Oral at bedtime  cadexomer iodine 0.9% Gel 1 Application(s) Topical daily  clopidogrel Tablet 75 milliGRAM(s) Oral daily  enoxaparin Injectable 40 milliGRAM(s) SubCutaneous every 24 hours  epoetin nancy-epbx (RETACRIT) Injectable 11485 Unit(s) SubCutaneous every 7 days  ferrous    sulfate 325 milliGRAM(s) Oral three times a day  gabapentin 900 milliGRAM(s) Oral three times a day  melatonin 3 milliGRAM(s) Oral at bedtime  methocarbamol 1000 milliGRAM(s) Oral every 8 hours  omega-3-Acid Ethyl Esters 2 Gram(s) Oral two times a day  pantoprazole    Tablet 40 milliGRAM(s) Oral before breakfast  phenytoin   Capsule 200 milliGRAM(s) Oral every 12 hours  polyethylene glycol 3350 17 Gram(s) Oral two times a day  QUEtiapine 200 milliGRAM(s) Oral at bedtime  venlafaxine XR. 75 milliGRAM(s) Oral daily    MEDICATIONS  (PRN):  aluminum hydroxide/magnesium hydroxide/simethicone Suspension 30 milliLiter(s) Oral every 8 hours PRN Dyspepsia  HYDROmorphone   Tablet 4 milliGRAM(s) Oral every 3 hours PRN Moderate Pain (4 - 6)  HYDROmorphone   Tablet 6 milliGRAM(s) Oral every 3 hours PRN Severe Pain (7 - 10)  HYDROmorphone  Injectable 1 milliGRAM(s) IV Push every 3 hours PRN BREAKTHROUGH  naloxone Injectable 0.1 milliGRAM(s) IV Push every 3 minutes PRN For ANY of the following changes in patient status:  A. RR LESS THAN 10 breaths per minute, B. Oxygen saturation LESS THAN 90%, C. Sedation score of 6  ondansetron Injectable 4 milliGRAM(s) IV Push every 6 hours PRN Nausea  senna 2 Tablet(s) Oral at bedtime PRN Constipation

## 2022-09-04 NOTE — PROGRESS NOTE ADULT - SUBJECTIVE AND OBJECTIVE BOX
Subjective: Still reporting toe pain and that pain is not well controlled     MEDICATIONS  (STANDING):  acetaminophen     Tablet .. 975 milliGRAM(s) Oral every 6 hours  amLODIPine   Tablet 10 milliGRAM(s) Oral daily  aspirin  chewable 81 milliGRAM(s) Oral daily  atorvastatin 80 milliGRAM(s) Oral at bedtime  cadexomer iodine 0.9% Gel 1 Application(s) Topical daily  clopidogrel Tablet 75 milliGRAM(s) Oral daily  enoxaparin Injectable 40 milliGRAM(s) SubCutaneous every 24 hours  epoetin nancy-epbx (RETACRIT) Injectable 88148 Unit(s) SubCutaneous every 7 days  ferrous    sulfate 325 milliGRAM(s) Oral three times a day  gabapentin 900 milliGRAM(s) Oral three times a day  melatonin 3 milliGRAM(s) Oral at bedtime  methocarbamol 1000 milliGRAM(s) Oral every 8 hours  omega-3-Acid Ethyl Esters 2 Gram(s) Oral two times a day  pantoprazole    Tablet 40 milliGRAM(s) Oral before breakfast  phenytoin   Capsule 200 milliGRAM(s) Oral every 12 hours  polyethylene glycol 3350 17 Gram(s) Oral two times a day  QUEtiapine 200 milliGRAM(s) Oral at bedtime  venlafaxine XR. 75 milliGRAM(s) Oral daily    MEDICATIONS  (PRN):  aluminum hydroxide/magnesium hydroxide/simethicone Suspension 30 milliLiter(s) Oral every 8 hours PRN Dyspepsia  HYDROmorphone   Tablet 4 milliGRAM(s) Oral every 3 hours PRN Moderate Pain (4 - 6)  HYDROmorphone   Tablet 6 milliGRAM(s) Oral every 3 hours PRN Severe Pain (7 - 10)  HYDROmorphone  Injectable 1 milliGRAM(s) IV Push every 3 hours PRN BREAKTHROUGH  naloxone Injectable 0.1 milliGRAM(s) IV Push every 3 minutes PRN For ANY of the following changes in patient status:  A. RR LESS THAN 10 breaths per minute, B. Oxygen saturation LESS THAN 90%, C. Sedation score of 6  ondansetron Injectable 4 milliGRAM(s) IV Push every 6 hours PRN Nausea  senna 2 Tablet(s) Oral at bedtime PRN Constipation      No Known Allergies        Objective:     ICU Vital Signs Last 24 Hrs  T(C): 36.7 (04 Sep 2022 05:01), Max: 36.9 (03 Sep 2022 19:53)  T(F): 98 (04 Sep 2022 05:01), Max: 98.4 (03 Sep 2022 19:53)  HR: 96 (04 Sep 2022 05:01) (96 - 107)  BP: 102/65 (04 Sep 2022 05:01) (85/54 - 156/71)  BP(mean): --  ABP: --  ABP(mean): --  RR: 18 (04 Sep 2022 05:01) (18 - 18)  SpO2: 96% (04 Sep 2022 05:01) (93% - 97%)    O2 Parameters below as of 04 Sep 2022 05:01  Patient On (Oxygen Delivery Method): room air            I&O's Detail    02 Sep 2022 07:01  -  03 Sep 2022 07:00  --------------------------------------------------------  IN:  Total IN: 0 mL    OUT:    Voided (mL): 2000 mL  Total OUT: 2000 mL    Total NET: -2000 mL      03 Sep 2022 07:01  -  04 Sep 2022 06:04  --------------------------------------------------------  IN:  Total IN: 0 mL    OUT:    Voided (mL): 700 mL  Total OUT: 700 mL    Total NET: -700 mL        Physical Exam:  Constitutional: patient resting comfortably in bed, in no acute distress  HEENT: EOMI, no active drainage or redness  Neck: Full ROM without pain  Respiratory: respirations are unlabored, no accessory muscle use, no conversational dyspnea  Cardiovascular: regular rate & rhythm  Gastrointestinal: Abdomen soft, non-tender, non-distended  Neurological: A&O x 3; no gross sensory / motor / coordination deficits  Extremities: RLE in knee immobilizer. Groin dressing clean dry and intact. Right ankle dressing mildly stained. Toe wounds unchanged.                           8.7    9.66  )-----------( 342      ( 03 Sep 2022 06:01 )             28.2       09-03    138  |  103  |  10.3  ----------------------------<  96  4.2   |  23.0  |  0.67    Ca    9.5      03 Sep 2022 06:01  Phos  4.1     09-03  Mg     1.9     09-03

## 2022-09-04 NOTE — PROGRESS NOTE ADULT - ASSESSMENT
56 yo M with PMHx Sz, HLD, HTN, and PVD s/p right fem-tibial bypass with cyrovein POD#5. Patient continues to experience pain to right first and second toe s/p surgery, coolness to toes. Podiatry consulted for possible TMA, no plan for surgical intervention. Recommend:  - 58 yo M with PMHx Sz, HLD, HTN, and PVD s/p right fem-tibial bypass with cyrovein POD#5. Patient continues to experience pain to right foot. Podiatry consulted for possible TMA, no plan for surgical intervention. Pain not well controlled, requiring extended release to assist with baseline levels of pain.   Recommending:  -Add MSContin 15mg q12h; depending on results consider titration of dosing to 30mg q12h  -Continue current IR regimen of dilaudid 4/6mg PO q3h prn mod/sev pain, dilaudid 1mg IVP q3h prn severe BTP  -Maximize adjuncts - continue methocarbamol, tylenol, and gabapentin  -Case discussed with attending Dr. Salgado

## 2022-09-04 NOTE — PROGRESS NOTE ADULT - ASSESSMENT
57 year old male, PMHx Sz, HLD, HTN, and PVD, now POD5 from right fem-tibial bypass with cyrovein. Patient has been having issues with pain to right first and second toe s/p surgery, coolness noted to toes. Podiatry consulted for possible TMA, reports no plan for surgical intervention. Pain unchanged, RLE is warm and well perfused. Continue current management.     Plan:  Hep gtt stopped, continue ASA, plavix  PCA pump discontinued, PRN medications ordered.  improved flow, no plan from podiatry for surgical intervention.   Local wound care.

## 2022-09-04 NOTE — PROGRESS NOTE ADULT - ATTENDING COMMENTS
Seen and examined continued ischemic pain in RLE planning for bypass (fem-pt) on tuesday    -continue heparin gtt for AC  -continue pain control
Patient evaluated at the bedside. Still complaining about toe pain since podiatry did debridement yesterday PM. Dressings were changed. No signs of infection. Wounds hallux and 2nd digit are superficial. Cap ref 2 secs. Will add IV tylenol and re consult pain medicine.
Patient seen and examined at bedside; has significant persistent RLE secondary to CLTI and ischemic pain  -will continue Hep gtt for anticoagulation currently therapeutic  -will continue managing pain with pain service; currently of PCA but poorly controlled   -plan for OR Tuesday for revascularization (right femoral to PT bypass)
Patient evaluated at the bedside. DP and bypass signals present. LE is warm. Patient still complaining of pain. Patient had better pain control on IV meds (PCA). Will switch back po to IV.

## 2022-09-04 NOTE — PHYSICAL THERAPY INITIAL EVALUATION ADULT - ASR WT BEARING STATUS EVAL
Right LE maintained NWB on eval. As per podiatry note " recommend MRI of R foot to r/o OM" and x ray of right foot ordered and performed but not yet read. Will awaiting x ray results.

## 2022-09-05 LAB
HCT VFR BLD CALC: 30.9 % — LOW (ref 39–50)
HGB BLD-MCNC: 9.5 G/DL — LOW (ref 13–17)
MCHC RBC-ENTMCNC: 28.6 PG — SIGNIFICANT CHANGE UP (ref 27–34)
MCHC RBC-ENTMCNC: 30.7 GM/DL — LOW (ref 32–36)
MCV RBC AUTO: 93.1 FL — SIGNIFICANT CHANGE UP (ref 80–100)
PLATELET # BLD AUTO: 387 K/UL — SIGNIFICANT CHANGE UP (ref 150–400)
RBC # BLD: 3.32 M/UL — LOW (ref 4.2–5.8)
RBC # FLD: 20.5 % — HIGH (ref 10.3–14.5)
WBC # BLD: 8.14 K/UL — SIGNIFICANT CHANGE UP (ref 3.8–10.5)
WBC # FLD AUTO: 8.14 K/UL — SIGNIFICANT CHANGE UP (ref 3.8–10.5)

## 2022-09-05 RX ADMIN — METHOCARBAMOL 1000 MILLIGRAM(S): 500 TABLET, FILM COATED ORAL at 06:03

## 2022-09-05 RX ADMIN — QUETIAPINE FUMARATE 200 MILLIGRAM(S): 200 TABLET, FILM COATED ORAL at 21:43

## 2022-09-05 RX ADMIN — HYDROMORPHONE HYDROCHLORIDE 6 MILLIGRAM(S): 2 INJECTION INTRAMUSCULAR; INTRAVENOUS; SUBCUTANEOUS at 09:10

## 2022-09-05 RX ADMIN — Medication 325 MILLIGRAM(S): at 21:43

## 2022-09-05 RX ADMIN — Medication 975 MILLIGRAM(S): at 06:30

## 2022-09-05 RX ADMIN — GABAPENTIN 900 MILLIGRAM(S): 400 CAPSULE ORAL at 11:31

## 2022-09-05 RX ADMIN — METHOCARBAMOL 1000 MILLIGRAM(S): 500 TABLET, FILM COATED ORAL at 11:31

## 2022-09-05 RX ADMIN — Medication 2 GRAM(S): at 17:26

## 2022-09-05 RX ADMIN — GABAPENTIN 900 MILLIGRAM(S): 400 CAPSULE ORAL at 21:42

## 2022-09-05 RX ADMIN — Medication 200 MILLIGRAM(S): at 06:01

## 2022-09-05 RX ADMIN — HYDROMORPHONE HYDROCHLORIDE 6 MILLIGRAM(S): 2 INJECTION INTRAMUSCULAR; INTRAVENOUS; SUBCUTANEOUS at 20:40

## 2022-09-05 RX ADMIN — MORPHINE SULFATE 15 MILLIGRAM(S): 50 CAPSULE, EXTENDED RELEASE ORAL at 06:01

## 2022-09-05 RX ADMIN — METHOCARBAMOL 1000 MILLIGRAM(S): 500 TABLET, FILM COATED ORAL at 21:43

## 2022-09-05 RX ADMIN — Medication 2 GRAM(S): at 06:00

## 2022-09-05 RX ADMIN — Medication 975 MILLIGRAM(S): at 00:51

## 2022-09-05 RX ADMIN — HYDROMORPHONE HYDROCHLORIDE 6 MILLIGRAM(S): 2 INJECTION INTRAMUSCULAR; INTRAVENOUS; SUBCUTANEOUS at 21:40

## 2022-09-05 RX ADMIN — Medication 975 MILLIGRAM(S): at 12:28

## 2022-09-05 RX ADMIN — Medication 325 MILLIGRAM(S): at 06:03

## 2022-09-05 RX ADMIN — HYDROMORPHONE HYDROCHLORIDE 6 MILLIGRAM(S): 2 INJECTION INTRAMUSCULAR; INTRAVENOUS; SUBCUTANEOUS at 16:26

## 2022-09-05 RX ADMIN — Medication 75 MILLIGRAM(S): at 11:29

## 2022-09-05 RX ADMIN — PANTOPRAZOLE SODIUM 40 MILLIGRAM(S): 20 TABLET, DELAYED RELEASE ORAL at 06:02

## 2022-09-05 RX ADMIN — HYDROMORPHONE HYDROCHLORIDE 6 MILLIGRAM(S): 2 INJECTION INTRAMUSCULAR; INTRAVENOUS; SUBCUTANEOUS at 08:11

## 2022-09-05 RX ADMIN — HYDROMORPHONE HYDROCHLORIDE 1 MILLIGRAM(S): 2 INJECTION INTRAMUSCULAR; INTRAVENOUS; SUBCUTANEOUS at 17:26

## 2022-09-05 RX ADMIN — ATORVASTATIN CALCIUM 80 MILLIGRAM(S): 80 TABLET, FILM COATED ORAL at 21:43

## 2022-09-05 RX ADMIN — Medication 3 MILLIGRAM(S): at 21:43

## 2022-09-05 RX ADMIN — HYDROMORPHONE HYDROCHLORIDE 1 MILLIGRAM(S): 2 INJECTION INTRAMUSCULAR; INTRAVENOUS; SUBCUTANEOUS at 18:00

## 2022-09-05 RX ADMIN — AMLODIPINE BESYLATE 10 MILLIGRAM(S): 2.5 TABLET ORAL at 06:03

## 2022-09-05 RX ADMIN — Medication 975 MILLIGRAM(S): at 06:04

## 2022-09-05 RX ADMIN — Medication 325 MILLIGRAM(S): at 11:32

## 2022-09-05 RX ADMIN — HYDROMORPHONE HYDROCHLORIDE 6 MILLIGRAM(S): 2 INJECTION INTRAMUSCULAR; INTRAVENOUS; SUBCUTANEOUS at 01:30

## 2022-09-05 RX ADMIN — Medication 975 MILLIGRAM(S): at 11:28

## 2022-09-05 RX ADMIN — ENOXAPARIN SODIUM 40 MILLIGRAM(S): 100 INJECTION SUBCUTANEOUS at 11:31

## 2022-09-05 RX ADMIN — Medication 975 MILLIGRAM(S): at 17:27

## 2022-09-05 RX ADMIN — Medication 975 MILLIGRAM(S): at 18:09

## 2022-09-05 RX ADMIN — HYDROMORPHONE HYDROCHLORIDE 6 MILLIGRAM(S): 2 INJECTION INTRAMUSCULAR; INTRAVENOUS; SUBCUTANEOUS at 00:52

## 2022-09-05 RX ADMIN — CLOPIDOGREL BISULFATE 75 MILLIGRAM(S): 75 TABLET, FILM COATED ORAL at 11:29

## 2022-09-05 RX ADMIN — Medication 975 MILLIGRAM(S): at 01:30

## 2022-09-05 RX ADMIN — MORPHINE SULFATE 15 MILLIGRAM(S): 50 CAPSULE, EXTENDED RELEASE ORAL at 01:30

## 2022-09-05 RX ADMIN — Medication 200 MILLIGRAM(S): at 17:26

## 2022-09-05 RX ADMIN — Medication 81 MILLIGRAM(S): at 11:29

## 2022-09-05 RX ADMIN — Medication 1 APPLICATION(S): at 11:28

## 2022-09-05 RX ADMIN — HYDROMORPHONE HYDROCHLORIDE 6 MILLIGRAM(S): 2 INJECTION INTRAMUSCULAR; INTRAVENOUS; SUBCUTANEOUS at 15:26

## 2022-09-05 RX ADMIN — GABAPENTIN 900 MILLIGRAM(S): 400 CAPSULE ORAL at 06:02

## 2022-09-06 LAB
ANION GAP SERPL CALC-SCNC: 12 MMOL/L — SIGNIFICANT CHANGE UP (ref 5–17)
ANISOCYTOSIS BLD QL: SLIGHT — SIGNIFICANT CHANGE UP
BASOPHILS # BLD AUTO: 0 K/UL — SIGNIFICANT CHANGE UP (ref 0–0.2)
BASOPHILS NFR BLD AUTO: 0 % — SIGNIFICANT CHANGE UP (ref 0–2)
BUN SERPL-MCNC: 16 MG/DL — SIGNIFICANT CHANGE UP (ref 8–20)
BURR CELLS BLD QL SMEAR: PRESENT — SIGNIFICANT CHANGE UP
CALCIUM SERPL-MCNC: 9.2 MG/DL — SIGNIFICANT CHANGE UP (ref 8.4–10.5)
CHLORIDE SERPL-SCNC: 103 MMOL/L — SIGNIFICANT CHANGE UP (ref 98–107)
CO2 SERPL-SCNC: 23 MMOL/L — SIGNIFICANT CHANGE UP (ref 22–29)
CREAT SERPL-MCNC: 0.63 MG/DL — SIGNIFICANT CHANGE UP (ref 0.5–1.3)
EGFR: 110 ML/MIN/1.73M2 — SIGNIFICANT CHANGE UP
ELLIPTOCYTES BLD QL SMEAR: SLIGHT — SIGNIFICANT CHANGE UP
EOSINOPHIL # BLD AUTO: 0.4 K/UL — SIGNIFICANT CHANGE UP (ref 0–0.5)
EOSINOPHIL NFR BLD AUTO: 5.4 % — SIGNIFICANT CHANGE UP (ref 0–6)
GIANT PLATELETS BLD QL SMEAR: PRESENT — SIGNIFICANT CHANGE UP
GLUCOSE SERPL-MCNC: 95 MG/DL — SIGNIFICANT CHANGE UP (ref 70–99)
HCT VFR BLD CALC: 30.3 % — LOW (ref 39–50)
HGB BLD-MCNC: 9.4 G/DL — LOW (ref 13–17)
LYMPHOCYTES # BLD AUTO: 2 K/UL — SIGNIFICANT CHANGE UP (ref 1–3.3)
LYMPHOCYTES # BLD AUTO: 27 % — SIGNIFICANT CHANGE UP (ref 13–44)
MACROCYTES BLD QL: SLIGHT — SIGNIFICANT CHANGE UP
MAGNESIUM SERPL-MCNC: 1.9 MG/DL — SIGNIFICANT CHANGE UP (ref 1.6–2.6)
MANUAL SMEAR VERIFICATION: SIGNIFICANT CHANGE UP
MCHC RBC-ENTMCNC: 29.6 PG — SIGNIFICANT CHANGE UP (ref 27–34)
MCHC RBC-ENTMCNC: 31 GM/DL — LOW (ref 32–36)
MCV RBC AUTO: 95.3 FL — SIGNIFICANT CHANGE UP (ref 80–100)
METAMYELOCYTES # FLD: 0.9 % — HIGH (ref 0–0)
MICROCYTES BLD QL: SLIGHT — SIGNIFICANT CHANGE UP
MONOCYTES # BLD AUTO: 0.67 K/UL — SIGNIFICANT CHANGE UP (ref 0–0.9)
MONOCYTES NFR BLD AUTO: 9 % — SIGNIFICANT CHANGE UP (ref 2–14)
MYELOCYTES NFR BLD: 0.9 % — HIGH (ref 0–0)
NEUTROPHILS # BLD AUTO: 4.15 K/UL — SIGNIFICANT CHANGE UP (ref 1.8–7.4)
NEUTROPHILS NFR BLD AUTO: 55.9 % — SIGNIFICANT CHANGE UP (ref 43–77)
OVALOCYTES BLD QL SMEAR: SLIGHT — SIGNIFICANT CHANGE UP
PHOSPHATE SERPL-MCNC: 4.4 MG/DL — SIGNIFICANT CHANGE UP (ref 2.4–4.7)
PLAT MORPH BLD: NORMAL — SIGNIFICANT CHANGE UP
PLATELET # BLD AUTO: 410 K/UL — HIGH (ref 150–400)
POIKILOCYTOSIS BLD QL AUTO: SIGNIFICANT CHANGE UP
POLYCHROMASIA BLD QL SMEAR: SIGNIFICANT CHANGE UP
POTASSIUM SERPL-MCNC: 4.1 MMOL/L — SIGNIFICANT CHANGE UP (ref 3.5–5.3)
POTASSIUM SERPL-SCNC: 4.1 MMOL/L — SIGNIFICANT CHANGE UP (ref 3.5–5.3)
PROMYELOCYTES # FLD: 0.9 % — HIGH (ref 0–0)
RBC # BLD: 3.18 M/UL — LOW (ref 4.2–5.8)
RBC # FLD: 20.9 % — HIGH (ref 10.3–14.5)
RBC BLD AUTO: ABNORMAL
SCHISTOCYTES BLD QL AUTO: SLIGHT — SIGNIFICANT CHANGE UP
SODIUM SERPL-SCNC: 138 MMOL/L — SIGNIFICANT CHANGE UP (ref 135–145)
WBC # BLD: 7.42 K/UL — SIGNIFICANT CHANGE UP (ref 3.8–10.5)
WBC # FLD AUTO: 7.42 K/UL — SIGNIFICANT CHANGE UP (ref 3.8–10.5)

## 2022-09-06 PROCEDURE — 73718 MRI LOWER EXTREMITY W/O DYE: CPT | Mod: 26,RT

## 2022-09-06 RX ORDER — INFLUENZA VIRUS VACCINE 15; 15; 15; 15 UG/.5ML; UG/.5ML; UG/.5ML; UG/.5ML
0.5 SUSPENSION INTRAMUSCULAR ONCE
Refills: 0 | Status: COMPLETED | OUTPATIENT
Start: 2022-09-06 | End: 2022-09-06

## 2022-09-06 RX ORDER — CADEXOMER IODINE 0.9 %
1 PADS, MEDICATED (EA) TOPICAL DAILY
Refills: 0 | Status: DISCONTINUED | OUTPATIENT
Start: 2022-09-06 | End: 2022-09-10

## 2022-09-06 RX ORDER — COLLAGENASE CLOSTRIDIUM HIST. 250 UNIT/G
1 OINTMENT (GRAM) TOPICAL DAILY
Refills: 0 | Status: DISCONTINUED | OUTPATIENT
Start: 2022-09-06 | End: 2022-09-06

## 2022-09-06 RX ORDER — COLLAGENASE CLOSTRIDIUM HIST. 250 UNIT/G
1 OINTMENT (GRAM) TOPICAL DAILY
Refills: 0 | Status: DISCONTINUED | OUTPATIENT
Start: 2022-09-06 | End: 2022-09-10

## 2022-09-06 RX ADMIN — Medication 2 GRAM(S): at 17:29

## 2022-09-06 RX ADMIN — METHOCARBAMOL 1000 MILLIGRAM(S): 500 TABLET, FILM COATED ORAL at 14:31

## 2022-09-06 RX ADMIN — Medication 975 MILLIGRAM(S): at 01:03

## 2022-09-06 RX ADMIN — HYDROMORPHONE HYDROCHLORIDE 6 MILLIGRAM(S): 2 INJECTION INTRAMUSCULAR; INTRAVENOUS; SUBCUTANEOUS at 06:40

## 2022-09-06 RX ADMIN — Medication 975 MILLIGRAM(S): at 07:30

## 2022-09-06 RX ADMIN — MORPHINE SULFATE 15 MILLIGRAM(S): 50 CAPSULE, EXTENDED RELEASE ORAL at 17:29

## 2022-09-06 RX ADMIN — HYDROMORPHONE HYDROCHLORIDE 6 MILLIGRAM(S): 2 INJECTION INTRAMUSCULAR; INTRAVENOUS; SUBCUTANEOUS at 00:03

## 2022-09-06 RX ADMIN — Medication 200 MILLIGRAM(S): at 17:29

## 2022-09-06 RX ADMIN — MORPHINE SULFATE 15 MILLIGRAM(S): 50 CAPSULE, EXTENDED RELEASE ORAL at 07:30

## 2022-09-06 RX ADMIN — ENOXAPARIN SODIUM 40 MILLIGRAM(S): 100 INJECTION SUBCUTANEOUS at 14:32

## 2022-09-06 RX ADMIN — CLOPIDOGREL BISULFATE 75 MILLIGRAM(S): 75 TABLET, FILM COATED ORAL at 11:46

## 2022-09-06 RX ADMIN — ATORVASTATIN CALCIUM 80 MILLIGRAM(S): 80 TABLET, FILM COATED ORAL at 22:27

## 2022-09-06 RX ADMIN — HYDROMORPHONE HYDROCHLORIDE 6 MILLIGRAM(S): 2 INJECTION INTRAMUSCULAR; INTRAVENOUS; SUBCUTANEOUS at 19:45

## 2022-09-06 RX ADMIN — HYDROMORPHONE HYDROCHLORIDE 6 MILLIGRAM(S): 2 INJECTION INTRAMUSCULAR; INTRAVENOUS; SUBCUTANEOUS at 01:03

## 2022-09-06 RX ADMIN — Medication 2 GRAM(S): at 06:37

## 2022-09-06 RX ADMIN — Medication 325 MILLIGRAM(S): at 22:28

## 2022-09-06 RX ADMIN — HYDROMORPHONE HYDROCHLORIDE 6 MILLIGRAM(S): 2 INJECTION INTRAMUSCULAR; INTRAVENOUS; SUBCUTANEOUS at 23:31

## 2022-09-06 RX ADMIN — QUETIAPINE FUMARATE 200 MILLIGRAM(S): 200 TABLET, FILM COATED ORAL at 22:28

## 2022-09-06 RX ADMIN — HYDROMORPHONE HYDROCHLORIDE 6 MILLIGRAM(S): 2 INJECTION INTRAMUSCULAR; INTRAVENOUS; SUBCUTANEOUS at 22:50

## 2022-09-06 RX ADMIN — MORPHINE SULFATE 15 MILLIGRAM(S): 50 CAPSULE, EXTENDED RELEASE ORAL at 06:40

## 2022-09-06 RX ADMIN — Medication 325 MILLIGRAM(S): at 06:37

## 2022-09-06 RX ADMIN — Medication 975 MILLIGRAM(S): at 06:40

## 2022-09-06 RX ADMIN — MORPHINE SULFATE 15 MILLIGRAM(S): 50 CAPSULE, EXTENDED RELEASE ORAL at 18:25

## 2022-09-06 RX ADMIN — HYDROMORPHONE HYDROCHLORIDE 6 MILLIGRAM(S): 2 INJECTION INTRAMUSCULAR; INTRAVENOUS; SUBCUTANEOUS at 13:26

## 2022-09-06 RX ADMIN — Medication 975 MILLIGRAM(S): at 11:47

## 2022-09-06 RX ADMIN — HYDROMORPHONE HYDROCHLORIDE 6 MILLIGRAM(S): 2 INJECTION INTRAMUSCULAR; INTRAVENOUS; SUBCUTANEOUS at 07:30

## 2022-09-06 RX ADMIN — HYDROMORPHONE HYDROCHLORIDE 6 MILLIGRAM(S): 2 INJECTION INTRAMUSCULAR; INTRAVENOUS; SUBCUTANEOUS at 14:45

## 2022-09-06 RX ADMIN — METHOCARBAMOL 1000 MILLIGRAM(S): 500 TABLET, FILM COATED ORAL at 22:28

## 2022-09-06 RX ADMIN — HYDROMORPHONE HYDROCHLORIDE 1 MILLIGRAM(S): 2 INJECTION INTRAMUSCULAR; INTRAVENOUS; SUBCUTANEOUS at 11:55

## 2022-09-06 RX ADMIN — AMLODIPINE BESYLATE 10 MILLIGRAM(S): 2.5 TABLET ORAL at 06:37

## 2022-09-06 RX ADMIN — Medication 975 MILLIGRAM(S): at 12:40

## 2022-09-06 RX ADMIN — HYDROMORPHONE HYDROCHLORIDE 1 MILLIGRAM(S): 2 INJECTION INTRAMUSCULAR; INTRAVENOUS; SUBCUTANEOUS at 12:25

## 2022-09-06 RX ADMIN — GABAPENTIN 900 MILLIGRAM(S): 400 CAPSULE ORAL at 14:32

## 2022-09-06 RX ADMIN — METHOCARBAMOL 1000 MILLIGRAM(S): 500 TABLET, FILM COATED ORAL at 06:37

## 2022-09-06 RX ADMIN — Medication 975 MILLIGRAM(S): at 00:03

## 2022-09-06 RX ADMIN — GABAPENTIN 900 MILLIGRAM(S): 400 CAPSULE ORAL at 06:37

## 2022-09-06 RX ADMIN — Medication 75 MILLIGRAM(S): at 11:56

## 2022-09-06 RX ADMIN — HYDROMORPHONE HYDROCHLORIDE 6 MILLIGRAM(S): 2 INJECTION INTRAMUSCULAR; INTRAVENOUS; SUBCUTANEOUS at 10:18

## 2022-09-06 RX ADMIN — Medication 975 MILLIGRAM(S): at 17:29

## 2022-09-06 RX ADMIN — Medication 975 MILLIGRAM(S): at 18:25

## 2022-09-06 RX ADMIN — Medication 3 MILLIGRAM(S): at 22:29

## 2022-09-06 RX ADMIN — Medication 81 MILLIGRAM(S): at 11:46

## 2022-09-06 RX ADMIN — PANTOPRAZOLE SODIUM 40 MILLIGRAM(S): 20 TABLET, DELAYED RELEASE ORAL at 06:37

## 2022-09-06 RX ADMIN — Medication 200 MILLIGRAM(S): at 06:37

## 2022-09-06 RX ADMIN — HYDROMORPHONE HYDROCHLORIDE 6 MILLIGRAM(S): 2 INJECTION INTRAMUSCULAR; INTRAVENOUS; SUBCUTANEOUS at 11:15

## 2022-09-06 RX ADMIN — Medication 325 MILLIGRAM(S): at 14:32

## 2022-09-06 RX ADMIN — GABAPENTIN 900 MILLIGRAM(S): 400 CAPSULE ORAL at 22:27

## 2022-09-06 RX ADMIN — HYDROMORPHONE HYDROCHLORIDE 6 MILLIGRAM(S): 2 INJECTION INTRAMUSCULAR; INTRAVENOUS; SUBCUTANEOUS at 20:30

## 2022-09-06 NOTE — PROGRESS NOTE ADULT - ASSESSMENT
57M  extensive PSH including L SFA stenting, L femoral endarterectomy, R femoral endarterectomy, R fem-PT bypass with GSV s/p ambulatory angiogram with no intervention 2/2 to R fem-PT occlusion admitted for persistent chronic leg ischemi  s/p R fem-PT bypass revision  8/30    Pain controlled  Pain service will sign off  Please reconsult as needed     can PIO Dilaudid ivp when due for discharge planning

## 2022-09-06 NOTE — PROGRESS NOTE ADULT - SUBJECTIVE AND OBJECTIVE BOX
HPI:57  yr old male presents with history of htn, seizures (last 2021) , high cholesterol  PAD  s/p right SFA stenting in 2017 left leg , carotid stenosis (may require ICA intervention in future) , s/p left femoral endarterectomy 10/1/21, right femoral endarterectomy on 4/15/22  s/p right fem -PT bypass with GSV on 5/13/22 for right great toe dry gangrene . Pt has c/o right foot pain that has burning sensation and has become severe over past week , using cane and wheelchair at times, c/o pain with standing and sitting 10/10 has no relief. states he cannot sleep without pain , fell out of bed last night and hit face on night stand ,  Lower foot and calf discolored red  with right calf incision   scab noted , no drainage . US done july 7/7 /22 demonstrating patent bypass with severe stenosis at the proximal anastomosis suggestive of >75% stenosis. pt is scheduled for RLE angiogram possible intervention with Dr. Lelia Elder on 8/9/22.      Podiatry HPI: Patient was seen bedside in no acute distress. States that he is feeling a lot of pain to his right foot recently. Patient wants pain medications. Denies any other constitutional symptoms of N/V/C/F/SOB. Denies any other pedal complaints.    Podiatry Interval HPI: 57M evaluated resting in bed. Pt admits to continued pain to RLE, specificaly to digits. States that vascular saw him this morning as well. Denies N/V/F/C/SOB.       Patient admits to  (-) Fevers, (-) Chills, (-) Nausea, (-) Vomiting, (-) Shortness of Breath (-) calf pain (-) chest pain     Medications acetaminophen     Tablet .. 975 milliGRAM(s) Oral every 6 hours  acetaminophen   IVPB .. 1000 milliGRAM(s) IV Intermittent once  aluminum hydroxide/magnesium hydroxide/simethicone Suspension 30 milliLiter(s) Oral every 8 hours PRN  amLODIPine   Tablet 10 milliGRAM(s) Oral daily  aspirin  chewable 81 milliGRAM(s) Oral daily  atorvastatin 80 milliGRAM(s) Oral at bedtime  cadexomer iodine 0.9% Gel 1 Application(s) Topical daily  clopidogrel Tablet 75 milliGRAM(s) Oral daily  enoxaparin Injectable 40 milliGRAM(s) SubCutaneous every 24 hours  epoetin nancy-epbx (RETACRIT) Injectable 06591 Unit(s) SubCutaneous every 7 days  ferrous    sulfate 325 milliGRAM(s) Oral three times a day  gabapentin 900 milliGRAM(s) Oral three times a day  HYDROmorphone   Tablet 4 milliGRAM(s) Oral every 3 hours PRN  HYDROmorphone   Tablet 6 milliGRAM(s) Oral every 3 hours PRN  HYDROmorphone  Injectable 1 milliGRAM(s) IV Push every 3 hours PRN  melatonin 3 milliGRAM(s) Oral at bedtime  methocarbamol 1000 milliGRAM(s) Oral every 8 hours  morphine ER Tablet 15 milliGRAM(s) Oral every 12 hours  naloxone Injectable 0.1 milliGRAM(s) IV Push every 3 minutes PRN  omega-3-Acid Ethyl Esters 2 Gram(s) Oral two times a day  ondansetron Injectable 4 milliGRAM(s) IV Push every 6 hours PRN  pantoprazole    Tablet 40 milliGRAM(s) Oral before breakfast  phenytoin   Capsule 200 milliGRAM(s) Oral every 12 hours  polyethylene glycol 3350 17 Gram(s) Oral two times a day  QUEtiapine 200 milliGRAM(s) Oral at bedtime  senna 2 Tablet(s) Oral at bedtime PRN  venlafaxine XR. 75 milliGRAM(s) Oral daily    FHFamily history of breast cancer (Mother)    Family history of hepatitis (Mother)    Family history of heart disease (Mother, Sibling)    Family history of CABG (Father)    Family history of peripheral vascular disease (Father)    Family history of brain aneurysm (Sibling)    ,   PMHHypercholesterolemia    Seizures    Depression    GERD (gastroesophageal reflux disease)    Hypertension    Peripheral vascular disease    Osteoarthritis    Former smoker    Prediabetes    Hypertension       PSHInguinal hernia, left    S/P ORIF (open reduction internal fixation) fracture    S/P shoulder surgery    S/P appendectomy    H/O endarterectomy    Status post femorotibial bypass        Labs                          9.4    7.42  )-----------( 410      ( 06 Sep 2022 06:07 )             30.3      09-06    138  |  103  |  16.0  ----------------------------<  95  4.1   |  23.0  |  0.63    Ca    9.2      06 Sep 2022 06:07  Phos  4.4     09-06  Mg     1.9     09-06       Vital Signs Last 24 Hrs  T(C): 36.3 (06 Sep 2022 04:23), Max: 36.7 (05 Sep 2022 16:33)  T(F): 97.3 (06 Sep 2022 04:23), Max: 98 (05 Sep 2022 16:33)  HR: 90 (06 Sep 2022 04:23) (88 - 99)  BP: 111/66 (06 Sep 2022 04:23) (111/66 - 150/67)  BP(mean): --  RR: 18 (06 Sep 2022 04:23) (18 - 20)  SpO2: 94% (06 Sep 2022 04:23) (94% - 97%)    Parameters below as of 06 Sep 2022 04:23  Patient On (Oxygen Delivery Method): room air              WBC Count: 7.42 K/uL (09-06-22 @ 06:07)          PHYSICAL EXAM  LE Focused:  RLE Focused   Vasc:  DP/PT faintly palpable, TG: warm to warm   Derm: Right 2nd digit wound noted with no drainage expressed, no malodor, significantly erythema and edema still noted to the foot, R hallux medial distal wound noted partial thickness with no drainage noted, no PTB noted, no fluctuance noted   Neuro: Protective sensation intact   MSK: pain on palpation noted to the distal toes of the right    xrays: unremarkable, official read pending

## 2022-09-06 NOTE — PROGRESS NOTE ADULT - SUBJECTIVE AND OBJECTIVE BOX
Interval Hx:  Patient seen during rounds  Patient reports pain to be controlled on current medications - required 1x iv dilaudid dose  Patient denies sedation with medications     Analgesic Dosing for past 24 hours reviewed as below:    acetaminophen     Tablet ..   975 milliGRAM(s) Oral (09-06-22 @ 11:47)   975 milliGRAM(s) Oral (09-06-22 @ 06:40)   975 milliGRAM(s) Oral (09-06-22 @ 00:03)   975 milliGRAM(s) Oral (09-05-22 @ 17:27)    gabapentin   900 milliGRAM(s) Oral (09-06-22 @ 06:37)   900 milliGRAM(s) Oral (09-05-22 @ 21:42)    HYDROmorphone   Tablet   6 milliGRAM(s) Oral (09-06-22 @ 10:18)   6 milliGRAM(s) Oral (09-06-22 @ 06:40)   6 milliGRAM(s) Oral (09-06-22 @ 00:03)   6 milliGRAM(s) Oral (09-05-22 @ 20:40)   6 milliGRAM(s) Oral (09-05-22 @ 15:26)    HYDROmorphone  Injectable   1 milliGRAM(s) IV Push (09-05-22 @ 17:26)    melatonin   3 milliGRAM(s) Oral (09-05-22 @ 21:43)    methocarbamol   1000 milliGRAM(s) Oral (09-06-22 @ 06:37)   1000 milliGRAM(s) Oral (09-05-22 @ 21:43)    morphine ER Tablet   15 milliGRAM(s) Oral (09-06-22 @ 06:40)    phenytoin   Capsule   200 milliGRAM(s) Oral (09-06-22 @ 06:37)   200 milliGRAM(s) Oral (09-05-22 @ 17:26)    QUEtiapine   200 milliGRAM(s) Oral (09-05-22 @ 21:43)          T(C): 36.8 (09-06-22 @ 11:16), Max: 36.8 (09-06-22 @ 11:16)  HR: 95 (09-06-22 @ 11:16) (88 - 99)  BP: 131/73 (09-06-22 @ 11:16) (111/66 - 148/70)  RR: 18 (09-06-22 @ 11:16) (18 - 18)  SpO2: 96% (09-06-22 @ 11:16) (94% - 97%)      09-05-22 @ 07:01  -  09-06-22 @ 07:00  --------------------------------------------------------  IN: 0 mL / OUT: 1550 mL / NET: -1550 mL        acetaminophen     Tablet .. 975 milliGRAM(s) Oral every 6 hours  acetaminophen   IVPB .. 1000 milliGRAM(s) IV Intermittent once  aluminum hydroxide/magnesium hydroxide/simethicone Suspension 30 milliLiter(s) Oral every 8 hours PRN  amLODIPine   Tablet 10 milliGRAM(s) Oral daily  aspirin  chewable 81 milliGRAM(s) Oral daily  atorvastatin 80 milliGRAM(s) Oral at bedtime  cadexomer iodine 0.9% Gel 1 Application(s) Topical daily  clopidogrel Tablet 75 milliGRAM(s) Oral daily  enoxaparin Injectable 40 milliGRAM(s) SubCutaneous every 24 hours  epoetin nancy-epbx (RETACRIT) Injectable 88279 Unit(s) SubCutaneous every 7 days  ferrous    sulfate 325 milliGRAM(s) Oral three times a day  gabapentin 900 milliGRAM(s) Oral three times a day  HYDROmorphone   Tablet 4 milliGRAM(s) Oral every 3 hours PRN  HYDROmorphone   Tablet 6 milliGRAM(s) Oral every 3 hours PRN  HYDROmorphone  Injectable 1 milliGRAM(s) IV Push every 3 hours PRN  melatonin 3 milliGRAM(s) Oral at bedtime  methocarbamol 1000 milliGRAM(s) Oral every 8 hours  morphine ER Tablet 15 milliGRAM(s) Oral every 12 hours  naloxone Injectable 0.1 milliGRAM(s) IV Push every 3 minutes PRN  omega-3-Acid Ethyl Esters 2 Gram(s) Oral two times a day  ondansetron Injectable 4 milliGRAM(s) IV Push every 6 hours PRN  pantoprazole    Tablet 40 milliGRAM(s) Oral before breakfast  phenytoin   Capsule 200 milliGRAM(s) Oral every 12 hours  polyethylene glycol 3350 17 Gram(s) Oral two times a day  QUEtiapine 200 milliGRAM(s) Oral at bedtime  senna 2 Tablet(s) Oral at bedtime PRN  venlafaxine XR. 75 milliGRAM(s) Oral daily                          9.4    7.42  )-----------( 410      ( 06 Sep 2022 06:07 )             30.3     09-06    138  |  103  |  16.0  ----------------------------<  95  4.1   |  23.0  |  0.63    Ca    9.2      06 Sep 2022 06:07  Phos  4.4     09-06  Mg     1.9     09-06            Pain Service   [phone]

## 2022-09-06 NOTE — PROGRESS NOTE ADULT - SUBJECTIVE AND OBJECTIVE BOX
HPI/Overnight Events:  57 year old male, PMHx Sz, HLD, HTN, and PVD, now POD7 from right fem-tibial bypass with cyrovein. Patient has been having issues with pain to right first and second toe s/p surgery, coolness noted to toes. Podiatry consulted for possible TMA, reports no plan for surgical intervention. Pain unchanged, RLE is warm and well perfused.   No acute events overnight, patient reports throbbing pain to right toes    PAST MEDICAL HISTORY:  Hypercholesterolemia  Seizures  Depression  GERD (gastroesophageal reflux disease)  Hypertension  Peripheral vascular disease  Osteoarthritis  Former smoker  Prediabetes  Hypertension      PAST SURGICAL HISTORY:  Inguinal hernia, left  S/P ORIF (open reduction internal fixation) fracture  S/P shoulder surgery  S/P appendectomy  H/O endarterectomy  Status post femorotibial bypass          MEDICATIONS:  acetaminophen     Tablet .. 975 milliGRAM(s) Oral every 6 hours  acetaminophen   IVPB .. 1000 milliGRAM(s) IV Intermittent once  aluminum hydroxide/magnesium hydroxide/simethicone Suspension 30 milliLiter(s) Oral every 8 hours PRN  amLODIPine   Tablet 10 milliGRAM(s) Oral daily  aspirin  chewable 81 milliGRAM(s) Oral daily  atorvastatin 80 milliGRAM(s) Oral at bedtime  cadexomer iodine 0.9% Gel 1 Application(s) Topical daily  clopidogrel Tablet 75 milliGRAM(s) Oral daily  enoxaparin Injectable 40 milliGRAM(s) SubCutaneous every 24 hours  epoetin nancy-epbx (RETACRIT) Injectable 72080 Unit(s) SubCutaneous every 7 days  ferrous    sulfate 325 milliGRAM(s) Oral three times a day  gabapentin 900 milliGRAM(s) Oral three times a day  HYDROmorphone   Tablet 4 milliGRAM(s) Oral every 3 hours PRN  HYDROmorphone   Tablet 6 milliGRAM(s) Oral every 3 hours PRN  HYDROmorphone  Injectable 1 milliGRAM(s) IV Push every 3 hours PRN  melatonin 3 milliGRAM(s) Oral at bedtime  methocarbamol 1000 milliGRAM(s) Oral every 8 hours  morphine ER Tablet 15 milliGRAM(s) Oral every 12 hours  naloxone Injectable 0.1 milliGRAM(s) IV Push every 3 minutes PRN  omega-3-Acid Ethyl Esters 2 Gram(s) Oral two times a day  ondansetron Injectable 4 milliGRAM(s) IV Push every 6 hours PRN  pantoprazole    Tablet 40 milliGRAM(s) Oral before breakfast  phenytoin   Capsule 200 milliGRAM(s) Oral every 12 hours  polyethylene glycol 3350 17 Gram(s) Oral two times a day  QUEtiapine 200 milliGRAM(s) Oral at bedtime  senna 2 Tablet(s) Oral at bedtime PRN  venlafaxine XR. 75 milliGRAM(s) Oral daily      ALLERGIES:  No Known Allergies        VITALS & I/Os:  Vital Signs Last 24 Hrs  T(C): 36.3 (06 Sep 2022 04:23), Max: 36.7 (05 Sep 2022 16:33)  T(F): 97.3 (06 Sep 2022 04:23), Max: 98 (05 Sep 2022 16:33)  HR: 90 (06 Sep 2022 04:23) (88 - 99)  BP: 111/66 (06 Sep 2022 04:23) (111/66 - 148/70)  BP(mean): --  RR: 18 (06 Sep 2022 04:23) (18 - 18)  SpO2: 94% (06 Sep 2022 04:23) (94% - 97%)    Parameters below as of 06 Sep 2022 04:23  Patient On (Oxygen Delivery Method): room air        I&O's Summary    05 Sep 2022 07:01  -  06 Sep 2022 07:00  --------------------------------------------------------  IN: 0 mL / OUT: 1550 mL / NET: -1550 mL          PHYSICAL EXAM:  Constitutional: no acute distress  HEENT: EOMI, no active drainage or redness, no scleral icterus   Neck: Full ROM without pain  Respiratory: respirations are unlabored, no accessory muscle use, no conversational dyspnea  Cardiovascular: regular rate & rhythm  Gastrointestinal: Abdomen soft, non-tender, non-distended, No rebound or guarding. No organomegaly, no palpable mass.  Neurological: A&O x 3; no gross sensory / motor / coordination deficits  Musculoskeletal: TAN  Vascular: Extremities warm and well perfused, RLE rubor. Right PT signals, incision sites are intact.        LABS:                        9.4    7.42  )-----------( 410      ( 06 Sep 2022 06:07 )             30.3     09-06    138  |  103  |  16.0  ----------------------------<  95  4.1   |  23.0  |  0.63    Ca    9.2      06 Sep 2022 06:07  Phos  4.4     09-06  Mg     1.9     09-06      Lactate:                     HPI/Overnight Events:  57 year old male, PMHx Sz, HLD, HTN, and PVD, now POD7 from right fem-tibial bypass with cyrovein. Patient has been having issues with pain to right first and second toe s/p surgery, coolness noted to toes. Podiatry consulted for possible TMA, reports no plan for surgical intervention. Pain unchanged, RLE is warm and well perfused.   No acute events overnight, patient reports throbbing pain to right toes    PAST MEDICAL HISTORY:  Hypercholesterolemia  Seizures  Depression  GERD (gastroesophageal reflux disease)  Hypertension  Peripheral vascular disease  Osteoarthritis  Former smoker  Prediabetes  Hypertension      PAST SURGICAL HISTORY:  Inguinal hernia, left  S/P ORIF (open reduction internal fixation) fracture  S/P shoulder surgery  S/P appendectomy  H/O endarterectomy  Status post femorotibial bypass          MEDICATIONS:  acetaminophen     Tablet .. 975 milliGRAM(s) Oral every 6 hours  acetaminophen   IVPB .. 1000 milliGRAM(s) IV Intermittent once  aluminum hydroxide/magnesium hydroxide/simethicone Suspension 30 milliLiter(s) Oral every 8 hours PRN  amLODIPine   Tablet 10 milliGRAM(s) Oral daily  aspirin  chewable 81 milliGRAM(s) Oral daily  atorvastatin 80 milliGRAM(s) Oral at bedtime  cadexomer iodine 0.9% Gel 1 Application(s) Topical daily  clopidogrel Tablet 75 milliGRAM(s) Oral daily  enoxaparin Injectable 40 milliGRAM(s) SubCutaneous every 24 hours  epoetin nancy-epbx (RETACRIT) Injectable 08145 Unit(s) SubCutaneous every 7 days  ferrous    sulfate 325 milliGRAM(s) Oral three times a day  gabapentin 900 milliGRAM(s) Oral three times a day  HYDROmorphone   Tablet 4 milliGRAM(s) Oral every 3 hours PRN  HYDROmorphone   Tablet 6 milliGRAM(s) Oral every 3 hours PRN  HYDROmorphone  Injectable 1 milliGRAM(s) IV Push every 3 hours PRN  melatonin 3 milliGRAM(s) Oral at bedtime  methocarbamol 1000 milliGRAM(s) Oral every 8 hours  morphine ER Tablet 15 milliGRAM(s) Oral every 12 hours  naloxone Injectable 0.1 milliGRAM(s) IV Push every 3 minutes PRN  omega-3-Acid Ethyl Esters 2 Gram(s) Oral two times a day  ondansetron Injectable 4 milliGRAM(s) IV Push every 6 hours PRN  pantoprazole    Tablet 40 milliGRAM(s) Oral before breakfast  phenytoin   Capsule 200 milliGRAM(s) Oral every 12 hours  polyethylene glycol 3350 17 Gram(s) Oral two times a day  QUEtiapine 200 milliGRAM(s) Oral at bedtime  senna 2 Tablet(s) Oral at bedtime PRN  venlafaxine XR. 75 milliGRAM(s) Oral daily      ALLERGIES:  No Known Allergies        VITALS & I/Os:  Vital Signs Last 24 Hrs  T(C): 36.3 (06 Sep 2022 04:23), Max: 36.7 (05 Sep 2022 16:33)  T(F): 97.3 (06 Sep 2022 04:23), Max: 98 (05 Sep 2022 16:33)  HR: 90 (06 Sep 2022 04:23) (88 - 99)  BP: 111/66 (06 Sep 2022 04:23) (111/66 - 148/70)  BP(mean): --  RR: 18 (06 Sep 2022 04:23) (18 - 18)  SpO2: 94% (06 Sep 2022 04:23) (94% - 97%)    Parameters below as of 06 Sep 2022 04:23  Patient On (Oxygen Delivery Method): room air        I&O's Summary    05 Sep 2022 07:01  -  06 Sep 2022 07:00  --------------------------------------------------------  IN: 0 mL / OUT: 1550 mL / NET: -1550 mL          PHYSICAL EXAM:  Constitutional: no acute distress  HEENT: EOMI, no active drainage or redness, no scleral icterus   Neck: Full ROM without pain  Respiratory: respirations are unlabored, no accessory muscle use, no conversational dyspnea  Cardiovascular: regular rate & rhythm  Gastrointestinal: Abdomen soft, non-tender, non-distended, No rebound or guarding. No organomegaly, no palpable mass.  Neurological: A&O x 3; no gross sensory / motor / coordination deficits  Musculoskeletal: TAN  Vascular: Extremities warm and well perfused, RLE rubor. Right PT signals, incision sites are intact. Scab to RLE unroofed and cleaned. Dressing in place         LABS:                        9.4    7.42  )-----------( 410      ( 06 Sep 2022 06:07 )             30.3     09-06    138  |  103  |  16.0  ----------------------------<  95  4.1   |  23.0  |  0.63    Ca    9.2      06 Sep 2022 06:07  Phos  4.4     09-06  Mg     1.9     09-06      Lactate:

## 2022-09-06 NOTE — PROGRESS NOTE ADULT - ASSESSMENT
A:   Left foot pain  PAD    P:  Patient evaluated, chart reviewed  Xray reviewed- official read pending   No JOSE/PVR as per vasc due to recent intervention   Applied betadine DSD, with webril (no ace)  D/w pt clinical findings- which are improving   discussed with patient that pain may be reperfusion pain   Pod Plan: clinical exam improving, no surgical intervention at this time  Continue pain management   Weight bearing status to be determined by vascular as pt just had recent intervention    Podiatry to follow in house  Seen and evaluated bedside with Attending Dr. Ferrer

## 2022-09-06 NOTE — PROGRESS NOTE ADULT - TIME BILLING
Pain Management - chart review, patient interview

## 2022-09-06 NOTE — PROGRESS NOTE ADULT - ASSESSMENT
57 year old male, PMHx Sz, HLD, HTN, and PVD, now POD7 from right fem-tibial bypass with cyrovein. Patient has been having issues with pain to right first and second toe s/p surgery, coolness noted to toes. Podiatry consulted for possible TMA, reports no plan for surgical intervention. Pain unchanged, RLE is warm and well perfused.     Plan:  Continue ASA, plavix  PCA pump discontinued, PRN medications ordered.  improved flow, no plan from podiatry for surgical intervention.  continue betadine DSD, with webril (no ace) to right foot per podiatry   F/U MRI to r/o OM 57 year old male, PMHx Sz, HLD, HTN, and PVD, now POD7 from right fem-tibial bypass with cyrovein. Patient has been having issues with pain to right first and second toe s/p surgery, coolness noted to toes. Podiatry consulted for possible TMA, reports no plan for surgical intervention. Pain unchanged, RLE is warm and well perfused.     Plan:  Continue ASA, plavix  PCA pump discontinued, PRN medications ordered.  improved flow, no plan from podiatry for surgical intervention.  continue betadine DSD, with webril (no ace) to right foot per podiatry   Apply santyl to RLE calf wound and wrap with gauze and kerlex, change daily   F/U MRI to r/o OM

## 2022-09-07 ENCOUNTER — TRANSCRIPTION ENCOUNTER (OUTPATIENT)
Age: 57
End: 2022-09-07

## 2022-09-07 LAB
CRP SERPL-MCNC: 28 MG/L — HIGH
ERYTHROCYTE [SEDIMENTATION RATE] IN BLOOD: 46 MM/HR — HIGH (ref 0–20)

## 2022-09-07 PROCEDURE — 99223 1ST HOSP IP/OBS HIGH 75: CPT

## 2022-09-07 RX ORDER — METHOCARBAMOL 500 MG/1
2 TABLET, FILM COATED ORAL
Qty: 180 | Refills: 0
Start: 2022-09-07 | End: 2022-10-06

## 2022-09-07 RX ORDER — SENNA PLUS 8.6 MG/1
2 TABLET ORAL
Qty: 60 | Refills: 0
Start: 2022-09-07 | End: 2022-10-06

## 2022-09-07 RX ORDER — COLLAGENASE CLOSTRIDIUM HIST. 250 UNIT/G
1 OINTMENT (GRAM) TOPICAL
Qty: 1 | Refills: 0
Start: 2022-09-07 | End: 2022-09-11

## 2022-09-07 RX ORDER — MORPHINE SULFATE 50 MG/1
1 CAPSULE, EXTENDED RELEASE ORAL
Qty: 20 | Refills: 0
Start: 2022-09-07 | End: 2022-09-16

## 2022-09-07 RX ORDER — CADEXOMER IODINE 0.9 %
1 PADS, MEDICATED (EA) TOPICAL
Qty: 1 | Refills: 0
Start: 2022-09-07 | End: 2022-09-16

## 2022-09-07 RX ORDER — FERROUS SULFATE 325(65) MG
1 TABLET ORAL
Qty: 90 | Refills: 0
Start: 2022-09-07 | End: 2022-10-06

## 2022-09-07 RX ORDER — NALOXONE HYDROCHLORIDE 4 MG/.1ML
4 SPRAY NASAL
Qty: 1 | Refills: 0
Start: 2022-09-07

## 2022-09-07 RX ADMIN — MORPHINE SULFATE 15 MILLIGRAM(S): 50 CAPSULE, EXTENDED RELEASE ORAL at 17:42

## 2022-09-07 RX ADMIN — METHOCARBAMOL 1000 MILLIGRAM(S): 500 TABLET, FILM COATED ORAL at 05:44

## 2022-09-07 RX ADMIN — Medication 2 GRAM(S): at 17:42

## 2022-09-07 RX ADMIN — Medication 975 MILLIGRAM(S): at 12:35

## 2022-09-07 RX ADMIN — ATORVASTATIN CALCIUM 80 MILLIGRAM(S): 80 TABLET, FILM COATED ORAL at 21:48

## 2022-09-07 RX ADMIN — Medication 325 MILLIGRAM(S): at 21:48

## 2022-09-07 RX ADMIN — GABAPENTIN 900 MILLIGRAM(S): 400 CAPSULE ORAL at 05:43

## 2022-09-07 RX ADMIN — METHOCARBAMOL 1000 MILLIGRAM(S): 500 TABLET, FILM COATED ORAL at 12:35

## 2022-09-07 RX ADMIN — Medication 200 MILLIGRAM(S): at 05:44

## 2022-09-07 RX ADMIN — HYDROMORPHONE HYDROCHLORIDE 6 MILLIGRAM(S): 2 INJECTION INTRAMUSCULAR; INTRAVENOUS; SUBCUTANEOUS at 13:44

## 2022-09-07 RX ADMIN — GABAPENTIN 900 MILLIGRAM(S): 400 CAPSULE ORAL at 12:35

## 2022-09-07 RX ADMIN — PANTOPRAZOLE SODIUM 40 MILLIGRAM(S): 20 TABLET, DELAYED RELEASE ORAL at 05:45

## 2022-09-07 RX ADMIN — Medication 325 MILLIGRAM(S): at 12:36

## 2022-09-07 RX ADMIN — Medication 75 MILLIGRAM(S): at 12:36

## 2022-09-07 RX ADMIN — AMLODIPINE BESYLATE 10 MILLIGRAM(S): 2.5 TABLET ORAL at 05:44

## 2022-09-07 RX ADMIN — MORPHINE SULFATE 15 MILLIGRAM(S): 50 CAPSULE, EXTENDED RELEASE ORAL at 05:43

## 2022-09-07 RX ADMIN — Medication 200 MILLIGRAM(S): at 17:42

## 2022-09-07 RX ADMIN — Medication 2 GRAM(S): at 05:44

## 2022-09-07 RX ADMIN — Medication 3 MILLIGRAM(S): at 21:49

## 2022-09-07 RX ADMIN — ENOXAPARIN SODIUM 40 MILLIGRAM(S): 100 INJECTION SUBCUTANEOUS at 12:36

## 2022-09-07 RX ADMIN — Medication 975 MILLIGRAM(S): at 18:42

## 2022-09-07 RX ADMIN — GABAPENTIN 900 MILLIGRAM(S): 400 CAPSULE ORAL at 21:48

## 2022-09-07 RX ADMIN — MORPHINE SULFATE 15 MILLIGRAM(S): 50 CAPSULE, EXTENDED RELEASE ORAL at 18:42

## 2022-09-07 RX ADMIN — CLOPIDOGREL BISULFATE 75 MILLIGRAM(S): 75 TABLET, FILM COATED ORAL at 12:36

## 2022-09-07 RX ADMIN — QUETIAPINE FUMARATE 200 MILLIGRAM(S): 200 TABLET, FILM COATED ORAL at 21:48

## 2022-09-07 RX ADMIN — HYDROMORPHONE HYDROCHLORIDE 6 MILLIGRAM(S): 2 INJECTION INTRAMUSCULAR; INTRAVENOUS; SUBCUTANEOUS at 09:41

## 2022-09-07 RX ADMIN — HYDROMORPHONE HYDROCHLORIDE 6 MILLIGRAM(S): 2 INJECTION INTRAMUSCULAR; INTRAVENOUS; SUBCUTANEOUS at 10:40

## 2022-09-07 RX ADMIN — HYDROMORPHONE HYDROCHLORIDE 6 MILLIGRAM(S): 2 INJECTION INTRAMUSCULAR; INTRAVENOUS; SUBCUTANEOUS at 14:40

## 2022-09-07 RX ADMIN — Medication 975 MILLIGRAM(S): at 21:50

## 2022-09-07 RX ADMIN — Medication 325 MILLIGRAM(S): at 05:46

## 2022-09-07 RX ADMIN — Medication 81 MILLIGRAM(S): at 12:36

## 2022-09-07 RX ADMIN — ERYTHROPOIETIN 40000 UNIT(S): 10000 INJECTION, SOLUTION INTRAVENOUS; SUBCUTANEOUS at 13:44

## 2022-09-07 RX ADMIN — Medication 975 MILLIGRAM(S): at 13:30

## 2022-09-07 RX ADMIN — Medication 975 MILLIGRAM(S): at 05:42

## 2022-09-07 RX ADMIN — METHOCARBAMOL 1000 MILLIGRAM(S): 500 TABLET, FILM COATED ORAL at 21:49

## 2022-09-07 RX ADMIN — Medication 975 MILLIGRAM(S): at 17:42

## 2022-09-07 NOTE — PROGRESS NOTE ADULT - SUBJECTIVE AND OBJECTIVE BOX
Vascular surgery follow     MRI findings noted   ID recommendations appreciated    - CRP, ESR, MRSA ordered   - will arrange IV access once covering medication is determined

## 2022-09-07 NOTE — CONSULT NOTE ADULT - SUBJECTIVE AND OBJECTIVE BOX
[facility]  INFECTIOUS DISEASES at [place] and [place]  =======================================================                               Jorge Sosa MD#  Leonid Cisse MD*         Iveth Torres MD*    Daxa Santos MD*  Asha Mack MD*            Diplomates American Board of Internal Medicine & Infectious Diseases                # [place] Office - Appt - Tel  [phone] Fax [phone]                * [place] Office - Appt - Tel [phone] Fax [phone]                                  Hospital Consult line:  [phone]  =======================================================      [id number]  RADHA CHAKRABORTY    CC: Patient is a 57y old  Male who presents with a chief complaint of right foot pain (07 Sep 2022 08:04)    56 y/o M with h/o HTN, seizures, hypercholesterolemia, and severe PVD s/p right SFA stenting in 2017 left leg , carotid stenosis s/p left femoral endarterectomy 10/1/21, right femoral endarterectomy on 4/15/22  s/p right fem -PT bypass with GSV on 5/13/22 for right great toe dry gangrene. Admitted on 8/23/2022 s/p failed RLE angiogram due to bypass occlusion ultimately requiring  right fem-tibial bypass with CryoVein on 8/30. Since then, right foot pain has overall improved except for pain on right 1st and 2nd digits. On 9/3 podiatry noted a 2nd digit wound with 2 cc of purulent drainage positive for probe to bone for which MRI was recommended to rule out OM. The MRI showed possible signs of early OM for which ID is consulted.           PAST MEDICAL & SURGICAL HISTORY:  Hypercholesterolemia  Seizures  Depression  GERD (gastroesophageal reflux disease)  Hypertension  Peripheral vascular disease  Osteoarthritis  Former smoker  Prediabetes  Hypertension  Inguinal hernia, left  S/P ORIF (open reduction internal fixation) fracture  Left  S/P shoulder surgery - right  S/P appendectomy  H/O endarterectomy  L femoral a.  R femoral a. april 2022  Status post femorotibial bypass with GSV 5/13/22    Social Hx:  Former smoker  Currently disabled - worked in construction   No pets at home  Lives with partner     FAMILY HISTORY:  Family history of breast cancer (Mother)  Family history of hepatitis (Mother)  Family history of heart disease (Mother, Sibling)  Family history of CABG (Father)  Family history of peripheral vascular disease (Father)  Family history of brain aneurysm (Sibling)    Allergies  No Known Allergies    Intolerances  Antibiotics - none known        REVIEW OF SYSTEMS:  CONSTITUTIONAL:  No Fever or chills  HEENT:  No diplopia or blurred vision.  No earache, sore throat or runny nose.  CARDIOVASCULAR:  No chest pain  RESPIRATORY:  No cough, shortness of breath  GASTROINTESTINAL:  No nausea, vomiting or diarrhea.  GENITOURINARY:  No dysuria, frequency or urgency. No Blood in urine  MUSCULOSKELETAL:  pain - right 1 and 2 toes.   SKIN:  No change in skin, hair or nails.  NEUROLOGIC:  No Headaches, seizures  PSYCHIATRIC:  No disorder of thought or mood.  ENDOCRINE:  No heat or cold intolerance  HEMATOLOGICAL:  No easy bruising or bleeding.       Physical Exam:  GEN: NAD, awake and alert, comfortable in bed   HEENT: normocephalic and atraumatic. EOMI. PERRL. Moist mucous membranes   NECK: Supple.   LUNGS: Eupneic. CTA B/L.  HEART: RRR, no m/r/g  ABDOMEN: Soft, NT, ND.  +BS.    : no indwelling catheter  EXTREMITIES: + Right foot swelling - R 1st digit - TTP, small superficial wound medial distal without visible bone, no active drainage. Right 2nd digit - TTP, small superficial ulcer without purulence over IP joint. Surgical incision w/o signs of infection.   MSK: As above. No joint swelling  NEUROLOGIC: No Focal Deficits   PSYCHIATRIC: Appropriate affect .  SKIN: as described above      Vitals:    T(F): 97.5 (07 Sep 2022 04:53), Max: 98.4 (06 Sep 2022 20:10)  HR: 95 (07 Sep 2022 04:53)  BP: 125/70 (07 Sep 2022 04:53)  RR: 18 (07 Sep 2022 04:53)  SpO2: 98% (07 Sep 2022 04:53) (97% - 98%)  temp max in last 48H T(F): , Max: 98.4 (09-06-22 @ 20:10)    Current Antibiotics:  None    Other medications:  acetaminophen     Tablet .. 975 milliGRAM(s) Oral every 6 hours  acetaminophen   IVPB .. 1000 milliGRAM(s) IV Intermittent once  amLODIPine   Tablet 10 milliGRAM(s) Oral daily  aspirin  chewable 81 milliGRAM(s) Oral daily  atorvastatin 80 milliGRAM(s) Oral at bedtime  cadexomer iodine 0.9% Gel 1 Application(s) Topical daily  clopidogrel Tablet 75 milliGRAM(s) Oral daily  collagenase Ointment 1 Application(s) Topical daily  enoxaparin Injectable 40 milliGRAM(s) SubCutaneous every 24 hours  epoetin nancy-epbx (RETACRIT) Injectable 59675 Unit(s) SubCutaneous every 7 days  ferrous    sulfate 325 milliGRAM(s) Oral three times a day  gabapentin 900 milliGRAM(s) Oral three times a day  influenza   Vaccine 0.5 milliLiter(s) IntraMuscular once  melatonin 3 milliGRAM(s) Oral at bedtime  methocarbamol 1000 milliGRAM(s) Oral every 8 hours  morphine ER Tablet 15 milliGRAM(s) Oral every 12 hours  omega-3-Acid Ethyl Esters 2 Gram(s) Oral two times a day  pantoprazole    Tablet 40 milliGRAM(s) Oral before breakfast  phenytoin   Capsule 200 milliGRAM(s) Oral every 12 hours  polyethylene glycol 3350 17 Gram(s) Oral two times a day  QUEtiapine 200 milliGRAM(s) Oral at bedtime  venlafaxine XR. 75 milliGRAM(s) Oral daily                            9.4    7.42  )-----------( 410      ( 06 Sep 2022 06:07 )             30.3     09-06    138  |  103  |  16.0  ----------------------------<  95  4.1   |  23.0  |  0.63    Ca    9.2      06 Sep 2022 06:07  Phos  4.4     09-06  Mg     1.9     09-06      RECENT CULTURES:      WBC Count: 7.42 K/uL (09-06-22 @ 06:07)  WBC Count: 8.14 K/uL (09-05-22 @ 07:30)  WBC Count: 9.66 K/uL (09-03-22 @ 06:01)    Creatinine, Serum: 0.63 mg/dL (09-06-22 @ 06:07)  Creatinine, Serum: 0.67 mg/dL (09-03-22 @ 06:01)      Ferritin, Serum: 61 ng/mL (08-26-22 @ 06:25)       COVID-19 PCR: NotDetec (08-29-22 @ 11:00)  COVID-19 PCR: NotDetec (08-23-22 @ 14:49)    RADIOLOGY   < from: MR Foot No Cont, Right (09.06.22 @ 19:30) > Image personally reviewed  IMPRESSION: Mild marrow edema within the distal aspects of the distal   phalanges of the first and second digits with adjacent soft tissue edema   and possible ulceration. This is compatible with either early   osteomyelitis or reactive osteitis.There is no T1 signal abnormality to   definitively diagnose osteomyelitis.    < from: Xray Foot AP + Lateral + Oblique, Right (09.03.22 @ 14:57) >Imaged personally reviewed   IMPRESSION:  No acute radiographic findings, in particular no soft tissue emphysema.  If there is continued clinical concern follow-up MRI can be ordered

## 2022-09-07 NOTE — DISCHARGE NOTE PROVIDER - HOSPITAL COURSE
57y Male PMHx of RLE PAD s/p multiple failed interventions, and R great toe gangrene presented to the hospital as an outpatient for an angiogram on 8/23, patient admitted to the hospital after attempted angio without intervention. Pt course complicated by pain management issues, patient taken for right femoral-PT bypass with CryoVein. Patient healing well but continued to have pain to right great toe and second toe. Pain now controlled and patient cleared for discharge. 57y Male PMHx of RLE PAD s/p multiple failed interventions, and R great toe gangrene presented to the hospital as an outpatient for an angiogram on 8/23, patient admitted to the hospital after attempted angio without intervention. Pt course complicated by pain management issues, patient taken for right femoral-PT bypass with CryoVein. Patient healing well but continued to have pain to right great toe and second toe. Pain now controlled and patient cleared for discharge.    Wound care for R leg ulcer:  -Please remove dressing and shower  -Let soapy water run through  -Tap to dry  -Apply saline moist gauze to the wound  -Cover with dry gauze   -Secure with tape  -Repeat daily after shower or more frequent if soiled.  -Staples and sutures from surgical incisions will be removed in the office.

## 2022-09-07 NOTE — DISCHARGE NOTE PROVIDER - PROVIDER TOKENS
PROVIDER:[TOKEN:[[id number]],FOLLOWUP:[2 weeks]],PROVIDER:[TOKEN:[[id number]],FOLLOWUP:[2 weeks]],PROVIDER:[TOKEN:[[id number]],FOLLOWUP:[2 weeks]]

## 2022-09-07 NOTE — CHART NOTE - NSCHARTNOTEFT_GEN_A_CORE
Post Op      Patient in pain in PACU, currently on PCA pump (started post-op_.  Bypass is patent with biphasic PT signal, and signal over bypass. Compartments are soft.Groin dressing in place with strikethrough, dressing is not saturated with no evidence of hematoma. Pain may be secondary to reperfusion. Per anesthesia, patient can get additional dose of dilaudid. Unable to order additional medication. Pain management consulted placed. Multimodal pain orders placed.
Consult called on patient.  Patient follows with [facility] (Dr. Tripathi)  Vascular NP Rehana leung
Post-op Check    Subjective:  Pt reports that his pain is better controlled. Denies nausea, vomiting, chest pain, SOB, palpitations.     STATUS POST:  bypass from femoral artery to distal tibial artery    POST OPERATIVE DAY #: 0    MEDICATIONS  (STANDING):  acetaminophen     Tablet .. 975 milliGRAM(s) Oral every 6 hours  amLODIPine   Tablet 10 milliGRAM(s) Oral daily  atorvastatin 80 milliGRAM(s) Oral at bedtime  gabapentin 900 milliGRAM(s) Oral three times a day  heparin   Injectable 5000 Unit(s) SubCutaneous every 8 hours  HYDROmorphone PCA (1 mG/mL) 30 milliLiter(s) PCA Continuous PCA Continuous  melatonin 3 milliGRAM(s) Oral at bedtime  methocarbamol 750 milliGRAM(s) Oral three times a day  omega-3-Acid Ethyl Esters 2 Gram(s) Oral two times a day  pantoprazole    Tablet 40 milliGRAM(s) Oral before breakfast  phenytoin   Capsule 200 milliGRAM(s) Oral every 12 hours  polyethylene glycol 3350 17 Gram(s) Oral two times a day  QUEtiapine 200 milliGRAM(s) Oral at bedtime  venlafaxine XR. 75 milliGRAM(s) Oral daily    MEDICATIONS  (PRN):  aluminum hydroxide/magnesium hydroxide/simethicone Suspension 30 milliLiter(s) Oral every 8 hours PRN Dyspepsia  naloxone Injectable 0.1 milliGRAM(s) IV Push every 3 minutes PRN For ANY of the following changes in patient status:  A. RR LESS THAN 10 breaths per minute, B. Oxygen saturation LESS THAN 90%, C. Sedation score of 6  ondansetron Injectable 4 milliGRAM(s) IV Push every 6 hours PRN Nausea  senna 2 Tablet(s) Oral at bedtime PRN Constipation      Vital Signs Last 24 Hrs  T(C): 37.2 (31 Aug 2022 04:19), Max: 37.6 (30 Aug 2022 17:04)  T(F): 98.9 (31 Aug 2022 04:19), Max: 99.6 (30 Aug 2022 17:04)  HR: 94 (31 Aug 2022 04:19) (68 - 97)  BP: 147/69 (31 Aug 2022 04:19) (80/50 - 149/63)  BP(mean): 88 (30 Aug 2022 21:00) (77 - 88)  RR: 18 (31 Aug 2022 04:19) (12 - 18)  SpO2: 96% (31 Aug 2022 04:19) (92% - 100%)    Parameters below as of 31 Aug 2022 04:19  Patient On (Oxygen Delivery Method): room air        Physical Exam:    General: Laying in bed, NAD  Respiratory: no accessory muscle use, respirations non-labored  Abdomen: soft, nontender  extremities: biphasic PT signal, and signal over bypass. Soft compartment. Dress with blood on it but not saturated.   Neurological: A&O x 3; without gross deficit    A: POD 0  bypass from femoral artery to distal tibial artery    P:  Continue current care  Pain control  OOB as tolerated  Encourage IS  DVT ppx
Source: Patient [x ]  Family [ ]   other [x ]    Current Diet: Diet, DASH/TLC:   Sodium & Cholesterol Restricted (08-30-22 @ 17:22)      Patient reports [ ] nausea  [ ] vomiting [ ] diarrhea [ ] constipation  [ ]chewing problems [ ] swallowing issues  [ ] other:     PO intake:  < 50% [ ]   50-75%  [ ]   %  [ x]  other :    Source for PO intake [x ] Patient [ ] family [ ] chart [ ] staff [x ] other: visual observation    Current Weight:   (8/31) 158.7 lbs  (8/30) 136.9 lbs  (8/24) 149.2 lbs  ? accuracy of weights, noted with R leg nonpitting edema, continue to trend    Pertinent Medications: MEDICATIONS  (STANDING):  acetaminophen     Tablet .. 975 milliGRAM(s) Oral every 6 hours  acetaminophen   IVPB .. 1000 milliGRAM(s) IV Intermittent once  amLODIPine   Tablet 10 milliGRAM(s) Oral daily  aspirin  chewable 81 milliGRAM(s) Oral daily  atorvastatin 80 milliGRAM(s) Oral at bedtime  cadexomer iodine 0.9% Gel 1 Application(s) Topical daily  clopidogrel Tablet 75 milliGRAM(s) Oral daily  collagenase Ointment 1 Application(s) Topical daily  enoxaparin Injectable 40 milliGRAM(s) SubCutaneous every 24 hours  epoetin nancy-epbx (RETACRIT) Injectable 79159 Unit(s) SubCutaneous every 7 days  ferrous    sulfate 325 milliGRAM(s) Oral three times a day  gabapentin 900 milliGRAM(s) Oral three times a day  influenza   Vaccine 0.5 milliLiter(s) IntraMuscular once  melatonin 3 milliGRAM(s) Oral at bedtime  methocarbamol 1000 milliGRAM(s) Oral every 8 hours  morphine ER Tablet 15 milliGRAM(s) Oral every 12 hours  omega-3-Acid Ethyl Esters 2 Gram(s) Oral two times a day  pantoprazole    Tablet 40 milliGRAM(s) Oral before breakfast  phenytoin   Capsule 200 milliGRAM(s) Oral every 12 hours  polyethylene glycol 3350 17 Gram(s) Oral two times a day  QUEtiapine 200 milliGRAM(s) Oral at bedtime  venlafaxine XR. 75 milliGRAM(s) Oral daily    MEDICATIONS  (PRN):  aluminum hydroxide/magnesium hydroxide/simethicone Suspension 30 milliLiter(s) Oral every 8 hours PRN Dyspepsia  HYDROmorphone   Tablet 4 milliGRAM(s) Oral every 3 hours PRN Moderate Pain (4 - 6)  HYDROmorphone   Tablet 6 milliGRAM(s) Oral every 3 hours PRN Severe Pain (7 - 10)  HYDROmorphone  Injectable 1 milliGRAM(s) IV Push every 3 hours PRN BREAKTHROUGH  naloxone Injectable 0.1 milliGRAM(s) IV Push every 3 minutes PRN For ANY of the following changes in patient status:  A. RR LESS THAN 10 breaths per minute, B. Oxygen saturation LESS THAN 90%, C. Sedation score of 6  ondansetron Injectable 4 milliGRAM(s) IV Push every 6 hours PRN Nausea  senna 2 Tablet(s) Oral at bedtime PRN Constipation    Pertinent Labs: CBC Full  -  ( 06 Sep 2022 06:07 )  WBC Count : 7.42 K/uL  RBC Count : 3.18 M/uL  Hemoglobin : 9.4 g/dL  Hematocrit : 30.3 %  Platelet Count - Automated : 410 K/uL  Mean Cell Volume : 95.3 fl  Mean Cell Hemoglobin : 29.6 pg  Mean Cell Hemoglobin Concentration : 31.0 gm/dL  Auto Neutrophil # : 4.15 K/uL  Auto Lymphocyte # : 2.00 K/uL  Auto Monocyte # : 0.67 K/uL  Auto Eosinophil # : 0.40 K/uL  Auto Basophil # : 0.00 K/uL  Auto Neutrophil % : 55.9 %  Auto Lymphocyte % : 27.0 %  Auto Monocyte % : 9.0 %  Auto Eosinophil % : 5.4 %  Auto Basophil % : 0.0 %      Skin: Surgical incision - right groin, right calf, right foot per documentation     Nutrition focused physical exam conducted - found signs of malnutrition [x ]absent [ ]present    Subcutaneous fat loss: [ ] Orbital fat pads region, [ ]Buccal fat region, [ ]Triceps region,  [ ]Ribs region    Muscle wasting: [ ]Temples region, [ ]Clavicle region, [ ]Shoulder region, [ ]Scapula region, [ ]Interosseous region,  [ ]thigh region, [ ]Calf region    Estimated Needs:   [ x] no change since previous assessment  [ ] recalculated:     Current Nutrition Diagnosis: Pt remains at nutrition risk secondary to increased nutrient needs related to increased physiological demand for nutrient as evidenced by right fem-tibial bypass with cyrovein. Pt reports good appetite/po intake, states he is enjoying his meals. Lunch tray observed at bedside, 100% consumed per plate waste. Pt with no nutrition-related questions/concerns at this time. RD to follow up.     Recommendations:   1) Continue diet as tolerated.   2) Continue ferrous sulfate supplementation, add vitamin C 500mg daily.  3) Rx: MVI daily.   4) Bowel regimen PRN.  5) Encourage po intake, monitor diet tolerance, and provide assistance at meals as needed.  6) Obtain daily weights to monitor trends.     Monitoring and Evaluation:   [ x] PO intake [ x] Tolerance to diet prescription [X] Weights  [X] Follow up per protocol [X] Labs
Unable to see patient as patient in OR    recommend continuing preoperative pain medications once postop    if concern for oversedation postoperatively:  - DC PCA  - Recommend starting oxycodone PO 5mg/10mg [id number] PRN mod/severe pain  - Recommend starting hydromorphone IVP 1mg q3hour PRN breakthrough pain

## 2022-09-07 NOTE — DISCHARGE NOTE PROVIDER - CARE PROVIDER_API CALL
Gabriela Joe)  Anesthesiology; Pain Medicine  [address] [facility], [place] [address]  Phone: [phone]  Fax: [phone]  Follow Up Time: 2 weeks    ManvarSingh, Pallavi B (MD)  Vascular Surgery  [address] [facility], [address] [place] [address]  Phone: [phone]  Fax: [phone]  Follow Up Time: 2 weeks    Joseph Ferrer (NEELAMM)  Podiatric Medicine and Surgery  [address] [place] [address]  Phone: [phone]  Fax: [phone]  Follow Up Time: 2 weeks

## 2022-09-07 NOTE — PROGRESS NOTE ADULT - ASSESSMENT
57 year old male, PMHx Sz, HLD, HTN, and PVD, now POD7 from right fem-tibial bypass with cyrovein. Patient has been having issues with pain to right first and second toe s/p surgery, coolness noted to toes. Podiatry consulted for possible TMA, reports no plan for surgical intervention. Pain unchanged, RLE is warm and well perfused.     Plan:  MRI reviewed  Continue ASA, plavix  Continue betadine DSD, with webril (no ace) to right foot per podiatry   Apply santyl to RLE calf wound and wrap with gauze and kerlex, change daily   Patient can be discharged today, pending podiatry recommendation

## 2022-09-07 NOTE — DISCHARGE NOTE PROVIDER - NSDCFUADDINST_GEN_ALL_CORE_FT
No heavy lifting for 6 weeks   Follow up with pain management in 2 weeks   Follow up with Podiatry

## 2022-09-07 NOTE — DISCHARGE NOTE PROVIDER - CARE PROVIDERS DIRECT ADDRESSES
,DirectAddress_Unknown,[email or web address],DirectAddress_Unknown

## 2022-09-07 NOTE — DISCHARGE NOTE PROVIDER - NSDCCPTREATMENT_GEN_ALL_CORE_FT
PRINCIPAL PROCEDURE  Procedure: Creation of bypass from femoral artery to distal tibial artery  Findings and Treatment: cryo vein

## 2022-09-07 NOTE — CONSULT NOTE ADULT - ASSESSMENT
57 year old male, PMHx Sz, HLD, HTN, and severe PVD, now POD7 from right fem-tibial bypass with CryoVein. Post-op course complicated by persistent right 1st and 2nd digits pain, along with a 2nd digit wound that expressed PTB, and MRI of the foot with findings suggestive of early right 1st and 2nd digits OM.     Plan:  - Check CRP and ESR  - Check MRSA nasal swab to evaluate for colonization   - Ideally bone biopsy should be obtained to further guide antibiotic therapy, but given underlying illness and poor wound healing, this may not be achievable.   - He will likely need long term antibiotics - favor initiating course with IV antibiotics to ensure better tissue distribution given severe PVD. Will provide abx recommendation upon results of MRSA swab.   - Patient would like to avoid amputation at all cost    Discussed at length with patient. All questions answered.   D/w vascular and podiatry teams  Will continue to follow    Asha ORONA MD     
57M  extensive PSH including L SFA stenting, L femoral endarterectomy, R femoral endarterectomy, R fem-PT bypass with GSV s/p ambulatory angiogram with no intervention 2/2 to R fem-PT occlusion admitted for persistent chronic leg ischemi  due for R fem-PT bypass revision    Med Recs:  DC oxy PO   - Recommend starting hydromorphone PO 2mg/4mg [id number] PRN mod/severe pain  - Recommend starting robaxin 750mg TID standing. HOLD for sedation 
PICC Placement Note    PRE-PROCEDURE VERIFICATION  Correct Procedure: yes  Correct Site:  yes  Temperature:  , Temperature Source:    Recent Labs     10/22/18  0912   PLT 77*   WBC 2.5*     Allergies: Patient has no known allergies. Education materials for PICC Care given: yes. See Patient Education activity for further details. PICC Booklet placed at bedside: yes    Closed Ended PICC Catheters:  Flush Lumens as Follows:  Intermittent Medication:   Flush before and after each medication with 10 ml NS. Unused Ports:  Flush every 8 hours with 10 ml NS.  TPN Ports:  Flush every 24 hours with 20 ml NS prior to hanging new bag. Blood Draws: Stop infusion, draw off and waste 10 ml of blood. Draw sample with 10cc syringe or greater. DO NOT USE VACUTAINER . Transfer with appropriate device to lab  tubes. Flush with 20 ml NS. Dressing Change:  Every 7 days, and PRN using sterile technique if integrity of dressing is compromised. Initial dressing change for central line 24-48 hours post insertion if gauze is used. Apply new dressing per policy. PROCEDURE DETAIL  Consent was obtained and all questions were answered related to risks and benefits. A single lumen PICC line was inserted, as a sterile procedure using ultrasound and modified Seldinger technique for Home IV Therapy. The following documentation is in addition to the PICC properties in the lines/airways flowsheet :  Lot #: [id number]  Lidocaine 1% administered intradermally :yes  Internal Catheter Total Length: 38 (cm)  Vein Selection for PICC:right basilic  Central Line Bundle followed yes  Complication Related to Insertion:no    The placement was verified by EKG, MAX P WAVE @ 38 (cm). PER EKG PICC TIP @ C/A junction.      Line is okay to use: yes    Arely Betancourt RN
57  yr old male presents with history of htn, seizures (last 2021) , high cholesterol  PAD  s/p right SFA stenting in 2017 left leg , carotid stenosis (may require ICA intervention in future) , s/p left femoral endarterectomy 10/1/21, right femoral endarterectomy on 4/15/22  s/p right fem -PT bypass with GSV on 5/13/22 for right great toe dry gangrene . Pt has c/o right foot pain that has burning sensation and has become severe over past week , using cane and wheelchair at times, c/o pain with standing and sitting 10/10 has no relief. US done july 7/7 /22 demonstrating patent bypass with severe stenosis at the proximal anastomosis suggestive of >75% stenosis. Patient s/p RLE angio and in need for RLE bypass.

## 2022-09-07 NOTE — DISCHARGE NOTE PROVIDER - NSDCMRMEDTOKEN_GEN_ALL_CORE_FT
amLODIPine 10 mg oral tablet: 1 tab(s) orally once a day  aspirin 81 mg oral tablet, chewable: 1 tab(s) orally once a day  atorvastatin 80 mg oral tablet: 1 tab(s) orally once a day  cadexomer iodine 0.9% topical gel: 1 application topically once a day  collagenase 250 units/g topical ointment: 1 application topically once a day  ferrous sulfate 325 mg (65 mg elemental iron) oral tablet: 1 tab(s) orally 3 times a day  Fish Oil 1000 mg oral capsule: 1 cap(s) orally 2 times a day  gabapentin 600 mg oral tablet: 1 tab(s) orally 3 times a day  methocarbamol 500 mg oral tablet: 2 tab(s) orally every 8 hours  morphine 15 mg/8 to 12 hr oral tablet, extended release: 1 tab(s) orally every 12 hours MDD:2 pills  naloxone: 4 milligram(s) nasal once   pantoprazole 40 mg oral delayed release tablet: 1 tab(s) orally once a day  phenytoin 200 mg oral capsule, extended release: 1 cap(s) orally every 12 hours  Plavix 75 mg oral tablet: 1 tab(s) orally once a day  QUEtiapine 200 mg oral tablet:  orally once a day (at bedtime)  senna leaf extract oral tablet: 2 tab(s) orally once a day (at bedtime), As needed, Constipation  venlafaxine 75 mg oral capsule, extended release: 1 cap(s) orally once a day   amLODIPine 10 mg oral tablet: 1 tab(s) orally once a day  aspirin 81 mg oral tablet, chewable: 1 tab(s) orally once a day  atorvastatin 80 mg oral tablet: 1 tab(s) orally once a day  cadexomer iodine 0.9% topical gel: 1 application topically once a day  cefTRIAXone 2 g injection: 2 gram(s) intravenously once a day for 4 weeks (through 10/7/2022)  Weekly CBC and CMP  Fax results to [phone]  collagenase 250 units/g topical ointment: 1 application topically once a day  ferrous sulfate 325 mg (65 mg elemental iron) oral tablet: 1 tab(s) orally 3 times a day  Fish Oil 1000 mg oral capsule: 1 cap(s) orally 2 times a day  gabapentin 600 mg oral tablet: 1 tab(s) orally 3 times a day  methocarbamol 500 mg oral tablet: 2 tab(s) orally every 8 hours  morphine 15 mg/8 to 12 hr oral tablet, extended release: 1 tab(s) orally every 12 hours MDD:2 pills  naloxone: 4 milligram(s) nasal once   pantoprazole 40 mg oral delayed release tablet: 1 tab(s) orally once a day  phenytoin 200 mg oral capsule, extended release: 1 cap(s) orally every 12 hours  Plavix 75 mg oral tablet: 1 tab(s) orally once a day  QUEtiapine 200 mg oral tablet:  orally once a day (at bedtime)  senna leaf extract oral tablet: 2 tab(s) orally once a day (at bedtime), As needed, Constipation  vancomycin 1 g/250 mL-NaCl 0.9% intravenous solution: 1 gram(s) intravenously every 12 hours for 4 weeks (through 10/7/2022)  weeky cbc cmp vanco trough. Fax results to [phone]  venlafaxine 75 mg oral capsule, extended release: 1 cap(s) orally once a day

## 2022-09-07 NOTE — CONSULT NOTE ADULT - CONSULT REASON
Preop eval
Right leg cellulitis
Possible right foot osteomyelitis
Pain Management
medical comanagement requested

## 2022-09-07 NOTE — DISCHARGE NOTE PROVIDER - NSDCCPCAREPLAN_GEN_ALL_CORE_FT
PRINCIPAL DISCHARGE DIAGNOSIS  Diagnosis: Peripheral vascular disease  Assessment and Plan of Treatment: with R SFA stent       PRINCIPAL DISCHARGE DIAGNOSIS  Diagnosis: Peripheral vascular disease  Assessment and Plan of Treatment: with R SFA stent  Follow up: Please call and make an appointment with the [facility] 10-14 days after discharge. Also, please call and make an appointment with your primary care physician as per your usual schedule.   Activity: May return to normal activities as tolerated  Diet: May continue regular diet.  Medications: Please take all medications listed on your discharge paperwork as prescribed. Pain medication has been prescribed for you. IV infussion abx was precribed to you, ceftriaxone every 24 hours and Vancomycin evvery 12 hours for 4 weeks. You need to follow with ID.   Wound care for R leg ulcer:  -Please remove dressing and shower  -Let soapy water run through  -Tap to dry  -Apply saline moist gauze to the wound  -Cover with dry gauze   -Secure with tape  -Repeat daily after shower or more frequent if soiled.  -Staples and sutures from surgical incisions will be removed in the office.   Patient is advised to RETURN TO THE EMERGENCY DEPARTMENT for any of the following - worsening pain, fever/chills, nausea/vomiting, altered mental status, chest pain, shortness of breath, or any other new / worsening symptom.

## 2022-09-07 NOTE — PROGRESS NOTE ADULT - SUBJECTIVE AND OBJECTIVE BOX
HPI/Overnight Events:    57 year old male, PMHx Sz, HLD, HTN, and PVD, now POD7 from right fem-tibial bypass with cyrovein. Patient has been having issues with pain to right first and second toe s/p surgery, coolness noted to toes. Podiatry consulted for possible TMA, reports no plan for surgical intervention. Pain unchanged, RLE is warm and well perfused. No acute events overnight      PAST MEDICAL HISTORY:  Hypercholesterolemia  Seizures  Depression  GERD (gastroesophageal reflux disease)  Hypertension  Peripheral vascular disease  Osteoarthritis  Former smoker  Prediabetes  Hypertension          PAST SURGICAL HISTORY:  Inguinal hernia, left  S/P ORIF (open reduction internal fixation) fracture  S/P shoulder surgery  S/P appendectomy  H/O endarterectomy  Status post femorotibial bypass          MEDICATIONS:  acetaminophen     Tablet .. 975 milliGRAM(s) Oral every 6 hours  acetaminophen   IVPB .. 1000 milliGRAM(s) IV Intermittent once  aluminum hydroxide/magnesium hydroxide/simethicone Suspension 30 milliLiter(s) Oral every 8 hours PRN  amLODIPine   Tablet 10 milliGRAM(s) Oral daily  aspirin  chewable 81 milliGRAM(s) Oral daily  atorvastatin 80 milliGRAM(s) Oral at bedtime  cadexomer iodine 0.9% Gel 1 Application(s) Topical daily  clopidogrel Tablet 75 milliGRAM(s) Oral daily  collagenase Ointment 1 Application(s) Topical daily  enoxaparin Injectable 40 milliGRAM(s) SubCutaneous every 24 hours  epoetin nancy-epbx (RETACRIT) Injectable 87403 Unit(s) SubCutaneous every 7 days  ferrous    sulfate 325 milliGRAM(s) Oral three times a day  gabapentin 900 milliGRAM(s) Oral three times a day  HYDROmorphone   Tablet 4 milliGRAM(s) Oral every 3 hours PRN  HYDROmorphone   Tablet 6 milliGRAM(s) Oral every 3 hours PRN  HYDROmorphone  Injectable 1 milliGRAM(s) IV Push every 3 hours PRN  influenza   Vaccine 0.5 milliLiter(s) IntraMuscular once  melatonin 3 milliGRAM(s) Oral at bedtime  methocarbamol 1000 milliGRAM(s) Oral every 8 hours  morphine ER Tablet 15 milliGRAM(s) Oral every 12 hours  naloxone Injectable 0.1 milliGRAM(s) IV Push every 3 minutes PRN  omega-3-Acid Ethyl Esters 2 Gram(s) Oral two times a day  ondansetron Injectable 4 milliGRAM(s) IV Push every 6 hours PRN  pantoprazole    Tablet 40 milliGRAM(s) Oral before breakfast  phenytoin   Capsule 200 milliGRAM(s) Oral every 12 hours  polyethylene glycol 3350 17 Gram(s) Oral two times a day  QUEtiapine 200 milliGRAM(s) Oral at bedtime  senna 2 Tablet(s) Oral at bedtime PRN  venlafaxine XR. 75 milliGRAM(s) Oral daily      ALLERGIES:  No Known Allergies        VASCULAR IMAGING:            CTA/MRA:   < from: MR Foot No Cont, Right (09.06.22 @ 19:30) >  IMPRESSION: Mild marrow edema within the distal aspects of the distal   phalanges of the first and second digits with adjacent soft tissue edema   and possible ulceration. This is compatible with either early   osteomyelitis or reactive osteitis.There is no T1 signal abnormality to   definitively diagnose osteomyelitis.    --- End of Report ---      IFEOMA KOLB MD; Attending Radiologist  This document has been electronically signed. Sep  6 2022  8:36PM    < end of copied text >          VITALS & I/Os:  Vital Signs Last 24 Hrs  T(C): 36.4 (07 Sep 2022 04:53), Max: 36.9 (06 Sep 2022 20:10)  T(F): 97.5 (07 Sep 2022 04:53), Max: 98.4 (06 Sep 2022 20:10)  HR: 95 (07 Sep 2022 04:53) (92 - 100)  BP: 125/70 (07 Sep 2022 04:53) (121/65 - 146/71)  BP(mean): --  RR: 18 (07 Sep 2022 04:53) (18 - 18)  SpO2: 98% (07 Sep 2022 04:53) (96% - 98%)    Parameters below as of 07 Sep 2022 04:53  Patient On (Oxygen Delivery Method): room air        I&O's Summary    06 Sep 2022 07:01  -  07 Sep 2022 07:00  --------------------------------------------------------  IN: 0 mL / OUT: 300 mL / NET: -300 mL            PHYSICAL EXAM:  Constitutional: no acute distress  HEENT: EOMI, no active drainage or redness, no scleral icterus   Neck: Full ROM without pain  Respiratory: respirations are unlabored, no accessory muscle use, no conversational dyspnea  Cardiovascular: regular rate & rhythm  Gastrointestinal: Abdomen soft, non-tender, non-distended, No rebound or guarding. No organomegaly, no palpable mass.  Neurological: A&O x 3; no gross sensory / motor / coordination deficits  Musculoskeletal: TAN  Vascular: Extremities warm and well perfused, RLE rubor. Right PT signals, incision sites are intact. Scab to RLE. Dressing in place       LABS:                        9.4    7.42  )-----------( 410      ( 06 Sep 2022 06:07 )             30.3     09-06    138  |  103  |  16.0  ----------------------------<  95  4.1   |  23.0  |  0.63    Ca    9.2      06 Sep 2022 06:07  Phos  4.4     09-06  Mg     1.9     09-06      Lactate:

## 2022-09-08 LAB
MRSA PCR RESULT.: DETECTED
S AUREUS DNA NOSE QL NAA+PROBE: DETECTED

## 2022-09-08 PROCEDURE — 76937 US GUIDE VASCULAR ACCESS: CPT | Mod: 26,59

## 2022-09-08 PROCEDURE — 71045 X-RAY EXAM CHEST 1 VIEW: CPT | Mod: 26

## 2022-09-08 PROCEDURE — 99232 SBSQ HOSP IP/OBS MODERATE 35: CPT

## 2022-09-08 PROCEDURE — 36569 INSJ PICC 5 YR+ W/O IMAGING: CPT

## 2022-09-08 RX ORDER — CEFTRIAXONE 500 MG/1
2000 INJECTION, POWDER, FOR SOLUTION INTRAMUSCULAR; INTRAVENOUS EVERY 24 HOURS
Refills: 0 | Status: DISCONTINUED | OUTPATIENT
Start: 2022-09-08 | End: 2022-09-10

## 2022-09-08 RX ORDER — CHLORHEXIDINE GLUCONATE 213 G/1000ML
1 SOLUTION TOPICAL
Refills: 0 | Status: DISCONTINUED | OUTPATIENT
Start: 2022-09-08 | End: 2022-09-10

## 2022-09-08 RX ORDER — SODIUM CHLORIDE 9 MG/ML
10 INJECTION INTRAMUSCULAR; INTRAVENOUS; SUBCUTANEOUS
Refills: 0 | Status: DISCONTINUED | OUTPATIENT
Start: 2022-09-08 | End: 2022-09-10

## 2022-09-08 RX ORDER — VANCOMYCIN HCL 1 G
1000 VIAL (EA) INTRAVENOUS EVERY 12 HOURS
Refills: 0 | Status: DISCONTINUED | OUTPATIENT
Start: 2022-09-08 | End: 2022-09-10

## 2022-09-08 RX ADMIN — METHOCARBAMOL 1000 MILLIGRAM(S): 500 TABLET, FILM COATED ORAL at 21:59

## 2022-09-08 RX ADMIN — PANTOPRAZOLE SODIUM 40 MILLIGRAM(S): 20 TABLET, DELAYED RELEASE ORAL at 05:45

## 2022-09-08 RX ADMIN — CLOPIDOGREL BISULFATE 75 MILLIGRAM(S): 75 TABLET, FILM COATED ORAL at 12:42

## 2022-09-08 RX ADMIN — Medication 975 MILLIGRAM(S): at 12:42

## 2022-09-08 RX ADMIN — HYDROMORPHONE HYDROCHLORIDE 6 MILLIGRAM(S): 2 INJECTION INTRAMUSCULAR; INTRAVENOUS; SUBCUTANEOUS at 15:44

## 2022-09-08 RX ADMIN — Medication 325 MILLIGRAM(S): at 21:59

## 2022-09-08 RX ADMIN — Medication 250 MILLIGRAM(S): at 18:02

## 2022-09-08 RX ADMIN — ATORVASTATIN CALCIUM 80 MILLIGRAM(S): 80 TABLET, FILM COATED ORAL at 22:00

## 2022-09-08 RX ADMIN — Medication 75 MILLIGRAM(S): at 12:43

## 2022-09-08 RX ADMIN — MORPHINE SULFATE 15 MILLIGRAM(S): 50 CAPSULE, EXTENDED RELEASE ORAL at 05:44

## 2022-09-08 RX ADMIN — HYDROMORPHONE HYDROCHLORIDE 6 MILLIGRAM(S): 2 INJECTION INTRAMUSCULAR; INTRAVENOUS; SUBCUTANEOUS at 16:14

## 2022-09-08 RX ADMIN — GABAPENTIN 900 MILLIGRAM(S): 400 CAPSULE ORAL at 05:42

## 2022-09-08 RX ADMIN — METHOCARBAMOL 1000 MILLIGRAM(S): 500 TABLET, FILM COATED ORAL at 05:44

## 2022-09-08 RX ADMIN — Medication 975 MILLIGRAM(S): at 05:45

## 2022-09-08 RX ADMIN — METHOCARBAMOL 1000 MILLIGRAM(S): 500 TABLET, FILM COATED ORAL at 12:42

## 2022-09-08 RX ADMIN — Medication 3 MILLIGRAM(S): at 21:59

## 2022-09-08 RX ADMIN — HYDROMORPHONE HYDROCHLORIDE 6 MILLIGRAM(S): 2 INJECTION INTRAMUSCULAR; INTRAVENOUS; SUBCUTANEOUS at 19:20

## 2022-09-08 RX ADMIN — Medication 81 MILLIGRAM(S): at 12:42

## 2022-09-08 RX ADMIN — Medication 2 GRAM(S): at 05:43

## 2022-09-08 RX ADMIN — CEFTRIAXONE 100 MILLIGRAM(S): 500 INJECTION, POWDER, FOR SOLUTION INTRAMUSCULAR; INTRAVENOUS at 18:02

## 2022-09-08 RX ADMIN — HYDROMORPHONE HYDROCHLORIDE 6 MILLIGRAM(S): 2 INJECTION INTRAMUSCULAR; INTRAVENOUS; SUBCUTANEOUS at 22:00

## 2022-09-08 RX ADMIN — Medication 325 MILLIGRAM(S): at 05:44

## 2022-09-08 RX ADMIN — MORPHINE SULFATE 15 MILLIGRAM(S): 50 CAPSULE, EXTENDED RELEASE ORAL at 18:03

## 2022-09-08 RX ADMIN — Medication 200 MILLIGRAM(S): at 05:43

## 2022-09-08 RX ADMIN — AMLODIPINE BESYLATE 10 MILLIGRAM(S): 2.5 TABLET ORAL at 05:44

## 2022-09-08 RX ADMIN — ENOXAPARIN SODIUM 40 MILLIGRAM(S): 100 INJECTION SUBCUTANEOUS at 15:43

## 2022-09-08 RX ADMIN — Medication 325 MILLIGRAM(S): at 12:43

## 2022-09-08 RX ADMIN — HYDROMORPHONE HYDROCHLORIDE 6 MILLIGRAM(S): 2 INJECTION INTRAMUSCULAR; INTRAVENOUS; SUBCUTANEOUS at 13:11

## 2022-09-08 RX ADMIN — HYDROMORPHONE HYDROCHLORIDE 1 MILLIGRAM(S): 2 INJECTION INTRAMUSCULAR; INTRAVENOUS; SUBCUTANEOUS at 10:34

## 2022-09-08 RX ADMIN — Medication 2 GRAM(S): at 15:43

## 2022-09-08 RX ADMIN — HYDROMORPHONE HYDROCHLORIDE 6 MILLIGRAM(S): 2 INJECTION INTRAMUSCULAR; INTRAVENOUS; SUBCUTANEOUS at 12:41

## 2022-09-08 RX ADMIN — Medication 975 MILLIGRAM(S): at 18:03

## 2022-09-08 RX ADMIN — QUETIAPINE FUMARATE 200 MILLIGRAM(S): 200 TABLET, FILM COATED ORAL at 21:59

## 2022-09-08 RX ADMIN — Medication 975 MILLIGRAM(S): at 13:12

## 2022-09-08 RX ADMIN — Medication 975 MILLIGRAM(S): at 18:33

## 2022-09-08 RX ADMIN — GABAPENTIN 900 MILLIGRAM(S): 400 CAPSULE ORAL at 12:42

## 2022-09-08 RX ADMIN — GABAPENTIN 900 MILLIGRAM(S): 400 CAPSULE ORAL at 22:00

## 2022-09-08 RX ADMIN — Medication 1 APPLICATION(S): at 06:43

## 2022-09-08 RX ADMIN — MORPHINE SULFATE 15 MILLIGRAM(S): 50 CAPSULE, EXTENDED RELEASE ORAL at 18:33

## 2022-09-08 RX ADMIN — HYDROMORPHONE HYDROCHLORIDE 1 MILLIGRAM(S): 2 INJECTION INTRAMUSCULAR; INTRAVENOUS; SUBCUTANEOUS at 10:49

## 2022-09-08 RX ADMIN — HYDROMORPHONE HYDROCHLORIDE 6 MILLIGRAM(S): 2 INJECTION INTRAMUSCULAR; INTRAVENOUS; SUBCUTANEOUS at 18:50

## 2022-09-08 RX ADMIN — Medication 200 MILLIGRAM(S): at 18:03

## 2022-09-08 NOTE — PROGRESS NOTE ADULT - ASSESSMENT
57 year old male, PMHx Sz, HLD, HTN, and PVD, now POD9 from right fem-tibial bypass with cyrovein. Patient had been having issues with pain to right first and second toe s/p surgery pain now controlled, improved blood flow to toes. Podiatry reports no surgical intervention needed at this time. RLE is warm and well perfused.     Plan:  MRI foot showing early osteomyelitis vs reactive osteitis  ID consulted   MRSA nasal swab pending   Discharge pending ID recommendations for long term abx therapy

## 2022-09-08 NOTE — PROGRESS NOTE ADULT - SUBJECTIVE AND OBJECTIVE BOX
[facility]  INFECTIOUS DISEASES at [place] and [place]  =======================================================                               Jorge Sosa MD#  Leonid Cisse MD*          Iveth Torres MD*    Daxa Santos MD*   Asha Mack MD*            Diplomates American Board of Internal Medicine & Infectious Diseases                # [place] Office - Appt - Tel  [phone] Fax [phone]                * [place] Office - Appt - Tel [phone] Fax [phone]                                  Hospital Consult line:  [phone]  =======================================================    RADHA CHAKRABORTY [id number]    Follow up:  Complains of pain at incision sites, particularly open wound below the knee.   Complains of pain at the tip of right great toe and 2nd digit  Bowing bowels regularly  Eager to go home    Allergies:  No Known Allergies  Denies antibiotic intolerances      REVIEW OF SYSTEMS: no other complains besides those listed above.     Physical Exam:  GEN: NAD, lying comfortably in bed  HEENT: normocephalic and atraumatic. Moist mucous membranes, no oral lesions   LUNGS: eupneic, CTA B/L.  HEART: RRR, no m/r/g  ABDOMEN: Soft, NT, ND.  +BS.    EXTREMITIES and SKIN: Right leg - surgical incisions c/d/i with some staples and stitches in place. Open wound packed with clean dressings. 1st digit - tip TTP, medial small superficial wound without drainage; 2nd digit - painful wound overlying IP joint but no active drainage.   NEUROLOGIC: Grossly no Focal Deficits   PSYCHIATRIC: Appropriate mood and affect.       Vitals:    T(F): 98.5 (08 Sep 2022 04:36), Max: 98.5 (08 Sep 2022 04:36)  HR: 100 (08 Sep 2022 04:36)  BP: 113/68 (08 Sep 2022 04:36)  RR: 18 (08 Sep 2022 04:36)  SpO2: 93% (08 Sep 2022 04:36) (93% - 97%)  temp max in last 48H T(F): , Max: 98.5 (09-08-22 @ 04:36)    Current Antibiotics:  None    Other medications:  acetaminophen     Tablet .. 975 milliGRAM(s) Oral every 6 hours  acetaminophen   IVPB .. 1000 milliGRAM(s) IV Intermittent once  amLODIPine   Tablet 10 milliGRAM(s) Oral daily  aspirin  chewable 81 milliGRAM(s) Oral daily  atorvastatin 80 milliGRAM(s) Oral at bedtime  cadexomer iodine 0.9% Gel 1 Application(s) Topical daily  clopidogrel Tablet 75 milliGRAM(s) Oral daily  collagenase Ointment 1 Application(s) Topical daily  enoxaparin Injectable 40 milliGRAM(s) SubCutaneous every 24 hours  epoetin nancy-epbx (RETACRIT) Injectable 71400 Unit(s) SubCutaneous every 7 days  ferrous    sulfate 325 milliGRAM(s) Oral three times a day  gabapentin 900 milliGRAM(s) Oral three times a day  influenza   Vaccine 0.5 milliLiter(s) IntraMuscular once  melatonin 3 milliGRAM(s) Oral at bedtime  methocarbamol 1000 milliGRAM(s) Oral every 8 hours  morphine ER Tablet 15 milliGRAM(s) Oral every 12 hours  omega-3-Acid Ethyl Esters 2 Gram(s) Oral two times a day  pantoprazole    Tablet 40 milliGRAM(s) Oral before breakfast  phenytoin   Capsule 200 milliGRAM(s) Oral every 12 hours  polyethylene glycol 3350 17 Gram(s) Oral two times a day  QUEtiapine 200 milliGRAM(s) Oral at bedtime  venlafaxine XR. 75 milliGRAM(s) Oral daily    Vitals:    T(F): 98.5 (08 Sep 2022 04:36), Max: 98.5 (08 Sep 2022 04:36)  HR: 100 (08 Sep 2022 04:36)  BP: 113/68 (08 Sep 2022 04:36)  RR: 18 (08 Sep 2022 04:36)  SpO2: 93% (08 Sep 2022 04:36) (93% - 97%)  temp max in last 48H T(F): , Max: 98.5 (09-08-22 @ 04:36)    Current Antibiotics:    Other medications:  acetaminophen     Tablet .. 975 milliGRAM(s) Oral every 6 hours  acetaminophen   IVPB .. 1000 milliGRAM(s) IV Intermittent once  amLODIPine   Tablet 10 milliGRAM(s) Oral daily  aspirin  chewable 81 milliGRAM(s) Oral daily  atorvastatin 80 milliGRAM(s) Oral at bedtime  cadexomer iodine 0.9% Gel 1 Application(s) Topical daily  clopidogrel Tablet 75 milliGRAM(s) Oral daily  collagenase Ointment 1 Application(s) Topical daily  enoxaparin Injectable 40 milliGRAM(s) SubCutaneous every 24 hours  epoetin nancy-epbx (RETACRIT) Injectable 93937 Unit(s) SubCutaneous every 7 days  ferrous    sulfate 325 milliGRAM(s) Oral three times a day  gabapentin 900 milliGRAM(s) Oral three times a day  influenza   Vaccine 0.5 milliLiter(s) IntraMuscular once  melatonin 3 milliGRAM(s) Oral at bedtime  methocarbamol 1000 milliGRAM(s) Oral every 8 hours  morphine ER Tablet 15 milliGRAM(s) Oral every 12 hours  omega-3-Acid Ethyl Esters 2 Gram(s) Oral two times a day  pantoprazole    Tablet 40 milliGRAM(s) Oral before breakfast  phenytoin   Capsule 200 milliGRAM(s) Oral every 12 hours  polyethylene glycol 3350 17 Gram(s) Oral two times a day  QUEtiapine 200 milliGRAM(s) Oral at bedtime  venlafaxine XR. 75 milliGRAM(s) Oral daily      RECENT CULTURES:  +MRSA Nasal swab    WBC Count: 7.42 K/uL (09-06-22 @ 06:07)  WBC Count: 8.14 K/uL (09-05-22 @ 07:30)    Creatinine, Serum: 0.63 mg/dL (09-06-22 @ 06:07)    C-Reactive Protein, Serum: 28 mg/L (09-07-22 @ 18:16)    Ferritin, Serum: 61 ng/mL (08-26-22 @ 06:25)    Sedimentation Rate, Erythrocyte: 46 mm/hr (09-07-22 @ 18:16)    RADIOLOGY   < from: MR Foot No Cont, Right (09.06.22 @ 19:30) > Image personally reviewed  IMPRESSION: Mild marrow edema within the distal aspects of the distal   phalanges of the first and second digits with adjacent soft tissue edema   and possible ulceration. This is compatible with either early   osteomyelitis or reactive osteitis.There is no T1 signal abnormality to   definitively diagnose osteomyelitis.    < from: Xray Foot AP + Lateral + Oblique, Right (09.03.22 @ 14:57) >Imaged personally reviewed   IMPRESSION:  No acute radiographic findings, in particular no soft tissue emphysema.  If there is continued clinical concern follow-up MRI can be ordered       [facility]  INFECTIOUS DISEASES at [place] and [place]  =======================================================                               Jorge Sosa MD#  Leonid Cisse MD*          Iveth Torres MD*    Daxa Santos MD*   Asha Mack MD*            Diplomates American Board of Internal Medicine & Infectious Diseases                # [place] Office - Appt - Tel  [phone] Fax [phone]                * [place] Office - Appt - Tel [phone] Fax [phone]                                  Hospital Consult line:  [phone]  =======================================================    RADHA CHAKRABORTY [id number]    Follow up:  Complains of pain at incision sites, particularly open wound below the knee.   Complains of pain at the tip of right great toe and 2nd digit  Bowing bowels regularly  Eager to go home    Allergies:  No Known Allergies  Denies antibiotic intolerances      REVIEW OF SYSTEMS: no other complains besides those listed above.     Physical Exam:  GEN: NAD, lying comfortably in bed  HEENT: normocephalic and atraumatic. Moist mucous membranes, no oral lesions   LUNGS: eupneic, CTA B/L.  HEART: RRR, no m/r/g  ABDOMEN: Soft, NT, ND.  +BS.    EXTREMITIES and SKIN: Right leg - surgical incisions c/d/i with some staples and stitches in place. Open wound packed with clean dressings. 1st digit - tip TTP, medial small superficial wound without drainage; 2nd digit - painful wound overlying IP joint but no active drainage.   NEUROLOGIC: Grossly no Focal Deficits   PSYCHIATRIC: Appropriate mood and affect.   LINES: Right midline - c/d/i      Vitals:    T(F): 98.5 (08 Sep 2022 04:36), Max: 98.5 (08 Sep 2022 04:36)  HR: 100 (08 Sep 2022 04:36)  BP: 113/68 (08 Sep 2022 04:36)  RR: 18 (08 Sep 2022 04:36)  SpO2: 93% (08 Sep 2022 04:36) (93% - 97%)  temp max in last 48H T(F): , Max: 98.5 (09-08-22 @ 04:36)    Current Antibiotics:  None    Other medications:  acetaminophen     Tablet .. 975 milliGRAM(s) Oral every 6 hours  acetaminophen   IVPB .. 1000 milliGRAM(s) IV Intermittent once  amLODIPine   Tablet 10 milliGRAM(s) Oral daily  aspirin  chewable 81 milliGRAM(s) Oral daily  atorvastatin 80 milliGRAM(s) Oral at bedtime  cadexomer iodine 0.9% Gel 1 Application(s) Topical daily  clopidogrel Tablet 75 milliGRAM(s) Oral daily  collagenase Ointment 1 Application(s) Topical daily  enoxaparin Injectable 40 milliGRAM(s) SubCutaneous every 24 hours  epoetin nancy-epbx (RETACRIT) Injectable 13125 Unit(s) SubCutaneous every 7 days  ferrous    sulfate 325 milliGRAM(s) Oral three times a day  gabapentin 900 milliGRAM(s) Oral three times a day  influenza   Vaccine 0.5 milliLiter(s) IntraMuscular once  melatonin 3 milliGRAM(s) Oral at bedtime  methocarbamol 1000 milliGRAM(s) Oral every 8 hours  morphine ER Tablet 15 milliGRAM(s) Oral every 12 hours  omega-3-Acid Ethyl Esters 2 Gram(s) Oral two times a day  pantoprazole    Tablet 40 milliGRAM(s) Oral before breakfast  phenytoin   Capsule 200 milliGRAM(s) Oral every 12 hours  polyethylene glycol 3350 17 Gram(s) Oral two times a day  QUEtiapine 200 milliGRAM(s) Oral at bedtime  venlafaxine XR. 75 milliGRAM(s) Oral daily    Vitals:    T(F): 98.5 (08 Sep 2022 04:36), Max: 98.5 (08 Sep 2022 04:36)  HR: 100 (08 Sep 2022 04:36)  BP: 113/68 (08 Sep 2022 04:36)  RR: 18 (08 Sep 2022 04:36)  SpO2: 93% (08 Sep 2022 04:36) (93% - 97%)  temp max in last 48H T(F): , Max: 98.5 (09-08-22 @ 04:36)    Current Antibiotics:    Other medications:  acetaminophen     Tablet .. 975 milliGRAM(s) Oral every 6 hours  acetaminophen   IVPB .. 1000 milliGRAM(s) IV Intermittent once  amLODIPine   Tablet 10 milliGRAM(s) Oral daily  aspirin  chewable 81 milliGRAM(s) Oral daily  atorvastatin 80 milliGRAM(s) Oral at bedtime  cadexomer iodine 0.9% Gel 1 Application(s) Topical daily  clopidogrel Tablet 75 milliGRAM(s) Oral daily  collagenase Ointment 1 Application(s) Topical daily  enoxaparin Injectable 40 milliGRAM(s) SubCutaneous every 24 hours  epoetin nancy-epbx (RETACRIT) Injectable 48090 Unit(s) SubCutaneous every 7 days  ferrous    sulfate 325 milliGRAM(s) Oral three times a day  gabapentin 900 milliGRAM(s) Oral three times a day  influenza   Vaccine 0.5 milliLiter(s) IntraMuscular once  melatonin 3 milliGRAM(s) Oral at bedtime  methocarbamol 1000 milliGRAM(s) Oral every 8 hours  morphine ER Tablet 15 milliGRAM(s) Oral every 12 hours  omega-3-Acid Ethyl Esters 2 Gram(s) Oral two times a day  pantoprazole    Tablet 40 milliGRAM(s) Oral before breakfast  phenytoin   Capsule 200 milliGRAM(s) Oral every 12 hours  polyethylene glycol 3350 17 Gram(s) Oral two times a day  QUEtiapine 200 milliGRAM(s) Oral at bedtime  venlafaxine XR. 75 milliGRAM(s) Oral daily      RECENT CULTURES:  +MRSA Nasal swab    WBC Count: 7.42 K/uL (09-06-22 @ 06:07)  WBC Count: 8.14 K/uL (09-05-22 @ 07:30)    Creatinine, Serum: 0.63 mg/dL (09-06-22 @ 06:07)    C-Reactive Protein, Serum: 28 mg/L (09-07-22 @ 18:16)    Ferritin, Serum: 61 ng/mL (08-26-22 @ 06:25)    Sedimentation Rate, Erythrocyte: 46 mm/hr (09-07-22 @ 18:16)    RADIOLOGY   < from: MR Foot No Cont, Right (09.06.22 @ 19:30) > Image personally reviewed  IMPRESSION: Mild marrow edema within the distal aspects of the distal   phalanges of the first and second digits with adjacent soft tissue edema   and possible ulceration. This is compatible with either early   osteomyelitis or reactive osteitis.There is no T1 signal abnormality to   definitively diagnose osteomyelitis.    < from: Xray Foot AP + Lateral + Oblique, Right (09.03.22 @ 14:57) >Imaged personally reviewed   IMPRESSION:  No acute radiographic findings, in particular no soft tissue emphysema.  If there is continued clinical concern follow-up MRI can be ordered

## 2022-09-08 NOTE — PROGRESS NOTE ADULT - SUBJECTIVE AND OBJECTIVE BOX
HPI/Overnight Events:  57 year old male, PMHx Sz, HLD, HTN, and PVD, now POD9 from right fem-tibial bypass with cyrovein. Patient had been having issues with pain to right first and second toe s/p surgery pain now controlled, improved blood flow to toes. Podiatry reports no surgical intervention needed at this time. RLE is warm and well perfused. No acute events overnight    PAST MEDICAL HISTORY:  Hypercholesterolemia  Seizures  Depression  GERD (gastroesophageal reflux disease)  Hypertension  Peripheral vascular disease  Osteoarthritis  Former smoker  Prediabetes  Hypertension        PAST SURGICAL HISTORY:  Inguinal hernia, left  S/P ORIF (open reduction internal fixation) fracture  S/P shoulder surgery  S/P appendectomy  H/O endarterectomy  Status post femorotibial bypass          MEDICATIONS:  acetaminophen     Tablet .. 975 milliGRAM(s) Oral every 6 hours  acetaminophen   IVPB .. 1000 milliGRAM(s) IV Intermittent once  aluminum hydroxide/magnesium hydroxide/simethicone Suspension 30 milliLiter(s) Oral every 8 hours PRN  amLODIPine   Tablet 10 milliGRAM(s) Oral daily  aspirin  chewable 81 milliGRAM(s) Oral daily  atorvastatin 80 milliGRAM(s) Oral at bedtime  cadexomer iodine 0.9% Gel 1 Application(s) Topical daily  clopidogrel Tablet 75 milliGRAM(s) Oral daily  collagenase Ointment 1 Application(s) Topical daily  enoxaparin Injectable 40 milliGRAM(s) SubCutaneous every 24 hours  epoetin nancy-epbx (RETACRIT) Injectable 09212 Unit(s) SubCutaneous every 7 days  ferrous    sulfate 325 milliGRAM(s) Oral three times a day  gabapentin 900 milliGRAM(s) Oral three times a day  HYDROmorphone   Tablet 4 milliGRAM(s) Oral every 3 hours PRN  HYDROmorphone   Tablet 6 milliGRAM(s) Oral every 3 hours PRN  HYDROmorphone  Injectable 1 milliGRAM(s) IV Push every 3 hours PRN  influenza   Vaccine 0.5 milliLiter(s) IntraMuscular once  melatonin 3 milliGRAM(s) Oral at bedtime  methocarbamol 1000 milliGRAM(s) Oral every 8 hours  morphine ER Tablet 15 milliGRAM(s) Oral every 12 hours  naloxone Injectable 0.1 milliGRAM(s) IV Push every 3 minutes PRN  omega-3-Acid Ethyl Esters 2 Gram(s) Oral two times a day  ondansetron Injectable 4 milliGRAM(s) IV Push every 6 hours PRN  pantoprazole    Tablet 40 milliGRAM(s) Oral before breakfast  phenytoin   Capsule 200 milliGRAM(s) Oral every 12 hours  polyethylene glycol 3350 17 Gram(s) Oral two times a day  QUEtiapine 200 milliGRAM(s) Oral at bedtime  senna 2 Tablet(s) Oral at bedtime PRN  venlafaxine XR. 75 milliGRAM(s) Oral daily      ALLERGIES:  No Known Allergies    Vascular Imaging:  MRI  < from: MR Foot No Cont, Right (09.06.22 @ 19:30) >  IMPRESSION: Mild marrow edema within the distal aspects of the distal   phalanges of the first and second digits with adjacent soft tissue edema   and possible ulceration. This is compatible with either early   osteomyelitis or reactive osteitis.There is no T1 signal abnormality to   definitively diagnose osteomyelitis.    --- End of Report ---      IFEOMA KOLB MD; Attending Radiologist  This document has been electronically signed. Sep  6 2022  8:36PM    < end of copied text >        VITALS & I/Os:  Vital Signs Last 24 Hrs  T(C): 36.9 (08 Sep 2022 04:36), Max: 36.9 (08 Sep 2022 04:36)  T(F): 98.5 (08 Sep 2022 04:36), Max: 98.5 (08 Sep 2022 04:36)  HR: 100 (08 Sep 2022 04:36) (92 - 100)  BP: 113/68 (08 Sep 2022 04:36) (113/68 - 146/71)  BP(mean): --  RR: 18 (08 Sep 2022 04:36) (18 - 19)  SpO2: 93% (08 Sep 2022 04:36) (93% - 97%)    Parameters below as of 08 Sep 2022 04:36  Patient On (Oxygen Delivery Method): room air        I&O's Summary    06 Sep 2022 07:01  -  07 Sep 2022 07:00  --------------------------------------------------------  IN: 0 mL / OUT: 300 mL / NET: -300 mL          PHYSICAL EXAM:  Constitutional: no acute distress  HEENT: EOMI, no active drainage or redness, no scleral icterus   Neck: Full ROM without pain  Respiratory: respirations are unlabored, no accessory muscle use, no conversational dyspnea  Cardiovascular: regular rate & rhythm  Gastrointestinal: Abdomen soft, non-tender, non-distended, No rebound or guarding. No organomegaly, no palpable mass.  Neurological: A&O x 3; no gross sensory / motor / coordination deficits  Musculoskeletal: TAN  Vascular: Extremities warm and well perfused, RLE rubor. Right PT signals, incision sites are intact. Scab to RLE. Dressing in place

## 2022-09-08 NOTE — PROGRESS NOTE ADULT - ASSESSMENT
57 year old male, PMHx Sz, HLD, HTN, and severe PVD, now POD9 from right fem-tibial bypass with CryoVein. Post-op course complicated by persistent right 1st and 2nd digits pain, along with a 2nd digit wound that expressed probe to bone, and MRI of the foot with findings suggestive of early right 1st and 2nd digits OM. Patient is MRSA colonized.     Plan:  - Plan to discharge on prolonged course of antibiotics with IV vancomycin 1g q12h and ceftriaxone 2g q24h.   - Anticipate 4 weeks of IV followed by PO  - No microbiologic data, except for +MRSA nasal swab - bone biopsy not achievable due to concerns of poor wound healing   - Place PICC (already ordered)  - Patient will need weekly CBC and CMP while on IV antibiotics.  Please fax to [phone]  - Appointment with me on 9/22 - Please call [phone]      Discussed at length with patient. All questions answered.   Will continue to follow    Asha ORONA MD      57 year old male, PMHx Sz, HLD, HTN, and severe PVD, now POD9 from right fem-tibial bypass with CryoVein. Post-op course complicated by persistent right 1st and 2nd digits pain, along with a 2nd digit wound that expressed probe to bone, and MRI of the foot with findings suggestive of early right 1st and 2nd digits OM. Patient is MRSA colonized.     Plan:  - Plan to discharge on prolonged course of antibiotics with IV vancomycin 1g q12h and ceftriaxone 2g q24h  - Anticipate 4 weeks of IV followed by PO  - Can start antibiotics now as patient will require a therapeutic vanco trough before discharge   - No microbiologic data, except for +MRSA nasal swab - bone biopsy not achievable due to concerns of poor wound healing   - Place PICC (already ordered)  - Patient will need weekly CBC and CMP while on IV antibiotics.  Please fax to [phone]  - Appointment with me on 9/22 - Please call [phone]    Discussed at length with patient. All questions answered.   Will continue to follow    Asha ORONA MD

## 2022-09-09 PROCEDURE — 99232 SBSQ HOSP IP/OBS MODERATE 35: CPT

## 2022-09-09 RX ORDER — CEFTRIAXONE 500 MG/1
2 INJECTION, POWDER, FOR SOLUTION INTRAMUSCULAR; INTRAVENOUS
Qty: 1 | Refills: 0
Start: 2022-09-09 | End: 2022-10-08

## 2022-09-09 RX ORDER — VANCOMYCIN HCL 1 G
1 VIAL (EA) INTRAVENOUS
Qty: 1 | Refills: 0
Start: 2022-09-09 | End: 2022-10-08

## 2022-09-09 RX ADMIN — Medication 200 MILLIGRAM(S): at 17:21

## 2022-09-09 RX ADMIN — MORPHINE SULFATE 15 MILLIGRAM(S): 50 CAPSULE, EXTENDED RELEASE ORAL at 18:20

## 2022-09-09 RX ADMIN — Medication 200 MILLIGRAM(S): at 05:10

## 2022-09-09 RX ADMIN — METHOCARBAMOL 1000 MILLIGRAM(S): 500 TABLET, FILM COATED ORAL at 21:05

## 2022-09-09 RX ADMIN — HYDROMORPHONE HYDROCHLORIDE 6 MILLIGRAM(S): 2 INJECTION INTRAMUSCULAR; INTRAVENOUS; SUBCUTANEOUS at 10:00

## 2022-09-09 RX ADMIN — Medication 975 MILLIGRAM(S): at 12:06

## 2022-09-09 RX ADMIN — CEFTRIAXONE 100 MILLIGRAM(S): 500 INJECTION, POWDER, FOR SOLUTION INTRAMUSCULAR; INTRAVENOUS at 18:16

## 2022-09-09 RX ADMIN — GABAPENTIN 900 MILLIGRAM(S): 400 CAPSULE ORAL at 05:10

## 2022-09-09 RX ADMIN — Medication 975 MILLIGRAM(S): at 18:20

## 2022-09-09 RX ADMIN — CLOPIDOGREL BISULFATE 75 MILLIGRAM(S): 75 TABLET, FILM COATED ORAL at 12:06

## 2022-09-09 RX ADMIN — HYDROMORPHONE HYDROCHLORIDE 6 MILLIGRAM(S): 2 INJECTION INTRAMUSCULAR; INTRAVENOUS; SUBCUTANEOUS at 06:33

## 2022-09-09 RX ADMIN — Medication 250 MILLIGRAM(S): at 16:48

## 2022-09-09 RX ADMIN — Medication 1 APPLICATION(S): at 08:00

## 2022-09-09 RX ADMIN — HYDROMORPHONE HYDROCHLORIDE 6 MILLIGRAM(S): 2 INJECTION INTRAMUSCULAR; INTRAVENOUS; SUBCUTANEOUS at 11:00

## 2022-09-09 RX ADMIN — Medication 975 MILLIGRAM(S): at 17:21

## 2022-09-09 RX ADMIN — Medication 3 MILLIGRAM(S): at 21:05

## 2022-09-09 RX ADMIN — Medication 325 MILLIGRAM(S): at 21:05

## 2022-09-09 RX ADMIN — GABAPENTIN 900 MILLIGRAM(S): 400 CAPSULE ORAL at 12:06

## 2022-09-09 RX ADMIN — MORPHINE SULFATE 15 MILLIGRAM(S): 50 CAPSULE, EXTENDED RELEASE ORAL at 06:28

## 2022-09-09 RX ADMIN — GABAPENTIN 900 MILLIGRAM(S): 400 CAPSULE ORAL at 21:04

## 2022-09-09 RX ADMIN — Medication 1 APPLICATION(S): at 17:11

## 2022-09-09 RX ADMIN — PANTOPRAZOLE SODIUM 40 MILLIGRAM(S): 20 TABLET, DELAYED RELEASE ORAL at 05:10

## 2022-09-09 RX ADMIN — Medication 325 MILLIGRAM(S): at 12:06

## 2022-09-09 RX ADMIN — METHOCARBAMOL 1000 MILLIGRAM(S): 500 TABLET, FILM COATED ORAL at 05:10

## 2022-09-09 RX ADMIN — Medication 81 MILLIGRAM(S): at 12:06

## 2022-09-09 RX ADMIN — AMLODIPINE BESYLATE 10 MILLIGRAM(S): 2.5 TABLET ORAL at 06:28

## 2022-09-09 RX ADMIN — ATORVASTATIN CALCIUM 80 MILLIGRAM(S): 80 TABLET, FILM COATED ORAL at 21:04

## 2022-09-09 RX ADMIN — MORPHINE SULFATE 15 MILLIGRAM(S): 50 CAPSULE, EXTENDED RELEASE ORAL at 17:21

## 2022-09-09 RX ADMIN — Medication 250 MILLIGRAM(S): at 05:11

## 2022-09-09 RX ADMIN — Medication 975 MILLIGRAM(S): at 13:00

## 2022-09-09 RX ADMIN — Medication 2 GRAM(S): at 17:21

## 2022-09-09 RX ADMIN — Medication 2 GRAM(S): at 05:10

## 2022-09-09 RX ADMIN — ENOXAPARIN SODIUM 40 MILLIGRAM(S): 100 INJECTION SUBCUTANEOUS at 15:02

## 2022-09-09 RX ADMIN — QUETIAPINE FUMARATE 200 MILLIGRAM(S): 200 TABLET, FILM COATED ORAL at 21:05

## 2022-09-09 RX ADMIN — Medication 75 MILLIGRAM(S): at 12:06

## 2022-09-09 RX ADMIN — Medication 325 MILLIGRAM(S): at 05:11

## 2022-09-09 RX ADMIN — Medication 975 MILLIGRAM(S): at 06:28

## 2022-09-09 RX ADMIN — METHOCARBAMOL 1000 MILLIGRAM(S): 500 TABLET, FILM COATED ORAL at 12:07

## 2022-09-09 RX ADMIN — Medication 975 MILLIGRAM(S): at 00:33

## 2022-09-09 RX ADMIN — CHLORHEXIDINE GLUCONATE 1 APPLICATION(S): 213 SOLUTION TOPICAL at 06:28

## 2022-09-09 RX ADMIN — Medication 975 MILLIGRAM(S): at 06:29

## 2022-09-09 RX ADMIN — MORPHINE SULFATE 15 MILLIGRAM(S): 50 CAPSULE, EXTENDED RELEASE ORAL at 05:09

## 2022-09-09 RX ADMIN — Medication 975 MILLIGRAM(S): at 23:27

## 2022-09-09 NOTE — PROGRESS NOTE ADULT - SUBJECTIVE AND OBJECTIVE BOX
HPI/Overnight Events:  57 year old male, PMHx Sz, HLD, HTN, and PVD, now POD 10 from right fem-tibial bypass with cyrovein. Patient had been having issues with pain to right first and second toe s/p surgery pain now controlled, improved blood flow to toes. MRI reveals findings of possible osteitis vs osteomyelitis. Podiatry continues to report no surgical intervention needed at this time. Consulted infectious disease for recommendations of antibiotic administration to move towards discharge. Nasal swab for MRSA results are negative for MSSA and MRSA. Consulting case management to set up at-home antibiotic infusion. RLE is warm and well perfused. No acute events overnight.    PHYSICAL EXAM:   Constitutional: no acute distress  HEENT: EOMI, no active drainage or redness, no scleral icterus   Neck: Full ROM without pain  Respiratory: respirations are unlabored, no accessory muscle use, no conversational dyspnea  Cardiovascular: regular rate & rhythm  Gastrointestinal: Abdomen soft, non-tender, non-distended, No rebound or guarding. No organomegaly, no palpable mass.  Neurological: A&O x 3; no gross sensory / motor / coordination deficits  Musculoskeletal: TAN  Vascular: Extremities warm and well perfused, RLE rubor. Right PT signals, incision sites are intact. Topical collagenase applied to RLE wound and dressed. All dressings have been changed and are dry and intact.         PAST MEDICAL HISTORY:  Hypercholesterolemia  Seizures  Depression  GERD (gastroesophageal reflux disease)  Hypertension  Peripheral vascular disease  Osteoarthritis  Former smoker  Prediabetes  Hypertension        PAST SURGICAL HISTORY:  Inguinal hernia, left  S/P ORIF (open reduction internal fixation) fracture  S/P shoulder surgery  S/P appendectomy  H/O endarterectomy  Status post femorotibial bypass     HPI/OVERNIGHT EVENTS:  57 year old male, PMHx Sz, HLD, HTN, and PVD, now POD 10 from right fem-tibial bypass with cyrovein. Patient had been having issues with pain to right first and second toe s/p surgery pain now controlled, improved blood flow to toes. MRI reveals findings of possible osteitis vs osteomyelitis. Podiatry continues to report no surgical intervention needed at this time. Consulted infectious disease for recommendations of antibiotic administration to move towards discharge, PICC line placed yesterday for long-term IV abx. Nasal swab for MRSA results are negative for MSSA and MRSA. Will f/u case management for at-home antibiotic infusion. RLE is warm and well perfused. vascular surgery dressings changed. No acute events overnight.        PAST MEDICAL HISTORY:  Hypercholesterolemia  Seizures  Depression  GERD (gastroesophageal reflux disease)  Hypertension  Peripheral vascular disease  Osteoarthritis  Former smoker  Prediabetes  Hypertension        PAST SURGICAL HISTORY:  Inguinal hernia, left  S/P ORIF (open reduction internal fixation) fracture  S/P shoulder surgery  S/P appendectomy  H/O endarterectomy  Status post femorotibial bypass      MEDICATIONS  (STANDING):  acetaminophen     Tablet .. 975 milliGRAM(s) Oral every 6 hours  acetaminophen   IVPB .. 1000 milliGRAM(s) IV Intermittent once  amLODIPine   Tablet 10 milliGRAM(s) Oral daily  aspirin  chewable 81 milliGRAM(s) Oral daily  atorvastatin 80 milliGRAM(s) Oral at bedtime  cadexomer iodine 0.9% Gel 1 Application(s) Topical daily  cefTRIAXone   IVPB 2000 milliGRAM(s) IV Intermittent every 24 hours  chlorhexidine 2% Cloths 1 Application(s) Topical <User Schedule>  clopidogrel Tablet 75 milliGRAM(s) Oral daily  collagenase Ointment 1 Application(s) Topical daily  enoxaparin Injectable 40 milliGRAM(s) SubCutaneous every 24 hours  epoetin nancy-epbx (RETACRIT) Injectable 16899 Unit(s) SubCutaneous every 7 days  ferrous    sulfate 325 milliGRAM(s) Oral three times a day  gabapentin 900 milliGRAM(s) Oral three times a day  melatonin 3 milliGRAM(s) Oral at bedtime  methocarbamol 1000 milliGRAM(s) Oral every 8 hours  morphine ER Tablet 15 milliGRAM(s) Oral every 12 hours  omega-3-Acid Ethyl Esters 2 Gram(s) Oral two times a day  pantoprazole    Tablet 40 milliGRAM(s) Oral before breakfast  phenytoin   Capsule 200 milliGRAM(s) Oral every 12 hours  polyethylene glycol 3350 17 Gram(s) Oral two times a day  QUEtiapine 200 milliGRAM(s) Oral at bedtime  vancomycin  IVPB 1000 milliGRAM(s) IV Intermittent every 12 hours  venlafaxine XR. 75 milliGRAM(s) Oral daily    MEDICATIONS  (PRN):  aluminum hydroxide/magnesium hydroxide/simethicone Suspension 30 milliLiter(s) Oral every 8 hours PRN Dyspepsia  HYDROmorphone   Tablet 4 milliGRAM(s) Oral every 3 hours PRN Moderate Pain (4 - 6)  HYDROmorphone   Tablet 6 milliGRAM(s) Oral every 3 hours PRN Severe Pain (7 - 10)  HYDROmorphone  Injectable 1 milliGRAM(s) IV Push every 3 hours PRN BREAKTHROUGH  naloxone Injectable 0.1 milliGRAM(s) IV Push every 3 minutes PRN For ANY of the following changes in patient status:  A. RR LESS THAN 10 breaths per minute, B. Oxygen saturation LESS THAN 90%, C. Sedation score of 6  ondansetron Injectable 4 milliGRAM(s) IV Push every 6 hours PRN Nausea  senna 2 Tablet(s) Oral at bedtime PRN Constipation  sodium chloride 0.9% lock flush 10 milliLiter(s) IV Push every 1 hour PRN Pre/post blood products, medications, blood draw, and to maintain line patency      Vital Signs Last 24 Hrs  T(C): 36.7 (09 Sep 2022 05:20), Max: 36.8 (08 Sep 2022 11:09)  T(F): 98.1 (09 Sep 2022 05:20), Max: 98.3 (08 Sep 2022 16:24)  HR: 96 (09 Sep 2022 05:20) (92 - 105)  BP: 120/71 (09 Sep 2022 05:20) (113/64 - 137/74)  BP(mean): --  RR: 18 (09 Sep 2022 05:20) (17 - 20)  SpO2: 96% (09 Sep 2022 05:20) (95% - 96%)    Parameters below as of 09 Sep 2022 05:20  Patient On (Oxygen Delivery Method): room air      PHYSICAL EXAM:   Constitutional: Laying in bed in no acute distress  HEENT: EOMI, no active drainage or redness, no scleral icterus   Neck: Full ROM without pain  Respiratory: respirations are unlabored, no accessory muscle use, no conversational dyspnea  Cardiovascular: regular rate & rhythm  Gastrointestinal: Abdomen soft, non-tender, non-distended, No rebound or guarding. No organomegaly, no palpable mass.  Neurological: A&O x 3; no gross sensory / motor / coordination deficits  Musculoskeletal: TAN  Vascular: Extremities warm and well perfused, Right PT signals, incision sites are intact. Medial R ankle incision with some drainage, dressing changed. Topical collagenase applied to RLE wound and dressed. Foot dressing per podiatry intact.       I&O's Detail      LABS:                 HPI/OVERNIGHT EVENTS:  57 year old male, PMHx Sz, HLD, HTN, and PVD, now POD 10 from right fem-tibial bypass with cyrovein. Patient had been having issues with pain to right first and second toe s/p surgery pain now controlled, improved blood flow to toes. MRI reveals findings of possible osteitis vs osteomyelitis. Podiatry continues to report no surgical intervention needed at this time. Consulted infectious disease for recommendations of antibiotic administration to move towards discharge, PICC line placed yesterday for long-term IV abx. Nasal swab for MRSA results are positive for MSSA and MRSA. Will f/u case management for at-home antibiotic infusion. RLE is warm and well perfused. vascular surgery dressings changed. No acute events overnight.        PAST MEDICAL HISTORY:  Hypercholesterolemia  Seizures  Depression  GERD (gastroesophageal reflux disease)  Hypertension  Peripheral vascular disease  Osteoarthritis  Former smoker  Prediabetes  Hypertension        PAST SURGICAL HISTORY:  Inguinal hernia, left  S/P ORIF (open reduction internal fixation) fracture  S/P shoulder surgery  S/P appendectomy  H/O endarterectomy  Status post femorotibial bypass      MEDICATIONS  (STANDING):  acetaminophen     Tablet .. 975 milliGRAM(s) Oral every 6 hours  acetaminophen   IVPB .. 1000 milliGRAM(s) IV Intermittent once  amLODIPine   Tablet 10 milliGRAM(s) Oral daily  aspirin  chewable 81 milliGRAM(s) Oral daily  atorvastatin 80 milliGRAM(s) Oral at bedtime  cadexomer iodine 0.9% Gel 1 Application(s) Topical daily  cefTRIAXone   IVPB 2000 milliGRAM(s) IV Intermittent every 24 hours  chlorhexidine 2% Cloths 1 Application(s) Topical <User Schedule>  clopidogrel Tablet 75 milliGRAM(s) Oral daily  collagenase Ointment 1 Application(s) Topical daily  enoxaparin Injectable 40 milliGRAM(s) SubCutaneous every 24 hours  epoetin nancy-epbx (RETACRIT) Injectable 87091 Unit(s) SubCutaneous every 7 days  ferrous    sulfate 325 milliGRAM(s) Oral three times a day  gabapentin 900 milliGRAM(s) Oral three times a day  melatonin 3 milliGRAM(s) Oral at bedtime  methocarbamol 1000 milliGRAM(s) Oral every 8 hours  morphine ER Tablet 15 milliGRAM(s) Oral every 12 hours  omega-3-Acid Ethyl Esters 2 Gram(s) Oral two times a day  pantoprazole    Tablet 40 milliGRAM(s) Oral before breakfast  phenytoin   Capsule 200 milliGRAM(s) Oral every 12 hours  polyethylene glycol 3350 17 Gram(s) Oral two times a day  QUEtiapine 200 milliGRAM(s) Oral at bedtime  vancomycin  IVPB 1000 milliGRAM(s) IV Intermittent every 12 hours  venlafaxine XR. 75 milliGRAM(s) Oral daily    MEDICATIONS  (PRN):  aluminum hydroxide/magnesium hydroxide/simethicone Suspension 30 milliLiter(s) Oral every 8 hours PRN Dyspepsia  HYDROmorphone   Tablet 4 milliGRAM(s) Oral every 3 hours PRN Moderate Pain (4 - 6)  HYDROmorphone   Tablet 6 milliGRAM(s) Oral every 3 hours PRN Severe Pain (7 - 10)  HYDROmorphone  Injectable 1 milliGRAM(s) IV Push every 3 hours PRN BREAKTHROUGH  naloxone Injectable 0.1 milliGRAM(s) IV Push every 3 minutes PRN For ANY of the following changes in patient status:  A. RR LESS THAN 10 breaths per minute, B. Oxygen saturation LESS THAN 90%, C. Sedation score of 6  ondansetron Injectable 4 milliGRAM(s) IV Push every 6 hours PRN Nausea  senna 2 Tablet(s) Oral at bedtime PRN Constipation  sodium chloride 0.9% lock flush 10 milliLiter(s) IV Push every 1 hour PRN Pre/post blood products, medications, blood draw, and to maintain line patency      Vital Signs Last 24 Hrs  T(C): 36.7 (09 Sep 2022 05:20), Max: 36.8 (08 Sep 2022 11:09)  T(F): 98.1 (09 Sep 2022 05:20), Max: 98.3 (08 Sep 2022 16:24)  HR: 96 (09 Sep 2022 05:20) (92 - 105)  BP: 120/71 (09 Sep 2022 05:20) (113/64 - 137/74)  BP(mean): --  RR: 18 (09 Sep 2022 05:20) (17 - 20)  SpO2: 96% (09 Sep 2022 05:20) (95% - 96%)    Parameters below as of 09 Sep 2022 05:20  Patient On (Oxygen Delivery Method): room air      PHYSICAL EXAM:   Constitutional: Laying in bed in no acute distress  HEENT: EOMI, no active drainage or redness, no scleral icterus   Neck: Full ROM without pain  Respiratory: respirations are unlabored, no accessory muscle use, no conversational dyspnea  Cardiovascular: regular rate & rhythm  Gastrointestinal: Abdomen soft, non-tender, non-distended, No rebound or guarding. No organomegaly, no palpable mass.  Neurological: A&O x 3; no gross sensory / motor / coordination deficits  Musculoskeletal: TAN  Vascular: Extremities warm and well perfused, Right PT signals, incision sites are intact. Medial R ankle incision with some drainage, dressing changed. Topical collagenase applied to RLE wound and dressed. Foot dressing per podiatry intact.       I&O's Detail      LABS:

## 2022-09-09 NOTE — PROGRESS NOTE ADULT - SUBJECTIVE AND OBJECTIVE BOX
HPI:57  yr old male presents with history of htn, seizures (last 2021) , high cholesterol  PAD  s/p right SFA stenting in 2017 left leg , carotid stenosis (may require ICA intervention in future) , s/p left femoral endarterectomy 10/1/21, right femoral endarterectomy on 4/15/22  s/p right fem -PT bypass with GSV on 5/13/22 for right great toe dry gangrene . Pt has c/o right foot pain that has burning sensation and has become severe over past week , using cane and wheelchair at times, c/o pain with standing and sitting 10/10 has no relief. states he cannot sleep without pain , fell out of bed last night and hit face on night stand ,  Lower foot and calf discolored red  with right calf incision   scab noted , no drainage . US done july 7/7 /22 demonstrating patent bypass with severe stenosis at the proximal anastomosis suggestive of >75% stenosis. pt is scheduled for RLE angiogram possible intervention with Dr. Lelia Elder on 8/9/22.      Podiatry HPI: Patient was seen bedside in no acute distress. States that he is feeling a lot of pain to his right foot recently. Patient wants pain medications. Denies any other constitutional symptoms of N/V/C/F/SOB. Denies any other pedal complaints.    Podiatry Interval HPI: 57M evaluated resting in bed. Pt admits to continued pain to RLE, specificaly to digits. States that vascular saw him this morning as well. Denies N/V/F/C/SOB.  PICC line was placed.      Patient admits to  (-) Fevers, (-) Chills, (-) Nausea, (-) Vomiting, (-) Shortness of Breath (-) calf pain (-) chest pain       Medications acetaminophen     Tablet .. 975 milliGRAM(s) Oral every 6 hours  acetaminophen   IVPB .. 1000 milliGRAM(s) IV Intermittent once  aluminum hydroxide/magnesium hydroxide/simethicone Suspension 30 milliLiter(s) Oral every 8 hours PRN  amLODIPine   Tablet 10 milliGRAM(s) Oral daily  aspirin  chewable 81 milliGRAM(s) Oral daily  atorvastatin 80 milliGRAM(s) Oral at bedtime  cadexomer iodine 0.9% Gel 1 Application(s) Topical daily  cefTRIAXone   IVPB 2000 milliGRAM(s) IV Intermittent every 24 hours  chlorhexidine 2% Cloths 1 Application(s) Topical <User Schedule>  clopidogrel Tablet 75 milliGRAM(s) Oral daily  collagenase Ointment 1 Application(s) Topical daily  enoxaparin Injectable 40 milliGRAM(s) SubCutaneous every 24 hours  epoetin nancy-epbx (RETACRIT) Injectable 91168 Unit(s) SubCutaneous every 7 days  ferrous    sulfate 325 milliGRAM(s) Oral three times a day  gabapentin 900 milliGRAM(s) Oral three times a day  HYDROmorphone   Tablet 4 milliGRAM(s) Oral every 3 hours PRN  HYDROmorphone   Tablet 6 milliGRAM(s) Oral every 3 hours PRN  HYDROmorphone  Injectable 1 milliGRAM(s) IV Push every 3 hours PRN  melatonin 3 milliGRAM(s) Oral at bedtime  methocarbamol 1000 milliGRAM(s) Oral every 8 hours  morphine ER Tablet 15 milliGRAM(s) Oral every 12 hours  naloxone Injectable 0.1 milliGRAM(s) IV Push every 3 minutes PRN  omega-3-Acid Ethyl Esters 2 Gram(s) Oral two times a day  ondansetron Injectable 4 milliGRAM(s) IV Push every 6 hours PRN  pantoprazole    Tablet 40 milliGRAM(s) Oral before breakfast  phenytoin   Capsule 200 milliGRAM(s) Oral every 12 hours  polyethylene glycol 3350 17 Gram(s) Oral two times a day  QUEtiapine 200 milliGRAM(s) Oral at bedtime  senna 2 Tablet(s) Oral at bedtime PRN  sodium chloride 0.9% lock flush 10 milliLiter(s) IV Push every 1 hour PRN  vancomycin  IVPB 1000 milliGRAM(s) IV Intermittent every 12 hours  venlafaxine XR. 75 milliGRAM(s) Oral daily    FHFamily history of breast cancer (Mother)    Family history of hepatitis (Mother)    Family history of heart disease (Mother, Sibling)    Family history of CABG (Father)    Family history of peripheral vascular disease (Father)    Family history of brain aneurysm (Sibling)    ,   PMHHypercholesterolemia    Seizures    Depression    GERD (gastroesophageal reflux disease)    Hypertension    Peripheral vascular disease    Osteoarthritis    Former smoker    Prediabetes    Hypertension       PSHInguinal hernia, left    S/P ORIF (open reduction internal fixation) fracture    S/P shoulder surgery    S/P appendectomy    H/O endarterectomy    Status post femorotibial bypass        Labs              Vital Signs Last 24 Hrs  T(C): 36.7 (09 Sep 2022 16:29), Max: 36.7 (08 Sep 2022 21:02)  T(F): 98 (09 Sep 2022 16:29), Max: 98.1 (08 Sep 2022 21:02)  HR: 96 (09 Sep 2022 16:29) (87 - 105)  BP: 143/76 (09 Sep 2022 16:29) (120/71 - 161/72)  BP(mean): --  RR: 19 (09 Sep 2022 16:29) (17 - 19)  SpO2: 97% (09 Sep 2022 16:29) (95% - 98%)    Parameters below as of 09 Sep 2022 16:29  Patient On (Oxygen Delivery Method): room air      Sedimentation Rate, Erythrocyte: 46 mm/hr (09-07-22 @ 18:16)         C-Reactive Protein, Serum: 28 mg/L (09-07-22 @ 18:16)       PHYSICAL EXAM  LE Focused:  RLE Focused   Vasc:  DP/PT faintly palpable, TG: warm to warm   Derm: Right 2nd digit wound noted with no drainage expressed, no malodor, significantly erythema and edema still noted to the foot, R hallux medial distal wound noted partial thickness with no drainage noted, no PTB noted, no fluctuance noted   Neuro: Protective sensation intact   MSK: pain on palpation noted to the distal toes of the right    ACC: [id number] EXAM:  XR FOOT COMP MIN 3 VIEWS RT                          PROCEDURE DATE:  09/03/2022          INTERPRETATION:  Clinical History: 57-year-old male, rule out gas.    Three views of the right foot are compared to 5/23/2022.    FINDINGS: Mild degenerative change with no fracture, dislocation, gross   cortical destruction or soft tissue emphysema.    Vascular calcifications again evident.    IMPRESSION:  No acute radiographic findings, in particular no soft tissue emphysema.    If there is continued clinical concern follow-up MRI can be ordered        ACC: [id number] EXAM:  MR FOOT RT                          PROCEDURE DATE:  09/06/2022          INTERPRETATION:  RIGHT FOOT MRI    CLINICAL INFORMATION: Right foot pain. Evaluate for osteomyelitis.  TECHNIQUE: Multiplanar, multisequence MRI was obtained of the right foot.    FINDINGS:    There is mild marrow edema within the distal aspect of the distal phalanx   of the great toe with surrounding soft tissue edema and possible skin   ulceration. There is no T1 signal abnormality. This is compatible with   either reactive osteitis or early osteomyelitis. Similarly, there is a   very small focus of bone marrow edema signal and is in the distal aspect   of the distal phalanx of the second toe with surrounding soft tissue   edema. There is also noT1 signal abnormality in this region.    There is marked dorsal subcutaneous soft tissue edema throughout the   midfoot and forefoot. There is no encapsulation to suggest the presence   of an abscess.    There is no fracture or dislocation identified.    IMPRESSION: Mild marrow edema within the distal aspects of the distal   phalanges of the first and second digits with adjacent soft tissue edema   and possible ulceration. This is compatible with either early   osteomyelitis or reactive osteitis. There is no T1 signal abnormality to   definitively diagnose osteomyelitis.

## 2022-09-09 NOTE — PROGRESS NOTE ADULT - ASSESSMENT
57 year old male, PMHx Sz, HLD, HTN, and PVD, now POD10 from right fem-tibial bypass with CryoVein. Post-op course complicated by persistent right 1st and 2nd digits pain, along with a 2nd digit wound that expressed probe to bone, and MRI of the foot with findings suggestive of early right 1st and 2nd digits OM. Patient is MRSA colonized.     Plan:  - Plan to treat empirically with IV vancomycin 1g q12h and ceftriaxone 2g q24h for at least 4 weeks, followed by oral therapy  - Unable to obtain micro data to guide antibiotic therapy except for +MRSA nasal swab   - OPAT with home infusion company being set up. PICC in place.   - Needs vanco trough before 4th dose before discharge  - Patient will need weekly CBC, CMP, vanco trough while on IV antibiotics.  Please fax to [phone]  - Appointment with me on 9/22 - Please call [phone]    Discussed at length with patient. All questions answered.   D/w vascular sx     Asha ORONA MD

## 2022-09-09 NOTE — PROGRESS NOTE ADULT - ASSESSMENT
A:   Left foot pain  PAD    P:  Patient evaluated, chart reviewed  Xray reviewed  MRI reviewed   No JOSE/PVR as per vasc due to recent intervention   Applied Iodosorb DSD, with webril (no ace)  D/w pt clinical findings- which are improving   Discussed with patient that pain may be reperfusion pain   Pod Plan: Clinical exam improving, no surgical intervention at this time  Continue pain management   Please continue abx per ID   Weight bearing status to be determined by vascular as pt just had recent intervention    Podiatry to follow in house  Seen and evaluated bedside with Attending Dr. Ferrer

## 2022-09-09 NOTE — PROGRESS NOTE ADULT - ASSESSMENT
Assessment: 	  57 year old male, PMHx Sz, HLD, HTN, and PVD, now POD 10 from right fem-tibial bypass with cyrovein. Patient had been having issues with pain to right first and second toe s/p surgery pain now controlled, improved blood flow to toes. MRI reveals findings of possible osteitis vs osteomyelitis. Podiatry continues to report no surgical intervention needed at this time. Nasal swab for MRSA results are negative for MSSA and MRSA. Consulting case management to set up at-home antibiotic infusion. RLE is warm and well perfused.     Plan:   -ID consulted   - Consult Case Management  -Discharge pending ID recommendations for long term abx therapy and Case Management for at-home set up  -Continue pain management        57 year old male, PMHx Sz, HLD, HTN, and PVD, now POD 10 from right fem-tibial bypass with cyrovein. Patient had been having issues with pain to right first and second toe s/p surgery pain now controlled, improved blood flow to toes. MRI reveals findings of possible osteitis vs osteomyelitis. Podiatry report no surgical intervention needed at this time. Pending long-term abx plan and establishment.     Plan:   - F/u ID Recs for long-term abx   - F/u case management for home infusion  - Pain control  - DASH diet  - DVT ppx  - ASA Plavix   - Likely d/c over weekend         57 year old male, PMHx Sz, HLD, HTN, and PVD, now POD 10 from right fem-tibial bypass with cyrovein. Patient had been having issues with pain to right first and second toe s/p surgery pain now controlled, improved blood flow to toes. MRI reveals findings of possible osteitis vs osteomyelitis. Nasal swab for MRSA results are positive for MSSA and MRSA. Podiatry report no surgical intervention needed at this time. Pending long-term abx plan and establishment.     Plan:   - F/u ID Recs for long-term abx   - F/u case management for home infusion  - Pain control  - DASH diet  - DVT ppx  - ASA Plavix   - Likely d/c over weekend

## 2022-09-10 ENCOUNTER — TRANSCRIPTION ENCOUNTER (OUTPATIENT)
Age: 57
End: 2022-09-10

## 2022-09-10 VITALS
SYSTOLIC BLOOD PRESSURE: 102 MMHG | TEMPERATURE: 98 F | RESPIRATION RATE: 18 BRPM | OXYGEN SATURATION: 98 % | HEART RATE: 95 BPM | DIASTOLIC BLOOD PRESSURE: 64 MMHG

## 2022-09-10 LAB — VANCOMYCIN TROUGH SERPL-MCNC: 12.7 UG/ML — SIGNIFICANT CHANGE UP (ref 10–20)

## 2022-09-10 PROCEDURE — 82728 ASSAY OF FERRITIN: CPT

## 2022-09-10 PROCEDURE — P9040: CPT

## 2022-09-10 PROCEDURE — 75716 ARTERY X-RAYS ARMS/LEGS: CPT

## 2022-09-10 PROCEDURE — 97110 THERAPEUTIC EXERCISES: CPT

## 2022-09-10 PROCEDURE — 87641 MR-STAPH DNA AMP PROBE: CPT

## 2022-09-10 PROCEDURE — 86923 COMPATIBILITY TEST ELECTRIC: CPT

## 2022-09-10 PROCEDURE — 80053 COMPREHEN METABOLIC PANEL: CPT

## 2022-09-10 PROCEDURE — 85610 PROTHROMBIN TIME: CPT

## 2022-09-10 PROCEDURE — 97116 GAIT TRAINING THERAPY: CPT

## 2022-09-10 PROCEDURE — 80048 BASIC METABOLIC PNL TOTAL CA: CPT

## 2022-09-10 PROCEDURE — 87640 STAPH A DNA AMP PROBE: CPT

## 2022-09-10 PROCEDURE — 84550 ASSAY OF BLOOD/URIC ACID: CPT

## 2022-09-10 PROCEDURE — U0005: CPT

## 2022-09-10 PROCEDURE — 85730 THROMBOPLASTIN TIME PARTIAL: CPT

## 2022-09-10 PROCEDURE — 73630 X-RAY EXAM OF FOOT: CPT

## 2022-09-10 PROCEDURE — C1894: CPT

## 2022-09-10 PROCEDURE — 84466 ASSAY OF TRANSFERRIN: CPT

## 2022-09-10 PROCEDURE — C1760: CPT

## 2022-09-10 PROCEDURE — 36246 INS CATH ABD/L-EXT ART 2ND: CPT

## 2022-09-10 PROCEDURE — C1887: CPT

## 2022-09-10 PROCEDURE — 86850 RBC ANTIBODY SCREEN: CPT

## 2022-09-10 PROCEDURE — 86900 BLOOD TYPING SEROLOGIC ABO: CPT

## 2022-09-10 PROCEDURE — 36415 COLL VENOUS BLD VENIPUNCTURE: CPT

## 2022-09-10 PROCEDURE — 85014 HEMATOCRIT: CPT

## 2022-09-10 PROCEDURE — 83605 ASSAY OF LACTIC ACID: CPT

## 2022-09-10 PROCEDURE — 86140 C-REACTIVE PROTEIN: CPT

## 2022-09-10 PROCEDURE — 85652 RBC SED RATE AUTOMATED: CPT

## 2022-09-10 PROCEDURE — 86901 BLOOD TYPING SEROLOGIC RH(D): CPT

## 2022-09-10 PROCEDURE — 83036 HEMOGLOBIN GLYCOSYLATED A1C: CPT

## 2022-09-10 PROCEDURE — 84100 ASSAY OF PHOSPHORUS: CPT

## 2022-09-10 PROCEDURE — C1769: CPT

## 2022-09-10 PROCEDURE — 83540 ASSAY OF IRON: CPT

## 2022-09-10 PROCEDURE — 82947 ASSAY GLUCOSE BLOOD QUANT: CPT

## 2022-09-10 PROCEDURE — 71045 X-RAY EXAM CHEST 1 VIEW: CPT

## 2022-09-10 PROCEDURE — 82330 ASSAY OF CALCIUM: CPT

## 2022-09-10 PROCEDURE — 83550 IRON BINDING TEST: CPT

## 2022-09-10 PROCEDURE — 36430 TRANSFUSION BLD/BLD COMPNT: CPT

## 2022-09-10 PROCEDURE — U0003: CPT

## 2022-09-10 PROCEDURE — 85018 HEMOGLOBIN: CPT

## 2022-09-10 PROCEDURE — 82435 ASSAY OF BLOOD CHLORIDE: CPT

## 2022-09-10 PROCEDURE — 85025 COMPLETE CBC W/AUTO DIFF WBC: CPT

## 2022-09-10 PROCEDURE — C1889: CPT

## 2022-09-10 PROCEDURE — 83735 ASSAY OF MAGNESIUM: CPT

## 2022-09-10 PROCEDURE — 82746 ASSAY OF FOLIC ACID SERUM: CPT

## 2022-09-10 PROCEDURE — 73718 MRI LOWER EXTREMITY W/O DYE: CPT

## 2022-09-10 PROCEDURE — 80061 LIPID PANEL: CPT

## 2022-09-10 PROCEDURE — 84295 ASSAY OF SERUM SODIUM: CPT

## 2022-09-10 PROCEDURE — C1776: CPT

## 2022-09-10 PROCEDURE — 84132 ASSAY OF SERUM POTASSIUM: CPT

## 2022-09-10 PROCEDURE — 80185 ASSAY OF PHENYTOIN TOTAL: CPT

## 2022-09-10 PROCEDURE — 80202 ASSAY OF VANCOMYCIN: CPT

## 2022-09-10 PROCEDURE — 82803 BLOOD GASES ANY COMBINATION: CPT

## 2022-09-10 PROCEDURE — 93005 ELECTROCARDIOGRAM TRACING: CPT

## 2022-09-10 PROCEDURE — 85027 COMPLETE CBC AUTOMATED: CPT

## 2022-09-10 RX ORDER — HYDROMORPHONE HYDROCHLORIDE 2 MG/ML
4 INJECTION INTRAMUSCULAR; INTRAVENOUS; SUBCUTANEOUS
Refills: 0 | Status: DISCONTINUED | OUTPATIENT
Start: 2022-09-10 | End: 2022-09-10

## 2022-09-10 RX ADMIN — Medication 2 GRAM(S): at 17:01

## 2022-09-10 RX ADMIN — HYDROMORPHONE HYDROCHLORIDE 4 MILLIGRAM(S): 2 INJECTION INTRAMUSCULAR; INTRAVENOUS; SUBCUTANEOUS at 15:06

## 2022-09-10 RX ADMIN — METHOCARBAMOL 1000 MILLIGRAM(S): 500 TABLET, FILM COATED ORAL at 05:43

## 2022-09-10 RX ADMIN — CLOPIDOGREL BISULFATE 75 MILLIGRAM(S): 75 TABLET, FILM COATED ORAL at 11:06

## 2022-09-10 RX ADMIN — AMLODIPINE BESYLATE 10 MILLIGRAM(S): 2.5 TABLET ORAL at 05:46

## 2022-09-10 RX ADMIN — Medication 250 MILLIGRAM(S): at 07:21

## 2022-09-10 RX ADMIN — Medication 200 MILLIGRAM(S): at 17:01

## 2022-09-10 RX ADMIN — MORPHINE SULFATE 15 MILLIGRAM(S): 50 CAPSULE, EXTENDED RELEASE ORAL at 17:06

## 2022-09-10 RX ADMIN — Medication 2 GRAM(S): at 05:43

## 2022-09-10 RX ADMIN — Medication 75 MILLIGRAM(S): at 11:12

## 2022-09-10 RX ADMIN — Medication 325 MILLIGRAM(S): at 05:44

## 2022-09-10 RX ADMIN — Medication 975 MILLIGRAM(S): at 17:06

## 2022-09-10 RX ADMIN — Medication 325 MILLIGRAM(S): at 13:01

## 2022-09-10 RX ADMIN — Medication 975 MILLIGRAM(S): at 05:43

## 2022-09-10 RX ADMIN — MORPHINE SULFATE 15 MILLIGRAM(S): 50 CAPSULE, EXTENDED RELEASE ORAL at 05:44

## 2022-09-10 RX ADMIN — Medication 200 MILLIGRAM(S): at 05:43

## 2022-09-10 RX ADMIN — Medication 975 MILLIGRAM(S): at 11:06

## 2022-09-10 RX ADMIN — Medication 975 MILLIGRAM(S): at 17:01

## 2022-09-10 RX ADMIN — PANTOPRAZOLE SODIUM 40 MILLIGRAM(S): 20 TABLET, DELAYED RELEASE ORAL at 05:42

## 2022-09-10 RX ADMIN — GABAPENTIN 900 MILLIGRAM(S): 400 CAPSULE ORAL at 05:44

## 2022-09-10 RX ADMIN — Medication 975 MILLIGRAM(S): at 00:20

## 2022-09-10 RX ADMIN — Medication 975 MILLIGRAM(S): at 12:20

## 2022-09-10 RX ADMIN — METHOCARBAMOL 1000 MILLIGRAM(S): 500 TABLET, FILM COATED ORAL at 13:01

## 2022-09-10 RX ADMIN — GABAPENTIN 900 MILLIGRAM(S): 400 CAPSULE ORAL at 13:01

## 2022-09-10 RX ADMIN — HYDROMORPHONE HYDROCHLORIDE 4 MILLIGRAM(S): 2 INJECTION INTRAMUSCULAR; INTRAVENOUS; SUBCUTANEOUS at 13:01

## 2022-09-10 RX ADMIN — ENOXAPARIN SODIUM 40 MILLIGRAM(S): 100 INJECTION SUBCUTANEOUS at 13:01

## 2022-09-10 RX ADMIN — Medication 81 MILLIGRAM(S): at 11:06

## 2022-09-10 RX ADMIN — CHLORHEXIDINE GLUCONATE 1 APPLICATION(S): 213 SOLUTION TOPICAL at 05:46

## 2022-09-10 RX ADMIN — CEFTRIAXONE 100 MILLIGRAM(S): 500 INJECTION, POWDER, FOR SOLUTION INTRAMUSCULAR; INTRAVENOUS at 16:49

## 2022-09-10 RX ADMIN — Medication 250 MILLIGRAM(S): at 17:01

## 2022-09-10 RX ADMIN — MORPHINE SULFATE 15 MILLIGRAM(S): 50 CAPSULE, EXTENDED RELEASE ORAL at 17:00

## 2022-09-10 NOTE — DISCHARGE NOTE NURSING/CASE MANAGEMENT/SOCIAL WORK - NSDCVIVACCINE_GEN_ALL_CORE_FT
Tdap; 18-Aug-2022 10:59; Neli Hoyt (RN); [employer]; [id number] (Exp. Date: 20-Sep-2024); IntraMuscular; Dorsogluteal Left.; 0.5 milliLiter(s); VIS (VIS Published: 09-May-2013, VIS Presented: 18-Aug-2022);

## 2022-09-10 NOTE — DISCHARGE NOTE NURSING/CASE MANAGEMENT/SOCIAL WORK - NSDCPEFALRISK_GEN_ALL_CORE
For information on Fall & Injury Prevention, visit: [email or web address] OR  [email or web address] OR  https://www.cdc.gov/steadi/patient.html

## 2022-09-10 NOTE — PROGRESS NOTE ADULT - SUBJECTIVE AND OBJECTIVE BOX
Subjective: Patient seen and examined at bedside. Reports pain is still not well conrolled however he states the pain is not as bad as the day he was admitted.       MEDICATIONS  (STANDING):  acetaminophen     Tablet .. 975 milliGRAM(s) Oral every 6 hours  acetaminophen   IVPB .. 1000 milliGRAM(s) IV Intermittent once  amLODIPine   Tablet 10 milliGRAM(s) Oral daily  aspirin  chewable 81 milliGRAM(s) Oral daily  atorvastatin 80 milliGRAM(s) Oral at bedtime  cadexomer iodine 0.9% Gel 1 Application(s) Topical daily  cefTRIAXone   IVPB 2000 milliGRAM(s) IV Intermittent every 24 hours  chlorhexidine 2% Cloths 1 Application(s) Topical <User Schedule>  clopidogrel Tablet 75 milliGRAM(s) Oral daily  collagenase Ointment 1 Application(s) Topical daily  enoxaparin Injectable 40 milliGRAM(s) SubCutaneous every 24 hours  epoetin anncy-epbx (RETACRIT) Injectable 49146 Unit(s) SubCutaneous every 7 days  ferrous    sulfate 325 milliGRAM(s) Oral three times a day  gabapentin 900 milliGRAM(s) Oral three times a day  melatonin 3 milliGRAM(s) Oral at bedtime  methocarbamol 1000 milliGRAM(s) Oral every 8 hours  morphine ER Tablet 15 milliGRAM(s) Oral every 12 hours  omega-3-Acid Ethyl Esters 2 Gram(s) Oral two times a day  pantoprazole    Tablet 40 milliGRAM(s) Oral before breakfast  phenytoin   Capsule 200 milliGRAM(s) Oral every 12 hours  polyethylene glycol 3350 17 Gram(s) Oral two times a day  QUEtiapine 200 milliGRAM(s) Oral at bedtime  vancomycin  IVPB 1000 milliGRAM(s) IV Intermittent every 12 hours  venlafaxine XR. 75 milliGRAM(s) Oral daily    MEDICATIONS  (PRN):  aluminum hydroxide/magnesium hydroxide/simethicone Suspension 30 milliLiter(s) Oral every 8 hours PRN Dyspepsia  naloxone Injectable 0.1 milliGRAM(s) IV Push every 3 minutes PRN For ANY of the following changes in patient status:  A. RR LESS THAN 10 breaths per minute, B. Oxygen saturation LESS THAN 90%, C. Sedation score of 6  ondansetron Injectable 4 milliGRAM(s) IV Push every 6 hours PRN Nausea  senna 2 Tablet(s) Oral at bedtime PRN Constipation  sodium chloride 0.9% lock flush 10 milliLiter(s) IV Push every 1 hour PRN Pre/post blood products, medications, blood draw, and to maintain line patency      No Known Allergies        Objective:     ICU Vital Signs Last 24 Hrs  T(C): 36.8 (09 Sep 2022 22:07), Max: 36.8 (09 Sep 2022 22:07)  T(F): 98.3 (09 Sep 2022 22:07), Max: 98.3 (09 Sep 2022 22:07)  HR: 97 (09 Sep 2022 22:07) (87 - 97)  BP: 148/69 (09 Sep 2022 22:07) (120/71 - 161/72)  BP(mean): --  ABP: --  ABP(mean): --  RR: 18 (09 Sep 2022 22:07) (18 - 19)  SpO2: 96% (09 Sep 2022 22:07) (96% - 98%)    O2 Parameters below as of 09 Sep 2022 22:07  Patient On (Oxygen Delivery Method): room air            I&O's Detail      Physical Exam:  General: NAD. Lying comfortably in bed.   HEENT: NCAT. Neck supple. EOMI.   Respiratory: Non labored breathing. No respiratory distress.   Cardio: RRR  Abdomen: Soft, non-tender, non-distended. No guarding or rebound.   Extremities: Extremities warm and well perfused, Right PT signals, incision sites are intact. Medial R ankle incision with some drainage, dressing changed. Topical collagenase applied to RLE wound  Musculoskeletal: No obvious bony masses or joint pain   Neuro: A&O x 3. CNs II-XII grossly inatct.

## 2022-09-10 NOTE — DISCHARGE NOTE NURSING/CASE MANAGEMENT/SOCIAL WORK - PATIENT PORTAL LINK FT
You can access the FollowMyHealth Patient Portal offered by [facility] by registering at the following website: [email or web address]. By joining [employer]’s FollowMyHealth portal, you will also be able to view your health information using other applications (apps) compatible with our system.

## 2022-09-12 ENCOUNTER — APPOINTMENT (OUTPATIENT)
Dept: INTERNAL MEDICINE | Facility: CLINIC | Age: 57
End: 2022-09-12

## 2022-09-12 ENCOUNTER — NON-APPOINTMENT (OUTPATIENT)
Age: 57
End: 2022-09-12

## 2022-09-13 ENCOUNTER — NON-APPOINTMENT (OUTPATIENT)
Age: 57
End: 2022-09-13

## 2022-09-13 PROBLEM — I10 ESSENTIAL (PRIMARY) HYPERTENSION: Chronic | Status: ACTIVE | Noted: 2022-08-25

## 2022-09-20 ENCOUNTER — APPOINTMENT (OUTPATIENT)
Dept: INTERNAL MEDICINE | Facility: CLINIC | Age: 57
End: 2022-09-20

## 2022-09-22 ENCOUNTER — APPOINTMENT (OUTPATIENT)
Dept: INTERNAL MEDICINE | Facility: CLINIC | Age: 57
End: 2022-09-22

## 2022-09-22 VITALS
HEIGHT: 66 IN | HEART RATE: 84 BPM | OXYGEN SATURATION: 97 % | WEIGHT: 160 LBS | TEMPERATURE: 97.3 F | BODY MASS INDEX: 25.71 KG/M2

## 2022-09-22 PROCEDURE — 99214 OFFICE O/P EST MOD 30 MIN: CPT

## 2022-09-22 NOTE — HISTORY OF PRESENT ILLNESS
[FreeTextEntry1] : 57 year old male, PMHx Sz, HLD, HTN, and PVD, s/p right fem-tibial bypass with CryoVein on 8/23. Post-op course complicated by persistent right 1st and 2nd digits pain, along with a 2nd digit wound that expressed probe to bone. MRI of the foot with findings suggestive of early right 1st and 2nd digits OM. Patient is MRSA colonized. \par \par He was started on empiric treatment with vancomycin 1 g q12h and ceftriaxone 2 g daily on 9/8. Unable to obtain surgical specimen for culture given concerns for poor healing. \par \par Discharged on 9/10 on OPAT. Comes today for a regular follow up.\par \par Doing well with infusions at home. His wife (Tasha) is helping with the infusions and wound care. \par \par Admits to pain on 1st and 2nd toe but improving. \par \par Denies nausea, vomiting, or diarrhea. PICC working well

## 2022-09-22 NOTE — PHYSICAL EXAM
[General Appearance - Alert] : alert [General Appearance - In No Acute Distress] : in no acute distress [Sclera] : the sclera and conjunctiva were normal [PERRL With Normal Accommodation] : pupils were equal in size, round, reactive to light [Extraocular Movements] : extraocular movements were intact [Outer Ear] : the ears and nose were normal in appearance [Oropharynx] : the oropharynx was normal with no thrush [Heart Rate And Rhythm] : heart rate was normal and rhythm regular [Heart Sounds] : normal S1 and S2 [Heart Sounds Gallop] : no gallops [Murmurs] : no murmurs [Heart Sounds Pericardial Friction Rub] : no pericardial rub [Edema] : there was no peripheral edema [Musculoskeletal - Swelling] : no joint swelling [Nail Clubbing] : no clubbing  or cyanosis of the fingernails [FreeTextEntry1] : RLE surgical incision wound with granulation tissue, no diascharge. Right 1st and 2nd toe TTP [No Focal Deficits] : no focal deficits [Oriented To Time, Place, And Person] : oriented to person, place, and time [Affect] : the affect was normal

## 2022-09-22 NOTE — ASSESSMENT
[FreeTextEntry1] : - Continue vanco and CTX at current doses\par - Weekly CBC and CMP\par - Will check ESR and CRP next visit\par - Follow up in 2 weeks

## 2022-09-22 NOTE — REVIEW OF SYSTEMS
[As Noted in HPI] : as noted in HPI [Limb Pain] : limb pain [Skin Lesions] : skin lesion [Skin Wound] : skin wound [Negative] : Heme/Lymph

## 2022-09-23 ENCOUNTER — APPOINTMENT (OUTPATIENT)
Dept: VASCULAR SURGERY | Facility: CLINIC | Age: 57
End: 2022-09-23

## 2022-09-23 VITALS
HEART RATE: 102 BPM | SYSTOLIC BLOOD PRESSURE: 155 MMHG | TEMPERATURE: 97.3 F | HEIGHT: 66 IN | OXYGEN SATURATION: 95 % | RESPIRATION RATE: 16 BRPM | DIASTOLIC BLOOD PRESSURE: 97 MMHG

## 2022-09-23 PROCEDURE — 99024 POSTOP FOLLOW-UP VISIT: CPT

## 2022-09-23 NOTE — ASSESSMENT
[Arterial/Venous Disease] : arterial/venous disease [Smoking Cessation] : smoking cessation [Leg Bypass] : leg bypass [FreeTextEntry1] : 56 year old man, previous heavy smoker with PAD (s/p right SFA stenting in 2017), carotid stenosis.\par Patient is s/p left femoral endarterectomy on 10/1/21\par Patient then had R great toe gangreene s/p right fem-PT bypass with GSV on 5/13/22\par \par Interval history:\par Patient was admitted to the hospital on 8/23, after attempted RLE  angio without intervention.Was taken for right femoral-Distal tibial artery bypass with CryoVein. Later was discharged on home antibiotics by ID for toe OM. Patient reports feeling better and has continued antibiotics at home without fevers or chills. Pain is controlled with medications, however, states that he run out of his pain medications and requests a refill. Reports that his RLE open wound has been healing nice and continues with daily dressing changes with Santyl. \par \par Patient healing well from vascular standpoint, bypass with good inflow and palpable pulse. Toe wounds on antibiotics. Groin and leg incisions healing well without dehiscence, hematoma, or infection. \par Patient educated and emphasized on smoking cessation to decrease chance of occlusion of the new bypass.

## 2022-09-23 NOTE — REASON FOR VISIT
[Follow-Up: _____] : a [unfilled] follow-up visit [FreeTextEntry1] : s/p RLE angiogram 8/23 and R fem to distal tibial artery bypass on 8/30

## 2022-09-23 NOTE — PHYSICAL EXAM
[Normal Breath Sounds] : Normal breath sounds [Respiratory Effort] : normal respiratory effort [Normal Heart Sounds] : normal heart sounds [Normal Rate and Rhythm] : normal rate and rhythm [2+] : left 2+ [Skin Ulcer] : ulcer [Alert] : alert [Oriented to Person] : oriented to person [Oriented to Place] : oriented to place [Oriented to Time] : oriented to time [Calm] : calm [JVD] : no jugular venous distention  [Ankle Swelling (On Exam)] : not present [Varicose Veins Of Lower Extremities] : not present [] : not present [Abdomen Masses] : No abdominal masses [Abdomen Tenderness] : ~T ~M No abdominal tenderness [Tender] : was nontender [Purpura] : no purpura  [Petechiae] : no petechiae [Skin Induration] : no induration [de-identified] : no acute distress [FreeTextEntry1] : Palpable bypass graft on the R side [de-identified] : FROM of all 4 extremities\par  [de-identified] : R leg with 3x2cm open wound with good granulation tissue, no evidence of infection.

## 2022-09-23 NOTE — PROCEDURE
[FreeTextEntry1] : Right groin staples and sutures removed without complications. Steri-strips applied\par R leg sutures removed without complications, steristrips applied.

## 2022-09-23 NOTE — HISTORY OF PRESENT ILLNESS
[FreeTextEntry1] : 56 year old man, previous heavy smoker with PAD (s/p right SFA stenting in 2017), carotid stenosis.\par Patient is s/p left femoral endarterectomy on 10/1/21\par Patient then had R great toe gangreene s/p right fem-PT bypass with GSV on 5/13/22\par \par \par  [de-identified] : Patient was admitted to the hospital on 8/23, after attempted RLE  angio without intervention.Was taken for right femoral-Distal tibial artery bypass with CryoVein. Later was discharged on home antibiotics by ID for toe OM. Patient reports feeling better and has continued antibiotics at home without fevers or chills. Pain is controlled with medications, however, states that he run out of his pain medications and requests a refill. Reports that his RLE open wound has been healing nice and continues with daily dressing changes with Santyl.

## 2022-09-29 ENCOUNTER — APPOINTMENT (OUTPATIENT)
Dept: INTERNAL MEDICINE | Facility: CLINIC | Age: 57
End: 2022-09-29

## 2022-10-06 ENCOUNTER — APPOINTMENT (OUTPATIENT)
Dept: INTERNAL MEDICINE | Facility: CLINIC | Age: 57
End: 2022-10-06

## 2022-10-06 VITALS
HEART RATE: 100 BPM | OXYGEN SATURATION: 97 % | HEIGHT: 66 IN | BODY MASS INDEX: 25.71 KG/M2 | TEMPERATURE: 97.3 F | WEIGHT: 160 LBS

## 2022-10-06 DIAGNOSIS — M86.9 OSTEOMYELITIS, UNSPECIFIED: ICD-10-CM

## 2022-10-06 PROCEDURE — 99214 OFFICE O/P EST MOD 30 MIN: CPT | Mod: 25

## 2022-10-06 PROCEDURE — 36415 COLL VENOUS BLD VENIPUNCTURE: CPT

## 2022-10-06 RX ORDER — AMOXICILLIN AND CLAVULANATE POTASSIUM 875; 125 MG/1; MG/1
875-125 TABLET, COATED ORAL
Qty: 14 | Refills: 0 | Status: COMPLETED | COMMUNITY
Start: 2022-08-10 | End: 2022-10-06

## 2022-10-06 NOTE — HISTORY OF PRESENT ILLNESS
[FreeTextEntry1] : 57 year old male, PMHx Sz, HLD, HTN, and PVD, s/p right fem-tibial bypass with CryoVein on 8/23. Post-op course complicated by persistent right 1st and 2nd digits pain, along with a 2nd digit wound that expressed probe to bone. MRI of the foot with findings suggestive of early right 1st and 2nd digits OM. Patient is MRSA colonized. \par \par He was started on empiric treatment with vancomycin 1 g q12h and ceftriaxone 2 g daily on 9/8. Unable to obtain surgical specimen for culture given concerns for poor healing. \par \par Discharged on 9/10 on OPAT. Comes today for a regular follow up.\par \par 9/22 Outpatient follow up\par Labs normal \par Doing well \par No issues with OPAT/PICC\par \par 10/6 Interim history \par Doing great. Pain subsided. Now able to walk on toes. \par No fever, chills, drainage. \par PICC working well\par IV abx last dose tomorrow, plan to continue with oral and patient agrees. \par He is know able to sleep through the night without pain

## 2022-10-06 NOTE — ASSESSMENT
[FreeTextEntry1] : 57 year old male, PMHx Sz, HLD, HTN, and PVD, s/p right fem-tibial bypass with CryoVein on 8/30. Post-op course complicated by persistent right 1st and 2nd digits pain, along with a 2nd digit wound that expressed probe to bone, and MRI of the foot with findings suggestive of early right 1st and 2nd digits OM. Patient is MRSA colonized. \par Unable to obtain biopsy to guide therapy. On empiric treatment with IV vanco 1 g q12h and ceftriaxone 2 g daily since 9/8. \par \par He continued OPAT at home without issues. CBC/CMP and vanco trough have remained within limits. \par \par He has completed 4 weeks of IV treatment, will now transition to oral therapy for 2 more weeks\par \par Plan:\par - Finish last IV abx doses at home. \par - OK to remove PICC afterwards. Home infusion company informed\par - Normal QTc\par - Start levofloxacin 750 mg PO daily PLUS doxycycline 100 mg PO q12h \par - Patient instructed to avoid sun exposure, and to avoid concomitant administration of dairy products with each levo dose. \par - Patient is in agreement with this plan. \par - Check ESR and CRP today \par - All questions answered to this satisfaction\par \par Follow up in 3 weeks \par \par  [Rx Dose / Side Effects] : Rx dose/side effects [Drug Interactions / Side Effects] : drug interactions/side effects

## 2022-10-06 NOTE — PHYSICAL EXAM
[General Appearance - Alert] : alert [General Appearance - In No Acute Distress] : in no acute distress [Sclera] : the sclera and conjunctiva were normal [PERRL With Normal Accommodation] : pupils were equal in size, round, reactive to light [Extraocular Movements] : extraocular movements were intact [Outer Ear] : the ears and nose were normal in appearance [Oropharynx] : the oropharynx was normal with no thrush [Heart Rate And Rhythm] : heart rate was normal and rhythm regular [Heart Sounds] : normal S1 and S2 [Heart Sounds Gallop] : no gallops [Murmurs] : no murmurs [Heart Sounds Pericardial Friction Rub] : no pericardial rub [Edema] : there was no peripheral edema [Musculoskeletal - Swelling] : no joint swelling [Nail Clubbing] : no clubbing  or cyanosis of the fingernails [FreeTextEntry1] : RLE surgical incision wound with granulation tissue, no discharge. Right 1st and 2nd toe minimally TTP, wounds healing. No active drainage.  [No Focal Deficits] : no focal deficits [Oriented To Time, Place, And Person] : oriented to person, place, and time [Affect] : the affect was normal

## 2022-10-06 NOTE — DATA REVIEWED
[FreeTextEntry1] : 10/4/2022\par WBC 6.39, Hgb 15, Plat 218\par Cr 0.8, BUN 8\par AST 24, ALT 18, , TBil <0.2\par Vanco tr 14.8

## 2022-10-07 LAB
CRP SERPL-MCNC: 28 MG/L
ERYTHROCYTE [SEDIMENTATION RATE] IN BLOOD BY WESTERGREN METHOD: 35 MM/HR

## 2022-10-17 ENCOUNTER — APPOINTMENT (OUTPATIENT)
Dept: VASCULAR SURGERY | Facility: CLINIC | Age: 57
End: 2022-10-17

## 2022-10-17 VITALS
RESPIRATION RATE: 16 BRPM | DIASTOLIC BLOOD PRESSURE: 100 MMHG | TEMPERATURE: 97.3 F | HEIGHT: 66 IN | WEIGHT: 160 LBS | SYSTOLIC BLOOD PRESSURE: 149 MMHG | OXYGEN SATURATION: 95 % | BODY MASS INDEX: 25.71 KG/M2 | HEART RATE: 111 BPM

## 2022-10-17 DIAGNOSIS — S81.801A UNSPECIFIED OPEN WOUND, RIGHT LOWER LEG, INITIAL ENCOUNTER: ICD-10-CM

## 2022-10-17 PROCEDURE — 93926 LOWER EXTREMITY STUDY: CPT

## 2022-10-17 PROCEDURE — 99024 POSTOP FOLLOW-UP VISIT: CPT

## 2022-10-17 PROCEDURE — 93880 EXTRACRANIAL BILAT STUDY: CPT

## 2022-10-17 NOTE — PHYSICAL EXAM
[Normal Breath Sounds] : Normal breath sounds [Respiratory Effort] : normal respiratory effort [Normal Heart Sounds] : normal heart sounds [Normal Rate and Rhythm] : normal rate and rhythm [2+] : right 2+ [0] : left 0 [Alert] : alert [Oriented to Person] : oriented to person [Oriented to Place] : oriented to place [Oriented to Time] : oriented to time [Calm] : calm [JVD] : no jugular venous distention  [Ankle Swelling (On Exam)] : not present [Varicose Veins Of Lower Extremities] : not present [] : not present [Abdomen Masses] : No abdominal masses [Abdomen Tenderness] : ~T ~M No abdominal tenderness [Tender] : was nontender [Purpura] : no purpura  [Petechiae] : no petechiae [Skin Ulcer] : no ulcer [Skin Induration] : no induration [de-identified] : no acute distress [FreeTextEntry1] : Palpable bypass graft and palpable R PT pulse\par Wound R pretibial area healed [de-identified] : FROM of all 4 extremities\par

## 2022-10-17 NOTE — ASSESSMENT
[Arterial/Venous Disease] : arterial/venous disease [Smoking Cessation] : smoking cessation [Leg Bypass] : leg bypass [FreeTextEntry1] : 56 year old man, previous heavy smoker with PAD (s/p right SFA stenting in 2017), carotid stenosis.\par Patient is s/p left femoral endarterectomy on 10/1/21\par Patient then had R great toe gangreene s/p right fem-PT bypass with GSV on 5/13/22\par Readmitted for occluded bypass, right leg wound and rest pain \par s/p right femoral-Distal tibial artery bypass with CryoVein on 8/30/22\par \par Arterial duplex demonstrates patent fem-PT bypass with focal stenoses within bypass conduit (corresponding to valve cusps) without flow-limitiation\par \par Carotid duplex demonstrates >70% stenosis of R ICA and 50-69% stenosis of L ICA.\par  [Medication Management] : medication management

## 2022-10-20 ENCOUNTER — NON-APPOINTMENT (OUTPATIENT)
Age: 57
End: 2022-10-20

## 2022-10-20 ENCOUNTER — APPOINTMENT (OUTPATIENT)
Dept: CARDIOLOGY | Facility: CLINIC | Age: 57
End: 2022-10-20

## 2022-10-20 VITALS
BODY MASS INDEX: 26.66 KG/M2 | WEIGHT: 160 LBS | SYSTOLIC BLOOD PRESSURE: 136 MMHG | HEART RATE: 89 BPM | DIASTOLIC BLOOD PRESSURE: 80 MMHG | HEIGHT: 65 IN

## 2022-10-20 PROCEDURE — 99214 OFFICE O/P EST MOD 30 MIN: CPT | Mod: 25

## 2022-10-20 PROCEDURE — 93000 ELECTROCARDIOGRAM COMPLETE: CPT | Mod: NC

## 2022-10-20 RX ORDER — CEFTRIAXONE 250 MG/1
250 INJECTION, POWDER, FOR SOLUTION INTRAMUSCULAR; INTRAVENOUS
Refills: 0 | Status: DISCONTINUED | COMMUNITY
End: 2022-10-20

## 2022-10-20 NOTE — DISCUSSION/SUMMARY
[FreeTextEntry1] : 1.  Proceed with surgery as planned.\par 2.  Monitor through the procedure until stable post procedurally.\par 3.  No additional cardiac testing at this time.\par 4.  Continue current cardiac meds in doses as noted above for hypertension, dyslipidemia and PAD.\par 5.  Recheck lipids and consider addition of PCSK9 inhibitor if his his LDL remains elevated despite being on high-dose statin and given his extensive PAD.\par 6.  Monitor BP at home, keep a log and bring to f/u.\par 7.  Follow-up with vascular.\par 8.  Encourage patient to be as active as possible.\par 9.  Follow up here in 3 months. [EKG obtained to assist in diagnosis and management of assessed problem(s)] : EKG obtained to assist in diagnosis and management of assessed problem(s)

## 2022-10-20 NOTE — HISTORY OF PRESENT ILLNESS
[FreeTextEntry1] : Patient presents the office today for evaluation prior to undergoing right carotid endarterectomy.  Patient is not been seen in the office in over a year.  In that time he has had 2 right lower extremity bypasses done and been treated extensively with antibiotics for nonhealing wounds in his foot.  He tolerated both of the surgeries well and had no problems.  After the second surgery he seems to have good flow into the lower leg and foot.  He is able to get around reasonably well despite the issues with his foot with the help of a cane.  He is able to go up and down stairs with a cane without any other symptoms or limitations.  Patient denies chest pain, shortness of breath, palpitations, orthopnea, presyncope, syncope.

## 2022-10-20 NOTE — PHYSICAL EXAM
[Well Developed] : well developed [Well Nourished] : well nourished [No Acute Distress] : no acute distress [Normal Conjunctiva] : normal conjunctiva [Normal Venous Pressure] : normal venous pressure [No Carotid Bruit] : no carotid bruit [Normal S1, S2] : normal S1, S2 [No Murmur] : no murmur [No Rub] : no rub [No Gallop] : no gallop [Clear Lung Fields] : clear lung fields [Good Air Entry] : good air entry [No Respiratory Distress] : no respiratory distress  [Soft] : abdomen soft [Non Tender] : non-tender [No Masses/organomegaly] : no masses/organomegaly [Normal Bowel Sounds] : normal bowel sounds [Normal Gait] : normal gait [No Edema] : no edema [No Cyanosis] : no cyanosis [No Clubbing] : no clubbing [No Varicosities] : no varicosities [Moves all extremities] : moves all extremities [No Focal Deficits] : no focal deficits [Normal Speech] : normal speech [Alert and Oriented] : alert and oriented [Normal memory] : normal memory [de-identified] : Right foot and ankle wrapped

## 2022-10-20 NOTE — ASSESSMENT
[FreeTextEntry1] : Nuclear stress test August 31, 2021 was a pharmacologic study which was tolerated well.  Blood pressure response was normal.  EKG showed no evidence of ischemia.  Evaluation of nuclear imaging showed no evidence of ischemia or infarct with some inferior artifact caused by diaphragmatic and bowel attenuation.  Ejection fraction of 61%.\par \par Echocardiogram September 10, 2021 demonstrated left ventricle normal size and function with ejection fraction of 55 to 60%.  Study was very limited but showed no significant valvular or other structural abnormality.\par \par EKG: Sinus rhythm with no significant ST or T wave changes.\par \par 57-year-old man with a past medical history of PAD status post right lower extremity stenting and bypass x2, hypertension, dyslipidemia, carotid stenosis who presented to me for evaluation prior to undergoing right carotid endarterectomy.  Patient successfully had 2 lower extremity bypasses since I last saw him a year ago without incident.  There is no evidence of heart failure at this time.  EKG is unremarkable.  Stress testing last year showed no evidence of ischemia or infarct and echo also showed a normal EF.  No significant valve disease on echo last year.  His activity certainly limited because of the issues with his right leg and foot but he does seem to be able to do 4 METS of activity without much other trouble.  At this time he represents a moderate risk but is without cardiac contraindication to endarterectomy.

## 2022-10-22 ENCOUNTER — RX RENEWAL (OUTPATIENT)
Age: 57
End: 2022-10-22

## 2022-10-23 ENCOUNTER — RX RENEWAL (OUTPATIENT)
Age: 57
End: 2022-10-23

## 2022-10-24 ENCOUNTER — RX RENEWAL (OUTPATIENT)
Age: 57
End: 2022-10-24

## 2022-10-25 ENCOUNTER — APPOINTMENT (OUTPATIENT)
Dept: CT IMAGING | Facility: CLINIC | Age: 57
End: 2022-10-25

## 2022-10-25 ENCOUNTER — OUTPATIENT (OUTPATIENT)
Dept: OUTPATIENT SERVICES | Facility: HOSPITAL | Age: 57
LOS: 1 days | End: 2022-10-25
Payer: MEDICAID

## 2022-10-25 DIAGNOSIS — Z98.890 OTHER SPECIFIED POSTPROCEDURAL STATES: Chronic | ICD-10-CM

## 2022-10-25 DIAGNOSIS — I65.29 OCCLUSION AND STENOSIS OF UNSPECIFIED CAROTID ARTERY: ICD-10-CM

## 2022-10-25 DIAGNOSIS — Z98.89 OTHER SPECIFIED POSTPROCEDURAL STATES: Chronic | ICD-10-CM

## 2022-10-25 DIAGNOSIS — K40.90 UNILATERAL INGUINAL HERNIA, WITHOUT OBSTRUCTION OR GANGRENE, NOT SPECIFIED AS RECURRENT: Chronic | ICD-10-CM

## 2022-10-25 DIAGNOSIS — Z96.7 PRESENCE OF OTHER BONE AND TENDON IMPLANTS: Chronic | ICD-10-CM

## 2022-10-25 DIAGNOSIS — Z00.8 ENCOUNTER FOR OTHER GENERAL EXAMINATION: ICD-10-CM

## 2022-10-25 PROCEDURE — 70498 CT ANGIOGRAPHY NECK: CPT

## 2022-10-25 PROCEDURE — 70498 CT ANGIOGRAPHY NECK: CPT | Mod: 26

## 2022-10-27 ENCOUNTER — APPOINTMENT (OUTPATIENT)
Dept: INTERNAL MEDICINE | Facility: CLINIC | Age: 57
End: 2022-10-27

## 2022-10-31 ENCOUNTER — OUTPATIENT (OUTPATIENT)
Dept: OUTPATIENT SERVICES | Facility: HOSPITAL | Age: 57
LOS: 1 days | End: 2022-10-31

## 2022-10-31 VITALS
HEART RATE: 96 BPM | HEIGHT: 65 IN | TEMPERATURE: 98 F | WEIGHT: 140.65 LBS | DIASTOLIC BLOOD PRESSURE: 62 MMHG | RESPIRATION RATE: 16 BRPM | SYSTOLIC BLOOD PRESSURE: 110 MMHG | OXYGEN SATURATION: 97 %

## 2022-10-31 DIAGNOSIS — K40.90 UNILATERAL INGUINAL HERNIA, WITHOUT OBSTRUCTION OR GANGRENE, NOT SPECIFIED AS RECURRENT: Chronic | ICD-10-CM

## 2022-10-31 DIAGNOSIS — Z98.89 OTHER SPECIFIED POSTPROCEDURAL STATES: Chronic | ICD-10-CM

## 2022-10-31 DIAGNOSIS — Z96.7 PRESENCE OF OTHER BONE AND TENDON IMPLANTS: Chronic | ICD-10-CM

## 2022-10-31 DIAGNOSIS — R56.9 UNSPECIFIED CONVULSIONS: ICD-10-CM

## 2022-10-31 DIAGNOSIS — Z29.9 ENCOUNTER FOR PROPHYLACTIC MEASURES, UNSPECIFIED: ICD-10-CM

## 2022-10-31 DIAGNOSIS — I65.29 OCCLUSION AND STENOSIS OF UNSPECIFIED CAROTID ARTERY: ICD-10-CM

## 2022-10-31 DIAGNOSIS — F32.9 MAJOR DEPRESSIVE DISORDER, SINGLE EPISODE, UNSPECIFIED: ICD-10-CM

## 2022-10-31 DIAGNOSIS — I10 ESSENTIAL (PRIMARY) HYPERTENSION: ICD-10-CM

## 2022-10-31 DIAGNOSIS — E78.0 PURE HYPERCHOLESTEROLEMIA: ICD-10-CM

## 2022-10-31 DIAGNOSIS — Z01.818 ENCOUNTER FOR OTHER PREPROCEDURAL EXAMINATION: ICD-10-CM

## 2022-10-31 DIAGNOSIS — Z98.890 OTHER SPECIFIED POSTPROCEDURAL STATES: Chronic | ICD-10-CM

## 2022-10-31 LAB
A1C WITH ESTIMATED AVERAGE GLUCOSE RESULT: 5.6 % — SIGNIFICANT CHANGE UP (ref 4–5.6)
ANION GAP SERPL CALC-SCNC: 13 MMOL/L — SIGNIFICANT CHANGE UP (ref 5–17)
APTT BLD: 33.1 SEC — SIGNIFICANT CHANGE UP (ref 27.5–35.5)
BLD GP AB SCN SERPL QL: SIGNIFICANT CHANGE UP
BUN SERPL-MCNC: 18.6 MG/DL — SIGNIFICANT CHANGE UP (ref 8–20)
CALCIUM SERPL-MCNC: 9.3 MG/DL — SIGNIFICANT CHANGE UP (ref 8.4–10.5)
CHLORIDE SERPL-SCNC: 103 MMOL/L — SIGNIFICANT CHANGE UP (ref 96–108)
CO2 SERPL-SCNC: 24 MMOL/L — SIGNIFICANT CHANGE UP (ref 22–29)
CREAT SERPL-MCNC: 0.91 MG/DL — SIGNIFICANT CHANGE UP (ref 0.5–1.3)
EGFR: 97 ML/MIN/1.73M2 — SIGNIFICANT CHANGE UP
ESTIMATED AVERAGE GLUCOSE: 114 MG/DL — SIGNIFICANT CHANGE UP (ref 68–114)
GLUCOSE SERPL-MCNC: 99 MG/DL — SIGNIFICANT CHANGE UP (ref 70–99)
HCT VFR BLD CALC: 49.1 % — SIGNIFICANT CHANGE UP (ref 39–50)
HGB BLD-MCNC: 16 G/DL — SIGNIFICANT CHANGE UP (ref 13–17)
INR BLD: 1.01 RATIO — SIGNIFICANT CHANGE UP (ref 0.88–1.16)
MCHC RBC-ENTMCNC: 30.4 PG — SIGNIFICANT CHANGE UP (ref 27–34)
MCHC RBC-ENTMCNC: 32.6 GM/DL — SIGNIFICANT CHANGE UP (ref 32–36)
MCV RBC AUTO: 93.2 FL — SIGNIFICANT CHANGE UP (ref 80–100)
PLATELET # BLD AUTO: 274 K/UL — SIGNIFICANT CHANGE UP (ref 150–400)
POTASSIUM SERPL-MCNC: 4.5 MMOL/L — SIGNIFICANT CHANGE UP (ref 3.5–5.3)
POTASSIUM SERPL-SCNC: 4.5 MMOL/L — SIGNIFICANT CHANGE UP (ref 3.5–5.3)
PROTHROM AB SERPL-ACNC: 11.7 SEC — SIGNIFICANT CHANGE UP (ref 10.5–13.4)
RBC # BLD: 5.27 M/UL — SIGNIFICANT CHANGE UP (ref 4.2–5.8)
RBC # FLD: 15.9 % — HIGH (ref 10.3–14.5)
SODIUM SERPL-SCNC: 140 MMOL/L — SIGNIFICANT CHANGE UP (ref 135–145)
WBC # BLD: 7.15 K/UL — SIGNIFICANT CHANGE UP (ref 3.8–10.5)
WBC # FLD AUTO: 7.15 K/UL — SIGNIFICANT CHANGE UP (ref 3.8–10.5)

## 2022-10-31 NOTE — H&P PST ADULT - MARITAL STATUS
lifted something heavy at work and hurt his back, happ 2 days ago, went to [facility] after he got hurt and was given motrin and robaxin - relief Domestic partner

## 2022-10-31 NOTE — H&P PST ADULT - PROBLEM SELECTOR PLAN 6
Right Carotid Endarterectomy by Dr. Diana Elder  on 11/8/22. No covid vaccine, covid test is on 11/5/22. Medical and cardiac clearance pending

## 2022-10-31 NOTE — H&P PST ADULT - NSICDXPASTMEDICALHX_GEN_ALL_CORE_FT
PAST MEDICAL HISTORY:  Depression     Former smoker     GERD (gastroesophageal reflux disease)     Hypercholesterolemia     Hypertension     Osteoarthritis     Peripheral vascular disease with R SFA stent    Prediabetes     Seizures 2021   last seizure     PAST MEDICAL HISTORY:  Depression     Former smoker     GERD (gastroesophageal reflux disease)     Hypercholesterolemia     Hypertension     Hypertension     Osteoarthritis     Peripheral vascular disease with R SFA stent    Prediabetes     Seizures 2021   last seizure

## 2022-10-31 NOTE — H&P PST ADULT - HISTORY OF PRESENT ILLNESS
57  yr old male presents with history of htn, seizures (last 2021) , high cholesterol  PAD  s/p right SFA stenting in 2017 left leg , carotid stenosis  , s/p left femoral endarterectomy 10/1/21, right femoral endarterectomy on 4/15/22  s/p right fem -PT bypass with GSV on 5/13/22 for right great toe dry gangrene . Pt has c/o right foot pain that has burning sensation and has become severe over past week , using cane and wheelchair at times, c/o pain with standing and sitting 10/10 has no relief. states he cannot sleep without pain , fell out of bed last night and hit face on night stand ,  Lower foot and calf discolored red  with right calf incision   scab noted , no drainage . US done july 7/7 /22 demonstrating patent bypass with severe stenosis at the proximal anastomosis suggestive of >75% stenosis. pt is scheduled for RLE angiogram possible intervention with Dr. Lelia Elder on 8/9/22.       Risk Factors for PAD       Age: 57       DM: N/A       Smoking History: N/A       Hyperlipidemia: yes       Hypertension: yes       Family History of PAD: N/A       Known Atherosclerotic Disease (coronary, carotid, subclavian, renal, mesenteric, AAA): yes carotid dz, PAD    Subjective Complaints:        Claudication Nic Class:        Impaired Walking Function: N/A       Ischemic Rest Pain: N/A       Other Non-Joint Related Exertional Lower Extremity Symptoms (not typical of claudication): N/A    Objective Findings:        Lower Extremity Pulses: Unable to palpate DP pulses, doppler pulses noted.       Nonhealing Lower Extremity Wound: N/A       Lower Extremity Gangrene: N/A       Vascular Bruit: N/A       Other Suggestive Lower Extremity Findings (elevation pallor, dependent rubor): N/A    Vascular Testing:        JOSE (Normal/Borderline/Abnormal/Noncompressable): < from: VA Physiol Extremity Lower 3+ Level, BI (04.09.22 @ 13:23) >  IMPRESSION: There is bilateral arterial disease.    The right JOSE of 0.62 indicates moderate disease on the right. The likely   location of the disease is the right femoral-popliteal segment.    The left JOSE of 0.94 indicates subclinical disease on the left.    --- End of Report ---    < end of copied text >         Arterial Dopplers: < from: US Duplex Arterial Lower Ext Ltd, Right (04.09.22 @ 13:21) >  RIGHT:  CFA: Biphasic; 67  Proximal SFA: Biphasic; 49  Mid SFA: Biphasic; 27  Distal SFA: Occluded;  Popliteal: Occluded;  Anterior tibial: Not seen;  Posterior tibial: Monophasic; 49  Peroneal: Not seen;  Dorsalis pedis: Not seen;    IMPRESSION:    Occlusion from the mid SFA to the popliteal with tardus parvus flow in   the posterior tibial artery. The remainder of the below the knee vessels   are not seen.    --- End of Report ---            DIANE ESPINO MD; Attending Radiologist  This document has been electronically signed. Apr 9 2022  3:29PM    < end of copied text >         CTA/MRA: < from: CT Angio Abd Aorta w/run-off w/ IV Cont (04.23.22 @ 20:46) >  IMPRESSION:    1. Interval occlusion of the right superficial femoral artery superior to   the stent. Redemonstration of distal right superficial femoral artery   stent occlusion. Distal reconstitution with 3 vessel runoff to the right   foot which is diffusely attenuated.  2. Interval development of a right inguinal subcutaneous hematoma at the   level of the right common femoral artery.            < end of copied text >    ARTERIAL DUPLEX PERFORMED 7/7/2022: right femoral to posterior tibial artery vein bypass graft with elevated velocities noted in the proximal anastamosis noted at this time. Patent runoff arteries noted.             Medical Management:       Antiplatelets: Aspirin 81, Plavix 75mg        Statin: atorvastatin 80mg        Nic Claudication Classification:        I: Asymptomatic       IIa: Walking > 200 meters (2.5 blocks)       IIb: Walking < 200 meters (2.5 blocks)       III: Rest/nocturnal pain       IV: Necrosis/gangrene    Ankle-Brachial Index:       Noncompressable: > 1.4       Normal: 1.0 to 1.4       Borderline: 0.91 to 0.99       Abnormal: < 0.91   57  yr old male presents with history of htn, seizures (last 2021) , high cholesterol  PAD  s/p right SFA stenting in 2017 left leg , carotid stenosis  , s/p left femoral endarterectomy 10/1/21, right femoral endarterectomy on 4/15/22  s/p right fem -PT bypass with GSV on 5/13/22 for right great toe dry gangrene . 57  yr old male presents to PST with history of HTN, seizures (last 2021) , high cholesterol  PAD  s/p right SFA stenting in 2017 left leg , carotid stenosis  , s/p left femoral endarterectomy 10/1/21, right femoral endarterectomy on 4/15/22  s/p right fem -PT bypass with GSV on 5/13/22 for right great toe dry gangrene, Recent Carotid duplex demonstrates >70% stenosis of R ICA and 50-69% stenosis of L ICA, now presents to PST for a Right Carotid Endarterectomy by Dr. Diana Elder  on 11/8/22. No covid vaccine, covid test is on 11/5/22. Medical and cardiac clearance pending

## 2022-10-31 NOTE — H&P PST ADULT - NSICDXPASTSURGICALHX_GEN_ALL_CORE_FT
PAST SURGICAL HISTORY:  H/O endarterectomy L femoral a.  R femoral a. april 2022    Inguinal hernia, left     S/P appendectomy     S/P ORIF (open reduction internal fixation) fracture Left    S/P shoulder surgery right    Status post femorotibial bypass with GSV 5/13/22     PAST SURGICAL HISTORY:  H/O endarterectomy L femoral a.  R femoral a. april 2022    Inguinal hernia, left     S/P appendectomy     S/P ORIF (open reduction internal fixation) fracture Left 5th digit    S/P shoulder surgery right    Status post femorotibial bypass with GSV 5/13/22

## 2022-10-31 NOTE — H&P PST ADULT - ASSESSMENT
57  yr old male presents to PST with history of HTN, seizures (last ) , high cholesterol  PAD  s/p right SFA stenting in 2017 left leg , carotid stenosis  , s/p left femoral endarterectomy 10/1/21, right femoral endarterectomy on 4/15/22  s/p right fem -PT bypass with GSV on 22 for right great toe dry gangrene, Recent Carotid duplex demonstrates >70% stenosis of R ICA and 50-69% stenosis of L ICA, now presents to PST for a Right Carotid Endarterectomy by Dr. Diana Elder  on 22. No covid vaccine, covid test is on 22. Medical and cardiac clearance pending     medications reviewed, instructions given on what medications to take and what not to take. He can continue with ASA and Plavix as per the vascular surgeon. Asked the patient to take the Blood pressure medication/ heart medication or any other important meds with a sip of water in the AM of surgery ( Amlodipine, ASA, Plavix, Gabapentin, phenytoin) Asked the patient to consult with Dr. Elder or Dr. Tripathi cardiologist about holding Xarelto  and stop/Hold it accordingly, failure to do so may result in cancellation or postponement of your surgery, pt  agreed and verbalized understanding. All other meds can be continued till the night before surgery.   CAPRINI VTE 2.0 SCORE [CLOT updated 2019]    AGE RELATED RISK FACTORS                                                       MOBILITY RELATED FACTORS  [x ] Age 41-60 years                                            (1 Point)                    [ ] Bed rest                                                        (1 Point)  [ ] Age: 61-74 years                                           (2 Points)                  [ ] Plaster cast                                                   (2 Points)  [ ] Age= 75 years                                              (3 Points)                    [ ] Bed bound for more than 72 hours                 (2 Points)    DISEASE RELATED RISK FACTORS                                               GENDER SPECIFIC FACTORS  [ ] Edema in the lower extremities                       (1 Point)              [ ] Pregnancy                                                     (1 Point)  [ ] Varicose veins                                               (1 Point)                     [ ] Post-partum < 6 weeks                                   (1 Point)             [ ] BMI > 25 Kg/m2                                            (1 Point)                     [ ] Hormonal therapy  or oral contraception          (1 Point)                 [ ] Sepsis (in the previous month)                        (1 Point)               [ ] History of pregnancy complications                 (1 point)  [ ] Pneumonia or serious lung disease                                               [ ] Unexplained or recurrent                     (1 Point)           (in the previous month)                               (1 Point)  [ ] Abnormal pulmonary function test                     (1 Point)                 SURGERY RELATED RISK FACTORS  [ ] Acute myocardial infarction                              (1 Point)               [ ]  Section                                             (1 Point)  [ ] Congestive heart failure (in the previous month)  (1 Point)      [ ] Minor surgery                                                  (1 Point)   [ ] Inflammatory bowel disease                             (1 Point)               [ ] Arthroscopic surgery                                        (2 Points)  [ ] Central venous access                                      (2 Points)                [x ] General surgery lasting more than 45 minutes (2 points)  [ ] Malignancy- Present or previous                   (2 Points)                [ ] Elective arthroplasty                                         (5 points)    [ ] Stroke (in the previous month)                          (5 Points)                                                                                                                                                           HEMATOLOGY RELATED FACTORS                                                 TRAUMA RELATED RISK FACTORS  [ ] Prior episodes of VTE                                     (3 Points)                [ ] Fracture of the hip, pelvis, or leg                       (5 Points)  [ ] Positive family history for VTE                         (3 Points)             [ ] Acute spinal cord injury (in the previous month)  (5 Points)  [ ] Prothrombin 81889 A                                     (3 Points)               [ ] Paralysis  (less than 1 month)                             (5 Points)  [ ] Factor V Leiden                                             (3 Points)                  [ ] Multiple Trauma within 1 month                        (5 Points)  [ ] Lupus anticoagulants                                     (3 Points)                                                           [ ] Anticardiolipin antibodies                               (3 Points)                                                       [ ] High homocysteine in the blood                      (3 Points)                                             [ ] Other congenital or acquired thrombophilia      (3 Points)                                                [ ] Heparin induced thrombocytopenia                  (3 Points)                                     Total Score [     3     ]  OPIOID RISK TOOL    [place] EACH BOX THAT APPLIES AND ADD TOTALS AT THE END    FAMILY HISTORY OF SUBSTANCE ABUSE                 FEMALE         MALE                                                Alcohol                             [  ]1 pt          [  ]3pts                                               Illegal Drugs                     [  ]2 pts        [  ]3pts                                               Rx Drugs                           [  ]4 pts        [  ]4 pts    PERSONAL HISTORY OF SUBSTANCE ABUSE                                                                                          Alcohol                             [  ]3 pts       [  ]3 pts                                               Illegal Drugs                     [  ]4 pts        [  ]4 pts                                               Rx Drugs                           [  ]5 pts        [  ]5 pts    AGE BETWEEN 16-45 YEARS                                      [  ]1 pt         [  ]1 pt    HISTORY OF PREADOLESCENT   SEXUAL ABUSE                                                             [  ]3 pts        [  ]0pts    PSYCHOLOGICAL DISEASE                     ADD, OCD, Bipolar, Schizophrenia        [  ]2 pts         [  ]2 pts                      Depression                                               [  ]1 pt           [  ]1 pt           SCORING TOTAL   (add numbers and type here)              ( 0)                                     A score of 3 or lower indicated LOW risk for future opioid abuse  A score of 4 to 7 indicated moderate risk for future opioid abuse  A score of 8 or higher indicates a high risk for opioid abuse

## 2022-11-03 ENCOUNTER — APPOINTMENT (OUTPATIENT)
Dept: FAMILY MEDICINE | Facility: CLINIC | Age: 57
End: 2022-11-03

## 2022-11-03 VITALS
HEIGHT: 65 IN | BODY MASS INDEX: 24.32 KG/M2 | RESPIRATION RATE: 16 BRPM | OXYGEN SATURATION: 98 % | TEMPERATURE: 97 F | SYSTOLIC BLOOD PRESSURE: 88 MMHG | HEART RATE: 91 BPM | DIASTOLIC BLOOD PRESSURE: 60 MMHG | WEIGHT: 146 LBS

## 2022-11-03 DIAGNOSIS — Z01.818 ENCOUNTER FOR OTHER PREPROCEDURAL EXAMINATION: ICD-10-CM

## 2022-11-03 PROCEDURE — 99214 OFFICE O/P EST MOD 30 MIN: CPT | Mod: 25

## 2022-11-03 PROCEDURE — G0008: CPT

## 2022-11-03 PROCEDURE — 90686 IIV4 VACC NO PRSV 0.5 ML IM: CPT

## 2022-11-03 NOTE — REVIEW OF SYSTEMS
[Joint Pain] : joint pain [Negative] : Heme/Lymph [FreeTextEntry9] : infection toe 1st and 2nd right foot

## 2022-11-03 NOTE — PHYSICAL EXAM
[Normal Sclera/Conjunctiva] : normal sclera/conjunctiva [No Respiratory Distress] : no respiratory distress  [No Accessory Muscle Use] : no accessory muscle use [Clear to Auscultation] : lungs were clear to auscultation bilaterally [No Joint Swelling] : no joint swelling [Coordination Grossly Intact] : coordination grossly intact [Normal Gait] : normal gait [Normal] : affect was normal and insight and judgment were intact [de-identified] : Right foot wrapped OM of 1st and 2nd toe

## 2022-11-03 NOTE — ASSESSMENT
[High Risk Surgery - Intraperitoneal, Intrathoracic or Supringuinal Vascular Procedures] : High Risk Surgery - Intraperitoneal, Intrathoracic or Supringuinal Vascular Procedures - No (0) [Ischemic Heart Disease] : Ischemic Heart Disease - No (0) [Congestive Heart Failure] : Congestive Heart Failure - No (0) [Prior Cerebrovascular Accident or TIA] : Prior Cerebrovascular Accident or TIA - No (0) [Creatinine >= 2mg/dL (1 Point)] : Creatinine >= 2mg/dL - No (0) [Insulin-dependent Diabetic (1 Point)] : Insulin-dependent Diabetic - No (0) [0] : 0 , RCRI Class: I, Risk of Post-Op Cardiac Complications: 3.9%, 95% CI for Risk Estimate: 2.8% - 5.4% [Patient Optimized for Surgery] : Patient optimized for surgery [No Further Testing Recommended] : no further testing recommended [Continue medications as is] : Continue current medications [As per surgery] : as per surgery [FreeTextEntry4] : Mr. RADHA CHAKRABORTY is a 57 year old male presenting for pre op evaluation for Right Carotid endarterectomy.\par Cardiac clearance done by Dr Irby [FreeTextEntry7] : Hold OTC supplements and aspirin as per surgeon.

## 2022-11-03 NOTE — HISTORY OF PRESENT ILLNESS
[No Pertinent Pulmonary History] : no history of asthma, COPD, sleep apnea, or smoking [No Adverse Anesthesia Reaction] : no adverse anesthesia reaction in self or family member [(Patient denies any chest pain, claudication, dyspnea on exertion, orthopnea, palpitations or syncope)] : Patient denies any chest pain, claudication, dyspnea on exertion, orthopnea, palpitations or syncope [No Pertinent Cardiac History] : no history of aortic stenosis, atrial fibrillation, coronary artery disease, recent myocardial infarction, or implantable device/pacemaker [Aortic Stenosis] : no aortic stenosis [Chronic Anticoagulation] : no chronic anticoagulation [Chronic Kidney Disease] : no chronic kidney disease [Diabetes] : no diabetes [FreeTextEntry1] : right Carotid Endarterectomy [FreeTextEntry2] : 10/8/2022 [FreeTextEntry3] : Dr Lelia Elder [FreeTextEntry4] : Mr. RADHA CHAKRABORTY is a 57 year old male presenting for pre op evaluation for Right Carotid endarterectomy.\par

## 2022-11-04 NOTE — ED ADULT TRIAGE NOTE - HISTORY OF COVID-19 VACCINATION
TREATMENT PLAN (Medication Management Only)        [facility]    Name and Date of Birth:  Sanjuana Richard 13 y o  2011   Date of Treatment Plan: 10/26/22  at 4:00 pm    Diagnosis/Diagnoses: Strengths/Personal Resources for Self-Care: "Lilly Meyers, parents, Dr Mabel Kelly", supportive family  Area/Areas of need (in own words): "continue to improve being consistent doing work", attention and concentration problems  1  Long Term Goal: improve social interactions and studying for big tests  Target Date:3 months - 2/4/2023  Person/Persons responsible for completion of goal: Lilly Meyers, mariia, Dr Mabel Kelly  2  Short Term Objective (s) - How will we reach this goal?:   A  Provider new recommended medication/dosage changes and/or continue medication(s): Strattera 10 mg daily, continue current medications as prescribed  B  N/A   C  N/A  Target Date:3 months - 2/4/2023  Person/Persons Responsible for Completion of Goal: Lilly Meyers, mariia, Dr Mabel Kelly  Progress Towards Goals: continuing treatment  Treatment Modality: medication management with psychotherapy every 3 months  Review due 180 days from date of this plan: 3 months - 2/4/2023  Expected length of service: ongoing treatment  My Physician/PA/NP and I have developed this plan together and I agree to work on the goals and objectives  I understand the treatment goals that were developed for my treatment 
No

## 2022-11-07 ENCOUNTER — OUTPATIENT (OUTPATIENT)
Dept: OUTPATIENT SERVICES | Facility: HOSPITAL | Age: 57
LOS: 1 days | End: 2022-11-07

## 2022-11-07 ENCOUNTER — TRANSCRIPTION ENCOUNTER (OUTPATIENT)
Age: 57
End: 2022-11-07

## 2022-11-07 DIAGNOSIS — Z98.890 OTHER SPECIFIED POSTPROCEDURAL STATES: Chronic | ICD-10-CM

## 2022-11-07 DIAGNOSIS — Z98.89 OTHER SPECIFIED POSTPROCEDURAL STATES: Chronic | ICD-10-CM

## 2022-11-07 DIAGNOSIS — Z20.828 CONTACT WITH AND (SUSPECTED) EXPOSURE TO OTHER VIRAL COMMUNICABLE DISEASES: ICD-10-CM

## 2022-11-07 DIAGNOSIS — K40.90 UNILATERAL INGUINAL HERNIA, WITHOUT OBSTRUCTION OR GANGRENE, NOT SPECIFIED AS RECURRENT: Chronic | ICD-10-CM

## 2022-11-07 DIAGNOSIS — Z96.7 PRESENCE OF OTHER BONE AND TENDON IMPLANTS: Chronic | ICD-10-CM

## 2022-11-07 LAB — SARS-COV-2 RNA SPEC QL NAA+PROBE: SIGNIFICANT CHANGE UP

## 2022-11-07 NOTE — ASU PATIENT PROFILE, ADULT - NSICDXPASTMEDICALHX_GEN_ALL_CORE_FT
PAST MEDICAL HISTORY:  Depression     Former smoker     GERD (gastroesophageal reflux disease)     Hypercholesterolemia     Hypertension     Hypertension     Osteoarthritis     Peripheral vascular disease with R SFA stent    Prediabetes     Seizures 2021   last seizure

## 2022-11-07 NOTE — ASU PATIENT PROFILE, ADULT - NSICDXPASTSURGICALHX_GEN_ALL_CORE_FT
PAST SURGICAL HISTORY:  H/O endarterectomy L femoral a.  R femoral a. april 2022    Inguinal hernia, left     S/P appendectomy     S/P ORIF (open reduction internal fixation) fracture Left 5th digit    S/P shoulder surgery right    Status post femorotibial bypass with GSV 5/13/22

## 2022-11-08 ENCOUNTER — RESULT REVIEW (OUTPATIENT)
Age: 57
End: 2022-11-08

## 2022-11-08 ENCOUNTER — INPATIENT (INPATIENT)
Facility: HOSPITAL | Age: 57
LOS: 0 days | Discharge: HOME | DRG: 38 | End: 2022-11-09
Attending: SURGERY | Admitting: SURGERY
Payer: COMMERCIAL

## 2022-11-08 ENCOUNTER — APPOINTMENT (OUTPATIENT)
Dept: VASCULAR SURGERY | Facility: HOSPITAL | Age: 57
End: 2022-11-08

## 2022-11-08 ENCOUNTER — TRANSCRIPTION ENCOUNTER (OUTPATIENT)
Age: 57
End: 2022-11-08

## 2022-11-08 VITALS
WEIGHT: 145.06 LBS | OXYGEN SATURATION: 97 % | DIASTOLIC BLOOD PRESSURE: 57 MMHG | HEIGHT: 65 IN | SYSTOLIC BLOOD PRESSURE: 124 MMHG | RESPIRATION RATE: 16 BRPM | TEMPERATURE: 98 F | HEART RATE: 61 BPM

## 2022-11-08 DIAGNOSIS — I65.29 OCCLUSION AND STENOSIS OF UNSPECIFIED CAROTID ARTERY: ICD-10-CM

## 2022-11-08 DIAGNOSIS — Z98.89 OTHER SPECIFIED POSTPROCEDURAL STATES: Chronic | ICD-10-CM

## 2022-11-08 DIAGNOSIS — Z96.7 PRESENCE OF OTHER BONE AND TENDON IMPLANTS: Chronic | ICD-10-CM

## 2022-11-08 DIAGNOSIS — Z98.890 OTHER SPECIFIED POSTPROCEDURAL STATES: Chronic | ICD-10-CM

## 2022-11-08 DIAGNOSIS — K40.90 UNILATERAL INGUINAL HERNIA, WITHOUT OBSTRUCTION OR GANGRENE, NOT SPECIFIED AS RECURRENT: Chronic | ICD-10-CM

## 2022-11-08 LAB
BLD GP AB SCN SERPL QL: SIGNIFICANT CHANGE UP
GAS PNL BLDA: SIGNIFICANT CHANGE UP

## 2022-11-08 PROCEDURE — 88311 DECALCIFY TISSUE: CPT | Mod: 26

## 2022-11-08 PROCEDURE — 35301 RECHANNELING OF ARTERY: CPT | Mod: RT,79

## 2022-11-08 PROCEDURE — 35301 RECHANNELING OF ARTERY: CPT | Mod: AS,RT,79

## 2022-11-08 PROCEDURE — 88304 TISSUE EXAM BY PATHOLOGIST: CPT | Mod: 26

## 2022-11-08 DEVICE — KIT A-LINE 1LUM 20G X 12CM SAFE KIT: Type: IMPLANTABLE DEVICE | Site: RIGHT | Status: FUNCTIONAL

## 2022-11-08 DEVICE — GELFOAM SZ 100 UNCOMPRESSED: Type: IMPLANTABLE DEVICE | Site: RIGHT | Status: FUNCTIONAL

## 2022-11-08 DEVICE — PATCH PERICARDIAL PHOTOFIX .8X8CM: Type: IMPLANTABLE DEVICE | Site: RIGHT | Status: FUNCTIONAL

## 2022-11-08 DEVICE — ARISTA 3GR: Type: IMPLANTABLE DEVICE | Site: RIGHT | Status: FUNCTIONAL

## 2022-11-08 DEVICE — SURGICEL NU-KNIT 6 X 9": Type: IMPLANTABLE DEVICE | Site: RIGHT | Status: FUNCTIONAL

## 2022-11-08 DEVICE — CLIP APPLIER COVIDIEN SURGICLIP III 9" SM: Type: IMPLANTABLE DEVICE | Site: RIGHT | Status: FUNCTIONAL

## 2022-11-08 RX ORDER — ONDANSETRON 8 MG/1
4 TABLET, FILM COATED ORAL EVERY 6 HOURS
Refills: 0 | Status: DISCONTINUED | OUTPATIENT
Start: 2022-11-08 | End: 2022-11-09

## 2022-11-08 RX ORDER — HYDROMORPHONE HYDROCHLORIDE 2 MG/ML
0.5 INJECTION INTRAMUSCULAR; INTRAVENOUS; SUBCUTANEOUS
Refills: 0 | Status: DISCONTINUED | OUTPATIENT
Start: 2022-11-08 | End: 2022-11-08

## 2022-11-08 RX ORDER — ONDANSETRON 8 MG/1
4 TABLET, FILM COATED ORAL ONCE
Refills: 0 | Status: DISCONTINUED | OUTPATIENT
Start: 2022-11-08 | End: 2022-11-08

## 2022-11-08 RX ORDER — SENNA PLUS 8.6 MG/1
2 TABLET ORAL DAILY
Refills: 0 | Status: DISCONTINUED | OUTPATIENT
Start: 2022-11-08 | End: 2022-11-09

## 2022-11-08 RX ORDER — SODIUM CHLORIDE 9 MG/ML
1000 INJECTION INTRAMUSCULAR; INTRAVENOUS; SUBCUTANEOUS
Refills: 0 | Status: DISCONTINUED | OUTPATIENT
Start: 2022-11-08 | End: 2022-11-09

## 2022-11-08 RX ORDER — OMEGA-3 ACID ETHYL ESTERS 1 G
2 CAPSULE ORAL
Refills: 0 | Status: DISCONTINUED | OUTPATIENT
Start: 2022-11-08 | End: 2022-11-09

## 2022-11-08 RX ORDER — PANTOPRAZOLE SODIUM 20 MG/1
40 TABLET, DELAYED RELEASE ORAL
Refills: 0 | Status: DISCONTINUED | OUTPATIENT
Start: 2022-11-08 | End: 2022-11-09

## 2022-11-08 RX ORDER — ASPIRIN/CALCIUM CARB/MAGNESIUM 324 MG
81 TABLET ORAL DAILY
Refills: 0 | Status: DISCONTINUED | OUTPATIENT
Start: 2022-11-09 | End: 2022-11-09

## 2022-11-08 RX ORDER — QUETIAPINE FUMARATE 200 MG/1
200 TABLET, FILM COATED ORAL AT BEDTIME
Refills: 0 | Status: DISCONTINUED | OUTPATIENT
Start: 2022-11-08 | End: 2022-11-09

## 2022-11-08 RX ORDER — PHENYLEPHRINE HYDROCHLORIDE 10 MG/ML
0.4 INJECTION INTRAVENOUS
Qty: 40 | Refills: 0 | Status: DISCONTINUED | OUTPATIENT
Start: 2022-11-08 | End: 2022-11-08

## 2022-11-08 RX ORDER — SODIUM CHLORIDE 9 MG/ML
3 INJECTION INTRAMUSCULAR; INTRAVENOUS; SUBCUTANEOUS EVERY 8 HOURS
Refills: 0 | Status: DISCONTINUED | OUTPATIENT
Start: 2022-11-08 | End: 2022-11-08

## 2022-11-08 RX ORDER — FERROUS SULFATE 325(65) MG
325 TABLET ORAL THREE TIMES A DAY
Refills: 0 | Status: DISCONTINUED | OUTPATIENT
Start: 2022-11-08 | End: 2022-11-09

## 2022-11-08 RX ORDER — ATORVASTATIN CALCIUM 80 MG/1
80 TABLET, FILM COATED ORAL AT BEDTIME
Refills: 0 | Status: DISCONTINUED | OUTPATIENT
Start: 2022-11-08 | End: 2022-11-09

## 2022-11-08 RX ORDER — VENLAFAXINE HCL 75 MG
75 CAPSULE, EXT RELEASE 24 HR ORAL DAILY
Refills: 0 | Status: DISCONTINUED | OUTPATIENT
Start: 2022-11-08 | End: 2022-11-09

## 2022-11-08 RX ORDER — CEFAZOLIN SODIUM 1 G
2000 VIAL (EA) INJECTION EVERY 8 HOURS
Refills: 0 | Status: COMPLETED | OUTPATIENT
Start: 2022-11-08 | End: 2022-11-09

## 2022-11-08 RX ORDER — CALCIUM GLUCONATE 100 MG/ML
1 VIAL (ML) INTRAVENOUS ONCE
Refills: 0 | Status: COMPLETED | OUTPATIENT
Start: 2022-11-08 | End: 2022-11-08

## 2022-11-08 RX ORDER — GABAPENTIN 400 MG/1
600 CAPSULE ORAL THREE TIMES A DAY
Refills: 0 | Status: DISCONTINUED | OUTPATIENT
Start: 2022-11-08 | End: 2022-11-09

## 2022-11-08 RX ORDER — CEFAZOLIN SODIUM 1 G
2000 VIAL (EA) INJECTION ONCE
Refills: 0 | Status: COMPLETED | OUTPATIENT
Start: 2022-11-08 | End: 2022-11-08

## 2022-11-08 RX ORDER — ACETAMINOPHEN 500 MG
975 TABLET ORAL ONCE
Refills: 0 | Status: COMPLETED | OUTPATIENT
Start: 2022-11-08 | End: 2022-11-08

## 2022-11-08 RX ORDER — POTASSIUM CHLORIDE 20 MEQ
10 PACKET (EA) ORAL
Refills: 0 | Status: COMPLETED | OUTPATIENT
Start: 2022-11-08 | End: 2022-11-08

## 2022-11-08 RX ORDER — HEPARIN SODIUM 5000 [USP'U]/ML
5000 INJECTION INTRAVENOUS; SUBCUTANEOUS EVERY 8 HOURS
Refills: 0 | Status: DISCONTINUED | OUTPATIENT
Start: 2022-11-08 | End: 2022-11-09

## 2022-11-08 RX ADMIN — Medication 100 MILLIEQUIVALENT(S): at 13:30

## 2022-11-08 RX ADMIN — Medication 975 MILLIGRAM(S): at 07:07

## 2022-11-08 RX ADMIN — PHENYLEPHRINE HYDROCHLORIDE 9.87 MICROGRAM(S)/KG/MIN: 10 INJECTION INTRAVENOUS at 12:36

## 2022-11-08 RX ADMIN — Medication 100 MILLIGRAM(S): at 09:00

## 2022-11-08 RX ADMIN — Medication 200 MILLIGRAM(S): at 18:50

## 2022-11-08 RX ADMIN — GABAPENTIN 600 MILLIGRAM(S): 400 CAPSULE ORAL at 22:43

## 2022-11-08 RX ADMIN — Medication 100 MILLIEQUIVALENT(S): at 14:57

## 2022-11-08 RX ADMIN — Medication 2 GRAM(S): at 18:50

## 2022-11-08 RX ADMIN — Medication 100 MILLIEQUIVALENT(S): at 12:16

## 2022-11-08 RX ADMIN — Medication 325 MILLIGRAM(S): at 14:40

## 2022-11-08 RX ADMIN — Medication 100 GRAM(S): at 12:45

## 2022-11-08 RX ADMIN — GABAPENTIN 600 MILLIGRAM(S): 400 CAPSULE ORAL at 14:40

## 2022-11-08 RX ADMIN — QUETIAPINE FUMARATE 200 MILLIGRAM(S): 200 TABLET, FILM COATED ORAL at 22:45

## 2022-11-08 RX ADMIN — Medication 325 MILLIGRAM(S): at 22:43

## 2022-11-08 RX ADMIN — HEPARIN SODIUM 5000 UNIT(S): 5000 INJECTION INTRAVENOUS; SUBCUTANEOUS at 22:43

## 2022-11-08 RX ADMIN — SODIUM CHLORIDE 75 MILLILITER(S): 9 INJECTION INTRAMUSCULAR; INTRAVENOUS; SUBCUTANEOUS at 12:33

## 2022-11-08 RX ADMIN — Medication 100 MILLIGRAM(S): at 18:50

## 2022-11-08 RX ADMIN — ATORVASTATIN CALCIUM 80 MILLIGRAM(S): 80 TABLET, FILM COATED ORAL at 22:43

## 2022-11-08 NOTE — BRIEF OPERATIVE NOTE - OPERATION/FINDINGS
neuromotoring used. Bovine patch angioplasty. Perfusion verified with doppler , common/internal/external. No neurological deficits post procedure.

## 2022-11-09 ENCOUNTER — TRANSCRIPTION ENCOUNTER (OUTPATIENT)
Age: 57
End: 2022-11-09

## 2022-11-09 VITALS — HEART RATE: 74 BPM | SYSTOLIC BLOOD PRESSURE: 122 MMHG | DIASTOLIC BLOOD PRESSURE: 60 MMHG

## 2022-11-09 LAB
ANION GAP SERPL CALC-SCNC: 11 MMOL/L — SIGNIFICANT CHANGE UP (ref 5–17)
BUN SERPL-MCNC: 8.9 MG/DL — SIGNIFICANT CHANGE UP (ref 8–20)
CALCIUM SERPL-MCNC: 8.1 MG/DL — LOW (ref 8.4–10.5)
CHLORIDE SERPL-SCNC: 106 MMOL/L — SIGNIFICANT CHANGE UP (ref 96–108)
CO2 SERPL-SCNC: 22 MMOL/L — SIGNIFICANT CHANGE UP (ref 22–29)
CREAT SERPL-MCNC: 0.62 MG/DL — SIGNIFICANT CHANGE UP (ref 0.5–1.3)
EGFR: 110 ML/MIN/1.73M2 — SIGNIFICANT CHANGE UP
GLUCOSE SERPL-MCNC: 106 MG/DL — HIGH (ref 70–99)
HCT VFR BLD CALC: 36.7 % — LOW (ref 39–50)
HGB BLD-MCNC: 12 G/DL — LOW (ref 13–17)
MCHC RBC-ENTMCNC: 30.5 PG — SIGNIFICANT CHANGE UP (ref 27–34)
MCHC RBC-ENTMCNC: 32.7 GM/DL — SIGNIFICANT CHANGE UP (ref 32–36)
MCV RBC AUTO: 93.1 FL — SIGNIFICANT CHANGE UP (ref 80–100)
PLATELET # BLD AUTO: 120 K/UL — LOW (ref 150–400)
POTASSIUM SERPL-MCNC: 4.1 MMOL/L — SIGNIFICANT CHANGE UP (ref 3.5–5.3)
POTASSIUM SERPL-SCNC: 4.1 MMOL/L — SIGNIFICANT CHANGE UP (ref 3.5–5.3)
RBC # BLD: 3.94 M/UL — LOW (ref 4.2–5.8)
RBC # FLD: 15.7 % — HIGH (ref 10.3–14.5)
SODIUM SERPL-SCNC: 139 MMOL/L — SIGNIFICANT CHANGE UP (ref 135–145)
WBC # BLD: 7.27 K/UL — SIGNIFICANT CHANGE UP (ref 3.8–10.5)
WBC # FLD AUTO: 7.27 K/UL — SIGNIFICANT CHANGE UP (ref 3.8–10.5)

## 2022-11-09 PROCEDURE — 82803 BLOOD GASES ANY COMBINATION: CPT

## 2022-11-09 PROCEDURE — 84132 ASSAY OF SERUM POTASSIUM: CPT

## 2022-11-09 PROCEDURE — 88304 TISSUE EXAM BY PATHOLOGIST: CPT

## 2022-11-09 PROCEDURE — 86901 BLOOD TYPING SEROLOGIC RH(D): CPT

## 2022-11-09 PROCEDURE — 82330 ASSAY OF CALCIUM: CPT

## 2022-11-09 PROCEDURE — 80048 BASIC METABOLIC PNL TOTAL CA: CPT

## 2022-11-09 PROCEDURE — 36415 COLL VENOUS BLD VENIPUNCTURE: CPT

## 2022-11-09 PROCEDURE — C1889: CPT

## 2022-11-09 PROCEDURE — 86900 BLOOD TYPING SEROLOGIC ABO: CPT

## 2022-11-09 PROCEDURE — 85018 HEMOGLOBIN: CPT

## 2022-11-09 PROCEDURE — 88311 DECALCIFY TISSUE: CPT

## 2022-11-09 PROCEDURE — 83605 ASSAY OF LACTIC ACID: CPT

## 2022-11-09 PROCEDURE — 82435 ASSAY OF BLOOD CHLORIDE: CPT

## 2022-11-09 PROCEDURE — 85014 HEMATOCRIT: CPT

## 2022-11-09 PROCEDURE — C1769: CPT

## 2022-11-09 PROCEDURE — 86850 RBC ANTIBODY SCREEN: CPT

## 2022-11-09 PROCEDURE — 84295 ASSAY OF SERUM SODIUM: CPT

## 2022-11-09 PROCEDURE — 82947 ASSAY GLUCOSE BLOOD QUANT: CPT

## 2022-11-09 PROCEDURE — 85027 COMPLETE CBC AUTOMATED: CPT

## 2022-11-09 RX ORDER — CLOPIDOGREL BISULFATE 75 MG/1
1 TABLET, FILM COATED ORAL
Qty: 0 | Refills: 0 | DISCHARGE

## 2022-11-09 RX ADMIN — Medication 81 MILLIGRAM(S): at 12:13

## 2022-11-09 RX ADMIN — GABAPENTIN 600 MILLIGRAM(S): 400 CAPSULE ORAL at 12:14

## 2022-11-09 RX ADMIN — SODIUM CHLORIDE 75 MILLILITER(S): 9 INJECTION INTRAMUSCULAR; INTRAVENOUS; SUBCUTANEOUS at 00:39

## 2022-11-09 RX ADMIN — Medication 75 MILLIGRAM(S): at 12:14

## 2022-11-09 RX ADMIN — Medication 200 MILLIGRAM(S): at 06:38

## 2022-11-09 RX ADMIN — PANTOPRAZOLE SODIUM 40 MILLIGRAM(S): 20 TABLET, DELAYED RELEASE ORAL at 06:38

## 2022-11-09 RX ADMIN — HEPARIN SODIUM 5000 UNIT(S): 5000 INJECTION INTRAVENOUS; SUBCUTANEOUS at 06:38

## 2022-11-09 RX ADMIN — Medication 2 GRAM(S): at 06:38

## 2022-11-09 RX ADMIN — Medication 325 MILLIGRAM(S): at 12:14

## 2022-11-09 RX ADMIN — HEPARIN SODIUM 5000 UNIT(S): 5000 INJECTION INTRAVENOUS; SUBCUTANEOUS at 12:14

## 2022-11-09 RX ADMIN — Medication 325 MILLIGRAM(S): at 06:39

## 2022-11-09 RX ADMIN — GABAPENTIN 600 MILLIGRAM(S): 400 CAPSULE ORAL at 06:39

## 2022-11-09 RX ADMIN — Medication 100 MILLIGRAM(S): at 00:36

## 2022-11-09 NOTE — PROGRESS NOTE ADULT - SUBJECTIVE AND OBJECTIVE BOX
Subjective: Feeling well this morning. Pain is controlled. Bleeding from scalp overnight from neuromonitoring puncture sites. Vitals stable, no acute events.     STATUS POST:  R CEA    POST OPERATIVE DAY #: 1    MEDICATIONS  (STANDING):  aspirin enteric coated 81 milliGRAM(s) Oral daily  atorvastatin 80 milliGRAM(s) Oral at bedtime  ferrous    sulfate 325 milliGRAM(s) Oral three times a day  gabapentin 600 milliGRAM(s) Oral three times a day  heparin   Injectable 5000 Unit(s) SubCutaneous every 8 hours  omega-3-Acid Ethyl Esters 2 Gram(s) Oral two times a day  pantoprazole    Tablet 40 milliGRAM(s) Oral before breakfast  phenytoin   Capsule 200 milliGRAM(s) Oral every 12 hours  QUEtiapine 200 milliGRAM(s) Oral at bedtime  sodium chloride 0.9%. 1000 milliLiter(s) (75 mL/Hr) IV Continuous <Continuous>  venlafaxine XR. 75 milliGRAM(s) Oral daily    MEDICATIONS  (PRN):  aluminum hydroxide/magnesium hydroxide/simethicone Suspension 30 milliLiter(s) Oral every 4 hours PRN Dyspepsia  ondansetron Injectable 4 milliGRAM(s) IV Push every 6 hours PRN Nausea  senna 2 Tablet(s) Oral daily PRN Constipation      Vital Signs Last 24 Hrs  T(C): 36.5 (09 Nov 2022 05:00), Max: 37.1 (08 Nov 2022 14:09)  T(F): 97.7 (09 Nov 2022 05:00), Max: 98.8 (08 Nov 2022 14:09)  HR: 65 (09 Nov 2022 05:00) (49 - 65)  BP: 115/77 (09 Nov 2022 05:00) (68/51 - 154/80)  BP(mean): 85 (08 Nov 2022 13:30) (53 - 92)  RR: 18 (09 Nov 2022 05:00) (14 - 20)  SpO2: 96% (09 Nov 2022 05:00) (92% - 97%)    Parameters below as of 09 Nov 2022 05:00  Patient On (Oxygen Delivery Method): room air        Physical Exam:  Constitutional: NAD  HEENT: PERRL, EOMI. Clotted blood present throughout scalp.   Neck: R neck with hematoma present, feels soft. Incision well-healing, c/d/i.   Respiratory: Respirations non-labored, no accessory muscle use  Gastrointestinal: Soft, non-tender, non-distended  Extremities: No peripheral edema, No cyanosis  Neurological: A&O x 3; without gross deficit  Musculoskeletal: No joint pain, swelling, deformity, or point tenderness; no limitation of movement      LABS:

## 2022-11-09 NOTE — DISCHARGE NOTE NURSING/CASE MANAGEMENT/SOCIAL WORK - NSDCVIVACCINE_GEN_ALL_CORE_FT
Tdap; 18-Aug-2022 10:59; Neli Hoyt (RN); [employer]; [id number] (Exp. Date: 20-Sep-2024); IntraMuscular; Dorsogluteal Left.; 0.5 milliLiter(s); VIS (VIS Published: 09-May-2013, VIS Presented: 18-Aug-2022);

## 2022-11-09 NOTE — DISCHARGE NOTE PROVIDER - CARE PROVIDERS DIRECT ADDRESSES
[email or web address]
[email or web address]

## 2022-11-09 NOTE — DISCHARGE NOTE NURSING/CASE MANAGEMENT/SOCIAL WORK - PATIENT PORTAL LINK FT
You can access the FollowMyHealth Patient Portal offered by [facility] by registering at the following website: [email or web address]. By joining [employer]’s FollowMyHealth portal, you will also be able to view your health information using other applications (apps) compatible with our system.

## 2022-11-09 NOTE — DISCHARGE NOTE NURSING/CASE MANAGEMENT/SOCIAL WORK - NSDCPEFALRISK_GEN_ALL_CORE
For information on Fall & Injury Prevention, visit: [email or web address] OR  [email or web address] OR  https://www.cdc.gov/steadi/patient.html

## 2022-11-09 NOTE — DISCHARGE NOTE PROVIDER - NSDCMRMEDTOKEN_GEN_ALL_CORE_FT
amLODIPine 10 mg oral tablet: 1 tab(s) orally once a day  aspirin 81 mg oral tablet, chewable: 1 tab(s) orally once a day  atorvastatin 80 mg oral tablet: 1 tab(s) orally once a day  cadexomer iodine 0.9% topical gel: 1 application topically once a day  collagenase 250 units/g topical ointment: 1 application topically once a day  ferrous sulfate 325 mg (65 mg elemental iron) oral tablet: 1 tab(s) orally 3 times a day  Fish Oil 1000 mg oral capsule: 1 cap(s) orally 2 times a day  gabapentin 600 mg oral tablet: 1 tab(s) orally 3 times a day  methocarbamol 500 mg oral tablet: 2 tab(s) orally every 8 hours  pantoprazole 40 mg oral delayed release tablet: 1 tab(s) orally once a day  phenytoin 200 mg oral capsule, extended release: 1 cap(s) orally every 12 hours  QUEtiapine 200 mg oral tablet:  orally once a day (at bedtime)  senna leaf extract oral tablet: 2 tab(s) orally once a day (at bedtime), As needed, Constipation  venlafaxine 75 mg oral capsule, extended release: 1 cap(s) orally once a day  Xarelto 15 mg oral tablet: 1 tab(s) orally 2 times a day

## 2022-11-09 NOTE — DISCHARGE NOTE PROVIDER - NSDCCPTREATMENT_GEN_ALL_CORE_FT
PRINCIPAL PROCEDURE  Procedure: Carotid endarterectomy  Findings and Treatment: right      SECONDARY PROCEDURE  Procedure: Carotid endarterectomy  Findings and Treatment: right

## 2022-11-09 NOTE — DISCHARGE NOTE PROVIDER - CARE PROVIDER_API CALL
ManvarSingh, Pallavi B (MD)  Vascular Surgery  [address], [address]st [place] [address]  Phone: [phone]  Fax: [phone]  Follow Up Time: 2 weeks

## 2022-11-09 NOTE — DISCHARGE NOTE PROVIDER - NSDCFUSCHEDAPPT_GEN_ALL_CORE_FT
`History and Physical  Obstetrics      SUBJECTIVE:     Lashon Diaz is a 31 y.o.  female at 38w5d  admitted for induction of labor.  Her current obstetrical history is significant for fibroids/h/o ptd/ h/o stillbirth.        (Not in a hospital admission)    Review of patient's allergies indicates:   Allergen Reactions    Percocet [oxycodone-acetaminophen] Itching    Penicillins Rash     Pt stated        Past Medical History:   Diagnosis Date    Asthma     Thyroid disease      Past Surgical History:   Procedure Laterality Date    APPENDECTOMY      DILATION AND CURETTAGE OF UTERUS      KJB    OVARIAN CYST REMOVAL      KJB     Family History   Problem Relation Age of Onset    Heart disease Maternal Grandmother      enlarged heart    Heart attacks under age 50 Maternal Grandmother     Breast cancer Neg Hx     Colon cancer Neg Hx     Ovarian cancer Neg Hx     Congenital heart disease Neg Hx     Pacemaker/defibrilator Neg Hx     Early death Neg Hx     Arrhythmia Neg Hx      Social History   Substance Use Topics    Smoking status: Former Smoker     Packs/day: 0.10     Types: Cigarettes    Smokeless tobacco: Never Used    Alcohol use No        OBJECTIVE:     Vital Signs (Most Recent)  BP: 133/86 (17 1133)    Physical Exam:  General:  Normal, alert and oriented                       Abdomen: Soft gravid no FT   Uterine Size:  c/w dates   Presentations:  vertex   FHT: reviewed   Pelvis: NEFG   Cervix: 2 cm                              Laboratory:  @LABRCNTIP(ABORH,HEPBSAG,RUBELLAIGGSC,GBS,AFP,WYHDGQR0PC)@   ASSESSMENT/PLAN:     38w5d gestation.  Pt desires elective induction of labor @ 39 wga. R/b/a discussed including possible increased risk of c/s. Pt desires to proceed.   Mitch Irby  [facility] Physician [facility]  CARDIOLOGY [address]  Scheduled Appointment: 01/23/2023

## 2022-11-09 NOTE — DISCHARGE NOTE PROVIDER - HOSPITAL COURSE
Elective right carotid endarterectomy performed 11/8.  Procedure performed as planned.  Patient was on multiple anticoagulants preoperatively, which contributed to a moderated size hematoma postoperatively and hemorrhage from his scalp probes sites.  No airway issues or difficulty with oral intake.  Patient with no neurovascular deficits in the postoperative period. Currently patient is deemed stable for discharge and is to resume his xarelto and aspirin on discharge.  (PLAVIX IS TO BE HELD)

## 2022-11-09 NOTE — PROGRESS NOTE ADULT - ASSESSMENT
58yo M w/ hx of R carotid artery stenosis now s/p R CEA.     PLAN:  - ASA, SQH  - DASH diet, IVF  - cool packs to neck  - will restart xarelto and plavix on d/c  - neck hematoma stable  - possible d/c today

## 2022-11-10 ENCOUNTER — NON-APPOINTMENT (OUTPATIENT)
Age: 57
End: 2022-11-10

## 2022-11-15 LAB — SURGICAL PATHOLOGY STUDY: SIGNIFICANT CHANGE UP

## 2022-12-09 ENCOUNTER — APPOINTMENT (OUTPATIENT)
Dept: VASCULAR SURGERY | Facility: CLINIC | Age: 57
End: 2022-12-09

## 2022-12-13 ENCOUNTER — RX RENEWAL (OUTPATIENT)
Age: 57
End: 2022-12-13

## 2022-12-29 NOTE — PHYSICAL THERAPY INITIAL EVALUATION ADULT - NEUROVASCULAR ASSESSMENT RLE
warm/no numbness/no tingling/no discoloration Patient with one or more new problems requiring additional work-up/treatment.

## 2023-01-01 NOTE — H&P PST ADULT - EYES
Progress Note    Rubina Brown      Baby's First Name =  Undecided  YOB: 2025    Gender: male BW: 7 lb 11.1 oz (3490 g)   Age: 21 hours Obstetrician: CANAVAN, ALLISON    Gestational Age: 39w1d            MATERNAL INFORMATION     Mother's Name: Kathia Brown    Age: 34 y.o.            PREGNANCY INFORMATION            Information for the patient's mother:  Kathia Brown [[id number]]     Patient Active Problem List   Diagnosis     (spontaneous vaginal delivery)    Anemia of mother during pregnancy, delivered    Prenatal records, US and labs reviewed.    PRENATAL RECORDS:  Prenatal Course: significant for failed 1 hr GTT, passed 3 hr GTT      MATERNAL PRENATAL LABS:    MBT: O-  RUBELLA: Immune  HBsAg:negative  Syphilis Testing (RPR/VDRL/T.Pallidum):Non Reactive  HIV: negative  HEP C Ab: negative  UDS: Negative  GBS Culture: negative  Genetic Testing: Declined      PRENATAL ULTRASOUND:  Normal Anatomy               MATERNAL MEDICAL, SOCIAL, GENETIC AND FAMILY HISTORY      History reviewed. No pertinent past medical history.     Family, Maternal or History of DDH, CHD, Renal, HSV, MRSA and Genetic:   Non-significant    Maternal Medications:   Information for the patient's mother:  Kathia Brown [[id number]]   docusate sodium, 100 mg, Oral, BID  ePHEDrine Sulfate (Pressors), , ,   ferrous sulfate, 325 mg, Oral, BID With Meals             LABOR AND DELIVERY SUMMARY        Rupture date:  2023   Rupture time:  8:38 AM  ROM prior to Delivery: 3h 47m     Antibiotics during Labor: No   EOS Calculator Screen:  With well appearing baby supports Routine Vitals and Care    YOB: 2025   Time of birth:  12:25 PM  Delivery type:  Vaginal, Spontaneous   Presentation/Position: Vertex;               APGAR SCORES:        APGARS  One minute Five minutes Ten minutes   Totals: 8   9                           INFORMATION     Vital Signs Temp:  [96.5 °F  "(35.8 °C)-100.5 °F (38.1 °C)] 98.3 °F (36.8 °C)  Pulse:  [126-158] 126  Resp:  [40-58] 40   Birth Weight: 3490 g (7 lb 11.1 oz)   Birth Length: (inches) 20   Birth Head Circumference: Head Circumference: 33 cm (12.99\")     Current Weight: Weight: 3370 g (7 lb 6.9 oz)   Weight Change from Birth Weight: -3%           PHYSICAL EXAMINATION     General appearance Alert and active.   Skin  Well perfused.     HEENT: AFSF.    OP clear and palate intact. +caput.    Chest Clear breath sounds bilaterally.  No distress.   Heart  Normal rate and rhythm.  No murmur.  Normal pulses.    Abdomen + BS.  Soft, non-tender.  No mass/HSM.   Genitalia  Normal.  Patent anus. New circumcision without active bleeding   Trunk and Spine Spine normal and intact.  No atypical dimpling.   Extremities  Clavicles intact.  No hip clicks/clunks.   Neuro Normal reflexes.  Normal tone.           LABORATORY AND RADIOLOGY RESULTS      LABS:  Recent Results (from the past 96 hour(s))   Cord Blood Evaluation    Collection Time: 11/15/23  2:10 PM    Specimen: Umbilical Cord; Cord Blood   Result Value Ref Range    ABO Type A     RH type Positive     SOURAV IgG Positive    Bilirubin,  Panel    Collection Time: 23  1:42 AM    Specimen: Blood   Result Value Ref Range    Bilirubin, Direct 0.2 0.0 - 0.8 mg/dL    Bilirubin, Indirect 4.3 mg/dL    Total Bilirubin 4.5 0.0 - 8.0 mg/dL       XRAYS: N/A  No orders to display           DIAGNOSIS / ASSESSMENT / PLAN OF TREATMENT    ___________________________________________________________    TERM INFANT    HISTORY:  Gestational Age: 39w1d; male  Vaginal, Spontaneous; Vertex  BW: 7 lb 11.1 oz (3490 g)  Mother is planning to breast feed.    DAILY ASSESSMENT:  Today's Weight: 3370 g (7 lb 6.9 oz)  Weight change from BW:  -3%  Feedings:  Nursing 14-41 minutes/session.   Voids/Stools:  Normal    PLAN:   Normal  care.   Follow [place] Screen per routine.  Parents to make follow up appointment with PCP " before discharge.  ___________________________________________________________    ABO INCOMPATIBILITY     HISTORY:  MBT= O- / Ab neg  BBT= A+, SOURAV = Positive  MOB states other children had issues with jaundice    PHOTOTHERAPY:  None     DAILY ASSESSMENT:   Total serum Bili today = 4.5 @ 13 hours of age with current photo level 8.6 per BiliTool (Ref: 2022 AAP guidelines).    PLAN:  Obtain initial bilirubin at 24 hours of age and then serial bilirubins as indicated.  Consider serial hematocrit and reticulocyte count.  Begin phototherapy as indicated per BiliTool recommendations.   ___________________________________________________________                                                               DISCHARGE PLANNING           HEALTHCARE MAINTENANCE     CCHD     Car Seat Challenge Test      Hearing Screen     [place] State [place] Screen         Vitamin K  phytonadione (VITAMIN K) injection 1 mg first administered on 2023  2:41 PM    Erythromycin Eye Ointment  erythromycin (ROMYCIN) ophthalmic ointment 1 application  first administered on 2023 12:36 PM    Hepatitis B Vaccine  Immunization History   Administered Date(s) Administered    Hep B, Adolescent or Pediatric 2023             FOLLOW UP APPOINTMENTS     1) PCP:  Moises Bah          PENDING TEST  RESULTS AT TIME OF DISCHARGE     1) [place] STATE  SCREEN          PARENT  UPDATE  / SIGNATURE     Infant examined, chart reviewed, and parents updated.    Discussed the following:    -feedings  -current weight and % loss from birth weight  -jaundice (bilirubin level and plan for f/u)  - screens  -PCP scheduling    Questions addressed       Elvia Cleveland, APRN  2023  09:45 EST     Right eye ecchymotic

## 2023-01-11 ENCOUNTER — RX RENEWAL (OUTPATIENT)
Age: 58
End: 2023-01-11

## 2023-01-30 ENCOUNTER — APPOINTMENT (OUTPATIENT)
Dept: CARDIOLOGY | Facility: CLINIC | Age: 58
End: 2023-01-30

## 2023-01-30 DIAGNOSIS — K59.00 CONSTIPATION, UNSPECIFIED: ICD-10-CM

## 2023-02-07 ENCOUNTER — APPOINTMENT (OUTPATIENT)
Dept: CARDIOLOGY | Facility: CLINIC | Age: 58
End: 2023-02-07

## 2023-02-10 ENCOUNTER — APPOINTMENT (OUTPATIENT)
Dept: VASCULAR SURGERY | Facility: CLINIC | Age: 58
End: 2023-02-10
Payer: MEDICAID

## 2023-02-10 VITALS
SYSTOLIC BLOOD PRESSURE: 106 MMHG | OXYGEN SATURATION: 95 % | RESPIRATION RATE: 16 BRPM | HEIGHT: 65 IN | BODY MASS INDEX: 23.66 KG/M2 | WEIGHT: 142 LBS | HEART RATE: 88 BPM | TEMPERATURE: 97.9 F | DIASTOLIC BLOOD PRESSURE: 73 MMHG

## 2023-02-10 PROCEDURE — 93926 LOWER EXTREMITY STUDY: CPT

## 2023-02-10 PROCEDURE — 93880 EXTRACRANIAL BILAT STUDY: CPT

## 2023-02-10 PROCEDURE — 99214 OFFICE O/P EST MOD 30 MIN: CPT

## 2023-02-10 NOTE — PHYSICAL EXAM
[Normal Breath Sounds] : Normal breath sounds [Respiratory Effort] : normal respiratory effort [Normal Heart Sounds] : normal heart sounds [Normal Rate and Rhythm] : normal rate and rhythm [2+] : right 2+ [0] : left 0 [Alert] : alert [Oriented to Person] : oriented to person [Oriented to Place] : oriented to place [Oriented to Time] : oriented to time [Calm] : calm [JVD] : no jugular venous distention  [Ankle Swelling (On Exam)] : not present [Varicose Veins Of Lower Extremities] : not present [] : not present [Abdomen Masses] : No abdominal masses [Abdomen Tenderness] : ~T ~M No abdominal tenderness [Tender] : was nontender [Purpura] : no purpura  [Petechiae] : no petechiae [Skin Ulcer] : no ulcer [Skin Induration] : no induration [de-identified] : no acute distress [FreeTextEntry1] : Palpable bypass graft and palpable R PT pulse\par Wound R pretibial area healed [de-identified] : FROM of all 4 extremities\par

## 2023-02-10 NOTE — ASSESSMENT
[Arterial/Venous Disease] : arterial/venous disease [Medication Management] : medication management [Smoking Cessation] : smoking cessation [Leg Bypass] : leg bypass [FreeTextEntry1] : 56 year old man, previous heavy smoker with PAD (s/p right SFA stenting in 2017), carotid stenosis.\par Patient is s/p left femoral endarterectomy on 10/1/21\par Patient then had R great toe gangreene s/p right fem-PT bypass with GSV on 5/13/22\par Readmitted for occluded bypass, right leg wound and rest pain \par s/p right femoral-Distal tibial artery bypass with CryoVein on 8/30/22\par s/p R CEA on 11/8/23\par \par Arterial duplex demonstrates patent fem-PT bypass with focal stenoses within bypass conduit (corresponding to valve cusps) without flow-limitation but >75% stenosis per velocity criteria at distal anastomosis\par \par Carotid duplex demonstrates patent R ICA patch and 50-69% stenosis distal to patch and 50-69% stenosis of L ICA.\par

## 2023-02-10 NOTE — HISTORY OF PRESENT ILLNESS
[FreeTextEntry1] : 56 year old man, previous heavy smoker with PAD (s/p right SFA stenting in 2017), carotid stenosis.\par Patient is s/p left femoral endarterectomy on 10/1/21\par Patient then had R great toe gangrene s/p right fem-PT bypass with GSV on 5/13/22\par \par \par  [de-identified] : Patient was admitted to the hospital on 8/23, after attempted RLE  angio without intervention.Was taken for right femoral-Distal tibial artery bypass with CryoVein on 8/30/22. Later was discharged on home antibiotics by ID for toe OM. Patient reports feeling better and has continued antibiotics at home without fevers or chills. \par Denies rest pain. RLE wound has healed. No evidence of stroke symptoms.\par Compliant with aspirin and xarelto 2.5mg daily.\par Now s/p R CEA on 11/8/22\par Doing well\par No complaints\par Ambulating without claudication or rest pain

## 2023-02-13 ENCOUNTER — APPOINTMENT (OUTPATIENT)
Dept: INTERNAL MEDICINE | Facility: CLINIC | Age: 58
End: 2023-02-13
Payer: MEDICAID

## 2023-02-13 VITALS
TEMPERATURE: 98 F | WEIGHT: 143 LBS | BODY MASS INDEX: 23.82 KG/M2 | HEIGHT: 65 IN | RESPIRATION RATE: 15 BRPM | HEART RATE: 70 BPM | SYSTOLIC BLOOD PRESSURE: 120 MMHG | DIASTOLIC BLOOD PRESSURE: 60 MMHG | OXYGEN SATURATION: 95 %

## 2023-02-13 DIAGNOSIS — Z12.2 ENCOUNTER FOR SCREENING FOR MALIGNANT NEOPLASM OF RESPIRATORY ORGANS: ICD-10-CM

## 2023-02-13 DIAGNOSIS — L98.9 DISORDER OF THE SKIN AND SUBCUTANEOUS TISSUE, UNSPECIFIED: ICD-10-CM

## 2023-02-13 DIAGNOSIS — Z13.31 ENCOUNTER FOR SCREENING FOR DEPRESSION: ICD-10-CM

## 2023-02-13 PROCEDURE — 99396 PREV VISIT EST AGE 40-64: CPT | Mod: 25

## 2023-02-13 PROCEDURE — 96127 BRIEF EMOTIONAL/BEHAV ASSMT: CPT

## 2023-02-13 PROCEDURE — G0296 VISIT TO DETERM LDCT ELIG: CPT

## 2023-02-13 RX ORDER — LEVOFLOXACIN 750 MG/1
750 TABLET, FILM COATED ORAL DAILY
Qty: 14 | Refills: 0 | Status: DISCONTINUED | COMMUNITY
Start: 2022-10-06 | End: 2023-02-13

## 2023-02-13 RX ORDER — APIXABAN 5 MG/1
5 TABLET, FILM COATED ORAL
Qty: 60 | Refills: 3 | Status: DISCONTINUED | COMMUNITY
Start: 2022-05-31 | End: 2023-02-13

## 2023-02-13 RX ORDER — OXYCODONE AND ACETAMINOPHEN 10; 325 MG/1; MG/1
10-325 TABLET ORAL
Qty: 15 | Refills: 0 | Status: DISCONTINUED | COMMUNITY
Start: 2022-02-17 | End: 2023-02-13

## 2023-02-13 RX ORDER — LIDOCAINE AND PRILOCAINE 25; 25 MG/G; MG/G
2.5-2.5 CREAM TOPICAL
Qty: 1 | Refills: 2 | Status: DISCONTINUED | COMMUNITY
Start: 2022-06-07 | End: 2023-02-13

## 2023-02-13 RX ORDER — PREGABALIN 150 MG/1
150 CAPSULE ORAL
Qty: 60 | Refills: 0 | Status: DISCONTINUED | COMMUNITY
Start: 2022-10-08 | End: 2023-02-13

## 2023-02-13 RX ORDER — VANCOMYCIN HYDROCHLORIDE 1 G/20ML
1 INJECTION, POWDER, LYOPHILIZED, FOR SOLUTION INTRAVENOUS
Refills: 0 | Status: DISCONTINUED | COMMUNITY
End: 2023-02-13

## 2023-02-13 RX ORDER — COLLAGENASE SANTYL 250 [ARB'U]/G
250 OINTMENT TOPICAL
Qty: 60 | Refills: 0 | Status: DISCONTINUED | COMMUNITY
Start: 2022-09-07 | End: 2023-02-13

## 2023-02-13 RX ORDER — DOXYCYCLINE HYCLATE 100 MG/1
100 CAPSULE ORAL
Qty: 28 | Refills: 0 | Status: DISCONTINUED | COMMUNITY
Start: 2022-10-06 | End: 2023-02-13

## 2023-02-13 RX ORDER — METHOCARBAMOL 500 MG/1
500 TABLET, FILM COATED ORAL 4 TIMES DAILY
Qty: 30 | Refills: 0 | Status: DISCONTINUED | COMMUNITY
Start: 2022-09-23 | End: 2023-02-13

## 2023-02-13 NOTE — REVIEW OF SYSTEMS
[Negative] : Psychiatric [Fever] : no fever [Chills] : no chills [Fatigue] : no fatigue [Chest Pain] : no chest pain [Palpitations] : no palpitations [Lower Ext Edema] : no lower extremity edema [Shortness Of Breath] : no shortness of breath [Wheezing] : no wheezing [Cough] : no cough [Dyspnea on Exertion] : not dyspnea on exertion [Abdominal Pain] : no abdominal pain [Nausea] : no nausea [Diarrhea] : no diarrhea [Vomiting] : no vomiting [de-identified] : See HPI

## 2023-02-13 NOTE — HEALTH RISK ASSESSMENT
[No] : In the past 12 months have you used drugs other than those required for medical reasons? No [No falls in past year] : Patient reported no falls in the past year [0] : 2) Feeling down, depressed, or hopeless: Not at all (0) [PHQ-2 Negative - No further assessment needed] : PHQ-2 Negative - No further assessment needed [HIV test declined] : HIV test declined [On disability] : on disability [Former] : Former [< 15 Years] : < 15 Years [EDB1Azsvr] : 0 [de-identified] : smoked 45 years-quit 2022

## 2023-02-13 NOTE — ASSESSMENT
[FreeTextEntry1] : \par PAD\par -followed by vascular surgery and plan is for right lower extremity angiogram with possible angioplasty RLE 3/2023\par -check fasting labs\par -currently with significant claudication and now lower extremity wounds\par -he is on atorvastatin, xarlento 2.5mg daily, and ASA-he states plavix is on hold until he is off xarelto-per vascular surgery\par \par Carotid stenosis:\par -s/p right CEA2023\par -he is on ASA and atorvastatin\par -check fasting labs\par \par Seizure disorder:\par -no recent seizures\par -He remains on Dilantin\par \par Vitamin D Deficiency:\par -Check vitamin D 25-OH level\par \par GERD:\par -on pantoprazole\par \par Dyslipidemia\par -on atorvastatin 80mg PO daily and Lovaza\par -Check fasting labs\par \par Elevated LFTs:\par -Previous abdominal ultrasound was normal\par -hepatitis B and C serology was negative\par -seen by GI previously\par -Check labs \par \par Anxiety/Insomnia/Depression:\par -on Venlafaxine ER 75mg PO daily and seroquel to 200mg PO QHS\par -PHQ 2 score 0 \par \par HTN:\par -BP at goal today\par -on Amlodipine 10mg Po daily\par \par Pre-DM:\par -Check fasting labs\par \par Anemia\par -Check CBC \par \par Scab on back of scalp\par -possible due to folliculitis/cyst which may have been infected\par -will tx with keflex 500mg BID x 7 days and Bactroban ointment BID x 7 days\par -he is to follow up in 4 weeks and if no improvement may need to see dermatology\par -he is to notify office if sx worsen\par \par \par HCM:\par \par CPE 2023\par \par [id number]\par \par Flu shot: 2022\par \par Tdap: 2017\par \par Pneumovax: 11/3/2015\par \par Shingles vaccine: will discuss at next visit.  he refused previously\par \par Covid vaccine: advised today 2023-he states he is thinking about it\par \par HIV testing offered: pt declined testing 2023\par \par Hep C screening: negative 2015\par \par Colonoscopy: He has hx of + FIT test.  he was previously referred to GI for colonoscopy but he states he did not make appt.  I discussed with him that his FIT test in past 2021 came back positive.  i discussed significance of this with him at length.  I again today advised he make appt to see GI as he needs colonoscopy for colon cancer screening-he states he will make appt-referral given\par \par FIT testin2021 positive\par \par Depression screenin2023 PHQ 2 score 0\par \par PSA: 2021 0.42-check PSA\par \par Lung cancer screening: discussed with pt-45 pack year hx-quit in -will order low dose CT chest today\par \par \par F/U 4 weeks.  Fasting labs ordered last visit and he will have done at lab when he is fasting

## 2023-02-13 NOTE — HISTORY OF PRESENT ILLNESS
[de-identified] : Here for CPE\par \par He reports he recently noted a lump on the back of his head.  He states he was draining some fluid and was tender.  He states since then it is less tender but appears to have formed a scab.  He denies any fever/chills.  He denies any recent injuries.  He is not sure if he needs some antibiotics\par \par He states he otherwise feels well

## 2023-02-13 NOTE — PHYSICAL EXAM
[No Acute Distress] : no acute distress [Well Nourished] : well nourished [Well Developed] : well developed [Well-Appearing] : well-appearing [Normal Voice/Communication] : normal voice/communication [Normal Sclera/Conjunctiva] : normal sclera/conjunctiva [PERRL] : pupils equal round and reactive to light [EOMI] : extraocular movements intact [Normal Outer Ear/Nose] : the outer ears and nose were normal in appearance [Normal Oropharynx] : the oropharynx was normal [Normal TMs] : both tympanic membranes were normal [Normal Nasal Mucosa] : the nasal mucosa was normal [No JVD] : no jugular venous distention [No Lymphadenopathy] : no lymphadenopathy [Supple] : supple [Thyroid Normal, No Nodules] : the thyroid was normal and there were no nodules present [Normal] : normal rate, regular rhythm, normal S1 and S2 and no murmur heard [No Carotid Bruits] : no carotid bruits [No Edema] : there was no peripheral edema [Soft] : abdomen soft [Non Tender] : non-tender [Non-distended] : non-distended [No Masses] : no abdominal mass palpated [No HSM] : no HSM [Normal Bowel Sounds] : normal bowel sounds [No CVA Tenderness] : no CVA  tenderness [No Spinal Tenderness] : no spinal tenderness [No Joint Swelling] : no joint swelling [No Rash] : no rash [No Focal Deficits] : no focal deficits [Normal Affect] : the affect was normal [Alert and Oriented x3] : oriented to person, place, and time [Normal Mood] : the mood was normal [Normal Insight/Judgement] : insight and judgment were intact [de-identified] : Posterior scalp he has a 2 cm x 2 cm hard scap, no obvious erythema or discharge [de-identified] : No calf tenderness, he has decreased DP/PT pulses bilaterally, capillary refill less than 3 seconds, bilateral feet are warm to touch

## 2023-02-20 LAB
25(OH)D3 SERPL-MCNC: 31.9 NG/ML
ALBUMIN SERPL ELPH-MCNC: 4.6 G/DL
ALP BLD-CCNC: 168 U/L
ALT SERPL-CCNC: 30 U/L
ANION GAP SERPL CALC-SCNC: 14 MMOL/L
APPEARANCE: CLEAR
AST SERPL-CCNC: 23 U/L
BACTERIA: NEGATIVE
BASOPHILS # BLD AUTO: 0.04 K/UL
BASOPHILS NFR BLD AUTO: 0.4 %
BILIRUB SERPL-MCNC: <0.2 MG/DL
BILIRUBIN URINE: NEGATIVE
BLOOD URINE: NEGATIVE
BUN SERPL-MCNC: 14 MG/DL
CALCIUM SERPL-MCNC: 9.9 MG/DL
CHLORIDE SERPL-SCNC: 100 MMOL/L
CHOLEST SERPL-MCNC: 262 MG/DL
CO2 SERPL-SCNC: 25 MMOL/L
COLOR: YELLOW
CREAT SERPL-MCNC: 0.9 MG/DL
EGFR: 98 ML/MIN/1.73M2
EOSINOPHIL # BLD AUTO: 0.25 K/UL
EOSINOPHIL NFR BLD AUTO: 2.6 %
ESTIMATED AVERAGE GLUCOSE: 114 MG/DL
GGT SERPL-CCNC: 101 U/L
GLUCOSE QUALITATIVE U: NEGATIVE
GLUCOSE SERPL-MCNC: 120 MG/DL
HBA1C MFR BLD HPLC: 5.6 %
HCT VFR BLD CALC: 47.7 %
HDLC SERPL-MCNC: 53 MG/DL
HGB BLD-MCNC: 15.6 G/DL
HYALINE CASTS: 0 /LPF
IMM GRANULOCYTES NFR BLD AUTO: 0.6 %
KETONES URINE: NEGATIVE
LDLC SERPL CALC-MCNC: 167 MG/DL
LEUKOCYTE ESTERASE URINE: NEGATIVE
LYMPHOCYTES # BLD AUTO: 2.67 K/UL
LYMPHOCYTES NFR BLD AUTO: 27.8 %
MAN DIFF?: NORMAL
MCHC RBC-ENTMCNC: 30.7 PG
MCHC RBC-ENTMCNC: 32.7 GM/DL
MCV RBC AUTO: 93.9 FL
MICROSCOPIC-UA: NORMAL
MONOCYTES # BLD AUTO: 0.65 K/UL
MONOCYTES NFR BLD AUTO: 6.8 %
NEUTROPHILS # BLD AUTO: 5.92 K/UL
NEUTROPHILS NFR BLD AUTO: 61.8 %
NITRITE URINE: NEGATIVE
NONHDLC SERPL-MCNC: 209 MG/DL
PH URINE: 6.5
PHENYTOIN SERPL QL: 5.5 UG/ML
PLATELET # BLD AUTO: 227 K/UL
POTASSIUM SERPL-SCNC: 4.8 MMOL/L
PROT SERPL-MCNC: 7.5 G/DL
PROTEIN URINE: NEGATIVE
PSA SERPL-MCNC: 0.49 NG/ML
RBC # BLD: 5.08 M/UL
RBC # FLD: 16.5 %
RED BLOOD CELLS URINE: 3 /HPF
SODIUM SERPL-SCNC: 140 MMOL/L
SPECIFIC GRAVITY URINE: 1.02
SQUAMOUS EPITHELIAL CELLS: 0 /HPF
T4 FREE SERPL-MCNC: 0.9 NG/DL
TRIGL SERPL-MCNC: 208 MG/DL
TSH SERPL-ACNC: 2.09 UIU/ML
UROBILINOGEN URINE: NORMAL
WBC # FLD AUTO: 9.59 K/UL
WHITE BLOOD CELLS URINE: 1 /HPF

## 2023-02-21 ENCOUNTER — NON-APPOINTMENT (OUTPATIENT)
Age: 58
End: 2023-02-21

## 2023-02-27 ENCOUNTER — APPOINTMENT (OUTPATIENT)
Dept: ULTRASOUND IMAGING | Facility: CLINIC | Age: 58
End: 2023-02-27

## 2023-02-28 ENCOUNTER — FORM ENCOUNTER (OUTPATIENT)
Age: 58
End: 2023-02-28

## 2023-03-01 ENCOUNTER — APPOINTMENT (OUTPATIENT)
Dept: CT IMAGING | Facility: CLINIC | Age: 58
End: 2023-03-01

## 2023-03-05 ENCOUNTER — FORM ENCOUNTER (OUTPATIENT)
Age: 58
End: 2023-03-05

## 2023-03-11 PROBLEM — I73.9 PERIPHERAL VASCULAR DISEASE, UNSPECIFIED: Chronic | Status: ACTIVE | Noted: 2023-02-28

## 2023-03-21 RX ORDER — CHLORHEXIDINE GLUCONATE 213 G/1000ML
1 SOLUTION TOPICAL ONCE
Refills: 0 | Status: DISCONTINUED | OUTPATIENT
Start: 2023-03-22 | End: 2023-04-05

## 2023-03-21 NOTE — H&P PST ADULT - ASSESSMENT
Pt scheduled for RLE angiogram with possible angioplasty, possible stent to preserve primary patency of the bypass graft due to elevated belocities at distal anastomosis.    ASA  Mallampati  GFR  Creat  Bleeding Risk     Plan: PRE-PROCEDURE ASSESSMENT    -Patient seen and examined  PRE-PROCEDURE ASSESSMENT  -NPO after midnight confirmed  -IV insert  -Patient seen and examined  -Labs reviewed  -Pre-procedure teaching completed with patient   -consent obtained   -Questions answered

## 2023-03-21 NOTE — H&P PST ADULT - HISTORY OF PRESENT ILLNESS
Narrative: This is a 57 yo male with a PMHx of heavy tobacco use, PAD sp right SFA stenting in 2017, carotid stenosis, a left femoral endarterectomy on 10/1/2021, R great toe gangrene s/p right fem PT bypass with GSV on 5/13/22. He was admitted to the hospital on 8/23 after attempted RLE angio without intervention, was taken for a right femoral-distal tibial artery bypass with cryoVein on 8/30/22. Later was discharged on home antibiotics by ID for toe OM.  He is on ASA and xarelto 2.5 mg daily. he is also sp R CEA on 11/8/22. He is an active tobacco smoker. Past surgical history includes R SFA angioplRLE femoral todistal tibial bypass with Cryovein,     Pt scheduled for RLE angiogram with possible angioplasty, possible stent to preserve primary patency of the bypass graft due to elevated belocities at distal anastomosis.    Risk Factors for PAD       Age: 57       DM: N/A       Smoking History: Active tobacco smoker       Hyperlipidemia: YES       Hypertension: YES       Family History of PAD: UNK       Known Atherosclerotic Disease (coronary, carotid, subclavian, renal, mesenteric, AAA): Carotid artery disease    Subjective Complaints:        Claudication Nic Class:        Impaired Walking Function: N/A       Ischemic Rest Pain: N/A       Other Non-Joint Related Exertional Lower Extremity Symptoms (not typical of claudication): N/A    Objective Findings:        Lower Extremity Pulses: Unable to palpate DP pulses, doppler pulses noted.       Nonhealing Lower Extremity Wound: N/A       Lower Extremity Gangrene: N/A       Vascular Bruit: N/A       Other Suggestive Lower Extremity Findings (elevation pallor, dependent rubor): N/A    Vascular Testing:        JOSE (Normal/Borderline/Abnormal/Noncompressable):        Arterial Dopplers: Limited duplex:  Right-Patent femoral to posterior tibial artery cryovein bypas graft with elevated velocities noted in the distal anastamosis.  Patent runoff arteries noted.       CTA/MRA:     Medical Management:       Antiplatelets: plavix and ASA        Cilostazol:        Statin: Atorvastatin        Nic Claudication Classification:        I: Asymptomatic       IIa: Walking > 200 meters (2.5 blocks)       IIb: Walking < 200 meters (2.5 blocks)       III: Rest/nocturnal pain       IV: Necrosis/gangrene    Ankle-Brachial Index:       Noncompressable: > 1.4       Normal: 1.0 to 1.4       Borderline: 0.91 to 0.99       Abnormal: < 0.91

## 2023-03-22 ENCOUNTER — TRANSCRIPTION ENCOUNTER (OUTPATIENT)
Age: 58
End: 2023-03-22

## 2023-03-22 ENCOUNTER — APPOINTMENT (OUTPATIENT)
Dept: VASCULAR SURGERY | Facility: HOSPITAL | Age: 58
End: 2023-03-22

## 2023-03-22 ENCOUNTER — OUTPATIENT (OUTPATIENT)
Dept: OUTPATIENT SERVICES | Facility: HOSPITAL | Age: 58
LOS: 1 days | Discharge: HOME | End: 2023-03-22
Payer: COMMERCIAL

## 2023-03-22 VITALS
DIASTOLIC BLOOD PRESSURE: 74 MMHG | HEART RATE: 74 BPM | RESPIRATION RATE: 18 BRPM | OXYGEN SATURATION: 96 % | SYSTOLIC BLOOD PRESSURE: 113 MMHG

## 2023-03-22 VITALS
RESPIRATION RATE: 15 BRPM | DIASTOLIC BLOOD PRESSURE: 56 MMHG | SYSTOLIC BLOOD PRESSURE: 135 MMHG | WEIGHT: 147.93 LBS | HEIGHT: 65 IN | TEMPERATURE: 98 F | HEART RATE: 56 BPM | OXYGEN SATURATION: 94 %

## 2023-03-22 DIAGNOSIS — I73.9 PERIPHERAL VASCULAR DISEASE, UNSPECIFIED: ICD-10-CM

## 2023-03-22 DIAGNOSIS — Z98.890 OTHER SPECIFIED POSTPROCEDURAL STATES: Chronic | ICD-10-CM

## 2023-03-22 DIAGNOSIS — Z98.89 OTHER SPECIFIED POSTPROCEDURAL STATES: Chronic | ICD-10-CM

## 2023-03-22 DIAGNOSIS — K40.90 UNILATERAL INGUINAL HERNIA, WITHOUT OBSTRUCTION OR GANGRENE, NOT SPECIFIED AS RECURRENT: Chronic | ICD-10-CM

## 2023-03-22 DIAGNOSIS — Z96.7 PRESENCE OF OTHER BONE AND TENDON IMPLANTS: Chronic | ICD-10-CM

## 2023-03-22 LAB
ANION GAP SERPL CALC-SCNC: 18 MMOL/L — HIGH (ref 5–17)
BASOPHILS # BLD AUTO: 0.04 K/UL — SIGNIFICANT CHANGE UP (ref 0–0.2)
BASOPHILS NFR BLD AUTO: 0.4 % — SIGNIFICANT CHANGE UP (ref 0–2)
BUN SERPL-MCNC: 15.8 MG/DL — SIGNIFICANT CHANGE UP (ref 8–20)
CALCIUM SERPL-MCNC: 9.4 MG/DL — SIGNIFICANT CHANGE UP (ref 8.4–10.5)
CHLORIDE SERPL-SCNC: 101 MMOL/L — SIGNIFICANT CHANGE UP (ref 96–108)
CO2 SERPL-SCNC: 24 MMOL/L — SIGNIFICANT CHANGE UP (ref 22–29)
CREAT SERPL-MCNC: 0.78 MG/DL — SIGNIFICANT CHANGE UP (ref 0.5–1.3)
EGFR: 102 ML/MIN/1.73M2 — SIGNIFICANT CHANGE UP
EOSINOPHIL # BLD AUTO: 0.24 K/UL — SIGNIFICANT CHANGE UP (ref 0–0.5)
EOSINOPHIL NFR BLD AUTO: 2.7 % — SIGNIFICANT CHANGE UP (ref 0–6)
GLUCOSE SERPL-MCNC: 83 MG/DL — SIGNIFICANT CHANGE UP (ref 70–99)
HCT VFR BLD CALC: 50.4 % — HIGH (ref 39–50)
HGB BLD-MCNC: 16.4 G/DL — SIGNIFICANT CHANGE UP (ref 13–17)
IMM GRANULOCYTES NFR BLD AUTO: 0.3 % — SIGNIFICANT CHANGE UP (ref 0–0.9)
LYMPHOCYTES # BLD AUTO: 2.48 K/UL — SIGNIFICANT CHANGE UP (ref 1–3.3)
LYMPHOCYTES # BLD AUTO: 27.6 % — SIGNIFICANT CHANGE UP (ref 13–44)
MCHC RBC-ENTMCNC: 30.5 PG — SIGNIFICANT CHANGE UP (ref 27–34)
MCHC RBC-ENTMCNC: 32.5 GM/DL — SIGNIFICANT CHANGE UP (ref 32–36)
MCV RBC AUTO: 93.7 FL — SIGNIFICANT CHANGE UP (ref 80–100)
MONOCYTES # BLD AUTO: 0.73 K/UL — SIGNIFICANT CHANGE UP (ref 0–0.9)
MONOCYTES NFR BLD AUTO: 8.1 % — SIGNIFICANT CHANGE UP (ref 2–14)
NEUTROPHILS # BLD AUTO: 5.48 K/UL — SIGNIFICANT CHANGE UP (ref 1.8–7.4)
NEUTROPHILS NFR BLD AUTO: 60.9 % — SIGNIFICANT CHANGE UP (ref 43–77)
PLATELET # BLD AUTO: 193 K/UL — SIGNIFICANT CHANGE UP (ref 150–400)
POTASSIUM SERPL-MCNC: 4.6 MMOL/L — SIGNIFICANT CHANGE UP (ref 3.5–5.3)
POTASSIUM SERPL-SCNC: 4.6 MMOL/L — SIGNIFICANT CHANGE UP (ref 3.5–5.3)
RBC # BLD: 5.38 M/UL — SIGNIFICANT CHANGE UP (ref 4.2–5.8)
RBC # FLD: 15.9 % — HIGH (ref 10.3–14.5)
SODIUM SERPL-SCNC: 143 MMOL/L — SIGNIFICANT CHANGE UP (ref 135–145)
WBC # BLD: 9 K/UL — SIGNIFICANT CHANGE UP (ref 3.8–10.5)
WBC # FLD AUTO: 9 K/UL — SIGNIFICANT CHANGE UP (ref 3.8–10.5)

## 2023-03-22 PROCEDURE — 75710 ARTERY X-RAYS ARM/LEG: CPT | Mod: 26,59

## 2023-03-22 PROCEDURE — 85025 COMPLETE CBC W/AUTO DIFF WBC: CPT

## 2023-03-22 PROCEDURE — C1887: CPT

## 2023-03-22 PROCEDURE — 37228: CPT

## 2023-03-22 PROCEDURE — 75716 ARTERY X-RAYS ARMS/LEGS: CPT | Mod: 59

## 2023-03-22 PROCEDURE — 37228: CPT | Mod: GC,RT

## 2023-03-22 PROCEDURE — 80048 BASIC METABOLIC PNL TOTAL CA: CPT

## 2023-03-22 PROCEDURE — 36247 INS CATH ABD/L-EXT ART 3RD: CPT | Mod: GC,59

## 2023-03-22 PROCEDURE — 99152 MOD SED SAME PHYS/QHP 5/>YRS: CPT | Mod: GC

## 2023-03-22 PROCEDURE — C1766: CPT

## 2023-03-22 PROCEDURE — C1769: CPT

## 2023-03-22 PROCEDURE — C1894: CPT

## 2023-03-22 PROCEDURE — 36415 COLL VENOUS BLD VENIPUNCTURE: CPT

## 2023-03-22 PROCEDURE — C1725: CPT

## 2023-03-22 PROCEDURE — 76937 US GUIDE VASCULAR ACCESS: CPT | Mod: 26,GC

## 2023-03-22 RX ORDER — QUETIAPINE FUMARATE 200 MG/1
200 TABLET, FILM COATED ORAL AT BEDTIME
Refills: 0 | Status: DISCONTINUED | OUTPATIENT
Start: 2023-03-22 | End: 2023-04-05

## 2023-03-22 RX ORDER — AMLODIPINE BESYLATE 2.5 MG/1
10 TABLET ORAL DAILY
Refills: 0 | Status: DISCONTINUED | OUTPATIENT
Start: 2023-03-22 | End: 2023-04-05

## 2023-03-22 RX ORDER — PANTOPRAZOLE SODIUM 20 MG/1
40 TABLET, DELAYED RELEASE ORAL
Refills: 0 | Status: DISCONTINUED | OUTPATIENT
Start: 2023-03-22 | End: 2023-04-05

## 2023-03-22 RX ORDER — GABAPENTIN 400 MG/1
600 CAPSULE ORAL THREE TIMES A DAY
Refills: 0 | Status: DISCONTINUED | OUTPATIENT
Start: 2023-03-22 | End: 2023-04-05

## 2023-03-22 RX ORDER — ATORVASTATIN CALCIUM 80 MG/1
80 TABLET, FILM COATED ORAL AT BEDTIME
Refills: 0 | Status: DISCONTINUED | OUTPATIENT
Start: 2023-03-22 | End: 2023-04-05

## 2023-03-22 RX ORDER — VENLAFAXINE HCL 75 MG
75 CAPSULE, EXT RELEASE 24 HR ORAL DAILY
Refills: 0 | Status: DISCONTINUED | OUTPATIENT
Start: 2023-03-22 | End: 2023-04-05

## 2023-03-22 RX ORDER — ASPIRIN/CALCIUM CARB/MAGNESIUM 324 MG
81 TABLET ORAL DAILY
Refills: 0 | Status: DISCONTINUED | OUTPATIENT
Start: 2023-03-22 | End: 2023-04-05

## 2023-03-22 NOTE — BRIEF OPERATIVE NOTE - OPERATION/FINDINGS
RLE angiogram performed which showed patent R fem-distal PT bypass however stenosis at distal anastomosis, small lesion noted of distal PT. Both lesions with balloon angioplasty and shockwave lithotripsy, mynx closure  post-op pulses: palpable PT

## 2023-03-22 NOTE — BRIEF OPERATIVE NOTE - NSICDXBRIEFPROCEDURE_GEN_ALL_CORE_FT
PROCEDURES:  Percutaneous transluminal angioplasty (PTA) of right posterior tibial vessel 22-Mar-2023 13:00:07  Marianne Higuera  Shockwave intravascular lithotripsy 22-Mar-2023 13:00:43  Marianne Higuera

## 2023-03-22 NOTE — DISCHARGE NOTE NURSING/CASE MANAGEMENT/SOCIAL WORK - PATIENT PORTAL LINK FT
You can access the FollowMyHealth Patient Portal offered by [facility] by registering at the following website: [email or web address]. By joining [employer]’s FollowMyHealth portal, you will also be able to view your health information using other applications (apps) compatible with our system.

## 2023-03-22 NOTE — ASU PATIENT PROFILE, ADULT - NSICDXPASTMEDICALHX_GEN_ALL_CORE_FT
PAST MEDICAL HISTORY:  Depression     Former smoker     GERD (gastroesophageal reflux disease)     Hypercholesterolemia     Hypertension     Hypertension     Osteoarthritis     PAD (peripheral artery disease)     Peripheral vascular disease with R SFA stent    Prediabetes     Seizures 2021   last seizure

## 2023-03-22 NOTE — DISCHARGE NOTE PROVIDER - CARE PROVIDER_API CALL
ManvarSingh, Pallavi B (MD)  Vascular Surgery  [address] [place] [address], [address] [place] [address]  Phone: [phone]  Fax: [phone]  Follow Up Time:

## 2023-03-22 NOTE — DISCHARGE NOTE NURSING/CASE MANAGEMENT/SOCIAL WORK - NSDCVIVACCINE_GEN_ALL_CORE_FT
Tdap; 18-Aug-2022 10:59; Neli Hoyt (RN); [employer]; [id number] (Exp. Date: 20-Sep-2024); IntraMuscular; Dorsogluteal Left.; 0.5 milliLiter(s); VIS (VIS Published: 09-May-2013, VIS Presented: 18-Aug-2022);

## 2023-03-22 NOTE — DISCHARGE NOTE PROVIDER - NSDCCPCAREPLAN_GEN_ALL_CORE_FT
PRINCIPAL DISCHARGE DIAGNOSIS  Diagnosis: PAD (peripheral artery disease)  Assessment and Plan of Treatment: No heavy lifting, driving, sex, tub baths, swimming, or any activity that submerges the lower half of the body in water for 48 hours.  Limited walking and stairs for 48 hours.    Change the bandaid after 24 hours and every 24 hours after that.  Keep the puncture site dry and covered with a bandaid until a scab forms.    Observe the site frequently.  If bleeding or a large lump (the size of a golf ball or bigger) occurs lie flat, apply continuous direct pressure just above the puncture site for at least 10 minutes, and notify your physician immediately.  If the bleeding cannot be controlled, call 911 immediately for assistance.  Notify your physician of pain, swelling or any drainage.    Notify your physician immediately if coldness, numbness, discoloration or pain in your foot occurs.  Do not stop Aspirin or Xarelto without speaking with cardiologist first

## 2023-03-22 NOTE — ASU PATIENT PROFILE, ADULT - FALL HARM RISK - UNIVERSAL INTERVENTIONS
Bed in lowest position, wheels locked, appropriate side rails in place/Call bell, personal items and telephone in reach/Instruct patient to call for assistance before getting out of bed or chair/Non-slip footwear when patient is out of bed/[place] to call system/Physically safe environment - no spills, clutter or unnecessary equipment/Purposeful Proactive Rounding/Room/bathroom lighting operational, light cord in reach

## 2023-03-22 NOTE — DISCHARGE NOTE PROVIDER - NSDCFUSCHEDAPPT_GEN_ALL_CORE_FT
Mohit Pat  [facility]  [facility] [address]  Scheduled Appointment: 04/04/2023

## 2023-03-22 NOTE — DISCHARGE NOTE NURSING/CASE MANAGEMENT/SOCIAL WORK - NSDCPEFALRISK_GEN_ALL_CORE
For information on Fall & Injury Prevention, visit: [email or web address] OR  [email or web address] OR  https://www.cdc.gov/steadi/patient.html

## 2023-03-22 NOTE — PROGRESS NOTE ADULT - SUBJECTIVE AND OBJECTIVE BOX
Successful balloon angioplasty and shock wave of Right distal PT and distal anastomosis via Left FA .   Hemostasis attained with Mynx closure device.   No complaint post procedure.   + DP/PT  + 1 RLE  toes warm RLE  left FA site no hematoma no bleeding.     plan for discharge at 3pm if Vss and Groin stable   pt will resume his Xarelto this sergio as well as other routine medications.

## 2023-03-22 NOTE — DISCHARGE NOTE PROVIDER - NSDCCPTREATMENT_GEN_ALL_CORE_FT
PRINCIPAL PROCEDURE  Procedure: Angioplasty, artery, peripheral  Findings and Treatment: No heavy lifting, driving, sex, tub baths, swimming, or any activity that submerges the lower half of the body in water for 48 hours.  Limited walking and stairs for 48 hours.    Change the bandaid after 24 hours and every 24 hours after that.  Keep the puncture site dry and covered with a bandaid until a scab forms.    Observe the site frequently.  If bleeding or a large lump (the size of a golf ball or bigger) occurs lie flat, apply continuous direct pressure just above the puncture site for at least 10 minutes, and notify your physician immediately.  If the bleeding cannot be controlled, call 911 immediately for assistance.  Notify your physician of pain, swelling or any drainage.    Notify your physician immediately if coldness, numbness, discoloration or pain in your foot occurs.

## 2023-03-22 NOTE — DISCHARGE NOTE PROVIDER - NSDCMRMEDTOKEN_GEN_ALL_CORE_FT
amLODIPine 10 mg oral tablet: 1 tab(s) orally once a day  aspirin 81 mg oral tablet, chewable: 1 tab(s) orally once a day  atorvastatin 80 mg oral tablet: 1 tab(s) orally once a day  Fish Oil 1000 mg oral capsule: 1 cap(s) orally 2 times a day  gabapentin 600 mg oral tablet: 1 tab(s) orally 3 times a day  pantoprazole 40 mg oral delayed release tablet: 1 tab(s) orally once a day  phenytoin 200 mg oral capsule, extended release: 1 cap(s) orally every 12 hours  QUEtiapine 200 mg oral tablet:  orally once a day (at bedtime)  venlafaxine 75 mg oral capsule, extended release: 1 cap(s) orally once a day  Xarelto 15 mg oral tablet: 1 tab(s) orally 2 times a day

## 2023-03-22 NOTE — DISCHARGE NOTE PROVIDER - HOSPITAL COURSE
· Subjective and Objective:   Successful balloon angioplasty and shock wave of Right distal PT and distal anastomosis via Left FA .   Hemostasis attained with Mynx closure device.   No complaint post procedure.   + DP/PT  + 1 RLE  toes warm RLE  left FA site no hematoma no bleeding.     plan for discharge at 3pm if Vss and Groin stable   pt will resume his Xarelto this sergio as well as other routine medications.

## 2023-04-01 ENCOUNTER — RX RENEWAL (OUTPATIENT)
Age: 58
End: 2023-04-01

## 2023-04-05 DIAGNOSIS — I70.301 UNSPECIFIED ATHEROSCLEROSIS OF UNSPECIFIED TYPE OF BYPASS GRAFT(S) OF THE EXTREMITIES, RIGHT LEG: ICD-10-CM

## 2023-04-10 ENCOUNTER — RX RENEWAL (OUTPATIENT)
Age: 58
End: 2023-04-10

## 2023-04-19 ENCOUNTER — RX RENEWAL (OUTPATIENT)
Age: 58
End: 2023-04-19

## 2023-04-26 NOTE — ASU PREOP CHECKLIST - HAND OFF
----- Message from Renu Mchugh sent at 4/26/2023  1:46 PM CDT -----  Please schedule patient with CRYSTAL BLACKMON    
Holding RN to OR RN

## 2023-05-01 ENCOUNTER — APPOINTMENT (OUTPATIENT)
Dept: VASCULAR SURGERY | Facility: CLINIC | Age: 58
End: 2023-05-01
Payer: MEDICAID

## 2023-05-01 VITALS
WEIGHT: 138 LBS | HEIGHT: 62 IN | HEART RATE: 89 BPM | DIASTOLIC BLOOD PRESSURE: 80 MMHG | BODY MASS INDEX: 25.4 KG/M2 | OXYGEN SATURATION: 96 % | TEMPERATURE: 98.2 F | RESPIRATION RATE: 16 BRPM | SYSTOLIC BLOOD PRESSURE: 138 MMHG

## 2023-05-01 PROCEDURE — 99214 OFFICE O/P EST MOD 30 MIN: CPT

## 2023-05-01 PROCEDURE — 93926 LOWER EXTREMITY STUDY: CPT

## 2023-05-01 NOTE — PHYSICAL EXAM
[JVD] : no jugular venous distention  [Normal Breath Sounds] : Normal breath sounds [Respiratory Effort] : normal respiratory effort [Normal Heart Sounds] : normal heart sounds [Normal Rate and Rhythm] : normal rate and rhythm [2+] : right 2+ [0] : left 0 [Ankle Swelling (On Exam)] : not present [Varicose Veins Of Lower Extremities] : not present [] : not present [Abdomen Masses] : No abdominal masses [Abdomen Tenderness] : ~T ~M No abdominal tenderness [Tender] : was nontender [Purpura] : no purpura  [Petechiae] : no petechiae [Skin Ulcer] : no ulcer [Skin Induration] : no induration [Alert] : alert [Oriented to Person] : oriented to person [Oriented to Place] : oriented to place [Oriented to Time] : oriented to time [Calm] : calm [de-identified] : no acute distress [FreeTextEntry1] : Palpable bypass graft and palpable R PT pulse\par  [de-identified] : FROM of all 4 extremities\par

## 2023-05-01 NOTE — ASSESSMENT
[FreeTextEntry1] : 56 year old man, previous heavy smoker with PAD (s/p right SFA stenting in 2017), carotid stenosis.\par Patient is s/p left femoral endarterectomy on 10/1/21\par Patient then had R great toe gangreene s/p right fem-PT bypass with GSV on 5/13/22\par Readmitted for occluded bypass, right leg wound and rest pain \par s/p right femoral-Distal tibial artery bypass with CryoVein on 8/30/22\par s/p R CEA on 11/8/23\par \par Arterial duplex images reviewed -- no evidence of residual flow-limiting stenosis of RLE fem-PT bypass \par  [Arterial/Venous Disease] : arterial/venous disease [Medication Management] : medication management

## 2023-05-01 NOTE — HISTORY OF PRESENT ILLNESS
[FreeTextEntry1] : 56 year old man, previous heavy smoker with PAD (s/p right SFA stenting in 2017), carotid stenosis.\par Patient is s/p left femoral endarterectomy on 10/1/21\par Patient then had R great toe gangrene s/p right fem-PT bypass with GSV on 5/13/22\par \par \par  [de-identified] : Patient was admitted to the hospital on 8/23, after attempted RLE  angio without intervention.Was taken for right femoral-Distal tibial artery bypass with CryoVein on 8/30/22. Later was discharged on home antibiotics by ID for toe OM. Patient reports feeling better and has continued antibiotics at home without fevers or chills. \par Denies rest pain. RLE wound has healed. No evidence of stroke symptoms.\par Compliant with aspirin and xarelto 2.5mg daily.\par Now s/p R CEA on 11/8/22\par \par s/p RLE angiogram and angioplasty and lithotripsy of PT artery and distal bypass anastomosis on 3/22/23 for increased velocities at the distal anastomosis concerning for impending bypass graft thrombosis. Doing well. No complications or complaints at this time. Compliant with all medications

## 2023-06-30 ENCOUNTER — APPOINTMENT (OUTPATIENT)
Dept: INTERNAL MEDICINE | Facility: CLINIC | Age: 58
End: 2023-06-30
Payer: MEDICAID

## 2023-06-30 ENCOUNTER — LABORATORY RESULT (OUTPATIENT)
Age: 58
End: 2023-06-30

## 2023-06-30 VITALS
OXYGEN SATURATION: 99 % | RESPIRATION RATE: 15 BRPM | DIASTOLIC BLOOD PRESSURE: 70 MMHG | HEART RATE: 90 BPM | SYSTOLIC BLOOD PRESSURE: 124 MMHG | HEIGHT: 62 IN | BODY MASS INDEX: 26.13 KG/M2 | WEIGHT: 142 LBS | TEMPERATURE: 97.7 F

## 2023-06-30 DIAGNOSIS — G40.909 EPILEPSY, UNSPECIFIED, NOT INTRACTABLE, W/OUT STATUS EPILEPTICUS: ICD-10-CM

## 2023-06-30 DIAGNOSIS — E78.5 HYPERLIPIDEMIA, UNSPECIFIED: ICD-10-CM

## 2023-06-30 DIAGNOSIS — I10 ESSENTIAL (PRIMARY) HYPERTENSION: ICD-10-CM

## 2023-06-30 DIAGNOSIS — R74.8 ABNORMAL LEVELS OF OTHER SERUM ENZYMES: ICD-10-CM

## 2023-06-30 DIAGNOSIS — E55.9 VITAMIN D DEFICIENCY, UNSPECIFIED: ICD-10-CM

## 2023-06-30 DIAGNOSIS — D64.9 ANEMIA, UNSPECIFIED: ICD-10-CM

## 2023-06-30 DIAGNOSIS — R19.5 OTHER FECAL ABNORMALITIES: ICD-10-CM

## 2023-06-30 DIAGNOSIS — M79.674 PAIN IN RIGHT TOE(S): ICD-10-CM

## 2023-06-30 PROCEDURE — 99214 OFFICE O/P EST MOD 30 MIN: CPT | Mod: 25

## 2023-06-30 RX ORDER — CEPHALEXIN 500 MG/1
500 CAPSULE ORAL
Qty: 14 | Refills: 0 | Status: DISCONTINUED | COMMUNITY
Start: 2023-02-13 | End: 2023-06-30

## 2023-07-05 DIAGNOSIS — D72.829 ELEVATED WHITE BLOOD CELL COUNT, UNSPECIFIED: ICD-10-CM

## 2023-07-05 PROBLEM — D64.9 ANEMIA: Status: ACTIVE | Noted: 2022-08-08

## 2023-07-05 PROBLEM — R19.5 POSITIVE FIT (FECAL IMMUNOCHEMICAL TEST): Status: ACTIVE | Noted: 2021-04-27

## 2023-07-05 LAB
ALBUMIN SERPL ELPH-MCNC: 5.2 G/DL
ALP BLD-CCNC: 186 U/L
ALT SERPL-CCNC: 35 U/L
ANION GAP SERPL CALC-SCNC: 18 MMOL/L
AST SERPL-CCNC: 26 U/L
BILIRUB SERPL-MCNC: 0.2 MG/DL
BUN SERPL-MCNC: 14 MG/DL
CALCIUM SERPL-MCNC: 10.5 MG/DL
CHLORIDE SERPL-SCNC: 100 MMOL/L
CHOLEST SERPL-MCNC: 323 MG/DL
CO2 SERPL-SCNC: 24 MMOL/L
CREAT SERPL-MCNC: 0.93 MG/DL
EGFR: 94 ML/MIN/1.73M2
ERYTHROCYTE [SEDIMENTATION RATE] IN BLOOD BY WESTERGREN METHOD: 11 MM/HR
GGT SERPL-CCNC: 150 U/L
GLUCOSE SERPL-MCNC: 93 MG/DL
HDLC SERPL-MCNC: 50 MG/DL
LDLC SERPL CALC-MCNC: NORMAL MG/DL
NONHDLC SERPL-MCNC: 273 MG/DL
PHENYTOIN SERPL QL: 10.2 UG/ML
POTASSIUM SERPL-SCNC: 5.1 MMOL/L
PROT SERPL-MCNC: 8.2 G/DL
SODIUM SERPL-SCNC: 143 MMOL/L
TRIGL SERPL-MCNC: 428 MG/DL

## 2023-07-05 NOTE — HISTORY OF PRESENT ILLNESS
[de-identified] : Here today because he needs medications refilled\par \par He reports he has been having severe pain in his right big toenail.  He states he sees podiatry and was told he has an ingrown toenail.  He states he has been seeing podiatrist for a while and states podiatrist can seem to fix it.  He states he has a follow-up appointment with his podiatrist tomorrow.  He states he was told he might need surgery for his ingrown toenail.  He states at times pain is severe and burning.  He states he almost went to the hospital due to pain recently.  Right lower extremity angioplasty approximately 3 months ago.

## 2023-07-05 NOTE — PHYSICAL EXAM
[No Acute Distress] : no acute distress [Well Nourished] : well nourished [Well Developed] : well developed [Well-Appearing] : well-appearing [Normal Voice/Communication] : normal voice/communication [Normal Sclera/Conjunctiva] : normal sclera/conjunctiva [PERRL] : pupils equal round and reactive to light [Normal Oropharynx] : the oropharynx was normal [No JVD] : no jugular venous distention [No Lymphadenopathy] : no lymphadenopathy [Supple] : supple [Thyroid Normal, No Nodules] : the thyroid was normal and there were no nodules present [Normal] : normal rate, regular rhythm, normal S1 and S2 and no murmur heard [No Carotid Bruits] : no carotid bruits [No Edema] : there was no peripheral edema [Soft] : abdomen soft [Non Tender] : non-tender [Non-distended] : non-distended [No Masses] : no abdominal mass palpated [No HSM] : no HSM [Normal Bowel Sounds] : normal bowel sounds [No Spinal Tenderness] : no spinal tenderness [No Joint Swelling] : no joint swelling [No Rash] : no rash [No Focal Deficits] : no focal deficits [Normal Affect] : the affect was normal [Alert and Oriented x3] : oriented to person, place, and time [Normal Mood] : the mood was normal [Normal Insight/Judgement] : insight and judgment were intact [de-identified] : No calf tenderness, +1 DP/PT pulses in bilaterally, capillary refill less than 3 seconds, bilateral feet are warm to touch [de-identified] : Right foot with mild ingrown toenail with no erythema or discharge

## 2023-07-05 NOTE — REVIEW OF SYSTEMS
[Negative] : Psychiatric [Chills] : no chills [Fever] : no fever [Fatigue] : no fatigue [Chest Pain] : no chest pain [Palpitations] : no palpitations [Lower Ext Edema] : no lower extremity edema [Shortness Of Breath] : no shortness of breath [Wheezing] : no wheezing [Cough] : no cough [Dyspnea on Exertion] : not dyspnea on exertion [Abdominal Pain] : no abdominal pain [Nausea] : no nausea [Diarrhea] : no diarrhea [Vomiting] : no vomiting [FreeTextEntry9] : See HPI [de-identified] : See HPI

## 2023-07-05 NOTE — ASSESSMENT
[FreeTextEntry1] : \par Right big toe pain\par -He reports he is being seen by podiatry for ingrown toenail.  It does not appear to be any significant erythema or swelling surrounding this ingrown toenail\par -Unclear if this is because of his pain\par -Neuropathy could be contributing\par -He does have history of PAD and is followed by vascular surgery\par -He has appointment to see podiatry tomorrow\par -I have advised x-ray of right toes\par -Reports she is currently taking gabapentin 600 mg once daily which she states helps a little\par -I have advised that he increase his gabapentin to 600 mg twice daily.  \par -Since he should go to ER\par \par PAD\par -followed by vascular surgery and plan is for right lower extremity angiogram with possible angioplasty RLE 3/2023\par -He was last seen by vascular surgery 2023\par -check fasting labs\par -he is on atorvastatin, Xarelto 2.5mg daily, and ASA-he states plavix is on hold until he is off xarelto-per vascular surgery\par -He will continue to follow-up with vascular surgery and has an appointment 2023\par \par Carotid stenosis:\par -s/p right CEA2023\par -he is on ASA and atorvastatin\par -check fasting labs\par \par Seizure disorder:\par -no recent seizures\par -He remains on Dilantin\par -Check labs\par \par Vitamin D Deficiency:\par -vitamin D 25-OH level was normal 2023\par \par GERD:\par -on pantoprazole\par \par Dyslipidemia\par -on atorvastatin 80mg PO daily and Lovaza\par -Check fasting labs\par \par Elevated alkaline phosphatase\par -Previous abdominal ultrasound was normal\par -hepatitis B and C serology was negative\par -seen by GI previously\par -I previously ordered abdominal ultrasound and referred him to hepatology\par -Check labs \par \par Anxiety/Insomnia/Depression:\par -on Venlafaxine ER 75mg PO daily and seroquel to 200mg PO QHS\par -Appears to be doing well on current medication regimen\par \par HTN:\par -BP at goal today\par -on Amlodipine 10mg Po daily\par \par Anemia\par -Check CBC \par \par \par HCM:\par \par CPE 2023\par \par [id number]\par \par Flu shot: 2022\par \par Tdap: 2017\par \par Pneumovax: 11/3/2015\par \par Shingles vaccine: He refused previously\par \par Covid vaccine: advised previously\par \par HIV testing offered: pt declined testing 2023\par \par Hep C screening: negative 2015\par \par Colonoscopy: He has hx of + FIT test.  He has been referred to GI previously.  He states he is aware he needs to make appointment but states he has not gone because of his right foot pain.  He states he does not want make appointment with GI until he has less right big toe pain.  I again discussed importance of getting colonoscopy today.  He states he will try to make appointment soon.  He understands positive FIT test could mean colon cancer\par \par FIT testin2021 positive\par \par Depression screenin2023 PHQ 2 score 0\par \par PSA: 0.49 2023\par \par Lung cancer screening: discussed with pt-45 pack year hx-quit in -I ordered LDCT chest previously\par \par \par F/U 3 months.  Labs ordered and drawn in office today.  Medications refilled

## 2023-07-06 ENCOUNTER — NON-APPOINTMENT (OUTPATIENT)
Age: 58
End: 2023-07-06

## 2023-07-21 ENCOUNTER — RX RENEWAL (OUTPATIENT)
Age: 58
End: 2023-07-21

## 2023-07-21 RX ORDER — ASPIRIN 81 MG/1
81 TABLET, DELAYED RELEASE ORAL DAILY
Qty: 90 | Refills: 0 | Status: ACTIVE | COMMUNITY
Start: 2020-01-03 | End: 1900-01-01

## 2023-07-29 NOTE — CONSULT NOTE ADULT - NS_MD_PANP_GEN_ALL_CORE
Attending and PA/NP shared services statement (NON-critical care):
Attending and PA/NP shared services statement (NON-critical care):
PAIN

## 2023-08-21 RX ORDER — EZETIMIBE 10 MG/1
10 TABLET ORAL
Qty: 90 | Refills: 0 | Status: COMPLETED | COMMUNITY
Start: 2023-02-21 | End: 2023-08-21

## 2023-09-20 ENCOUNTER — RX RENEWAL (OUTPATIENT)
Age: 58
End: 2023-09-20

## 2023-10-20 NOTE — DISCHARGE NOTE NURSING/CASE MANAGEMENT/SOCIAL WORK - FLU SEASON?
Yes... Prednisone Counseling:  I discussed with the patient the risks of prolonged use of prednisone including but not limited to weight gain, insomnia, osteoporosis, mood changes, diabetes, susceptibility to infection, glaucoma and high blood pressure.  In cases where prednisone use is prolonged, patients should be monitored with blood pressure checks, serum glucose levels and an eye exam.  Additionally, the patient may need to be placed on GI prophylaxis, PCP prophylaxis, and calcium and vitamin D supplementation and/or a bisphosphonate.  The patient verbalized understanding of the proper use and the possible adverse effects of prednisone.  All of the patient's questions and concerns were addressed.

## 2023-11-16 ENCOUNTER — RX RENEWAL (OUTPATIENT)
Age: 58
End: 2023-11-16

## 2023-11-17 ENCOUNTER — RX RENEWAL (OUTPATIENT)
Age: 58
End: 2023-11-17

## 2023-11-27 ENCOUNTER — APPOINTMENT (OUTPATIENT)
Dept: VASCULAR SURGERY | Facility: CLINIC | Age: 58
End: 2023-11-27

## 2023-11-27 ENCOUNTER — APPOINTMENT (OUTPATIENT)
Dept: INTERNAL MEDICINE | Facility: CLINIC | Age: 58
End: 2023-11-27

## 2023-11-30 NOTE — PATIENT PROFILE ADULT. - FUNCTIONAL LEVEL PRIOR: COMMUNICATION
Patient/Caregiver provided printed discharge information.
REASON FOR VISIT:  Chief Complaint   Patient presents with   • Office Visit   • Eye Exam   • Contact Lens   • Diabetes       HISTORY OF PRESENT ILLNESS: Vipul Spears is a 49 year old  who presents for a comprehensive dilated eye examination, refraction, and contact lens assessment for evaluation of ocular health, diabetes, and refractive error. Patient was last examined by me on December 8, 2021. He was diagnosed with diabetes in 2012 and reports his control has been adequate with a most recent hemoglobin A1c being 7.2. He denies blurring or fluctuations of his vision with high blood sugar readings. He reports no ocular complaints. He feels his vision is stable. He denies eye pain, irritation, redness, photophobia, burning, tearing, itching, discharge, dryness, foreign body sensation, decreased vision, diplopia, photopsia, and floaters. He is happy with the vision and comfort with his contact lenses.    ASSESSMENT:  1. Diabetes mellitus type 2 without retinopathy (CMD)    2. Cyst of left upper eyelid    3. Myopia of both eyes with regular astigmatism and presbyopia    4. Encounter for fitting or adjustment of spectacles or contact lenses        PLAN:  Orders Placed This Encounter   Procedures   • REFRACTION   • BASIC FIT EXISTING WEARER     -New prescriptions given for eyeglasses and contact lenses as written below. Trial contact lenses dispensed. Recommend 100% UV protection when outdoors to lessen the risk of cataracts, macular degeneration, and eyelid skin cancers. Emphasized the importance of proper care and handling of contact lenses including the daily use of multipurpose solutions if not wearing daily disposables, removing the lenses before bedtime, and replacing lenses as prescribed to prevent complications. Patient was instructed to return immediately with eye pain/irritation, light sensitivity, redness, discharge, or decreased vision.  -No evidence of diabetic retinopathy seen on dilated fundoscopic 
examination. In particular, no microaneurysms, edema, exudate, hemorrhages, or neovascularization noted in either eye. Discussed the importance of good glycemic control to minimize the risk of developing diabetic retinopathy and emphasized annual dilated exams to monitor for retinopathy. He was instructed to return immediately with vision changes.  -Patient will schedule an appointment with an ophthalmologist if he desires removal of his eyelid cyst.    FOLLOWUP:  Return in about 1 year (around 3/14/2024) for eye exam.      FINAL Rx GLASSES:  Final Rx       Sphere Cylinder Axis Dist VA Add    Right -1.50 +1.00 005 20/20 +1.75    Left -2.00 +0.75 165 20/20 +1.75    Expiration Date: 3/14/2024          FINAL Rx CONTACTS:  Final Contact Lens Rx       Brand Base Curve Diameter Sphere Cylinder Axis    Right Acuvue Oasys for Astigmatism 8.6 14.5 -0.75 -0.75 090    Left Acuvue Oasys for Astigmatism 8.6 14.5 [place] -0.75 080    Expiration Date: 3/14/2024    Replacement: Monthly          I reviewed the allergies, medication list, and past medical, family, and social history sections listed in the medical record as well as any pertinent nursing/tech notes and recent labs.  
(0) understands/communicates without difficulty

## 2023-12-04 ENCOUNTER — APPOINTMENT (OUTPATIENT)
Dept: VASCULAR SURGERY | Facility: CLINIC | Age: 58
End: 2023-12-04

## 2023-12-08 ENCOUNTER — APPOINTMENT (OUTPATIENT)
Dept: VASCULAR SURGERY | Facility: CLINIC | Age: 58
End: 2023-12-08

## 2024-01-19 ENCOUNTER — APPOINTMENT (OUTPATIENT)
Dept: INTERNAL MEDICINE | Facility: CLINIC | Age: 59
End: 2024-01-19

## 2024-01-25 NOTE — PHYSICAL THERAPY INITIAL EVALUATION ADULT - GENERAL OBSERVATIONS, REHAB EVAL
Call to Lab to ensure BMP is running for insulin titration.   Also informed lab BMP will be needing run hourly until POC is obtainable.    Pt received on 2GUL, pt ok for PT by FOX Hodges. pt observed semi-hoang in bed with IV lock, telemonitor, prevena wound vac x 1 (right groin) and VCBs, pleasant, cooperative, A&O and c/o 3/10 right LE  pain t/o eval. Pt received on 2GUL, pt ok for PT by FOX Hodges. pt observed semi-hoang in bed with IV lock, telemonitor, prevena wound vac x 1 (right groin), gallegos and VCBs, pleasant, cooperative, A&O and c/o 3/10 right LE  pain t/o eval.

## 2024-01-29 ENCOUNTER — APPOINTMENT (OUTPATIENT)
Dept: VASCULAR SURGERY | Facility: CLINIC | Age: 59
End: 2024-01-29
Payer: MEDICAID

## 2024-01-29 VITALS
BODY MASS INDEX: 26.13 KG/M2 | RESPIRATION RATE: 16 BRPM | DIASTOLIC BLOOD PRESSURE: 78 MMHG | TEMPERATURE: 98.3 F | HEIGHT: 62 IN | HEART RATE: 86 BPM | SYSTOLIC BLOOD PRESSURE: 128 MMHG | OXYGEN SATURATION: 96 % | WEIGHT: 142 LBS

## 2024-01-29 PROCEDURE — 99213 OFFICE O/P EST LOW 20 MIN: CPT

## 2024-02-01 ENCOUNTER — APPOINTMENT (OUTPATIENT)
Dept: VASCULAR SURGERY | Facility: CLINIC | Age: 59
End: 2024-02-01

## 2024-02-08 ENCOUNTER — APPOINTMENT (OUTPATIENT)
Dept: VASCULAR SURGERY | Facility: CLINIC | Age: 59
End: 2024-02-08
Payer: MEDICAID

## 2024-02-08 VITALS
BODY MASS INDEX: 25.03 KG/M2 | RESPIRATION RATE: 16 BRPM | OXYGEN SATURATION: 97 % | HEIGHT: 62 IN | WEIGHT: 136 LBS | SYSTOLIC BLOOD PRESSURE: 110 MMHG | DIASTOLIC BLOOD PRESSURE: 55 MMHG | HEART RATE: 78 BPM | TEMPERATURE: 97.5 F

## 2024-02-08 DIAGNOSIS — I65.29 OCCLUSION AND STENOSIS OF UNSPECIFIED CAROTID ARTERY: ICD-10-CM

## 2024-02-08 PROCEDURE — 99214 OFFICE O/P EST MOD 30 MIN: CPT

## 2024-02-08 PROCEDURE — 93926 LOWER EXTREMITY STUDY: CPT

## 2024-02-08 PROCEDURE — 93880 EXTRACRANIAL BILAT STUDY: CPT

## 2024-02-08 NOTE — PHYSICAL EXAM
[2+] : left 2+ [0] : right 0 [Alert] : alert [Oriented to Person] : oriented to person [Oriented to Place] : oriented to place [Calm] : calm [JVD] : no jugular venous distention  [Ankle Swelling (On Exam)] : not present [Varicose Veins Of Lower Extremities] : not present [Abdomen Masses] : No abdominal masses [Tender] : was nontender [Purpura] : no purpura  [de-identified] : NAD in chair [de-identified] : NC Atraumatic [de-identified] : no increased work of breathing [de-identified] : well perfused [FreeTextEntry1] :  Erythema of right foot to mid ankle, CR about 2s.   [de-identified] : FROM of all 4 extremities

## 2024-02-08 NOTE — REVIEW OF SYSTEMS
[Fever] : no fever [Chills] : no chills [FreeTextEntry9] : RLE redness from foot to inner calf [Joint Swelling] : joint swelling [As Noted in HPI] : as noted in HPI [Negative] : Heme/Lymph

## 2024-02-08 NOTE — ASSESSMENT
[FreeTextEntry1] : 58 year old man PMH significant for s/p right femoral-Distal tibial artery bypass with CryoVein on 8/30/22, PAD (s/p right SFA stenting in 2017), carotid stenosis, s/p left femoral endarterectomy on 10/1/21, right fem-PT bypass with GSV on 5/13/22 presents today for follow up visit. He was unable to complete today's visit due to his wife being hospitalized. He does report that yesterday he dropped an iron on his right foot and woke up this morning with redness and mild swelling extending from the right foot to the medial portion of the leg and calf. He denies fever, chest pain, SOB. He has missed several appointments due to illness in the family. Now evidence of bypass without flow, carotid with appropriate flow. denies claudication but significant erythema. NO emergent indication for intervention, as options are limited will discuss again if there is threat with tissue loss rest pain, follow up in 3 months [Arterial/Venous Disease] : arterial/venous disease [Foot care/Footwear] : foot care/footwear

## 2024-02-08 NOTE — HISTORY OF PRESENT ILLNESS
[FreeTextEntry1] : 58 year old man s/p right femoral-Distal tibial artery bypass with CryoVein on 8/30/22 presents today for follow up visit. He was previous heavy smoker with PAD (s/p right SFA stenting in 2017), carotid stenosis, s/p left femoral endarterectomy on 10/1/21. Patient then had R great toe gangrene s/p right fem-PT bypass with GSV on 5/13/22. Today he reports that he is experiencing redness and swelling form the right foot to the medial calf after an iron fell on his right foot. He has missed his previous appointments due to family issues. He is unable to stay for his visit today as his wife is currently hospitalized with lung cancer. Denies fever, chest pain, SOB. [de-identified] : Now returns for follow up. Endorses erythema and edema of great toe of right leg, denies claudication or wounds, but intermittent pain to great toe, denies weakness numbness or speech or visual changes. still smoking, denies missing AC.

## 2024-02-29 NOTE — PROGRESS NOTE ADULT - SUBJECTIVE AND OBJECTIVE BOX
Interval Hx:  Patient seen during rounds  Patient reports pain to be uncontrolled on current medications, PCA use ranges from 2mg - 6mg  current max dose set at 10mg  Patient denies sedation with medications     Analgesic Dosing for past 24 hours reviewed as below:    acetaminophen     Tablet ..   975 milliGRAM(s) Oral (08-29-22 @ 05:30)   975 milliGRAM(s) Oral (08-28-22 @ 22:27)   975 milliGRAM(s) Oral (08-28-22 @ 18:03)   975 milliGRAM(s) Oral (08-28-22 @ 13:45)    acetaminophen   IVPB ..   400 mL/Hr IV Intermittent (08-29-22 @ 10:41)    gabapentin   900 milliGRAM(s) Oral (08-29-22 @ 05:35)   900 milliGRAM(s) Oral (08-28-22 @ 22:26)   900 milliGRAM(s) Oral (08-28-22 @ 15:47)    melatonin   3 milliGRAM(s) Oral (08-28-22 @ 22:26)    methocarbamol   750 milliGRAM(s) Oral (08-29-22 @ 05:35)   750 milliGRAM(s) Oral (08-28-22 @ 22:26)   750 milliGRAM(s) Oral (08-28-22 @ 15:48)    phenytoin   Capsule   200 milliGRAM(s) Oral (08-29-22 @ 05:36)   200 milliGRAM(s) Oral (08-28-22 @ 18:02)    QUEtiapine   200 milliGRAM(s) Oral (08-28-22 @ 22:26)    venlafaxine XR.   75 milliGRAM(s) Oral (08-28-22 @ 11:58)          T(C): 36.5 (08-29-22 @ 11:14), Max: 36.8 (08-28-22 @ 20:21)  HR: 63 (08-29-22 @ 11:14) (63 - 98)  BP: 107/64 (08-29-22 @ 11:14) (107/64 - 157/94)  RR: 18 (08-29-22 @ 11:14) (17 - 18)  SpO2: 100% (08-29-22 @ 11:14) (93% - 100%)      08-28-22 @ 07:01  -  08-29-22 @ 07:00  --------------------------------------------------------  IN: 121 mL / OUT: 0 mL / NET: 121 mL        acetaminophen     Tablet .. 975 milliGRAM(s) Oral every 6 hours  aluminum hydroxide/magnesium hydroxide/simethicone Suspension 30 milliLiter(s) Oral every 8 hours PRN  amLODIPine   Tablet 10 milliGRAM(s) Oral daily  aspirin  chewable 81 milliGRAM(s) Oral daily  atorvastatin 80 milliGRAM(s) Oral at bedtime  clopidogrel Tablet 75 milliGRAM(s) Oral daily  gabapentin 900 milliGRAM(s) Oral three times a day  heparin  Infusion. 1200 Unit(s)/Hr IV Continuous <Continuous>  HYDROmorphone  Injectable 1 milliGRAM(s) IV Push once  HYDROmorphone PCA (1 mG/mL) 30 milliLiter(s) PCA Continuous PCA Continuous  ketorolac   Injectable 15 milliGRAM(s) IV Push every 6 hours  melatonin 3 milliGRAM(s) Oral at bedtime  methocarbamol 750 milliGRAM(s) Oral three times a day  naloxone Injectable 0.1 milliGRAM(s) IV Push every 3 minutes PRN  omega-3-Acid Ethyl Esters 2 Gram(s) Oral two times a day  ondansetron Injectable 4 milliGRAM(s) IV Push every 6 hours PRN  pantoprazole    Tablet 40 milliGRAM(s) Oral before breakfast  phenytoin   Capsule 200 milliGRAM(s) Oral every 12 hours  polyethylene glycol 3350 17 Gram(s) Oral two times a day  QUEtiapine 200 milliGRAM(s) Oral at bedtime  senna 2 Tablet(s) Oral at bedtime PRN  sodium chloride 0.9%. 1000 milliLiter(s) IV Continuous <Continuous>  venlafaxine XR. 75 milliGRAM(s) Oral daily                          8.8    11.13 )-----------( 301      ( 29 Aug 2022 05:39 )             29.1           PTT - ( 29 Aug 2022 05:39 )  PTT:77.3 sec      Pain Service   [phone] show

## 2024-04-03 ENCOUNTER — RX RENEWAL (OUTPATIENT)
Age: 59
End: 2024-04-03

## 2024-04-17 NOTE — HISTORY OF PRESENT ILLNESS
[FreeTextEntry1] : 58 year old man s/p right femoral-Distal tibial artery bypass with CryoVein on 8/30/22 presents today for follow up visit. He was previous heavy smoker with PAD (s/p right SFA stenting in 2017), carotid stenosis, s/p left femoral endarterectomy on 10/1/21. Patient then had R great toe gangrene s/p right fem-PT bypass with GSV on 5/13/22. Today he reports that he is experiencing redness and swelling form the right foot to the medial calf after an iron fell on his right foot. He has missed his previous appointments due to family issues. He is unable to stay for his visit today as his wife is currently hospitalized with lung cancer. Denies fever, chest pain, SOB. [de-identified] : Now returns for follow up. Endorses erythema and edema of great toe of right leg, denies claudication or wounds, but intermittent pain to great toe, denies weakness numbness or speech or visual changes. still smoking, compliant with AC

## 2024-04-17 NOTE — HISTORY OF PRESENT ILLNESS
[FreeTextEntry1] : 58 year old man s/p right femoral-Distal tibial artery bypass with CryoVein on 8/30/22 presents today for follow up visit. He was previous heavy smoker with PAD (s/p right SFA stenting in 2017), carotid stenosis, s/p left femoral endarterectomy on 10/1/21. Patient then had R great toe gangrene s/p right fem-PT bypass with GSV on 5/13/22. Today he reports that he is experiencing redness and swelling form the right foot to the medial calf after an iron fell on his right foot. He has missed his previous appointments due to family issues. He is unable to stay for his visit today as his wife is currently hospitalized with lung cancer. Denies fever, chest pain, SOB. [de-identified] : Now returns for follow up. Endorses erythema and edema of great toe of right leg, denies claudication or wounds, but intermittent pain to great toe, denies weakness numbness or speech or visual changes. still smoking, compliant with AC

## 2024-04-17 NOTE — ASSESSMENT
[Leg Bypass] : leg bypass [FreeTextEntry1] : PAD (peripheral artery disease) (443.9) (I73.9) 58 year old man PMH significant for s/p right femoral-Distal tibial artery bypass with CryoVein on 8/30/22, PAD (s/p right SFA stenting in 2017), carotid stenosis, s/p left femoral endarterectomy on 10/1/21, right fem-PT bypass with GSV on 5/13/22 presents today for follow up visit. He was unable to complete today's visit due to his wife being hospitalized. He does report that yesterday he dropped an iron on his right foot and woke up this morning with redness and mild swelling extending from the right foot to the medial portion of the leg and calf. He denies fever, chest pain, SOB. He has missed several appointments due to illness in the family. Now evidence of bypass without flow, carotid with appropriate flow. denies claudication but significant erythema without evidence of infection  [Arterial/Venous Disease] : arterial/venous disease

## 2024-04-17 NOTE — PHYSICAL EXAM
[Normal Breath Sounds] : Normal breath sounds [Respiratory Effort] : normal respiratory effort [de-identified] : erythematous RLE from the feet to the medial thigh, mild swelling. [2+] : left 2+ [0] : right 0 [Alert] : alert [Oriented to Person] : oriented to person [Oriented to Place] : oriented to place [Oriented to Time] : oriented to time [Calm] : calm [JVD] : no jugular venous distention  [Ankle Swelling (On Exam)] : not present [Varicose Veins Of Lower Extremities] : not present [Abdomen Masses] : No abdominal masses [Abdomen Tenderness] : ~T ~M No abdominal tenderness [Tender] : was nontender [Stool Sample Taken] : No stool obtained  on rectal exam [Petechiae] : petechiae [Skin Ulcer] : no ulcer [Skin Induration] : no induration [de-identified] : NAD in chair [de-identified] : NC/AT [de-identified] : no increased work of breathing [de-identified] : well perfused [FreeTextEntry1] : erythema of right foot to mid ankle with diffiuse petechiae

## 2024-04-17 NOTE — PHYSICAL EXAM
[Normal Breath Sounds] : Normal breath sounds [Respiratory Effort] : normal respiratory effort [de-identified] : erythematous RLE from the feet to the medial thigh, mild swelling. [2+] : left 2+ [0] : right 0 [Alert] : alert [Oriented to Person] : oriented to person [Oriented to Place] : oriented to place [Oriented to Time] : oriented to time [Calm] : calm [JVD] : no jugular venous distention  [Ankle Swelling (On Exam)] : not present [Varicose Veins Of Lower Extremities] : not present [Abdomen Masses] : No abdominal masses [Abdomen Tenderness] : ~T ~M No abdominal tenderness [Tender] : was nontender [Stool Sample Taken] : No stool obtained  on rectal exam [Petechiae] : petechiae [Skin Ulcer] : no ulcer [Skin Induration] : no induration [de-identified] : NAD in chair [de-identified] : NC/AT [de-identified] : no increased work of breathing [de-identified] : well perfused [FreeTextEntry1] : erythema of right foot to mid ankle with diffiuse petechiae

## 2024-04-18 ENCOUNTER — RX RENEWAL (OUTPATIENT)
Age: 59
End: 2024-04-18

## 2024-05-07 ENCOUNTER — APPOINTMENT (OUTPATIENT)
Dept: INTERNAL MEDICINE | Facility: CLINIC | Age: 59
End: 2024-05-07

## 2024-05-09 ENCOUNTER — RX RENEWAL (OUTPATIENT)
Age: 59
End: 2024-05-09

## 2024-05-09 ENCOUNTER — APPOINTMENT (OUTPATIENT)
Dept: VASCULAR SURGERY | Facility: CLINIC | Age: 59
End: 2024-05-09

## 2024-05-09 NOTE — ASSESSMENT
[FreeTextEntry1] : 59M with PMHx of HTN, HLD, Hypertriglyceridemia, PAD, Seizure disorder, GERD, Anemia, Anxiety, Depression, Insomnia, Erectile dysfunction, Gout, Osteomyelitis of left foot, presents for CPE and to establish care.   Health Maintenance: - EKG done and noted  - Routine labs - Vaccination: Flu? TDap, PCV20 - Colonoscopy?  HTN - on Amlodipine 10mg po qd  HLD - Atorvastatin 80mg po qd   Hypertriglyceridemia - Omega-3-acid Ethyl Esters 1mg po  PAD - Clopidogrel 75mg po qd - ASA 81 - Xarelto 2.5mg po bid  Seizure disorder - Phenytoin 100mg, 5 capsules qd  Anemia - Ferrous Sulfate 325mg po qd  Depression/ Anxiety - Seroquel 200mg po at bedtime  - Venlafaxine 75mg po qd  GERD - Pantoprazole 40mg   Katie Hester DO

## 2024-05-09 NOTE — HISTORY OF PRESENT ILLNESS
[FreeTextEntry1] : CPE/ Establish care [de-identified] : 59M with PMHx of Carotid stenosis s/p R CEA 11/2023, HTN, HLD, Hypertriglyceridemia, PAD, Seizure disorder, GERD, Anemia, Anxiety, Depression, Insomnia, Erectile dysfunction, Gout, Osteomyelitis of left foot, presents for CPE and to establish care. Patient

## 2024-05-22 ENCOUNTER — APPOINTMENT (OUTPATIENT)
Dept: VASCULAR SURGERY | Facility: CLINIC | Age: 59
End: 2024-05-22
Payer: MEDICAID

## 2024-05-22 VITALS
TEMPERATURE: 98 F | SYSTOLIC BLOOD PRESSURE: 114 MMHG | DIASTOLIC BLOOD PRESSURE: 70 MMHG | OXYGEN SATURATION: 97 % | RESPIRATION RATE: 16 BRPM | WEIGHT: 130 LBS | HEIGHT: 62 IN | BODY MASS INDEX: 23.92 KG/M2 | HEART RATE: 70 BPM

## 2024-05-22 PROCEDURE — 93923 UPR/LXTR ART STDY 3+ LVLS: CPT

## 2024-05-22 PROCEDURE — 99214 OFFICE O/P EST MOD 30 MIN: CPT

## 2024-05-23 NOTE — HISTORY OF PRESENT ILLNESS
[FreeTextEntry1] : 58 year old man s/p right femoral-Distal tibial artery bypass with CryoVein on 8/30/22 presents today for follow up visit. He was previous heavy smoker with PAD (s/p right SFA stenting in 2017), carotid stenosis, s/p left femoral endarterectomy on 10/1/21. Patient then had R great toe gangrene s/p right fem-PT bypass with GSV on 5/13/22. Today he reports that he is experiencing redness and swelling form the right foot to the medial calf after an iron fell on his right foot. He has missed his previous appointments due to family issues. He is unable to stay for his visit today as his wife is currently hospitalized with lung cancer. Denies fever, chest pain, SOB.   Interval History: Now returns for follow up. Endorses erythema and edema of great toe of right leg, denies claudication or wounds, but intermittent pain to great toe, denies weakness numbness or speech or visual changes. still smoking, denies missing AC. [de-identified] : Patient presents today for follow up with painful right great toe and erythema of the right foot and leg to the calf. Patient reports constant pain while walking and at rest especially to right great toe. He reports it sometimes keep him up at night. He is able to walk less than a quarter mile stopping multiple times secondary to pain. Denies ulcers or swelling. He reports being compliant with his medications including Xarelto, aspirin and atorvastatin.

## 2024-05-23 NOTE — ASSESSMENT
[FreeTextEntry1] : 59 year old man s/p right femoral-Distal tibial artery bypass with CryoVein on 8/30/22, PAD (s/p right SFA stenting in 2017), carotid stenosis, s/p left femoral endarterectomy on 10/1/21, right fem-PT bypass with GSV on 5/13/22 with known occlusion of the right femoral to posterior tibial bypass now with RLE ischemic rest pain. Pt has a 0.34 JOSE on the right and a 0.44 on the left  Plan Patient will be scheduled for RLE angiogram, 6/11/24 Continue current medications. Risks and complications explained.

## 2024-05-23 NOTE — PHYSICAL EXAM
[Normal Rate and Rhythm] : normal rate and rhythm [2+] : left 2+ [No Rash or Lesion] : No rash or lesion [Alert] : alert [Oriented to Person] : oriented to person [Oriented to Place] : oriented to place [Oriented to Time] : oriented to time [Calm] : calm [Abdomen Tenderness] : ~T ~M No abdominal tenderness [Skin Ulcer] : no ulcer [de-identified] :  no acute distress, sitting in chair [de-identified] : normocephalic, atraumatic [de-identified] :  supple, no masses. [de-identified] :   normal respiratory effort [FreeTextEntry1] : Dependent rubor of the RLE foot to the calf  [de-identified] : Moves all extremities.

## 2024-05-24 ENCOUNTER — APPOINTMENT (OUTPATIENT)
Dept: FAMILY MEDICINE | Facility: CLINIC | Age: 59
End: 2024-05-24
Payer: MEDICAID

## 2024-05-24 VITALS
HEART RATE: 98 BPM | SYSTOLIC BLOOD PRESSURE: 120 MMHG | RESPIRATION RATE: 16 BRPM | TEMPERATURE: 97.9 F | BODY MASS INDEX: 23.37 KG/M2 | HEIGHT: 62 IN | OXYGEN SATURATION: 96 % | DIASTOLIC BLOOD PRESSURE: 68 MMHG | WEIGHT: 127.01 LBS

## 2024-05-24 DIAGNOSIS — Z00.00 ENCOUNTER FOR GENERAL ADULT MEDICAL EXAMINATION W/OUT ABNORMAL FINDINGS: ICD-10-CM

## 2024-05-24 DIAGNOSIS — Z87.891 PERSONAL HISTORY OF NICOTINE DEPENDENCE: ICD-10-CM

## 2024-05-24 DIAGNOSIS — Z76.89 PERSONS ENCOUNTERING HEALTH SERVICES IN OTHER SPECIFIED CIRCUMSTANCES: ICD-10-CM

## 2024-05-24 PROCEDURE — 99214 OFFICE O/P EST MOD 30 MIN: CPT

## 2024-05-24 RX ORDER — SENNOSIDES 8.6 MG TABLETS 8.6 MG/1
8.6 TABLET ORAL
Qty: 60 | Refills: 0 | Status: COMPLETED | COMMUNITY
Start: 2023-04-01 | End: 2024-05-24

## 2024-05-24 RX ORDER — MUPIROCIN 20 MG/G
2 OINTMENT TOPICAL
Qty: 1 | Refills: 0 | Status: COMPLETED | COMMUNITY
Start: 2023-02-13 | End: 2024-05-24

## 2024-05-24 RX ORDER — SENNOSIDES 8.6 MG/1
8.6 TABLET, COATED ORAL
Qty: 60 | Refills: 0 | Status: COMPLETED | COMMUNITY
Start: 2022-09-07 | End: 2024-05-24

## 2024-05-24 RX ORDER — OMEGA-3-ACID ETHYL ESTERS CAPSULES 1 G/1
1 CAPSULE, LIQUID FILLED ORAL TWICE DAILY
Qty: 360 | Refills: 0 | Status: ACTIVE | COMMUNITY
Start: 2017-10-02 | End: 1900-01-01

## 2024-05-24 RX ORDER — RIVAROXABAN 2.5 MG/1
2.5 TABLET, FILM COATED ORAL
Qty: 180 | Refills: 0 | Status: COMPLETED | COMMUNITY
Start: 2023-09-19 | End: 2024-05-24

## 2024-05-24 RX ORDER — CLOPIDOGREL BISULFATE 75 MG/1
75 TABLET, FILM COATED ORAL DAILY
Qty: 90 | Refills: 0 | Status: DISCONTINUED | COMMUNITY
Start: 2022-02-17 | End: 2024-05-24

## 2024-05-24 RX ORDER — BLOOD-GLUCOSE METER
KIT MISCELLANEOUS
Qty: 1 | Refills: 0 | Status: COMPLETED | COMMUNITY
Start: 2022-11-07 | End: 2024-05-24

## 2024-05-24 RX ORDER — RIVAROXABAN 2.5 MG/1
2.5 TABLET, FILM COATED ORAL TWICE DAILY
Qty: 60 | Refills: 3 | Status: COMPLETED | COMMUNITY
Start: 2022-10-17 | End: 2024-05-24

## 2024-05-24 RX ORDER — CHLORHEXIDINE GLUCONATE 4 %
325 (65 FE) LIQUID (ML) TOPICAL
Qty: 90 | Refills: 0 | Status: COMPLETED | COMMUNITY
Start: 2022-09-07 | End: 2024-05-24

## 2024-05-24 RX ORDER — GABAPENTIN 600 MG/1
600 TABLET, COATED ORAL
Qty: 180 | Refills: 3 | Status: ACTIVE | COMMUNITY
Start: 2022-05-06 | End: 1900-01-01

## 2024-05-30 NOTE — COUNSELING
[Fall prevention counseling provided] : Fall prevention counseling provided [Adequate lighting] : Adequate lighting [No throw rugs] : No throw rugs [Use proper foot wear] : Use proper foot wear [Use recommended devices] : Use recommended devices [Behavioral health counseling provided] : Behavioral health counseling provided [Sleep ___ hours/day] : Sleep [unfilled] hours/day [Engage in a relaxing activity] : Engage in a relaxing activity [Plan in advance] : Plan in advance [Yes] : Risk of tobacco use and health benefits of smoking cessation discussed: Yes [AUDIT-C Screening administered and reviewed] : AUDIT-C Screening administered and reviewed [Encouraged to increase physical activity] : Encouraged to increase physical activity [____ min/wk Activity] : [unfilled] min/wk activity

## 2024-05-30 NOTE — PHYSICAL EXAM
Problem: Self Care Deficits Care Plan (Adult)  Goal: *Therapy Goal (Edit Goal, Insert Text)  Description: Occupational Therapy Goals   Long Term Goals  Initiated 22 and to be accomplished within 4 week(s)  1. Pt will perform self-feeding with Aimee. 2. Pt will perform grooming with Aimee. 3. Pt will perform UB bathing with supervision. 4. Pt will perform LB bathing with supervision. 5. Pt will perform tub/shower transfer with supervision. 6. Pt will perform UB dressing with Aimee. 7. Pt will perform LB dressing with Aimee. 8. Pt will perform toileting task with supervision. 9. Pt will perform toilet transfer with supervision. Short Term Goals   Initiated 22 and to be accomplished within 7 day(s), reassessed 4/10/22  1. Pt will perform self-feeding with SBA. 2. Pt will perform grooming with SBA. 3. Pt will perform UB bathing with SBA. GM 4/10/22  4. Pt will perform LB bathing with Chano. 5. Pt will perform tub/shower transfer with modA. 6. Pt will perform UB dressing with SBA. GM 4/10/22  7. Pt will perform LB dressing with modA. GM 4/10/22  8. Pt will perform toileting task with modA. 9. Pt will perform toilet transfer with Chano. Outcome: Progressing Towards Goal  Goal: Interventions  Outcome: Progressing Towards Goal   Occupational Therapy TREATMENT    Patient: Fernando Webster   25 y.o. Patient identified with name and : yes    Date: 2022    First Tx Session  Time In: 1132  Time Out[de-identified] 4436        Diagnosis: Central pontine myelinolysis ([facility] [address] [G37.2]   Precautions: Aspiration,Fall  Chart, occupational therapy assessment, plan of care, and goals were reviewed. Pain:  Pt reports -/10 pain or discomfort prior to treatment. Pt reports -/10 pain or discomfort post treatment. Intervention Provided:       SUBJECTIVE:   Patient stated my hand is curling at night.     OBJECTIVE DATA SUMMARY:     THERAPEUTIC ACTIVITY Daily Assessment    OTR fitted pt for LUE palm guard at night to reduce risk for contractures and skin integrity. Pt engaged in functional reach activity at tabletop with dowel and LUE stabilized to dowel with Ace Bandage to facilitate improved and sustained grasp. Pt demonstrated ability to climb 2 levels with symmetrical movements and VC's for controlled and smooth motions as well as increased LUE AROM and strength. X30 reps with RB's in between sets. Pt attempted to grasp cones at table top and slide from left to right across midline however demonstrated increased difficulty unable to grasp with RUE assistance. THERAPEUTIC EXERCISE Daily Assessment    Pt performed LUE AAROM with skateboard (abduction/adduction, shoulder flexion/extension) with SB at tabletop. Pt demonstrates slight LUE digit AROM flexion/extension~10 degrees digit 1-3. NMRE Daily Assessment    ESTIM to left forearm extensors 17 mA while squeezing yellow foam resistive block, with yellow foam block to increase AROM for self cares. TOILETING Daily Assessment    Functional Level: 3  Comments: Pt performed toileting with modA for toilet transfer and clothing management     MOBILITY/TRANSFERS Daily Assessment    Toilet Transfer Score: 4  Comments: Pt performed toilet transfer with FWW and Chano for stability             ASSESSMENT:  Pt is improving stability with FWW  for toileting and improved LUE grasp with assistance of RUE and ACE wrap for stability. Pt is increasing activity tolerance for self cares. Pt demonstrated decreased BLE tremors this date and issued LUE palm guard to don at night. Progression toward goals:  [x]          Improving appropriately and progressing toward goals  []          Improving slowly and progressing toward goals  []          Not making progress toward goals and plan of care will be adjusted     PLAN:  Patient continues to benefit from skilled intervention to address the above impairments. Continue treatment per established plan of care.   Discharge Recommendations: Home Health  Further Equipment Recommendations for Discharge: TBD     Activity Tolerance:  Fair-good      Estimated LOS:~ 5/3/22    Please refer to the flowsheet for vital signs taken during this treatment. After treatment:   []  Patient left in no apparent distress sitting up in chair   [x]  Patient left in no apparent distress in bed  [x]  Call bell left within reach  []  Nursing notified  []  Caregiver present  []  Bed alarm activated    COMMUNICATION/EDUCATION:   [] Home safety education was provided and the patient/caregiver indicated understanding. [] Patient/family have participated as able in goal setting and plan of care. [x] Patient/family agree to work toward stated goals and plan of care. [] Patient understands intent and goals of therapy, but is neutral about his/her participation. [] Patient is unable to participate in goal setting and plan of care.       Kiran Padilla, OT [No Acute Distress] : no acute distress [Well Nourished] : well nourished [Well Developed] : well developed [Well-Appearing] : well-appearing [Normal Sclera/Conjunctiva] : normal sclera/conjunctiva [PERRL] : pupils equal round and reactive to light [EOMI] : extraocular movements intact [Normal Outer Ear/Nose] : the outer ears and nose were normal in appearance [Normal Oropharynx] : the oropharynx was normal [No JVD] : no jugular venous distention [No Lymphadenopathy] : no lymphadenopathy [Supple] : supple [Thyroid Normal, No Nodules] : the thyroid was normal and there were no nodules present [No Respiratory Distress] : no respiratory distress  [No Accessory Muscle Use] : no accessory muscle use [Clear to Auscultation] : lungs were clear to auscultation bilaterally [Normal Rate] : normal rate  [Regular Rhythm] : with a regular rhythm [Normal S1, S2] : normal S1 and S2 [No Murmur] : no murmur heard [No Carotid Bruits] : no carotid bruits [No Abdominal Bruit] : a ~M bruit was not heard ~T in the abdomen [No Varicosities] : no varicosities [Pedal Pulses Present] : the pedal pulses are present [No Edema] : there was no peripheral edema [No Palpable Aorta] : no palpable aorta [No Extremity Clubbing/Cyanosis] : no extremity clubbing/cyanosis [Soft] : abdomen soft [Non Tender] : non-tender [Non-distended] : non-distended [No Masses] : no abdominal mass palpated [No HSM] : no HSM [Normal Bowel Sounds] : normal bowel sounds [Normal Posterior Cervical Nodes] : no posterior cervical lymphadenopathy [Normal Anterior Cervical Nodes] : no anterior cervical lymphadenopathy [No CVA Tenderness] : no CVA  tenderness [No Spinal Tenderness] : no spinal tenderness [No Joint Swelling] : no joint swelling [Grossly Normal Strength/Tone] : grossly normal strength/tone [No Rash] : no rash [Coordination Grossly Intact] : coordination grossly intact [No Focal Deficits] : no focal deficits [Normal Gait] : normal gait [Deep Tendon Reflexes (DTR)] : deep tendon reflexes were 2+ and symmetric [Normal Affect] : the affect was normal [Normal Insight/Judgement] : insight and judgment were intact

## 2024-05-31 PROBLEM — Z87.891 FORMER SMOKER: Status: ACTIVE | Noted: 2024-05-31

## 2024-05-31 NOTE — HEALTH RISK ASSESSMENT

## 2024-05-31 NOTE — HEALTH RISK ASSESSMENT
[Good] : ~his/her~  mood as  good [1 or 2 (0 pts)] : 1 or 2 (0 points) [Never (0 pts)] : Never (0 points) [No] : In the past 12 months have you used drugs other than those required for medical reasons? No [No falls in past year] : Patient reported no falls in the past year [0] : 2) Feeling down, depressed, or hopeless: Not at all (0) [PHQ-2 Negative - No further assessment needed] : PHQ-2 Negative - No further assessment needed [HIV test declined] : HIV test declined [Hepatitis C test declined] : Hepatitis C test declined [With Family] : lives with family [On disability] : on disability [Fully functional (bathing, dressing, toileting, transferring, walking, feeding)] : Fully functional (bathing, dressing, toileting, transferring, walking, feeding) [Smoke Detector] : smoke detector [Carbon Monoxide Detector] : carbon monoxide detector [Seat Belt] :  uses seat belt [Sunscreen] : uses sunscreen [Patient/Caregiver unclear of wishes] : , patient/caregiver unclear of wishes [Reviewed updated] : Reviewed, updated [Former] : Former [< 15 Years] : < 15 Years [Audit-CScore] : 0 [de-identified] : none [de-identified] : regular [[id number]] : 0 [Reports changes in hearing] : Reports no changes in hearing [Reports changes in vision] : Reports no changes in vision [Reports changes in dental health] : Reports no changes in dental health [Guns at Home] : no guns at home [ColonoscopyDate] : 2021 [ColonoscopyComments] : positive fit [AdvancecareDate] : 05/24 [de-identified] : smoked 45 years-quit 2022

## 2024-05-31 NOTE — HISTORY OF PRESENT ILLNESS
[FreeTextEntry1] : Establish care and CPE [de-identified] : This is a 58 y/o male with PMHx significant for Anemia, anxiety, Carotid stenosis s/p CEA 10/1/21, depression, elevated LFTs, GERD, HTN, HLD, Insomnia, PAD s/p right SFA stenting in 2017, s/p right fem-PT bypass with GSV on 5/13/22, s/p  right femoral-Distal tibial artery bypass with CryoVein on 8/30/22, Prediabetes, CAD s/p stent, seizure disorder, presenting to the office to establish care and for CPE. Pt currently following with Vascular for RLE ischemia.

## 2024-05-31 NOTE — HISTORY OF PRESENT ILLNESS
[FreeTextEntry1] : Establish care and CPE [de-identified] : This is a 58 y/o male with PMHx significant for Anemia, anxiety, Carotid stenosis s/p CEA 10/1/21, depression, elevated LFTs, GERD, HTN, HLD, Insomnia, PAD s/p right SFA stenting in 2017, s/p right fem-PT bypass with GSV on 5/13/22, s/p  right femoral-Distal tibial artery bypass with CryoVein on 8/30/22, Prediabetes, CAD s/p stent, seizure disorder, presenting to the office to establish care and for CPE. Pt currently following with Vascular for RLE ischemia.

## 2024-05-31 NOTE — ASSESSMENT
[FreeTextEntry1] : This is a 58 y/o male with PMHx significant for Anemia, anxiety, Carotid stenosis s/p CEA 10/1/21, depression, elevated LFTs, GERD, HTN, HLD, Insomnia, PAD s/p right SFA stenting in 2017, s/p right fem-PT bypass with GSV on 5/13/22, s/p  right femoral-Distal tibial artery bypass with CryoVein on 8/30/22, Prediabetes, CAD s/p stent, seizure disorder, presenting to the office to establish care and for CPE. Pt currently following with Vascular for RLE ischemia.  VASCULAR: s/p right femoral-Distal tibial artery bypass with CryoVein on 8/30/22, PAD (s/p right SFA stenting in 2017), carotid stenosis, s/p left femoral endarterectomy on 10/1/21, right fem-PT bypass with GSV on 5/13/22 with known occlusion of the right femoral to posterior tibial bypass now with RLE ischemic rest pain -Pt scheduled for RLE angiogram, 6/11/24 with Dr Serna -Continue low dose aspirin, Xarelto 2.5 mg bid, Gabapentin 600 mg qid  CARDIO: h/o HTN, HLD -Blood pressure under control -Cardiology Dr Mitch Irby -Continue Amlodipine 10 mg daily, Atorvastatin 80 mg daily, ASA 81 mg, Omega 3-Acid Esters 2gm bid  NEURO / PSYCH: h/o Seizures, insomnia, Depression -No reported recent seizures -Continue Phenytoin 500 mg daily, Seroquel 200 mg dailyat bedtime, Venlafaxine 75 mg daily  ENDO: Prediabetes -A1c 5.6 2/23, obtain A1c with average glucose -Diet and exercise as tolerated.  GI: Elevated LFTS, GERD -Continue Pantoprazole 40 mg daily -Check CMP  HCM: -blood work as outpt -Low dose CT scan for Lung CA screening recommended, never performed -FIOBT screen + 4/2021, referred for Colonoscopy by previous PMD, not compliant with recs. -HIV screening declined -Hep C screening negative 2015 -Pneumovax 11/15 -TDaP 12/17

## 2024-06-04 ENCOUNTER — OUTPATIENT (OUTPATIENT)
Dept: OUTPATIENT SERVICES | Facility: HOSPITAL | Age: 59
LOS: 1 days | End: 2024-06-04
Payer: COMMERCIAL

## 2024-06-04 VITALS
OXYGEN SATURATION: 97 % | HEIGHT: 64 IN | TEMPERATURE: 98 F | SYSTOLIC BLOOD PRESSURE: 112 MMHG | HEART RATE: 85 BPM | RESPIRATION RATE: 20 BRPM | DIASTOLIC BLOOD PRESSURE: 70 MMHG | WEIGHT: 130.95 LBS

## 2024-06-04 DIAGNOSIS — Z29.9 ENCOUNTER FOR PROPHYLACTIC MEASURES, UNSPECIFIED: ICD-10-CM

## 2024-06-04 DIAGNOSIS — Z98.89 OTHER SPECIFIED POSTPROCEDURAL STATES: Chronic | ICD-10-CM

## 2024-06-04 DIAGNOSIS — Z01.818 ENCOUNTER FOR OTHER PREPROCEDURAL EXAMINATION: ICD-10-CM

## 2024-06-04 DIAGNOSIS — Z98.890 OTHER SPECIFIED POSTPROCEDURAL STATES: Chronic | ICD-10-CM

## 2024-06-04 DIAGNOSIS — Z96.7 PRESENCE OF OTHER BONE AND TENDON IMPLANTS: Chronic | ICD-10-CM

## 2024-06-04 DIAGNOSIS — I73.9 PERIPHERAL VASCULAR DISEASE, UNSPECIFIED: ICD-10-CM

## 2024-06-04 DIAGNOSIS — I70.221 ATHEROSCLEROSIS OF NATIVE ARTERIES OF EXTREMITIES WITH REST PAIN, RIGHT LEG: ICD-10-CM

## 2024-06-04 DIAGNOSIS — I10 ESSENTIAL (PRIMARY) HYPERTENSION: ICD-10-CM

## 2024-06-04 DIAGNOSIS — K40.90 UNILATERAL INGUINAL HERNIA, WITHOUT OBSTRUCTION OR GANGRENE, NOT SPECIFIED AS RECURRENT: Chronic | ICD-10-CM

## 2024-06-04 LAB
A1C WITH ESTIMATED AVERAGE GLUCOSE RESULT: 5.8 % — HIGH (ref 4–5.6)
ALBUMIN SERPL ELPH-MCNC: 4.4 G/DL — SIGNIFICANT CHANGE UP (ref 3.3–5.2)
ALP SERPL-CCNC: 139 U/L — HIGH (ref 40–120)
ALT FLD-CCNC: 24 U/L — SIGNIFICANT CHANGE UP
ANION GAP SERPL CALC-SCNC: 13 MMOL/L — SIGNIFICANT CHANGE UP (ref 5–17)
APTT BLD: 24.4 SEC — LOW (ref 24.5–35.6)
AST SERPL-CCNC: 21 U/L — SIGNIFICANT CHANGE UP
BASOPHILS # BLD AUTO: 0.03 K/UL — SIGNIFICANT CHANGE UP (ref 0–0.2)
BASOPHILS NFR BLD AUTO: 0.4 % — SIGNIFICANT CHANGE UP (ref 0–2)
BILIRUB SERPL-MCNC: <0.2 MG/DL — LOW (ref 0.4–2)
BLD GP AB SCN SERPL QL: SIGNIFICANT CHANGE UP
BUN SERPL-MCNC: 19.1 MG/DL — SIGNIFICANT CHANGE UP (ref 8–20)
CALCIUM SERPL-MCNC: 9.4 MG/DL — SIGNIFICANT CHANGE UP (ref 8.4–10.5)
CHLORIDE SERPL-SCNC: 103 MMOL/L — SIGNIFICANT CHANGE UP (ref 96–108)
CO2 SERPL-SCNC: 25 MMOL/L — SIGNIFICANT CHANGE UP (ref 22–29)
CREAT SERPL-MCNC: 0.89 MG/DL — SIGNIFICANT CHANGE UP (ref 0.5–1.3)
EGFR: 98 ML/MIN/1.73M2 — SIGNIFICANT CHANGE UP
EOSINOPHIL # BLD AUTO: 0.21 K/UL — SIGNIFICANT CHANGE UP (ref 0–0.5)
EOSINOPHIL NFR BLD AUTO: 2.8 % — SIGNIFICANT CHANGE UP (ref 0–6)
ESTIMATED AVERAGE GLUCOSE: 120 MG/DL — HIGH (ref 68–114)
GLUCOSE SERPL-MCNC: 81 MG/DL — SIGNIFICANT CHANGE UP (ref 70–99)
HCT VFR BLD CALC: 44.6 % — SIGNIFICANT CHANGE UP (ref 39–50)
HGB BLD-MCNC: 14.8 G/DL — SIGNIFICANT CHANGE UP (ref 13–17)
IMM GRANULOCYTES NFR BLD AUTO: 0.5 % — SIGNIFICANT CHANGE UP (ref 0–0.9)
INR BLD: 0.94 RATIO — SIGNIFICANT CHANGE UP (ref 0.85–1.18)
LYMPHOCYTES # BLD AUTO: 2.04 K/UL — SIGNIFICANT CHANGE UP (ref 1–3.3)
LYMPHOCYTES # BLD AUTO: 27.2 % — SIGNIFICANT CHANGE UP (ref 13–44)
MAGNESIUM SERPL-MCNC: 2.1 MG/DL — SIGNIFICANT CHANGE UP (ref 1.6–2.6)
MCHC RBC-ENTMCNC: 30.5 PG — SIGNIFICANT CHANGE UP (ref 27–34)
MCHC RBC-ENTMCNC: 33.2 GM/DL — SIGNIFICANT CHANGE UP (ref 32–36)
MCV RBC AUTO: 91.8 FL — SIGNIFICANT CHANGE UP (ref 80–100)
MONOCYTES # BLD AUTO: 0.64 K/UL — SIGNIFICANT CHANGE UP (ref 0–0.9)
MONOCYTES NFR BLD AUTO: 8.5 % — SIGNIFICANT CHANGE UP (ref 2–14)
NEUTROPHILS # BLD AUTO: 4.54 K/UL — SIGNIFICANT CHANGE UP (ref 1.8–7.4)
NEUTROPHILS NFR BLD AUTO: 60.6 % — SIGNIFICANT CHANGE UP (ref 43–77)
PLATELET # BLD AUTO: 204 K/UL — SIGNIFICANT CHANGE UP (ref 150–400)
POTASSIUM SERPL-MCNC: 5.3 MMOL/L — SIGNIFICANT CHANGE UP (ref 3.5–5.3)
POTASSIUM SERPL-SCNC: 5.3 MMOL/L — SIGNIFICANT CHANGE UP (ref 3.5–5.3)
PROT SERPL-MCNC: 7.2 G/DL — SIGNIFICANT CHANGE UP (ref 6.6–8.7)
PROTHROM AB SERPL-ACNC: 10.4 SEC — SIGNIFICANT CHANGE UP (ref 9.5–13)
RBC # BLD: 4.86 M/UL — SIGNIFICANT CHANGE UP (ref 4.2–5.8)
RBC # FLD: 14.9 % — HIGH (ref 10.3–14.5)
SODIUM SERPL-SCNC: 140 MMOL/L — SIGNIFICANT CHANGE UP (ref 135–145)
WBC # BLD: 7.5 K/UL — SIGNIFICANT CHANGE UP (ref 3.8–10.5)
WBC # FLD AUTO: 7.5 K/UL — SIGNIFICANT CHANGE UP (ref 3.8–10.5)

## 2024-06-04 PROCEDURE — 83036 HEMOGLOBIN GLYCOSYLATED A1C: CPT

## 2024-06-04 PROCEDURE — 86901 BLOOD TYPING SEROLOGIC RH(D): CPT

## 2024-06-04 PROCEDURE — 80053 COMPREHEN METABOLIC PANEL: CPT

## 2024-06-04 PROCEDURE — 83735 ASSAY OF MAGNESIUM: CPT

## 2024-06-04 PROCEDURE — 86900 BLOOD TYPING SEROLOGIC ABO: CPT

## 2024-06-04 PROCEDURE — G0463: CPT

## 2024-06-04 PROCEDURE — 85025 COMPLETE CBC W/AUTO DIFF WBC: CPT

## 2024-06-04 PROCEDURE — 36415 COLL VENOUS BLD VENIPUNCTURE: CPT

## 2024-06-04 PROCEDURE — 93005 ELECTROCARDIOGRAM TRACING: CPT

## 2024-06-04 PROCEDURE — 93010 ELECTROCARDIOGRAM REPORT: CPT

## 2024-06-04 PROCEDURE — 86850 RBC ANTIBODY SCREEN: CPT

## 2024-06-04 PROCEDURE — 85610 PROTHROMBIN TIME: CPT

## 2024-06-04 PROCEDURE — 85730 THROMBOPLASTIN TIME PARTIAL: CPT

## 2024-06-04 RX ORDER — RIVAROXABAN 15 MG-20MG
1 KIT ORAL
Refills: 0 | DISCHARGE

## 2024-06-04 NOTE — H&P PST ADULT - HISTORY OF PRESENT ILLNESS
Pt is a 58 year old man seen today pre-op for Right lower extremity angiogram for PAD.  Pt  s/p right femoral-Distal tibial artery bypass with CryoVein on 8/30/22,  heavy smoker with PAD (s/p right SFA stenting in 2017), carotid stenosis, s/p left femoral endarterectomy on 10/1/21, S/p  Right great toe gangrene status post  right fem-PT bypass with GSV on 5/13/22. Pt  Pt is a 59 year old man seen today pre-op for Right lower extremity angiogram for PAD.  Pt  s/p right femoral-Distal tibial artery bypass with CryoVein on 8/30/22, former smoker with PAD (s/p right SFA stenting in 2017), carotid stenosis, s/p left femoral endarterectomy on 10/1/21, S/p  Right great toe gangrene status post  right fem-PT bypass with GSV on 5/13/22. Pt  reports intermittent right great toe pain that sometimes keep her up at night. Pt denies claudication, ulcers,  wounds, weakness numbness,  change in vision and any other related issues.  Positive dependant rubor  of the right foot and leg to the calf region noted, no open skin lesion. Pt on Xarelto BID, Pt medical hx includes seizure,  HLD, htn, prediabetes. Pt  scheduled for this surgery on 6/11/24 with Dr. Serna

## 2024-06-04 NOTE — H&P PST ADULT - ASSESSMENT
Pt is a 59 year old man seen today pre-op for Right lower extremity angiogram for PAD.  Pt  s/p right femoral-Distal tibial artery bypass with CryoVein on 22, former smoker with PAD (s/p right SFA stenting in 2017), carotid stenosis, s/p left femoral endarterectomy on 10/1/21, S/p  Right great toe gangrene status post  right fem-PT bypass with GSV on 22. Pt  reports intermittent right great toe pain that sometimes keep her up at night. Pt denies claudication, ulcers,  wounds, weakness numbness,  change in vision and any other related issues.  Positive dependant rubor  of the right foot and leg to the calf region noted, no open skin lesion. Pt on Xarelto BID, Pt medical hx includes seizure,  HLD, htn, prediabetes. Pt  scheduled for this surgery on 24 with Dr. Serna. Surgery protocol reviewed with Pt today. Pt to follow-up with PCP for medical clearance   CAPRINI VTE 2.0 SCORE [CLOT updated 2019]    AGE RELATED RISK FACTORS                                                       MOBILITY RELATED FACTORS  [ ] Age 41-60 years                                            (1 Point)                    [ ] Bed rest                                                        (1 Point)  [x ] Age: 61-74 years                                           (2 Points)                  [ ] Plaster cast                                                   (2 Points)  [ ] Age= 75 years                                              (3 Points)                    [ ] Bed bound for more than 72 hours                 (2 Points)    DISEASE RELATED RISK FACTORS                                               GENDER SPECIFIC FACTORS  [ ] Edema in the lower extremities                       (1 Point)              [ ] Pregnancy                                                     (1 Point)  [ ] Varicose veins                                               (1 Point)                     [ ] Post-partum < 6 weeks                                   (1 Point)             [x ] BMI > 25 Kg/m2                                            (1 Point)                     [ ] Hormonal therapy  or oral contraception          (1 Point)                 [ ] Sepsis (in the previous month)                        (1 Point)               [ ] History of pregnancy complications                 (1 point)  [ ] Pneumonia or serious lung disease                                               [ ] Unexplained or recurrent                     (1 Point)           (in the previous month)                               (1 Point)  [ ] Abnormal pulmonary function test                     (1 Point)                 SURGERY RELATED RISK FACTORS  [ ] Acute myocardial infarction                              (1 Point)               [ ]  Section                                             (1 Point)  [ ] Congestive heart failure (in the previous month)  (1 Point)      [ ] Minor surgery                                                  (1 Point)   [ ] Inflammatory bowel disease                             (1 Point)               [ ] Arthroscopic surgery                                        (2 Points)  [ ] Central venous access                                      (2 Points)                [x ] General surgery lasting more than 45 minutes (2 points)  [ ] Malignancy- Present or previous                   (2 Points)                [ ] Elective arthroplasty                                         (5 points)    [ ] Stroke (in the previous month)                          (5 Points)                                                                                                                                                           HEMATOLOGY RELATED FACTORS                                                 TRAUMA RELATED RISK FACTORS  [ ] Prior episodes of VTE                                     (3 Points)                [ ] Fracture of the hip, pelvis, or leg                       (5 Points)  [ ] Positive family history for VTE                         (3 Points)             [ ] Acute spinal cord injury (in the previous month)  (5 Points)  [ ] Prothrombin 98075 A                                     (3 Points)               [ ] Paralysis  (less than 1 month)                             (5 Points)  [ ] Factor V Leiden                                             (3 Points)                  [ ] Multiple Trauma within 1 month                        (5 Points)  [ ] Lupus anticoagulants                                     (3 Points)                                                           [ ] Anticardiolipin antibodies                               (3 Points)                                                       [ ] High homocysteine in the blood                      (3 Points)                                             [ ] Other congenital or acquired thrombophilia      (3 Points)                                                [ ] Heparin induced thrombocytopenia                  (3 Points)                                     Total Score [     5     ]  OPIOID RISK TOOL    [place] EACH BOX THAT APPLIES AND ADD TOTALS AT THE END    FAMILY HISTORY OF SUBSTANCE ABUSE                 FEMALE         MALE                                                Alcohol                             [  ]1 pt          [  ]3pts                                               Illegal Durgs                     [  ]2 pts        [  ]3pts                                               Rx Drugs                           [  ]4 pts        [  ]4 pts    PERSONAL HISTORY OF SUBSTANCE ABUSE                                                                                          Alcohol                             [  ]3 pts       [  ]3 pts                                               Illegal Drugs                     [  ]4 pts        [  ]4 pts                                               Rx Drugs                           [  ]5 pts        [  ]5 pts    AGE BETWEEN 16-45 YEARS                                      [  ]1 pt         [  ]1 pt    HISTORY OF PREADOLESCENT   SEXUAL ABUSE                                                             [  ]3 pts        [  ]0pts    PSYCHOLOGICAL DISEASE                     ADD, OCD, Bipolar, Schizophrenia        [  ]2 pts         [  ]2 pts                      Depression                                               [  ]1 pt           [  ]1 pt           SCORING TOTAL   (add numbers and type here)              (*0**)                                     A score of 3 or lower indicated LOW risk for future opioid abuse  A score of 4 to 7 indicated moderate risk for future opioid abuse  A score of 8 or higher indicates a high risk for opioid abuse

## 2024-06-05 ENCOUNTER — NON-APPOINTMENT (OUTPATIENT)
Age: 59
End: 2024-06-05

## 2024-06-05 LAB
ALBUMIN SERPL ELPH-MCNC: 4.9 G/DL
ALP BLD-CCNC: 166 U/L
ALT SERPL-CCNC: 36 U/L
ANION GAP SERPL CALC-SCNC: 17 MMOL/L
APPEARANCE: CLEAR
AST SERPL-CCNC: 20 U/L
BACTERIA: NEGATIVE /HPF
BILIRUB SERPL-MCNC: 0.2 MG/DL
BILIRUBIN URINE: NEGATIVE
BLOOD URINE: NEGATIVE
BUN SERPL-MCNC: 18 MG/DL
CALCIUM OXALATE CRYSTALS: PRESENT
CALCIUM SERPL-MCNC: 9.9 MG/DL
CAST: 10 /LPF
CHLORIDE SERPL-SCNC: 103 MMOL/L
CHOLEST SERPL-MCNC: 268 MG/DL
CO2 SERPL-SCNC: 20 MMOL/L
COLOR: YELLOW
CREAT SERPL-MCNC: 0.92 MG/DL
EGFR: 95 ML/MIN/1.73M2
EPITHELIAL CELLS: 3 /HPF
ESTIMATED AVERAGE GLUCOSE: 117 MG/DL
GLUCOSE QUALITATIVE U: NEGATIVE MG/DL
GLUCOSE SERPL-MCNC: 110 MG/DL
HBA1C MFR BLD HPLC: 5.7 %
HCT VFR BLD CALC: 48.7 %
HDLC SERPL-MCNC: 56 MG/DL
HGB BLD-MCNC: 16.6 G/DL
HYALINE CASTS: PRESENT
KETONES URINE: NEGATIVE MG/DL
LDLC SERPL CALC-MCNC: 181 MG/DL
LEUKOCYTE ESTERASE URINE: NEGATIVE
MCHC RBC-ENTMCNC: 31.1 PG
MCHC RBC-ENTMCNC: 34.1 GM/DL
MCV RBC AUTO: 91.4 FL
MICROSCOPIC-UA: NORMAL
MUCUS: PRESENT
NITRITE URINE: NEGATIVE
NONHDLC SERPL-MCNC: 212 MG/DL
PH URINE: 5.5
PLATELET # BLD AUTO: 180 K/UL
POTASSIUM SERPL-SCNC: 4.7 MMOL/L
PROT SERPL-MCNC: 7.7 G/DL
PROTEIN URINE: NORMAL MG/DL
PSA SERPL-MCNC: 0.51 NG/ML
RBC # BLD: 5.33 M/UL
RBC # FLD: 15.7 %
RED BLOOD CELLS URINE: NORMAL /HPF
REVIEW: NORMAL
SODIUM SERPL-SCNC: 140 MMOL/L
SPECIFIC GRAVITY URINE: 1.03
TRIGL SERPL-MCNC: 166 MG/DL
TSH SERPL-ACNC: 2.33 UIU/ML
UROBILINOGEN URINE: 0.2 MG/DL
WBC # FLD AUTO: 6.65 K/UL
WHITE BLOOD CELLS URINE: 1 /HPF

## 2024-06-06 ENCOUNTER — APPOINTMENT (OUTPATIENT)
Dept: FAMILY MEDICINE | Facility: CLINIC | Age: 59
End: 2024-06-06
Payer: MEDICAID

## 2024-06-06 VITALS
RESPIRATION RATE: 14 BRPM | DIASTOLIC BLOOD PRESSURE: 70 MMHG | OXYGEN SATURATION: 95 % | HEIGHT: 62 IN | WEIGHT: 128 LBS | BODY MASS INDEX: 23.55 KG/M2 | SYSTOLIC BLOOD PRESSURE: 110 MMHG | HEART RATE: 87 BPM

## 2024-06-06 DIAGNOSIS — I73.9 PERIPHERAL VASCULAR DISEASE, UNSPECIFIED: ICD-10-CM

## 2024-06-06 DIAGNOSIS — Z01.818 ENCOUNTER FOR OTHER PREPROCEDURAL EXAMINATION: ICD-10-CM

## 2024-06-06 PROCEDURE — 99213 OFFICE O/P EST LOW 20 MIN: CPT

## 2024-06-07 PROBLEM — Z01.818 PREOPERATIVE CLEARANCE: Status: ACTIVE | Noted: 2024-06-07

## 2024-06-07 PROBLEM — I73.9 PERIPHERAL ARTERY DISEASE: Noted: 2024-04-17

## 2024-06-07 NOTE — PLAN
[FreeTextEntry1] :     Medical clearance discussed with and reviewed by supervising attending Dr Manjula Lezama M.D.

## 2024-06-07 NOTE — PHYSICAL EXAM
[No Acute Distress] : no acute distress [Well Nourished] : well nourished [Well Developed] : well developed [Well-Appearing] : well-appearing [Normal Sclera/Conjunctiva] : normal sclera/conjunctiva [PERRL] : pupils equal round and reactive to light [EOMI] : extraocular movements intact [Normal Outer Ear/Nose] : the outer ears and nose were normal in appearance [Normal Oropharynx] : the oropharynx was normal [No JVD] : no jugular venous distention [No Lymphadenopathy] : no lymphadenopathy [Supple] : supple [Thyroid Normal, No Nodules] : the thyroid was normal and there were no nodules present [No Respiratory Distress] : no respiratory distress  [No Accessory Muscle Use] : no accessory muscle use [Clear to Auscultation] : lungs were clear to auscultation bilaterally [Normal Rate] : normal rate  [Regular Rhythm] : with a regular rhythm [Normal S1, S2] : normal S1 and S2 [No Murmur] : no murmur heard [No Carotid Bruits] : no carotid bruits [No Abdominal Bruit] : a ~M bruit was not heard ~T in the abdomen [No Varicosities] : no varicosities [No Edema] : there was no peripheral edema [No Palpable Aorta] : no palpable aorta [Soft] : abdomen soft [Non Tender] : non-tender [Non-distended] : non-distended [No Masses] : no abdominal mass palpated [No HSM] : no HSM [Normal Bowel Sounds] : normal bowel sounds [Normal Posterior Cervical Nodes] : no posterior cervical lymphadenopathy [Normal Anterior Cervical Nodes] : no anterior cervical lymphadenopathy [No CVA Tenderness] : no CVA  tenderness [No Spinal Tenderness] : no spinal tenderness [No Joint Swelling] : no joint swelling [Grossly Normal Strength/Tone] : grossly normal strength/tone [No Rash] : no rash [Coordination Grossly Intact] : coordination grossly intact [No Focal Deficits] : no focal deficits [Normal Gait] : normal gait [Deep Tendon Reflexes (DTR)] : deep tendon reflexes were 2+ and symmetric [Normal Affect] : the affect was normal [Normal Insight/Judgement] : insight and judgment were intact [de-identified] : Distal RLE with venous stasis changes and cooler than LEFT LE

## 2024-06-07 NOTE — HISTORY OF PRESENT ILLNESS
[No Pertinent Cardiac History] : no history of aortic stenosis, atrial fibrillation, coronary artery disease, recent myocardial infarction, or implantable device/pacemaker [No Pertinent Pulmonary History] : no history of asthma, COPD, sleep apnea, or smoking [No Adverse Anesthesia Reaction] : no adverse anesthesia reaction in self or family member [Chronic Anticoagulation] : chronic anticoagulation [Chronic Kidney Disease] : no chronic kidney disease [Diabetes] : no diabetes [(Patient denies any chest pain, claudication, dyspnea on exertion, orthopnea, palpitations or syncope)] : Patient denies any chest pain, claudication, dyspnea on exertion, orthopnea, palpitations or syncope [Anti-Platelet Agents: _____] : Anti-Platelet Agents: [unfilled] [Anticoagulants: _____] : Anticoagulants: [unfilled] [FreeTextEntry1] : RIGHT LOWER EXTREMITY ANGIOGRAM. [FreeTextEntry2] : 6/11/24 [FreeTextEntry3] : Dr Carlos Serna.

## 2024-06-07 NOTE — RESULTS/DATA
[] : results reviewed [de-identified] : Few PACs otherwise normal [de-identified] : A1c 5.8 ABO RH B Positive

## 2024-06-07 NOTE — ASSESSMENT
[High Risk Surgery - Intraperitoneal, Intrathoracic or Supringuinal Vascular Procedures] : High Risk Surgery - Intraperitoneal, Intrathoracic or Supringuinal Vascular Procedures - Yes (1) [Ischemic Heart Disease] : Ischemic Heart Disease - No (0) [Congestive Heart Failure] : Congestive Heart Failure - No (0) [Prior Cerebrovascular Accident or TIA] : Prior Cerebrovascular Accident or TIA - No (0) [Creatinine >= 2mg/dL (1 Point)] : Creatinine >= 2mg/dL - No (0) [Insulin-dependent Diabetic (1 Point)] : Insulin-dependent Diabetic - No (0) [1] : 1 , RCRI Class: II, Risk of Post-Op Cardiac Complications: 6.0%, 95% CI for Risk Estimate: 4.9% - 7.4% [Patient Optimized for Surgery] : Patient optimized for surgery [No Further Testing Recommended] : no further testing recommended [Continue anti-platelet treatment as is] : Continue current anti-platelet treatment [As per surgery] : as per surgery [FreeTextEntry4] : Laboratory results reviewed, no abnormalities found. EKG reviewed, few PACs, otherwise no abnormalities found. Pt is medically cleared for proposed procedure. [FreeTextEntry5] : Stop Xarelto starting 5 days prior to procedure (6/7/24) [FreeTextEntry6] : May continue ASA 81 mg [FreeTextEntry7] : Take anti-hypertensive medication with small sip of water on the day of the procedure. Avoid NSAIDs starting 1 week prior to procedure

## 2024-06-10 ENCOUNTER — TRANSCRIPTION ENCOUNTER (OUTPATIENT)
Age: 59
End: 2024-06-10

## 2024-06-11 ENCOUNTER — TRANSCRIPTION ENCOUNTER (OUTPATIENT)
Age: 59
End: 2024-06-11

## 2024-06-11 ENCOUNTER — APPOINTMENT (OUTPATIENT)
Dept: VASCULAR SURGERY | Facility: HOSPITAL | Age: 59
End: 2024-06-11

## 2024-06-11 ENCOUNTER — OUTPATIENT (OUTPATIENT)
Dept: INPATIENT UNIT | Facility: HOSPITAL | Age: 59
LOS: 1 days | End: 2024-06-11
Payer: COMMERCIAL

## 2024-06-11 VITALS
RESPIRATION RATE: 16 BRPM | TEMPERATURE: 98 F | SYSTOLIC BLOOD PRESSURE: 103 MMHG | HEART RATE: 54 BPM | DIASTOLIC BLOOD PRESSURE: 84 MMHG | OXYGEN SATURATION: 100 % | WEIGHT: 130.95 LBS | HEIGHT: 64 IN

## 2024-06-11 VITALS
DIASTOLIC BLOOD PRESSURE: 66 MMHG | TEMPERATURE: 97 F | OXYGEN SATURATION: 96 % | RESPIRATION RATE: 10 BRPM | HEART RATE: 54 BPM | SYSTOLIC BLOOD PRESSURE: 165 MMHG

## 2024-06-11 DIAGNOSIS — K40.90 UNILATERAL INGUINAL HERNIA, WITHOUT OBSTRUCTION OR GANGRENE, NOT SPECIFIED AS RECURRENT: Chronic | ICD-10-CM

## 2024-06-11 DIAGNOSIS — Z96.7 PRESENCE OF OTHER BONE AND TENDON IMPLANTS: Chronic | ICD-10-CM

## 2024-06-11 DIAGNOSIS — Z98.890 OTHER SPECIFIED POSTPROCEDURAL STATES: Chronic | ICD-10-CM

## 2024-06-11 DIAGNOSIS — Z98.89 OTHER SPECIFIED POSTPROCEDURAL STATES: Chronic | ICD-10-CM

## 2024-06-11 DIAGNOSIS — I70.221 ATHEROSCLEROSIS OF NATIVE ARTERIES OF EXTREMITIES WITH REST PAIN, RIGHT LEG: ICD-10-CM

## 2024-06-11 PROCEDURE — 76000 FLUOROSCOPY <1 HR PHYS/QHP: CPT

## 2024-06-11 PROCEDURE — 75710 ARTERY X-RAYS ARM/LEG: CPT | Mod: 26,GC

## 2024-06-11 PROCEDURE — 36247 INS CATH ABD/L-EXT ART 3RD: CPT | Mod: RT

## 2024-06-11 PROCEDURE — C1760: CPT

## 2024-06-11 PROCEDURE — C1769: CPT

## 2024-06-11 PROCEDURE — C1887: CPT

## 2024-06-11 PROCEDURE — 75625 CONTRAST EXAM ABDOMINL AORTA: CPT | Mod: 26,GC

## 2024-06-11 PROCEDURE — C1894: CPT

## 2024-06-11 DEVICE — GWIRE STR .035X260 STIFF: Type: IMPLANTABLE DEVICE | Site: RIGHT | Status: FUNCTIONAL

## 2024-06-11 DEVICE — CATH ANGIO GLIDECATH ANGLE 5FR X 100CM: Type: IMPLANTABLE DEVICE | Site: RIGHT | Status: FUNCTIONAL

## 2024-06-11 DEVICE — CATH OMNI FLSH 0.035IN 5FRX65: Type: IMPLANTABLE DEVICE | Site: RIGHT | Status: FUNCTIONAL

## 2024-06-11 DEVICE — SHEATH INTRODUCER TERUMO PINNACLE PERIPHERAL 5FR X 10CM X 0.035" MINI WIRE: Type: IMPLANTABLE DEVICE | Site: RIGHT | Status: FUNCTIONAL

## 2024-06-11 DEVICE — MICRO-INTRO 5F STIFFEN 0.018X40CM NITINOL TUNG TIP 7CM SMOOT: Type: IMPLANTABLE DEVICE | Site: RIGHT | Status: FUNCTIONAL

## 2024-06-11 DEVICE — DEVICE CLOSURE 5F MYNX GRIP MUST ORDER MIN OF 10 EA: Type: IMPLANTABLE DEVICE | Site: RIGHT | Status: FUNCTIONAL

## 2024-06-11 RX ORDER — HYDROMORPHONE HYDROCHLORIDE 2 MG/ML
0.5 INJECTION INTRAMUSCULAR; INTRAVENOUS; SUBCUTANEOUS
Refills: 0 | Status: DISCONTINUED | OUTPATIENT
Start: 2024-06-11 | End: 2024-06-11

## 2024-06-11 RX ORDER — QUETIAPINE FUMARATE 200 MG/1
200 TABLET, FILM COATED ORAL AT BEDTIME
Refills: 0 | Status: ACTIVE | OUTPATIENT
Start: 2024-06-11 | End: 2025-05-10

## 2024-06-11 RX ORDER — ACETAMINOPHEN 500 MG
1000 TABLET ORAL ONCE
Refills: 0 | Status: DISCONTINUED | OUTPATIENT
Start: 2024-06-11 | End: 2024-06-11

## 2024-06-11 RX ORDER — FENTANYL CITRATE 50 UG/ML
25 INJECTION INTRAVENOUS
Refills: 0 | Status: DISCONTINUED | OUTPATIENT
Start: 2024-06-11 | End: 2024-06-11

## 2024-06-11 RX ORDER — RIVAROXABAN 15 MG-20MG
2.5 KIT ORAL
Refills: 0 | Status: ACTIVE | OUTPATIENT
Start: 2024-06-11 | End: 2025-05-10

## 2024-06-11 RX ORDER — SODIUM CHLORIDE 9 MG/ML
3 INJECTION INTRAMUSCULAR; INTRAVENOUS; SUBCUTANEOUS ONCE
Refills: 0 | Status: DISCONTINUED | OUTPATIENT
Start: 2024-06-11 | End: 2024-06-11

## 2024-06-11 RX ORDER — ASPIRIN/CALCIUM CARB/MAGNESIUM 324 MG
81 TABLET ORAL DAILY
Refills: 0 | Status: ACTIVE | OUTPATIENT
Start: 2024-06-11 | End: 2025-05-10

## 2024-06-11 RX ORDER — VENLAFAXINE HCL 75 MG
75 CAPSULE, EXT RELEASE 24 HR ORAL DAILY
Refills: 0 | Status: ACTIVE | OUTPATIENT
Start: 2024-06-11 | End: 2025-05-10

## 2024-06-11 RX ORDER — ATORVASTATIN CALCIUM 80 MG/1
80 TABLET, FILM COATED ORAL AT BEDTIME
Refills: 0 | Status: ACTIVE | OUTPATIENT
Start: 2024-06-11 | End: 2025-05-10

## 2024-06-11 RX ORDER — AMLODIPINE BESYLATE 2.5 MG/1
10 TABLET ORAL DAILY
Refills: 0 | Status: ACTIVE | OUTPATIENT
Start: 2024-06-11 | End: 2025-05-10

## 2024-06-11 RX ORDER — ONDANSETRON 8 MG/1
4 TABLET, FILM COATED ORAL ONCE
Refills: 0 | Status: DISCONTINUED | OUTPATIENT
Start: 2024-06-11 | End: 2024-06-11

## 2024-06-11 RX ORDER — GABAPENTIN 400 MG/1
1 CAPSULE ORAL
Qty: 0 | Refills: 0 | DISCHARGE

## 2024-06-11 RX ORDER — PANTOPRAZOLE SODIUM 20 MG/1
40 TABLET, DELAYED RELEASE ORAL
Refills: 0 | Status: ACTIVE | OUTPATIENT
Start: 2024-06-11 | End: 2025-05-10

## 2024-06-11 RX ORDER — ACETAMINOPHEN 500 MG
975 TABLET ORAL ONCE
Refills: 0 | Status: COMPLETED | OUTPATIENT
Start: 2024-06-11 | End: 2024-06-11

## 2024-06-11 RX ADMIN — Medication 975 MILLIGRAM(S): at 06:51

## 2024-06-11 NOTE — ASU DISCHARGE PLAN (ADULT/PEDIATRIC) - NS MD DC FALL RISK RISK
For information on Fall & Injury Prevention, visit: [email or web address] OR  [email or web address] OR  https://www.cdc.gov/steadi/patient.html

## 2024-06-11 NOTE — DISCHARGE NOTE PROVIDER - HOSPITAL COURSE
Patient is a 58 yo M with a hx of s/p right femoral-Distal tibial artery bypass with CryoVein on 8/30/22, former smoker with PAD (s/p right SFA stenting in 2017), carotid stenosis, s/p left femoral endarterectomy on 10/1/21, S/p  Right great toe gangrene status post  right fem-PT bypass with GSV on 5/13/22, who has been c/o RLE discomfort. He underwent a diagnostic RLE angiogram 6/11. Patient remained hemodynamically stable post op and was admitted for cardiac clearance in preparation for RLE bypass.

## 2024-06-11 NOTE — CONSULT NOTE ADULT - SUBJECTIVE AND OBJECTIVE BOX
[facility]                                      [facility]                              [address] [place] [address]                                                 PHONE: [phone]                                                 FAX: [phone]  ------------------------------------------------------------------------------------------------    Chief complaint: leg pain    59y Male with past medical history as under s/p diagnostic RLE angiogram. Post operatively patient remained stable but was going to be admitted to obtain cardiac clearance in prep for RLE bypass he will need in near future.   Pt's wife recently passed away and requests to go home at this time.   At the time of evaluation, pt reports feeling better and is interested in pursuing surgery as outpt.     PAST MEDICAL & SURGICAL HISTORY:  Hypercholesterolemia      Seizures  2021   last seizure      Depression      GERD (gastroesophageal reflux disease)      Hypertension      Peripheral vascular disease  with R SFA stent      Osteoarthritis      Former smoker      Prediabetes      Hypertension      PAD (peripheral artery disease)      Inguinal hernia, left      S/P ORIF (open reduction internal fixation) fracture  Left 5th digit      S/P shoulder surgery  right      S/P appendectomy      H/O endarterectomy  L femoral a.  R femoral a. april 2022      Status post femorotibial bypass  with GSV 5/13/22        No Known Allergies    Vital Signs Last 24 Hrs  T(C): 36.1 (11 Jun 2024 09:04), Max: 36.6 (11 Jun 2024 06:26)  T(F): 97 (11 Jun 2024 09:04), Max: 97.8 (11 Jun 2024 06:26)  HR: 58 (11 Jun 2024 11:30) (51 - 68)  BP: 153/87 (11 Jun 2024 11:30) (103/84 - 172/80)  BP(mean): --  RR: 12 (11 Jun 2024 11:30) (12 - 16)  SpO2: 100% (11 Jun 2024 11:30) (95% - 100%)    Parameters below as of 11 Jun 2024 11:30    O2 Flow (L/min): 3    RELEVANT PHYSICAL EXAM:  Neck: No obvious JVD  Cardiovascular: regular S1, S2  Respiratory: Lungs clear to auscultation; no crepitations, no wheeze  Musculoskeletal: No edema    LABS:    CARDIOLOGY TESTING:(reviewed)   ECG (Independent visualization): NSR with APCs    TELEMETRY independently visualized/reviewed and demonstrated : NSR with APCs    MEDICATIONS:(reviewed)  Home Medications:  MEDICATIONS  (STANDING):  acetaminophen   IVPB .. 1000 milliGRAM(s) IV Intermittent once  amLODIPine   Tablet 10 milliGRAM(s) Oral daily  aspirin  chewable 81 milliGRAM(s) Oral daily  atorvastatin 80 milliGRAM(s) Oral at bedtime  pantoprazole    Tablet 40 milliGRAM(s) Oral before breakfast  phenytoin   Capsule 200 milliGRAM(s) Oral every 12 hours  QUEtiapine 200 milliGRAM(s) Oral at bedtime  rivaroxaban 2.5 milliGRAM(s) Oral two times a day  venlafaxine XR. 75 milliGRAM(s) Oral daily    MEDICATIONS  (PRN):  fentaNYL    Injectable 25 MICROGram(s) IV Push every 5 minutes PRN Moderate Pain (4 - 6)  HYDROmorphone  Injectable 0.5 milliGRAM(s) IV Push every 10 minutes PRN Severe Pain (7 - 10)  ondansetron Injectable 4 milliGRAM(s) IV Push once PRN Nausea and/or Vomiting      ASSESSMENT AND PLAN:    59y Male with prior history of PAD s/p right femoral-Distal tibial artery bypass with CryoVein on 8/30/22, former smoker with PAD (s/p right SFA stenting in 2017), carotid stenosis, s/p left femoral endarterectomy on 10/1/21, S/p  Right great toe gangrene status post  right fem-PT bypass with GSV on 5/13/22  s/p diagnostic RLE angiogram. Needs cardiac clearance in prep for RLE bypass    Plan for Echo and stress test as outpt ASAP for periop risk stratification for vascular surgery  On ASA and low dose Xarelto for PAD  Continue high dose statin  Continue amlodipine for HTN

## 2024-06-11 NOTE — DISCHARGE NOTE PROVIDER - NSDCFUSCHEDAPPT_GEN_ALL_CORE_FT
[facility] [address]  Scheduled Appointment: 06/14/2024

## 2024-06-11 NOTE — DISCHARGE NOTE PROVIDER - CARE PROVIDER_API CALL
Carlos Serna  Surgery  [address] [facility]  [place] [address]  Phone: [phone]  Fax: [phone]  Follow Up Time: 1 week

## 2024-06-11 NOTE — ASU PREOP CHECKLIST - WAS PATIENT ON BETA BLOCKER?
No Intermediate Repair Preamble Text (Leave Blank If You Do Not Want): Layered closure was performed.

## 2024-06-11 NOTE — ASU DISCHARGE PLAN (ADULT/PEDIATRIC) - CARE PROVIDER_API CALL
Carlos Serna  Surgery  [address] [facility] [address] [facility]  [place] [address]  Phone: [phone]  Fax: [phone]  Follow Up Time: 2 weeks

## 2024-06-11 NOTE — ASU DISCHARGE PLAN (ADULT/PEDIATRIC) - ASU DC SPECIAL INSTRUCTIONSFT
BATHING: Please do not submerge wound underwater. You may shower and/or sponge bathe.   ACTIVITY: No heavy lifting or straining. Otherwise, you may return to your usual level of physical activity.   DIET: Return to your usual diet.  NOTIFY YOUR SURGEON IF: You have any bleeding that does not stop, any pus draining from your wound(s), any fever (over 100.4 F) or chills, persistent nausea/vomiting, persistent diarrhea, or if your pain is not controlled on your discharge pain medications.  FOLLOW-UP: Please follow up with your primary care physician and [facility] [phone] in 10-14 days regarding your hospitalization. Call for appointment upon discharge.

## 2024-06-11 NOTE — ASU DISCHARGE PLAN (ADULT/PEDIATRIC) - ***IN THE EVENT THAT YOU DEVELOP A COMPLICATION AND YOU ARE UNABLE TO REACH YOUR OWN PHYSICIAN, YOU MAY CONTACT:
Patient should have EGD  Does she need prior auth  Epigastric pain, anemia, weight loss, concern for ulcer or celiac disease (celiac labs normal). H pylori negative. Needs EGD before can consider capsule. Statement Selected

## 2024-06-11 NOTE — ASU DISCHARGE PLAN (ADULT/PEDIATRIC) - MEDICATION INSTRUCTIONS
Head; normocephalic, atraumatic.  Mouth and Throat; Oral cavity and pharynx appearance normal.
Tylenol prn

## 2024-06-11 NOTE — BRIEF OPERATIVE NOTE - NSICDXBRIEFPROCEDURE_GEN_ALL_CORE_FT
PROCEDURES:  Fluoroscopic angiography of vessels of right lower extremity with contrast 11-Jun-2024 09:08:55 diagnostic RLE angiogram Darcy Rojas

## 2024-06-11 NOTE — CHART NOTE - NSCHARTNOTEFT_GEN_A_CORE
post operatively patient remained stable but was going to be admitted to obtain cardiac clearance in prep for RLE bypass he will need in near future.   Pt's wife recently passed away and requests to go home at this time.   Patient is ok to be discharged home after 4 hours of laying flat post op and the surgery will be planned through the office.   Patient's groin site is c/d/i, no active bleeding and no hematoma.

## 2024-06-11 NOTE — ASU DISCHARGE PLAN (ADULT/PEDIATRIC) - PATIENT EDUCATION MATERIALS PROVIED
Patient: Rin Jordan  : 1952  Date of Visit: 2017    REASON FOR VISIT / CONSULTATION:   Chief Complaint   Patient presents with    6 Month Follow-Up     HX: Stent, CAD, HTN. Pt states he is doing pretty good. C/O: Has been having a stabbing pain in his chest has had it twice in the last two weeks lasts a sec. Palpitation once last week. Denies: Lightheaded/dizziness, SOB. Dear Dr. Zamzam Botello had the pleasure of seeing Rin Jordan today for follow up on his cardiac status. As you know, Mr. Jabier Ozuna is 75 y.o male with PMH of hypertension, hyperlipidemia, type II diabetes and coronary artery disease with last heart cath done on 2013 at [facility]. Vs at that time he has NEDRA to OM1. The cath also showed mild disease of the other coronaries and normal left ventricular function by ventriculogram.      Earlier this year, patient had an episode of prolonged chest pain. Retrosternal, burning in nature without radiation. Pain occurred at rest with partial relief with sublingual nitrates. Repeat cardiac cath on  2017 showed new totally occluded mid RCA. An attempt to open the totally occluded RCA on 2017 has failed. Patient treated medically and did very well over the past 6 months. 2 short episodes of chest pain which he described as chest stabbing in nature. Each lasted for seconds. No sweating or diaphoresis. No significant radiation. No significant shortness of breath. No orthopnea or PND. No significant change in weight. He was not able to tolerate Ranexa before. He continues to smoke but he is trying to quit. Past Medical History:   Diagnosis Date    Abnormal cardiac cath 13    LMCA; Mild irregularities 10-20%. LAD: Abnormal.  Mild to moderate irregularities throughout the LAD and branches. LCx:  abnormal.  80-90% stenosis in a moderate sized OM1. RCA: Abnormal.  40-50% mid RCA stenosis.     Actinic keratosis     Arthritis     neck, fingers  Calculus of kidney     Cervical stenosis of spine     Diabetes mellitus (HCC)     GERD (gastroesophageal reflux disease)     Gout     H/O echocardiogram 04/29/2016    Stress echo. Normal resting LV systolic function,normal systolic restponse to exercise. No evidence of reversible  ischemia on this maximal,symptom limited,treadmill exerxiess stress echocardiography study    History of cardiac cath 02/09/2017    LMCA: Lesion on LMCA: Distal subsection . 20% stenosis. LAD: Lesion on 1st Diag: Mid subsection . 75% stenosis. RCA: Lesion on Mid RCA: Mid subsection . 100% stenosis. Comments: The distal RCA fills by left to right collateralization. Dominance: Mixed. LV function assessed as: Normal. EF 65%.  Hyperlipidemia     Hypertension     Raynaud's phenomenon     S/P angioplasty with stent 9/20/13    PTCA-NEDRA of  the OM 1         Past Surgical History:   Procedure Laterality Date    CARDIAC CATHETERIZATION  2007    patient states had 2 small blockages    CARDIAC CATHETERIZATION  02/09/2017    LMCA: Lesion on LMCA: Distal subsection 20% stenosis. LAD: Lesion on 1st Diag: Mid subsection 75% stenosis. RCA: Lesion on Mid RCA: Mid subsection 100% stenosis.  CARDIAC CATHETERIZATION  02/09/2017    Attempted PCI to chronic total occlusion of RCA was not successful. Unable to cross with multiple wires due to heavy calcification and toruosity.     CATARACT REMOVAL  4/6/15    Right eye - Dr. Becky Kearney  6/8/15    Dr. Consuelo Kennedy - left eye    COLONOSCOPY  1/11/2006    Dr. Rose Allred - internal hemorrhoids, no polps    COLONOSCOPY  5/23/2016    -polyp    HEMORRHOID SURGERY      HERNIA REPAIR      NECK SURGERY  1997    c4-5 laminectomy and fusion    OTHER SURGICAL HISTORY      Right foot - 7 from heal and 1 from great toe at [address]  12/11/14    pain injection - cervical    UPPER GASTROINTESTINAL ENDOSCOPY  12/28/2005    Dr. Rose Allred - mild esophagitis and gastritis     Family History   Problem Relation Age of Onset    Heart Disease Mother     Diabetes Mother     High Blood Pressure Mother     Diabetes Father     Heart Disease Sister      CABG    Heart Disease Brother     Heart Disease Brother     Heart Disease Brother     Cancer Brother          Social History   Substance Use Topics    Smoking status: Former Smoker     Packs/day: 1.00     Years: 50.00     Types: Cigarettes     Quit date: 9/1/2013    Smokeless tobacco: Never Used      Comment: Patient recently had accupuncture to help stop. Goes next week for another    Alcohol use No         Current Outpatient Prescriptions:     atenolol (TENORMIN) 25 MG tablet, Take 1 tablet by mouth daily, Disp: 90 tablet, Rfl: 3    amLODIPine (NORVASC) 10 MG tablet, TAKE ONE-HALF TABLET BY MOUTH DAILY, Disp: 45 tablet, Rfl: 3    clopidogrel (PLAVIX) 75 MG tablet, Take 1 tablet by mouth daily, Disp: 90 tablet, Rfl: 3    nitroGLYCERIN (NITROSTAT) 0.4 MG SL tablet, Place 1 tablet under the tongue every 5 minutes as needed for Chest pain, Disp: 25 tablet, Rfl: 3    metFORMIN (GLUCOPHAGE) 1000 MG tablet, Take 1 tablet by mouth 2 times daily (with meals), Disp: 60 tablet, Rfl: 3    atorvastatin (LIPITOR) 20 MG tablet, Take 1 tablet by mouth daily, Disp: 90 tablet, Rfl: 3    Ranitidine HCl (ZANTAC PO), Take 1 tablet by mouth as needed , Disp: , Rfl:     TRADJENTA 5 MG tablet, Take 5 mg by mouth daily, Disp: , Rfl:     bisacodyl (DULCOLAX) 5 MG EC tablet, Take 5 mg by mouth daily as needed for Constipation Takes 2 daily, Disp: , Rfl:     lisinopril (PRINIVIL;ZESTRIL) 20 MG tablet, Take 1 tablet by mouth daily, Disp: 60 tablet, Rfl: 3    glimepiride (AMARYL) 2 MG tablet, Take 1 tablet by mouth 2 times daily, Disp: 180 tablet, Rfl: 3    aspirin 81 MG tablet, Take 81 mg by mouth daily. , Disp: , Rfl:     Allergies   Allergen Reactions    Niacin And Related     Oxycodone Hcl     Other Nausea And significant shortness of breath. 2 short episodes of palpitations since last visit. Continue medical therapy and risk factor modification. · Atherosclerotic Heart Disease:  Antiplatelet Agent: Continue Aspirin 81 mg daily. Continue Plavix 75 mg daily. · Beta Blocker Therapy: Continue atenolol 25 mg once daily I discussed the potential side effects of this medication including lightheadedness and dizziness and told him to call the office if this occurs. · Other Anti-anginal medications: [id number] mg daily   · Statin Therapy: Continue atorvastatin (Lipitor)  . I discussed the potential benefits of statin therapy as well as the potential risks including myalgia as well as the rare but potentially serious complication of liver or kidney damage. Although rare, I told him that this could be serious and therefore told him to stop the medication immediately and call if he developed any severe muscle aches or pains and he agreed to do so. · Additional Testing: I Ordered an Echocardiogram to assess Mr. Mcknight Ramp ejection fraction and to look for significant valvular heart disease as a source of Mr. Inna Galvez symptoms. · BP controlled. Continue Norvasc and Lisinopril. · Follow up lipid panel as per Yaneth Walker    Follow-up after 6 months or sooner if needed. Counseled regarding diet and exercise. Counseled regarding smoking cessation. Thank you for letting us participate in the care of this patient.      Sincerely,  [id number] [facility]  Phone: [phone]; Fax: [phone] Provider pre-printed instructions given

## 2024-06-11 NOTE — DISCHARGE NOTE PROVIDER - NSDCMRMEDTOKEN_GEN_ALL_CORE_FT
amLODIPine 10 mg oral tablet: 1 tab(s) orally once a day  aspirin 81 mg oral tablet, chewable: 1 tab(s) orally once a day  atorvastatin 80 mg oral tablet: 1 tab(s) orally once a day  Fish Oil 1000 mg oral capsule: 1 cap(s) orally 2 times a day  pantoprazole 40 mg oral delayed release tablet: 1 tab(s) orally once a day  phenytoin 200 mg oral capsule, extended release: 1 cap(s) orally every 12 hours  QUEtiapine 200 mg oral tablet:  orally once a day (at bedtime)  venlafaxine 75 mg oral capsule, extended release: 1 cap(s) orally once a day  Xarelto 2.5 mg oral tablet: 1 tab(s) orally 2 times a day Pt was instructed on last dose 6/8/24 prior to this procedure

## 2024-06-14 ENCOUNTER — APPOINTMENT (OUTPATIENT)
Dept: FAMILY MEDICINE | Facility: CLINIC | Age: 59
End: 2024-06-14

## 2024-06-19 RX ORDER — RIVAROXABAN 2.5 MG/1
2.5 TABLET, FILM COATED ORAL
Qty: 180 | Refills: 3 | Status: ACTIVE | COMMUNITY
Start: 2023-02-21 | End: 1900-01-01

## 2024-06-27 ENCOUNTER — APPOINTMENT (OUTPATIENT)
Dept: VASCULAR SURGERY | Facility: CLINIC | Age: 59
End: 2024-06-27
Payer: MEDICAID

## 2024-06-27 VITALS
HEIGHT: 62 IN | HEART RATE: 62 BPM | SYSTOLIC BLOOD PRESSURE: 145 MMHG | DIASTOLIC BLOOD PRESSURE: 77 MMHG | RESPIRATION RATE: 14 BRPM | WEIGHT: 135.03 LBS | OXYGEN SATURATION: 97 % | BODY MASS INDEX: 24.85 KG/M2 | TEMPERATURE: 98.6 F

## 2024-06-27 DIAGNOSIS — I73.9 PERIPHERAL VASCULAR DISEASE, UNSPECIFIED: ICD-10-CM

## 2024-06-27 PROCEDURE — 93970 EXTREMITY STUDY: CPT

## 2024-06-27 PROCEDURE — 99024 POSTOP FOLLOW-UP VISIT: CPT

## 2024-06-28 ENCOUNTER — OUTPATIENT (OUTPATIENT)
Dept: OUTPATIENT SERVICES | Facility: HOSPITAL | Age: 59
LOS: 1 days | End: 2024-06-28
Payer: COMMERCIAL

## 2024-06-28 VITALS
RESPIRATION RATE: 16 BRPM | WEIGHT: 132.28 LBS | SYSTOLIC BLOOD PRESSURE: 110 MMHG | OXYGEN SATURATION: 99 % | DIASTOLIC BLOOD PRESSURE: 60 MMHG | TEMPERATURE: 98 F | HEIGHT: 64 IN | HEART RATE: 52 BPM

## 2024-06-28 DIAGNOSIS — Z96.7 PRESENCE OF OTHER BONE AND TENDON IMPLANTS: Chronic | ICD-10-CM

## 2024-06-28 DIAGNOSIS — Z98.890 OTHER SPECIFIED POSTPROCEDURAL STATES: Chronic | ICD-10-CM

## 2024-06-28 DIAGNOSIS — Z01.818 ENCOUNTER FOR OTHER PREPROCEDURAL EXAMINATION: ICD-10-CM

## 2024-06-28 DIAGNOSIS — Z98.89 OTHER SPECIFIED POSTPROCEDURAL STATES: Chronic | ICD-10-CM

## 2024-06-28 DIAGNOSIS — K40.90 UNILATERAL INGUINAL HERNIA, WITHOUT OBSTRUCTION OR GANGRENE, NOT SPECIFIED AS RECURRENT: Chronic | ICD-10-CM

## 2024-06-28 LAB
A1C WITH ESTIMATED AVERAGE GLUCOSE RESULT: 5.7 % — HIGH (ref 4–5.6)
ALBUMIN SERPL ELPH-MCNC: 4.3 G/DL — SIGNIFICANT CHANGE UP (ref 3.3–5.2)
ALP SERPL-CCNC: 123 U/L — HIGH (ref 40–120)
ALT FLD-CCNC: 22 U/L — SIGNIFICANT CHANGE UP
ANION GAP SERPL CALC-SCNC: 10 MMOL/L — SIGNIFICANT CHANGE UP (ref 5–17)
APTT BLD: 30.7 SEC — SIGNIFICANT CHANGE UP (ref 24.5–35.6)
AST SERPL-CCNC: 20 U/L — SIGNIFICANT CHANGE UP
BASOPHILS # BLD AUTO: 0.04 K/UL — SIGNIFICANT CHANGE UP (ref 0–0.2)
BASOPHILS NFR BLD AUTO: 0.5 % — SIGNIFICANT CHANGE UP (ref 0–2)
BILIRUB SERPL-MCNC: <0.2 MG/DL — LOW (ref 0.4–2)
BLD GP AB SCN SERPL QL: SIGNIFICANT CHANGE UP
BUN SERPL-MCNC: 15 MG/DL — SIGNIFICANT CHANGE UP (ref 8–20)
CALCIUM SERPL-MCNC: 9.1 MG/DL — SIGNIFICANT CHANGE UP (ref 8.4–10.5)
CHLORIDE SERPL-SCNC: 103 MMOL/L — SIGNIFICANT CHANGE UP (ref 96–108)
CO2 SERPL-SCNC: 26 MMOL/L — SIGNIFICANT CHANGE UP (ref 22–29)
CREAT SERPL-MCNC: 0.74 MG/DL — SIGNIFICANT CHANGE UP (ref 0.5–1.3)
EGFR: 103 ML/MIN/1.73M2 — SIGNIFICANT CHANGE UP
EOSINOPHIL # BLD AUTO: 0.23 K/UL — SIGNIFICANT CHANGE UP (ref 0–0.5)
EOSINOPHIL NFR BLD AUTO: 2.8 % — SIGNIFICANT CHANGE UP (ref 0–6)
ESTIMATED AVERAGE GLUCOSE: 117 MG/DL — HIGH (ref 68–114)
GLUCOSE SERPL-MCNC: 97 MG/DL — SIGNIFICANT CHANGE UP (ref 70–99)
HCT VFR BLD CALC: 45.2 % — SIGNIFICANT CHANGE UP (ref 39–50)
HGB BLD-MCNC: 14.9 G/DL — SIGNIFICANT CHANGE UP (ref 13–17)
IMM GRANULOCYTES NFR BLD AUTO: 0.2 % — SIGNIFICANT CHANGE UP (ref 0–0.9)
INR BLD: 0.92 RATIO — SIGNIFICANT CHANGE UP (ref 0.85–1.18)
LYMPHOCYTES # BLD AUTO: 1.78 K/UL — SIGNIFICANT CHANGE UP (ref 1–3.3)
LYMPHOCYTES # BLD AUTO: 21.9 % — SIGNIFICANT CHANGE UP (ref 13–44)
MCHC RBC-ENTMCNC: 30.8 PG — SIGNIFICANT CHANGE UP (ref 27–34)
MCHC RBC-ENTMCNC: 33 GM/DL — SIGNIFICANT CHANGE UP (ref 32–36)
MCV RBC AUTO: 93.6 FL — SIGNIFICANT CHANGE UP (ref 80–100)
MONOCYTES # BLD AUTO: 0.68 K/UL — SIGNIFICANT CHANGE UP (ref 0–0.9)
MONOCYTES NFR BLD AUTO: 8.4 % — SIGNIFICANT CHANGE UP (ref 2–14)
NEUTROPHILS # BLD AUTO: 5.36 K/UL — SIGNIFICANT CHANGE UP (ref 1.8–7.4)
NEUTROPHILS NFR BLD AUTO: 66.2 % — SIGNIFICANT CHANGE UP (ref 43–77)
PLATELET # BLD AUTO: 195 K/UL — SIGNIFICANT CHANGE UP (ref 150–400)
POTASSIUM SERPL-MCNC: 4.8 MMOL/L — SIGNIFICANT CHANGE UP (ref 3.5–5.3)
POTASSIUM SERPL-SCNC: 4.8 MMOL/L — SIGNIFICANT CHANGE UP (ref 3.5–5.3)
PROT SERPL-MCNC: 7.3 G/DL — SIGNIFICANT CHANGE UP (ref 6.6–8.7)
PROTHROM AB SERPL-ACNC: 10.2 SEC — SIGNIFICANT CHANGE UP (ref 9.5–13)
RBC # BLD: 4.83 M/UL — SIGNIFICANT CHANGE UP (ref 4.2–5.8)
RBC # FLD: 15.2 % — HIGH (ref 10.3–14.5)
SODIUM SERPL-SCNC: 139 MMOL/L — SIGNIFICANT CHANGE UP (ref 135–145)
WBC # BLD: 8.11 K/UL — SIGNIFICANT CHANGE UP (ref 3.8–10.5)
WBC # FLD AUTO: 8.11 K/UL — SIGNIFICANT CHANGE UP (ref 3.8–10.5)

## 2024-06-28 PROCEDURE — 86901 BLOOD TYPING SEROLOGIC RH(D): CPT

## 2024-06-28 PROCEDURE — 85610 PROTHROMBIN TIME: CPT

## 2024-06-28 PROCEDURE — 86900 BLOOD TYPING SEROLOGIC ABO: CPT

## 2024-06-28 PROCEDURE — 85025 COMPLETE CBC W/AUTO DIFF WBC: CPT

## 2024-06-28 PROCEDURE — 36415 COLL VENOUS BLD VENIPUNCTURE: CPT

## 2024-06-28 PROCEDURE — 85730 THROMBOPLASTIN TIME PARTIAL: CPT

## 2024-06-28 PROCEDURE — 80053 COMPREHEN METABOLIC PANEL: CPT

## 2024-06-28 PROCEDURE — 83036 HEMOGLOBIN GLYCOSYLATED A1C: CPT

## 2024-06-28 PROCEDURE — 86850 RBC ANTIBODY SCREEN: CPT

## 2024-06-28 RX ORDER — PANTOPRAZOLE SODIUM 20 MG/1
1 TABLET, DELAYED RELEASE ORAL
Qty: 0 | Refills: 0 | DISCHARGE

## 2024-06-28 RX ORDER — RIVAROXABAN 15 MG-20MG
1 KIT ORAL
Refills: 0 | DISCHARGE

## 2024-06-28 RX ORDER — ATORVASTATIN CALCIUM 80 MG/1
1 TABLET, FILM COATED ORAL
Qty: 0 | Refills: 0 | DISCHARGE

## 2024-06-28 RX ORDER — OMEGA-3 ACID ETHYL ESTERS 1 G
1 CAPSULE ORAL
Qty: 0 | Refills: 0 | DISCHARGE

## 2024-06-28 RX ORDER — AMLODIPINE BESYLATE 2.5 MG/1
1 TABLET ORAL
Qty: 0 | Refills: 0 | DISCHARGE

## 2024-06-28 RX ORDER — VENLAFAXINE HCL 75 MG
1 CAPSULE, EXT RELEASE 24 HR ORAL
Qty: 0 | Refills: 0 | DISCHARGE

## 2024-07-03 PROBLEM — I70.221 ATHEROSCLEROSIS OF NATIVE ARTERIES OF EXTREMITIES WITH REST PAIN, RIGHT LEG: Chronic | Status: ACTIVE | Noted: 2024-06-28
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
## 2024-07-11 ENCOUNTER — APPOINTMENT (OUTPATIENT)
Dept: FAMILY MEDICINE | Facility: CLINIC | Age: 59
End: 2024-07-11
Payer: MEDICAID

## 2024-07-11 VITALS
DIASTOLIC BLOOD PRESSURE: 78 MMHG | RESPIRATION RATE: 14 BRPM | HEIGHT: 62 IN | HEART RATE: 50 BPM | BODY MASS INDEX: 23.92 KG/M2 | OXYGEN SATURATION: 98 % | WEIGHT: 130 LBS | SYSTOLIC BLOOD PRESSURE: 130 MMHG

## 2024-07-11 DIAGNOSIS — Z01.818 ENCOUNTER FOR OTHER PREPROCEDURAL EXAMINATION: ICD-10-CM

## 2024-07-11 DIAGNOSIS — I73.9 PERIPHERAL VASCULAR DISEASE, UNSPECIFIED: ICD-10-CM

## 2024-07-11 PROCEDURE — 99214 OFFICE O/P EST MOD 30 MIN: CPT

## 2024-07-14 ENCOUNTER — TRANSCRIPTION ENCOUNTER (OUTPATIENT)
Age: 59
End: 2024-07-14

## 2024-07-15 ENCOUNTER — APPOINTMENT (OUTPATIENT)
Dept: VASCULAR SURGERY | Facility: HOSPITAL | Age: 59
End: 2024-07-15

## 2024-07-15 RX ORDER — RIVAROXABAN 10 MG/1
1 TABLET, FILM COATED ORAL
Refills: 0 | DISCHARGE

## 2024-07-16 ENCOUNTER — RESULT REVIEW (OUTPATIENT)
Age: 59
End: 2024-07-16

## 2024-07-16 ENCOUNTER — TRANSCRIPTION ENCOUNTER (OUTPATIENT)
Age: 59
End: 2024-07-16

## 2024-07-19 ENCOUNTER — TRANSCRIPTION ENCOUNTER (OUTPATIENT)
Age: 59
End: 2024-07-19

## 2024-07-22 ENCOUNTER — TRANSCRIPTION ENCOUNTER (OUTPATIENT)
Age: 59
End: 2024-07-22

## 2024-07-22 RX ORDER — PHENYTOIN SODIUM 100 MG/1
2 CAPSULE, EXTENDED RELEASE ORAL
Refills: 0 | DISCHARGE

## 2024-07-29 ENCOUNTER — NON-APPOINTMENT (OUTPATIENT)
Age: 59
End: 2024-07-29

## 2024-08-01 ENCOUNTER — APPOINTMENT (OUTPATIENT)
Dept: VASCULAR SURGERY | Facility: CLINIC | Age: 59
End: 2024-08-01
Payer: MEDICAID

## 2024-08-01 VITALS
HEART RATE: 95 BPM | HEIGHT: 62 IN | DIASTOLIC BLOOD PRESSURE: 52 MMHG | OXYGEN SATURATION: 95 % | WEIGHT: 144 LBS | RESPIRATION RATE: 16 BRPM | TEMPERATURE: 98.3 F | BODY MASS INDEX: 26.5 KG/M2 | SYSTOLIC BLOOD PRESSURE: 144 MMHG

## 2024-08-01 DIAGNOSIS — I99.8 PAIN IN UNSPECIFIED FOOT: ICD-10-CM

## 2024-08-01 DIAGNOSIS — M79.673 PAIN IN UNSPECIFIED FOOT: ICD-10-CM

## 2024-08-01 PROCEDURE — 99024 POSTOP FOLLOW-UP VISIT: CPT

## 2024-08-01 RX ORDER — RIVAROXABAN 20 MG/1
20 TABLET, FILM COATED ORAL
Qty: 1 | Refills: 3 | Status: ACTIVE | COMMUNITY
Start: 2024-08-01 | End: 1900-01-01

## 2024-08-02 PROBLEM — M79.673 ISCHEMIC PAIN OF FOOT AT REST: Status: ACTIVE | Noted: 2024-08-02

## 2024-08-02 NOTE — DISCUSSION/SUMMARY
[FreeTextEntry1] : 58 YO M with hx of RLE ischemic rest pain and multiple prior RLE bypasses now s/p RLE redo femoral to peroneal bypass with PTFE with lateral fibulectomy and plastics sartorius muscle flap. His post op course complicated by bleeding from sartorius flap. Now doing well - staples from lower incision removed, will remove lower leg sutures in 2 weeks - He will follow up with plastics as well, Dr. Earl - return to clinic in 2 weeks to remove staples and first post op arterial duplex of bypass

## 2024-08-02 NOTE — PHYSICAL EXAM
[Normal Rate and Rhythm] : normal rate and rhythm [2+] : left 2+ [Abdomen Tenderness] : ~T ~M No abdominal tenderness [No Rash or Lesion] : No rash or lesion [Skin Ulcer] : no ulcer [Alert] : alert [Oriented to Person] : oriented to person [Oriented to Place] : oriented to place [Oriented to Time] : oriented to time [Calm] : calm [de-identified] :  no acute distress, sitting in chair [de-identified] : normocephalic, atraumatic [de-identified] :  supple, no masses. [de-identified] :   normal respiratory effort [FreeTextEntry1] : triphasic PT signal, biphasic DP signal.  [de-identified] : Moves all extremities.

## 2024-08-02 NOTE — REASON FOR VISIT
[de-identified] : 60 YO M presents for 2 week post op visit after right CFA to peroneal bypass with PTFE and lateral fibulectomy with plastics sartorius muscle flap. Post op course complicated by bleeding from muscle flap requiring take back to OR and washout. Doing well. States rest pain is resolved. Has some leg edema. No fevers, chills. No drainage from incisions. NICOLE drain from sart flap with minimal output. Hasn't seen plastics yet.

## 2024-08-06 NOTE — DISCHARGE NOTE PROVIDER - NSDCFUSCHEDAPPT_GEN_ALL_CORE_FT
[FreeTextEntry2] : the third SynoJoynt injection into the left knee Mohit Pat  [facility]  [facility] [address]  Scheduled Appointment: 09/12/2022

## 2024-08-19 ENCOUNTER — APPOINTMENT (OUTPATIENT)
Dept: VASCULAR SURGERY | Facility: CLINIC | Age: 59
End: 2024-08-19
Payer: MEDICAID

## 2024-08-19 ENCOUNTER — APPOINTMENT (OUTPATIENT)
Dept: VASCULAR SURGERY | Facility: CLINIC | Age: 59
End: 2024-08-19

## 2024-08-19 VITALS
DIASTOLIC BLOOD PRESSURE: 59 MMHG | OXYGEN SATURATION: 94 % | HEIGHT: 62 IN | BODY MASS INDEX: 26.5 KG/M2 | TEMPERATURE: 97.7 F | HEART RATE: 74 BPM | SYSTOLIC BLOOD PRESSURE: 112 MMHG | RESPIRATION RATE: 16 BRPM | WEIGHT: 144 LBS

## 2024-08-19 DIAGNOSIS — M79.674 PAIN IN RIGHT TOE(S): ICD-10-CM

## 2024-08-19 PROCEDURE — 93926 LOWER EXTREMITY STUDY: CPT | Mod: RT

## 2024-08-19 PROCEDURE — 93922 UPR/L XTREMITY ART 2 LEVELS: CPT

## 2024-08-19 PROCEDURE — 99024 POSTOP FOLLOW-UP VISIT: CPT

## 2024-08-19 NOTE — ASSESSMENT
[FreeTextEntry1] : 59 year old man s/p right femoral-Distal tibial artery bypass with CryoVein on 8/30/22, PAD (s/p right SFA stenting in 2017), carotid stenosis, s/p left femoral endarterectomy on 10/1/21, right fem-PT bypass with GSV on 5/13/22 with known occlusion of the right femoral to posterior tibial bypass now with RLE ischemic rest pain.    now status post a right femoral to mid peroneal bypass with PTFE with lateral fibulectomy with sartorius muscle flap by plastics doing well. Arterial duplex demonstrates patent stent distal bypass without any evidence of stenosis Return to clinic in 2 months for ongoing surveillance of right fem distal bypass

## 2024-08-19 NOTE — REASON FOR VISIT
[de-identified] : 59-year-old male presents for postop visit after right common femoral to peroneal bypass with PTFE with lateral fibulectomy for ischemic rest pain.  He had a sartorius muscle flap with plastic surgery with postop course complicated by hematoma requiring washout.  His rest pain in the foot has now resolved he had some postoperative swelling in the leg which is also resolved he is taking Xarelto and aspirin daily incisions are all healed he has seen plastic surgery and the NICOLE drain fibroid is not removed

## 2024-09-10 ENCOUNTER — APPOINTMENT (OUTPATIENT)
Dept: FAMILY MEDICINE | Facility: CLINIC | Age: 59
End: 2024-09-10
Payer: MEDICAID

## 2024-09-10 VITALS
DIASTOLIC BLOOD PRESSURE: 80 MMHG | BODY MASS INDEX: 25.58 KG/M2 | HEART RATE: 93 BPM | OXYGEN SATURATION: 96 % | WEIGHT: 139 LBS | RESPIRATION RATE: 14 BRPM | HEIGHT: 62 IN | SYSTOLIC BLOOD PRESSURE: 120 MMHG

## 2024-09-10 DIAGNOSIS — E78.1 PURE HYPERGLYCERIDEMIA: ICD-10-CM

## 2024-09-10 DIAGNOSIS — S81.801A UNSPECIFIED OPEN WOUND, RIGHT LOWER LEG, INITIAL ENCOUNTER: ICD-10-CM

## 2024-09-10 DIAGNOSIS — D64.9 ANEMIA, UNSPECIFIED: ICD-10-CM

## 2024-09-10 DIAGNOSIS — G40.909 EPILEPSY, UNSPECIFIED, NOT INTRACTABLE, W/OUT STATUS EPILEPTICUS: ICD-10-CM

## 2024-09-10 DIAGNOSIS — I10 ESSENTIAL (PRIMARY) HYPERTENSION: ICD-10-CM

## 2024-09-10 DIAGNOSIS — F32.A DEPRESSION, UNSPECIFIED: ICD-10-CM

## 2024-09-10 DIAGNOSIS — F41.9 ANXIETY DISORDER, UNSPECIFIED: ICD-10-CM

## 2024-09-10 DIAGNOSIS — I73.9 PERIPHERAL VASCULAR DISEASE, UNSPECIFIED: ICD-10-CM

## 2024-09-10 PROCEDURE — 99214 OFFICE O/P EST MOD 30 MIN: CPT

## 2024-09-10 PROCEDURE — G2211 COMPLEX E/M VISIT ADD ON: CPT | Mod: NC

## 2024-09-10 RX ORDER — BUDESONIDE AND FORMOTEROL FUMARATE DIHYDRATE 160; 4.5 UG/1; UG/1
160-4.5 AEROSOL RESPIRATORY (INHALATION) TWICE DAILY
Qty: 1 | Refills: 6 | Status: ACTIVE | COMMUNITY
Start: 2024-09-10 | End: 1900-01-01

## 2024-09-10 NOTE — HISTORY OF PRESENT ILLNESS
[FreeTextEntry1] : Follow up. [de-identified] : This is a 58 y/o male with PMHx significant for Anemia, anxiety, Carotid stenosis s/p CEA 10/1/21, depression, elevated LFTs, GERD, HTN, HLD, Insomnia, PAD s/p right SFA stenting in 2017, s/p right fem-PT bypass with GSV on 5/13/22, s/p  right femoral-Distal tibial artery bypass with CryoVein on 8/30/22, Prediabetes, CAD s/p stent, seizure disorder, presenting to the office for medication refills. Pt offers no new complains, still experiencing RIGHT leg pain / discomfort.

## 2024-09-10 NOTE — H&P PST ADULT - PATIENT'S GENDER IDENTITY
She basically had gastroenteritis from enteropathogenic E COLI     75,000 - 100,000 cfu/mL Mixed gram positive and negative  organisms  Multiple organisms isolated, no predominance. Culture  indicates probable urogenital contamination. Please review  colony count and clinical indications to determine if a  repeat culture is necessary. No further workup to be done.     AMPYLOBACTER Negative   PLESIOMONAS SHIGELLOIDES Negative   SALMONELLA Negative   VIBRIO Negative   VIBRIO CHOLERAE Negative   YERSINIA ENTEROCOLITICA Negative   ENTEROAGGREGATIVE E. COLI (EAEC) Negative   ENTEROPATHOGENIC E. COLI (EPEC) Positive Abnormal    ENTEROTOXIGENIC E. COLI (ETEC) LT/ST Negative   SHIGA-LIKE TOXIN-PRODUCING E COLI (STEC) STX1/STX2 Negative   SHIGELLA/ENTEROINVASIVE E. COLI (EIEC) Negative   CRYPTOSPORIDIUM Negative   CYCLOSPORA CAYETANENSIS Negative   ENTAMOEBA HISTOLYTICA Negative   GIARDIA LAMBLIA Negative   ADENOVIRUS F 40/41 Negative   ASTROVIRUS Negative   NOROVIRUS G1/G2 Negative   ROTAVIRUS A Negative   SAPOVIRUS Negative   ]   Male

## 2024-09-10 NOTE — ASSESSMENT
[FreeTextEntry1] : This is a 58 y/o male with PMHx significant for Anemia, anxiety, Carotid stenosis s/p CEA 10/1/21, depression, elevated LFTs, GERD, HTN, HLD, Insomnia, PAD s/p right SFA stenting in 2017, s/p right fem-PT bypass with GSV on 5/13/22, s/p  right femoral-Distal tibial artery bypass with CryoVein on 8/30/22, Prediabetes, CAD s/p stent, seizure disorder, presenting to the office for medication refills. Pt currently following with Vascular for RLE ischemia.  VASCULAR: s/p right femoral-Distal tibial artery bypass with CryoVein on 8/30/22, PAD (s/p right SFA stenting in 2017), carotid stenosis, s/p left femoral endarterectomy on 10/1/21, right fem-PT bypass with GSV on 5/13/22 with known occlusion of the right femoral to posterior tibial bypass, s/p RLE angiogram, 6/11/24 with Dr Serna -Continue low dose aspirin, Xarelto 2.5 mg bid, Gabapentin 600 mg qid -Follow up with Dr Serna 10/14/24 -LE Duplex limited showed patent femoral to peroneal bypass graft 8/18/24 -Right JOSE 0.89 (mild arterial occlusive disease) 8/19/24 -Left JOSE 0.33 (Evidence of severe arterial occlusive disease) 8/19/24.  CARDIO: h/o HTN, HLD -Blood pressure under control -Cardiology Dr Mitch Irby -Continue Amlodipine 10 mg daily, Atorvastatin 80 mg daily, ASA 81 mg, Omega 3-Acid Esters 2gm bid, all e-refilled  NEURO / PSYCH: h/o Seizures, insomnia, Depression -No reported recent seizures -Continue Phenytoin 500 mg daily, Seroquel 200 mg daily at bedtime, Venlafaxine 75 mg daily, all e-refilled  ENDO: Prediabetes -A1c 5.6 --> 5.7 on 6/24 -Diet and exercise as tolerated.  GI: Elevated LFTS, GERD -Continue Pantoprazole 40 mg daily  HCM: -Low dose CT scan for Lung CA screening recommended, never performed -FIOBT screen + 4/2021, referred for Colonoscopy by previous PMD, not compliant with recs. -HIV screening declined -Hep C screening negative 2015 -Pneumovax 11/15 -TDaP 12/17

## 2024-09-10 NOTE — PHYSICAL EXAM
[No Acute Distress] : no acute distress [Well Nourished] : well nourished [Well Developed] : well developed [Well-Appearing] : well-appearing [No Respiratory Distress] : no respiratory distress  [No Accessory Muscle Use] : no accessory muscle use [Clear to Auscultation] : lungs were clear to auscultation bilaterally [Normal Rate] : normal rate  [Regular Rhythm] : with a regular rhythm [Normal S1, S2] : normal S1 and S2 [No Murmur] : no murmur heard [No Carotid Bruits] : no carotid bruits [No Abdominal Bruit] : a ~M bruit was not heard ~T in the abdomen [No Varicosities] : no varicosities [No Edema] : there was no peripheral edema [No Palpable Aorta] : no palpable aorta [Soft] : abdomen soft [Non Tender] : non-tender [Non-distended] : non-distended [No Masses] : no abdominal mass palpated [No HSM] : no HSM [Normal Bowel Sounds] : normal bowel sounds [No Spinal Tenderness] : no spinal tenderness [Deep Tendon Reflexes (DTR)] : deep tendon reflexes were 2+ and symmetric [Normal Affect] : the affect was normal [Normal Insight/Judgement] : insight and judgment were intact [de-identified] : Distal RIGHT LE wound dressing in place c/d/i [de-identified] : Walks aided by a cane

## 2024-09-10 NOTE — HEALTH RISK ASSESSMENT
[1 or 2 (0 pts)] : 1 or 2 (0 points) [Never (0 pts)] : Never (0 points) [No] : In the past 12 months have you used drugs other than those required for medical reasons? No [No falls in past year] : Patient reported no falls in the past year [0] : 2) Feeling down, depressed, or hopeless: Not at all (0) [PHQ-2 Negative - No further assessment needed] : PHQ-2 Negative - No further assessment needed [Patient/Caregiver unclear of wishes] : , patient/caregiver unclear of wishes [Reviewed updated] : Reviewed, updated [Former] : Former [< 15 Years] : < 15 Years [Audit-CScore] : 0 [de-identified] : none [de-identified] : regular [[id number]] : 0 [AdvancecareDate] : 05/24 [de-identified] : smoked 45 years-quit 2022

## 2024-09-23 NOTE — DISCHARGE NOTE NURSING/CASE MANAGEMENT/SOCIAL WORK - NSPROEXTENSIONSOFSELF_GEN_A_NUR
cane [Fever] : fever [Difficulty with Sleep] : difficulty with sleep [Nasal Discharge] : nasal discharge [Nasal Congestion] : nasal congestion [Sore Throat] : sore throat [Cough] : cough [Appetite Changes] : appetite changes [Negative] : Genitourinary [Headache] : no headache [Ear Pain] : no ear pain [Vomiting] : no vomiting [Diarrhea] : no diarrhea [Abdominal Pain] : no abdominal pain

## 2024-10-23 ENCOUNTER — APPOINTMENT (OUTPATIENT)
Dept: VASCULAR SURGERY | Facility: CLINIC | Age: 59
End: 2024-10-23

## 2024-10-31 ENCOUNTER — APPOINTMENT (OUTPATIENT)
Dept: NEUROLOGY | Facility: CLINIC | Age: 59
End: 2024-10-31

## 2024-11-06 ENCOUNTER — APPOINTMENT (OUTPATIENT)
Dept: VASCULAR SURGERY | Facility: CLINIC | Age: 59
End: 2024-11-06
Payer: MEDICAID

## 2024-11-06 VITALS
DIASTOLIC BLOOD PRESSURE: 59 MMHG | WEIGHT: 145 LBS | OXYGEN SATURATION: 97 % | HEIGHT: 62 IN | RESPIRATION RATE: 16 BRPM | TEMPERATURE: 98.3 F | SYSTOLIC BLOOD PRESSURE: 149 MMHG | HEART RATE: 64 BPM | BODY MASS INDEX: 26.68 KG/M2

## 2024-11-06 PROCEDURE — 93926 LOWER EXTREMITY STUDY: CPT | Mod: RT

## 2024-11-06 PROCEDURE — 99213 OFFICE O/P EST LOW 20 MIN: CPT

## 2024-11-12 ENCOUNTER — RX RENEWAL (OUTPATIENT)
Age: 59
End: 2024-11-12

## 2024-12-03 ENCOUNTER — NON-APPOINTMENT (OUTPATIENT)
Age: 59
End: 2024-12-03

## 2024-12-03 RX ORDER — CLOPIDOGREL BISULFATE 75 MG/1
75 TABLET, FILM COATED ORAL DAILY
Qty: 30 | Refills: 0 | Status: ACTIVE | COMMUNITY
Start: 2024-12-03 | End: 1900-01-01

## 2024-12-09 LAB — PHENYTOIN SERPL QL: 2.5 UG/ML

## 2024-12-10 ENCOUNTER — APPOINTMENT (OUTPATIENT)
Age: 59
End: 2024-12-10

## 2024-12-10 DIAGNOSIS — B35.1 TINEA UNGUIUM: ICD-10-CM

## 2024-12-10 DIAGNOSIS — I73.9 PERIPHERAL VASCULAR DISEASE, UNSPECIFIED: ICD-10-CM

## 2024-12-10 PROCEDURE — 11721 DEBRIDE NAIL 6 OR MORE: CPT

## 2024-12-10 PROCEDURE — 99203 OFFICE O/P NEW LOW 30 MIN: CPT | Mod: 25

## 2024-12-12 ENCOUNTER — NON-APPOINTMENT (OUTPATIENT)
Age: 59
End: 2024-12-12

## 2024-12-31 ENCOUNTER — APPOINTMENT (OUTPATIENT)
Dept: NEUROLOGY | Facility: CLINIC | Age: 59
End: 2024-12-31

## 2025-01-03 ENCOUNTER — APPOINTMENT (OUTPATIENT)
Dept: FAMILY MEDICINE | Facility: CLINIC | Age: 60
End: 2025-01-03

## 2025-01-03 VITALS
TEMPERATURE: 97.2 F | HEART RATE: 70 BPM | OXYGEN SATURATION: 98 % | BODY MASS INDEX: 25.76 KG/M2 | WEIGHT: 140 LBS | SYSTOLIC BLOOD PRESSURE: 130 MMHG | HEIGHT: 62 IN | DIASTOLIC BLOOD PRESSURE: 78 MMHG | RESPIRATION RATE: 16 BRPM

## 2025-01-03 PROCEDURE — 99214 OFFICE O/P EST MOD 30 MIN: CPT | Mod: 25

## 2025-01-06 LAB
CHOLEST SERPL-MCNC: 239 MG/DL
HDLC SERPL-MCNC: 57 MG/DL
LDLC SERPL CALC-MCNC: 166 MG/DL
NONHDLC SERPL-MCNC: 182 MG/DL
PHENYTOIN SERPL QL: 24.4 UG/ML
TRIGL SERPL-MCNC: 90 MG/DL

## 2025-01-07 RX ORDER — CLOPIDOGREL BISULFATE 75 MG/1
75 TABLET, FILM COATED ORAL
Qty: 7 | Refills: 0 | Status: ACTIVE | COMMUNITY
Start: 2025-01-07 | End: 1900-01-01

## 2025-01-13 PROBLEM — Z01.818 PREOPERATIVE CLEARANCE: Status: RESOLVED | Noted: 2024-06-07 | Resolved: 2025-01-13

## 2025-01-13 PROBLEM — Z01.818 PRE-OP EVALUATION: Status: RESOLVED | Noted: 2022-08-08 | Resolved: 2025-01-13

## 2025-02-05 ENCOUNTER — APPOINTMENT (OUTPATIENT)
Dept: VASCULAR SURGERY | Facility: CLINIC | Age: 60
End: 2025-02-05
Payer: MEDICAID

## 2025-02-05 VITALS
DIASTOLIC BLOOD PRESSURE: 60 MMHG | WEIGHT: 140 LBS | SYSTOLIC BLOOD PRESSURE: 97 MMHG | BODY MASS INDEX: 25.76 KG/M2 | TEMPERATURE: 98.4 F | OXYGEN SATURATION: 95 % | RESPIRATION RATE: 16 BRPM | HEIGHT: 62 IN | HEART RATE: 89 BPM

## 2025-02-05 DIAGNOSIS — I73.9 PERIPHERAL VASCULAR DISEASE, UNSPECIFIED: ICD-10-CM

## 2025-02-05 PROCEDURE — 93926 LOWER EXTREMITY STUDY: CPT | Mod: RT

## 2025-02-05 PROCEDURE — 99213 OFFICE O/P EST LOW 20 MIN: CPT

## 2025-02-24 ENCOUNTER — APPOINTMENT (OUTPATIENT)
Age: 60
End: 2025-02-24
Payer: MEDICAID

## 2025-02-24 DIAGNOSIS — I73.9 PERIPHERAL VASCULAR DISEASE, UNSPECIFIED: ICD-10-CM

## 2025-02-24 DIAGNOSIS — B35.1 TINEA UNGUIUM: ICD-10-CM

## 2025-02-24 PROCEDURE — 99213 OFFICE O/P EST LOW 20 MIN: CPT | Mod: 25

## 2025-02-24 PROCEDURE — 11721 DEBRIDE NAIL 6 OR MORE: CPT

## 2025-02-26 NOTE — ED ADULT NURSE NOTE - DRUG PRE-SCREENING (DAST -1)
-- DO NOT REPLY / DO NOT REPLY ALL --  -- This inbox is not monitored. If this was sent to the wrong provider or department, reroute message to P [facility] Reroute pool. --  -- Message is from Engagement Center Operations (ECO) --      Message Type:  Refill Medication   Refill request for Pended medication named: [email or web address]  Preferred pharmacy verified, and selected.   [id number] - [address] AT CORNER OF [address] [place] [address]    Is the patient OUT of Medication?  Yes and During Work Hours Medication Refills handled by [facility] Clinical - route as high priority to [facility] CLINICAL MSG pool. Patient has been advised it will be addressed within 1 business day.     Message: Patient is out of medication since January had extra friday in month.                 Copied from CRM #[id number]. Topic: MW Medication/Rx - MW Rx Refill  >> Feb 26, 2025 12:06 PM Esthela CURTIS wrote:  Álvaro Dumont called to request a medication refill and is out of medications or critically low during working hours. Medication is:  Listed on Med Management list. [facility] Clinical to process refill: Selected 'Wrap Up CRM' and created new Refill Med Encounter after clicking 'Convert to Clinical Call'. Selected reason for call 'Refill Request'. Pended medication. Sent Med template and routed as high priority to [facility] CLINICAL MSG POOL. Readback full message.   Statement Selected

## 2025-03-28 ENCOUNTER — APPOINTMENT (OUTPATIENT)
Dept: NEUROLOGY | Facility: CLINIC | Age: 60
End: 2025-03-28
Payer: MEDICAID

## 2025-03-28 VITALS
WEIGHT: 140 LBS | DIASTOLIC BLOOD PRESSURE: 72 MMHG | HEIGHT: 65 IN | SYSTOLIC BLOOD PRESSURE: 145 MMHG | BODY MASS INDEX: 23.32 KG/M2

## 2025-03-28 DIAGNOSIS — G40.909 EPILEPSY, UNSPECIFIED, NOT INTRACTABLE, W/OUT STATUS EPILEPTICUS: ICD-10-CM

## 2025-03-28 PROCEDURE — 99205 OFFICE O/P NEW HI 60 MIN: CPT

## 2025-06-24 ENCOUNTER — APPOINTMENT (OUTPATIENT)
Dept: FAMILY MEDICINE | Facility: CLINIC | Age: 60
End: 2025-06-24
Payer: MEDICAID

## 2025-06-24 VITALS
RESPIRATION RATE: 14 BRPM | HEIGHT: 65 IN | WEIGHT: 132 LBS | HEART RATE: 85 BPM | SYSTOLIC BLOOD PRESSURE: 112 MMHG | OXYGEN SATURATION: 97 % | TEMPERATURE: 97.3 F | BODY MASS INDEX: 21.99 KG/M2 | DIASTOLIC BLOOD PRESSURE: 60 MMHG

## 2025-06-24 LAB
CHOLEST SERPL-MCNC: 205 MG/DL
HDLC SERPL-MCNC: 46 MG/DL
LDLC SERPL-MCNC: 126 MG/DL
NONHDLC SERPL-MCNC: 159 MG/DL
PHENYTOIN SERPL QL: 19.5 UG/ML
TRIGL SERPL-MCNC: 187 MG/DL

## 2025-06-24 PROCEDURE — 83036 HEMOGLOBIN GLYCOSYLATED A1C: CPT | Mod: QW

## 2025-06-24 PROCEDURE — 99214 OFFICE O/P EST MOD 30 MIN: CPT | Mod: 25

## 2025-07-21 ENCOUNTER — APPOINTMENT (OUTPATIENT)
Age: 60
End: 2025-07-21
Payer: MEDICAID

## 2025-07-21 VITALS — WEIGHT: 145 LBS | HEIGHT: 65 IN | BODY MASS INDEX: 24.16 KG/M2

## 2025-07-21 DIAGNOSIS — R73.03 PREDIABETES.: ICD-10-CM

## 2025-07-21 DIAGNOSIS — B35.1 TINEA UNGUIUM: ICD-10-CM

## 2025-07-21 PROCEDURE — 99213 OFFICE O/P EST LOW 20 MIN: CPT | Mod: 25

## 2025-07-21 PROCEDURE — 11721 DEBRIDE NAIL 6 OR MORE: CPT | Mod: Q8

## 2025-08-14 ENCOUNTER — APPOINTMENT (OUTPATIENT)
Dept: FAMILY MEDICINE | Facility: CLINIC | Age: 60
End: 2025-08-14
Payer: MEDICAID

## 2025-08-14 VITALS
OXYGEN SATURATION: 95 % | BODY MASS INDEX: 21.83 KG/M2 | DIASTOLIC BLOOD PRESSURE: 60 MMHG | TEMPERATURE: 98 F | HEIGHT: 65 IN | SYSTOLIC BLOOD PRESSURE: 120 MMHG | WEIGHT: 131 LBS | HEART RATE: 70 BPM

## 2025-08-14 DIAGNOSIS — I73.9 PERIPHERAL VASCULAR DISEASE, UNSPECIFIED: ICD-10-CM

## 2025-08-14 DIAGNOSIS — E78.5 HYPERLIPIDEMIA, UNSPECIFIED: ICD-10-CM

## 2025-08-14 DIAGNOSIS — I99.8 PAIN IN UNSPECIFIED FOOT: ICD-10-CM

## 2025-08-14 DIAGNOSIS — G40.909 EPILEPSY, UNSPECIFIED, NOT INTRACTABLE, W/OUT STATUS EPILEPTICUS: ICD-10-CM

## 2025-08-14 DIAGNOSIS — Z95.820 PERIPHERAL VASCULAR ANGIOPLASTY STATUS WITH IMPLANTS AND GRAFTS: ICD-10-CM

## 2025-08-14 DIAGNOSIS — M79.673 PAIN IN UNSPECIFIED FOOT: ICD-10-CM

## 2025-08-14 DIAGNOSIS — E78.1 PURE HYPERGLYCERIDEMIA: ICD-10-CM

## 2025-08-14 DIAGNOSIS — I10 ESSENTIAL (PRIMARY) HYPERTENSION: ICD-10-CM

## 2025-08-14 PROCEDURE — 99214 OFFICE O/P EST MOD 30 MIN: CPT

## 2025-08-14 PROCEDURE — G2211 COMPLEX E/M VISIT ADD ON: CPT | Mod: NC

## 2025-09-03 ENCOUNTER — APPOINTMENT (OUTPATIENT)
Dept: VASCULAR SURGERY | Facility: CLINIC | Age: 60
End: 2025-09-03

## 2025-09-10 ENCOUNTER — APPOINTMENT (OUTPATIENT)
Dept: FAMILY MEDICINE | Facility: CLINIC | Age: 60
End: 2025-09-10

## (undated) DEVICE — BLANKET WARMER UPPER ADULT

## (undated) DEVICE — SOL IRR POUR NS 0.9% 500ML

## (undated) DEVICE — SUT VICRYL 2-0 27" SH UNDYED

## (undated) DEVICE — SPONGE PEANUT AUTO COUNT

## (undated) DEVICE — SUT PROLENE 6-0 30" BV-1

## (undated) DEVICE — GLV 7.5 PROTEXIS (WHITE)

## (undated) DEVICE — DRSG DERMABOND 0.7ML

## (undated) DEVICE — STAPLER SKIN PROXIMATE

## (undated) DEVICE — SUT NYLON 3-0 18" PS-2

## (undated) DEVICE — SUT VICRYL 2-0 27" CT-1 UNDYED

## (undated) DEVICE — DRSG TELFA 3 X 4

## (undated) DEVICE — SOL IRR POUR NS 0.9% 1000ML

## (undated) DEVICE — DRAPE DOME BAG 22"

## (undated) DEVICE — SUT VICRYL 3-0 27" PS-2 UNDYED

## (undated) DEVICE — SUT GORETEX CV-5 (4-0) 36" TTC-13

## (undated) DEVICE — NDL HYPO REGULAR BEVEL 25G X 1.5" (BLUE)

## (undated) DEVICE — SUT SILK 0 30" SH

## (undated) DEVICE — DRAPE SPLIT SHEET 77" X 108"

## (undated) DEVICE — EVERGRIP FOGARTY CLAMP INSERT 86MM DISP

## (undated) DEVICE — SUCTION YANKAUER NO CONTROL VENT

## (undated) DEVICE — DRAPE 3/4 SHEET 52X76"

## (undated) DEVICE — SUT VICRYL 3-0 27" SH

## (undated) DEVICE — WRAP COMPRESSION CALF MED

## (undated) DEVICE — SOL IRR POUR H2O 1000ML

## (undated) DEVICE — SOL BAG NS 0.9% 1000ML

## (undated) DEVICE — Device

## (undated) DEVICE — VESSEL LOOP MINI-BLUE 0.075" X 16"

## (undated) DEVICE — DRAPE C ARM UNIVERSAL

## (undated) DEVICE — COVER PROBE T TIP INTRAOP SM

## (undated) DEVICE — CLAMP BULLDOG MIDI STRAIGHT YELLOW DISP

## (undated) DEVICE — EVERGRIP FOGARTY CLAMP INSERT 61MM DISP

## (undated) DEVICE — PACK VASCULAR

## (undated) DEVICE — POSITIONER CARDIAC BUMP

## (undated) DEVICE — DRSG 4X4

## (undated) DEVICE — DRSG MASTISOL

## (undated) DEVICE — SUT PROLENE 6-0 30" C-1

## (undated) DEVICE — SUT SILK 3-0 30" TIES

## (undated) DEVICE — SYR CONTROL LUER LOK 10CC

## (undated) DEVICE — SUT SILK 4-0 30" TIES

## (undated) DEVICE — GEL AQUSNC PACKET 20GR

## (undated) DEVICE — DRAPE IOBAN 23X33"

## (undated) DEVICE — WARMING BLANKET LOWER ADULT

## (undated) DEVICE — DRAPE ISOLATION BAG 20X20"

## (undated) DEVICE — SOL INJ NS 0.9% 500ML 1-PORT

## (undated) DEVICE — SUT SILK 2-0 30" TIES

## (undated) DEVICE — VENODYNE/SCD SLEEVE CALF MEDIUM

## (undated) DEVICE — SHEATH TUNNELER GRN 9.5MM X 20.5IN

## (undated) DEVICE — COVER ULTRASOUND PROBE T-SHAPED INTRAOP SM

## (undated) DEVICE — DRSG ADAPTIC 3X8"

## (undated) DEVICE — URETERAL CATH RED RUBBER 20FR (YELLOW)

## (undated) DEVICE — DRAPE SPLIT SHEETS 77X108"

## (undated) DEVICE — DRSG STERISTRIPS 0.5 X 4"

## (undated) DEVICE — LIGASURE EXACT DISSCT 20.6MMX21CM

## (undated) DEVICE — DRAPE XL SHEET 77X98"

## (undated) DEVICE — TORQUE DEVICE FOR GUIDEWIRE 0.0100.038"

## (undated) DEVICE — SUT MONOCRYL 4-0 27" PS-2 UNDYED

## (undated) DEVICE — WARMING BLANKET UPPER ADULT

## (undated) DEVICE — GLV 6.5 PROTEXIS (WHITE)

## (undated) DEVICE — DRAPE THYROID 77" X 123"

## (undated) DEVICE — GLV 6 PROTEXIS (WHITE)